# Patient Record
Sex: FEMALE | Race: BLACK OR AFRICAN AMERICAN | NOT HISPANIC OR LATINO | Employment: FULL TIME | URBAN - METROPOLITAN AREA
[De-identification: names, ages, dates, MRNs, and addresses within clinical notes are randomized per-mention and may not be internally consistent; named-entity substitution may affect disease eponyms.]

---

## 2017-01-13 RX ORDER — BETAMETHASONE DIPROPIONATE 0.5 MG/G
CREAM TOPICAL
Qty: 45 G | Refills: 3 | Status: SHIPPED | OUTPATIENT
Start: 2017-01-13 | End: 2018-07-27 | Stop reason: SDUPTHER

## 2017-02-13 ENCOUNTER — TELEPHONE (OUTPATIENT)
Dept: OBSTETRICS AND GYNECOLOGY | Facility: CLINIC | Age: 52
End: 2017-02-13

## 2017-02-13 RX ORDER — NORETHINDRONE 0.35 MG/1
1 TABLET ORAL DAILY
Qty: 84 TABLET | Refills: 3 | Status: SHIPPED | OUTPATIENT
Start: 2017-02-13 | End: 2017-02-17 | Stop reason: SDUPTHER

## 2017-02-14 NOTE — TELEPHONE ENCOUNTER
----- Message from Ursula Henao MA sent at 2/9/2017  3:17 PM CST -----  Contact:  [facility]      ----- Message -----     From: Nadia Navarro     Sent: 2/9/2017  12:25 PM       To: Gamaliel MAHER Staff     Pt is calling nurse staff regarding a refill RX robert . Pt call back to be advise [phone]    [facility] - [place] - [address] [address]  [address] [place] [address]  Phone: [phone] Fax: [phone]

## 2017-02-17 ENCOUNTER — TELEPHONE (OUTPATIENT)
Dept: OBSTETRICS AND GYNECOLOGY | Facility: CLINIC | Age: 52
End: 2017-02-17

## 2017-02-17 RX ORDER — NORETHINDRONE 0.35 MG/1
1 TABLET ORAL DAILY
Qty: 84 TABLET | Refills: 0 | Status: SHIPPED | OUTPATIENT
Start: 2017-02-17 | End: 2018-06-19

## 2017-02-17 NOTE — TELEPHONE ENCOUNTER
----- Message from Ursula Henao MA sent at 2/17/2017  9:17 AM CST -----      ----- Message -----     From: Laura Collado     Sent: 2/16/2017   4:38 PM       To: Gamaliel MAHER Staff    Would like a refill on jonatan. Pt uses.  [facility] - [place]  [id number]  [address]  [place] [address]  Phone: [phone] Fax: [phone]    Please call back at [phone]. Thanks//cdb

## 2017-02-27 ENCOUNTER — OFFICE VISIT (OUTPATIENT)
Dept: FAMILY MEDICINE | Facility: CLINIC | Age: 52
End: 2017-02-27
Payer: COMMERCIAL

## 2017-02-27 VITALS
SYSTOLIC BLOOD PRESSURE: 90 MMHG | OXYGEN SATURATION: 98 % | HEIGHT: 62 IN | RESPIRATION RATE: 16 BRPM | WEIGHT: 184.31 LBS | TEMPERATURE: 97 F | DIASTOLIC BLOOD PRESSURE: 70 MMHG | BODY MASS INDEX: 33.92 KG/M2

## 2017-02-27 DIAGNOSIS — L50.9 URTICARIA: Primary | ICD-10-CM

## 2017-02-27 PROCEDURE — 96372 THER/PROPH/DIAG INJ SC/IM: CPT | Mod: S$GLB,,, | Performed by: FAMILY MEDICINE

## 2017-02-27 PROCEDURE — 99214 OFFICE O/P EST MOD 30 MIN: CPT | Mod: 25,S$GLB,, | Performed by: FAMILY MEDICINE

## 2017-02-27 PROCEDURE — 1160F RVW MEDS BY RX/DR IN RCRD: CPT | Mod: S$GLB,,, | Performed by: FAMILY MEDICINE

## 2017-02-27 PROCEDURE — 3074F SYST BP LT 130 MM HG: CPT | Mod: S$GLB,,, | Performed by: FAMILY MEDICINE

## 2017-02-27 PROCEDURE — 99999 PR PBB SHADOW E&M-EST. PATIENT-LVL IV: CPT | Mod: PBBFAC,,, | Performed by: FAMILY MEDICINE

## 2017-02-27 PROCEDURE — 3078F DIAST BP <80 MM HG: CPT | Mod: S$GLB,,, | Performed by: FAMILY MEDICINE

## 2017-02-27 RX ORDER — BETAMETHASONE SODIUM PHOSPHATE AND BETAMETHASONE ACETATE 3; 3 MG/ML; MG/ML
6 INJECTION, SUSPENSION INTRA-ARTICULAR; INTRALESIONAL; INTRAMUSCULAR; SOFT TISSUE ONCE
Status: COMPLETED | OUTPATIENT
Start: 2017-02-27 | End: 2017-02-27

## 2017-02-27 RX ORDER — TRIAMCINOLONE ACETONIDE 40 MG/ML
40 INJECTION, SUSPENSION INTRA-ARTICULAR; INTRAMUSCULAR
Status: COMPLETED | OUTPATIENT
Start: 2017-02-27 | End: 2017-02-27

## 2017-02-27 RX ADMIN — TRIAMCINOLONE ACETONIDE 40 MG: 40 INJECTION, SUSPENSION INTRA-ARTICULAR; INTRAMUSCULAR at 08:02

## 2017-02-27 RX ADMIN — BETAMETHASONE SODIUM PHOSPHATE AND BETAMETHASONE ACETATE 6 MG: 3; 3 INJECTION, SUSPENSION INTRA-ARTICULAR; INTRALESIONAL; INTRAMUSCULAR; SOFT TISSUE at 08:02

## 2017-02-27 NOTE — PROGRESS NOTES
Subjective:       Patient ID: Stacey Wade is a 51 y.o. female.    Chief Complaint: Rash      HPI   Ms. Wade present to clinic today for complaints of urticaria flare up.   She states her last flare up was a few weeks ago.   She states she see allergy and dermatology for this.   She is suppose to be on XOLAIR , but last month got her prescription late.   Her allergist is Dr. Horton.   She states she is not sure what caused the flare up and it might be related to stress.   Her last flare up was a few weeks ago.   She states over the weekend, she had a lot of itching on her face.   She usually has to come get steroid shot to make it better.     Review of Systems   Constitutional: Negative for fever.   Respiratory: Negative for cough and shortness of breath.    Cardiovascular: Negative for chest pain.   Gastrointestinal: Negative for abdominal pain, diarrhea, nausea and vomiting.   Skin: Positive for rash.       Medication List with Changes/Refills   Current Medications    ASPIRIN 81 MG TAB    Take 1 tablet by mouth.    BETAMETHASONE DIPROPIONATE (DIPROLENE) 0.05 % CREAM    APPLY TOPICALLY 2 TIMES DAILY    CONNIE 0.35 MG TABLET    Take 1 tablet (0.35 mg total) by mouth once daily.    CLORAZEPATE (TRANXENE) 3.75 MG TAB    Take 1 tablet (3.75 mg total) by mouth daily as needed.    DOXEPIN (SINEQUAN) 10 MG CAPSULE    TAKE 2 CAPSULES BY MOUTH 3 TIMES A DAY    FEXOFENADINE (ALLEGRA) 180 MG TABLET    TAKE ONE TABLET BY MOUTH EVERY DAY    HYDROXYZINE HCL (ATARAX) 25 MG TABLET    TAKE ONE TABLET BY MOUTH FOUR TIMES A DAY AS NEEDED    MOMETASONE (NASONEX) 50 MCG/ACTUATION NASAL SPRAY    2 sprays by Nasal route once daily.    MONTELUKAST (SINGULAIR) 10 MG TABLET    Take 1 tablet (10 mg total) by mouth once daily.    OMALIZUMAB (XOLAIR) 150 MG INJECTION    Inject 150 mg into the skin.    POTASSIUM CHLORIDE (MICRO-K) 10 MEQ CPSR    Take 1 capsule (10 mEq total) by mouth once daily.    RANITIDINE (ZANTAC) 150 MG TABLET     Take 1 tablet (150 mg total) by mouth 2 (two) times daily.    TRIAMTERENE-HYDROCHLOROTHIAZIDE 37.5-25 MG (DYAZIDE) 37.5-25 MG PER CAPSULE    Take 1 capsule by mouth once daily.       Patient Active Problem List   Diagnosis    Essential hypertension    Allergic rhinitis    Anxiety    Urticaria         Objective:     Physical Exam   Constitutional: She is oriented to person, place, and time. She appears well-developed and well-nourished. No distress.   HENT:   Head: Normocephalic and atraumatic.   Right Ear: External ear normal.   Left Ear: External ear normal.   Eyes: EOM are normal. Right eye exhibits no discharge. Left eye exhibits no discharge.   Cardiovascular: Normal rate and regular rhythm.    Pulmonary/Chest: Effort normal and breath sounds normal. No respiratory distress. She has no wheezes.   Musculoskeletal: She exhibits no edema.   Neurological: She is alert and oriented to person, place, and time.   Skin: Skin is warm and dry. Rash noted. She is not diaphoretic. No erythema.   Small papule noted on face    Psychiatric: She has a normal mood and affect.   Vitals reviewed.    Vitals:    02/27/17 0819   BP: 90/70   Resp: 16   Temp: 97.4 °F (36.3 °C)       Assessment/  PLAN     Urticaria  -     triamcinolone acetonide injection 40 mg; Inject 1 mL (40 mg total) into the muscle one time.  -     betamethasone acetate-betamethasone sodium phosphate injection 6 mg; Inject 1 mL (6 mg total) into the muscle once.    plan as above   Get back in with dermatology / allergy   Reviewed previous notes and saw where pt had got steroid shot from PCP    Shelby Lawrence MD  [facility]

## 2017-02-27 NOTE — MR AVS SNAPSHOT
[facility]  [address]  [place] [address]  Phone: [phone]                  Stacey Wade   2017 8:00 AM   Office Visit    Description:  Female : 1965   Provider:  Shelby Lawrence MD   Department:  [facility]           Reason for Visit     Rash           Diagnoses this Visit        Comments    Urticaria    -  Primary            To Do List           Future Appointments        Provider Department Dept Phone    3/13/2017 10:00 AM Nadiya Alston MD [facility]/ -[phone]      Goals (5 Years of Data)     None      Follow-Up and Disposition     Return if symptoms worsen or fail to improve.      [facility]     [facility] On Call Nurse [facility] Line -  Assistance  Registered nurses in the [facility] On Call Center provide clinical advisement, health education, appointment booking, and other advisory services.  Call for this free service at [phone].             Medications           Message regarding Medications     Verify the changes and/or additions to your medication regime listed below are the same as discussed with your clinician today.  If any of these changes or additions are incorrect, please notify your healthcare provider.        These medications were administered today        Dose Freq    triamcinolone acetonide injection 40 mg 40 mg Clinic/HOD 1 time    Sig: Inject 1 mL (40 mg total) into the muscle one time.    Class: Normal    Route: Intramuscular    betamethasone acetate-betamethasone sodium phosphate injection 6 mg 6 mg Once    Sig: Inject 1 mL (6 mg total) into the muscle once.    Class: Normal    Route: Intramuscular           Verify that the below list of medications is an accurate representation of the medications you are currently taking.  If none reported, the list may be blank. If incorrect, please contact your healthcare provider. Carry this list with you in case of emergency.           Current  Medications     aspirin 81 mg Tab Take 1 tablet by mouth.    betamethasone dipropionate (DIPROLENE) 0.05 % cream APPLY TOPICALLY 2 TIMES DAILY    CONNIE 0.35 mg tablet Take 1 tablet (0.35 mg total) by mouth once daily.    clorazepate (TRANXENE) 3.75 MG Tab Take 1 tablet (3.75 mg total) by mouth daily as needed.    doxepin (SINEQUAN) 10 MG capsule TAKE 2 CAPSULES BY MOUTH 3 TIMES A DAY    fexofenadine (ALLEGRA) 180 MG tablet TAKE ONE TABLET BY MOUTH EVERY DAY    hydrOXYzine HCl (ATARAX) 25 MG tablet TAKE ONE TABLET BY MOUTH FOUR TIMES A DAY AS NEEDED    mometasone (NASONEX) 50 mcg/actuation nasal spray 2 sprays by Nasal route once daily.    montelukast (SINGULAIR) 10 mg tablet Take 1 tablet (10 mg total) by mouth once daily.    omalizumab (XOLAIR) 150 mg injection Inject 150 mg into the skin.    potassium chloride (MICRO-K) 10 MEQ CpSR Take 1 capsule (10 mEq total) by mouth once daily.    ranitidine (ZANTAC) 150 MG tablet Take 1 tablet (150 mg total) by mouth 2 (two) times daily.    triamterene-hydrochlorothiazide 37.5-25 mg (DYAZIDE) 37.5-25 mg per capsule Take 1 capsule by mouth once daily.           Clinical Reference Information           Your Vitals Were     BP                   90/70           Blood Pressure          Most Recent Value    BP  90/70      Allergies as of 2/27/2017     Benzalkonium Chloride    Black Pepper    Chocolate Flavor    Citrus And Derivatives    Furosemide    Grapefruit    Latex    Lemon Balm (Aline Officinalis)    Neomycin-bacitracin-polymyxin    Oats (Yehuda)    Wheat Containing Prod      Immunizations Administered on Date of Encounter - 2/27/2017     None      Administrations This Visit     betamethasone acetate-betamethasone sodium phosphate injection 6 mg     Admin Date Action Dose Route Administered By             02/27/2017 Given 6 mg Intramuscular Parvin Pierce LPN                    triamcinolone acetonide injection 40 mg     Admin Date Action Dose Route Administered By              02/27/2017 Given 40 mg Intramuscular Parvin Pierce LPN                      Language Assistance Services     ATTENTION: Language assistance services are available, free of charge. Please call [phone].      ATENCIÓN: Si nayely weathers, tiene a rodriguez disposición servicios gratuitos de asistencia lingüística. Llame al [phone].     CHÚ Ý: N?u b?n nói Ti?ng Vi?t, có các d?ch v? h? tr? ngôn ng? mi?n phí dành cho b?n. G?i s? [phone].         [facility] complies with applicable Federal civil rights laws and does not discriminate on the basis of race, color, national origin, age, disability, or sex.

## 2017-03-13 ENCOUNTER — OFFICE VISIT (OUTPATIENT)
Dept: OBSTETRICS AND GYNECOLOGY | Facility: CLINIC | Age: 52
End: 2017-03-13
Payer: COMMERCIAL

## 2017-03-13 VITALS
WEIGHT: 183 LBS | DIASTOLIC BLOOD PRESSURE: 70 MMHG | BODY MASS INDEX: 33.68 KG/M2 | HEIGHT: 62 IN | SYSTOLIC BLOOD PRESSURE: 122 MMHG

## 2017-03-13 DIAGNOSIS — N63.10 BREAST MASS, RIGHT: ICD-10-CM

## 2017-03-13 DIAGNOSIS — Z01.419 WELL WOMAN EXAM WITH ROUTINE GYNECOLOGICAL EXAM: Primary | ICD-10-CM

## 2017-03-13 PROCEDURE — 88175 CYTOPATH C/V AUTO FLUID REDO: CPT

## 2017-03-13 PROCEDURE — 3078F DIAST BP <80 MM HG: CPT | Mod: S$GLB,,, | Performed by: OBSTETRICS & GYNECOLOGY

## 2017-03-13 PROCEDURE — 99396 PREV VISIT EST AGE 40-64: CPT | Mod: S$GLB,,, | Performed by: OBSTETRICS & GYNECOLOGY

## 2017-03-13 PROCEDURE — 3074F SYST BP LT 130 MM HG: CPT | Mod: S$GLB,,, | Performed by: OBSTETRICS & GYNECOLOGY

## 2017-03-13 PROCEDURE — 99999 PR PBB SHADOW E&M-EST. PATIENT-LVL III: CPT | Mod: PBBFAC,,, | Performed by: OBSTETRICS & GYNECOLOGY

## 2017-03-13 NOTE — PROGRESS NOTES
CHIEF COMPLAINT:   Gynecologic Exam  Chief Complaint   Patient presents with    Gynecologic Exam       HISTORY OF PRESENT ILLNESS  Patient presents for annual exam. The patient has no complaints today.   She is sexually active.  Contraception:  robert ocp and wants to cont    Menses regular Qmonth <5days in length with moderate/normal flow.    Patient's last menstrual period was 02/20/2017.    GYN screening history: last Pap: was normal.      Health Maintenance   Topic Date Due    Pap Smear  03/12/2017    Lipid Panel  10/11/2017    Mammogram  12/06/2017    TETANUS VACCINE  10/10/2026    Influenza Vaccine  Completed       HISTORY  Patient Active Problem List   Diagnosis    Essential hypertension    Allergic rhinitis    Anxiety    Urticaria       Past Medical History:   Diagnosis Date    Allergic rhinitis     Anxiety     Hypertension     Urticaria        Past Surgical History:   Procedure Laterality Date    HERNIA REPAIR Left        Family History   Problem Relation Age of Onset    Lung cancer Paternal Grandfather     Ovarian cancer Maternal Grandmother     Hyperlipidemia Mother     Hypertension Mother     Breast cancer Mother     Arthritis Mother     Lung cancer Father     Breast cancer Maternal Aunt     Heart failure Other      Great aunt    Prostate cancer Brother        Social History     Social History    Marital status: Single     Spouse name: N/A    Number of children: N/A    Years of education: N/A     Social History Main Topics    Smoking status: Never Smoker    Smokeless tobacco: Never Used    Alcohol use No    Drug use: No    Sexual activity: Not Currently     Partners: Male     Birth control/ protection: OCP, None     Other Topics Concern    None     Social History Narrative    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       Current Outpatient Prescriptions   Medication Sig Dispense Refill     aspirin 81 mg Tab Take 1 tablet by mouth.      betamethasone dipropionate (DIPROLENE) 0.05 % cream APPLY TOPICALLY 2 TIMES DAILY 45 g 3    CONNIE 0.35 mg tablet Take 1 tablet (0.35 mg total) by mouth once daily. 84 tablet 0    clorazepate (TRANXENE) 3.75 MG Tab Take 1 tablet (3.75 mg total) by mouth daily as needed. 30 tablet 0    doxepin (SINEQUAN) 10 MG capsule TAKE 2 CAPSULES BY MOUTH 3 TIMES A  capsule 11    fexofenadine (ALLEGRA) 180 MG tablet TAKE ONE TABLET BY MOUTH EVERY DAY 30 tablet 0    hydrOXYzine HCl (ATARAX) 25 MG tablet TAKE ONE TABLET BY MOUTH FOUR TIMES A DAY AS NEEDED 120 tablet 5    mometasone (NASONEX) 50 mcg/actuation nasal spray 2 sprays by Nasal route once daily. 1 each 11    montelukast (SINGULAIR) 10 mg tablet Take 1 tablet (10 mg total) by mouth once daily. 30 tablet 11    omalizumab (XOLAIR) 150 mg injection Inject 150 mg into the skin.      potassium chloride (MICRO-K) 10 MEQ CpSR Take 1 capsule (10 mEq total) by mouth once daily. 30 capsule 11    ranitidine (ZANTAC) 150 MG tablet Take 1 tablet (150 mg total) by mouth 2 (two) times daily. 60 tablet 11    triamterene-hydrochlorothiazide 37.5-25 mg (DYAZIDE) 37.5-25 mg per capsule Take 1 capsule by mouth once daily. 30 capsule 11     No current facility-administered medications for this visit.        Review of patient's allergies indicates:   Allergen Reactions    Benzalkonium chloride     Black pepper      Other reaction(s): Hives    Chocolate flavor      Other reaction(s): Hives    Citrus and derivatives Hives     LEMON PRODUCTS    Furosemide     Grapefruit      Other reaction(s): Hives    Latex      Other reaction(s): Hives    Lemon balm (casper officinalis) Hives    Neomycin-bacitracin-polymyxin      Other reaction(s): Rash    Oats (richard) Hives     Oat foods (oatmeal, etc...)    Wheat containing prod            PHYSICAL EXAM     Vitals:    03/13/17 1036   BP: 122/70       PAIN SCALE: 0/10  None    ROS:  GENERAL: No fever, chills, fatigability or weight loss.  ABDOMEN: Appetite fine. No weight loss. Denies diarrhea, abdominal pain, hematemesis or blood in stool.  No change in bowel movement pattern.  URINARY: No flank pain, dysuria or hematuria.  REPRODUCTIVE: No abnormal vaginal bleeding.  BREASTS: Breasts symmetric, nontender and no lumps detected.    PE:   APPEARANCE: Well nourished, well developed, in no acute distress.  NECK: Neck symmetric without masses or thyromegaly.     NODES: No inguinal lymph node enlargement.  ABDOMEN: Soft. No tenderness or masses. No hepatosplenomegaly. No hernias.  BREASTS: Symmetrical, no skin changes or visible lesions. No  nipple discharge or adenopathy bilaterally. There ia a palpable masses in the right breast right next to the areola that is NT mobile and oval shape but with irreg edges, 10x5mm    PELVIC:   VULVA: No lesions. Normal female genitalia.  URETHRAL MEATUS: Normal size and location, no lesions, no prolapse.  URETHRA: No masses, tenderness, prolapse or scarring.  VAGINA: Moist and well rugated, no discharge, no significant cystocele or rectocele.  CERVIX: No lesions, normal diameter, no stenosis, no cervical motion tenderness.  UTERUS: 10-12week size, irregular shape, mobile, non-tender, normal position, good support.  ADNEXA: No masses, tenderness or CDS nodularity.  ANUS PERINEUM: No lesions, no relaxation, external hemorrhoids noted.         DIAGNOSIS:   1. gyn exam  2. Screening pap smear  3. Breast mass      PLAN:   Breast u/s then f/u [facility]    MEDICATIONS PRESCRIBED:   MVI    COUNSELING:  Patient was counseled today on A.C.S. Pap guidelines and recommendations for yearly pelvic exams, mammograms and monthly self breast exams; to see her PCP for other health maintenance.     FOLLOW-UP: With me in 1 year. Pap smear every 3 years.

## 2017-03-13 NOTE — MR AVS SNAPSHOT
[facility]  [address]  [place] [address]  Phone: [phone]  Fax: [phone]                  Stacey Wade   3/13/2017 10:00 AM   Office Visit    Description:  Female : 1965   Provider:  Nadiya Alston MD   Department:  [facility]           Reason for Visit     Gynecologic Exam           Diagnoses this Visit        Comments    Well woman exam with routine gynecological exam    -  Primary     Breast mass, right                To Do List           Future Appointments        Provider Department Dept Phone    3/16/2017 2:45 PM [facility] [phone]    3/27/2017 8:00 AM Yue Merchant NP [facility] General Surgery [phone]      Goals (5 Years of Data)     None      [facility] On Call     [facility] On Call Nurse [facility] Line -  Assistance  Registered nurses in the [facility] On Call Center provide clinical advisement, health education, appointment booking, and other advisory services.  Call for this free service at [phone].             Medications           Message regarding Medications     Verify the changes and/or additions to your medication regime listed below are the same as discussed with your clinician today.  If any of these changes or additions are incorrect, please notify your healthcare provider.             Verify that the below list of medications is an accurate representation of the medications you are currently taking.  If none reported, the list may be blank. If incorrect, please contact your healthcare provider. Carry this list with you in case of emergency.           Current Medications     aspirin 81 mg Tab Take 1 tablet by mouth.    betamethasone dipropionate (DIPROLENE) 0.05 % cream APPLY TOPICALLY 2 TIMES DAILY    CONNIE 0.35 mg tablet Take 1 tablet (0.35 mg total) by mouth once daily.    clorazepate (TRANXENE) 3.75 MG Tab Take 1 tablet (3.75 mg total) by mouth daily as needed.    doxepin (SINEQUAN) 10  "MG capsule TAKE 2 CAPSULES BY MOUTH 3 TIMES A DAY    fexofenadine (ALLEGRA) 180 MG tablet TAKE ONE TABLET BY MOUTH EVERY DAY    hydrOXYzine HCl (ATARAX) 25 MG tablet TAKE ONE TABLET BY MOUTH FOUR TIMES A DAY AS NEEDED    mometasone (NASONEX) 50 mcg/actuation nasal spray 2 sprays by Nasal route once daily.    montelukast (SINGULAIR) 10 mg tablet Take 1 tablet (10 mg total) by mouth once daily.    omalizumab (XOLAIR) 150 mg injection Inject 150 mg into the skin.    potassium chloride (MICRO-K) 10 MEQ CpSR Take 1 capsule (10 mEq total) by mouth once daily.    ranitidine (ZANTAC) 150 MG tablet Take 1 tablet (150 mg total) by mouth 2 (two) times daily.    triamterene-hydrochlorothiazide 37.5-25 mg (DYAZIDE) 37.5-25 mg per capsule Take 1 capsule by mouth once daily.           Clinical Reference Information           Your Vitals Were     BP Height Weight Last Period BMI    122/70 5' 2" (1.575 m) 83 kg (183 lb) 02/20/2017 33.47 kg/m2      Blood Pressure          Most Recent Value    BP  122/70      Allergies as of 3/13/2017     Benzalkonium Chloride    Black Pepper    Chocolate Flavor    Citrus And Derivatives    Furosemide    Grapefruit    Latex    Lemon Balm (Aline Officinalis)    Neomycin-bacitracin-polymyxin    Oats (Yehuda)    Wheat Containing Prod      Immunizations Administered on Date of Encounter - 3/13/2017     None      Orders Placed During Today's Visit      Normal Orders This Visit    Ambulatory referral to General Surgery     Liquid-based pap smear, screening     Future Labs/Procedures Expected by Expires    US Breast Right Complete  3/13/2017 5/13/2018      Language Assistance Services     ATTENTION: Language assistance services are available, free of charge. Please call [phone].      ATENCIÓN: Si delorisla jasiel, tiene a rodriguez disposición servicios gratuitos de asistencia lingüística. Llame al [phone].     CHÚ Ý: N?u b?n nói Ti?ng Vi?t, có các d?ch v? h? tr? ngôn ng? mi?n phí dành cho b?n. G?i s? " [phone].         [facility] complies with applicable Federal civil rights laws and does not discriminate on the basis of race, color, national origin, age, disability, or sex.

## 2017-03-15 ENCOUNTER — TELEPHONE (OUTPATIENT)
Dept: RADIOLOGY | Facility: HOSPITAL | Age: 52
End: 2017-03-15

## 2017-03-16 ENCOUNTER — HOSPITAL ENCOUNTER (OUTPATIENT)
Dept: RADIOLOGY | Facility: HOSPITAL | Age: 52
Discharge: HOME | End: 2017-03-16
Attending: OBSTETRICS & GYNECOLOGY
Payer: COMMERCIAL

## 2017-03-16 DIAGNOSIS — N63.10 BREAST MASS, RIGHT: ICD-10-CM

## 2017-03-16 PROCEDURE — 77061 BREAST TOMOSYNTHESIS UNI: CPT | Mod: TC

## 2017-03-16 PROCEDURE — 76642 ULTRASOUND BREAST LIMITED: CPT | Mod: 26,RT,, | Performed by: RADIOLOGY

## 2017-03-16 PROCEDURE — 77061 BREAST TOMOSYNTHESIS UNI: CPT | Mod: 26,,, | Performed by: RADIOLOGY

## 2017-03-16 PROCEDURE — 77065 DX MAMMO INCL CAD UNI: CPT | Mod: 26,,, | Performed by: RADIOLOGY

## 2017-03-16 PROCEDURE — 76642 ULTRASOUND BREAST LIMITED: CPT | Mod: TC,PO,RT

## 2017-03-17 ENCOUNTER — DOCUMENTATION ONLY (OUTPATIENT)
Dept: RADIOLOGY | Facility: HOSPITAL | Age: 52
End: 2017-03-17

## 2017-03-17 NOTE — PROGRESS NOTES
Breast Care Management Follow-Up:    Date of Mammogram:03/16/17    Mammogram Reason:Palpable abnormality in the right breast    Mammogram Results:and Right U/S - no abnormality seen.      Referrals/Recommendations:Annual mammo in December 2017.        Patient Status:03/17/17 Pt has an appt with Yue Merchant NP on 3/27/17 for evaluation of breast mass. Results letter and report mailed to pt.

## 2017-03-27 ENCOUNTER — OFFICE VISIT (OUTPATIENT)
Dept: SURGERY | Facility: CLINIC | Age: 52
End: 2017-03-27
Payer: COMMERCIAL

## 2017-03-27 VITALS
WEIGHT: 185.44 LBS | HEART RATE: 81 BPM | SYSTOLIC BLOOD PRESSURE: 116 MMHG | BODY MASS INDEX: 33.91 KG/M2 | DIASTOLIC BLOOD PRESSURE: 74 MMHG

## 2017-03-27 DIAGNOSIS — Z80.3 FAMILY HISTORY OF BREAST CANCER: ICD-10-CM

## 2017-03-27 DIAGNOSIS — Z12.39 BREAST CANCER SCREENING, HIGH RISK PATIENT: ICD-10-CM

## 2017-03-27 DIAGNOSIS — N60.11 FIBROCYSTIC BREAST CHANGES, RIGHT: Primary | ICD-10-CM

## 2017-03-27 PROCEDURE — 1160F RVW MEDS BY RX/DR IN RCRD: CPT | Mod: S$GLB,,, | Performed by: NURSE PRACTITIONER

## 2017-03-27 PROCEDURE — 3078F DIAST BP <80 MM HG: CPT | Mod: S$GLB,,, | Performed by: NURSE PRACTITIONER

## 2017-03-27 PROCEDURE — 3074F SYST BP LT 130 MM HG: CPT | Mod: S$GLB,,, | Performed by: NURSE PRACTITIONER

## 2017-03-27 PROCEDURE — 99204 OFFICE O/P NEW MOD 45 MIN: CPT | Mod: S$GLB,,, | Performed by: NURSE PRACTITIONER

## 2017-03-27 PROCEDURE — 99999 PR PBB SHADOW E&M-EST. PATIENT-LVL III: CPT | Mod: PBBFAC,,, | Performed by: NURSE PRACTITIONER

## 2017-03-27 NOTE — MR AVS SNAPSHOT
[facility]  [address] [address]  Phone: [phone]  Fax: [phone]                  Stacey Wade   3/27/2017 8:00 AM   Office Visit    Description:  Female : 1965   Provider:  Yue Merchant NP   [facility]           Reason for Visit     Breast Problem                To Do List           Future Appointments        Provider Department Dept Phone    5/15/2017 8:00 AM Yue Merchant NP [facility] [phone]      Goals (5 Years of Data)     None      [facility]     [facility] On Call Nurse Care Line -  Assistance  Registered nurses in the [facility] On Call Center provide clinical advisement, health education, appointment booking, and other advisory services.  Call for this free service at [phone].             Medications           Message regarding Medications     Verify the changes and/or additions to your medication regime listed below are the same as discussed with your clinician today.  If any of these changes or additions are incorrect, please notify your healthcare provider.             Verify that the below list of medications is an accurate representation of the medications you are currently taking.  If none reported, the list may be blank. If incorrect, please contact your healthcare provider. Carry this list with you in case of emergency.           Current Medications     aspirin 81 mg Tab Take 1 tablet by mouth.    betamethasone dipropionate (DIPROLENE) 0.05 % cream APPLY TOPICALLY 2 TIMES DAILY    CONNIE 0.35 mg tablet Take 1 tablet (0.35 mg total) by mouth once daily.    clorazepate (TRANXENE) 3.75 MG Tab Take 1 tablet (3.75 mg total) by mouth daily as needed.    doxepin (SINEQUAN) 10 MG capsule TAKE 2 CAPSULES BY MOUTH 3 TIMES A DAY    fexofenadine (ALLEGRA) 180 MG tablet TAKE ONE TABLET BY MOUTH EVERY DAY    hydrOXYzine HCl (ATARAX) 25 MG tablet TAKE ONE TABLET BY MOUTH  FOUR TIMES A DAY AS NEEDED    mometasone (NASONEX) 50 mcg/actuation nasal spray 2 sprays by Nasal route once daily.    montelukast (SINGULAIR) 10 mg tablet Take 1 tablet (10 mg total) by mouth once daily.    omalizumab (XOLAIR) 150 mg injection Inject 150 mg into the skin.    potassium chloride (MICRO-K) 10 MEQ CpSR Take 1 capsule (10 mEq total) by mouth once daily.    ranitidine (ZANTAC) 150 MG tablet Take 1 tablet (150 mg total) by mouth 2 (two) times daily.    triamterene-hydrochlorothiazide 37.5-25 mg (DYAZIDE) 37.5-25 mg per capsule Take 1 capsule by mouth once daily.           Clinical Reference Information           Your Vitals Were     BP Pulse Weight Last Period BMI    116/74 81 84.1 kg (185 lb 6.5 oz) 02/20/2017 33.91 kg/m2      Blood Pressure          Most Recent Value    BP  116/74      Allergies as of 3/27/2017     Benzalkonium Chloride    Black Pepper    Chocolate Flavor    Citrus And Derivatives    Furosemide    Grapefruit    Latex    Lemon Balm (Aline Officinalis)    Neomycin-bacitracin-polymyxin    Oats (Yehuda)    Wheat Containing Prod      Immunizations Administered on Date of Encounter - 3/27/2017     None      Language Assistance Services     ATTENTION: Language assistance services are available, free of charge. Please call [phone].      ATENCIÓN: Si nayely weathers, tiene a rodriguez disposición servicios gratuitos de asistencia lingüística. Llame al [phone].     CHÚ Ý: N?u b?n nói Ti?ng Vi?t, có các d?ch v? h? tr? ngôn ng? mi?n phí dành cho b?n. G?i s? [phone].         [facility] complies with applicable Federal civil rights laws and does not discriminate on the basis of race, color, national origin, age, disability, or sex.

## 2017-03-27 NOTE — PROGRESS NOTES
Patient ID: Stacey Wade is a 51 y.o. female.    Chief Complaint: Breast Problem (breast mass)    HPI: Patient presents for the evaluation of a breast mass noted during her routine gyn exam.    Review of Systems   Constitutional: Negative.    HENT: Negative.    Eyes: Negative.    Respiratory: Negative.    Cardiovascular: Negative.    Gastrointestinal: Negative.    Endocrine: Negative.    Genitourinary: Negative.    Musculoskeletal: Negative.    Skin: Negative.    Allergic/Immunologic: Negative.    Neurological: Negative.    Hematological: Negative.    Psychiatric/Behavioral: Negative.    Breast: pt denies any breast pain, nipple discharge or palpable mass that she has noted. Denies any previous breast abnormalities or trauma.    Current Outpatient Prescriptions   Medication Sig Dispense Refill    aspirin 81 mg Tab Take 1 tablet by mouth.      betamethasone dipropionate (DIPROLENE) 0.05 % cream APPLY TOPICALLY 2 TIMES DAILY 45 g 3    CONNIE 0.35 mg tablet Take 1 tablet (0.35 mg total) by mouth once daily. 84 tablet 0    clorazepate (TRANXENE) 3.75 MG Tab Take 1 tablet (3.75 mg total) by mouth daily as needed. 30 tablet 0    doxepin (SINEQUAN) 10 MG capsule TAKE 2 CAPSULES BY MOUTH 3 TIMES A  capsule 11    fexofenadine (ALLEGRA) 180 MG tablet TAKE ONE TABLET BY MOUTH EVERY DAY 30 tablet 0    hydrOXYzine HCl (ATARAX) 25 MG tablet TAKE ONE TABLET BY MOUTH FOUR TIMES A DAY AS NEEDED 120 tablet 5    mometasone (NASONEX) 50 mcg/actuation nasal spray 2 sprays by Nasal route once daily. 1 each 11    montelukast (SINGULAIR) 10 mg tablet Take 1 tablet (10 mg total) by mouth once daily. 30 tablet 11    omalizumab (XOLAIR) 150 mg injection Inject 150 mg into the skin.      potassium chloride (MICRO-K) 10 MEQ CpSR Take 1 capsule (10 mEq total) by mouth once daily. 30 capsule 11    ranitidine (ZANTAC) 150 MG tablet Take 1 tablet (150 mg total) by mouth 2 (two) times daily. 60 tablet 11     triamterene-hydrochlorothiazide 37.5-25 mg (DYAZIDE) 37.5-25 mg per capsule Take 1 capsule by mouth once daily. 30 capsule 11     No current facility-administered medications for this visit.        Review of patient's allergies indicates:   Allergen Reactions    Benzalkonium chloride     Black pepper      Other reaction(s): Hives    Chocolate flavor      Other reaction(s): Hives    Citrus and derivatives Hives     LEMON PRODUCTS    Furosemide     Grapefruit      Other reaction(s): Hives    Latex      Other reaction(s): Hives    Lemon balm (casper officinalis) Hives    Neomycin-bacitracin-polymyxin      Other reaction(s): Rash    Oats (richard) Hives     Oat foods (oatmeal, etc...)    Wheat containing prod        Past Medical History:   Diagnosis Date    Allergic rhinitis     Anxiety     Hypertension     Urticaria        Past Surgical History:   Procedure Laterality Date    HERNIA REPAIR Left        Family History   Problem Relation Age of Onset    Lung cancer Paternal Grandfather     Ovarian cancer Maternal Grandmother     Hyperlipidemia Mother     Hypertension Mother     Breast cancer Mother     Arthritis Mother     Lung cancer Father     Breast cancer Maternal Aunt     Heart failure Other      Great aunt    Prostate cancer Brother        Social History     Social History    Marital status: Single     Spouse name: N/A    Number of children: N/A    Years of education: N/A     Occupational History    Not on file.     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       Vitals:    03/27/17 0822   BP: 116/74   Pulse: 81        Physical Exam   Constitutional: She is oriented to person, place, and time. She appears well-developed and well-nourished.   HENT:   Head: Normocephalic and atraumatic.   Right Ear: External ear normal.   Left Ear: External ear normal.   Eyes: Conjunctivae and EOM are normal. Pupils are equal, round, and reactive to light. Right eye exhibits no discharge. Left eye exhibits no discharge. No scleral icterus.   Neck: Normal range of motion. Neck supple. No thyromegaly present.   Cardiovascular: Normal rate and regular rhythm.    Pulmonary/Chest: Effort normal and breath sounds normal. Right breast exhibits no inverted nipple, no mass, no nipple discharge, no skin change and no tenderness. Left breast exhibits no inverted nipple, no mass, no nipple discharge, no skin change and no tenderness.       Musculoskeletal: Normal range of motion. She exhibits no edema.   Lymphadenopathy:        Head (right side): No submental, no submandibular, no tonsillar, no preauricular, no posterior auricular and no occipital adenopathy present.        Head (left side): No submental, no submandibular, no tonsillar, no preauricular, no posterior auricular and no occipital adenopathy present.     She has no cervical adenopathy.        Right cervical: No superficial cervical and no posterior cervical adenopathy present.       Left cervical: No superficial cervical and no posterior cervical adenopathy present.     She has no axillary adenopathy.        Right: No supraclavicular adenopathy present.        Left: No supraclavicular adenopathy present.   Neurological: She is alert and oriented to person, place, and time.   Skin: Skin is warm and dry.   Psychiatric: She has a normal mood and affect. Her behavior is normal. Judgment and thought content normal.   Vitals reviewed.    IMAGIN16- bilateral mammo wnl  3/16/17:  MAMMO DIGITAL DIAGNOSTIC RIGHT WITH TOMOSYNTHESIS_CAD  Views: right craniocaudal spot compression; right  craniocaudal with  tomosynthesis; right mediolateral spot compression; right mediolateral with  tomosynthesis; right mediolateral oblique spot compression; right  mediolateral oblique with tomosynthesis    The breasts are heterogeneously dense.  This may lower the sensitivity of  mammography.    Finding 1:  No significant masses, significant calcifications, or other  abnormalities are seen.  Digital tomosynthesis was performed and used in the interpretation of the  images.  Images were evaluated with a Computer Aided Detection (CAD) system.    RIGHT LIMITED ULTRASOUND FINDINGS:  High-resolution real-time ultrasound scanning was performed.    Finding 2:  There is no evidence of any solid mass or abnormal cystic  elements.  Impression  Finding 1:  There is no mammographic evidence of malignancy.    Finding 2:  There is no sonographic evidence of malignancy.    Routine follow-up mammogram in 1 year is recommended.    Menarche at 18 y/o    LMP: 3/17/17  No history of hormone use other than birth control and no history of radiation to the neck or chest wall.     FH: Mother breast cancer at 59 y/o, Maternal aunt breast cancer at 49 y/o, Maternal GM ovarian cancer, Father lung and prostate cancer, Brother - prostate cancer    Assessment & Plan:  1. Area of prominent glandular tissue noted at the 3 oclock location of the right breast. Fibrous texture and blends more medially.  2. Negative imaging - no abnormality noted on mammo or ultrasound.  3. Explained clinical findings and imaging findings.   4. Risk assessment performed with NCI breast cancer tool- Patient risk is 1% higher than average woman her age over the next 5 years and 1% higher over the average for a lifetime.   5. Recommend pt rtc in 6 weeks for a recheck and measure of the area. If changes would recommend biopsy. If stable continue to watch. Pt agrees.   6. Patient verbalized understanding of the recommendations and agrees with the plan- proper bse  demonstrated for pt.

## 2017-03-27 NOTE — LETTER
March 27, 2017      Nadiya Alston MD  [address] [facility] [address] [facility] [address]           [facility]  [address] [place] [address]  Phone: [phone]  Fax: [phone]          Patient: Stacey Wade   MR Number: [id number]   YOB: 1965   Date of Visit: 3/27/2017       Dear Dr. Nadiya Alston:    Thank you for referring Stacey Wade to me for evaluation. Attached you will find relevant portions of my assessment and plan of care.    If you have questions, please do not hesitate to call me. I look forward to following Stacey Wade along with you.    Sincerely,    Yue Merchant NP    Enclosure  CC:  No Recipients    If you would like to receive this communication electronically, please contact [email or web address] or [phone] to request more information on [employer] Link access.    For providers and/or their staff who would like to refer a patient to [facility], please contact us through our one-stop-shop provider referral line, Rosie Rivers, at [phone].    If you feel you have received this communication in error or would no longer like to receive these types of communications, please e-mail [email or web address]

## 2017-04-28 ENCOUNTER — OFFICE VISIT (OUTPATIENT)
Dept: FAMILY MEDICINE | Facility: CLINIC | Age: 52
End: 2017-04-28
Payer: COMMERCIAL

## 2017-04-28 VITALS
TEMPERATURE: 98 F | RESPIRATION RATE: 18 BRPM | HEART RATE: 90 BPM | SYSTOLIC BLOOD PRESSURE: 110 MMHG | HEIGHT: 62 IN | BODY MASS INDEX: 33.71 KG/M2 | WEIGHT: 183.19 LBS | DIASTOLIC BLOOD PRESSURE: 80 MMHG

## 2017-04-28 DIAGNOSIS — L50.9 URTICARIA: Primary | ICD-10-CM

## 2017-04-28 PROCEDURE — 3074F SYST BP LT 130 MM HG: CPT | Mod: S$GLB,,, | Performed by: FAMILY MEDICINE

## 2017-04-28 PROCEDURE — 1160F RVW MEDS BY RX/DR IN RCRD: CPT | Mod: S$GLB,,, | Performed by: FAMILY MEDICINE

## 2017-04-28 PROCEDURE — 96372 THER/PROPH/DIAG INJ SC/IM: CPT | Mod: S$GLB,,, | Performed by: FAMILY MEDICINE

## 2017-04-28 PROCEDURE — 3079F DIAST BP 80-89 MM HG: CPT | Mod: S$GLB,,, | Performed by: FAMILY MEDICINE

## 2017-04-28 PROCEDURE — 99999 PR PBB SHADOW E&M-EST. PATIENT-LVL III: CPT | Mod: PBBFAC,,, | Performed by: FAMILY MEDICINE

## 2017-04-28 PROCEDURE — 99213 OFFICE O/P EST LOW 20 MIN: CPT | Mod: 25,S$GLB,, | Performed by: FAMILY MEDICINE

## 2017-04-28 RX ORDER — BETAMETHASONE SODIUM PHOSPHATE AND BETAMETHASONE ACETATE 3; 3 MG/ML; MG/ML
6 INJECTION, SUSPENSION INTRA-ARTICULAR; INTRALESIONAL; INTRAMUSCULAR; SOFT TISSUE
Status: COMPLETED | OUTPATIENT
Start: 2017-04-28 | End: 2017-04-28

## 2017-04-28 RX ORDER — TRIAMCINOLONE ACETONIDE 40 MG/ML
40 INJECTION, SUSPENSION INTRA-ARTICULAR; INTRAMUSCULAR
Status: COMPLETED | OUTPATIENT
Start: 2017-04-28 | End: 2017-04-28

## 2017-04-28 RX ADMIN — TRIAMCINOLONE ACETONIDE 40 MG: 40 INJECTION, SUSPENSION INTRA-ARTICULAR; INTRAMUSCULAR at 10:04

## 2017-04-28 RX ADMIN — BETAMETHASONE SODIUM PHOSPHATE AND BETAMETHASONE ACETATE 6 MG: 3; 3 INJECTION, SUSPENSION INTRA-ARTICULAR; INTRALESIONAL; INTRAMUSCULAR; SOFT TISSUE at 10:04

## 2017-04-28 NOTE — PROGRESS NOTES
CHIEF COMPLAINT: This is a 51-year-old female complaining of urticaria and itching.    SUBJECTIVE: Patient has chronic urticaria and receives monthly injections of Xolair, which has been very helpful in controlling her symptoms.  However, she's had flareup of urticaria recently with swelling and itching of right side of face.  She also complains of generalized itching despite taking Benadryl, ranitidine, Singulair, Atarax and Allegra.  Patient also applies Gold Bond powder.  She requests corticosteroid injection.  Last injection was 2 months ago.  Patient denies chest pain, shortness of breath or wheezing.    ROS:  GENERAL: Patient denies fever, chills, night sweats. Patient denies weight gain or loss. Patient denies anorexia, fatigue, weakness or swollen glands.  SKIN: Patient denies hair loss.  HEENT: Patient denies sore throat, ear pain, hearing loss, nasal congestion, or runny nose. Patient denies visual disturbance, eye irritation or discharge.  LUNGS: Patient denies cough, wheeze or hemoptysis.  CARDIOVASCULAR: Patient denies chest pain, shortness of breath, palpitations, syncope or lower extremity edema.  GI: Patient denies abdominal pain, nausea, vomiting, diarrhea, constipation, blood in stool or melena.     OBJECTIVE:    GENERAL: Well-developed well-nourished obese, black female alert and oriented x3 in no acute distress.  Memory, judgment and cognition without deficit.  SKIN: No urticaria seen. Slight erythema and swelling noted right face and neck.  No excoriations.  HEENT: Eyes: Clear conjunctivae.  No scleral icterus.  .  Pharynx: Without injection or exudates.  NECK: Supple, normal range of motion.  No lymphadenopathy.  LUNGS: Clear to auscultation.  Normal respiratory effort.  CARDIOVASCULAR: Regular rhythm, normal S1, S2 without murmur, gallop or rub.  EXTREMITIES: Without cyanosis, clubbing or edema.  Distal pulses 2+ and equal.  No joint effusion, erythema or warmth.    ASSESSMENT:  1. Urticaria       PLAN:   1.  Kenalog 40 mg and Celestone 6 mg IM.  2.  Continue maintenance medications.  3.  Cool baths.  4.  Follow-up with allergist.

## 2017-04-28 NOTE — MR AVS SNAPSHOT
[facility] Medicine  [address]  [place] [address]  Phone: [phone]                  Stacey Wade   2017 9:30 AM   Office Visit    Description:  Female : 1965   Provider:  Savannah Lindsey MD   Department:  [facility]           Reason for Visit     Allergic Reaction           Diagnoses this Visit        Comments    Urticaria    -  Primary            To Do List           Future Appointments        Provider Department Dept Phone    5/15/2017 8:00 AM Yue Merchant NP '[place] - General Surgery [phone]      Goals (5 Years of Data)     None      [facility]     [facility] On Call Nurse Care Line -  Assistance  Unless otherwise directed by your provider, please contact [facility] On[facility], our nurse care line that is available for  assistance.     Registered nurses in the [facility] On Call Center provide: appointment scheduling, clinical advisement, health education, and other advisory services.  Call: [phone] (toll free)               Medications           Message regarding Medications     Verify the changes and/or additions to your medication regime listed below are the same as discussed with your clinician today.  If any of these changes or additions are incorrect, please notify your healthcare provider.        These medications were administered today        Dose Freq    betamethasone acetate-betamethasone sodium phosphate injection 6 mg 6 mg Clinic/HOD 1 time    Sig: Inject 1 mL (6 mg total) into the muscle one time.    Class: Normal    Route: Intramuscular    triamcinolone acetonide injection 40 mg 40 mg Clinic/HOD 1 time    Sig: Inject 1 mL (40 mg total) into the muscle one time.    Class: Normal    Route: Intramuscular           Verify that the below list of medications is an accurate representation of the medications you are currently taking.  If none reported, the list may be blank. If incorrect, please contact  "your healthcare provider. Carry this list with you in case of emergency.           Current Medications     aspirin 81 mg Tab Take 1 tablet by mouth.    betamethasone dipropionate (DIPROLENE) 0.05 % cream APPLY TOPICALLY 2 TIMES DAILY    CONNIE 0.35 mg tablet Take 1 tablet (0.35 mg total) by mouth once daily.    clorazepate (TRANXENE) 3.75 MG Tab Take 1 tablet (3.75 mg total) by mouth daily as needed.    doxepin (SINEQUAN) 10 MG capsule TAKE 2 CAPSULES BY MOUTH 3 TIMES A DAY    fexofenadine (ALLEGRA) 180 MG tablet TAKE ONE TABLET BY MOUTH EVERY DAY    hydrOXYzine HCl (ATARAX) 25 MG tablet TAKE ONE TABLET BY MOUTH FOUR TIMES A DAY AS NEEDED    mometasone (NASONEX) 50 mcg/actuation nasal spray 2 sprays by Nasal route once daily.    montelukast (SINGULAIR) 10 mg tablet Take 1 tablet (10 mg total) by mouth once daily.    omalizumab (XOLAIR) 150 mg injection Inject 150 mg into the skin.    potassium chloride (MICRO-K) 10 MEQ CpSR Take 1 capsule (10 mEq total) by mouth once daily.    ranitidine (ZANTAC) 150 MG tablet Take 1 tablet (150 mg total) by mouth 2 (two) times daily.    triamterene-hydrochlorothiazide 37.5-25 mg (DYAZIDE) 37.5-25 mg per capsule Take 1 capsule by mouth once daily.           Clinical Reference Information           Your Vitals Were     BP Pulse Temp Resp Height Weight    110/80 90 97.5 °F (36.4 °C) (Tympanic) 18 5' 2" (1.575 m) 83.1 kg (183 lb 3.2 oz)    BMI                33.51 kg/m2          Blood Pressure          Most Recent Value    BP  110/80      Allergies as of 4/28/2017     Benzalkonium Chloride    Black Pepper    Chocolate Flavor    Citrus And Derivatives    Furosemide    Grapefruit    Latex    Lemon Balm (Aline Officinalis)    Neomycin-bacitracin-polymyxin    Oats (Yehuda)    Wheat Containing Prod      Immunizations Administered on Date of Encounter - 4/28/2017     None      Language Assistance Services     ATTENTION: Language assistance services are available, free of charge. Please call " [phone].      ATENCIÓN: Si habla español, tiene a rodriguez disposición servicios gratuitos de asistencia lingüística. Llame al [phone].     CHÚ Ý: N?u b?n nói Ti?ng Vi?t, có các d?ch v? h? tr? ngôn ng? mi?n phí dành cho b?n. G?i s? [phone].         [facility] complies with applicable Federal civil rights laws and does not discriminate on the basis of race, color, national origin, age, disability, or sex.

## 2017-05-15 ENCOUNTER — OFFICE VISIT (OUTPATIENT)
Dept: SURGERY | Facility: CLINIC | Age: 52
End: 2017-05-15
Payer: COMMERCIAL

## 2017-05-15 VITALS
WEIGHT: 184.06 LBS | SYSTOLIC BLOOD PRESSURE: 105 MMHG | BODY MASS INDEX: 33.67 KG/M2 | HEART RATE: 76 BPM | TEMPERATURE: 99 F | DIASTOLIC BLOOD PRESSURE: 80 MMHG

## 2017-05-15 DIAGNOSIS — N60.11 FIBROCYSTIC DISEASE OF BOTH BREASTS: Primary | ICD-10-CM

## 2017-05-15 DIAGNOSIS — N60.12 FIBROCYSTIC DISEASE OF BOTH BREASTS: Primary | ICD-10-CM

## 2017-05-15 DIAGNOSIS — Z80.3 FAMILY HISTORY OF BREAST CANCER: ICD-10-CM

## 2017-05-15 PROCEDURE — 99999 PR PBB SHADOW E&M-EST. PATIENT-LVL IV: CPT | Mod: PBBFAC,,, | Performed by: NURSE PRACTITIONER

## 2017-05-15 PROCEDURE — 3074F SYST BP LT 130 MM HG: CPT | Mod: S$GLB,,, | Performed by: NURSE PRACTITIONER

## 2017-05-15 PROCEDURE — 3079F DIAST BP 80-89 MM HG: CPT | Mod: S$GLB,,, | Performed by: NURSE PRACTITIONER

## 2017-05-15 PROCEDURE — 1160F RVW MEDS BY RX/DR IN RCRD: CPT | Mod: S$GLB,,, | Performed by: NURSE PRACTITIONER

## 2017-05-15 PROCEDURE — 99213 OFFICE O/P EST LOW 20 MIN: CPT | Mod: S$GLB,,, | Performed by: NURSE PRACTITIONER

## 2017-05-15 NOTE — MR AVS SNAPSHOT
[facility]  [address] [address]  Phone: [phone]  Fax: [phone]                  Stacey Wade   5/15/2017 8:00 AM   Office Visit    Description:  Female : 1965   Provider:  Yue Merchant NP   [facility]                To Do List           Future Appointments        Provider Department Dept Phone    2017 8:00 AM Yue Merchant NP [facility] [phone]      Goals (5 Years of Data)     None      [facility] On Call     [facility] On Call Nurse Care Line -  Assistance  Unless otherwise directed by your provider, please contact [facility] On-Call, our nurse care line that is available for  assistance.     Registered nurses in the [facility] On Call Center provide: appointment scheduling, clinical advisement, health education, and other advisory services.  Call: [phone] (toll free)               Medications           Message regarding Medications     Verify the changes and/or additions to your medication regime listed below are the same as discussed with your clinician today.  If any of these changes or additions are incorrect, please notify your healthcare provider.             Verify that the below list of medications is an accurate representation of the medications you are currently taking.  If none reported, the list may be blank. If incorrect, please contact your healthcare provider. Carry this list with you in case of emergency.           Current Medications     aspirin 81 mg Tab Take 1 tablet by mouth.    betamethasone dipropionate (DIPROLENE) 0.05 % cream APPLY TOPICALLY 2 TIMES DAILY    CONNIE 0.35 mg tablet Take 1 tablet (0.35 mg total) by mouth once daily.    clorazepate (TRANXENE) 3.75 MG Tab Take 1 tablet (3.75 mg total) by mouth daily as needed.    doxepin (SINEQUAN) 10 MG capsule TAKE 2 CAPSULES BY MOUTH 3 TIMES A DAY    fexofenadine (ALLEGRA) 180 MG tablet TAKE ONE TABLET  BY MOUTH EVERY DAY    hydrOXYzine HCl (ATARAX) 25 MG tablet TAKE ONE TABLET BY MOUTH FOUR TIMES A DAY AS NEEDED    mometasone (NASONEX) 50 mcg/actuation nasal spray 2 sprays by Nasal route once daily.    montelukast (SINGULAIR) 10 mg tablet Take 1 tablet (10 mg total) by mouth once daily.    omalizumab (XOLAIR) 150 mg injection Inject 150 mg into the skin.    potassium chloride (MICRO-K) 10 MEQ CpSR Take 1 capsule (10 mEq total) by mouth once daily.    ranitidine (ZANTAC) 150 MG tablet Take 1 tablet (150 mg total) by mouth 2 (two) times daily.    triamterene-hydrochlorothiazide 37.5-25 mg (DYAZIDE) 37.5-25 mg per capsule Take 1 capsule by mouth once daily.           Clinical Reference Information           Your Vitals Were     BP Pulse Temp Weight BMI    105/80 76 98.5 °F (36.9 °C) (Oral) 83.5 kg (184 lb 1.4 oz) 33.67 kg/m2      Blood Pressure          Most Recent Value    BP  105/80      Allergies as of 5/15/2017     Benzalkonium Chloride    Black Pepper    Chocolate Flavor    Citrus And Derivatives    Furosemide    Grapefruit    Latex    Lemon Balm (Aline Officinalis)    Neomycin-bacitracin-polymyxin    Oats (Yehuda)    Wheat Containing Prod      Immunizations Administered on Date of Encounter - 5/15/2017     None      Language Assistance Services     ATTENTION: Language assistance services are available, free of charge. Please call [phone].      ATENCIÓN: Si habla jasiel, tiene a rodriguez disposición servicios gratuitos de asistencia lingüística. Llame al [phone].     [facility] Ý: N?u b?n nói Ti?ng Vi?t, có các d?ch v? h? tr? ngôn ng? mi?n phí dành cho b?n. G?i s? [phone].         [facility] complies with applicable Federal civil rights laws and does not discriminate on the basis of race, color, national origin, age, disability, or sex.

## 2017-05-15 NOTE — PROGRESS NOTES
Patient ID: Stacey Wade is a 51 y.o. female.    Chief Complaint: Breast Problem (fibrocystic breast) and family history of breast cancer    HPI: Patient presents for 6 week recheck of a breast mass noted during her routine gyn exam. Negative right breast imaging with mammo and ultrasound at that time (March 2017).    Review of Systems   Constitutional: Negative.    HENT: Negative.    Eyes: Negative.    Respiratory: Negative.    Cardiovascular: Negative.    Gastrointestinal: Negative.    Endocrine: Negative.    Genitourinary: Negative.    Musculoskeletal: Negative.    Skin: Negative.    Allergic/Immunologic: Negative.    Neurological: Negative.    Hematological: Negative.    Psychiatric/Behavioral: Negative.    Breast: pt denies any breast pain, nipple discharge or palpable mass that she has noted. Denies any previous breast abnormalities or trauma.    Current Outpatient Prescriptions   Medication Sig Dispense Refill    aspirin 81 mg Tab Take 1 tablet by mouth.      betamethasone dipropionate (DIPROLENE) 0.05 % cream APPLY TOPICALLY 2 TIMES DAILY 45 g 3    CONNIE 0.35 mg tablet Take 1 tablet (0.35 mg total) by mouth once daily. 84 tablet 0    clorazepate (TRANXENE) 3.75 MG Tab Take 1 tablet (3.75 mg total) by mouth daily as needed. 30 tablet 0    doxepin (SINEQUAN) 10 MG capsule TAKE 2 CAPSULES BY MOUTH 3 TIMES A  capsule 11    fexofenadine (ALLEGRA) 180 MG tablet TAKE ONE TABLET BY MOUTH EVERY DAY 30 tablet 0    hydrOXYzine HCl (ATARAX) 25 MG tablet TAKE ONE TABLET BY MOUTH FOUR TIMES A DAY AS NEEDED 120 tablet 5    mometasone (NASONEX) 50 mcg/actuation nasal spray 2 sprays by Nasal route once daily. 1 each 11    montelukast (SINGULAIR) 10 mg tablet Take 1 tablet (10 mg total) by mouth once daily. 30 tablet 11    omalizumab (XOLAIR) 150 mg injection Inject 150 mg into the skin.      potassium chloride (MICRO-K) 10 MEQ CpSR Take 1 capsule (10 mEq total) by mouth once daily. 30 capsule 11     ranitidine (ZANTAC) 150 MG tablet Take 1 tablet (150 mg total) by mouth 2 (two) times daily. 60 tablet 11    triamterene-hydrochlorothiazide 37.5-25 mg (DYAZIDE) 37.5-25 mg per capsule Take 1 capsule by mouth once daily. 30 capsule 11     No current facility-administered medications for this visit.        Review of patient's allergies indicates:   Allergen Reactions    Benzalkonium chloride     Black pepper      Other reaction(s): Hives    Chocolate flavor      Other reaction(s): Hives    Citrus and derivatives Hives     LEMON PRODUCTS    Furosemide     Grapefruit      Other reaction(s): Hives    Latex      Other reaction(s): Hives    Lemon balm (casper officinalis) Hives    Neomycin-bacitracin-polymyxin      Other reaction(s): Rash    Oats (richard) Hives     Oat foods (oatmeal, etc...)    Wheat containing prod        Past Medical History:   Diagnosis Date    Allergic rhinitis     Anxiety     Hypertension     Urticaria        Past Surgical History:   Procedure Laterality Date    HERNIA REPAIR Left        Family History   Problem Relation Age of Onset    Lung cancer Paternal Grandfather     Ovarian cancer Maternal Grandmother     Hyperlipidemia Mother     Hypertension Mother     Breast cancer Mother     Arthritis Mother     Lung cancer Father     Breast cancer Maternal Aunt     Heart failure Other      Great aunt    Prostate cancer Brother        Social History     Social History    Marital status: Single     Spouse name: N/A    Number of children: N/A    Years of education: N/A     Occupational History    Not on file.     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  She cares for her mother, who lives with her.       Vitals:    05/15/17 0806   BP: 105/80   Pulse: 76   Temp: 98.5 °F (36.9 °C)       Physical Exam   Constitutional: She is oriented to person, place, and time. She appears well-developed and well-nourished.   HENT:   Head: Normocephalic and atraumatic.   Right Ear: External ear normal.   Left Ear: External ear normal.   Eyes: Conjunctivae and EOM are normal. Pupils are equal, round, and reactive to light. Right eye exhibits no discharge. Left eye exhibits no discharge. No scleral icterus.   Neck: Normal range of motion. Neck supple. No thyromegaly present.   Cardiovascular: Normal rate and regular rhythm.    Pulmonary/Chest: Effort normal and breath sounds normal. Right breast exhibits no inverted nipple, no mass, no nipple discharge, no skin change and no tenderness. Left breast exhibits no inverted nipple, no mass, no nipple discharge, no skin change and no tenderness.       Musculoskeletal: Normal range of motion. She exhibits no edema.   Lymphadenopathy:        Head (right side): No submental, no submandibular, no tonsillar, no preauricular, no posterior auricular and no occipital adenopathy present.        Head (left side): No submental, no submandibular, no tonsillar, no preauricular, no posterior auricular and no occipital adenopathy present.     She has no cervical adenopathy.        Right cervical: No superficial cervical and no posterior cervical adenopathy present.       Left cervical: No superficial cervical and no posterior cervical adenopathy present.     She has no axillary adenopathy.        Right: No supraclavicular adenopathy present.        Left: No supraclavicular adenopathy present.   Neurological: She is alert and oriented to person, place, and time.   Skin: Skin is warm and dry.   Psychiatric: She has a normal mood and affect. Her behavior is normal. Judgment and thought content normal.   Vitals reviewed.    IMAGIN16- bilateral mammo  wnl  3/16/17:  MAMMO DIGITAL DIAGNOSTIC RIGHT WITH TOMOSYNTHESIS_CAD  Views: right craniocaudal spot compression; right craniocaudal with  tomosynthesis; right mediolateral spot compression; right mediolateral with  tomosynthesis; right mediolateral oblique spot compression; right  mediolateral oblique with tomosynthesis    The breasts are heterogeneously dense.  This may lower the sensitivity of  mammography.    Finding 1:  No significant masses, significant calcifications, or other  abnormalities are seen.  Digital tomosynthesis was performed and used in the interpretation of the  images.  Images were evaluated with a Computer Aided Detection (CAD) system.    RIGHT LIMITED ULTRASOUND FINDINGS:  High-resolution real-time ultrasound scanning was performed.    Finding 2:  There is no evidence of any solid mass or abnormal cystic  elements.  Impression  Finding 1:  There is no mammographic evidence of malignancy.    Finding 2:  There is no sonographic evidence of malignancy.    Routine follow-up mammogram in 1 year is recommended.    Menarche at 20 y/o    LMP: 3/17/17  No history of hormone use other than birth control and no history of radiation to the neck or chest wall.     FH: Mother breast cancer at 59 y/o, Maternal aunt breast cancer at 51 y/o, Maternal GM ovarian cancer, Father lung and prostate cancer, Brother - prostate cancer    Assessment & Plan:  1. Area of prominent glandular tissue noted at the 3 oclock location of the right breast. Fibrous texture and blends more medially. Smaller on exam today  2. Negative imaging - no abnormality noted on mammo or ultrasound.  3. Explained clinical findings and imaging findings.   4. Risk assessment performed with NCI breast cancer tool- Patient risk is 1% higher than average woman her age over the next 5 years and 1% higher over the average for a lifetime.   5. Recommend pt rtc in 3 months for a recheck and measure of the area. If changes would recommend biopsy.  If stable continue to watch. Pt agrees.   6. Patient verbalized understanding of the recommendations and agrees with the plan- proper bse demonstrated for pt.

## 2017-06-19 RX ORDER — HYDROXYZINE HYDROCHLORIDE 25 MG/1
TABLET, FILM COATED ORAL
Qty: 120 TABLET | Refills: 5 | Status: SHIPPED | OUTPATIENT
Start: 2017-06-19 | End: 2019-01-26 | Stop reason: SDUPTHER

## 2017-07-27 RX ORDER — FLUCONAZOLE 150 MG/1
TABLET ORAL
Qty: 1 TABLET | Refills: 0 | Status: SHIPPED | OUTPATIENT
Start: 2017-07-27 | End: 2018-04-24 | Stop reason: ALTCHOICE

## 2017-07-28 RX ORDER — CLORAZEPATE DIPOTASSIUM 3.75 MG/1
3.75 TABLET ORAL DAILY PRN
Qty: 30 TABLET | Refills: 2 | Status: SHIPPED | OUTPATIENT
Start: 2017-07-28 | End: 2019-04-11 | Stop reason: SDUPTHER

## 2017-08-07 ENCOUNTER — OFFICE VISIT (OUTPATIENT)
Dept: SURGERY | Facility: CLINIC | Age: 52
End: 2017-08-07
Payer: COMMERCIAL

## 2017-08-07 VITALS
TEMPERATURE: 98 F | DIASTOLIC BLOOD PRESSURE: 72 MMHG | SYSTOLIC BLOOD PRESSURE: 105 MMHG | BODY MASS INDEX: 33.63 KG/M2 | WEIGHT: 183.88 LBS | HEART RATE: 74 BPM

## 2017-08-07 DIAGNOSIS — Z80.3 FAMILY HISTORY OF BREAST CANCER IN MOTHER: ICD-10-CM

## 2017-08-07 DIAGNOSIS — N60.12 FIBROCYSTIC BREAST CHANGES OF BOTH BREASTS: Primary | ICD-10-CM

## 2017-08-07 DIAGNOSIS — N60.11 FIBROCYSTIC BREAST CHANGES OF BOTH BREASTS: Primary | ICD-10-CM

## 2017-08-07 PROCEDURE — 99214 OFFICE O/P EST MOD 30 MIN: CPT | Mod: S$GLB,,, | Performed by: NURSE PRACTITIONER

## 2017-08-07 PROCEDURE — 3008F BODY MASS INDEX DOCD: CPT | Mod: S$GLB,,, | Performed by: NURSE PRACTITIONER

## 2017-08-07 PROCEDURE — 99999 PR PBB SHADOW E&M-EST. PATIENT-LVL III: CPT | Mod: PBBFAC,,, | Performed by: NURSE PRACTITIONER

## 2017-08-07 NOTE — PROGRESS NOTES
Patient ID: Stacey Wade is a 51 y.o. female.    Chief Complaint: No chief complaint on file.    HPI: Patient presents for 3 mon recheck of a breast mass noted during her routine gyn exam. Negative right breast imaging with mammo and ultrasound at that time (March 2017).    Review of Systems   Constitutional: Negative.    HENT: Negative.    Eyes: Negative.    Respiratory: Negative.    Cardiovascular: Negative.    Gastrointestinal: Negative.    Endocrine: Negative.    Genitourinary: Negative.    Musculoskeletal: Negative.    Skin: Negative.    Allergic/Immunologic: Negative.    Neurological: Negative.    Hematological: Negative.    Psychiatric/Behavioral: Negative.    Breast: pt denies any nipple discharge or palpable mass that she has noted. Has had bilateral nipple tenderness since her cycle a couple of weeks ago and has had an increase in her cafeine intake over the past month.     Current Outpatient Prescriptions   Medication Sig Dispense Refill    aspirin 81 mg Tab Take 1 tablet by mouth.      betamethasone dipropionate (DIPROLENE) 0.05 % cream APPLY TOPICALLY 2 TIMES DAILY 45 g 3    CONNIE 0.35 mg tablet Take 1 tablet (0.35 mg total) by mouth once daily. 84 tablet 0    clorazepate (TRANXENE) 3.75 MG Tab Take 1 tablet (3.75 mg total) by mouth daily as needed. 30 tablet 2    doxepin (SINEQUAN) 10 MG capsule TAKE 2 CAPSULES BY MOUTH 3 TIMES A  capsule 11    fexofenadine (ALLEGRA) 180 MG tablet TAKE ONE TABLET BY MOUTH EVERY DAY 30 tablet 0    fluconazole (DIFLUCAN) 150 MG Tab TAKE ONE TABLET BY MOUTH EVERY DAY 1 tablet 0    hydrOXYzine HCl (ATARAX) 25 MG tablet TAKE ONE TABLET BY MOUTH FOUR TIMES A DAY AS NEEDED 120 tablet 5    mometasone (NASONEX) 50 mcg/actuation nasal spray 2 sprays by Nasal route once daily. 1 each 11    montelukast (SINGULAIR) 10 mg tablet Take 1 tablet (10 mg total) by mouth once daily. 30 tablet 11    omalizumab (XOLAIR) 150 mg injection Inject 150 mg into the skin.       potassium chloride (MICRO-K) 10 MEQ CpSR Take 1 capsule (10 mEq total) by mouth once daily. 30 capsule 11    ranitidine (ZANTAC) 150 MG tablet Take 1 tablet (150 mg total) by mouth 2 (two) times daily. 60 tablet 11    triamterene-hydrochlorothiazide 37.5-25 mg (DYAZIDE) 37.5-25 mg per capsule Take 1 capsule by mouth once daily. 30 capsule 11     No current facility-administered medications for this visit.        Review of patient's allergies indicates:   Allergen Reactions    Benzalkonium chloride     Black pepper      Other reaction(s): Hives    Chocolate flavor      Other reaction(s): Hives    Citrus and derivatives Hives     LEMON PRODUCTS    Furosemide     Grapefruit      Other reaction(s): Hives    Latex      Other reaction(s): Hives    Lemon balm (casper officinalis) Hives    Neomycin-bacitracin-polymyxin      Other reaction(s): Rash    Oats (richard) Hives     Oat foods (oatmeal, etc...)    Wheat containing prod        Past Medical History:   Diagnosis Date    Allergic rhinitis     Anxiety     Hypertension     Urticaria        Past Surgical History:   Procedure Laterality Date    HERNIA REPAIR Left        Family History   Problem Relation Age of Onset    Lung cancer Paternal Grandfather     Ovarian cancer Maternal Grandmother     Hyperlipidemia Mother     Hypertension Mother     Breast cancer Mother     Arthritis Mother     Lung cancer Father     Breast cancer Maternal Aunt     Heart failure Other      Great aunt    Prostate cancer Brother        Social History     Social History    Marital status: Single     Spouse name: N/A    Number of children: N/A    Years of education: N/A     Occupational History    Not on file.     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       There were no vitals filed for this visit.    Physical Exam   Constitutional: She is oriented to person, place, and time. She appears well-developed and well-nourished.   HENT:   Head: Normocephalic and atraumatic.   Right Ear: External ear normal.   Left Ear: External ear normal.   Eyes: Conjunctivae and EOM are normal. Pupils are equal, round, and reactive to light. Right eye exhibits no discharge. Left eye exhibits no discharge. No scleral icterus.   Neck: Normal range of motion. Neck supple. No thyromegaly present.   Cardiovascular: Normal rate and regular rhythm.    Pulmonary/Chest: Effort normal and breath sounds normal. Right breast exhibits no inverted nipple, no mass, no nipple discharge, no skin change and no tenderness. Left breast exhibits no inverted nipple, no mass, no nipple discharge, no skin change and no tenderness.       Musculoskeletal: Normal range of motion. She exhibits no edema.   Lymphadenopathy:        Head (right side): No submental, no submandibular, no tonsillar, no preauricular, no posterior auricular and no occipital adenopathy present.        Head (left side): No submental, no submandibular, no tonsillar, no preauricular, no posterior auricular and no occipital adenopathy present.     She has no cervical adenopathy.        Right cervical: No superficial cervical and no posterior cervical adenopathy present.       Left cervical: No superficial cervical and no posterior cervical adenopathy present.     She has no axillary adenopathy.        Right: No supraclavicular adenopathy present.        Left: No supraclavicular adenopathy present.   Neurological: She is alert and oriented to person, place, and time.   Skin: Skin is warm and dry.   Psychiatric: She has a normal mood and affect. Her behavior is normal. Judgment and  thought content normal.   Vitals reviewed.    IMAGIN16- bilateral mammo wnl  3/16/17:  MAMMO DIGITAL DIAGNOSTIC RIGHT WITH TOMOSYNTHESIS_CAD  Views: right craniocaudal spot compression; right craniocaudal with  tomosynthesis; right mediolateral spot compression; right mediolateral with  tomosynthesis; right mediolateral oblique spot compression; right  mediolateral oblique with tomosynthesis    The breasts are heterogeneously dense.  This may lower the sensitivity of  mammography.    Finding 1:  No significant masses, significant calcifications, or other  abnormalities are seen.  Digital tomosynthesis was performed and used in the interpretation of the  images.  Images were evaluated with a Computer Aided Detection (CAD) system.    RIGHT LIMITED ULTRASOUND FINDINGS:  High-resolution real-time ultrasound scanning was performed.    Finding 2:  There is no evidence of any solid mass or abnormal cystic  elements.  Impression  Finding 1:  There is no mammographic evidence of malignancy.    Finding 2:  There is no sonographic evidence of malignancy.    Routine follow-up mammogram in 1 year is recommended.    Menarche at 18 y/o    LMP: 10 days ago  No history of hormone use other than birth control and no history of radiation to the neck or chest wall.     FH: Mother breast cancer at 59 y/o, Maternal aunt breast cancer at 49 y/o, Maternal GM ovarian cancer, Father lung and prostate cancer, Brother - prostate cancer    Assessment & Plan:  1. Area of prominent glandular tissue noted at the 3 oclock location of the right breast. Fibrous texture and blends more medially. Larger on exam today  2. Negative imaging - no abnormality noted on mammo or ultrasound.  3. Explained clinical findings and imaging findings.   4. Risk assessment performed with NCI breast cancer tool- Patient risk is 1% higher than average woman her age over the next 5 years and 1% higher over the average for a lifetime.   5. Explained findings  to pt and offered pt surgical consultation to see if the surgeon feels like needs to be biopsied due to increase in size vs decrease caffeine intake and recheck in one month - pt recommended this- pt would like to make dietary changes and recheck area- if the same or larger will agree to see surgeon. If smaller will continue our clinical f/u and plan to get a linda mammo in Dec. Pt in agreement.

## 2017-09-11 ENCOUNTER — OFFICE VISIT (OUTPATIENT)
Dept: SURGERY | Facility: CLINIC | Age: 52
End: 2017-09-11
Payer: COMMERCIAL

## 2017-09-11 VITALS
BODY MASS INDEX: 34.07 KG/M2 | DIASTOLIC BLOOD PRESSURE: 75 MMHG | SYSTOLIC BLOOD PRESSURE: 111 MMHG | WEIGHT: 186.31 LBS | HEART RATE: 74 BPM

## 2017-09-11 DIAGNOSIS — N60.11 FIBROCYSTIC BREAST CHANGES, RIGHT: Primary | ICD-10-CM

## 2017-09-11 DIAGNOSIS — Z80.3 FAMILY HISTORY OF BREAST CANCER: ICD-10-CM

## 2017-09-11 PROCEDURE — 3078F DIAST BP <80 MM HG: CPT | Mod: S$GLB,,, | Performed by: NURSE PRACTITIONER

## 2017-09-11 PROCEDURE — 99999 PR PBB SHADOW E&M-EST. PATIENT-LVL III: CPT | Mod: PBBFAC,,, | Performed by: NURSE PRACTITIONER

## 2017-09-11 PROCEDURE — 99213 OFFICE O/P EST LOW 20 MIN: CPT | Mod: S$GLB,,, | Performed by: NURSE PRACTITIONER

## 2017-09-11 PROCEDURE — 3008F BODY MASS INDEX DOCD: CPT | Mod: S$GLB,,, | Performed by: NURSE PRACTITIONER

## 2017-09-11 PROCEDURE — 3074F SYST BP LT 130 MM HG: CPT | Mod: S$GLB,,, | Performed by: NURSE PRACTITIONER

## 2017-09-11 NOTE — PROGRESS NOTES
Patient ID: Stacey Wade is a 51 y.o. female.    Chief Complaint: No chief complaint on file.    HPI: Patient presents for 1 mon recheck of a breast mass noted during her routine gyn exam back in March. Negative right breast imaging with mammo and ultrasound at that time (March 2017). We have been checking it clinically and one month ago it seemed to increase in size slightly. Pt had been using more caffeine than usual. Recommended seeing surgeon and pt wanted to decrease her caffeine to see if it would decrease the size of the area.     Review of Systems   Constitutional: Negative.    HENT: Negative.    Eyes: Negative.    Respiratory: Negative.    Cardiovascular: Negative.    Gastrointestinal: Negative.    Endocrine: Negative.    Genitourinary: Negative.    Musculoskeletal: Negative.    Skin: Negative.    Allergic/Immunologic: Negative.    Neurological: Negative.    Hematological: Negative.    Psychiatric/Behavioral: Negative.    Breast: pt denies any nipple discharge or palpable mass that she has noted. Has had bilateral nipple tenderness since her cycle a couple of weeks ago and has had a decrease in her cafeine intake over the past month.     Current Outpatient Prescriptions   Medication Sig Dispense Refill    aspirin 81 mg Tab Take 1 tablet by mouth.      betamethasone dipropionate (DIPROLENE) 0.05 % cream APPLY TOPICALLY 2 TIMES DAILY 45 g 3    CONNIE 0.35 mg tablet Take 1 tablet (0.35 mg total) by mouth once daily. 84 tablet 0    clorazepate (TRANXENE) 3.75 MG Tab Take 1 tablet (3.75 mg total) by mouth daily as needed. 30 tablet 2    doxepin (SINEQUAN) 10 MG capsule TAKE 2 CAPSULES BY MOUTH 3 TIMES A  capsule 11    fexofenadine (ALLEGRA) 180 MG tablet TAKE ONE TABLET BY MOUTH EVERY DAY 30 tablet 0    fluconazole (DIFLUCAN) 150 MG Tab TAKE ONE TABLET BY MOUTH EVERY DAY 1 tablet 0    hydrOXYzine HCl (ATARAX) 25 MG tablet TAKE ONE TABLET BY MOUTH FOUR TIMES A DAY AS NEEDED 120 tablet 5     mometasone (NASONEX) 50 mcg/actuation nasal spray 2 sprays by Nasal route once daily. 1 each 11    montelukast (SINGULAIR) 10 mg tablet Take 1 tablet (10 mg total) by mouth once daily. 30 tablet 11    omalizumab (XOLAIR) 150 mg injection Inject 150 mg into the skin.      potassium chloride (MICRO-K) 10 MEQ CpSR Take 1 capsule (10 mEq total) by mouth once daily. 30 capsule 11    ranitidine (ZANTAC) 150 MG tablet Take 1 tablet (150 mg total) by mouth 2 (two) times daily. 60 tablet 11    triamterene-hydrochlorothiazide 37.5-25 mg (DYAZIDE) 37.5-25 mg per capsule Take 1 capsule by mouth once daily. 30 capsule 11     No current facility-administered medications for this visit.        Review of patient's allergies indicates:   Allergen Reactions    Benzalkonium chloride     Black pepper      Other reaction(s): Hives    Chocolate flavor      Other reaction(s): Hives    Citrus and derivatives Hives     LEMON PRODUCTS    Furosemide     Grapefruit      Other reaction(s): Hives    Latex      Other reaction(s): Hives    Lemon balm (casper officinalis) Hives    Neomycin-bacitracin-polymyxin      Other reaction(s): Rash    Oats (richard) Hives     Oat foods (oatmeal, etc...)    Wheat containing prod        Past Medical History:   Diagnosis Date    Allergic rhinitis     Anxiety     Hypertension     Urticaria        Past Surgical History:   Procedure Laterality Date    HERNIA REPAIR Left        Family History   Problem Relation Age of Onset    Lung cancer Paternal Grandfather     Ovarian cancer Maternal Grandmother     Hyperlipidemia Mother     Hypertension Mother     Breast cancer Mother     Arthritis Mother     Lung cancer Father     Breast cancer Maternal Aunt     Heart failure Other      Great aunt    Prostate cancer Brother        Social History     Social History    Marital status: Single     Spouse name: N/A    Number of children: N/A    Years of education: N/A     Occupational History     Not on file.     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       There were no vitals filed for this visit.    Physical Exam   Constitutional: She is oriented to person, place, and time. She appears well-developed and well-nourished.   HENT:   Head: Normocephalic and atraumatic.   Right Ear: External ear normal.   Left Ear: External ear normal.   Eyes: Conjunctivae and EOM are normal. Pupils are equal, round, and reactive to light. Right eye exhibits no discharge. Left eye exhibits no discharge. No scleral icterus.   Neck: Normal range of motion. Neck supple. No thyromegaly present.   Cardiovascular: Normal rate and regular rhythm.    Pulmonary/Chest: Effort normal and breath sounds normal. Right breast exhibits no inverted nipple, no mass, no nipple discharge, no skin change and no tenderness. Left breast exhibits no inverted nipple, no mass, no nipple discharge, no skin change and no tenderness.       Musculoskeletal: Normal range of motion. She exhibits no edema.   Lymphadenopathy:        Head (right side): No submental, no submandibular, no tonsillar, no preauricular, no posterior auricular and no occipital adenopathy present.        Head (left side): No submental, no submandibular, no tonsillar, no preauricular, no posterior auricular and no occipital adenopathy present.     She has no cervical adenopathy.        Right cervical: No superficial cervical and no posterior cervical adenopathy present.       Left cervical: No superficial cervical and no posterior cervical adenopathy present.     She has no axillary adenopathy.        Right: No  supraclavicular adenopathy present.        Left: No supraclavicular adenopathy present.   Neurological: She is alert and oriented to person, place, and time.   Skin: Skin is warm and dry.   Psychiatric: She has a normal mood and affect. Her behavior is normal. Judgment and thought content normal.   Vitals reviewed.    IMAGIN16- bilateral mammo wnl  3/16/17:  MAMMO DIGITAL DIAGNOSTIC RIGHT WITH TOMOSYNTHESIS_CAD  Views: right craniocaudal spot compression; right craniocaudal with  tomosynthesis; right mediolateral spot compression; right mediolateral with  tomosynthesis; right mediolateral oblique spot compression; right  mediolateral oblique with tomosynthesis    The breasts are heterogeneously dense.  This may lower the sensitivity of  mammography.    Finding 1:  No significant masses, significant calcifications, or other  abnormalities are seen.  Digital tomosynthesis was performed and used in the interpretation of the  images.  Images were evaluated with a Computer Aided Detection (CAD) system.    RIGHT LIMITED ULTRASOUND FINDINGS:  High-resolution real-time ultrasound scanning was performed.    Finding 2:  There is no evidence of any solid mass or abnormal cystic  elements.  Impression  Finding 1:  There is no mammographic evidence of malignancy.    Finding 2:  There is no sonographic evidence of malignancy.    Routine follow-up mammogram in 1 year is recommended.    Menarche at 20 y/o    LMP: 19  No history of hormone use other than birth control and no history of radiation to the neck or chest wall.     FH: Mother breast cancer at 61 y/o, Maternal aunt breast cancer at 49 y/o, Maternal GM ovarian cancer, Father lung and prostate cancer, Brother - prostate cancer    Assessment & Plan:  1. Area of prominent glandular tissue noted at the 3 oclock location of the right breast. Fibrous texture and blends more medially and laterally- slightly smaller and softer today  2. Negative imaging - no  abnormality noted on mammo or ultrasound.  3. Explained clinical findings and imaging findings.   4. Risk assessment performed with NCI breast cancer tool- Patient risk is 1% higher than average woman her age over the next 5 years and 1% higher over the average for a lifetime.   5. RTC in 6 weeks for recheck of the right breast and will plan to schedule bilateral mammo in Dec and see her on the same day.

## 2017-10-12 RX ORDER — MONTELUKAST SODIUM 10 MG/1
TABLET ORAL
Qty: 30 TABLET | Refills: 0 | Status: SHIPPED | OUTPATIENT
Start: 2017-10-12 | End: 2017-12-11 | Stop reason: SDUPTHER

## 2017-10-12 RX ORDER — DOXEPIN HYDROCHLORIDE 10 MG/1
CAPSULE ORAL
Qty: 120 CAPSULE | Refills: 0 | Status: SHIPPED | OUTPATIENT
Start: 2017-10-12 | End: 2018-05-11 | Stop reason: SDUPTHER

## 2017-10-12 RX ORDER — POTASSIUM CHLORIDE 750 MG/1
CAPSULE, EXTENDED RELEASE ORAL
Qty: 30 CAPSULE | Refills: 0 | Status: SHIPPED | OUTPATIENT
Start: 2017-10-12 | End: 2017-12-08 | Stop reason: SDUPTHER

## 2017-10-12 RX ORDER — TRIAMTERENE AND HYDROCHLOROTHIAZIDE 37.5; 25 MG/1; MG/1
1 CAPSULE ORAL DAILY
Qty: 30 CAPSULE | Refills: 0 | Status: SHIPPED | OUTPATIENT
Start: 2017-10-12 | End: 2017-11-17 | Stop reason: SDUPTHER

## 2017-10-23 ENCOUNTER — OFFICE VISIT (OUTPATIENT)
Dept: SURGERY | Facility: CLINIC | Age: 52
End: 2017-10-23
Payer: COMMERCIAL

## 2017-10-23 VITALS
WEIGHT: 186.5 LBS | BODY MASS INDEX: 34.32 KG/M2 | HEIGHT: 62 IN | HEART RATE: 75 BPM | DIASTOLIC BLOOD PRESSURE: 73 MMHG | TEMPERATURE: 98 F | SYSTOLIC BLOOD PRESSURE: 104 MMHG | RESPIRATION RATE: 20 BRPM

## 2017-10-23 DIAGNOSIS — Z12.31 VISIT FOR SCREENING MAMMOGRAM: ICD-10-CM

## 2017-10-23 DIAGNOSIS — N60.11 FIBROCYSTIC BREAST CHANGES, RIGHT: Primary | ICD-10-CM

## 2017-10-23 DIAGNOSIS — Z91.89 AT HIGH RISK FOR BREAST CANCER: ICD-10-CM

## 2017-10-23 PROCEDURE — 99999 PR PBB SHADOW E&M-EST. PATIENT-LVL II: CPT | Mod: PBBFAC,,, | Performed by: NURSE PRACTITIONER

## 2017-10-23 PROCEDURE — 99213 OFFICE O/P EST LOW 20 MIN: CPT | Mod: S$GLB,,, | Performed by: NURSE PRACTITIONER

## 2017-10-23 NOTE — PROGRESS NOTES
Patient ID: Stacey Wade is a 51 y.o. female.    Chief Complaint: Breast Problem (recheck)    HPI: Patient presents for recheck of a breast mass noted during her routine gyn exam back in March. Negative right breast imaging with mammo and ultrasound at that time (March 2017). We have been checking it clinically and one month ago it seemed to increase in size slightly. Pt had been using more caffeine than usual. Recommended seeing surgeon and pt wanted to decrease her caffeine to see if it would decrease the size of the area. It did go back to her baseline measurement. Pt denies any change. No new areas of concern.     Review of Systems   Constitutional: Negative.    HENT: Negative.    Eyes: Negative.    Respiratory: Negative.    Cardiovascular: Negative.    Gastrointestinal: Negative.    Endocrine: Negative.    Genitourinary: Negative.    Musculoskeletal: Negative.    Skin: Negative.    Allergic/Immunologic: Negative.    Neurological: Negative.    Hematological: Negative.    Psychiatric/Behavioral: Negative.    Breast: pt denies any nipple discharge or palpable mass that she has noted. Has had bilateral nipple tenderness since her cycle which started yesterday. She has decreased her cafeine intake.     Current Outpatient Prescriptions   Medication Sig Dispense Refill    aspirin 81 mg Tab Take 1 tablet by mouth.      betamethasone dipropionate (DIPROLENE) 0.05 % cream APPLY TOPICALLY 2 TIMES DAILY 45 g 3    CONNIE 0.35 mg tablet Take 1 tablet (0.35 mg total) by mouth once daily. 84 tablet 0    clorazepate (TRANXENE) 3.75 MG Tab Take 1 tablet (3.75 mg total) by mouth daily as needed. 30 tablet 2    doxepin (SINEQUAN) 10 MG capsule TAKE 2 CAPSULES BY MOUTH 3 TIMES A  capsule 0    fexofenadine (ALLEGRA) 180 MG tablet TAKE ONE TABLET BY MOUTH EVERY DAY 30 tablet 0    fluconazole (DIFLUCAN) 150 MG Tab TAKE ONE TABLET BY MOUTH EVERY DAY 1 tablet 0    hydrOXYzine HCl (ATARAX) 25 MG tablet TAKE ONE  TABLET BY MOUTH FOUR TIMES A DAY AS NEEDED 120 tablet 5    mometasone (NASONEX) 50 mcg/actuation nasal spray 2 sprays by Nasal route once daily. 1 each 11    montelukast (SINGULAIR) 10 mg tablet TAKE 1 TABLET BY MOUTH ONCE DAILY 30 tablet 0    omalizumab (XOLAIR) 150 mg injection Inject 150 mg into the skin.      potassium chloride (MICRO-K) 10 MEQ CpSR TAKE 1 CAPSULE BY MOUTH ONCE DAILY 30 capsule 0    ranitidine (ZANTAC) 150 MG tablet TAKE 1 TABLET BY MOUTH 2 TIMES A DAY 60 tablet 0    triamterene-hydrochlorothiazide 37.5-25 mg (DYAZIDE) 37.5-25 mg per capsule TAKE 1 CAPSULE BY MOUTH ONCE DAILY 30 capsule 0     No current facility-administered medications for this visit.        Review of patient's allergies indicates:   Allergen Reactions    Benzalkonium chloride     Black pepper      Other reaction(s): Hives    Chocolate flavor      Other reaction(s): Hives    Citrus and derivatives Hives     LEMON PRODUCTS    Furosemide     Grapefruit      Other reaction(s): Hives    Latex      Other reaction(s): Hives    Lemon balm (casper officinalis) Hives    Neomycin-bacitracin-polymyxin      Other reaction(s): Rash    Oats (richard) Hives     Oat foods (oatmeal, etc...)    Wheat containing prod        Past Medical History:   Diagnosis Date    Allergic rhinitis     Anxiety     Hypertension     Urticaria        Past Surgical History:   Procedure Laterality Date    HERNIA REPAIR Left        Family History   Problem Relation Age of Onset    Lung cancer Paternal Grandfather     Ovarian cancer Maternal Grandmother     Hyperlipidemia Mother     Hypertension Mother     Breast cancer Mother     Arthritis Mother     Lung cancer Father     Breast cancer Maternal Aunt     Heart failure Other      Great aunt    Prostate cancer Brother        Social History     Social History    Marital status: Single     Spouse name: N/A    Number of children: N/A    Years of education: N/A     Occupational History     Not on file.     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       There were no vitals filed for this visit.    Physical Exam   Constitutional: She is oriented to person, place, and time. She appears well-developed and well-nourished.   HENT:   Head: Normocephalic and atraumatic.   Right Ear: External ear normal.   Left Ear: External ear normal.   Eyes: Conjunctivae and EOM are normal. Pupils are equal, round, and reactive to light. Right eye exhibits no discharge. Left eye exhibits no discharge. No scleral icterus.   Neck: Normal range of motion. Neck supple. No thyromegaly present.   Cardiovascular: Normal rate and regular rhythm.    Pulmonary/Chest: Effort normal and breath sounds normal. Right breast exhibits no inverted nipple, no mass, no nipple discharge, no skin change and no tenderness. Left breast exhibits no inverted nipple, no mass, no nipple discharge, no skin change and no tenderness.       Musculoskeletal: Normal range of motion. She exhibits no edema.   Lymphadenopathy:        Head (right side): No submental, no submandibular, no tonsillar, no preauricular, no posterior auricular and no occipital adenopathy present.        Head (left side): No submental, no submandibular, no tonsillar, no preauricular, no posterior auricular and no occipital adenopathy present.     She has no cervical adenopathy.        Right cervical: No superficial cervical and no posterior cervical adenopathy present.       Left cervical: No superficial cervical and no posterior cervical adenopathy present.     She has no axillary adenopathy.        Right: No  supraclavicular adenopathy present.        Left: No supraclavicular adenopathy present.   Neurological: She is alert and oriented to person, place, and time.   Skin: Skin is warm and dry.   Psychiatric: She has a normal mood and affect. Her behavior is normal. Judgment and thought content normal.   Vitals reviewed.    IMAGIN16- bilateral mammo wnl  3/16/17:  MAMMO DIGITAL DIAGNOSTIC RIGHT WITH TOMOSYNTHESIS_CAD  Views: right craniocaudal spot compression; right craniocaudal with  tomosynthesis; right mediolateral spot compression; right mediolateral with  tomosynthesis; right mediolateral oblique spot compression; right  mediolateral oblique with tomosynthesis    The breasts are heterogeneously dense.  This may lower the sensitivity of  mammography.    Finding 1:  No significant masses, significant calcifications, or other  abnormalities are seen.  Digital tomosynthesis was performed and used in the interpretation of the  images.  Images were evaluated with a Computer Aided Detection (CAD) system.    RIGHT LIMITED ULTRASOUND FINDINGS:  High-resolution real-time ultrasound scanning was performed.    Finding 2:  There is no evidence of any solid mass or abnormal cystic  elements.  Impression  Finding 1:  There is no mammographic evidence of malignancy.    Finding 2:  There is no sonographic evidence of malignancy.    Routine follow-up mammogram in 1 year is recommended.    Menarche at 20 y/o    LMP: 10/22/17  No history of hormone use other than birth control and no history of radiation to the neck or chest wall.     FH: Mother breast cancer at 59 y/o, Maternal aunt breast cancer at 51 y/o, Maternal GM ovarian cancer, Father lung and prostate cancer, Brother - prostate cancer    Assessment & Plan:  1. Area of prominent glandular tissue noted at the 3 oclock location of the right breast. Fibrous texture and blends more medially and laterally- stable measurements today  2. Negative imaging - no abnormality  noted on mammo or ultrasound in March 2017  3. Explained clinical findings and imaging findings.   4. RTC on 12/18/17 for linda mammo and see me same day for exam- discussed risk assessment with breast imaging and if scores over 20% would need to do MRI of the breasts once a year and mammograms once a year 6 mons apart. Pt has started exercising and trying to loose wt. Will discuss further risk reduction strategies in Dec at the time of her mammogram. Pt verbalized understanding.

## 2017-11-16 RX ORDER — TRIAMTERENE AND HYDROCHLOROTHIAZIDE 37.5; 25 MG/1; MG/1
1 CAPSULE ORAL DAILY
Qty: 30 CAPSULE | Refills: 0 | OUTPATIENT
Start: 2017-11-16

## 2017-11-16 RX ORDER — MONTELUKAST SODIUM 10 MG/1
TABLET ORAL
Qty: 30 TABLET | Refills: 0 | OUTPATIENT
Start: 2017-11-16

## 2017-11-16 RX ORDER — POTASSIUM CHLORIDE 750 MG/1
CAPSULE, EXTENDED RELEASE ORAL
Qty: 30 CAPSULE | Refills: 0 | OUTPATIENT
Start: 2017-11-16

## 2017-11-16 RX ORDER — DOXEPIN HYDROCHLORIDE 10 MG/1
CAPSULE ORAL
Qty: 120 CAPSULE | Refills: 0 | OUTPATIENT
Start: 2017-11-16

## 2017-11-17 ENCOUNTER — TELEPHONE (OUTPATIENT)
Dept: FAMILY MEDICINE | Facility: CLINIC | Age: 52
End: 2017-11-17

## 2017-11-17 RX ORDER — TRIAMTERENE AND HYDROCHLOROTHIAZIDE 37.5; 25 MG/1; MG/1
1 CAPSULE ORAL DAILY
Qty: 30 CAPSULE | Refills: 0 | Status: SHIPPED | OUTPATIENT
Start: 2017-11-17 | End: 2017-12-08 | Stop reason: SDUPTHER

## 2017-11-17 NOTE — TELEPHONE ENCOUNTER
----- Message from Darby Shen sent at 11/17/2017  8:09 AM CST -----  Contact: Pt  Pt needs to speak with nurse regarding an upcoming appt if needed. Please give pt a call at  [phone] (work)

## 2017-11-17 NOTE — TELEPHONE ENCOUNTER
----- Message from Araceli Hawk sent at 11/17/2017  1:26 PM CST -----  Contact: patient  Calling concerning her medication. States she made the appointment and really need BP medication. Please call patient ASAP @ [phone]. Thanks, laney

## 2017-12-08 ENCOUNTER — OFFICE VISIT (OUTPATIENT)
Dept: FAMILY MEDICINE | Facility: CLINIC | Age: 52
End: 2017-12-08
Payer: COMMERCIAL

## 2017-12-08 VITALS
HEIGHT: 62 IN | TEMPERATURE: 98 F | WEIGHT: 186.5 LBS | OXYGEN SATURATION: 99 % | DIASTOLIC BLOOD PRESSURE: 76 MMHG | HEART RATE: 91 BPM | SYSTOLIC BLOOD PRESSURE: 120 MMHG | RESPIRATION RATE: 18 BRPM | BODY MASS INDEX: 34.32 KG/M2

## 2017-12-08 DIAGNOSIS — Z12.11 COLON CANCER SCREENING: ICD-10-CM

## 2017-12-08 DIAGNOSIS — L50.9 URTICARIA: ICD-10-CM

## 2017-12-08 DIAGNOSIS — F41.9 ANXIETY: ICD-10-CM

## 2017-12-08 DIAGNOSIS — J30.9 ALLERGIC RHINITIS, UNSPECIFIED CHRONICITY, UNSPECIFIED SEASONALITY, UNSPECIFIED TRIGGER: ICD-10-CM

## 2017-12-08 DIAGNOSIS — I10 ESSENTIAL HYPERTENSION: ICD-10-CM

## 2017-12-08 DIAGNOSIS — Z23 NEEDS FLU SHOT: ICD-10-CM

## 2017-12-08 DIAGNOSIS — Z00.00 PREVENTATIVE HEALTH CARE: Primary | ICD-10-CM

## 2017-12-08 PROCEDURE — 90471 IMMUNIZATION ADMIN: CPT | Mod: S$GLB,,, | Performed by: FAMILY MEDICINE

## 2017-12-08 PROCEDURE — 99396 PREV VISIT EST AGE 40-64: CPT | Mod: 25,S$GLB,, | Performed by: FAMILY MEDICINE

## 2017-12-08 PROCEDURE — 90686 IIV4 VACC NO PRSV 0.5 ML IM: CPT | Mod: S$GLB,,, | Performed by: FAMILY MEDICINE

## 2017-12-08 PROCEDURE — 99999 PR PBB SHADOW E&M-EST. PATIENT-LVL V: CPT | Mod: PBBFAC,,, | Performed by: FAMILY MEDICINE

## 2017-12-08 RX ORDER — TRIAMTERENE AND HYDROCHLOROTHIAZIDE 37.5; 25 MG/1; MG/1
1 CAPSULE ORAL DAILY
Qty: 30 CAPSULE | Refills: 11 | Status: SHIPPED | OUTPATIENT
Start: 2017-12-08 | End: 2018-05-03

## 2017-12-08 RX ORDER — POTASSIUM CHLORIDE 750 MG/1
10 CAPSULE, EXTENDED RELEASE ORAL DAILY
Qty: 30 CAPSULE | Refills: 11 | Status: SHIPPED | OUTPATIENT
Start: 2017-12-08 | End: 2018-12-20 | Stop reason: SDUPTHER

## 2017-12-08 NOTE — PROGRESS NOTES
CHIEF COMPLAINT: This is a 52-year-old female here for preventive health exam.    SUBJECTIVE: Patient is doing well without complaints.  She's being followed by breast screening because of fibrocystic disease.  She had nodule in right breast which has improved since she discontinued caffeine.  She continues to take Xolair injections for chronic urticaria.  Her allergist is weaning her off of other meds which include Singulair, doxepin, and ranitidine.  Her hypertension is controlled on Dyazide.  Patient takes potassium supplementation for hypokalemia.  Patient takes OCP to regulate menses.  The patient exercises once a week.     Eye exam November 2017.  Mammogram March 2017. Pap smear March 2017 per GYN.  Tdap October 2016. Flu vaccine October 2016.     ROS:  GENERAL: Patient denies fever, chills, night sweats. Patient denies weight gain or loss. Patient denies anorexia, fatigue, weakness or swollen glands.  SKIN: Patient denies rash or hair loss.  HEENT: Patient denies sore throat, ear pain, hearing loss, nasal congestion, or runny nose. Patient denies visual disturbance, eye irritation or discharge.  LUNGS: Patient denies cough, wheeze or hemoptysis.  CARDIOVASCULAR: Patient denies shortness of breath, palpitations, syncope or lower extremity edema.  GI: Patient denies abdominal pain, nausea, vomiting, diarrhea, constipation, blood in stool or melena.  GENITOURINARY: Patient denies pelvic pain, vaginal discharge, itch or odor. Patient denies irregular vaginal bleeding. Patient denies dysuria, frequency, hematuria, nocturia, urgency or incontinence.  BREASTS: Patient denies breast pain, mass or nipple discharge.  MUSCULOSKELETAL: Patient denies joint swelling, redness or warmth. Positive for ankle and foot pain.  NEUROLOGIC: Patient denies headache, vertigo, paresthesias, weakness in limb, dysarthria, dysphagia or abnormality of gait.  PSYCHIATRIC: Patient denies anxiety, depression, or memory loss.     OBJECTIVE:    GENERAL: Well-developed well-nourished, obese, black female alert and oriented x3, in no acute distress. Memory, judgment and cognition without deficit.  Weight gain of 3 pounds in the last year.  SKIN: Clear without rash. Normal color and tone.  HEENT: Eyes: Clear conjunctivae. Pupils equal reactive to light and accommodation. Ears: Clear TMs. Clear canals. Nose: Without congestion. Pharynx: Without injection or exudates.  NECK: Supple, normal range of motion. No masses, lymphadenopathy or enlarged thyroid. No JVD. Carotids 2+ and equal. No bruits.  LUNGS: Clear to auscultation. Normal respiratory effort.  CARDIOVASCULAR: Regular rhythm, normal S1, S2 without murmur, gallop or rub.  BACK: No CVA or spinal tenderness.  BREASTS: No masses, tenderness or nipple discharge.  ABDOMEN: Normal appearance. Active bowel sounds. Soft, nontender without mass or organomegaly. No rebound or guarding.  EXTREMITIES: Without cyanosis, clubbing or edema. Distal pulses 2+ and equal. Normal range of motion in all extremities. No joint effusion, erythema or warmth. Ankle/foot splints in place.  NEUROLOGIC: Cranial nerves II through XII without deficit. Motor strength equal bilaterally. Sensation normal to touch. Deep tendon reflexes 2+ and equal. Gait without abnormality. No tremor. Negative cerebellar signs.  PELVIC: Deferred to GYN.     ASSESSMENT:  1. Preventative health care    2. Essential hypertension    3. Anxiety    4. Allergic rhinitis, unspecified chronicity, unspecified seasonality, unspecified trigger    5. Urticaria    6. Colon cancer screening      PLAN:   1.  Weight reduction.  Exercise regularly.  2.  Age-appropriate counseling.  3.  Fasting lab.  4.  Refill medications.  5.  Colonoscopy.  6.  Influenza vaccine.

## 2017-12-11 ENCOUNTER — LAB VISIT (OUTPATIENT)
Dept: LAB | Facility: HOSPITAL | Age: 52
End: 2017-12-11
Attending: FAMILY MEDICINE
Payer: COMMERCIAL

## 2017-12-11 DIAGNOSIS — Z00.00 PREVENTATIVE HEALTH CARE: ICD-10-CM

## 2017-12-11 LAB
ALBUMIN SERPL BCP-MCNC: 3.5 G/DL
ALP SERPL-CCNC: 120 U/L
ALT SERPL W/O P-5'-P-CCNC: 16 U/L
ANION GAP SERPL CALC-SCNC: 8 MMOL/L
AST SERPL-CCNC: 16 U/L
BASOPHILS # BLD AUTO: 0.05 K/UL
BASOPHILS NFR BLD: 0.7 %
BILIRUB SERPL-MCNC: 0.4 MG/DL
BUN SERPL-MCNC: 11 MG/DL
CALCIUM SERPL-MCNC: 9.6 MG/DL
CHLORIDE SERPL-SCNC: 103 MMOL/L
CHOLEST SERPL-MCNC: 159 MG/DL
CHOLEST/HDLC SERPL: 2.9 {RATIO}
CO2 SERPL-SCNC: 26 MMOL/L
CREAT SERPL-MCNC: 1 MG/DL
DIFFERENTIAL METHOD: ABNORMAL
EOSINOPHIL # BLD AUTO: 0.2 K/UL
EOSINOPHIL NFR BLD: 2.1 %
ERYTHROCYTE [DISTWIDTH] IN BLOOD BY AUTOMATED COUNT: 16.1 %
EST. GFR  (AFRICAN AMERICAN): >60 ML/MIN/1.73 M^2
EST. GFR  (NON AFRICAN AMERICAN): >60 ML/MIN/1.73 M^2
GLUCOSE SERPL-MCNC: 90 MG/DL
HCT VFR BLD AUTO: 44.1 %
HDLC SERPL-MCNC: 54 MG/DL
HDLC SERPL: 34 %
HGB BLD-MCNC: 14.4 G/DL
IMM GRANULOCYTES # BLD AUTO: 0.02 K/UL
IMM GRANULOCYTES NFR BLD AUTO: 0.3 %
LDLC SERPL CALC-MCNC: 95.8 MG/DL
LYMPHOCYTES # BLD AUTO: 1.9 K/UL
LYMPHOCYTES NFR BLD: 25.2 %
MCH RBC QN AUTO: 24 PG
MCHC RBC AUTO-ENTMCNC: 32.7 G/DL
MCV RBC AUTO: 73 FL
MONOCYTES # BLD AUTO: 0.6 K/UL
MONOCYTES NFR BLD: 7.6 %
NEUTROPHILS # BLD AUTO: 4.8 K/UL
NEUTROPHILS NFR BLD: 64.1 %
NONHDLC SERPL-MCNC: 105 MG/DL
NRBC BLD-RTO: 0 /100 WBC
PLATELET # BLD AUTO: 155 K/UL
PMV BLD AUTO: 12.9 FL
POTASSIUM SERPL-SCNC: 3.3 MMOL/L
PROT SERPL-MCNC: 7.3 G/DL
RBC # BLD AUTO: 6.01 M/UL
SODIUM SERPL-SCNC: 137 MMOL/L
TRIGL SERPL-MCNC: 46 MG/DL
TSH SERPL DL<=0.005 MIU/L-ACNC: 1.52 UIU/ML
WBC # BLD AUTO: 7.46 K/UL

## 2017-12-11 PROCEDURE — 85025 COMPLETE CBC W/AUTO DIFF WBC: CPT

## 2017-12-11 PROCEDURE — 36415 COLL VENOUS BLD VENIPUNCTURE: CPT | Mod: PO

## 2017-12-11 PROCEDURE — 80053 COMPREHEN METABOLIC PANEL: CPT

## 2017-12-11 PROCEDURE — 84443 ASSAY THYROID STIM HORMONE: CPT

## 2017-12-11 PROCEDURE — 80061 LIPID PANEL: CPT

## 2017-12-11 RX ORDER — MONTELUKAST SODIUM 10 MG/1
TABLET ORAL
Qty: 30 TABLET | Refills: 11 | Status: SHIPPED | OUTPATIENT
Start: 2017-12-11 | End: 2018-12-20 | Stop reason: SDUPTHER

## 2017-12-14 NOTE — PROGRESS NOTES
Patient ID: Stacey Wade is a 52 y.o. female.    Chief Complaint: Breast Cancer Screening    HPI: Patient presents for her annual mammogram and breast examination. Has a history of a breast mass noted during her routine gyn exam back in March 2017. Negative right breast imaging with mammo and ultrasound at that time. We have been checking it clinically and couple of months ago it seemed to increase in size slightly. Pt had been using more caffeine than usual. Recommended seeing surgeon and pt wanted to decrease her caffeine to see if it would decrease the size of the area. It did go back to her baseline measurement. Pt denies any change. No new areas of concern.   Review of Systems   Constitutional: Negative.    HENT: Negative.    Eyes: Negative.    Respiratory: Negative.    Cardiovascular: Negative.    Gastrointestinal: Negative.    Endocrine: Negative.    Genitourinary: Negative.    Musculoskeletal: Negative.    Skin: Negative.    Allergic/Immunologic: Negative.    Neurological: Negative.    Hematological: Negative.    Psychiatric/Behavioral: Negative.    Breast: pt denies any nipple discharge or palpable mass that she has noted. Has had bilateral nipple tenderness since her cycle which started yesterday. She has decreased her cafeine intake.     Current Outpatient Prescriptions   Medication Sig Dispense Refill    aspirin 81 mg Tab Take 1 tablet by mouth.      betamethasone dipropionate (DIPROLENE) 0.05 % cream APPLY TOPICALLY 2 TIMES DAILY 45 g 3    CONNIE 0.35 mg tablet Take 1 tablet (0.35 mg total) by mouth once daily. 84 tablet 0    clorazepate (TRANXENE) 3.75 MG Tab Take 1 tablet (3.75 mg total) by mouth daily as needed. 30 tablet 2    doxepin (SINEQUAN) 10 MG capsule TAKE 2 CAPSULES BY MOUTH 3 TIMES A  capsule 0    fexofenadine (ALLEGRA) 180 MG tablet TAKE ONE TABLET BY MOUTH EVERY DAY 30 tablet 0    fluconazole (DIFLUCAN) 150 MG Tab TAKE ONE TABLET BY MOUTH EVERY DAY 1 tablet 0     hydrOXYzine HCl (ATARAX) 25 MG tablet TAKE ONE TABLET BY MOUTH FOUR TIMES A DAY AS NEEDED 120 tablet 5    mometasone (NASONEX) 50 mcg/actuation nasal spray 2 sprays by Nasal route once daily. 1 each 11    montelukast (SINGULAIR) 10 mg tablet TAKE 1 TABLET BY MOUTH ONCE DAILY 30 tablet 11    omalizumab (XOLAIR) 150 mg injection Inject 150 mg into the skin.      potassium chloride (MICRO-K) 10 MEQ CpSR Take 1 capsule (10 mEq total) by mouth once daily. 30 capsule 11    ranitidine (ZANTAC) 150 MG tablet TAKE 1 TABLET BY MOUTH 2 TIMES A DAY 60 tablet 11    triamterene-hydrochlorothiazide 37.5-25 mg (DYAZIDE) 37.5-25 mg per capsule Take 1 capsule by mouth once daily. 30 capsule 11     No current facility-administered medications for this visit.        Review of patient's allergies indicates:   Allergen Reactions    Benzalkonium chloride     Black pepper      Other reaction(s): Hives    Chocolate flavor      Other reaction(s): Hives    Citrus and derivatives Hives     LEMON PRODUCTS    Furosemide     Grapefruit      Other reaction(s): Hives    Latex      Other reaction(s): Hives    Lemon balm (casper officinalis) Hives    Neomycin-bacitracin-polymyxin      Other reaction(s): Rash    Oats (richard) Hives     Oat foods (oatmeal, etc...)    Wheat containing prod        Past Medical History:   Diagnosis Date    Allergic rhinitis     Anxiety     Hypertension     Urticaria        Past Surgical History:   Procedure Laterality Date    HERNIA REPAIR Left        Family History   Problem Relation Age of Onset    Lung cancer Paternal Grandfather     Ovarian cancer Maternal Grandmother     Hyperlipidemia Mother     Hypertension Mother     Breast cancer Mother     Arthritis Mother     Lung cancer Father     Breast cancer Maternal Aunt     Heart failure Other      Great aunt    Prostate cancer Brother        Social History     Social History    Marital status: Single     Spouse name: N/A    Number of  children: N/A    Years of education: N/A     Occupational History    Not on file.     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       There were no vitals filed for this visit.    Physical Exam   Constitutional: She is oriented to person, place, and time. She appears well-developed and well-nourished.   HENT:   Head: Normocephalic and atraumatic.   Right Ear: External ear normal.   Left Ear: External ear normal.   Eyes: Conjunctivae and EOM are normal. Pupils are equal, round, and reactive to light. Right eye exhibits no discharge. Left eye exhibits no discharge. No scleral icterus.   Neck: Normal range of motion. Neck supple. No thyromegaly present.   Cardiovascular: Normal rate and regular rhythm.    Pulmonary/Chest: Effort normal and breath sounds normal. Right breast exhibits no inverted nipple, no mass, no nipple discharge, no skin change and no tenderness. Left breast exhibits no inverted nipple, no mass, no nipple discharge, no skin change and no tenderness.       Musculoskeletal: Normal range of motion. She exhibits no edema.   Lymphadenopathy:        Head (right side): No submental, no submandibular, no tonsillar, no preauricular, no posterior auricular and no occipital adenopathy present.        Head (left side): No submental, no submandibular, no tonsillar, no preauricular, no posterior auricular and no occipital adenopathy present.     She has no cervical adenopathy.        Right cervical: No superficial cervical and no posterior cervical adenopathy present.       Left cervical: No superficial cervical and no posterior cervical  "adenopathy present.     She has no axillary adenopathy.        Right: No supraclavicular adenopathy present.        Left: No supraclavicular adenopathy present.   Neurological: She is alert and oriented to person, place, and time.   Skin: Skin is warm and dry.   Psychiatric: She has a normal mood and affect. Her behavior is normal. Judgment and thought content normal.   Vitals reviewed.    IMAGING:   Risk Factors:    Menarche at 18 y/o    LMP: 19  No history of hormone use other than birth control and no history of radiation to the neck or chest wall.     Ht: 5'2"  Wt: 186  BMI:34.31    FH: Maternal aunt breast cancer at 49 y/o, Maternal GM ovarian cancer, Father lung and prostate cancer, Brother - prostate cancer    Assessment & Plan:  1. Area of prominent glandular tissue noted at the 3 oclock location of the right breast. Fibrous texture and blends more medially and laterally- stable measurements today  2. Mammogram results pending- Explained potential for elevated breast cancer risk score due to nulliparity and family history.       Explained potential for elevated risk assessment score due to nulliparity and family history of breast cancer. Explained recommendations for additional screening with MRI in 6 months alternating with Diagnostic mammograms    Discussed modifiable risk factors such as diet and exercise. Pt not exercising at all. Explained benefits of loosing a few pounds to decrease estrogen exposure from fat cells. Pt has agreed to loose 5 # by March visit.    Discussed risks vs benefits of genetic testing - no first degree relative with breast cancer and only one with breast and one with ovarian- will discuss further at the 3 month f/u visit. Explained process of drawing blood- filing with insurance- if denied pt will be notified and given the option of paying out of pocket.      Discussed if testing is negative would proceed with MRI and Mammogram screenings alternating every 6 months with " clinical exams.  If positive would have pt see surgeon and plastic surgeon to discuss prophylactic linda mastectomies with reconstruction with or without TAHBSO depending on test results.    Discussed use of tamoxifen for the reduction of breast cancer risk- Pt declines at this time due to the potential for hot flashes and blood clots.   Discussed implications for her care and the care of her family.      BSE technique was demonstrated and explained. Related what to look and feel for on exam monthly. Pt verbalized understanding and return demonstrated proper technique and knowledge of what to look for on self exam.   One hour- 60 minutes was spent with this patient and 75% was spent identifying risk factors, reviewing records and educating pt regarding risk reduction strategies and planning future screenings.    3. Explained clinical findings and imaging findings.   4. RTC on 12/18/17 for linda mammo and see me same day for exam- discussed risk assessment with breast imaging and if scores over 20% would need to do MRI of the breasts June 2018.

## 2017-12-18 ENCOUNTER — OFFICE VISIT (OUTPATIENT)
Dept: SURGERY | Facility: CLINIC | Age: 52
End: 2017-12-18
Payer: COMMERCIAL

## 2017-12-18 ENCOUNTER — HOSPITAL ENCOUNTER (OUTPATIENT)
Dept: RADIOLOGY | Facility: HOSPITAL | Age: 52
Discharge: HOME | End: 2017-12-18
Attending: NURSE PRACTITIONER
Payer: COMMERCIAL

## 2017-12-18 VITALS
HEIGHT: 62 IN | SYSTOLIC BLOOD PRESSURE: 110 MMHG | HEART RATE: 72 BPM | DIASTOLIC BLOOD PRESSURE: 72 MMHG | BODY MASS INDEX: 33.51 KG/M2 | WEIGHT: 182.13 LBS

## 2017-12-18 VITALS — BODY MASS INDEX: 34.23 KG/M2 | HEIGHT: 62 IN | WEIGHT: 186 LBS

## 2017-12-18 DIAGNOSIS — Z12.31 VISIT FOR SCREENING MAMMOGRAM: ICD-10-CM

## 2017-12-18 DIAGNOSIS — Z91.89 AT HIGH RISK FOR BREAST CANCER: ICD-10-CM

## 2017-12-18 DIAGNOSIS — Z80.3 FAMILY HISTORY OF BREAST CANCER: ICD-10-CM

## 2017-12-18 DIAGNOSIS — N60.11 FIBROCYSTIC BREAST CHANGES, RIGHT: ICD-10-CM

## 2017-12-18 DIAGNOSIS — Z55.9 EDUCATIONAL CIRCUMSTANCE: ICD-10-CM

## 2017-12-18 DIAGNOSIS — Z12.31 VISIT FOR SCREENING MAMMOGRAM: Primary | ICD-10-CM

## 2017-12-18 DIAGNOSIS — Z71.89 COUNSELING REGARDING GOALS OF CARE: ICD-10-CM

## 2017-12-18 PROCEDURE — 99214 OFFICE O/P EST MOD 30 MIN: CPT | Mod: S$GLB,,, | Performed by: NURSE PRACTITIONER

## 2017-12-18 PROCEDURE — 77067 SCR MAMMO BI INCL CAD: CPT | Mod: 26,,, | Performed by: RADIOLOGY

## 2017-12-18 PROCEDURE — 99999 PR PBB SHADOW E&M-EST. PATIENT-LVL IV: CPT | Mod: PBBFAC,,, | Performed by: NURSE PRACTITIONER

## 2017-12-18 PROCEDURE — 77067 SCR MAMMO BI INCL CAD: CPT | Mod: TC

## 2017-12-18 SDOH — SOCIAL DETERMINANTS OF HEALTH (SDOH): PROBLEMS RELATED TO EDUCATION AND LITERACY, UNSPECIFIED: Z55.9

## 2017-12-26 ENCOUNTER — TELEPHONE (OUTPATIENT)
Dept: FAMILY MEDICINE | Facility: CLINIC | Age: 52
End: 2017-12-26

## 2017-12-26 NOTE — TELEPHONE ENCOUNTER
----- Message from Tobin Martinez sent at 12/26/2017  1:30 PM CST -----  Contact: Pt   States she's calling rg letting the Dr know that she hasn't heard anything back from the gastro dept and can be reached at [phone]

## 2017-12-29 ENCOUNTER — TELEPHONE (OUTPATIENT)
Dept: FAMILY MEDICINE | Facility: CLINIC | Age: 52
End: 2017-12-29

## 2017-12-29 NOTE — TELEPHONE ENCOUNTER
----- Message from Kenisha Lawrence sent at 12/29/2017  1:49 PM CST -----  The name of her eye drRaghu Is Richard duckworth. She see him at [facility] on [address]. Pt can be reach at [phone]

## 2018-01-15 RX ORDER — SODIUM, POTASSIUM,MAG SULFATES 17.5-3.13G
SOLUTION, RECONSTITUTED, ORAL ORAL
Qty: 354 ML | Refills: 0 | Status: ON HOLD | OUTPATIENT
Start: 2018-01-15 | End: 2018-01-18 | Stop reason: ALTCHOICE

## 2018-01-18 ENCOUNTER — SURGERY (OUTPATIENT)
Age: 53
End: 2018-01-18

## 2018-01-18 ENCOUNTER — ANESTHESIA EVENT (OUTPATIENT)
Dept: ENDOSCOPY | Facility: HOSPITAL | Age: 53
End: 2018-01-18
Payer: COMMERCIAL

## 2018-01-18 ENCOUNTER — HOSPITAL ENCOUNTER (OUTPATIENT)
Facility: HOSPITAL | Age: 53
Discharge: HOME | End: 2018-01-18
Attending: FAMILY MEDICINE | Admitting: FAMILY MEDICINE
Payer: COMMERCIAL

## 2018-01-18 ENCOUNTER — ANESTHESIA (OUTPATIENT)
Dept: ENDOSCOPY | Facility: HOSPITAL | Age: 53
End: 2018-01-18
Payer: COMMERCIAL

## 2018-01-18 VITALS
HEART RATE: 78 BPM | OXYGEN SATURATION: 99 % | RESPIRATION RATE: 18 BRPM | TEMPERATURE: 98 F | DIASTOLIC BLOOD PRESSURE: 83 MMHG | SYSTOLIC BLOOD PRESSURE: 119 MMHG

## 2018-01-18 DIAGNOSIS — Z12.11 COLON CANCER SCREENING: Primary | ICD-10-CM

## 2018-01-18 DIAGNOSIS — Z12.11 SPECIAL SCREENING FOR MALIGNANT NEOPLASMS, COLON: ICD-10-CM

## 2018-01-18 LAB
B-HCG UR QL: NEGATIVE
CTP QC/QA: YES

## 2018-01-18 PROCEDURE — G0121 COLON CA SCRN NOT HI RSK IND: HCPCS | Performed by: FAMILY MEDICINE

## 2018-01-18 PROCEDURE — 25000003 PHARM REV CODE 250: Performed by: FAMILY MEDICINE

## 2018-01-18 PROCEDURE — 81025 URINE PREGNANCY TEST: CPT | Performed by: FAMILY MEDICINE

## 2018-01-18 PROCEDURE — 25000003 PHARM REV CODE 250: Performed by: NURSE ANESTHETIST, CERTIFIED REGISTERED

## 2018-01-18 PROCEDURE — 37000008 HC ANESTHESIA 1ST 15 MINUTES: Performed by: FAMILY MEDICINE

## 2018-01-18 PROCEDURE — 37000009 HC ANESTHESIA EA ADD 15 MINS: Performed by: FAMILY MEDICINE

## 2018-01-18 PROCEDURE — G0121 COLON CA SCRN NOT HI RSK IND: HCPCS | Mod: ,,, | Performed by: FAMILY MEDICINE

## 2018-01-18 PROCEDURE — 63600175 PHARM REV CODE 636 W HCPCS: Performed by: NURSE ANESTHETIST, CERTIFIED REGISTERED

## 2018-01-18 RX ORDER — SODIUM CHLORIDE, SODIUM LACTATE, POTASSIUM CHLORIDE, CALCIUM CHLORIDE 600; 310; 30; 20 MG/100ML; MG/100ML; MG/100ML; MG/100ML
INJECTION, SOLUTION INTRAVENOUS CONTINUOUS PRN
Status: DISCONTINUED | OUTPATIENT
Start: 2018-01-18 | End: 2018-01-18

## 2018-01-18 RX ORDER — PROPOFOL 10 MG/ML
INJECTION, EMULSION INTRAVENOUS
Status: DISCONTINUED | OUTPATIENT
Start: 2018-01-18 | End: 2018-01-18

## 2018-01-18 RX ORDER — LIDOCAINE HCL/PF 100 MG/5ML
SYRINGE (ML) INTRAVENOUS
Status: DISCONTINUED | OUTPATIENT
Start: 2018-01-18 | End: 2018-01-18

## 2018-01-18 RX ORDER — SODIUM CHLORIDE, SODIUM LACTATE, POTASSIUM CHLORIDE, CALCIUM CHLORIDE 600; 310; 30; 20 MG/100ML; MG/100ML; MG/100ML; MG/100ML
INJECTION, SOLUTION INTRAVENOUS CONTINUOUS
Status: DISCONTINUED | OUTPATIENT
Start: 2018-01-18 | End: 2018-01-18 | Stop reason: HOSPADM

## 2018-01-18 RX ADMIN — PROPOFOL 30 MG: 10 INJECTION, EMULSION INTRAVENOUS at 02:01

## 2018-01-18 RX ADMIN — PROPOFOL 30 MG: 10 INJECTION, EMULSION INTRAVENOUS at 03:01

## 2018-01-18 RX ADMIN — PROPOFOL 140 MG: 10 INJECTION, EMULSION INTRAVENOUS at 02:01

## 2018-01-18 RX ADMIN — SODIUM CHLORIDE, SODIUM LACTATE, POTASSIUM CHLORIDE, AND CALCIUM CHLORIDE: 600; 310; 30; 20 INJECTION, SOLUTION INTRAVENOUS at 02:01

## 2018-01-18 RX ADMIN — LIDOCAINE HYDROCHLORIDE 100 MG: 20 INJECTION, SOLUTION INTRAVENOUS at 02:01

## 2018-01-18 RX ADMIN — SODIUM CHLORIDE, SODIUM LACTATE, POTASSIUM CHLORIDE, AND CALCIUM CHLORIDE: .6; .31; .03; .02 INJECTION, SOLUTION INTRAVENOUS at 01:01

## 2018-01-18 NOTE — ANESTHESIA POSTPROCEDURE EVALUATION
Anesthesia Post Evaluation    Patient: Stacey Wade    Procedure(s) Performed: Procedure(s) (LRB):  COLONOSCOPY (N/A)    Final Anesthesia Type: MAC  Patient location during evaluation: PACU  Patient participation: Yes- Able to Participate  Level of consciousness: awake and alert and oriented  Post-procedure vital signs: reviewed and stable  Pain management: adequate  Airway patency: patent  PONV status at discharge: No PONV  Anesthetic complications: no      Cardiovascular status: blood pressure returned to baseline  Respiratory status: unassisted, room air and spontaneous ventilation  Hydration status: euvolemic  Follow-up not needed.        Visit Vitals  /71 (BP Location: Left arm, Patient Position: Lying)   Pulse 85   Temp 36.8 °C (98.2 °F) (Oral)   Resp 16   SpO2 100%   Breastfeeding? No       Pain/Brooke Score: Pain Assessment Performed: Yes (1/18/2018  1:17 PM)  Presence of Pain: denies (1/18/2018  1:17 PM)  Brooke Score: 9 (1/18/2018  3:09 PM)

## 2018-01-18 NOTE — ANESTHESIA PREPROCEDURE EVALUATION
01/18/2018  Stacey Wade is a 52 y.o., female.    Anesthesia Evaluation    I have reviewed the Patient Summary Reports.    I have reviewed the Nursing Notes.   I have reviewed the Medications.     Review of Systems  Social:  No Alcohol Use, Non-Smoker    Hematology/Oncology:  Hematology Normal   Oncology Normal     EENT/Dental:   chronic allergic rhinitis   Cardiovascular:   Exercise tolerance: good Hypertension ECG has been reviewed. Normal sinus rhythm  Nonspecific T wave abnormality  Abnormal ECG  No previous ECGs available  Confirmed by JINNY HAUSER MD ([id number]) on 3/2/2016 6:54:09 AM   Pulmonary:  Pulmonary Normal    Renal/:  Renal/ Normal     Hepatic/GI:   Bowel Prep. 1030 last drink of fluid.   Musculoskeletal:  Musculoskeletal Normal    Neurological:  Neurology Normal    Endocrine:  Endocrine Normal    Dermatological:  Skin Normal    Psych:  Psychiatric Normal           Physical Exam  General:  Well nourished, Obesity    Airway/Jaw/Neck:  Airway Findings: Mallampati: I                Anesthesia Plan  Type of Anesthesia, risks & benefits discussed:  Anesthesia Type:  MAC  Patient's Preference:   Intra-op Monitoring Plan:   Intra-op Monitoring Plan Comments:   Post Op Pain Control Plan:   Post Op Pain Control Plan Comments:   Induction:   IV  Beta Blocker:  Patient is not currently on a Beta-Blocker (No further documentation required).       Informed Consent: Patient understands risks and agrees with Anesthesia plan.  Questions answered. Anesthesia consent signed with patient.  ASA Score: 2     Day of Surgery Review of History & Physical: I have interviewed and examined the patient. I have reviewed the patient's H&P dated: 01/18/18. There are no significant changes.  H&P update referred to the provider.         Ready For Surgery From Anesthesia Perspective.

## 2018-01-18 NOTE — TRANSFER OF CARE
Anesthesia Transfer of Care Note    Patient: Stacey Wade    Procedure(s) Performed: Procedure(s) (LRB):  COLONOSCOPY (N/A)    Patient location: PACU    Anesthesia Type: MAC    Transport from OR: Transported from OR on room air with adequate spontaneous ventilation    Post pain: adequate analgesia    Post assessment: no apparent anesthetic complications    Post vital signs: stable    Level of consciousness: awake and alert    Nausea/Vomiting: no nausea/vomiting    Complications: none    Transfer of care protocol was followed      Last vitals:   Visit Vitals  /71 (BP Location: Left arm, Patient Position: Lying)   Pulse 85   Temp 36.8 °C (98.2 °F) (Oral)   Resp 16   SpO2 100%   Breastfeeding? No

## 2018-01-18 NOTE — ANESTHESIA RELEASE NOTE
Anesthesia Release from PACU Note    Patient: Stacey Wade    Procedure(s) Performed: Procedure(s) (LRB):  COLONOSCOPY (N/A)    Anesthesia type: MAC    Post pain: Adequate analgesia    Post assessment: no apparent anesthetic complications, tolerated procedure well and no evidence of recall    Last Vitals:   Visit Vitals  /71 (BP Location: Left arm, Patient Position: Lying)   Pulse 85   Temp 36.8 °C (98.2 °F) (Oral)   Resp 16   SpO2 100%   Breastfeeding? No       Post vital signs: stable    Level of consciousness: awake, alert  and oriented    Nausea/Vomiting: no nausea/no vomiting    Complications: none    Airway Patency: patent    Respiratory: unassisted, spontaneous ventilation, room air    Cardiovascular: stable    Hydration: euvolemic

## 2018-01-18 NOTE — DISCHARGE SUMMARY
Endoscopy Discharge Summary      Admit Date: 1/18/2018    Discharge Date and Time:  1/18/2018 3:07 PM    Attending Physician: Yong Smith MD     Discharge Physician: Yong Smith MD     Principal Admitting Diagnoses: <principal problem not specified>         Discharge Diagnosis: The primary encounter diagnosis was Colon cancer screening. A diagnosis of Special screening for malignant neoplasms, colon was also pertinent to this visit.     Discharged Condition: Good    Indication for Admission: <principal problem not specified>     Hospital Course: Patient was admitted for an inpatient procedure and tolerated the procedure well with no complications.    Significant Diagnostic Studies: Colonoscopy    Pathology (if any):  Specimen (12h ago through future)    None          Estimated Blood Loss: 0 ml.    Discussed with: patient and family.    Disposition: Home.    Follow Up/Patient Instructions:   Current Discharge Medication List      CONTINUE these medications which have NOT CHANGED    Details   aspirin 81 mg Tab Take 1 tablet by mouth.      betamethasone dipropionate (DIPROLENE) 0.05 % cream APPLY TOPICALLY 2 TIMES DAILY  Qty: 45 g, Refills: 3    Comments: **ATTN: FILL MEDS DUE**      CONNIE 0.35 mg tablet Take 1 tablet (0.35 mg total) by mouth once daily.  Qty: 84 tablet, Refills: 0      clorazepate (TRANXENE) 3.75 MG Tab Take 1 tablet (3.75 mg total) by mouth daily as needed.  Qty: 30 tablet, Refills: 2      doxepin (SINEQUAN) 10 MG capsule TAKE 2 CAPSULES BY MOUTH 3 TIMES A DAY  Qty: 120 capsule, Refills: 0      fexofenadine (ALLEGRA) 180 MG tablet TAKE ONE TABLET BY MOUTH EVERY DAY  Qty: 30 tablet, Refills: 0      hydrOXYzine HCl (ATARAX) 25 MG tablet TAKE ONE TABLET BY MOUTH FOUR TIMES A DAY AS NEEDED  Qty: 120 tablet, Refills: 5      mometasone (NASONEX) 50 mcg/actuation nasal spray 2 sprays by Nasal route once daily.  Qty: 1 each, Refills: 11      montelukast (SINGULAIR) 10 mg tablet TAKE 1 TABLET BY  MOUTH ONCE DAILY  Qty: 30 tablet, Refills: 11    Comments: ATTN TECH:CALL IN,FILL ALL      omalizumab (XOLAIR) 150 mg injection Inject 150 mg into the skin.      potassium chloride (MICRO-K) 10 MEQ CpSR Take 1 capsule (10 mEq total) by mouth once daily.  Qty: 30 capsule, Refills: 11      ranitidine (ZANTAC) 150 MG tablet TAKE 1 TABLET BY MOUTH 2 TIMES A DAY  Qty: 60 tablet, Refills: 11    Comments: ATTN TECH:CALL IN,FILL ALL      triamterene-hydrochlorothiazide 37.5-25 mg (DYAZIDE) 37.5-25 mg per capsule Take 1 capsule by mouth once daily.  Qty: 30 capsule, Refills: 11      fluconazole (DIFLUCAN) 150 MG Tab TAKE ONE TABLET BY MOUTH EVERY DAY  Qty: 1 tablet, Refills: 0               Discharge Procedure Orders  Diet general     Activity as tolerated     Call MD for:  temperature >100.4     Call MD for:  persistent nausea and vomiting     Call MD for:  severe uncontrolled pain     Call MD for:  difficulty breathing, headache or visual disturbances     Call MD for:  redness, tenderness, or signs of infection (pain, swelling, redness, odor or green/yellow discharge around incision site)     Call MD for:  hives     Call MD for:  persistent dizziness or light-headedness     No dressing needed         Follow-up Information     Go to Savannah Lindsey MD.    Specialty:  Family Medicine  Why:  As needed  Contact information:  [address] [place] [address]  [phone]                   [email or web address]

## 2018-01-18 NOTE — H&P
Short Stay Endoscopy History and Physical    PCP - Savannah Lindsey MD    Procedure - Colonoscopy  ASA - 2  Mallampati - per anesthesia  History of Anesthesia problems - no  Family history Anesthesia problems -  no     HPI:  This is a 52 y.o. female here for evaluation of :   Active Hospital Problems    Diagnosis  POA    Colon cancer screening [Z12.11]  Not Applicable    Special screening for malignant neoplasms, colon [Z12.11]  Not Applicable      Resolved Hospital Problems    Diagnosis Date Resolved POA   No resolved problems to display.         Health Maintenance       Date Due Completion Date    Colonoscopy 10/24/2017 ---    Lipid Panel 12/11/2018 12/11/2017    Mammogram 12/18/2018 12/18/2017    Override on 10/18/2012: Done    Pap Smear with HPV Cotest 03/13/2020 3/13/2017    TETANUS VACCINE 10/10/2026 10/10/2016          Screening - yes  History of polyps - no  Diarrhea - no  Anemia - no  Blood in stools - no  Abdominal pain - no  Other - no    ROS:  CONSTITUTIONAL: Denies weight change,  fatigue, fevers, chills, night sweats.  CARDIOVASCULAR: Denies chest pain, shortness of breath, orthopnea and edema.  RESPIRATORY: Denies cough, hemoptysis, dyspnea, and wheezing.  GI: See HPI.    Medical History:   Past Medical History:   Diagnosis Date    Allergic rhinitis     Anxiety     Hypertension     Urticaria        Surgical History:   Past Surgical History:   Procedure Laterality Date    HERNIA REPAIR Left        Family History:   Family History   Problem Relation Age of Onset    Lung cancer Paternal Grandfather     Ovarian cancer Maternal Grandmother     Hyperlipidemia Mother     Hypertension Mother     Breast cancer Mother     Arthritis Mother     Lung cancer Father     Breast cancer Maternal Aunt     Heart failure Other      Great aunt    Prostate cancer Brother        Social History:   Social History   Substance Use Topics    Smoking status: Never Smoker    Smokeless tobacco: Never Used     Alcohol use No       Allergies:   Review of patient's allergies indicates:   Allergen Reactions    Benzalkonium chloride     Black pepper      Other reaction(s): Hives    Chocolate flavor      Other reaction(s): Hives    Citrus and derivatives Hives     LEMON PRODUCTS    Furosemide     Grapefruit      Other reaction(s): Hives    Latex      Other reaction(s): Hives    Lemon balm (casper officinalis) Hives    Neomycin-bacitracin-polymyxin      Other reaction(s): Rash    Oats (richard) Hives     Oat foods (oatmeal, etc...)    Wheat containing prod        Medications:   No current facility-administered medications on file prior to encounter.      Current Outpatient Prescriptions on File Prior to Encounter   Medication Sig Dispense Refill    aspirin 81 mg Tab Take 1 tablet by mouth.      betamethasone dipropionate (DIPROLENE) 0.05 % cream APPLY TOPICALLY 2 TIMES DAILY 45 g 3    CONNIE 0.35 mg tablet Take 1 tablet (0.35 mg total) by mouth once daily. 84 tablet 0    clorazepate (TRANXENE) 3.75 MG Tab Take 1 tablet (3.75 mg total) by mouth daily as needed. 30 tablet 2    doxepin (SINEQUAN) 10 MG capsule TAKE 2 CAPSULES BY MOUTH 3 TIMES A  capsule 0    fexofenadine (ALLEGRA) 180 MG tablet TAKE ONE TABLET BY MOUTH EVERY DAY 30 tablet 0    hydrOXYzine HCl (ATARAX) 25 MG tablet TAKE ONE TABLET BY MOUTH FOUR TIMES A DAY AS NEEDED 120 tablet 5    mometasone (NASONEX) 50 mcg/actuation nasal spray 2 sprays by Nasal route once daily. 1 each 11    omalizumab (XOLAIR) 150 mg injection Inject 150 mg into the skin.      potassium chloride (MICRO-K) 10 MEQ CpSR Take 1 capsule (10 mEq total) by mouth once daily. 30 capsule 11    triamterene-hydrochlorothiazide 37.5-25 mg (DYAZIDE) 37.5-25 mg per capsule Take 1 capsule by mouth once daily. 30 capsule 11    fluconazole (DIFLUCAN) 150 MG Tab TAKE ONE TABLET BY MOUTH EVERY DAY 1 tablet 0       Physical Exam:  Vital Signs: see nurses notes.  General Appearance:  Well appearing in no acute distress  ENT: OP clear  Chest: CTA B  CV: RRR, no m/r/g  Abd: s/nt/nd/nabs  Ext: no edema    Labs:Reviewed    IMP:  Active Hospital Problems    Diagnosis  POA    Colon cancer screening [Z12.11]  Not Applicable    Special screening for malignant neoplasms, colon [Z12.11]  Not Applicable      Resolved Hospital Problems    Diagnosis Date Resolved POA   No resolved problems to display.         Plan:   I have explained the risks and benefits of colonoscopy to the patient including but not limited to bleeding, perforation, infection, and death. The patient wishes to proceed.

## 2018-01-18 NOTE — DISCHARGE INSTRUCTIONS
Colonoscopy     A camera attached to a flexible tube with a viewing lens is used to take video pictures.     Colonoscopy is a test to view the inside of your lower digestive tract (colon and rectum). Sometimes it can show the last part of the small intestine (ileum). During the test, small pieces of tissue may be removed for testing. This is called a biopsy. Small growths, such as polyps, may also be removed.   Why is colonoscopy done?  The test is done to help look for colon cancer. And it can help find the source of abdominal pain, bleeding, and changes in bowel habits. It may be needed once a year, depending on factors such as your:  · Age  · Health history  · Family health history  · Symptoms  · Results from any prior colonoscopy  Risks and possible complications  These include:  · Bleeding               · A puncture or tear in the colon   · Risks of anesthesia  · A cancer lesion not being seen  Getting ready   To prepare for the test:  · Talk with your healthcare provider about the risks of the test (see below). Also ask your healthcare provider about alternatives to the test.  · Tell your healthcare provider about any medicines you take. Also tell him or her about any health conditions you may have.  · Make sure your rectum and colon are empty for the test. Follow the diet and bowel prep instructions exactly. If you dont, the test may need to be rescheduled.  · Plan for a friend or family member to drive you home after the test.     Colonoscopy provides an inside view of the entire colon.     You may discuss the results with your doctor right away or at a future visit.  During the test   The test is usually done in the hospital on an outpatient basis. This means you go home the same day. The procedure takes about 30 minutes. During that time:  · You are given relaxing (sedating) medicine through an IV line. You may be drowsy, or fully asleep.  · The healthcare provider will first give you a physical exam to  check for anal and rectal problems.  · Then the anus is lubricated and the scope inserted.  · If you are awake, you may have a feeling similar to needing to have a bowel movement. You may also feel pressure as air is pumped into the colon. Its OK to pass gas during the procedure.  · Biopsy, polyp removal, or other treatments may be done during the test.  After the test   You may have gas right after the test. It can help to try to pass it to help prevent later bloating. Your healthcare provider may discuss the results with you right away. Or you may need to schedule a follow-up visit to talk about the results. After the test, you can go back to your normal eating and other activities. You may be tired from the sedation and need to rest for a few hours.  Date Last Reviewed: 11/1/2016 © 2000-2017 [employer]. [address] [place] [address], [place] [address]. All rights reserved. This information is not intended as a substitute for professional medical care. Always follow your healthcare professional's instructions.

## 2018-03-16 NOTE — PROGRESS NOTES
Patient ID: Stacey Wade is a 52 y.o. female.    Chief Complaint: Breast Cancer Screening    HPI: Patient presents for 3 month f/u breast exam and to discuss high risk status based on her breast cancer risk assessment score of 25.79% calculated at the time of her annual mammogram 12/18/17.    Has a history of a breast mass noted during her routine gyn exam back in March 2017. Negative right breast imaging with mammo and ultrasound at that time. We have been checking it clinically at 3- 6 mon intervals - had an episode of it increasing increase in size slightly last year. Pt had been using more caffeine than usual. Recommended seeing surgeon and pt wanted to decrease her caffeine to see if it would decrease the size of the area. It did go back to her baseline measurement. Pt denies any change. No new areas of concern.     Review of Systems   Constitutional: Negative.    HENT: Negative.    Eyes: Negative.    Respiratory: Negative.    Cardiovascular: Negative.    Gastrointestinal: Negative.    Endocrine: Negative.    Genitourinary: Negative.    Musculoskeletal: Negative.    Skin: Negative.    Allergic/Immunologic: Negative.    Neurological: Negative.    Hematological: Negative.    Psychiatric/Behavioral: Negative.    Breast: pt denies any nipple discharge or palpable mass that she has noted. Has had bilateral nipple tenderness intermittently. She has continued to decrease her cafeine intake.     Current Outpatient Prescriptions   Medication Sig Dispense Refill    aspirin (ECOTRIN) 81 MG EC tablet Take 81 mg by mouth once daily.      betamethasone dipropionate (DIPROLENE) 0.05 % cream APPLY TOPICALLY 2 TIMES DAILY 45 g 3    CONNIE 0.35 mg tablet Take 1 tablet (0.35 mg total) by mouth once daily. 84 tablet 0    clorazepate (TRANXENE) 3.75 MG Tab Take 1 tablet (3.75 mg total) by mouth daily as needed. 30 tablet 2    doxepin (SINEQUAN) 10 MG capsule TAKE 2 CAPSULES BY MOUTH 3 TIMES A  capsule 0     fexofenadine (ALLEGRA) 180 MG tablet TAKE ONE TABLET BY MOUTH EVERY DAY 30 tablet 0    fluconazole (DIFLUCAN) 150 MG Tab TAKE ONE TABLET BY MOUTH EVERY DAY 1 tablet 0    hydrOXYzine HCl (ATARAX) 25 MG tablet TAKE ONE TABLET BY MOUTH FOUR TIMES A DAY AS NEEDED 120 tablet 5    mometasone (NASONEX) 50 mcg/actuation nasal spray 2 sprays by Nasal route once daily. 1 each 11    montelukast (SINGULAIR) 10 mg tablet TAKE 1 TABLET BY MOUTH ONCE DAILY 30 tablet 11    omalizumab (XOLAIR) 150 mg injection Inject 150 mg into the skin.      potassium chloride (MICRO-K) 10 MEQ CpSR Take 1 capsule (10 mEq total) by mouth once daily. 30 capsule 11    ranitidine (ZANTAC) 150 MG tablet TAKE 1 TABLET BY MOUTH 2 TIMES A DAY 60 tablet 11    triamterene-hydrochlorothiazide 37.5-25 mg (DYAZIDE) 37.5-25 mg per capsule Take 1 capsule by mouth once daily. 30 capsule 11     No current facility-administered medications for this visit.        Review of patient's allergies indicates:   Allergen Reactions    Benzalkonium chloride     Black pepper      Other reaction(s): Hives    Chocolate flavor      Other reaction(s): Hives    Citrus and derivatives Hives     LEMON PRODUCTS    Furosemide     Grapefruit      Other reaction(s): Hives    Latex      Other reaction(s): Hives    Lemon balm (casper officinalis) Hives    Neomycin-bacitracin-polymyxin      Other reaction(s): Rash    Oats (richard) Hives     Oat foods (oatmeal, etc...)    Wheat containing prod        Past Medical History:   Diagnosis Date    Allergic rhinitis     Anxiety     Hypertension     Urticaria        Past Surgical History:   Procedure Laterality Date    COLONOSCOPY N/A 1/18/2018    Procedure: COLONOSCOPY;  Surgeon: Yong Smith MD;  Location: [facility];  Service: Endoscopy;  Laterality: N/A;    HERNIA REPAIR Left        Family History   Problem Relation Age of Onset    Lung cancer Paternal Grandfather     Ovarian cancer Maternal Grandmother      Hyperlipidemia Mother     Hypertension Mother     Breast cancer Mother     Arthritis Mother     Lung cancer Father     Breast cancer Maternal Aunt     Heart failure Other      Great aunt    Prostate cancer Brother        Social History     Social History    Marital status: Single     Spouse name: N/A    Number of children: N/A    Years of education: N/A     Occupational History    Not on file.     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       Vitals:    03/19/18 0917   BP: 107/75   Pulse: 77       Physical Exam   Constitutional: She is oriented to person, place, and time. She appears well-developed and well-nourished.   HENT:   Head: Normocephalic and atraumatic.   Right Ear: External ear normal.   Left Ear: External ear normal.   Eyes: Conjunctivae and EOM are normal. Pupils are equal, round, and reactive to light. Right eye exhibits no discharge. Left eye exhibits no discharge. No scleral icterus.   Neck: Normal range of motion. Neck supple. No thyromegaly present.   Cardiovascular: Normal rate and regular rhythm.    Pulmonary/Chest: Effort normal and breath sounds normal. Right breast exhibits no inverted nipple, no mass, no nipple discharge, no skin change and no tenderness. Left breast exhibits no inverted nipple, no mass, no nipple discharge, no skin change and no tenderness.       Musculoskeletal: Normal range of motion. She exhibits no edema.   Lymphadenopathy:        Head (right side): No submental, no submandibular, no tonsillar, no preauricular, no posterior auricular and no occipital adenopathy present.        Head (left  "side): No submental, no submandibular, no tonsillar, no preauricular, no posterior auricular and no occipital adenopathy present.     She has no cervical adenopathy.        Right cervical: No superficial cervical and no posterior cervical adenopathy present.       Left cervical: No superficial cervical and no posterior cervical adenopathy present.     She has no axillary adenopathy.        Right: No supraclavicular adenopathy present.        Left: No supraclavicular adenopathy present.   Neurological: She is alert and oriented to person, place, and time.   Skin: Skin is warm and dry.   Psychiatric: She has a normal mood and affect. Her behavior is normal. Judgment and thought content normal.   Vitals reviewed.    [id number]  Result:  Mammo Digital Screening Bilat with CAD     History:  Patient is 52 y.o. and is seen for a screening mammogram.     Films Compared:  Compared to: 2016 Mammo Digital Screening Bilat with CAD (No Change), and 2015 Mammo Digital Screening Bilat with CAD (No Change)     Findings:  Computer-aided detection was utilized in the interpretation of this examination.       The breasts are heterogeneously dense, which may obscure small masses. There is no evidence of suspicious masses, microcalcifications or architectural distortion.     Impression:  No mammographic evidence of malignancy.     BI-RADS Category 1: Negative     Recommendation:  Routine screening mammogram in 1 year is recommended.     The patient's estimated lifetime risk of breast cancer (to age 85) based on Tyrer-Cuzick - 7 risk assessment model is: Tyrer-Cuzick: 25.79 %. According to the American Cancer Society,  patients with a lifetime breast cancer risk of 20% or higher might benefit from supplemental screening tests.    Menarche at 18 y/o    LMP:   No history of hormone use other than birth control and no history of radiation to the neck or chest wall.     Ht: 5'2"  Wt: 179  BMI:34.31    FH: Maternal aunt " breast cancer at 49 y/o, Maternal GM ovarian cancer, Father lung and prostate cancer, Brother - prostate cancer    Assessment & Plan:  1. Area of prominent glandular tissue noted at the 3 oclock location of the right breast. Fibrous texture and blends more medially and laterally- stable measurements today  2. Mammogram 12/18/17 wnl - elevated risk assessment score.   3. Explained recommendations for additional screening with MRI in June and bilateral mammo-diagnostic Dec  4. Discussed modifiable risk factors such as diet and exercise. Pt walking for about 15 minutes 2 times a week. Encouraged 30 minutes most days of the week. Pt lost 3 # since her visit in Dec. Explained benefits of loosing a few pounds to decrease estrogen exposure from fat cells. Pt has agreed to loose 5 # by June visit.  5. Discussed risks vs benefits of genetic testing - no first degree relative with breast cancer and only one with breast and one with ovarian-  Explained process of drawing blood- filing with insurance- if denied pt will be notified and given the option of paying out of pocket.  Discussed if testing is negative would proceed with MRI and Mammogram screenings alternating every 6 months with clinical exams.If positive would have pt see surgeon and plastic surgeon to discuss prophylactic linda mastectomies with reconstruction with or without TAHBSO depending on test results. Pt has a twin sister that has been told she has an abnormal mammogram- will hold off on making genetic recommendations until we know what is happening with her.   6. Discussed use of tamoxifen for the reduction of breast cancer risk- Pt declines at this time due to the potential for hot flashes and blood clots.   Discussed implications for her care and the care of her family.   7. BSE technique was demonstrated and explained. Related what to look and feel for on exam monthly. Pt verbalized understanding and return demonstrated proper technique and knowledge of  what to look for on self exam.     One hour- 30 minutes was spent with this patient and 75% was spent identifying risk factors, reviewing records and educating pt regarding risk reduction strategies and planning future screenings.

## 2018-03-19 ENCOUNTER — OFFICE VISIT (OUTPATIENT)
Dept: SURGERY | Facility: CLINIC | Age: 53
End: 2018-03-19
Payer: COMMERCIAL

## 2018-03-19 VITALS
SYSTOLIC BLOOD PRESSURE: 107 MMHG | OXYGEN SATURATION: 98 % | HEART RATE: 77 BPM | HEIGHT: 62 IN | BODY MASS INDEX: 32.94 KG/M2 | WEIGHT: 179 LBS | DIASTOLIC BLOOD PRESSURE: 75 MMHG

## 2018-03-19 DIAGNOSIS — Z71.89 COUNSELING REGARDING GOALS OF CARE: ICD-10-CM

## 2018-03-19 DIAGNOSIS — Z91.89 AT HIGH RISK FOR BREAST CANCER: Primary | ICD-10-CM

## 2018-03-19 DIAGNOSIS — Z80.3 FAMILY HISTORY OF BREAST CANCER: ICD-10-CM

## 2018-03-19 DIAGNOSIS — Z55.9 EDUCATIONAL CIRCUMSTANCE: ICD-10-CM

## 2018-03-19 PROCEDURE — 99999 PR PBB SHADOW E&M-EST. PATIENT-LVL IV: CPT | Mod: PBBFAC,,, | Performed by: NURSE PRACTITIONER

## 2018-03-19 PROCEDURE — 3074F SYST BP LT 130 MM HG: CPT | Mod: CPTII,S$GLB,, | Performed by: NURSE PRACTITIONER

## 2018-03-19 PROCEDURE — 3078F DIAST BP <80 MM HG: CPT | Mod: CPTII,S$GLB,, | Performed by: NURSE PRACTITIONER

## 2018-03-19 PROCEDURE — 99214 OFFICE O/P EST MOD 30 MIN: CPT | Mod: S$GLB,,, | Performed by: NURSE PRACTITIONER

## 2018-03-19 RX ORDER — ASPIRIN 81 MG/1
81 TABLET ORAL DAILY
Status: ON HOLD | COMMUNITY
End: 2022-05-13 | Stop reason: HOSPADM

## 2018-03-19 RX ORDER — TRIAMTERENE AND HYDROCHLOROTHIAZIDE 37.5; 25 MG/1; MG/1
CAPSULE ORAL
COMMUNITY
End: 2018-03-19

## 2018-03-19 SDOH — SOCIAL DETERMINANTS OF HEALTH (SDOH): PROBLEMS RELATED TO EDUCATION AND LITERACY, UNSPECIFIED: Z55.9

## 2018-04-04 RX ORDER — MOMETASONE FUROATE 50 UG/1
SPRAY, METERED NASAL
Qty: 17 G | Refills: 9 | Status: SHIPPED | OUTPATIENT
Start: 2018-04-04 | End: 2019-02-14 | Stop reason: SDUPTHER

## 2018-04-24 ENCOUNTER — LAB VISIT (OUTPATIENT)
Dept: LAB | Facility: HOSPITAL | Age: 53
End: 2018-04-24
Payer: COMMERCIAL

## 2018-04-24 ENCOUNTER — OFFICE VISIT (OUTPATIENT)
Dept: FAMILY MEDICINE | Facility: CLINIC | Age: 53
End: 2018-04-24
Payer: COMMERCIAL

## 2018-04-24 VITALS
DIASTOLIC BLOOD PRESSURE: 70 MMHG | OXYGEN SATURATION: 97 % | HEIGHT: 62 IN | BODY MASS INDEX: 33.02 KG/M2 | SYSTOLIC BLOOD PRESSURE: 100 MMHG | RESPIRATION RATE: 18 BRPM | HEART RATE: 90 BPM | WEIGHT: 179.44 LBS | TEMPERATURE: 99 F

## 2018-04-24 DIAGNOSIS — R42 LIGHTHEADEDNESS: ICD-10-CM

## 2018-04-24 DIAGNOSIS — I95.9 HYPOTENSION, UNSPECIFIED HYPOTENSION TYPE: ICD-10-CM

## 2018-04-24 DIAGNOSIS — R42 LIGHTHEADEDNESS: Primary | ICD-10-CM

## 2018-04-24 LAB
ANION GAP SERPL CALC-SCNC: 10 MMOL/L
BASOPHILS # BLD AUTO: 0.02 K/UL
BASOPHILS NFR BLD: 0.3 %
BUN SERPL-MCNC: 9 MG/DL
CALCIUM SERPL-MCNC: 9.6 MG/DL
CHLORIDE SERPL-SCNC: 99 MMOL/L
CO2 SERPL-SCNC: 26 MMOL/L
CREAT SERPL-MCNC: 1 MG/DL
DIFFERENTIAL METHOD: ABNORMAL
EOSINOPHIL # BLD AUTO: 0 K/UL
EOSINOPHIL NFR BLD: 0.3 %
ERYTHROCYTE [DISTWIDTH] IN BLOOD BY AUTOMATED COUNT: 16.5 %
EST. GFR  (AFRICAN AMERICAN): >60 ML/MIN/1.73 M^2
EST. GFR  (NON AFRICAN AMERICAN): >60 ML/MIN/1.73 M^2
GLUCOSE SERPL-MCNC: 105 MG/DL
HCT VFR BLD AUTO: 46.1 %
HGB BLD-MCNC: 14.7 G/DL
IMM GRANULOCYTES # BLD AUTO: 0.02 K/UL
IMM GRANULOCYTES NFR BLD AUTO: 0.3 %
LYMPHOCYTES # BLD AUTO: 1.3 K/UL
LYMPHOCYTES NFR BLD: 18.4 %
MCH RBC QN AUTO: 23.4 PG
MCHC RBC AUTO-ENTMCNC: 31.9 G/DL
MCV RBC AUTO: 74 FL
MONOCYTES # BLD AUTO: 0.5 K/UL
MONOCYTES NFR BLD: 6.6 %
NEUTROPHILS # BLD AUTO: 5.4 K/UL
NEUTROPHILS NFR BLD: 74.1 %
NRBC BLD-RTO: 0 /100 WBC
PLATELET # BLD AUTO: 166 K/UL
PMV BLD AUTO: 12.5 FL
POTASSIUM SERPL-SCNC: 3.1 MMOL/L
RBC # BLD AUTO: 6.27 M/UL
SODIUM SERPL-SCNC: 135 MMOL/L
WBC # BLD AUTO: 7.28 K/UL

## 2018-04-24 PROCEDURE — 3074F SYST BP LT 130 MM HG: CPT | Mod: CPTII,S$GLB,, | Performed by: FAMILY MEDICINE

## 2018-04-24 PROCEDURE — 36415 COLL VENOUS BLD VENIPUNCTURE: CPT | Mod: PO

## 2018-04-24 PROCEDURE — 80048 BASIC METABOLIC PNL TOTAL CA: CPT

## 2018-04-24 PROCEDURE — 99214 OFFICE O/P EST MOD 30 MIN: CPT | Mod: S$GLB,,, | Performed by: FAMILY MEDICINE

## 2018-04-24 PROCEDURE — 3008F BODY MASS INDEX DOCD: CPT | Mod: CPTII,S$GLB,, | Performed by: FAMILY MEDICINE

## 2018-04-24 PROCEDURE — 85025 COMPLETE CBC W/AUTO DIFF WBC: CPT

## 2018-04-24 PROCEDURE — 99999 PR PBB SHADOW E&M-EST. PATIENT-LVL V: CPT | Mod: PBBFAC,,, | Performed by: FAMILY MEDICINE

## 2018-04-24 PROCEDURE — 3078F DIAST BP <80 MM HG: CPT | Mod: CPTII,S$GLB,, | Performed by: FAMILY MEDICINE

## 2018-04-24 NOTE — PATIENT INSTRUCTIONS
Stop taking blood pressure medicine for a few days, increase eating   Keep checking blood pressure   Follow up in 1 week with dr mcdonough

## 2018-04-24 NOTE — PROGRESS NOTES
Subjective:       Patient ID: Stacey Wade is a 52 y.o. female.    Chief Complaint: Chills and Nausea      HPI   Ms. Wade presents to clinic today for feeling ill.   She states she has been lightheaded.   She has had decrease in appetite, chills and nausea.   She denies any diarrhea or vomiting.  She not tried anything for this.  She continues to take all her medications.      Review of Systems   Constitutional: Positive for appetite change, chills, diaphoresis and fatigue. Negative for fever.   Respiratory: Negative for cough and shortness of breath.    Cardiovascular: Negative for chest pain.   Gastrointestinal: Positive for nausea. Negative for abdominal pain, diarrhea and vomiting.   Genitourinary: Negative for dysuria and hematuria.   Musculoskeletal: Positive for arthralgias and myalgias.   Neurological: Positive for light-headedness. Negative for dizziness and headaches.       Medication List with Changes/Refills   Current Medications    ASPIRIN (ECOTRIN) 81 MG EC TABLET    Take 81 mg by mouth once daily.    BETAMETHASONE DIPROPIONATE (DIPROLENE) 0.05 % CREAM    APPLY TOPICALLY 2 TIMES DAILY    CONNIE 0.35 MG TABLET    Take 1 tablet (0.35 mg total) by mouth once daily.    CLORAZEPATE (TRANXENE) 3.75 MG TAB    Take 1 tablet (3.75 mg total) by mouth daily as needed.    DOXEPIN (SINEQUAN) 10 MG CAPSULE    TAKE 2 CAPSULES BY MOUTH 3 TIMES A DAY    FEXOFENADINE (ALLEGRA) 180 MG TABLET    TAKE ONE TABLET BY MOUTH EVERY DAY    HYDROXYZINE HCL (ATARAX) 25 MG TABLET    TAKE ONE TABLET BY MOUTH FOUR TIMES A DAY AS NEEDED    MOMETASONE (NASONEX) 50 MCG/ACTUATION NASAL SPRAY    USE 2 SPRAYS NASALLY ONCE DAILY    MONTELUKAST (SINGULAIR) 10 MG TABLET    TAKE 1 TABLET BY MOUTH ONCE DAILY    OMALIZUMAB (XOLAIR) 150 MG INJECTION    Inject 150 mg into the skin.    POTASSIUM CHLORIDE (MICRO-K) 10 MEQ CPSR    Take 1 capsule (10 mEq total) by mouth once daily.    RANITIDINE (ZANTAC) 150 MG TABLET    TAKE 1 TABLET BY MOUTH  2 TIMES A DAY    TRIAMTERENE-HYDROCHLOROTHIAZIDE 37.5-25 MG (DYAZIDE) 37.5-25 MG PER CAPSULE    Take 1 capsule by mouth once daily.   Discontinued Medications    FLUCONAZOLE (DIFLUCAN) 150 MG TAB    TAKE ONE TABLET BY MOUTH EVERY DAY       Patient Active Problem List   Diagnosis    Essential hypertension    Allergic rhinitis    Anxiety    Urticaria    Colon cancer screening    Special screening for malignant neoplasms, colon         Objective:     Physical Exam   Constitutional: She is oriented to person, place, and time. She appears well-developed and well-nourished. No distress.   HENT:   Head: Normocephalic and atraumatic.   Right Ear: External ear normal.   Left Ear: External ear normal.   Mouth/Throat: No oropharyngeal exudate.   Eyes: EOM are normal. Right eye exhibits no discharge. Left eye exhibits no discharge.   Cardiovascular: Normal rate and regular rhythm.    Pulmonary/Chest: Effort normal and breath sounds normal. No respiratory distress. She has no wheezes.   Musculoskeletal: She exhibits no edema.   Neurological: She is alert and oriented to person, place, and time.   Skin: Skin is warm and dry. She is not diaphoretic. No erythema.   Psychiatric: She has a normal mood and affect.   Vitals reviewed.    Vitals:    04/24/18 1323   BP: 100/70   Pulse: 90   Resp: 18   Temp: 99.2 °F (37.3 °C)       Assessment/  PLAN     Lightheadedness  -     Basic metabolic panel; Future; Expected date: 04/24/2018  -     CBC auto differential; Future; Expected date: 04/24/2018    Hypotension, unspecified hypotension type  - increase food and water intake   - hold bp meds for a few days     Plan as above  Monitor bp   rtc 1 week to follow up with PCP      Shelby Lawrence MD  [facility]

## 2018-04-25 ENCOUNTER — TELEPHONE (OUTPATIENT)
Dept: FAMILY MEDICINE | Facility: CLINIC | Age: 53
End: 2018-04-25

## 2018-04-26 ENCOUNTER — OFFICE VISIT (OUTPATIENT)
Dept: OBSTETRICS AND GYNECOLOGY | Facility: CLINIC | Age: 53
End: 2018-04-26
Payer: COMMERCIAL

## 2018-04-26 ENCOUNTER — PATIENT MESSAGE (OUTPATIENT)
Dept: FAMILY MEDICINE | Facility: CLINIC | Age: 53
End: 2018-04-26

## 2018-04-26 ENCOUNTER — PATIENT MESSAGE (OUTPATIENT)
Dept: OBSTETRICS AND GYNECOLOGY | Facility: CLINIC | Age: 53
End: 2018-04-26

## 2018-04-26 VITALS
BODY MASS INDEX: 33.6 KG/M2 | SYSTOLIC BLOOD PRESSURE: 114 MMHG | WEIGHT: 182.56 LBS | HEIGHT: 62 IN | DIASTOLIC BLOOD PRESSURE: 70 MMHG

## 2018-04-26 DIAGNOSIS — Z01.419 WELL WOMAN EXAM WITH ROUTINE GYNECOLOGICAL EXAM: Primary | ICD-10-CM

## 2018-04-26 DIAGNOSIS — Z00.00 PREVENTATIVE HEALTH CARE: ICD-10-CM

## 2018-04-26 PROBLEM — Z12.11 COLON CANCER SCREENING: Status: RESOLVED | Noted: 2018-01-18 | Resolved: 2018-04-26

## 2018-04-26 PROBLEM — Z12.11 SPECIAL SCREENING FOR MALIGNANT NEOPLASMS, COLON: Status: RESOLVED | Noted: 2018-01-18 | Resolved: 2018-04-26

## 2018-04-26 PROCEDURE — 3074F SYST BP LT 130 MM HG: CPT | Mod: CPTII,S$GLB,, | Performed by: NURSE PRACTITIONER

## 2018-04-26 PROCEDURE — 88175 CYTOPATH C/V AUTO FLUID REDO: CPT

## 2018-04-26 PROCEDURE — 3078F DIAST BP <80 MM HG: CPT | Mod: CPTII,S$GLB,, | Performed by: NURSE PRACTITIONER

## 2018-04-26 PROCEDURE — 99999 PR PBB SHADOW E&M-EST. PATIENT-LVL IV: CPT | Mod: PBBFAC,,, | Performed by: NURSE PRACTITIONER

## 2018-04-26 PROCEDURE — 99396 PREV VISIT EST AGE 40-64: CPT | Mod: S$GLB,,, | Performed by: NURSE PRACTITIONER

## 2018-04-26 NOTE — PROGRESS NOTES
"CC: Well woman exam    Stacey Wade is a 52 y.o. female  presents for well woman exam.  LMP: Patient's last menstrual period was 2018 (exact date)..  No issues, problems, or complaints. No birth control, is not sexually active. Last pap exam was normal. Is being followed by GRANT Merchant NP for elevated risk of breast cancer.       Past Medical History:   Diagnosis Date    Allergic rhinitis     Anxiety     Hypertension     Urticaria      Past Surgical History:   Procedure Laterality Date    COLONOSCOPY N/A 2018    Procedure: COLONOSCOPY;  Surgeon: Yong Smith MD;  Location: [facility];  Service: Endoscopy;  Laterality: N/A;    HERNIA REPAIR Left      Social History     Social History    Marital status: Single     Spouse name: N/A    Number of children: N/A    Years of education: N/A     Occupational History    Not on file.     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     Family History   Problem Relation Age of Onset    Lung cancer Paternal Grandfather     Ovarian cancer Maternal Grandmother     Hyperlipidemia Mother     Hypertension Mother     Breast cancer Mother     Arthritis Mother     Lung cancer Father     Breast cancer Maternal Aunt     Heart failure Other      Great aunt    Prostate cancer Brother      OB History      Para Term  AB Living    0 0            SAB TAB Ectopic Multiple Live Births                       /70 (BP Location: Right arm, Patient Position: Sitting, BP Method: Medium (Manual))   Ht 5' 2" (1.575 m)   Wt 82.8 kg (182 lb 8.7 oz)   LMP 2018 (Exact Date)  "  BMI 33.39 kg/m²       ROS:  GENERAL: Denies weight gain or weight loss. Feeling well overall.   SKIN: Denies rash or lesions.   HEAD: Denies head injury or headache.   NODES: Denies enlarged lymph nodes.   CHEST: Denies chest pain or shortness of breath.   CARDIOVASCULAR: Denies palpitations or left sided chest pain.   ABDOMEN: No abdominal pain, constipation, diarrhea, nausea, vomiting or rectal bleeding.   URINARY: No frequency, dysuria, hematuria, or burning on urination.  REPRODUCTIVE: See HPI.   BREASTS: The patient performs breast self-examination and denies pain, lumps, or nipple discharge.   HEMATOLOGIC: No easy bruisability or excessive bleeding.   MUSCULOSKELETAL: Denies joint pain or swelling.   NEUROLOGIC: Denies syncope or weakness.   PSYCHIATRIC: Denies depression, anxiety or mood swings.    PHYSICAL EXAM:  APPEARANCE: Well nourished, well developed, in no acute distress.  AFFECT: WNL, alert and oriented x 3  SKIN: No acne or hirsutism  NECK: Neck symmetric without masses or thyromegaly  NODES: No inguinal, cervical, axillary, or femoral lymph node enlargement  CHEST: Good respiratory effect  ABDOMEN: Soft.  No tenderness or masses.  No hepatosplenomegaly.  No hernias.  BREASTS: Symmetrical, no skin changes or visible lesions.  No palpable masses, nipple discharge bilaterally.  PELVIC: Normal external genitalia without lesions.  Normal hair distribution.  Adequate perineal body, normal urethral meatus.  Vagina atrophy and well rugated without lesions or discharge.  Cervix pink, without lesions, discharge or tenderness.  No significant cystocele or rectocele.  Bimanual exam shows uterus to be normal size, regular, mobile and nontender.  Adnexa without masses or tenderness.    EXTREMITIES: No edema.    1. Well woman exam with routine gynecological exam  Liquid-based pap smear, screening   2. Preventative health care  Liquid-based pap smear, screening     PLAN:  Pap exam  Patient was aware that a pap  exam did not need to be obtained today; Patient wanted to proceed with pap exam  Patient was counseled today on A.C.S. Pap guidelines and recommendations for yearly pelvic exams, mammograms and monthly self breast exams; to see her PCP for other health maintenance.

## 2018-04-27 DIAGNOSIS — E87.6 HYPOKALEMIA: Primary | ICD-10-CM

## 2018-04-30 NOTE — TELEPHONE ENCOUNTER
Spoke w pt. Indeed, considering her age, sexual activity status (abstinent) and increase overall lifetime risk of developing breast cancer, I agree w Yue Merchant for pt to stop her Laihsa pills after she completes the current pack. She will f/u w me if her menses become too painful, heavy, or irregular and we will discuss other alternatives.

## 2018-05-01 ENCOUNTER — TELEPHONE (OUTPATIENT)
Dept: OBSTETRICS AND GYNECOLOGY | Facility: CLINIC | Age: 53
End: 2018-05-01

## 2018-05-01 NOTE — TELEPHONE ENCOUNTER
Pt returned call to clinic. Informed pt that her pap exam came back normal.  Pt verbalized understanding.

## 2018-05-03 ENCOUNTER — OFFICE VISIT (OUTPATIENT)
Dept: FAMILY MEDICINE | Facility: CLINIC | Age: 53
End: 2018-05-03
Payer: COMMERCIAL

## 2018-05-03 ENCOUNTER — LAB VISIT (OUTPATIENT)
Dept: LAB | Facility: HOSPITAL | Age: 53
End: 2018-05-03
Payer: COMMERCIAL

## 2018-05-03 VITALS
WEIGHT: 179.88 LBS | SYSTOLIC BLOOD PRESSURE: 100 MMHG | HEART RATE: 77 BPM | HEIGHT: 62 IN | OXYGEN SATURATION: 98 % | BODY MASS INDEX: 33.1 KG/M2 | TEMPERATURE: 98 F | RESPIRATION RATE: 18 BRPM | DIASTOLIC BLOOD PRESSURE: 60 MMHG

## 2018-05-03 DIAGNOSIS — E87.6 HYPOKALEMIA: ICD-10-CM

## 2018-05-03 DIAGNOSIS — I95.9 HYPOTENSION, UNSPECIFIED HYPOTENSION TYPE: Primary | ICD-10-CM

## 2018-05-03 DIAGNOSIS — I10 ESSENTIAL HYPERTENSION: ICD-10-CM

## 2018-05-03 LAB
ANION GAP SERPL CALC-SCNC: 9 MMOL/L
BUN SERPL-MCNC: 9 MG/DL
CALCIUM SERPL-MCNC: 9.7 MG/DL
CHLORIDE SERPL-SCNC: 106 MMOL/L
CO2 SERPL-SCNC: 25 MMOL/L
CREAT SERPL-MCNC: 0.9 MG/DL
EST. GFR  (AFRICAN AMERICAN): >60 ML/MIN/1.73 M^2
EST. GFR  (NON AFRICAN AMERICAN): >60 ML/MIN/1.73 M^2
GLUCOSE SERPL-MCNC: 74 MG/DL
POTASSIUM SERPL-SCNC: 3.7 MMOL/L
SODIUM SERPL-SCNC: 140 MMOL/L

## 2018-05-03 PROCEDURE — 3008F BODY MASS INDEX DOCD: CPT | Mod: CPTII,S$GLB,, | Performed by: FAMILY MEDICINE

## 2018-05-03 PROCEDURE — 36415 COLL VENOUS BLD VENIPUNCTURE: CPT | Mod: PO

## 2018-05-03 PROCEDURE — 3074F SYST BP LT 130 MM HG: CPT | Mod: CPTII,S$GLB,, | Performed by: FAMILY MEDICINE

## 2018-05-03 PROCEDURE — 3078F DIAST BP <80 MM HG: CPT | Mod: CPTII,S$GLB,, | Performed by: FAMILY MEDICINE

## 2018-05-03 PROCEDURE — 99999 PR PBB SHADOW E&M-EST. PATIENT-LVL IV: CPT | Mod: PBBFAC,,, | Performed by: FAMILY MEDICINE

## 2018-05-03 PROCEDURE — 99214 OFFICE O/P EST MOD 30 MIN: CPT | Mod: S$GLB,,, | Performed by: FAMILY MEDICINE

## 2018-05-03 PROCEDURE — 80048 BASIC METABOLIC PNL TOTAL CA: CPT

## 2018-05-03 NOTE — PROGRESS NOTES
CHIEF COMPLAINT: This 52-year-old female here for follow-up low blood pressure.    SUBJECTIVE: Patient had an episode of lightheadedness, nausea, headache, feeling hot and chilled.  She was seen here 10 days ago with low blood pressure and was told that her symptoms might be related to hypotension.  She was told to discontinue diuretic and consider lower dose of medication.  Blood work done at that time showed potassium of 3.1.  Patient takes potassium chloride 10 mEq daily.  In the last 10 days, she's taken Dyazide twice because of headache and concern about elevated blood pressure.  She did not confirm that her blood pressure was elevated by using sphygmomanometer.  Currently, she denies headache, vertigo, chest pain, shortness of breath or lower extremity edema.  Patient has lost 7 pounds in the last months.    ROS:  GENERAL: Patient denies fever, chills, night sweats. Patient denies weight gain or loss. Patient denies anorexia, fatigue, weakness or swollen glands.  SKIN: Patient denies rash or hair loss.  HEENT: Patient denies sore throat, ear pain, hearing loss, nasal congestion, or runny nose. Patient denies visual disturbance, eye irritation or discharge.  LUNGS: Patient denies cough, wheeze or hemoptysis.  CARDIOVASCULAR: Patient denies shortness of breath, palpitations, syncope or lower extremity edema.  GI: Patient denies abdominal pain, nausea, vomiting, diarrhea, constipation, blood in stool or melena.  GENITOURINARY: Patient denies dysuria, frequency, hematuria, nocturia, urgency or incontinence.  MUSCULOSKELETAL: Patient denies joint swelling, redness or warmth.  NEUROLOGIC: Patient denies headache, vertigo, paresthesias, weakness in limb, dysarthria, dysphagia or abnormality of gait.  PSYCHIATRIC: Patient denies anxiety, depression, or memory loss.     OBJECTIVE:   Repeat blood pressure = 100/60.  GENERAL: Well-developed well-nourished, obese, black female alert and oriented x3, in no acute distress.  Memory, judgment and cognition without deficit.    SKIN: Clear without rash. Normal color and tone.  HEENT: Eyes: Clear conjunctivae.  No scleral icterus.  NECK: Supple, normal range of motion. No masses, lymphadenopathy or enlarged thyroid. No JVD. Carotids 2+ and equal. No bruits.  LUNGS: Clear to auscultation. Normal respiratory effort.  CARDIOVASCULAR: Regular rhythm, normal S1, S2 without murmur, gallop or rub.  BACK: No CVA or spinal tenderness.  ABDOMEN: Soft, nontender without mass or organomegaly. No rebound or guarding.  EXTREMITIES: Without cyanosis, clubbing or edema. Distal pulses 2+ and equal. Normal range of motion in all extremities. No joint effusion, erythema or warmth.    NEUROLOGIC: Motor strength equal bilaterally. Sensation normal to touch..  Gait without abnormality. No tremor.     ASSESSMENT:  1. Hypotension, unspecified hypotension type    2. Essential hypertension    3. Hypokalemia      PLAN:   1.  Discontinue Dyazide.  2.  Monitor blood pressure.  Report readings greater than or equal to 140/90.  3.  Check BMP today.  4.  Continue potassium supplement for now.

## 2018-05-11 ENCOUNTER — NURSE TRIAGE (OUTPATIENT)
Dept: ADMINISTRATIVE | Facility: CLINIC | Age: 53
End: 2018-05-11

## 2018-05-11 RX ORDER — DOXEPIN HYDROCHLORIDE 10 MG/1
CAPSULE ORAL
Qty: 180 CAPSULE | Refills: 5 | Status: SHIPPED | OUTPATIENT
Start: 2018-05-11 | End: 2019-02-14 | Stop reason: SDUPTHER

## 2018-05-11 NOTE — TELEPHONE ENCOUNTER
Pharmacy calling requesting refill for pt richie   Advised pharmacy MD was in clinic and the request was sent

## 2018-05-11 NOTE — TELEPHONE ENCOUNTER
----- Message from Elenita Wade sent at 5/11/2018  2:52 PM CDT -----  [facility] ([facility]) is requesting a call from nurse to f/u on pt prescription.        Please call [facility] ([facility]) back at  [phone]

## 2018-05-11 NOTE — TELEPHONE ENCOUNTER
----- Message from Cristelclaire Silva sent at 5/11/2018  8:54 AM CDT -----  Contact: pt  States she need her doxepin refilled, she took her last two last night, she is out. Pt uses   [facility] [place] [place] - [place] - [address]  [address] [facility] [address]  [place] [address]  Phone: [phone] Fax: [phone]    Please call pt at [phone]. Thank you

## 2018-05-12 NOTE — TELEPHONE ENCOUNTER
"  Reason for Disposition   Caller has medication question only, adult not sick, and triager answers question    Answer Assessment - Initial Assessment Questions  1. SYMPTOMS: "Do you have any symptoms?"      n/a  2. SEVERITY: If symptoms are present, ask "Are they mild, moderate or severe?"      n/a    Protocols used: ST MEDICATION QUESTION CALL-A-    Patient calling for refill on Doxepin. Already sent and received by pharmacy. Patient advised and voices understanding.  "

## 2018-06-06 ENCOUNTER — TELEPHONE (OUTPATIENT)
Dept: FAMILY MEDICINE | Facility: CLINIC | Age: 53
End: 2018-06-06

## 2018-06-06 NOTE — TELEPHONE ENCOUNTER
----- Message from Michele Dennis sent at 6/6/2018  1:33 PM CDT -----  Contact: pt   Pt is requesting a call back from the nurse in regards to pt stopping her med that  told her to stop and pt left foot swelling.                                              [phone] ([place])

## 2018-06-08 ENCOUNTER — TELEPHONE (OUTPATIENT)
Dept: FAMILY MEDICINE | Facility: CLINIC | Age: 53
End: 2018-06-08

## 2018-06-08 NOTE — TELEPHONE ENCOUNTER
Advised pt that she needs to be evaluated for the issue she's having. Wasn't in regards to her BP, but has notice swelling in her hip. Appt scheduled.

## 2018-06-11 ENCOUNTER — TELEPHONE (OUTPATIENT)
Dept: RADIOLOGY | Facility: HOSPITAL | Age: 53
End: 2018-06-11

## 2018-06-12 ENCOUNTER — OFFICE VISIT (OUTPATIENT)
Dept: FAMILY MEDICINE | Facility: CLINIC | Age: 53
End: 2018-06-12
Payer: COMMERCIAL

## 2018-06-12 ENCOUNTER — HOSPITAL ENCOUNTER (OUTPATIENT)
Dept: RADIOLOGY | Facility: HOSPITAL | Age: 53
Discharge: HOME | End: 2018-06-12
Attending: NURSE PRACTITIONER
Payer: COMMERCIAL

## 2018-06-12 VITALS
HEIGHT: 62 IN | SYSTOLIC BLOOD PRESSURE: 104 MMHG | TEMPERATURE: 98 F | DIASTOLIC BLOOD PRESSURE: 76 MMHG | WEIGHT: 180.75 LBS | RESPIRATION RATE: 18 BRPM | BODY MASS INDEX: 33.26 KG/M2

## 2018-06-12 DIAGNOSIS — R60.0 PEDAL EDEMA: Primary | ICD-10-CM

## 2018-06-12 PROCEDURE — 77059 MRI BREAST BILATERAL W W/O CONTRAST: CPT | Mod: 26,,, | Performed by: RADIOLOGY

## 2018-06-12 PROCEDURE — 0159T MRI BREAST BILATERAL W W/O CONTRAST: CPT | Mod: TC,PO

## 2018-06-12 PROCEDURE — A9577 INJ MULTIHANCE: HCPCS | Mod: PO | Performed by: NURSE PRACTITIONER

## 2018-06-12 PROCEDURE — 3078F DIAST BP <80 MM HG: CPT | Mod: CPTII,S$GLB,, | Performed by: FAMILY MEDICINE

## 2018-06-12 PROCEDURE — 3008F BODY MASS INDEX DOCD: CPT | Mod: CPTII,S$GLB,, | Performed by: FAMILY MEDICINE

## 2018-06-12 PROCEDURE — 25500020 PHARM REV CODE 255: Mod: PO | Performed by: NURSE PRACTITIONER

## 2018-06-12 PROCEDURE — 99214 OFFICE O/P EST MOD 30 MIN: CPT | Mod: S$GLB,,, | Performed by: FAMILY MEDICINE

## 2018-06-12 PROCEDURE — 99999 PR PBB SHADOW E&M-EST. PATIENT-LVL IV: CPT | Mod: PBBFAC,,, | Performed by: FAMILY MEDICINE

## 2018-06-12 PROCEDURE — 0159T MRI BREAST BILATERAL W W/O CONTRAST: CPT | Mod: 26,,, | Performed by: RADIOLOGY

## 2018-06-12 PROCEDURE — 3074F SYST BP LT 130 MM HG: CPT | Mod: CPTII,S$GLB,, | Performed by: FAMILY MEDICINE

## 2018-06-12 RX ORDER — FUROSEMIDE 20 MG/1
20 TABLET ORAL DAILY PRN
Qty: 30 TABLET | Refills: 0 | Status: SHIPPED | OUTPATIENT
Start: 2018-06-12 | End: 2018-07-06 | Stop reason: SDUPTHER

## 2018-06-12 RX ADMIN — GADOBENATE DIMEGLUMINE 15 ML: 529 INJECTION, SOLUTION INTRAVENOUS at 11:06

## 2018-06-12 NOTE — PROGRESS NOTES
Subjective:       Patient ID: Stacey Wade is a 52 y.o. female.    Chief Complaint: Foot Swelling (left)      HPI  Ms. Wade presents to clinic today for foot swelling.  She has had swelling since last week.   She denies any injury.  She states she wore heels on Sunday.   She has been off her blood pressure medicine since her last visit here with her PCP- Dr Lindsey.     Review of Systems   Constitutional: Negative for fever.   Respiratory: Negative for cough and shortness of breath.    Cardiovascular: Negative for chest pain.   Gastrointestinal: Negative for abdominal pain, blood in stool, nausea and vomiting.   Genitourinary: Negative for difficulty urinating and hematuria.   Skin: Negative for rash.   Neurological: Negative for dizziness and headaches.       Medication List with Changes/Refills   Current Medications    ASPIRIN (ECOTRIN) 81 MG EC TABLET    Take 81 mg by mouth once daily.    BETAMETHASONE DIPROPIONATE (DIPROLENE) 0.05 % CREAM    APPLY TOPICALLY 2 TIMES DAILY    CONNIE 0.35 MG TABLET    Take 1 tablet (0.35 mg total) by mouth once daily.    CLORAZEPATE (TRANXENE) 3.75 MG TAB    Take 1 tablet (3.75 mg total) by mouth daily as needed.    DOXEPIN (SINEQUAN) 10 MG CAPSULE    TAKE 2 CAPSULES BY MOUTH 3 TIMES A DAY    FEXOFENADINE (ALLEGRA) 180 MG TABLET    TAKE ONE TABLET BY MOUTH EVERY DAY    HYDROXYZINE HCL (ATARAX) 25 MG TABLET    TAKE ONE TABLET BY MOUTH FOUR TIMES A DAY AS NEEDED    MOMETASONE (NASONEX) 50 MCG/ACTUATION NASAL SPRAY    USE 2 SPRAYS NASALLY ONCE DAILY    MONTELUKAST (SINGULAIR) 10 MG TABLET    TAKE 1 TABLET BY MOUTH ONCE DAILY    OMALIZUMAB (XOLAIR) 150 MG INJECTION    Inject 150 mg into the skin.    POTASSIUM CHLORIDE (MICRO-K) 10 MEQ CPSR    Take 1 capsule (10 mEq total) by mouth once daily.    RANITIDINE (ZANTAC) 150 MG TABLET    TAKE 1 TABLET BY MOUTH 2 TIMES A DAY       Patient Active Problem List   Diagnosis    Essential hypertension    Allergic rhinitis    Anxiety     Urticaria         Objective:     Physical Exam   Constitutional: She is oriented to person, place, and time. She appears well-developed and well-nourished. No distress.   HENT:   Head: Normocephalic and atraumatic.   Eyes: EOM are normal. Right eye exhibits no discharge. Left eye exhibits no discharge.   Cardiovascular: Normal rate and regular rhythm.    Pulmonary/Chest: Effort normal and breath sounds normal. No respiratory distress. She has no wheezes.   Musculoskeletal: She exhibits edema.   Mild pedal edema      Neurological: She is alert and oriented to person, place, and time.   Skin: Skin is warm and dry. She is not diaphoretic. No erythema.   Psychiatric: She has a normal mood and affect.   Vitals reviewed.    Vitals:    06/12/18 0844   BP: 104/76   Resp: 18   Temp: 98 °F (36.7 °C)   \    Assessment/  PLAN     Pedal edema  -     furosemide (LASIX) 20 MG tablet; Take 1 tablet (20 mg total) by mouth daily as needed.  Dispense: 30 tablet; Refill: 0  - avoid heels   - take lasix as needed   - elevate extremities   - monitor bp     Plan as above           Shelby Lawrence MD  [facility]

## 2018-06-12 NOTE — PATIENT INSTRUCTIONS
Leg Swelling in a Single Leg  Swelling of the arms, feet, ankles, and legs is called edema. It is caused by extra fluid collecting in the tissues. Because of gravity, extra fluid in the body settles to the lowest part. That is why the legs and feet are most affected. You have swelling in a single leg.  Some of the causes for swelling in only a single leg include:  · Infection in the foot or leg  · Long-term problem with a vein not working well (venous insufficiency)  · Swollen, twisted vein in the leg (varicose veins)  · Insect bite or sting on the foot or leg  · Injury or recent surgery on the foot or leg  · Blood clot in a deep vein of the leg (deep vein thrombosis or DVT)  · Inflammation of the joints of the lower leg  Medical treatment will depend on what is causing your swelling.  Home care  Follow these guidelines when caring for yourself at home:  · Dont wear tight clothing.  · Keep your legs up while lying or sitting.  · Take any medicines as directed.  · If infection, injury, or recent surgery is the cause of your swelling, stay off your legs as much as possible until your symptoms get better.  · If you have venous insufficiency or varicose veins, dont sit or  one place for long periods of time. Take breaks and walk around every few hours. Talk with your healthcare provider about wearing support stockings to help lessen swelling during the day.  · Wear compression stockings with your doctor's approval  Follow-up care  Follow up with your healthcare provider as advised.  Call 911  Call 911 if any of these occur:  · Shortness of breath or trouble breathing  · Chest pain  · Coughing up blood  · Fainting or loss of consciousness   When to seek medical advice  Call your healthcare provider right away if any of these occur:  · Increased pain, swelling, warmth, or redness of the leg, ankle, or foot  · Fever of 100.4°F (38ºC) or higher, or as directed by your healthcare provider  · Weakness or  dizziness  · Shaking chills  · Drenching sweats  Date Last Reviewed: 4/11/2016  © 5481-1451 [employer]. [address] [place] [address], [place] [address]. All rights reserved. This information is not intended as a substitute for professional medical care. Always follow your healthcare professional's instructions.

## 2018-06-18 NOTE — PROGRESS NOTES
Patient ID: Stacey Wade is a 52 y.o. female.    Chief Complaint: No chief complaint on file.    HPI: Patient presents for 6 month f/u breast exam and to review recent linda breast MRI results performed due to high risk status based on her breast cancer risk assessment score of 25.79% calculated at the time of her annual mammogram 12/18/17.    Has a history of a breast mass noted during her routine gyn exam back in March 2017. Negative right breast imaging with mammo and ultrasound at that time. We have been checking it clinically at 3- 6 mon intervals - had an episode of it increasing increase in size slightly last year. Pt had been using more caffeine than usual. Recommended seeing surgeon and pt wanted to decrease her caffeine to see if it would decrease the size of the area. It did go back to her baseline measurement. Pt denies any change. No new areas of concern.     MRI on 6/12/18 was wnl    Pt exercising intermittently by walking but no wt loss-    Pt is completely off her hormones now.     Review of Systems   Constitutional: Negative.  Negative for appetite change and unexpected weight change.   HENT: Negative.    Eyes: Negative.  Negative for visual disturbance.   Respiratory: Negative.  Negative for cough and shortness of breath.    Cardiovascular: Negative.  Negative for chest pain.   Gastrointestinal: Negative.  Negative for abdominal pain and diarrhea.   Endocrine: Negative.    Genitourinary: Negative.  Negative for frequency.   Musculoskeletal: Negative.  Negative for back pain.   Skin: Negative.  Negative for rash.   Allergic/Immunologic: Negative.    Neurological: Negative.  Negative for headaches.   Hematological: Negative.  Negative for adenopathy.   Psychiatric/Behavioral: Negative.  The patient is not nervous/anxious.    Breast: pt denies any nipple discharge or palpable mass that she has noted. Has had bilateral nipple tenderness intermittently. She has continued to decrease her cafeine  intake.     Current Outpatient Prescriptions   Medication Sig Dispense Refill    aspirin (ECOTRIN) 81 MG EC tablet Take 81 mg by mouth once daily.      betamethasone dipropionate (DIPROLENE) 0.05 % cream APPLY TOPICALLY 2 TIMES DAILY (Patient taking differently: APPLY TOPICALLY 2 TIMES DAILY prn) 45 g 3    CONNIE 0.35 mg tablet Take 1 tablet (0.35 mg total) by mouth once daily. 84 tablet 0    clorazepate (TRANXENE) 3.75 MG Tab Take 1 tablet (3.75 mg total) by mouth daily as needed. 30 tablet 2    doxepin (SINEQUAN) 10 MG capsule TAKE 2 CAPSULES BY MOUTH 3 TIMES A  capsule 5    fexofenadine (ALLEGRA) 180 MG tablet TAKE ONE TABLET BY MOUTH EVERY DAY 30 tablet 0    furosemide (LASIX) 20 MG tablet Take 1 tablet (20 mg total) by mouth daily as needed. 30 tablet 0    hydrOXYzine HCl (ATARAX) 25 MG tablet TAKE ONE TABLET BY MOUTH FOUR TIMES A DAY AS NEEDED 120 tablet 5    mometasone (NASONEX) 50 mcg/actuation nasal spray USE 2 SPRAYS NASALLY ONCE DAILY 17 g 9    montelukast (SINGULAIR) 10 mg tablet TAKE 1 TABLET BY MOUTH ONCE DAILY 30 tablet 11    omalizumab (XOLAIR) 150 mg injection Inject 150 mg into the skin.      potassium chloride (MICRO-K) 10 MEQ CpSR Take 1 capsule (10 mEq total) by mouth once daily. 30 capsule 11    ranitidine (ZANTAC) 150 MG tablet TAKE 1 TABLET BY MOUTH 2 TIMES A DAY 60 tablet 11     No current facility-administered medications for this visit.        Review of patient's allergies indicates:   Allergen Reactions    Benzalkonium chloride     Black pepper      Other reaction(s): Hives    Chocolate flavor      Other reaction(s): Hives    Citrus and derivatives Hives     LEMON PRODUCTS    Furosemide     Grapefruit      Other reaction(s): Hives    Latex      Other reaction(s): Hives    Lemon balm (casper officinalis) Hives    Neomycin-bacitracin-polymyxin      Other reaction(s): Rash    Oats (richard) Hives     Oat foods (oatmeal, etc...)    Wheat containing prod         Past Medical History:   Diagnosis Date    Allergic rhinitis     Anxiety     Hypertension     Urticaria        Past Surgical History:   Procedure Laterality Date    COLONOSCOPY N/A 1/18/2018    Procedure: COLONOSCOPY;  Surgeon: Yong Smith MD;  [facility];  Service: Endoscopy;  Laterality: N/A;    HERNIA REPAIR Left        Family History   Problem Relation Age of Onset    Lung cancer Paternal Grandfather     Ovarian cancer Maternal Grandmother     Hyperlipidemia Mother     Hypertension Mother     Breast cancer Mother     Arthritis Mother     Lung cancer Father     Breast cancer Maternal Aunt     Heart failure Other         Great aunt    Prostate cancer Brother        Social History     Social History    Marital status: Single     Spouse name: N/A    Number of children: N/A    Years of education: N/A     Occupational History    Not on file.     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       There were no vitals filed for this visit.    Physical Exam   Constitutional: She is oriented to person, place, and time. She appears well-developed and well-nourished.   HENT:   Head: Normocephalic and atraumatic.   Right Ear: External ear normal.   Left Ear: External ear normal.   Eyes: Conjunctivae and EOM are normal. Pupils are equal, round, and reactive to light. Right eye exhibits no discharge. Left eye exhibits no discharge. No scleral icterus.   Neck: Normal range of motion. Neck supple. No thyromegaly present.   Cardiovascular: Normal rate and regular rhythm.    Pulmonary/Chest: Effort normal and breath sounds normal.  Right breast exhibits no inverted nipple, no mass, no nipple discharge, no skin change and no tenderness. Left breast exhibits no inverted nipple, no mass, no nipple discharge, no skin change and no tenderness.       Musculoskeletal: Normal range of motion. She exhibits no edema.   Lymphadenopathy:        Head (right side): No submental, no submandibular, no tonsillar, no preauricular, no posterior auricular and no occipital adenopathy present.        Head (left side): No submental, no submandibular, no tonsillar, no preauricular, no posterior auricular and no occipital adenopathy present.     She has no cervical adenopathy.        Right cervical: No superficial cervical and no posterior cervical adenopathy present.       Left cervical: No superficial cervical and no posterior cervical adenopathy present.     She has no axillary adenopathy.        Right: No supraclavicular adenopathy present.        Left: No supraclavicular adenopathy present.   Neurological: She is alert and oriented to person, place, and time.   Skin: Skin is warm and dry.   Psychiatric: She has a normal mood and affect. Her behavior is normal. Judgment and thought content normal.   Vitals reviewed.    [id number]  Result:   MRI Breast Bilateral W WO Contrast     History:  Patient is 52 y.o. and is seen for at high risk for breast cancer.        Films Compared:  Prior images (if available) were compared.     Technique:  A routine breast MRI was performed with a dedicated breast coil. Pre-contrast STIR were acquired. Then, pre and post contrast T1 weighted fat saturated images were acquired and subtracted with MIP reconstruction. 15 ml of intravenous gadolinium contrast was administered. The study was reviewed with eWise software.     Findings:  The breasts have heterogeneous fibroglandular tissue. The background parenchymal enhancement is minimal and symmetric.     No significant masses, enhancements, or other abnormal findings are  "seen.     Impression:  No significant masses, enhancements, or other abnormal findings are seen.     BI-RADS Category:   Overall: 1 - Negative     Recommendation:  Screening Breast MRI in 1 year is recommended for both breasts.     The patient's estimated lifetime risk of breast cancer (to age 85) based on Tyrer-Cuzick - 7 risk assessment model is: Tyrer-Cuzick: 25.79 %. According to the American Cancer Society,  patients with a lifetime breast cancer risk of 20% or higher might benefit from supplemental screening tests.    Encounter     View Encounter              Menarche at 18 y/o    LMP: 3 weeks ago  No history of hormone use other than birth control and no history of radiation to the neck or chest wall.     Ht: 5'2"  Wt: 179  BMI:34.31    FH: Maternal aunt breast cancer at 51 y/o, Maternal GM ovarian cancer, Father lung and prostate cancer, Brother - prostate cancer    Assessment & Plan:  1. Area of prominent glandular tissue noted at the 3 oclock location of the right breast. Fibrous texture and blends more medially and laterally- stable measurements today  2. Mammogram 17 wnl - elevated risk assessment score. MRI 18- wnl  3. Recommend [facility] screening mammogram in Dec with clinical exam.   4. BSE recommended monthly- call for any changes.    5. Encouraged exercise and wt loss  6. [facility] mammogram and exam 2018    One hour- 30 minutes was spent with this patient and 75% was spent identifying risk factors, reviewing records and educating pt regarding risk reduction strategies and planning future screenings.      "

## 2018-06-19 ENCOUNTER — OFFICE VISIT (OUTPATIENT)
Dept: HEMATOLOGY/ONCOLOGY | Facility: CLINIC | Age: 53
End: 2018-06-19
Payer: COMMERCIAL

## 2018-06-19 VITALS
WEIGHT: 183.19 LBS | DIASTOLIC BLOOD PRESSURE: 68 MMHG | OXYGEN SATURATION: 99 % | SYSTOLIC BLOOD PRESSURE: 102 MMHG | HEIGHT: 62 IN | HEART RATE: 79 BPM | BODY MASS INDEX: 33.71 KG/M2

## 2018-06-19 DIAGNOSIS — Z91.89 AT HIGH RISK FOR BREAST CANCER: Primary | ICD-10-CM

## 2018-06-19 PROCEDURE — 99999 PR PBB SHADOW E&M-EST. PATIENT-LVL III: CPT | Mod: PBBFAC,,, | Performed by: NURSE PRACTITIONER

## 2018-06-19 PROCEDURE — 3008F BODY MASS INDEX DOCD: CPT | Mod: CPTII,S$GLB,, | Performed by: NURSE PRACTITIONER

## 2018-06-19 PROCEDURE — 99213 OFFICE O/P EST LOW 20 MIN: CPT | Mod: S$GLB,,, | Performed by: NURSE PRACTITIONER

## 2018-06-19 PROCEDURE — 3074F SYST BP LT 130 MM HG: CPT | Mod: CPTII,S$GLB,, | Performed by: NURSE PRACTITIONER

## 2018-06-19 PROCEDURE — 3078F DIAST BP <80 MM HG: CPT | Mod: CPTII,S$GLB,, | Performed by: NURSE PRACTITIONER

## 2018-07-06 DIAGNOSIS — R60.0 PEDAL EDEMA: ICD-10-CM

## 2018-07-06 RX ORDER — FUROSEMIDE 20 MG/1
TABLET ORAL
Qty: 30 TABLET | Refills: 0 | Status: SHIPPED | OUTPATIENT
Start: 2018-07-06 | End: 2019-02-14

## 2018-07-25 ENCOUNTER — OFFICE VISIT (OUTPATIENT)
Dept: FAMILY MEDICINE | Facility: CLINIC | Age: 53
End: 2018-07-25
Payer: COMMERCIAL

## 2018-07-25 ENCOUNTER — LAB VISIT (OUTPATIENT)
Dept: LAB | Facility: HOSPITAL | Age: 53
End: 2018-07-25
Payer: COMMERCIAL

## 2018-07-25 ENCOUNTER — TELEPHONE (OUTPATIENT)
Dept: FAMILY MEDICINE | Facility: CLINIC | Age: 53
End: 2018-07-25

## 2018-07-25 VITALS
RESPIRATION RATE: 18 BRPM | HEIGHT: 62 IN | HEART RATE: 90 BPM | OXYGEN SATURATION: 98 % | BODY MASS INDEX: 33.92 KG/M2 | TEMPERATURE: 99 F | SYSTOLIC BLOOD PRESSURE: 110 MMHG | WEIGHT: 184.31 LBS | DIASTOLIC BLOOD PRESSURE: 76 MMHG

## 2018-07-25 DIAGNOSIS — Z79.899 MEDICATION MANAGEMENT: ICD-10-CM

## 2018-07-25 DIAGNOSIS — L50.8 CHRONIC URTICARIA: Primary | ICD-10-CM

## 2018-07-25 LAB
ANION GAP SERPL CALC-SCNC: 8 MMOL/L
BUN SERPL-MCNC: 13 MG/DL
CALCIUM SERPL-MCNC: 9.7 MG/DL
CHLORIDE SERPL-SCNC: 106 MMOL/L
CO2 SERPL-SCNC: 26 MMOL/L
CREAT SERPL-MCNC: 1.1 MG/DL
EST. GFR  (AFRICAN AMERICAN): >60 ML/MIN/1.73 M^2
EST. GFR  (NON AFRICAN AMERICAN): 58.7 ML/MIN/1.73 M^2
GLUCOSE SERPL-MCNC: 91 MG/DL
POTASSIUM SERPL-SCNC: 3.7 MMOL/L
SODIUM SERPL-SCNC: 140 MMOL/L

## 2018-07-25 PROCEDURE — 36415 COLL VENOUS BLD VENIPUNCTURE: CPT | Mod: PO

## 2018-07-25 PROCEDURE — 99999 PR PBB SHADOW E&M-EST. PATIENT-LVL V: CPT | Mod: PBBFAC,,, | Performed by: FAMILY MEDICINE

## 2018-07-25 PROCEDURE — 80048 BASIC METABOLIC PNL TOTAL CA: CPT

## 2018-07-25 PROCEDURE — 96372 THER/PROPH/DIAG INJ SC/IM: CPT | Mod: S$GLB,,, | Performed by: FAMILY MEDICINE

## 2018-07-25 PROCEDURE — 3078F DIAST BP <80 MM HG: CPT | Mod: CPTII,S$GLB,, | Performed by: FAMILY MEDICINE

## 2018-07-25 PROCEDURE — 3074F SYST BP LT 130 MM HG: CPT | Mod: CPTII,S$GLB,, | Performed by: FAMILY MEDICINE

## 2018-07-25 PROCEDURE — 99214 OFFICE O/P EST MOD 30 MIN: CPT | Mod: 25,S$GLB,, | Performed by: FAMILY MEDICINE

## 2018-07-25 PROCEDURE — 3008F BODY MASS INDEX DOCD: CPT | Mod: CPTII,S$GLB,, | Performed by: FAMILY MEDICINE

## 2018-07-25 RX ORDER — BETAMETHASONE SODIUM PHOSPHATE AND BETAMETHASONE ACETATE 3; 3 MG/ML; MG/ML
12 INJECTION, SUSPENSION INTRA-ARTICULAR; INTRALESIONAL; INTRAMUSCULAR; SOFT TISSUE ONCE
Status: COMPLETED | OUTPATIENT
Start: 2018-07-25 | End: 2018-07-25

## 2018-07-25 RX ADMIN — BETAMETHASONE SODIUM PHOSPHATE AND BETAMETHASONE ACETATE 12 MG: 3; 3 INJECTION, SUSPENSION INTRA-ARTICULAR; INTRALESIONAL; INTRAMUSCULAR; SOFT TISSUE at 02:07

## 2018-07-25 NOTE — PATIENT INSTRUCTIONS
Hives (Adult)  Hives are pink or red bumps on the skin. These bumps are also known as wheals. The bumps can itch, burn, or sting. Hives can occur anywhere on the body. They vary in size and shape and can form in clusters. Individual hives can appear and go away quickly. New hives may develop as old ones fade. Hives are common and usually harmless. Occasionally hives are a sign of a serious allergy.  Hives are often caused by an allergic reaction. It may be an allergic reaction to foods such as fruit, shellfish, chocolate, nuts, or tomatoes. It may be a reaction to pollens, animal fur, or mold spores. Medicines, chemicals, and insect bites can also cause hives. And hives can be caused by hot sun or cold air. The cause of hives can be difficult to find.  You may be given medicines to relieve swelling and itching. Follow all instructions when using these medicines. The hives will usually fade in a few days, but can last up to 2 weeks.  Home care  Follow these tips:  · Try to find the cause of the hives and eliminate it. Discuss possible causes with your healthcare provider. Future reactions to the same allergen may be worse.  · Dont scratch the hives. Scratching will delay healing. To reduce itching, apply cool, wet compresses to the skin.  · Dress in soft, loose cotton clothing.  · Dont bathe in hot water. This can make the itching worse.  · Apply an ice pack or cool pack wrapped in a thin towel to your skin. This will help reduce redness and itching. But if your hives were caused by exposure to cold, then do not apply more cold to them.  · You may use over-the counter antihistamines to reduce itching. Some older antihistamines, such as diphenhydramine and chlorpheniramine, are inexpensive. But they need to be taken often and may make you sleepy. They are best used at bedtime. Dont use diphenhydramine if you have glaucoma or have trouble urinating because of an enlarged prostate. Newer antihistamines, such as  loratadine, cetirizine, and fexofenadine, are generally more expensive. But they tend to have fewer side effects, such as drowsiness. They can be taken less often.  · Another type of antihistamine is used to treat heartburn. This type includes ranitidine, nizatidine, famotidine, and cimetidine. These are sometimes used along with the above antihistamines if a single medicine is not working.  Follow-up care  Follow up with your healthcare provider if your symptoms don't get better in 2 days. Ask your provider about allergy testing if you have had a severe reaction, or have had several episodes of hives. He or she can use the allergy testing to find out what you are allergic to.  When to seek medical advice  Call your healthcare provider right away if any of these occur:  · Fever of 100.4°F (38.0°C) or higher, or as directed by your healthcare provider  · Redness, swelling, or pain  · Foul-smelling fluid coming from the rash  Call 911  Call 911 if any of the following occur:  · Swelling of the face, throat, or tongue  · Trouble breathing or swallowing  · Dizziness, weakness, or fainting  Date Last Reviewed: 9/1/2016  © 8347-9910 [employer]. [address] [place] [address], [place] [address]. All rights reserved. This information is not intended as a substitute for professional medical care. Always follow your healthcare professional's instructions.

## 2018-07-27 RX ORDER — BETAMETHASONE DIPROPIONATE 0.5 MG/G
CREAM TOPICAL
Qty: 45 G | Refills: 0 | Status: SHIPPED | OUTPATIENT
Start: 2018-07-27 | End: 2019-04-26 | Stop reason: SDUPTHER

## 2018-07-29 NOTE — PROGRESS NOTES
Subjective:       Patient ID: Stacey Wade is a 52 y.o. female.    Chief Complaint: Urticaria      HPI    Ms. Wade presents to clinic today for concern of rash.   She states a few days ago at work the heat was off and since then, she had a rash and itching.   She states she has taken all her regular allergy medicine, but feels she needs a shot.   She states her eyes were swollen and itchy.   She also had hives.     Review of Systems   Constitutional: Negative for fever.   Respiratory: Negative for cough and shortness of breath.    Cardiovascular: Negative for chest pain.   Gastrointestinal: Negative for abdominal pain and vomiting.   Skin:        Swelling over eyes        Medication List with Changes/Refills   Current Medications    ASPIRIN (ECOTRIN) 81 MG EC TABLET    Take 81 mg by mouth once daily.    CLORAZEPATE (TRANXENE) 3.75 MG TAB    Take 1 tablet (3.75 mg total) by mouth daily as needed.    DOXEPIN (SINEQUAN) 10 MG CAPSULE    TAKE 2 CAPSULES BY MOUTH 3 TIMES A DAY    FEXOFENADINE (ALLEGRA) 180 MG TABLET    TAKE ONE TABLET BY MOUTH EVERY DAY    FUROSEMIDE (LASIX) 20 MG TABLET    TAKE 1 TABLET BY MOUTH EVERY DAY AS NEEDED    HYDROXYZINE HCL (ATARAX) 25 MG TABLET    TAKE ONE TABLET BY MOUTH FOUR TIMES A DAY AS NEEDED    MOMETASONE (NASONEX) 50 MCG/ACTUATION NASAL SPRAY    USE 2 SPRAYS NASALLY ONCE DAILY    MONTELUKAST (SINGULAIR) 10 MG TABLET    TAKE 1 TABLET BY MOUTH ONCE DAILY    OMALIZUMAB (XOLAIR) 150 MG INJECTION    Inject 150 mg into the skin.    POTASSIUM CHLORIDE (MICRO-K) 10 MEQ CPSR    Take 1 capsule (10 mEq total) by mouth once daily.    RANITIDINE (ZANTAC) 150 MG TABLET    TAKE 1 TABLET BY MOUTH 2 TIMES A DAY   Changed and/or Refilled Medications    Modified Medication Previous Medication    BETAMETHASONE DIPROPIONATE (DIPROLENE) 0.05 % CREAM betamethasone dipropionate (DIPROLENE) 0.05 % cream       APPLY TOPICALLY 2 TIMES DAILY    APPLY TOPICALLY 2 TIMES DAILY       Patient Active Problem  List   Diagnosis    Essential hypertension    Allergic rhinitis    Anxiety    Urticaria         Objective:     Physical Exam   Constitutional: She is oriented to person, place, and time. She appears well-developed and well-nourished. No distress.   HENT:   Head: Normocephalic and atraumatic.   Eyes: EOM are normal. Right eye exhibits no discharge. Left eye exhibits no discharge.   Cardiovascular: Normal rate and regular rhythm.    Pulmonary/Chest: Effort normal and breath sounds normal. No respiratory distress. She has no wheezes.   Musculoskeletal: She exhibits no edema.   Neurological: She is alert and oriented to person, place, and time.   Skin: Skin is warm and dry. Rash noted. She is not diaphoretic. No erythema.   Psychiatric: She has a normal mood and affect.   Vitals reviewed.    Vitals:    07/25/18 1411   BP: 110/76   Pulse: 90   Resp: 18   Temp: 98.6 °F (37 °C)       Assessment/  PLAN     Chronic urticaria  -     betamethasone acetate-betamethasone sodium phosphate injection 12 mg; Inject 2 mLs (12 mg total) into the muscle once.    Medication management  -     Basic metabolic panel; Future; Expected date: 08/08/2018        Shelby Lawrence MD  [facility]

## 2018-08-30 ENCOUNTER — TELEPHONE (OUTPATIENT)
Dept: FAMILY MEDICINE | Facility: CLINIC | Age: 53
End: 2018-08-30

## 2018-08-30 ENCOUNTER — OFFICE VISIT (OUTPATIENT)
Dept: FAMILY MEDICINE | Facility: CLINIC | Age: 53
End: 2018-08-30
Payer: COMMERCIAL

## 2018-08-30 VITALS
HEART RATE: 88 BPM | OXYGEN SATURATION: 98 % | RESPIRATION RATE: 18 BRPM | SYSTOLIC BLOOD PRESSURE: 130 MMHG | DIASTOLIC BLOOD PRESSURE: 88 MMHG | BODY MASS INDEX: 33.84 KG/M2 | HEIGHT: 62 IN | WEIGHT: 183.88 LBS | TEMPERATURE: 99 F

## 2018-08-30 DIAGNOSIS — J01.90 ACUTE RHINOSINUSITIS: Primary | ICD-10-CM

## 2018-08-30 PROCEDURE — 99999 PR PBB SHADOW E&M-EST. PATIENT-LVL III: CPT | Mod: PBBFAC,,, | Performed by: FAMILY MEDICINE

## 2018-08-30 PROCEDURE — 3008F BODY MASS INDEX DOCD: CPT | Mod: CPTII,S$GLB,, | Performed by: FAMILY MEDICINE

## 2018-08-30 PROCEDURE — 3075F SYST BP GE 130 - 139MM HG: CPT | Mod: CPTII,S$GLB,, | Performed by: FAMILY MEDICINE

## 2018-08-30 PROCEDURE — 99213 OFFICE O/P EST LOW 20 MIN: CPT | Mod: S$GLB,,, | Performed by: FAMILY MEDICINE

## 2018-08-30 PROCEDURE — 3079F DIAST BP 80-89 MM HG: CPT | Mod: CPTII,S$GLB,, | Performed by: FAMILY MEDICINE

## 2018-08-30 RX ORDER — METHYLPREDNISOLONE 4 MG/1
TABLET ORAL
Qty: 1 PACKAGE | Refills: 0 | Status: SHIPPED | OUTPATIENT
Start: 2018-08-30 | End: 2018-12-18

## 2018-08-30 NOTE — TELEPHONE ENCOUNTER
Pt states that she has been sick since last week Tuesday, she has decided to come in for a visit at 2:45 today bc she is wanting antibiotics.

## 2018-08-30 NOTE — TELEPHONE ENCOUNTER
Is she requesting a cough medication?  I do not call in antibiotics.  how long has she been sick?

## 2018-08-30 NOTE — TELEPHONE ENCOUNTER
Pt wants to know if you can call her something in for a cold. She states that she is experiencing chills, cough, congestion, headache, and is seeing yellow mucus when she sneezes, she states that she took mussinex pm and robitussin and it did not work.    Lp: 12/08/17  [facility] [address]

## 2018-08-30 NOTE — PROGRESS NOTES
CHIEF COMPLAINT:  This is a 52-year-old female complaining of upper respiratory symptoms.    SUBJECTIVE:  The patient complains of a 10 day history of head and nasal congestion, sore throat and chills.  Cough has resolved with cough drops.  She is taking Mucinex without relief.  She denies chest congestion, fever, runny nose, ear pain or postnasal drip.    ROS:  GENERAL: Patient denies fever, night sweats.  Patient denies weight gain or loss. Patient denies anorexia, fatigue, weakness or swollen glands.  SKIN: Patient denies rash.  HEENT: Patient denies hearing loss, visual disturbance, eye irritation or discharge.  LUNGS: Patient denies wheeze or hemoptysis.  CARDIOVASCULAR: Patient denies chest pain, shortness of breath, palpitations, syncope or lower extremity edema.  GI: Patient denies abdominal pain, nausea, vomiting, diarrhea, constipation, blood in stool or melena.     OBJECTIVE:   GENERAL: Well-developed well-nourished, obese, black female alert and oriented x3 in no acute distress.  Memory, judgment and cognition without deficit.  No audible wheezing.  Hoarseness.  SKIN: Clear without rash.  Normal color and tone.  HEENT: Eyes: Clear conjunctivae.  No scleral icterus.  Ears: Clear canals.  Clear TMs.  Nose: Mild bilateral congestion.  Pharynx: 2+ injection.  No exudates.  NECK: Supple, normal range of motion.  No lymphadenopathy.  No masses or enlarged thyroid.  No JVD.  Carotids 2+ and equal.  No bruits.  LUNGS: Clear to auscultation.  Normal respiratory effort.  CARDIOVASCULAR: Regular rhythm, normal S1, S2 without murmur, gallop or rub.    ASSESSMENT:  1. Acute rhinosinusitis      PLAN:   1.  Medrol Dosepak.  2.  Symptomatic treatment.  3.  Follow up if no improvement or worsening symptoms.

## 2018-08-30 NOTE — TELEPHONE ENCOUNTER
----- Message from Purvi Alvarado sent at 8/30/2018  8:34 AM CDT -----  Contact: patient   Patient needs call back  medication [phone]

## 2018-09-28 ENCOUNTER — TELEPHONE (OUTPATIENT)
Dept: OBSTETRICS AND GYNECOLOGY | Facility: CLINIC | Age: 53
End: 2018-09-28

## 2018-09-28 RX ORDER — FLUCONAZOLE 150 MG/1
150 TABLET ORAL ONCE
Qty: 1 TABLET | Refills: 1 | Status: SHIPPED | OUTPATIENT
Start: 2018-09-28 | End: 2018-09-28

## 2018-09-28 NOTE — TELEPHONE ENCOUNTER
This is a Dr. Alston pt. Requesting a diflucan be sent to [facility]cay due to vaginal itching. Please advise.

## 2018-09-28 NOTE — TELEPHONE ENCOUNTER
----- Message from Charlotte Mccormack sent at 9/28/2018  4:28 PM CDT -----  Contact: self [phone]  Would like to follow-up with nurse regarding request for Diflucan. Please call back at [phone]. Md Brenda

## 2018-12-18 ENCOUNTER — OFFICE VISIT (OUTPATIENT)
Dept: FAMILY MEDICINE | Facility: CLINIC | Age: 53
End: 2018-12-18
Payer: COMMERCIAL

## 2018-12-18 VITALS
WEIGHT: 183.63 LBS | HEART RATE: 89 BPM | DIASTOLIC BLOOD PRESSURE: 80 MMHG | SYSTOLIC BLOOD PRESSURE: 126 MMHG | OXYGEN SATURATION: 98 % | HEIGHT: 62 IN | BODY MASS INDEX: 33.79 KG/M2 | RESPIRATION RATE: 18 BRPM | TEMPERATURE: 99 F

## 2018-12-18 DIAGNOSIS — Z23 NEEDS FLU SHOT: ICD-10-CM

## 2018-12-18 DIAGNOSIS — J06.9 ACUTE URI: Primary | ICD-10-CM

## 2018-12-18 PROCEDURE — 99213 OFFICE O/P EST LOW 20 MIN: CPT | Mod: 25,S$GLB,, | Performed by: FAMILY MEDICINE

## 2018-12-18 PROCEDURE — 90471 IMMUNIZATION ADMIN: CPT | Mod: S$GLB,,, | Performed by: FAMILY MEDICINE

## 2018-12-18 PROCEDURE — 3008F BODY MASS INDEX DOCD: CPT | Mod: CPTII,S$GLB,, | Performed by: FAMILY MEDICINE

## 2018-12-18 PROCEDURE — 99999 PR PBB SHADOW E&M-EST. PATIENT-LVL IV: CPT | Mod: PBBFAC,,, | Performed by: FAMILY MEDICINE

## 2018-12-18 PROCEDURE — 3074F SYST BP LT 130 MM HG: CPT | Mod: CPTII,S$GLB,, | Performed by: FAMILY MEDICINE

## 2018-12-18 PROCEDURE — 90686 IIV4 VACC NO PRSV 0.5 ML IM: CPT | Mod: S$GLB,,, | Performed by: FAMILY MEDICINE

## 2018-12-18 PROCEDURE — 3079F DIAST BP 80-89 MM HG: CPT | Mod: CPTII,S$GLB,, | Performed by: FAMILY MEDICINE

## 2018-12-18 RX ORDER — METHYLPREDNISOLONE 4 MG/1
TABLET ORAL
Qty: 1 PACKAGE | Refills: 0 | Status: SHIPPED | OUTPATIENT
Start: 2018-12-18 | End: 2019-02-14

## 2018-12-18 NOTE — PROGRESS NOTES
CHIEF COMPLAINT:  This is a 53-year-old female complaining of upper respiratory illness.    SUBJECTIVE:  Patient complains of a 4 day history of sore throat, sinus pressure, stuffy nose and slight cough which is nonproductive.  She denies ear pain, fever, chills, chest congestion, shortness of breath or wheezing.  She has been taking Mucinex Sinus and congestion and Sudafed.  Positive ill contacts.  Patient has not received the influenza vaccine.    ROS:  GENERAL: Patient denies fever, night sweats.  Patient denies weight gain or loss. Patient denies anorexia, fatigue, weakness or swollen glands.  SKIN: Patient denies rash.  HEENT: Patient denies hearing loss, visual disturbance, eye irritation or discharge.  LUNGS: Patient denies wheeze or hemoptysis.  CARDIOVASCULAR: Patient denies chest pain, shortness of breath, palpitations, syncope or lower extremity edema.  GI: Patient denies abdominal pain, nausea, vomiting, diarrhea, constipation, blood in stool or melena.     OBJECTIVE:   GENERAL: Well-developed well-nourished, obese, black female alert and oriented x3 in no acute distress.  Memory, judgment and cognition without deficit.  No audible wheezing..  SKIN: Clear without rash.  Normal color and tone.  HEENT: Eyes: Clear conjunctivae.  No scleral icterus.  Ears: Clear canals.  Clear TMs.  Nose:  Marked bilateral congestion.  Pharynx: 2+ injection.  No exudates.  NECK: Supple, normal range of motion.  No lymphadenopathy.  No masses or enlarged thyroid.  No JVD.  Carotids 2+ and equal.  No bruits.  LUNGS: Clear to auscultation.  Normal respiratory effort.  CARDIOVASCULAR: Regular rhythm, normal S1, S2 without murmur, gallop or rub.    ASSESSMENT:  1. Acute URI    2. Needs flu shot      PLAN:   1.  Symptomatic treatment.  2.  Vitamin-C 1000 mg daily for 5 days.  3.  Increase fluid intake.  4.  Influenza vaccine.  5.  Follow-up if no improvement or worsening symptoms.

## 2018-12-18 NOTE — LETTER
December 18, 2018                 [facility]  Family Medicine  [address] [address]  Phone: [phone]   December 18, 2018     Patient: Stacey Wade   YOB: 1965   Date of Visit: 12/18/2018       To Whom it May Concern:    Stacey Wade was seen in my clinic on 12/18/2018. She may return to work on 12/19/2018.    If you have any questions or concerns, please don't hesitate to call.    Sincerely,         Savannah Lindsey MD

## 2018-12-19 NOTE — PROGRESS NOTES
Patient ID: Stacey Wade is a 53 y.o. female.    Chief Complaint: High risk for breast cancer    HPI: Patient presents for 6 month f/u breast exam and to review recent linda breast mammogram.     Pt has been calculated to be high risk for breast cancer- Her breast cancer risk assessment score of 25.79% was calculated at the time of her annual mammogram 12/18/17. June 2018 had linda MRI and was wnl.    Has a history of a breast mass noted during her routine gyn exam back in March 2017. Negative right breast imaging with mammo and ultrasound at that time. We have been checking it clinically at 3- 6 mon intervals - had an episode of it increasing increase in size slightly last year. Pt had been using more caffeine than usual. Recommended seeing surgeon and pt wanted to decrease her caffeine to see if it would decrease the size of the area. It did go back to her baseline measurement. Pt denies any change. No new areas of concern.     MRI on 6/12/18 was wnl    Pt exercising once a week- walking for 1 hour on Saturdays - wt loss-    Pt is completely off her hormones now.     Review of Systems   Constitutional: Negative.  Negative for appetite change and unexpected weight change.   HENT: Negative.    Eyes: Negative.  Negative for visual disturbance.   Respiratory: Negative.  Negative for cough and shortness of breath.    Cardiovascular: Negative.  Negative for chest pain.   Gastrointestinal: Negative.  Negative for abdominal pain and diarrhea.   Endocrine: Negative.    Genitourinary: Negative.  Negative for frequency.   Musculoskeletal: Negative.  Negative for back pain.   Skin: Negative.  Negative for rash.   Allergic/Immunologic: Negative.    Neurological: Negative.  Negative for headaches.   Hematological: Negative.  Negative for adenopathy.   Psychiatric/Behavioral: Negative.  The patient is not nervous/anxious.    Breast: pt denies any nipple discharge or palpable mass that she has noted. Has had bilateral nipple  tenderness intermittently. She has continued to decrease her cafeine intake.     Current Outpatient Medications   Medication Sig Dispense Refill    aspirin (ECOTRIN) 81 MG EC tablet Take 81 mg by mouth once daily.      betamethasone dipropionate (DIPROLENE) 0.05 % cream APPLY TOPICALLY 2 TIMES DAILY 45 g 0    clorazepate (TRANXENE) 3.75 MG Tab Take 1 tablet (3.75 mg total) by mouth daily as needed. 30 tablet 2    doxepin (SINEQUAN) 10 MG capsule TAKE 2 CAPSULES BY MOUTH 3 TIMES A  capsule 5    fexofenadine (ALLEGRA) 180 MG tablet TAKE ONE TABLET BY MOUTH EVERY DAY 30 tablet 0    furosemide (LASIX) 20 MG tablet TAKE 1 TABLET BY MOUTH EVERY DAY AS NEEDED 30 tablet 0    hydrOXYzine HCl (ATARAX) 25 MG tablet TAKE ONE TABLET BY MOUTH FOUR TIMES A DAY AS NEEDED 120 tablet 5    methylPREDNISolone (MEDROL DOSEPACK) 4 mg tablet use as directed 1 Package 0    mometasone (NASONEX) 50 mcg/actuation nasal spray USE 2 SPRAYS NASALLY ONCE DAILY 17 g 9    montelukast (SINGULAIR) 10 mg tablet TAKE 1 TABLET BY MOUTH ONCE DAILY 30 tablet 0    omalizumab (XOLAIR) 150 mg injection Inject 150 mg into the skin.      potassium chloride (MICRO-K) 10 MEQ CpSR TAKE 1 CAPSULE BY MOUTH ONCE DAILY 30 capsule 0    ranitidine (ZANTAC) 150 MG tablet TAKE 1 TABLET BY MOUTH 2 TIMES A DAY 60 tablet 0     No current facility-administered medications for this visit.        Review of patient's allergies indicates:   Allergen Reactions    Benzalkonium chloride     Black pepper      Other reaction(s): Hives    Chocolate flavor      Other reaction(s): Hives    Citrus and derivatives Hives     LEMON PRODUCTS    Furosemide     Grapefruit      Other reaction(s): Hives    Latex      Other reaction(s): Hives    Lemon balm (casper officinalis) Hives    Neomycin-bacitracin-polymyxin      Other reaction(s): Rash    Oats (richard) Hives     Oat foods (oatmeal, etc...)    Wheat containing prod        Past Medical History:   Diagnosis  Date    Allergic rhinitis     Anxiety     Hypertension     Urticaria        Past Surgical History:   Procedure Laterality Date    COLONOSCOPY N/A 1/18/2018    Procedure: COLONOSCOPY;  Surgeon: Yong Smith MD;  Location: [facility];  Service: Endoscopy;  Laterality: N/A;    COLONOSCOPY N/A 1/18/2018    Performed by Yong Smith MD at [facility] ENDO    HERNIA REPAIR Left        Family History   Problem Relation Age of Onset    Lung cancer Paternal Grandfather     Ovarian cancer Maternal Grandmother     Hyperlipidemia Mother     Hypertension Mother     Breast cancer Mother     Arthritis Mother     Lung cancer Father     Breast cancer Maternal Aunt     Heart failure Other         Great aunt    Prostate cancer Brother        Social History     Socioeconomic History    Marital status: Single     Spouse name: Not on file    Number of children: Not on file    Years of education: Not on file    Highest education level: Not on file   Social Needs    Financial resource strain: Not on file    Food insecurity - worry: Not on file    Food insecurity - inability: Not on file    Transportation needs - medical: Not on file    Transportation needs - non-medical: Not on file   Occupational History    Not on file   Tobacco Use    Smoking status: Never Smoker    Smokeless tobacco: Never Used   Substance and Sexual Activity    Alcohol use: No    Drug use: No    Sexual activity: Not Currently     Partners: Male     Birth control/protection: OCP, None   Other Topics Concern    Not on file   Social History Narrative    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       Vitals:    12/20/18 1446   BP: 110/80   Pulse: 87   Resp: 16       Physical Exam   Constitutional: She is oriented to person, place, and time. She appears well-developed and well-nourished.   HENT:   Head: Normocephalic and atraumatic.   Right Ear: External ear normal.  "  Left Ear: External ear normal.   Eyes: Conjunctivae and EOM are normal. Pupils are equal, round, and reactive to light. Right eye exhibits no discharge. Left eye exhibits no discharge. No scleral icterus.   Neck: Normal range of motion. Neck supple. No thyromegaly present.   Cardiovascular: Normal rate and regular rhythm.   Pulmonary/Chest: Effort normal and breath sounds normal. Right breast exhibits no inverted nipple, no mass, no nipple discharge, no skin change and no tenderness. Left breast exhibits no inverted nipple, no mass, no nipple discharge, no skin change and no tenderness.   Musculoskeletal: Normal range of motion. She exhibits no edema.   Lymphadenopathy:        Head (right side): No submental, no submandibular, no tonsillar, no preauricular, no posterior auricular and no occipital adenopathy present.        Head (left side): No submental, no submandibular, no tonsillar, no preauricular, no posterior auricular and no occipital adenopathy present.     She has no cervical adenopathy.        Right cervical: No superficial cervical and no posterior cervical adenopathy present.       Left cervical: No superficial cervical and no posterior cervical adenopathy present.     She has no axillary adenopathy.        Right: No supraclavicular adenopathy present.        Left: No supraclavicular adenopathy present.   Neurological: She is alert and oriented to person, place, and time.   Skin: Skin is warm and dry.   Psychiatric: She has a normal mood and affect. Her behavior is normal. Judgment and thought content normal.   Vitals reviewed.    IMAGING: mammogram today- results pending    Menarche at 18 y/o      No history of hormone use other than birth control and no history of radiation to the neck or chest wall.     Ht: 5'2"  Wt: 183  BMI: 33.63    FH: Maternal aunt breast cancer at 49 y/o, Maternal GM ovarian cancer, Father lung and prostate cancer, Brother - prostate cancer    Assessment & Plan:  1. Area " of prominent glandular tissue noted at the 3 oclock location of the right breast. Fibrous texture and blends more medially and laterally- stable measurements today  2. Mammogram today- results pending - elevated risk assessment score. MRI recommended for 6/2019  3. Recommend linda screening mammogram in Dec 2019 if mammo today wnl- with clinical exam.   4. BSE recommended monthly- call for any changes.    5. Encouraged exercise and wt loss- pt agrees  6. Will plan to discuss genetic testing at next visit -

## 2018-12-20 ENCOUNTER — HOSPITAL ENCOUNTER (OUTPATIENT)
Dept: RADIOLOGY | Facility: HOSPITAL | Age: 53
Discharge: HOME | End: 2018-12-20
Attending: NURSE PRACTITIONER
Payer: COMMERCIAL

## 2018-12-20 ENCOUNTER — OFFICE VISIT (OUTPATIENT)
Dept: HEMATOLOGY/ONCOLOGY | Facility: CLINIC | Age: 53
End: 2018-12-20
Payer: COMMERCIAL

## 2018-12-20 VITALS
RESPIRATION RATE: 16 BRPM | SYSTOLIC BLOOD PRESSURE: 110 MMHG | OXYGEN SATURATION: 97 % | WEIGHT: 183.88 LBS | HEART RATE: 87 BPM | HEIGHT: 62 IN | DIASTOLIC BLOOD PRESSURE: 80 MMHG | BODY MASS INDEX: 33.84 KG/M2

## 2018-12-20 DIAGNOSIS — Z71.89 COUNSELING REGARDING GOALS OF CARE: ICD-10-CM

## 2018-12-20 DIAGNOSIS — Z55.9 EDUCATIONAL CIRCUMSTANCE: Primary | ICD-10-CM

## 2018-12-20 DIAGNOSIS — Z91.89 AT HIGH RISK FOR BREAST CANCER: ICD-10-CM

## 2018-12-20 PROCEDURE — 99999 PR PBB SHADOW E&M-EST. PATIENT-LVL IV: CPT | Mod: PBBFAC,,, | Performed by: NURSE PRACTITIONER

## 2018-12-20 PROCEDURE — 99213 OFFICE O/P EST LOW 20 MIN: CPT | Mod: S$GLB,,, | Performed by: NURSE PRACTITIONER

## 2018-12-20 PROCEDURE — 3008F BODY MASS INDEX DOCD: CPT | Mod: CPTII,S$GLB,, | Performed by: NURSE PRACTITIONER

## 2018-12-20 PROCEDURE — 77063 BREAST TOMOSYNTHESIS BI: CPT | Mod: TC

## 2018-12-20 PROCEDURE — 3079F DIAST BP 80-89 MM HG: CPT | Mod: CPTII,S$GLB,, | Performed by: NURSE PRACTITIONER

## 2018-12-20 PROCEDURE — 77063 BREAST TOMOSYNTHESIS BI: CPT | Mod: 26,,, | Performed by: RADIOLOGY

## 2018-12-20 PROCEDURE — 3074F SYST BP LT 130 MM HG: CPT | Mod: CPTII,S$GLB,, | Performed by: NURSE PRACTITIONER

## 2018-12-20 PROCEDURE — 77067 SCR MAMMO BI INCL CAD: CPT | Mod: TC

## 2018-12-20 PROCEDURE — 77067 SCR MAMMO BI INCL CAD: CPT | Mod: 26,,, | Performed by: RADIOLOGY

## 2018-12-20 RX ORDER — POTASSIUM CHLORIDE 750 MG/1
CAPSULE, EXTENDED RELEASE ORAL
Qty: 30 CAPSULE | Refills: 0 | Status: SHIPPED | OUTPATIENT
Start: 2018-12-20 | End: 2019-01-18 | Stop reason: SDUPTHER

## 2018-12-20 RX ORDER — MONTELUKAST SODIUM 10 MG/1
TABLET ORAL
Qty: 30 TABLET | Refills: 0 | Status: SHIPPED | OUTPATIENT
Start: 2018-12-20 | End: 2019-02-14 | Stop reason: SDUPTHER

## 2018-12-20 SDOH — SOCIAL DETERMINANTS OF HEALTH (SDOH): PROBLEMS RELATED TO EDUCATION AND LITERACY, UNSPECIFIED: Z55.9

## 2019-01-18 RX ORDER — POTASSIUM CHLORIDE 750 MG/1
CAPSULE, EXTENDED RELEASE ORAL
Qty: 30 CAPSULE | Refills: 0 | Status: SHIPPED | OUTPATIENT
Start: 2019-01-18 | End: 2019-05-15 | Stop reason: SDUPTHER

## 2019-01-25 ENCOUNTER — TELEPHONE (OUTPATIENT)
Dept: FAMILY MEDICINE | Facility: CLINIC | Age: 54
End: 2019-01-25

## 2019-01-25 NOTE — TELEPHONE ENCOUNTER
----- Message from Veronika Mullen sent at 1/25/2019  1:13 PM CST -----  Contact: [phone]-  Would like to consult with the nurse, patients needs to know if she needs to come in to be seen by the doctor to get refill, please call back at [phone], thanks

## 2019-01-27 RX ORDER — HYDROXYZINE HYDROCHLORIDE 25 MG/1
TABLET, FILM COATED ORAL
Qty: 120 TABLET | Refills: 0 | Status: SHIPPED | OUTPATIENT
Start: 2019-01-27 | End: 2019-02-14 | Stop reason: SDUPTHER

## 2019-01-28 RX ORDER — HYDROXYZINE HYDROCHLORIDE 25 MG/1
TABLET, FILM COATED ORAL
Qty: 120 TABLET | Refills: 0 | OUTPATIENT
Start: 2019-01-28

## 2019-01-28 NOTE — TELEPHONE ENCOUNTER
----- Message from Araceli Hawk sent at 1/28/2019  1:12 PM CST -----  Contact: patient  Please call patient concerning her medication renewals.   1. What is the name of the medication you are requesting? Atarax generic brand  2. What is the dose? 25 mg  3. How do you take the medication? Orally, topically, etc? oral  4. How often do you take this medication?1 pill 4 x daily  5. Do you need a 30 day or 90 day supply? 30  6. How many refills are you requesting?3  7. What is your preferred pharmacy and location of the pharmacy?   [facility] [place] [place]  [id number]  [facility] [place] [address]  Phone: [phone] Fax: [phone]    8. Who can we contact with further questions? Patient @ [phone]

## 2019-02-14 ENCOUNTER — OFFICE VISIT (OUTPATIENT)
Dept: FAMILY MEDICINE | Facility: CLINIC | Age: 54
End: 2019-02-14
Payer: COMMERCIAL

## 2019-02-14 VITALS
SYSTOLIC BLOOD PRESSURE: 114 MMHG | HEIGHT: 62 IN | DIASTOLIC BLOOD PRESSURE: 72 MMHG | OXYGEN SATURATION: 99 % | HEART RATE: 77 BPM | BODY MASS INDEX: 33.51 KG/M2 | WEIGHT: 182.13 LBS | TEMPERATURE: 98 F

## 2019-02-14 DIAGNOSIS — I10 ESSENTIAL HYPERTENSION: Chronic | ICD-10-CM

## 2019-02-14 DIAGNOSIS — Z00.00 PREVENTATIVE HEALTH CARE: Primary | ICD-10-CM

## 2019-02-14 DIAGNOSIS — F41.9 ANXIETY: ICD-10-CM

## 2019-02-14 DIAGNOSIS — J30.9 ALLERGIC RHINITIS, UNSPECIFIED SEASONALITY, UNSPECIFIED TRIGGER: ICD-10-CM

## 2019-02-14 DIAGNOSIS — L50.9 URTICARIA: ICD-10-CM

## 2019-02-14 PROCEDURE — 3074F PR MOST RECENT SYSTOLIC BLOOD PRESSURE < 130 MM HG: ICD-10-PCS | Mod: CPTII,S$GLB,, | Performed by: FAMILY MEDICINE

## 2019-02-14 PROCEDURE — 3078F PR MOST RECENT DIASTOLIC BLOOD PRESSURE < 80 MM HG: ICD-10-PCS | Mod: CPTII,S$GLB,, | Performed by: FAMILY MEDICINE

## 2019-02-14 PROCEDURE — 99396 PR PREVENTIVE VISIT,EST,40-64: ICD-10-PCS | Mod: S$GLB,,, | Performed by: FAMILY MEDICINE

## 2019-02-14 PROCEDURE — 99999 PR PBB SHADOW E&M-EST. PATIENT-LVL III: ICD-10-PCS | Mod: PBBFAC,,, | Performed by: FAMILY MEDICINE

## 2019-02-14 PROCEDURE — 3078F DIAST BP <80 MM HG: CPT | Mod: CPTII,S$GLB,, | Performed by: FAMILY MEDICINE

## 2019-02-14 PROCEDURE — 99999 PR PBB SHADOW E&M-EST. PATIENT-LVL III: CPT | Mod: PBBFAC,,, | Performed by: FAMILY MEDICINE

## 2019-02-14 PROCEDURE — 3074F SYST BP LT 130 MM HG: CPT | Mod: CPTII,S$GLB,, | Performed by: FAMILY MEDICINE

## 2019-02-14 PROCEDURE — 99396 PREV VISIT EST AGE 40-64: CPT | Mod: S$GLB,,, | Performed by: FAMILY MEDICINE

## 2019-02-14 RX ORDER — DOXEPIN HYDROCHLORIDE 10 MG/1
CAPSULE ORAL
Qty: 180 CAPSULE | Refills: 5 | Status: SHIPPED | OUTPATIENT
Start: 2019-02-14 | End: 2019-08-14 | Stop reason: SDUPTHER

## 2019-02-14 RX ORDER — MOMETASONE FUROATE 50 UG/1
2 SPRAY, METERED NASAL DAILY
Qty: 17 G | Refills: 11 | Status: SHIPPED | OUTPATIENT
Start: 2019-02-14 | End: 2020-05-07

## 2019-02-14 RX ORDER — HYDROXYZINE HYDROCHLORIDE 25 MG/1
25 TABLET, FILM COATED ORAL 4 TIMES DAILY PRN
Qty: 120 TABLET | Refills: 5 | Status: SHIPPED | OUTPATIENT
Start: 2019-02-14 | End: 2019-08-14 | Stop reason: SDUPTHER

## 2019-02-14 RX ORDER — MONTELUKAST SODIUM 10 MG/1
10 TABLET ORAL DAILY
Qty: 30 TABLET | Refills: 11 | Status: SHIPPED | OUTPATIENT
Start: 2019-02-14 | End: 2020-02-13

## 2019-02-14 NOTE — PROGRESS NOTES
CHIEF COMPLAINT:  This is a 53-year-old female here for preventive health exam.    SUBJECTIVE:  Patient is doing well without complaints.  She would like to discontinue potassium because  she no longer takes furosemide or Dyazide.  She has a history of hypertension.  Her blood pressure is 114/72 on no meds.  She's being followed by breast screening because of fibrocystic disease.  She had nodule in right breast which has improved since she discontinued caffeine.  She continues to take Xolair injections for chronic urticaria.  She also takes Singulair, doxepin, and ranitidine.  The patient does not exercise regularly.  Her weight is unchanged.  She is obese with a BMI of 33.31.     Eye exam 2018.  Mammogram June 2018.   Pap smear April 2018 per GYN.  Colonoscopy January 2018, due again in January 2028.  Tdap October 2016. Flu vaccine December 2018.     ROS:  GENERAL: Patient denies fever, chills, night sweats. Patient denies weight gain or loss. Patient denies anorexia, fatigue, weakness or swollen glands.  SKIN: Patient denies rash or hair loss.  HEENT: Patient denies sore throat, ear pain, hearing loss, nasal congestion, or runny nose. Patient denies visual disturbance, eye irritation or discharge.  LUNGS: Patient denies cough, wheeze or hemoptysis.  CARDIOVASCULAR: Patient denies shortness of breath, palpitations, syncope or lower extremity edema.  GI: Patient denies abdominal pain, nausea, vomiting, diarrhea, constipation, blood in stool or melena.  GENITOURINARY: Patient denies pelvic pain, vaginal discharge, itch or odor. Patient denies irregular vaginal bleeding. Patient denies dysuria, frequency, hematuria, nocturia, urgency or incontinence.  BREASTS: Patient denies breast pain, mass or nipple discharge.  MUSCULOSKELETAL: Patient denies joint swelling, redness or warmth. Positive for ankle and foot pain.  NEUROLOGIC: Patient denies headache, vertigo, paresthesias, weakness in limb, dysarthria, dysphagia or  abnormality of gait.  PSYCHIATRIC: Patient denies anxiety, depression, or memory loss.     OBJECTIVE:   GENERAL: Well-developed well-nourished, obese, black female alert and oriented x3, in no acute distress. Memory, judgment and cognition without deficit.  Weight gain of 3 pounds in the last year.  SKIN: Clear without rash. Normal color and tone.  HEENT: Eyes: Clear conjunctivae. Pupils equal reactive to light and accommodation. Ears: Clear TMs. Clear canals. Nose: Without congestion. Pharynx: Without injection or exudates.  NECK: Supple, normal range of motion. No masses, lymphadenopathy or enlarged thyroid. No JVD. Carotids 2+ and equal. No bruits.  LUNGS: Clear to auscultation. Normal respiratory effort.  CARDIOVASCULAR: Regular rhythm, normal S1, S2 without murmur, gallop or rub.  BACK: No CVA or spinal tenderness.  BREASTS: No masses, tenderness or nipple discharge.  ABDOMEN: Normal appearance. Active bowel sounds. Soft, nontender without mass or organomegaly. No rebound or guarding.  EXTREMITIES: Without cyanosis, clubbing or edema. Distal pulses 2+ and equal. Normal range of motion in all extremities. No joint effusion, erythema or warmth. Ankle/foot splints in place.  NEUROLOGIC: Cranial nerves II through XII without deficit. Motor strength equal bilaterally. Sensation normal to touch. Deep tendon reflexes 2+ and equal. Gait without abnormality. No tremor. Negative cerebellar signs.  PELVIC: Deferred to GYN.    ASSESSMENT:  1. Preventative health care    2. Essential hypertension    3. Allergic rhinitis, unspecified seasonality, unspecified trigger    4. Anxiety    5. Urticaria      PLAN:   1.  Weight reduction.  Exercise regularly.  2.  Age-appropriate counseling.  3.  Fasting lab.  4.  Refill medications.  5.  Follow-up annually.

## 2019-02-18 ENCOUNTER — LAB VISIT (OUTPATIENT)
Dept: LAB | Facility: HOSPITAL | Age: 54
End: 2019-02-18
Attending: FAMILY MEDICINE
Payer: COMMERCIAL

## 2019-02-18 DIAGNOSIS — Z00.00 PREVENTATIVE HEALTH CARE: ICD-10-CM

## 2019-02-18 LAB
ALBUMIN SERPL BCP-MCNC: 3.9 G/DL
ALP SERPL-CCNC: 158 U/L
ALT SERPL W/O P-5'-P-CCNC: 21 U/L
ANION GAP SERPL CALC-SCNC: 7 MMOL/L
AST SERPL-CCNC: 17 U/L
BASOPHILS # BLD AUTO: 0.05 K/UL
BASOPHILS NFR BLD: 0.6 %
BILIRUB SERPL-MCNC: 0.5 MG/DL
BUN SERPL-MCNC: 10 MG/DL
CALCIUM SERPL-MCNC: 9.5 MG/DL
CHLORIDE SERPL-SCNC: 104 MMOL/L
CHOLEST SERPL-MCNC: 180 MG/DL
CHOLEST/HDLC SERPL: 2.9 {RATIO}
CO2 SERPL-SCNC: 28 MMOL/L
CREAT SERPL-MCNC: 0.9 MG/DL
DIFFERENTIAL METHOD: ABNORMAL
EOSINOPHIL # BLD AUTO: 0.1 K/UL
EOSINOPHIL NFR BLD: 1.5 %
ERYTHROCYTE [DISTWIDTH] IN BLOOD BY AUTOMATED COUNT: 16.5 %
EST. GFR  (AFRICAN AMERICAN): >60 ML/MIN/1.73 M^2
EST. GFR  (NON AFRICAN AMERICAN): >60 ML/MIN/1.73 M^2
GLUCOSE SERPL-MCNC: 79 MG/DL
HCT VFR BLD AUTO: 44.9 %
HDLC SERPL-MCNC: 63 MG/DL
HDLC SERPL: 35 %
HGB BLD-MCNC: 14 G/DL
IMM GRANULOCYTES # BLD AUTO: 0.02 K/UL
IMM GRANULOCYTES NFR BLD AUTO: 0.3 %
LDLC SERPL CALC-MCNC: 102.8 MG/DL
LYMPHOCYTES # BLD AUTO: 2.3 K/UL
LYMPHOCYTES NFR BLD: 29.4 %
MCH RBC QN AUTO: 23.3 PG
MCHC RBC AUTO-ENTMCNC: 31.2 G/DL
MCV RBC AUTO: 75 FL
MONOCYTES # BLD AUTO: 0.5 K/UL
MONOCYTES NFR BLD: 6.2 %
NEUTROPHILS # BLD AUTO: 4.9 K/UL
NEUTROPHILS NFR BLD: 62 %
NONHDLC SERPL-MCNC: 117 MG/DL
NRBC BLD-RTO: 0 /100 WBC
PLATELET # BLD AUTO: 192 K/UL
PMV BLD AUTO: 11.9 FL
POTASSIUM SERPL-SCNC: 3.5 MMOL/L
PROT SERPL-MCNC: 7 G/DL
RBC # BLD AUTO: 6.01 M/UL
SODIUM SERPL-SCNC: 139 MMOL/L
TRIGL SERPL-MCNC: 71 MG/DL
TSH SERPL DL<=0.005 MIU/L-ACNC: 1.61 UIU/ML
WBC # BLD AUTO: 7.87 K/UL

## 2019-02-18 PROCEDURE — 80061 LIPID PANEL: CPT

## 2019-02-18 PROCEDURE — 80053 COMPREHEN METABOLIC PANEL: CPT

## 2019-02-18 PROCEDURE — 84443 ASSAY THYROID STIM HORMONE: CPT

## 2019-02-18 PROCEDURE — 36415 COLL VENOUS BLD VENIPUNCTURE: CPT | Mod: PO

## 2019-02-18 PROCEDURE — 85025 COMPLETE CBC W/AUTO DIFF WBC: CPT

## 2019-04-11 RX ORDER — CLORAZEPATE DIPOTASSIUM 3.75 MG/1
TABLET ORAL
Qty: 30 TABLET | Refills: 0 | Status: SHIPPED | OUTPATIENT
Start: 2019-04-11 | End: 2019-06-10 | Stop reason: SDUPTHER

## 2019-04-26 RX ORDER — BETAMETHASONE DIPROPIONATE 0.5 MG/G
CREAM TOPICAL
Qty: 45 G | Refills: 3 | Status: SHIPPED | OUTPATIENT
Start: 2019-04-26 | End: 2020-01-03

## 2019-05-15 RX ORDER — POTASSIUM CHLORIDE 750 MG/1
CAPSULE, EXTENDED RELEASE ORAL
Qty: 30 CAPSULE | Refills: 9 | Status: SHIPPED | OUTPATIENT
Start: 2019-05-15 | End: 2020-02-13

## 2019-06-05 ENCOUNTER — TELEPHONE (OUTPATIENT)
Dept: HEMATOLOGY/ONCOLOGY | Facility: CLINIC | Age: 54
End: 2019-06-05

## 2019-06-05 NOTE — TELEPHONE ENCOUNTER
----- Message from Carlos Vásquez sent at 6/5/2019  1:19 PM CDT -----  Contact: Self/[phone]  Type: Patient Call Back    Who called:Patient    What is the request in detail:The patient would like to speak to the staff regarding her MRI.    Would the patient rather a call back or a response via [facility]? Call back    Best call back number:[phone]      Thank you

## 2019-06-10 RX ORDER — CLORAZEPATE DIPOTASSIUM 3.75 MG/1
TABLET ORAL
Qty: 30 TABLET | Refills: 0 | Status: SHIPPED | OUTPATIENT
Start: 2019-06-10 | End: 2020-06-11

## 2019-06-13 ENCOUNTER — TELEPHONE (OUTPATIENT)
Dept: HEMATOLOGY/ONCOLOGY | Facility: CLINIC | Age: 54
End: 2019-06-13

## 2019-06-13 NOTE — TELEPHONE ENCOUNTER
----- Message from Charlotte Mccormack sent at 6/13/2019  1:39 PM CDT -----  Contact: self [phone]  Pt would like return call from nurse regarding prior authorization for MRI. Please call back at [phone].   Md Brenda

## 2019-06-18 ENCOUNTER — OFFICE VISIT (OUTPATIENT)
Dept: FAMILY MEDICINE | Facility: CLINIC | Age: 54
End: 2019-06-18
Payer: COMMERCIAL

## 2019-06-18 ENCOUNTER — HOSPITAL ENCOUNTER (OUTPATIENT)
Dept: RADIOLOGY | Facility: HOSPITAL | Age: 54
Discharge: HOME | End: 2019-06-18
Attending: FAMILY MEDICINE
Payer: COMMERCIAL

## 2019-06-18 VITALS
WEIGHT: 181.44 LBS | BODY MASS INDEX: 33.39 KG/M2 | TEMPERATURE: 99 F | HEART RATE: 84 BPM | SYSTOLIC BLOOD PRESSURE: 112 MMHG | HEIGHT: 62 IN | DIASTOLIC BLOOD PRESSURE: 72 MMHG | OXYGEN SATURATION: 98 % | RESPIRATION RATE: 18 BRPM

## 2019-06-18 DIAGNOSIS — R10.12 ACUTE LUQ PAIN: Primary | ICD-10-CM

## 2019-06-18 DIAGNOSIS — M54.9 UPPER BACK PAIN ON LEFT SIDE: ICD-10-CM

## 2019-06-18 DIAGNOSIS — K59.00 CONSTIPATION, UNSPECIFIED CONSTIPATION TYPE: ICD-10-CM

## 2019-06-18 DIAGNOSIS — R10.12 ACUTE LUQ PAIN: ICD-10-CM

## 2019-06-18 LAB
BILIRUB SERPL-MCNC: NEGATIVE MG/DL
BLOOD URINE, POC: NEGATIVE
COLOR, POC UA: YELLOW
GLUCOSE UR QL STRIP: NORMAL
KETONES UR QL STRIP: NEGATIVE
LEUKOCYTE ESTERASE URINE, POC: ABNORMAL
NITRITE, POC UA: NEGATIVE
PH, POC UA: 7
PROTEIN, POC: ABNORMAL
SPECIFIC GRAVITY, POC UA: 1.02
UROBILINOGEN, POC UA: NORMAL

## 2019-06-18 PROCEDURE — 3078F DIAST BP <80 MM HG: CPT | Mod: CPTII,S$GLB,, | Performed by: FAMILY MEDICINE

## 2019-06-18 PROCEDURE — 99999 PR PBB SHADOW E&M-EST. PATIENT-LVL IV: CPT | Mod: PBBFAC,,, | Performed by: FAMILY MEDICINE

## 2019-06-18 PROCEDURE — 99214 PR OFFICE/OUTPT VISIT, EST, LEVL IV, 30-39 MIN: ICD-10-PCS | Mod: 25,S$GLB,, | Performed by: FAMILY MEDICINE

## 2019-06-18 PROCEDURE — 3074F PR MOST RECENT SYSTOLIC BLOOD PRESSURE < 130 MM HG: ICD-10-PCS | Mod: CPTII,S$GLB,, | Performed by: FAMILY MEDICINE

## 2019-06-18 PROCEDURE — 3074F SYST BP LT 130 MM HG: CPT | Mod: CPTII,S$GLB,, | Performed by: FAMILY MEDICINE

## 2019-06-18 PROCEDURE — 74019 RADEX ABDOMEN 2 VIEWS: CPT | Mod: 26,,, | Performed by: RADIOLOGY

## 2019-06-18 PROCEDURE — 3008F BODY MASS INDEX DOCD: CPT | Mod: CPTII,S$GLB,, | Performed by: FAMILY MEDICINE

## 2019-06-18 PROCEDURE — 81002 URINALYSIS NONAUTO W/O SCOPE: CPT | Mod: S$GLB,,, | Performed by: FAMILY MEDICINE

## 2019-06-18 PROCEDURE — 3078F PR MOST RECENT DIASTOLIC BLOOD PRESSURE < 80 MM HG: ICD-10-PCS | Mod: CPTII,S$GLB,, | Performed by: FAMILY MEDICINE

## 2019-06-18 PROCEDURE — 74019 XR ABDOMEN FLAT AND ERECT: ICD-10-PCS | Mod: 26,,, | Performed by: RADIOLOGY

## 2019-06-18 PROCEDURE — 99999 PR PBB SHADOW E&M-EST. PATIENT-LVL IV: ICD-10-PCS | Mod: PBBFAC,,, | Performed by: FAMILY MEDICINE

## 2019-06-18 PROCEDURE — 74019 RADEX ABDOMEN 2 VIEWS: CPT | Mod: TC,FY,PO

## 2019-06-18 PROCEDURE — 81002 POCT URINE DIPSTICK WITHOUT MICROSCOPE: ICD-10-PCS | Mod: S$GLB,,, | Performed by: FAMILY MEDICINE

## 2019-06-18 PROCEDURE — 99214 OFFICE O/P EST MOD 30 MIN: CPT | Mod: 25,S$GLB,, | Performed by: FAMILY MEDICINE

## 2019-06-18 PROCEDURE — 3008F PR BODY MASS INDEX (BMI) DOCUMENTED: ICD-10-PCS | Mod: CPTII,S$GLB,, | Performed by: FAMILY MEDICINE

## 2019-06-18 NOTE — PROGRESS NOTES
CHIEF COMPLAINT:  This is a 53-year-old female complaining of left-sided back and upper abdominal pain.    SUBJECTIVE:  The patient complains of a 2-3 day history of pressure in the middle of her left upper back and left upper abdomen.  This morning she awakened and pain was worse when she took in a deep breath.  Pain has subsided at present.  Patient  has been taking Prilosec OTC and Gas-X with minimal relief.  She denies constipation but only has a bowel movement twice a week.  Stool is normal in quantity and quality.  Last bowel movement was yesterday and was somewhat heart and slightly difficult to pass.  Patient denies bright red blood or melena.  Patient reports that 1 month ago she began having urinary urgency.  She started taking Azo Go Less  with improvement in frequency.  She denies dysuria, hematuria or incontinence.  Patient has also been drinking cranberry juice.  She denies fever, chills, nausea, vomiting, diarrhea, pelvic pain or vaginal bleeding.    ROS:  GENERAL: Patient denies fever, chills, night sweats. Patient denies weight gain or loss. Patient denies anorexia, fatigue, weakness or swollen glands.  SKIN: Patient denies rash.  HEENT: Patient denies sore throat, ear pain, hearing loss, nasal congestion, or runny nose. Patient denies visual disturbance, eye irritation or discharge.  LUNGS: Patient denies cough, wheeze or hemoptysis.  CARDIOVASCULAR: Patient denies shortness of breath, palpitations, syncope or lower extremity edema.  GI:  As above.GENITOURINARY: Patient denies pelvic pain, vaginal discharge, itch or odor. Patient denies irregular vaginal bleeding. Patient denies dysuria, frequency, hematuria, nocturia, urgency or incontinence.  BREASTS: Patient denies breast pain, mass or nipple discharge.  MUSCULOSKELETAL: Patient denies joint swelling, redness or warmth. Positive for ankle and foot pain.  NEUROLOGIC: Patient denies headache, vertigo, paresthesias, weakness in limb, or abnormality  of gait.  PSYCHIATRIC: Patient denies anxiety, depression, or memory loss.     OBJECTIVE:   GENERAL: Well-developed well-nourished, obese, black female alert and oriented x3, in no acute distress. Memory, judgment and cognition without deficit.  Weight gain of 3 pounds in the last year.  SKIN: Clear without rash. Normal color and tone.  HEENT: Eyes: Clear conjunctivae.  No scleral icterus.  NECK: Supple, normal range of motion. No masses, lymphadenopathy or enlarged thyroid. No JVD. Carotids 2+ and equal. No bruits.  LUNGS: Clear to auscultation. Normal respiratory effort.  CARDIOVASCULAR: Regular rhythm, normal S1, S2 without murmur, gallop or rub.  BACK: No CVA or spinal tenderness. Normal range of motion in flexion and extension.  ABDOMEN: Normal appearance.  Decreased bowel sounds. Soft, with mild left upper quadrant tenderness, without mass or organomegaly. No rebound or guarding.  EXTREMITIES: Without cyanosis, clubbing or edema. Distal pulses 2+ and equal. Normal range of motion in all extremities. No joint effusion, erythema or warmth.  NEUROLOGIC: Gait without abnormality. No tremor.     UA dip:  Trace leukocytes.  Trace protein.  Abdominal x-ray:  No air-fluid levels.  Significant dilated bowel in left upper quadrant.  Retained stool.    ASSESSMENT:  1. Acute LUQ pain    2. Upper back pain on left side    3. Constipation, unspecified constipation type      PLAN:   1.  Colon cleanser with mild laxative.  2.  Increase daily water intake.  3.  Increase fiber in diet and/or fiber product daily.  4.  Follow up if no improvement or worsening symptoms.    This note is generated with speech recognition software and is subject to transcription error and sound alike phrases that may be missed by proofreading.

## 2019-06-21 ENCOUNTER — TELEPHONE (OUTPATIENT)
Dept: HEMATOLOGY/ONCOLOGY | Facility: CLINIC | Age: 54
End: 2019-06-21

## 2019-06-21 ENCOUNTER — PATIENT MESSAGE (OUTPATIENT)
Dept: HEMATOLOGY/ONCOLOGY | Facility: CLINIC | Age: 54
End: 2019-06-21

## 2019-06-21 NOTE — TELEPHONE ENCOUNTER
----- Message from Giovanni Truong sent at 6/21/2019  1:45 PM CDT -----  Contact: pt   Pt checking to make sure PA was received for MRI. pls return call.             ..[phone] (azna)

## 2019-06-24 ENCOUNTER — TELEPHONE (OUTPATIENT)
Dept: RADIOLOGY | Facility: HOSPITAL | Age: 54
End: 2019-06-24

## 2019-07-08 ENCOUNTER — LAB VISIT (OUTPATIENT)
Dept: LAB | Facility: HOSPITAL | Age: 54
End: 2019-07-08
Attending: NURSE PRACTITIONER
Payer: COMMERCIAL

## 2019-07-08 DIAGNOSIS — Z91.89 AT HIGH RISK FOR BREAST CANCER: ICD-10-CM

## 2019-07-08 LAB
ANION GAP SERPL CALC-SCNC: 10 MMOL/L (ref 8–16)
BUN SERPL-MCNC: 14 MG/DL (ref 6–20)
CALCIUM SERPL-MCNC: 9.9 MG/DL (ref 8.7–10.5)
CHLORIDE SERPL-SCNC: 104 MMOL/L (ref 95–110)
CO2 SERPL-SCNC: 24 MMOL/L (ref 23–29)
CREAT SERPL-MCNC: 1 MG/DL (ref 0.5–1.4)
EST. GFR  (AFRICAN AMERICAN): >60 ML/MIN/1.73 M^2
EST. GFR  (NON AFRICAN AMERICAN): >60 ML/MIN/1.73 M^2
GLUCOSE SERPL-MCNC: 87 MG/DL (ref 70–110)
POTASSIUM SERPL-SCNC: 3.9 MMOL/L (ref 3.5–5.1)
SODIUM SERPL-SCNC: 138 MMOL/L (ref 136–145)

## 2019-07-08 PROCEDURE — 36415 COLL VENOUS BLD VENIPUNCTURE: CPT | Mod: PO

## 2019-07-08 PROCEDURE — 80048 BASIC METABOLIC PNL TOTAL CA: CPT

## 2019-08-14 ENCOUNTER — TELEPHONE (OUTPATIENT)
Dept: HEMATOLOGY/ONCOLOGY | Facility: CLINIC | Age: 54
End: 2019-08-14

## 2019-08-14 PROBLEM — Z91.89 AT HIGH RISK FOR BREAST CANCER: Status: ACTIVE | Noted: 2019-08-14

## 2019-08-14 RX ORDER — DOXEPIN HYDROCHLORIDE 10 MG/1
CAPSULE ORAL
Qty: 180 CAPSULE | Refills: 5 | Status: SHIPPED | OUTPATIENT
Start: 2019-08-14 | End: 2020-02-13

## 2019-08-14 RX ORDER — HYDROXYZINE HYDROCHLORIDE 25 MG/1
TABLET, FILM COATED ORAL
Qty: 120 TABLET | Refills: 5 | Status: SHIPPED | OUTPATIENT
Start: 2019-08-14 | End: 2020-05-04 | Stop reason: SDUPTHER

## 2019-08-14 NOTE — TELEPHONE ENCOUNTER
----- Message from Charlotte Mccormack sent at 8/14/2019  1:14 PM CDT -----  Contact: self [phone]  .Type:  Patient Returning Call    Who Called:Stacey Wade  Who Left Message for Patient:Lisa  Does the patient know what this is regarding?:no  Would the patient rather a call back or a response via [facility]? Call back  Best Call Back Number:[phone]  Additional Information:

## 2019-08-14 NOTE — PROGRESS NOTES
Patient ID: Stacey Wade is a 53 y.o. female.    Chief Complaint: high risk for breast cancer    HPI: Patient presents for 6 month f/u breast exam.     Pt has been calculated to be high risk for breast cancer- Her breast cancer risk assessment score of 25.79% was calculated at the time of her annual mammogram 12/18/17. June 2018 had linda MRI and was wnl.    Pt was due for MRI in June this year but was unable to afford the co-pay associated with the test so it has been postponed at this time. Pt is here for exam.     Mammogram is due December 2019 unless pt decides to schedule MRI before then.     Has a history of a breast mass noted during her routine gyn exam back in March 2017. Negative right breast imaging with mammo and ultrasound at that time. We have been checking it clinically at 3- 6 mon intervals - had an episode of it increasing increase in size slightly last year. Pt had been using more caffeine than usual. Recommended seeing surgeon and pt wanted to decrease her caffeine to see if it would decrease the size of the area. It did go back to her baseline measurement. Pt denies any change. No new areas of concern.     MRI on 6/12/18 was wnl    Mammogram 12/20/18 wnl    Pt exercising twice a week- walking for 1 hour on Saturdays - and at [employer] on Friday nights. Has lost 4 # since Dec 2018- pt states feels much better    Pt is completely off her hormones now.     Review of Systems   Constitutional: Negative.  Negative for appetite change and unexpected weight change.   HENT: Negative.    Eyes: Negative.  Negative for visual disturbance.   Respiratory: Negative.  Negative for cough and shortness of breath.    Cardiovascular: Negative.  Negative for chest pain.   Gastrointestinal: Negative.  Negative for abdominal pain and diarrhea.        No reflux   Endocrine: Negative.    Genitourinary: Negative.  Negative for frequency.   Musculoskeletal: Negative.  Negative for back pain.   Skin: Negative.   Negative for rash.   Allergic/Immunologic: Negative.    Neurological: Negative.  Negative for headaches.   Hematological: Negative.  Negative for adenopathy.   Psychiatric/Behavioral: Negative.  The patient is not nervous/anxious.    Breast: pt denies any nipple discharge or palpable mass that she has noted. Has had bilateral nipple tenderness intermittently. She has continued to decrease her cafeine intake.     Current Outpatient Medications   Medication Sig Dispense Refill    aspirin (ECOTRIN) 81 MG EC tablet Take 81 mg by mouth once daily.      betamethasone dipropionate (DIPROLENE) 0.05 % cream APPLY TOPICALLY 2 TIMES DAILY 45 g 3    clorazepate (TRANXENE) 3.75 MG Tab TAKE 1 TABLET BY MOUTH EVERY DAY AS NEEDED 30 tablet 0    doxepin (SINEQUAN) 10 MG capsule TAKE 2 CAPSULES BY MOUTH 3 TIMES A  capsule 5    fexofenadine (ALLEGRA) 180 MG tablet TAKE ONE TABLET BY MOUTH EVERY DAY 30 tablet 0    hydrOXYzine HCl (ATARAX) 25 MG tablet TAKE 1 TABLET BY MOUTH FOUR TIMES A DAY AS NEEDED FOR ITCHING 120 tablet 5    mometasone (NASONEX) 50 mcg/actuation nasal spray 2 sprays by Nasal route once daily. 17 g 11    montelukast (SINGULAIR) 10 mg tablet Take 1 tablet (10 mg total) by mouth once daily. 30 tablet 11    omalizumab (XOLAIR) 150 mg injection Inject 150 mg into the skin.      potassium chloride (MICRO-K) 10 MEQ CpSR TAKE 1 CAPSULE BY MOUTH ONCE DAILY 30 capsule 9    ranitidine (ZANTAC) 150 MG tablet Take 1 tablet (150 mg total) by mouth 2 (two) times daily. 60 tablet 11     No current facility-administered medications for this visit.        Review of patient's allergies indicates:   Allergen Reactions    Benzalkonium chloride     Black pepper      Other reaction(s): Hives    Chocolate flavor      Other reaction(s): Hives    Citrus and derivatives Hives     LEMON PRODUCTS    Furosemide     Grapefruit      Other reaction(s): Hives    Latex      Other reaction(s): Hives    Lemon balm (casper  officinalis) Hives    Neomycin-bacitracin-polymyxin      Other reaction(s): Rash    Oats (richard) Hives     Oat foods (oatmeal, etc...)    Wheat containing prod        Past Medical History:   Diagnosis Date    Allergic rhinitis     Anxiety     Hypertension     Urticaria        Past Surgical History:   Procedure Laterality Date    COLONOSCOPY N/A 1/18/2018    [facility]    HERNIA REPAIR Left        Family History   Problem Relation Age of Onset    Lung cancer Paternal Grandfather     Ovarian cancer Maternal Grandmother     Hyperlipidemia Mother     Hypertension Mother     Breast cancer Mother     Arthritis Mother     Lung cancer Father     Breast cancer Maternal Aunt     Heart failure Other         Great aunt    Prostate cancer Brother        Social History     Socioeconomic History    Marital status: Single     Spouse name: Not on file    Number of children: Not on file    Years of education: Not on file    Highest education level: Not on file   Occupational History    Not on file   Social Needs    Financial resource strain: Not on file    Food insecurity:     Worry: Not on file     Inability: Not on file    Transportation needs:     Medical: Not on file     Non-medical: Not on file   Tobacco Use    Smoking status: Never Smoker    Smokeless tobacco: Never Used   Substance and Sexual Activity    Alcohol use: No    Drug use: No    Sexual activity: Not Currently     Partners: Male     Birth control/protection: OCP, None   Lifestyle    Physical activity:     Days per week: Not on file     Minutes per session: Not on file    Stress: Not on file   Relationships    Social connections:     Talks on phone: Not on file     Gets together: Not on file     Attends Restorationist service: Not on file     Active member of club or organization: Not on file     Attends meetings of clubs or organizations: Not on file     Relationship status: Not on file   Other Topics  Concern    Not on file   Social History Narrative    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       Vitals:    08/15/19 1032   BP: 116/76   Pulse: 69   Resp: 16   Temp: 98.2 °F (36.8 °C)       Physical Exam   Constitutional: She is oriented to person, place, and time. She appears well-developed and well-nourished.   HENT:   Head: Normocephalic and atraumatic.   Right Ear: External ear normal.   Left Ear: External ear normal.   Mouth/Throat: No oropharyngeal exudate.   Eyes: Pupils are equal, round, and reactive to light. Conjunctivae and EOM are normal. Right eye exhibits no discharge. Left eye exhibits no discharge. No scleral icterus.   Neck: Normal range of motion. Neck supple. No thyromegaly present.   Cardiovascular: Normal rate, regular rhythm and normal heart sounds.   Pulmonary/Chest: Effort normal and breath sounds normal. Right breast exhibits no inverted nipple, no mass, no nipple discharge, no skin change and no tenderness. Left breast exhibits no inverted nipple, no mass, no nipple discharge, no skin change and no tenderness.   Abdominal: Soft. Bowel sounds are normal.   Musculoskeletal: Normal range of motion. She exhibits no edema.        Right shoulder: She exhibits no crepitus and normal strength.   Lymphadenopathy:        Head (right side): No submental, no submandibular, no tonsillar, no preauricular, no posterior auricular and no occipital adenopathy present.        Head (left side): No submental, no submandibular, no tonsillar, no preauricular, no posterior auricular and no occipital adenopathy present.     She has no cervical adenopathy.        Right cervical: No superficial cervical and no posterior cervical adenopathy present.       Left cervical: No superficial cervical and no posterior cervical adenopathy present.     She has no axillary adenopathy.        Right: No supraclavicular adenopathy present.        Left:  "No supraclavicular adenopathy present.   Neurological: She is alert and oriented to person, place, and time. She has normal reflexes.   Skin: Skin is warm and dry. No rash noted. No erythema. No pallor.   Psychiatric: She has a normal mood and affect. Her behavior is normal. Judgment and thought content normal.   Vitals reviewed.    IMAGING: mammo due Dec 2019    Menarche at 20 y/o      No history of hormone use other than birth control and no history of radiation to the neck or chest wall.     Ht: 5'2"  Wt: 179 - down 4#  BMI: 33.63    FH: Maternal aunt breast cancer at 51 y/o, Maternal GM ovarian cancer, Father lung and prostate cancer, Brother - prostate cancer    Assessment & Plan:  1. Area of prominent glandular tissue noted at the 3 oclock location of the right breast. Fibrous texture and blends more medially and laterally- stable measurements today  2. Mammogram Dec 2018 wnl - elevated risk assessment score. MRI recommended for 2019- pt canceled due to financial responsibility  3. Recommend linda screening mammogram in Dec 2019 - with clinical exam-    4. BSE recommended monthly- call for any changes.    5. Encouraged continued exercise and wt loss- pt agrees  6. Discussed genetic testing - pt declines at this time  7. Pt declines tamoxifen use for chemoprevention due to potential side effects         "

## 2019-08-15 ENCOUNTER — OFFICE VISIT (OUTPATIENT)
Dept: HEMATOLOGY/ONCOLOGY | Facility: CLINIC | Age: 54
End: 2019-08-15
Payer: COMMERCIAL

## 2019-08-15 VITALS
TEMPERATURE: 98 F | SYSTOLIC BLOOD PRESSURE: 116 MMHG | HEART RATE: 69 BPM | RESPIRATION RATE: 16 BRPM | BODY MASS INDEX: 32.99 KG/M2 | OXYGEN SATURATION: 100 % | HEIGHT: 62 IN | DIASTOLIC BLOOD PRESSURE: 76 MMHG | WEIGHT: 179.25 LBS

## 2019-08-15 DIAGNOSIS — Z71.89 COUNSELING ON HEALTH PROMOTION AND DISEASE PREVENTION: ICD-10-CM

## 2019-08-15 DIAGNOSIS — Z71.89 COUNSELING AND COORDINATION OF CARE: Primary | ICD-10-CM

## 2019-08-15 DIAGNOSIS — Z91.89 AT HIGH RISK FOR BREAST CANCER: ICD-10-CM

## 2019-08-15 DIAGNOSIS — Z80.3 FAMILY HISTORY OF BREAST CANCER: ICD-10-CM

## 2019-08-15 PROCEDURE — 3074F SYST BP LT 130 MM HG: CPT | Mod: CPTII,S$GLB,, | Performed by: NURSE PRACTITIONER

## 2019-08-15 PROCEDURE — 99214 OFFICE O/P EST MOD 30 MIN: CPT | Mod: S$GLB,,, | Performed by: NURSE PRACTITIONER

## 2019-08-15 PROCEDURE — 3008F BODY MASS INDEX DOCD: CPT | Mod: CPTII,S$GLB,, | Performed by: NURSE PRACTITIONER

## 2019-08-15 PROCEDURE — 3074F PR MOST RECENT SYSTOLIC BLOOD PRESSURE < 130 MM HG: ICD-10-PCS | Mod: CPTII,S$GLB,, | Performed by: NURSE PRACTITIONER

## 2019-08-15 PROCEDURE — 3078F PR MOST RECENT DIASTOLIC BLOOD PRESSURE < 80 MM HG: ICD-10-PCS | Mod: CPTII,S$GLB,, | Performed by: NURSE PRACTITIONER

## 2019-08-15 PROCEDURE — 99999 PR PBB SHADOW E&M-EST. PATIENT-LVL IV: ICD-10-PCS | Mod: PBBFAC,,, | Performed by: NURSE PRACTITIONER

## 2019-08-15 PROCEDURE — 99214 PR OFFICE/OUTPT VISIT, EST, LEVL IV, 30-39 MIN: ICD-10-PCS | Mod: S$GLB,,, | Performed by: NURSE PRACTITIONER

## 2019-08-15 PROCEDURE — 99999 PR PBB SHADOW E&M-EST. PATIENT-LVL IV: CPT | Mod: PBBFAC,,, | Performed by: NURSE PRACTITIONER

## 2019-08-15 PROCEDURE — 3078F DIAST BP <80 MM HG: CPT | Mod: CPTII,S$GLB,, | Performed by: NURSE PRACTITIONER

## 2019-08-15 PROCEDURE — 3008F PR BODY MASS INDEX (BMI) DOCUMENTED: ICD-10-PCS | Mod: CPTII,S$GLB,, | Performed by: NURSE PRACTITIONER

## 2019-08-27 ENCOUNTER — OFFICE VISIT (OUTPATIENT)
Dept: FAMILY MEDICINE | Facility: CLINIC | Age: 54
End: 2019-08-27
Payer: COMMERCIAL

## 2019-08-27 VITALS
BODY MASS INDEX: 33.31 KG/M2 | TEMPERATURE: 99 F | WEIGHT: 181 LBS | HEIGHT: 62 IN | DIASTOLIC BLOOD PRESSURE: 82 MMHG | OXYGEN SATURATION: 98 % | HEART RATE: 91 BPM | SYSTOLIC BLOOD PRESSURE: 128 MMHG

## 2019-08-27 DIAGNOSIS — L50.9 URTICARIA: Primary | ICD-10-CM

## 2019-08-27 PROCEDURE — 3074F PR MOST RECENT SYSTOLIC BLOOD PRESSURE < 130 MM HG: ICD-10-PCS | Mod: CPTII,S$GLB,, | Performed by: FAMILY MEDICINE

## 2019-08-27 PROCEDURE — 3008F PR BODY MASS INDEX (BMI) DOCUMENTED: ICD-10-PCS | Mod: CPTII,S$GLB,, | Performed by: FAMILY MEDICINE

## 2019-08-27 PROCEDURE — 99999 PR PBB SHADOW E&M-EST. PATIENT-LVL III: CPT | Mod: PBBFAC,,, | Performed by: FAMILY MEDICINE

## 2019-08-27 PROCEDURE — 99213 PR OFFICE/OUTPT VISIT, EST, LEVL III, 20-29 MIN: ICD-10-PCS | Mod: 25,S$GLB,, | Performed by: FAMILY MEDICINE

## 2019-08-27 PROCEDURE — 96372 THER/PROPH/DIAG INJ SC/IM: CPT | Mod: S$GLB,,, | Performed by: FAMILY MEDICINE

## 2019-08-27 PROCEDURE — 3008F BODY MASS INDEX DOCD: CPT | Mod: CPTII,S$GLB,, | Performed by: FAMILY MEDICINE

## 2019-08-27 PROCEDURE — 3074F SYST BP LT 130 MM HG: CPT | Mod: CPTII,S$GLB,, | Performed by: FAMILY MEDICINE

## 2019-08-27 PROCEDURE — 3079F PR MOST RECENT DIASTOLIC BLOOD PRESSURE 80-89 MM HG: ICD-10-PCS | Mod: CPTII,S$GLB,, | Performed by: FAMILY MEDICINE

## 2019-08-27 PROCEDURE — 96372 PR INJECTION,THERAP/PROPH/DIAG2ST, IM OR SUBCUT: ICD-10-PCS | Mod: S$GLB,,, | Performed by: FAMILY MEDICINE

## 2019-08-27 PROCEDURE — 99999 PR PBB SHADOW E&M-EST. PATIENT-LVL III: ICD-10-PCS | Mod: PBBFAC,,, | Performed by: FAMILY MEDICINE

## 2019-08-27 PROCEDURE — 99213 OFFICE O/P EST LOW 20 MIN: CPT | Mod: 25,S$GLB,, | Performed by: FAMILY MEDICINE

## 2019-08-27 PROCEDURE — 3079F DIAST BP 80-89 MM HG: CPT | Mod: CPTII,S$GLB,, | Performed by: FAMILY MEDICINE

## 2019-08-27 RX ORDER — METHYLPREDNISOLONE ACETATE 80 MG/ML
80 INJECTION, SUSPENSION INTRA-ARTICULAR; INTRALESIONAL; INTRAMUSCULAR; SOFT TISSUE ONCE
Status: COMPLETED | OUTPATIENT
Start: 2019-08-27 | End: 2019-08-27

## 2019-08-27 RX ADMIN — METHYLPREDNISOLONE ACETATE 80 MG: 80 INJECTION, SUSPENSION INTRA-ARTICULAR; INTRALESIONAL; INTRAMUSCULAR; SOFT TISSUE at 09:08

## 2019-08-27 NOTE — PROGRESS NOTES
Subjective:      Patient ID: Stacey Wade is a 53 y.o. female.    Chief Complaint: Urticaria    HPI    Patient here today for hives   Patient would like steroid injection- has helped with hives in the past   Associated itching  Last injection was 1 year ago   Hives x 1 weekend   Past hx of hives/urticaria - chronic issue   Seeing allergist - gets xolair injection once a month - allergies to different foods, wheat/black pepper   No new medications  No new house hold items like detergents   No new foods, clothing or anything   Patient can't think of anything different   Diffusely spread   Having hives issue since 89'       Past Medical History:   Diagnosis Date    Allergic rhinitis     Anxiety     Hypertension     Urticaria        Past Surgical History:   Procedure Laterality Date    COLONOSCOPY N/A 1/18/2018    [facility]    HERNIA REPAIR Left        Family History   Problem Relation Age of Onset    Lung cancer Paternal Grandfather     Ovarian cancer Maternal Grandmother     Hyperlipidemia Mother     Hypertension Mother     Breast cancer Mother     Arthritis Mother     Lung cancer Father     Breast cancer Maternal Aunt     Heart failure Other         Great aunt    Prostate cancer Brother        Social History     Socioeconomic History    Marital status: Single     Spouse name: Not on file    Number of children: Not on file    Years of education: Not on file    Highest education level: Not on file   Occupational History    Not on file   Social Needs    Financial resource strain: Not on file    Food insecurity:     Worry: Not on file     Inability: Not on file    Transportation needs:     Medical: Not on file     Non-medical: Not on file   Tobacco Use    Smoking status: Never Smoker    Smokeless tobacco: Never Used   Substance and Sexual Activity    Alcohol use: No    Drug use: No    Sexual activity: Not Currently     Partners: Male     Birth  control/protection: OCP, None   Lifestyle    Physical activity:     Days per week: Not on file     Minutes per session: Not on file    Stress: Not on file   Relationships    Social connections:     Talks on phone: Not on file     Gets together: Not on file     Attends Restorationist service: Not on file     Active member of club or organization: Not on file     Attends meetings of clubs or organizations: Not on file     Relationship status: Not on file   Other Topics Concern    Not on file   Social History Narrative    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       Health Maintenance Topics with due status: Not Due       Topic Last Completion Date    TETANUS VACCINE 10/10/2016    Colonoscopy 01/18/2018    Pap Smear with HPV Cotest 04/26/2018    Influenza Vaccine 12/18/2018    Mammogram 12/20/2018    Lipid Panel 02/18/2019       Medication List with Changes/Refills   Current Medications    ASPIRIN (ECOTRIN) 81 MG EC TABLET    Take 81 mg by mouth once daily.    BETAMETHASONE DIPROPIONATE (DIPROLENE) 0.05 % CREAM    APPLY TOPICALLY 2 TIMES DAILY    CLORAZEPATE (TRANXENE) 3.75 MG TAB    TAKE 1 TABLET BY MOUTH EVERY DAY AS NEEDED    DOXEPIN (SINEQUAN) 10 MG CAPSULE    TAKE 2 CAPSULES BY MOUTH 3 TIMES A DAY    FEXOFENADINE (ALLEGRA) 180 MG TABLET    TAKE ONE TABLET BY MOUTH EVERY DAY    HYDROXYZINE HCL (ATARAX) 25 MG TABLET    TAKE 1 TABLET BY MOUTH FOUR TIMES A DAY AS NEEDED FOR ITCHING    MOMETASONE (NASONEX) 50 MCG/ACTUATION NASAL SPRAY    2 sprays by Nasal route once daily.    MONTELUKAST (SINGULAIR) 10 MG TABLET    Take 1 tablet (10 mg total) by mouth once daily.    OMALIZUMAB (XOLAIR) 150 MG INJECTION    Inject 150 mg into the skin.    POTASSIUM CHLORIDE (MICRO-K) 10 MEQ CPSR    TAKE 1 CAPSULE BY MOUTH ONCE DAILY    RANITIDINE (ZANTAC) 150 MG TABLET    Take 1 tablet (150 mg total) by mouth 2 (two) times daily.       Review of patient's  allergies indicates:   Allergen Reactions    Benzalkonium chloride     Black pepper      Other reaction(s): Hives    Chocolate flavor      Other reaction(s): Hives    Citrus and derivatives Hives     LEMON PRODUCTS    Grapefruit Hives     Other reaction(s): Hives    Ibuprofen Swelling    Latex      Other reaction(s): Hives    Lemon balm (casper officinalis) Hives     Anything with lemon in it    Naproxen Swelling    Neomycin-bacitracin-polymyxin      Other reaction(s): Rash    Oats (richard) Hives     Oat foods (oatmeal, etc...)    Vegetable acetoglycerides Hives     Vegetable gums    Wheat containing prod     Wheat flour Hives       Review of Systems   Constitutional: Negative for fever.   HENT: Negative for congestion.    Eyes: Negative for blurred vision.   Respiratory: Negative for shortness of breath.    Cardiovascular: Negative for chest pain and leg swelling.   Gastrointestinal: Negative for abdominal pain, constipation and diarrhea.   Genitourinary: Negative for dysuria.   Skin: Positive for itching and rash.   Neurological: Negative for headaches.       Objective:     Vitals:    08/27/19 0820   BP: 128/82   Pulse: 91   Temp: 99.3 °F (37.4 °C)     Body mass index is 33.1 kg/m².    Physical Exam   Constitutional: She is oriented to person, place, and time. She appears well-developed and well-nourished. No distress.   HENT:   Head: Normocephalic.   Cardiovascular: Normal rate, regular rhythm and normal heart sounds.   No murmur heard.  Pulmonary/Chest: Effort normal and breath sounds normal. No respiratory distress. She has no wheezes.   Abdominal: Soft. Bowel sounds are normal. There is no tenderness.   Musculoskeletal: She exhibits no edema.   Neurological: She is alert and oriented to person, place, and time.   Skin: Skin is warm and dry.   Multiple hives over arms, legs, abdomen, ankles, back, face     Psychiatric: She has a normal mood and affect.   Nursing note and vitals  reviewed.      Assessment and Plan:     Urticaria  -     methylPREDNISolone acetate injection 80 mg    patient would like steroid injection today as she needs jury duty and needs medication to work fast   Would like excuse for jury duty to go in later today     Follow up if symptoms worsen or fail to improve.    @@

## 2019-08-27 NOTE — LETTER
August 27, 2019                 [facility]  Family Medicine  [address] [place] [address]  Phone: [phone]  Fax: [phone]   August 27, 2019     Patient: Stacey Wade   YOB: 1965   Date of Visit: 8/27/2019       To Whom it May Concern:    Please excuse Ms. Stacey Wade for she was seen in my clinic on today 8/27/2019. In which caused her to be tardy for her jury duty post. She is able to complete all duties without any restrictions.   If you have any questions or concerns, please don't hesitate to call.    Sincerely,       ALEXANDER Grover MD

## 2019-09-23 ENCOUNTER — TELEPHONE (OUTPATIENT)
Dept: INTERNAL MEDICINE | Facility: CLINIC | Age: 54
End: 2019-09-23

## 2019-09-23 NOTE — TELEPHONE ENCOUNTER
MAJOR asking patient to give us a call back./marco antonio    ----- Message from Jasmyne Reed sent at 9/23/2019  8:49 AM CDT -----  Contact: Patient  Patient is having really bad sinus headaches and pressure. She would feel better after she vomit a few times, and would like to know if this is normal. Please call to advise at  [phone].

## 2019-09-24 ENCOUNTER — OFFICE VISIT (OUTPATIENT)
Dept: FAMILY MEDICINE | Facility: CLINIC | Age: 54
End: 2019-09-24
Payer: COMMERCIAL

## 2019-09-24 VITALS
OXYGEN SATURATION: 98 % | HEART RATE: 68 BPM | WEIGHT: 180.13 LBS | BODY MASS INDEX: 33.15 KG/M2 | TEMPERATURE: 99 F | DIASTOLIC BLOOD PRESSURE: 78 MMHG | SYSTOLIC BLOOD PRESSURE: 116 MMHG | HEIGHT: 62 IN

## 2019-09-24 DIAGNOSIS — G43.909 MIGRAINE WITHOUT STATUS MIGRAINOSUS, NOT INTRACTABLE, UNSPECIFIED MIGRAINE TYPE: Primary | ICD-10-CM

## 2019-09-24 DIAGNOSIS — L50.9 URTICARIA: ICD-10-CM

## 2019-09-24 DIAGNOSIS — R51.9 NEW ONSET OF HEADACHES: ICD-10-CM

## 2019-09-24 PROCEDURE — 3074F PR MOST RECENT SYSTOLIC BLOOD PRESSURE < 130 MM HG: ICD-10-PCS | Mod: CPTII,S$GLB,, | Performed by: FAMILY MEDICINE

## 2019-09-24 PROCEDURE — 3078F DIAST BP <80 MM HG: CPT | Mod: CPTII,S$GLB,, | Performed by: FAMILY MEDICINE

## 2019-09-24 PROCEDURE — 99999 PR PBB SHADOW E&M-EST. PATIENT-LVL III: ICD-10-PCS | Mod: PBBFAC,,, | Performed by: FAMILY MEDICINE

## 2019-09-24 PROCEDURE — 99214 PR OFFICE/OUTPT VISIT, EST, LEVL IV, 30-39 MIN: ICD-10-PCS | Mod: S$GLB,,, | Performed by: FAMILY MEDICINE

## 2019-09-24 PROCEDURE — 3078F PR MOST RECENT DIASTOLIC BLOOD PRESSURE < 80 MM HG: ICD-10-PCS | Mod: CPTII,S$GLB,, | Performed by: FAMILY MEDICINE

## 2019-09-24 PROCEDURE — 3008F PR BODY MASS INDEX (BMI) DOCUMENTED: ICD-10-PCS | Mod: CPTII,S$GLB,, | Performed by: FAMILY MEDICINE

## 2019-09-24 PROCEDURE — 3008F BODY MASS INDEX DOCD: CPT | Mod: CPTII,S$GLB,, | Performed by: FAMILY MEDICINE

## 2019-09-24 PROCEDURE — 99214 OFFICE O/P EST MOD 30 MIN: CPT | Mod: S$GLB,,, | Performed by: FAMILY MEDICINE

## 2019-09-24 PROCEDURE — 3074F SYST BP LT 130 MM HG: CPT | Mod: CPTII,S$GLB,, | Performed by: FAMILY MEDICINE

## 2019-09-24 PROCEDURE — 99999 PR PBB SHADOW E&M-EST. PATIENT-LVL III: CPT | Mod: PBBFAC,,, | Performed by: FAMILY MEDICINE

## 2019-09-24 RX ORDER — ONDANSETRON 4 MG/1
4 TABLET, ORALLY DISINTEGRATING ORAL EVERY 8 HOURS PRN
Qty: 15 TABLET | Refills: 0 | Status: SHIPPED | OUTPATIENT
Start: 2019-09-24 | End: 2021-02-25 | Stop reason: SDUPTHER

## 2019-09-24 RX ORDER — OMALIZUMAB 150 MG/ML
INJECTION, SOLUTION SUBCUTANEOUS
COMMUNITY
Start: 2019-09-23 | End: 2023-02-23

## 2019-09-24 RX ORDER — BUTALBITAL, ACETAMINOPHEN AND CAFFEINE 50; 325; 40 MG/1; MG/1; MG/1
1 TABLET ORAL EVERY 4 HOURS PRN
Qty: 15 TABLET | Refills: 0 | Status: SHIPPED | OUTPATIENT
Start: 2019-09-24 | End: 2019-10-24

## 2019-09-24 RX ORDER — FAMOTIDINE 20 MG/1
20 TABLET, FILM COATED ORAL 2 TIMES DAILY
Qty: 60 TABLET | Refills: 0 | Status: SHIPPED | OUTPATIENT
Start: 2019-09-24 | End: 2019-11-08 | Stop reason: SDUPTHER

## 2019-09-24 NOTE — PATIENT INSTRUCTIONS

## 2019-09-24 NOTE — PROGRESS NOTES
Subjective:      Patient ID: Stacey Wade is a 53 y.o. female.    Chief Complaint: Sinus Problem    HPI    Patient here today for sinus issues   Notes she has had chronic issue with sinus  Notes that she gets a pressure in her head - having to use nasal spray, pseudoephed and tylenol to resolve it   Associated nausea and vomiting x 3 - last time happened 4 days ago - symptoms have now resolved    Notes throbbing in right side of head   Associated sensitivity to light and sound during episode  No hx of migraines   These headaches started over the last year - new issue  Multiple times per month  Notes that mother has similar symptoms but not as bad     On xolair injections - couple of years ago   Using daily caffeine   Unsure what triggers headaches       Past Medical History:   Diagnosis Date    Allergic rhinitis     Anxiety     Hypertension     Urticaria        Past Surgical History:   Procedure Laterality Date    COLONOSCOPY N/A 1/18/2018    Procedure: COLONOSCOPY;  Surgeon: Yong Smith MD;  Location: [facility];  Service: Endoscopy;  Laterality: N/A;    HERNIA REPAIR Left        Family History   Problem Relation Age of Onset    Lung cancer Paternal Grandfather     Ovarian cancer Maternal Grandmother     Hyperlipidemia Mother     Hypertension Mother     Breast cancer Mother     Arthritis Mother     Lung cancer Father     Breast cancer Maternal Aunt     Heart failure Other         Great aunt    Prostate cancer Brother        Social History     Socioeconomic History    Marital status: Single     Spouse name: Not on file    Number of children: Not on file    Years of education: Not on file    Highest education level: Not on file   Occupational History    Not on file   Social Needs    Financial resource strain: Not on file    Food insecurity:     Worry: Not on file     Inability: Not on file    Transportation needs:     Medical: Not on file     Non-medical: Not on file   Tobacco Use     Smoking status: Never Smoker    Smokeless tobacco: Never Used   Substance and Sexual Activity    Alcohol use: No    Drug use: No    Sexual activity: Not Currently     Partners: Male     Birth control/protection: OCP, None   Lifestyle    Physical activity:     Days per week: Not on file     Minutes per session: Not on file    Stress: Not on file   Relationships    Social connections:     Talks on phone: Not on file     Gets together: Not on file     Attends Gnosticist service: Not on file     Active member of club or organization: Not on file     Attends meetings of clubs or organizations: Not on file     Relationship status: Not on file   Other Topics Concern    Not on file   Social History Narrative    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       Health Maintenance Topics with due status: Not Due       Topic Last Completion Date    TETANUS VACCINE 10/10/2016    Colonoscopy 01/18/2018    Pap Smear with HPV Cotest 04/26/2018    Mammogram 12/20/2018    Lipid Panel 02/18/2019       Medication List with Changes/Refills   New Medications    BUTALBITAL-ACETAMINOPHEN-CAFFEINE -40 MG (FIORICET, ESGIC) -40 MG PER TABLET    Take 1 tablet by mouth every 4 (four) hours as needed for Pain or Headaches.    FAMOTIDINE (PEPCID) 20 MG TABLET    Take 1 tablet (20 mg total) by mouth 2 (two) times daily.    ONDANSETRON (ZOFRAN-ODT) 4 MG TBDL    Take 1 tablet (4 mg total) by mouth every 8 (eight) hours as needed (nausea).   Current Medications    ASPIRIN (ECOTRIN) 81 MG EC TABLET    Take 81 mg by mouth once daily.    BETAMETHASONE DIPROPIONATE (DIPROLENE) 0.05 % CREAM    APPLY TOPICALLY 2 TIMES DAILY    CLORAZEPATE (TRANXENE) 3.75 MG TAB    TAKE 1 TABLET BY MOUTH EVERY DAY AS NEEDED    DOXEPIN (SINEQUAN) 10 MG CAPSULE    TAKE 2 CAPSULES BY MOUTH 3 TIMES A DAY    FEXOFENADINE (ALLEGRA) 180 MG TABLET    TAKE ONE TABLET BY MOUTH EVERY DAY     HYDROXYZINE HCL (ATARAX) 25 MG TABLET    TAKE 1 TABLET BY MOUTH FOUR TIMES A DAY AS NEEDED FOR ITCHING    MOMETASONE (NASONEX) 50 MCG/ACTUATION NASAL SPRAY    2 sprays by Nasal route once daily.    MONTELUKAST (SINGULAIR) 10 MG TABLET    Take 1 tablet (10 mg total) by mouth once daily.    POTASSIUM CHLORIDE (MICRO-K) 10 MEQ CPSR    TAKE 1 CAPSULE BY MOUTH ONCE DAILY    XOLAIR 150 MG/ML INJECTION       Discontinued Medications    RANITIDINE (ZANTAC) 150 MG TABLET    Take 1 tablet (150 mg total) by mouth 2 (two) times daily.       Review of patient's allergies indicates:   Allergen Reactions    Benzalkonium chloride     Black pepper      Other reaction(s): Hives    Chocolate flavor      Other reaction(s): Hives    Citrus and derivatives Hives     LEMON PRODUCTS    Grapefruit Hives     Other reaction(s): Hives    Ibuprofen Swelling    Latex      Other reaction(s): Hives    Lemon balm (casper officinalis) Hives     Anything with lemon in it    Naproxen Swelling    Neomycin-bacitracin-polymyxin      Other reaction(s): Rash    Oats (richard) Hives     Oat foods (oatmeal, etc...)    Vegetable acetoglycerides Hives     Vegetable gums    Wheat containing prod     Wheat flour Hives       Review of Systems   Constitutional: Negative for chills, fever and malaise/fatigue.   HENT: Negative for congestion and ear pain.    Eyes: Negative for blurred vision and pain.   Respiratory: Negative for shortness of breath.    Cardiovascular: Negative for chest pain and leg swelling.   Gastrointestinal: Positive for nausea and vomiting. Negative for abdominal pain, constipation and diarrhea.   Genitourinary: Negative for dysuria.   Skin: Negative for rash.   Neurological: Positive for headaches.       Objective:     Vitals:    09/24/19 0720   BP: 116/78   Pulse: 68   Temp: 98.5 °F (36.9 °C)     Body mass index is 32.94 kg/m².    Physical Exam   Constitutional: She is oriented to person, place, and time. She appears  well-developed and well-nourished. No distress.   HENT:   Head: Normocephalic and atraumatic.   Right Ear: External ear normal.   Left Ear: External ear normal.   Nose: Nose normal.   Mouth/Throat: Oropharynx is clear and moist.   Eyes: Pupils are equal, round, and reactive to light. Conjunctivae and EOM are normal.   Neck: No thyromegaly present.   Cardiovascular: Normal rate, regular rhythm, normal heart sounds and intact distal pulses.   No murmur heard.  Pulmonary/Chest: Effort normal and breath sounds normal. No respiratory distress. She has no wheezes. She has no rales. She exhibits no tenderness.   Abdominal: Soft. Bowel sounds are normal. She exhibits no distension. There is no tenderness.   Musculoskeletal: She exhibits no edema.   Lymphadenopathy:     She has no cervical adenopathy.   Neurological: She is alert and oriented to person, place, and time. She displays normal reflexes. No cranial nerve deficit or sensory deficit. She exhibits normal muscle tone. Coordination normal.   Skin: Skin is warm and dry.   Psychiatric: She has a normal mood and affect.   Nursing note and vitals reviewed.      Assessment and Plan:     Migraine without status migrainosus, not intractable, unspecified migraine type  -     MRI Brain Without Contrast; Future; Expected date: 09/24/2019    New onset of headaches  -     MRI Brain Without Contrast; Future; Expected date: 09/24/2019    Advised patient that I do not believe this is sinus congestion but migraines  Given educational material/hand outs   Will give as needed fioricet --advised take as soon as she feels headaches   zofran for nausea   Given recent new onset of headaches with no hx of migraines in the past - will get MRI of brain to rule out mass/pathology causing new onset headaches   Advised patient to cut back on caffeine and increase water intake   Consider migraine diary     Urticaria  Resolved    Other orders  -     butalbital-acetaminophen-caffeine -40 mg  (FIORICET, ESGIC) -40 mg per tablet; Take 1 tablet by mouth every 4 (four) hours as needed for Pain or Headaches.  Dispense: 15 tablet; Refill: 0  -     ondansetron (ZOFRAN-ODT) 4 MG TbDL; Take 1 tablet (4 mg total) by mouth every 8 (eight) hours as needed (nausea).  Dispense: 15 tablet; Refill: 0  -     famotidine (PEPCID) 20 MG tablet; Take 1 tablet (20 mg total) by mouth 2 (two) times daily.  Dispense: 60 tablet; Refill: 0        Follow up in about 4 weeks (around 10/22/2019).    @@

## 2019-10-01 ENCOUNTER — TELEPHONE (OUTPATIENT)
Dept: FAMILY MEDICINE | Facility: CLINIC | Age: 54
End: 2019-10-01

## 2019-10-01 NOTE — TELEPHONE ENCOUNTER
----- Message from Darby Shen sent at 10/1/2019  2:00 PM CDT -----  Contact: Pt  ..Type:  Patient Returning Call    Who Called: Pt   Who Left Message for Patient: Aimee  Does the patient know what this is regarding?: return call   Would the patient rather a call back or a response via [facility]? Call back   Best Call Back Number: [phone]  Additional Information:

## 2019-10-01 NOTE — TELEPHONE ENCOUNTER
Patient said the vm was old and she was returning the call, but she's seen Chiquis and has another follow-up appointment with Rosalia at the end of the month/vlw

## 2019-10-01 NOTE — TELEPHONE ENCOUNTER
Spoke to pt. I do not see where we called her and she is not sure what the call would have been for. I let her know Vernique would give her a call tomorrow regarding what the call was for.

## 2019-10-22 ENCOUNTER — OFFICE VISIT (OUTPATIENT)
Dept: FAMILY MEDICINE | Facility: CLINIC | Age: 54
End: 2019-10-22
Payer: COMMERCIAL

## 2019-10-22 VITALS
DIASTOLIC BLOOD PRESSURE: 82 MMHG | WEIGHT: 180.44 LBS | HEART RATE: 84 BPM | SYSTOLIC BLOOD PRESSURE: 122 MMHG | TEMPERATURE: 98 F | HEIGHT: 62 IN | OXYGEN SATURATION: 99 % | BODY MASS INDEX: 33.21 KG/M2

## 2019-10-22 DIAGNOSIS — Z23 NEED FOR VACCINATION: ICD-10-CM

## 2019-10-22 DIAGNOSIS — J30.9 ALLERGIC RHINITIS, UNSPECIFIED SEASONALITY, UNSPECIFIED TRIGGER: ICD-10-CM

## 2019-10-22 DIAGNOSIS — R51.9 ACUTE NONINTRACTABLE HEADACHE, UNSPECIFIED HEADACHE TYPE: Primary | ICD-10-CM

## 2019-10-22 DIAGNOSIS — L50.9 URTICARIA: ICD-10-CM

## 2019-10-22 PROCEDURE — 3008F PR BODY MASS INDEX (BMI) DOCUMENTED: ICD-10-PCS | Mod: CPTII,S$GLB,, | Performed by: FAMILY MEDICINE

## 2019-10-22 PROCEDURE — 3079F PR MOST RECENT DIASTOLIC BLOOD PRESSURE 80-89 MM HG: ICD-10-PCS | Mod: CPTII,S$GLB,, | Performed by: FAMILY MEDICINE

## 2019-10-22 PROCEDURE — 99214 OFFICE O/P EST MOD 30 MIN: CPT | Mod: 25,S$GLB,, | Performed by: FAMILY MEDICINE

## 2019-10-22 PROCEDURE — 3008F BODY MASS INDEX DOCD: CPT | Mod: CPTII,S$GLB,, | Performed by: FAMILY MEDICINE

## 2019-10-22 PROCEDURE — 99999 PR PBB SHADOW E&M-EST. PATIENT-LVL III: CPT | Mod: PBBFAC,,, | Performed by: FAMILY MEDICINE

## 2019-10-22 PROCEDURE — 99999 PR PBB SHADOW E&M-EST. PATIENT-LVL III: ICD-10-PCS | Mod: PBBFAC,,, | Performed by: FAMILY MEDICINE

## 2019-10-22 PROCEDURE — 3079F DIAST BP 80-89 MM HG: CPT | Mod: CPTII,S$GLB,, | Performed by: FAMILY MEDICINE

## 2019-10-22 PROCEDURE — 3074F SYST BP LT 130 MM HG: CPT | Mod: CPTII,S$GLB,, | Performed by: FAMILY MEDICINE

## 2019-10-22 PROCEDURE — 99214 PR OFFICE/OUTPT VISIT, EST, LEVL IV, 30-39 MIN: ICD-10-PCS | Mod: 25,S$GLB,, | Performed by: FAMILY MEDICINE

## 2019-10-22 PROCEDURE — 3074F PR MOST RECENT SYSTOLIC BLOOD PRESSURE < 130 MM HG: ICD-10-PCS | Mod: CPTII,S$GLB,, | Performed by: FAMILY MEDICINE

## 2019-10-22 NOTE — PROGRESS NOTES
CHIEF COMPLAINT:  This is a 53-year-old female here for follow-up headaches.    SUBJECTIVE:  The patient reports 2 episodes of severe headache associated with vomiting last month.  She was seen on September 24  for evaluation and given Fioricet and Zofran.  Patient initially felt that headache was related to her sinuses although she had no sinus symptoms at the time.  Pain is usually localized supraorbital ridge on either side.  Vomiting was a new component.  Patient has had no headaches associated with vomiting September 24.  She did not have MRI of the head done as ordered because of cost.  Patient has a history of allergic rhinitis and urticaria.  She takes multiple antihistamines, Singulair, doxepin and H2 blocker for her allergic symptoms.  She is followed by allergist.  Ranitidine was discontinued at last visit and patient is now taking Pepcid 20 mg twice daily.  Patient also reports that she did not have MRI of the breast as recommended due to high risk for breast cancer because of cost.  Previous MRI in June 2018 was negative.    ROS:  GENERAL: Patient denies fever, chills, night sweats. Patient denies weight gain or loss. Patient denies anorexia, fatigue, weakness or swollen glands.  SKIN: Patient denies rash.  HEENT: Patient denies sore throat, ear pain, hearing loss, nasal congestion, or runny nose. Patient denies visual disturbance, eye irritation or discharge.  LUNGS: Patient denies cough, wheeze or hemoptysis.  CARDIOVASCULAR: Patient denies shortness of breath, palpitations, syncope or lower extremity edema.  GI: Patient denies abdominal pain, nausea, vomiting, diarrhea, constipation, blood in stool or melena.  GENITOURINARY:  Patient denies dysuria, frequency, hematuria, nocturia, urgency or incontinence.  MUSCULOSKELETAL: Patient denies joint swelling, redness or warmth. Positive for ankle and foot pain.  NEUROLOGIC: Patient denies headache, vertigo, paresthesias, weakness in limb, dysarthria,  dysphagia or abnormality of gait.  PSYCHIATRIC: Patient denies anxiety, depression, or memory loss.     OBJECTIVE:   GENERAL: Well-developed well-nourished, obese, black female alert and oriented x3, in no acute distress. Memory, judgment and cognition without deficit.  SKIN: Clear without rash. Normal color and tone.  HEENT: Eyes: Clear conjunctivae. Pupils equal reactive to light and accommodation. Extraocular movements intact. No nystagmus.  Fundi not visualized.  Ears: Clear TMs. Clear canals. Nose: Without congestion. Pharynx: Without injection or exudates.  NECK: Supple, normal range of motion. No masses, lymphadenopathy or enlarged thyroid. No JVD. Carotids 2+ and equal. No bruits.  LUNGS: Clear to auscultation. Normal respiratory effort.  CARDIOVASCULAR: Regular rhythm, normal S1, S2 without murmur, gallop or rub.  EXTREMITIES: Without cyanosis, clubbing or edema. Distal pulses 2+ and equal. Normal range of motion in all extremities. No joint effusion, erythema or warmth.  NEUROLOGIC: Cranial nerves II through XII without deficit. Motor strength equal bilaterally. Sensation normal to touch. Deep tendon reflexes 2+ and equal. Gait without abnormality. No tremor. Negative cerebellar signs. Negative Romberg.    ASSESSMENT:  1. Acute nonintractable headache, unspecified headache type    2. Urticaria    3. Allergic rhinitis, unspecified seasonality, unspecified trigger    4. Need for vaccination      PLAN:   1.  Episodes of report further episodes of headaches associated with vomiting.  2.  Use Fioricet and Zofran as needed.  3.  Continue Pepcid 20 mg twice daily.  Refill as needed.  4.  Influenza vaccine.  5.  Screening mammogram December 2019.  6.  Return to clinic in 4 months for preventive health exam.    This note is generated with speech recognition software and is subject to transcription error and sound alike phrases that may be missed by proofreading.

## 2019-11-06 DIAGNOSIS — Z91.89 AT HIGH RISK FOR BREAST CANCER: ICD-10-CM

## 2019-11-06 DIAGNOSIS — Z80.3 FAMILY HISTORY OF BREAST CANCER: Primary | ICD-10-CM

## 2019-11-08 RX ORDER — FAMOTIDINE 20 MG/1
TABLET, FILM COATED ORAL
Qty: 60 TABLET | Refills: 0 | Status: SHIPPED | OUTPATIENT
Start: 2019-11-08 | End: 2019-11-26 | Stop reason: SDUPTHER

## 2019-11-26 RX ORDER — FAMOTIDINE 20 MG/1
TABLET, FILM COATED ORAL
Qty: 60 TABLET | Refills: 0 | Status: SHIPPED | OUTPATIENT
Start: 2019-11-26 | End: 2020-01-03

## 2019-12-18 NOTE — PROGRESS NOTES
Patient ID: Stacey Wade is a 54 y.o. female.    Chief Complaint: Breast Cancer Screening and high risk breast cancer    HPI: Patient presents for 6 month f/u breast exam.     Pt has been calculated to be high risk for breast cancer- Her breast cancer risk assessment score of 25.79% was calculated at the time of her annual mammogram 12/18/17. June 2018 had linda MRI and was wnl.    Pt was due for MRI in June this year but was unable to afford the co-pay associated with the test so it has been postponed at this time. Pt is here for exam.     Mammogram was performed today- results pending.     Has a history of a breast mass noted during her routine gyn exam back in March 2017. Negative right breast imaging with mammo and ultrasound at that time. We have been checking it clinically at 3- 6 mon intervals - had an episode of it increasing increase in size slightly last year. Pt had been using more caffeine than usual. Recommended seeing surgeon and pt wanted to decrease her caffeine to see if it would decrease the size of the area. It did go back to her baseline measurement. Pt denies any change. No new areas of concern.     MRI on 6/12/18 was wnl    Mammogram 12/20/18 wnl    Pt exercising most days of the week for 45-1 hour - and at [employer] on Friday nights. Has lost 8 # since Dec 2018- pt states feels much better    Pt is completely off her hormones now.     Review of Systems   Constitutional: Negative.  Negative for appetite change and unexpected weight change.   HENT: Negative.    Eyes: Negative.  Negative for visual disturbance.   Respiratory: Negative.  Negative for cough and shortness of breath.    Cardiovascular: Negative.  Negative for chest pain.   Gastrointestinal: Negative.  Negative for abdominal pain and diarrhea.        No reflux   Endocrine: Negative.    Genitourinary: Negative.  Negative for frequency.   Musculoskeletal: Negative.  Negative for back pain.   Skin: Negative.  Negative for  rash.   Allergic/Immunologic: Negative.    Neurological: Negative.  Negative for headaches.   Hematological: Negative.  Negative for adenopathy.   Psychiatric/Behavioral: Negative.  The patient is not nervous/anxious.    Breast: pt denies any nipple discharge or palpable mass that she has noted. Has had bilateral nipple tenderness intermittently. She has continued to decrease her cafeine intake.     Current Outpatient Medications   Medication Sig Dispense Refill    aspirin (ECOTRIN) 81 MG EC tablet Take 81 mg by mouth once daily.      betamethasone dipropionate (DIPROLENE) 0.05 % cream APPLY TOPICALLY 2 TIMES DAILY 45 g 3    clorazepate (TRANXENE) 3.75 MG Tab TAKE 1 TABLET BY MOUTH EVERY DAY AS NEEDED 30 tablet 0    doxepin (SINEQUAN) 10 MG capsule TAKE 2 CAPSULES BY MOUTH 3 TIMES A  capsule 5    famotidine (PEPCID) 20 MG tablet TAKE 1 TABLET BY MOUTH 2 TIMES A DAY 60 tablet 0    fexofenadine (ALLEGRA) 180 MG tablet TAKE ONE TABLET BY MOUTH EVERY DAY 30 tablet 0    hydrOXYzine HCl (ATARAX) 25 MG tablet TAKE 1 TABLET BY MOUTH FOUR TIMES A DAY AS NEEDED FOR ITCHING 120 tablet 5    mometasone (NASONEX) 50 mcg/actuation nasal spray 2 sprays by Nasal route once daily. 17 g 11    montelukast (SINGULAIR) 10 mg tablet Take 1 tablet (10 mg total) by mouth once daily. 30 tablet 11    ondansetron (ZOFRAN-ODT) 4 MG TbDL Take 1 tablet (4 mg total) by mouth every 8 (eight) hours as needed (nausea). 15 tablet 0    potassium chloride (MICRO-K) 10 MEQ CpSR TAKE 1 CAPSULE BY MOUTH ONCE DAILY 30 capsule 9    XOLAIR 150 mg/mL injection        No current facility-administered medications for this visit.        Review of patient's allergies indicates:   Allergen Reactions    Benzalkonium chloride     Black pepper      Other reaction(s): Hives    Chocolate flavor      Other reaction(s): Hives    Citrus and derivatives Hives     LEMON PRODUCTS    Furosemide     Grapefruit      Other reaction(s): Hives    Latex       Other reaction(s): Hives    Lemon balm (casper officinalis) Hives    Neomycin-bacitracin-polymyxin      Other reaction(s): Rash    Oats (richrad) Hives     Oat foods (oatmeal, etc...)    Wheat containing prod        Past Medical History:   Diagnosis Date    Allergic rhinitis     Anxiety     Hypertension     Urticaria        Past Surgical History:   Procedure Laterality Date    COLONOSCOPY N/A 1/18/2018    Procedure: COLONOSCOPY;  Surgeon: Yong Smith MD;  Location: [facility];  Service: Endoscopy;  Laterality: N/A;    HERNIA REPAIR Left        Family History   Problem Relation Age of Onset    Lung cancer Paternal Grandfather     Ovarian cancer Maternal Grandmother     Hyperlipidemia Mother     Hypertension Mother     Breast cancer Mother     Arthritis Mother     Lung cancer Father     Breast cancer Maternal Aunt     Heart failure Other         Great aunt    Prostate cancer Brother        Social History     Socioeconomic History    Marital status: Single     Spouse name: Not on file    Number of children: Not on file    Years of education: Not on file    Highest education level: Not on file   Occupational History    Not on file   Social Needs    Financial resource strain: Not on file    Food insecurity:     Worry: Not on file     Inability: Not on file    Transportation needs:     Medical: Not on file     Non-medical: Not on file   Tobacco Use    Smoking status: Never Smoker    Smokeless tobacco: Never Used   Substance and Sexual Activity    Alcohol use: No    Drug use: No    Sexual activity: Not Currently     Partners: Male     Birth control/protection: OCP, None   Lifestyle    Physical activity:     Days per week: Not on file     Minutes per session: Not on file    Stress: Not on file   Relationships    Social connections:     Talks on phone: Not on file     Gets together: Not on file     Attends Scientology service: Not on file     Active member of club or organization: Not on  file     Attends meetings of clubs or organizations: Not on file     Relationship status: Not on file   Other Topics Concern    Not on file   Social History Narrative    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       Vitals:    12/19/19 1432   BP: 121/80   Pulse: 77   Temp: 98.4 °F (36.9 °C)       Physical Exam   Constitutional: She is oriented to person, place, and time. She appears well-developed and well-nourished.   HENT:   Head: Normocephalic and atraumatic.   Right Ear: External ear normal.   Left Ear: External ear normal.   Mouth/Throat: No oropharyngeal exudate.   Eyes: Pupils are equal, round, and reactive to light. Conjunctivae and EOM are normal. Right eye exhibits no discharge. Left eye exhibits no discharge. No scleral icterus.   Neck: Normal range of motion. Neck supple. No thyromegaly present.   Cardiovascular: Normal rate, regular rhythm and normal heart sounds.   Pulmonary/Chest: Effort normal and breath sounds normal. Right breast exhibits no inverted nipple, no mass, no nipple discharge, no skin change and no tenderness. Left breast exhibits no inverted nipple, no mass, no nipple discharge, no skin change and no tenderness.   Abdominal: Soft. Bowel sounds are normal.   Musculoskeletal: Normal range of motion. She exhibits no edema.        Right shoulder: She exhibits no crepitus and normal strength.   Lymphadenopathy:        Head (right side): No submental, no submandibular, no tonsillar, no preauricular, no posterior auricular and no occipital adenopathy present.        Head (left side): No submental, no submandibular, no tonsillar, no preauricular, no posterior auricular and no occipital adenopathy present.     She has no cervical adenopathy.        Right cervical: No superficial cervical and no posterior cervical adenopathy present.       Left cervical: No superficial cervical and no posterior cervical adenopathy  "present.     She has no axillary adenopathy.        Right: No supraclavicular adenopathy present.        Left: No supraclavicular adenopathy present.   Neurological: She is alert and oriented to person, place, and time. She has normal reflexes.   Skin: Skin is warm and dry. No rash noted. No erythema. No pallor.   Psychiatric: She has a normal mood and affect. Her behavior is normal. Judgment and thought content normal.   Vitals reviewed.    IMAGING: today- results pending    Menarche at 20 y/o      No history of hormone use other than birth control and no history of radiation to the neck or chest wall.     Ht: 5'2"  Wt: 174 - down 8#    FH: Maternal aunt breast cancer at 51 y/o, Maternal GM ovarian cancer, Father lung and prostate cancer, Brother - prostate cancer    Assessment & Plan:  1. Area of prominent glandular tissue noted at the 3 oclock location of the right breast. Fibrous texture and blends more medially and laterally- stable measurements today  2. Mammogram Dec 2018 wnl - elevated risk assessment score. MRI recommended for 2019- pt canceled due to financial responsibility  3. linda screening mammogram today- results pending  4. BSE recommended monthly- call for any changes.    5. Encouraged continued exercise and wt loss- pt agrees  6. Discussed genetic testing - pt declines at this time  7. Pt declines tamoxifen use for chemoprevention due to potential side effects         "

## 2019-12-19 ENCOUNTER — HOSPITAL ENCOUNTER (OUTPATIENT)
Dept: RADIOLOGY | Facility: HOSPITAL | Age: 54
Discharge: HOME | End: 2019-12-19
Attending: NURSE PRACTITIONER
Payer: COMMERCIAL

## 2019-12-19 ENCOUNTER — OFFICE VISIT (OUTPATIENT)
Dept: HEMATOLOGY/ONCOLOGY | Facility: CLINIC | Age: 54
End: 2019-12-19
Payer: COMMERCIAL

## 2019-12-19 VITALS
TEMPERATURE: 98 F | BODY MASS INDEX: 31.94 KG/M2 | WEIGHT: 174.63 LBS | SYSTOLIC BLOOD PRESSURE: 121 MMHG | HEART RATE: 77 BPM | DIASTOLIC BLOOD PRESSURE: 80 MMHG

## 2019-12-19 VITALS — HEIGHT: 62 IN | BODY MASS INDEX: 33.18 KG/M2 | WEIGHT: 180.31 LBS

## 2019-12-19 DIAGNOSIS — Z71.89 COUNSELING AND COORDINATION OF CARE: ICD-10-CM

## 2019-12-19 DIAGNOSIS — Z91.89 AT HIGH RISK FOR BREAST CANCER: Primary | ICD-10-CM

## 2019-12-19 DIAGNOSIS — Z71.9 HEALTH EDUCATION/COUNSELING: ICD-10-CM

## 2019-12-19 DIAGNOSIS — Z80.3 FAMILY HISTORY OF BREAST CANCER: ICD-10-CM

## 2019-12-19 DIAGNOSIS — Z71.89 COUNSELING ON HEALTH PROMOTION AND DISEASE PREVENTION: ICD-10-CM

## 2019-12-19 DIAGNOSIS — Z12.39 BREAST CANCER SCREENING: ICD-10-CM

## 2019-12-19 DIAGNOSIS — Z91.89 AT HIGH RISK FOR BREAST CANCER: ICD-10-CM

## 2019-12-19 PROCEDURE — 3079F PR MOST RECENT DIASTOLIC BLOOD PRESSURE 80-89 MM HG: ICD-10-PCS | Mod: CPTII,S$GLB,, | Performed by: NURSE PRACTITIONER

## 2019-12-19 PROCEDURE — 77067 SCR MAMMO BI INCL CAD: CPT | Mod: 26,,, | Performed by: RADIOLOGY

## 2019-12-19 PROCEDURE — 3074F SYST BP LT 130 MM HG: CPT | Mod: CPTII,S$GLB,, | Performed by: NURSE PRACTITIONER

## 2019-12-19 PROCEDURE — 77063 BREAST TOMOSYNTHESIS BI: CPT | Mod: 26,,, | Performed by: RADIOLOGY

## 2019-12-19 PROCEDURE — 77067 SCR MAMMO BI INCL CAD: CPT | Mod: TC

## 2019-12-19 PROCEDURE — 99999 PR PBB SHADOW E&M-EST. PATIENT-LVL III: ICD-10-PCS | Mod: PBBFAC,,, | Performed by: NURSE PRACTITIONER

## 2019-12-19 PROCEDURE — 99999 PR PBB SHADOW E&M-EST. PATIENT-LVL III: CPT | Mod: PBBFAC,,, | Performed by: NURSE PRACTITIONER

## 2019-12-19 PROCEDURE — 77063 MAMMO DIGITAL SCREENING BILAT WITH TOMOSYNTHESIS_CAD: ICD-10-PCS | Mod: 26,,, | Performed by: RADIOLOGY

## 2019-12-19 PROCEDURE — 3074F PR MOST RECENT SYSTOLIC BLOOD PRESSURE < 130 MM HG: ICD-10-PCS | Mod: CPTII,S$GLB,, | Performed by: NURSE PRACTITIONER

## 2019-12-19 PROCEDURE — 3079F DIAST BP 80-89 MM HG: CPT | Mod: CPTII,S$GLB,, | Performed by: NURSE PRACTITIONER

## 2019-12-19 PROCEDURE — 3008F BODY MASS INDEX DOCD: CPT | Mod: CPTII,S$GLB,, | Performed by: NURSE PRACTITIONER

## 2019-12-19 PROCEDURE — 3008F PR BODY MASS INDEX (BMI) DOCUMENTED: ICD-10-PCS | Mod: CPTII,S$GLB,, | Performed by: NURSE PRACTITIONER

## 2019-12-19 PROCEDURE — 99214 PR OFFICE/OUTPT VISIT, EST, LEVL IV, 30-39 MIN: ICD-10-PCS | Mod: S$GLB,,, | Performed by: NURSE PRACTITIONER

## 2019-12-19 PROCEDURE — 77067 MAMMO DIGITAL SCREENING BILAT WITH TOMOSYNTHESIS_CAD: ICD-10-PCS | Mod: 26,,, | Performed by: RADIOLOGY

## 2019-12-19 PROCEDURE — 99214 OFFICE O/P EST MOD 30 MIN: CPT | Mod: S$GLB,,, | Performed by: NURSE PRACTITIONER

## 2020-01-03 RX ORDER — BETAMETHASONE DIPROPIONATE 0.5 MG/G
CREAM TOPICAL
Qty: 45 G | Refills: 1 | Status: SHIPPED | OUTPATIENT
Start: 2020-01-03 | End: 2020-03-17 | Stop reason: SDUPTHER

## 2020-01-03 RX ORDER — FAMOTIDINE 20 MG/1
TABLET, FILM COATED ORAL
Qty: 60 TABLET | Refills: 0 | Status: SHIPPED | OUTPATIENT
Start: 2020-01-03 | End: 2020-02-11

## 2020-02-11 RX ORDER — FAMOTIDINE 20 MG/1
TABLET, FILM COATED ORAL
Qty: 60 TABLET | Refills: 0 | Status: SHIPPED | OUTPATIENT
Start: 2020-02-11 | End: 2020-02-27

## 2020-02-13 RX ORDER — MONTELUKAST SODIUM 10 MG/1
TABLET ORAL
Qty: 30 TABLET | Refills: 0 | Status: SHIPPED | OUTPATIENT
Start: 2020-02-13 | End: 2020-03-04

## 2020-02-13 RX ORDER — POTASSIUM CHLORIDE 750 MG/1
CAPSULE, EXTENDED RELEASE ORAL
Qty: 30 CAPSULE | Refills: 0 | Status: SHIPPED | OUTPATIENT
Start: 2020-02-13 | End: 2020-03-04

## 2020-02-13 RX ORDER — DOXEPIN HYDROCHLORIDE 10 MG/1
CAPSULE ORAL
Qty: 180 CAPSULE | Refills: 0 | Status: SHIPPED | OUTPATIENT
Start: 2020-02-13 | End: 2020-03-04

## 2020-02-27 RX ORDER — FAMOTIDINE 20 MG/1
TABLET, FILM COATED ORAL
Qty: 60 TABLET | Refills: 0 | Status: SHIPPED | OUTPATIENT
Start: 2020-02-27 | End: 2020-03-17 | Stop reason: SDUPTHER

## 2020-03-04 RX ORDER — POTASSIUM CHLORIDE 750 MG/1
CAPSULE, EXTENDED RELEASE ORAL
Qty: 30 CAPSULE | Refills: 0 | Status: SHIPPED | OUTPATIENT
Start: 2020-03-04 | End: 2020-03-17 | Stop reason: SDUPTHER

## 2020-03-04 RX ORDER — DOXEPIN HYDROCHLORIDE 10 MG/1
CAPSULE ORAL
Qty: 180 CAPSULE | Refills: 0 | Status: SHIPPED | OUTPATIENT
Start: 2020-03-04 | End: 2020-05-04

## 2020-03-04 RX ORDER — MONTELUKAST SODIUM 10 MG/1
TABLET ORAL
Qty: 30 TABLET | Refills: 0 | Status: SHIPPED | OUTPATIENT
Start: 2020-03-04 | End: 2020-03-17 | Stop reason: SDUPTHER

## 2020-03-17 ENCOUNTER — OFFICE VISIT (OUTPATIENT)
Dept: FAMILY MEDICINE | Facility: CLINIC | Age: 55
End: 2020-03-17
Payer: COMMERCIAL

## 2020-03-17 ENCOUNTER — LAB VISIT (OUTPATIENT)
Dept: LAB | Facility: HOSPITAL | Age: 55
End: 2020-03-17
Payer: COMMERCIAL

## 2020-03-17 VITALS
OXYGEN SATURATION: 99 % | BODY MASS INDEX: 31.77 KG/M2 | TEMPERATURE: 99 F | HEIGHT: 62 IN | SYSTOLIC BLOOD PRESSURE: 120 MMHG | DIASTOLIC BLOOD PRESSURE: 80 MMHG | HEART RATE: 99 BPM | WEIGHT: 172.63 LBS | RESPIRATION RATE: 18 BRPM

## 2020-03-17 DIAGNOSIS — Z00.00 PREVENTATIVE HEALTH CARE: ICD-10-CM

## 2020-03-17 DIAGNOSIS — I10 ESSENTIAL HYPERTENSION: Chronic | ICD-10-CM

## 2020-03-17 DIAGNOSIS — Z23 NEED FOR VACCINATION: ICD-10-CM

## 2020-03-17 DIAGNOSIS — Z00.00 PREVENTATIVE HEALTH CARE: Primary | ICD-10-CM

## 2020-03-17 DIAGNOSIS — J30.9 ALLERGIC RHINITIS, UNSPECIFIED SEASONALITY, UNSPECIFIED TRIGGER: ICD-10-CM

## 2020-03-17 LAB
ALBUMIN SERPL BCP-MCNC: 4 G/DL (ref 3.5–5.2)
ALP SERPL-CCNC: 144 U/L (ref 55–135)
ALT SERPL W/O P-5'-P-CCNC: 15 U/L (ref 10–44)
ANION GAP SERPL CALC-SCNC: 11 MMOL/L (ref 8–16)
AST SERPL-CCNC: 18 U/L (ref 10–40)
BASOPHILS # BLD AUTO: 0.04 K/UL (ref 0–0.2)
BASOPHILS NFR BLD: 0.6 % (ref 0–1.9)
BILIRUB SERPL-MCNC: 0.5 MG/DL (ref 0.1–1)
BUN SERPL-MCNC: 7 MG/DL (ref 6–20)
CALCIUM SERPL-MCNC: 9.7 MG/DL (ref 8.7–10.5)
CHLORIDE SERPL-SCNC: 102 MMOL/L (ref 95–110)
CHOLEST SERPL-MCNC: 207 MG/DL (ref 120–199)
CHOLEST/HDLC SERPL: 2.8 {RATIO} (ref 2–5)
CO2 SERPL-SCNC: 26 MMOL/L (ref 23–29)
CREAT SERPL-MCNC: 1 MG/DL (ref 0.5–1.4)
DIFFERENTIAL METHOD: ABNORMAL
EOSINOPHIL # BLD AUTO: 0.1 K/UL (ref 0–0.5)
EOSINOPHIL NFR BLD: 1.4 % (ref 0–8)
ERYTHROCYTE [DISTWIDTH] IN BLOOD BY AUTOMATED COUNT: 17 % (ref 11.5–14.5)
EST. GFR  (AFRICAN AMERICAN): >60 ML/MIN/1.73 M^2
EST. GFR  (NON AFRICAN AMERICAN): >60 ML/MIN/1.73 M^2
GLUCOSE SERPL-MCNC: 82 MG/DL (ref 70–110)
HCT VFR BLD AUTO: 48 % (ref 37–48.5)
HDLC SERPL-MCNC: 75 MG/DL (ref 40–75)
HDLC SERPL: 36.2 % (ref 20–50)
HGB BLD-MCNC: 14.2 G/DL (ref 12–16)
IMM GRANULOCYTES # BLD AUTO: 0.02 K/UL (ref 0–0.04)
IMM GRANULOCYTES NFR BLD AUTO: 0.3 % (ref 0–0.5)
LDLC SERPL CALC-MCNC: 118 MG/DL (ref 63–159)
LYMPHOCYTES # BLD AUTO: 2 K/UL (ref 1–4.8)
LYMPHOCYTES NFR BLD: 28.7 % (ref 18–48)
MCH RBC QN AUTO: 22.8 PG (ref 27–31)
MCHC RBC AUTO-ENTMCNC: 29.6 G/DL (ref 32–36)
MCV RBC AUTO: 77 FL (ref 82–98)
MONOCYTES # BLD AUTO: 0.5 K/UL (ref 0.3–1)
MONOCYTES NFR BLD: 6.6 % (ref 4–15)
NEUTROPHILS # BLD AUTO: 4.4 K/UL (ref 1.8–7.7)
NEUTROPHILS NFR BLD: 62.4 % (ref 38–73)
NONHDLC SERPL-MCNC: 132 MG/DL
NRBC BLD-RTO: 0 /100 WBC
PLATELET # BLD AUTO: 177 K/UL (ref 150–350)
PMV BLD AUTO: 12.1 FL (ref 9.2–12.9)
POTASSIUM SERPL-SCNC: 3.6 MMOL/L (ref 3.5–5.1)
PROT SERPL-MCNC: 7.5 G/DL (ref 6–8.4)
RBC # BLD AUTO: 6.24 M/UL (ref 4–5.4)
SODIUM SERPL-SCNC: 139 MMOL/L (ref 136–145)
TRIGL SERPL-MCNC: 70 MG/DL (ref 30–150)
TSH SERPL DL<=0.005 MIU/L-ACNC: 1.12 UIU/ML (ref 0.4–4)
WBC # BLD AUTO: 7.11 K/UL (ref 3.9–12.7)

## 2020-03-17 PROCEDURE — 36415 COLL VENOUS BLD VENIPUNCTURE: CPT | Mod: PO

## 2020-03-17 PROCEDURE — 86703 HIV-1/HIV-2 1 RESULT ANTBDY: CPT

## 2020-03-17 PROCEDURE — 90471 ZOSTER RECOMBINANT VACCINE: ICD-10-PCS | Mod: S$GLB,,, | Performed by: FAMILY MEDICINE

## 2020-03-17 PROCEDURE — 90471 IMMUNIZATION ADMIN: CPT | Mod: S$GLB,,, | Performed by: FAMILY MEDICINE

## 2020-03-17 PROCEDURE — 3079F DIAST BP 80-89 MM HG: CPT | Mod: CPTII,S$GLB,, | Performed by: FAMILY MEDICINE

## 2020-03-17 PROCEDURE — 90750 HZV VACC RECOMBINANT IM: CPT | Mod: S$GLB,,, | Performed by: FAMILY MEDICINE

## 2020-03-17 PROCEDURE — 3079F PR MOST RECENT DIASTOLIC BLOOD PRESSURE 80-89 MM HG: ICD-10-PCS | Mod: CPTII,S$GLB,, | Performed by: FAMILY MEDICINE

## 2020-03-17 PROCEDURE — 3074F PR MOST RECENT SYSTOLIC BLOOD PRESSURE < 130 MM HG: ICD-10-PCS | Mod: CPTII,S$GLB,, | Performed by: FAMILY MEDICINE

## 2020-03-17 PROCEDURE — 85025 COMPLETE CBC W/AUTO DIFF WBC: CPT

## 2020-03-17 PROCEDURE — 80053 COMPREHEN METABOLIC PANEL: CPT

## 2020-03-17 PROCEDURE — 3074F SYST BP LT 130 MM HG: CPT | Mod: CPTII,S$GLB,, | Performed by: FAMILY MEDICINE

## 2020-03-17 PROCEDURE — 99999 PR PBB SHADOW E&M-EST. PATIENT-LVL III: ICD-10-PCS | Mod: PBBFAC,,, | Performed by: FAMILY MEDICINE

## 2020-03-17 PROCEDURE — 84443 ASSAY THYROID STIM HORMONE: CPT

## 2020-03-17 PROCEDURE — 90750 ZOSTER RECOMBINANT VACCINE: ICD-10-PCS | Mod: S$GLB,,, | Performed by: FAMILY MEDICINE

## 2020-03-17 PROCEDURE — 80061 LIPID PANEL: CPT

## 2020-03-17 PROCEDURE — 99396 PREV VISIT EST AGE 40-64: CPT | Mod: 25,S$GLB,, | Performed by: FAMILY MEDICINE

## 2020-03-17 PROCEDURE — 99396 PR PREVENTIVE VISIT,EST,40-64: ICD-10-PCS | Mod: 25,S$GLB,, | Performed by: FAMILY MEDICINE

## 2020-03-17 PROCEDURE — 99999 PR PBB SHADOW E&M-EST. PATIENT-LVL III: CPT | Mod: PBBFAC,,, | Performed by: FAMILY MEDICINE

## 2020-03-17 RX ORDER — POTASSIUM CHLORIDE 750 MG/1
10 CAPSULE, EXTENDED RELEASE ORAL DAILY
Qty: 30 CAPSULE | Refills: 11 | Status: SHIPPED | OUTPATIENT
Start: 2020-03-17 | End: 2021-04-08

## 2020-03-17 RX ORDER — MONTELUKAST SODIUM 10 MG/1
10 TABLET ORAL DAILY
Qty: 30 TABLET | Refills: 11 | Status: SHIPPED | OUTPATIENT
Start: 2020-03-17 | End: 2021-05-13 | Stop reason: SDUPTHER

## 2020-03-17 RX ORDER — FAMOTIDINE 20 MG/1
20 TABLET, FILM COATED ORAL 2 TIMES DAILY
Qty: 60 TABLET | Refills: 11 | Status: SHIPPED | OUTPATIENT
Start: 2020-03-17 | End: 2020-07-17

## 2020-03-17 RX ORDER — BETAMETHASONE DIPROPIONATE 0.5 MG/G
CREAM TOPICAL
Qty: 45 G | Refills: 3 | Status: SHIPPED | OUTPATIENT
Start: 2020-03-17 | End: 2020-11-30

## 2020-03-17 NOTE — PROGRESS NOTES
CHIEF COMPLAINT:  This is a 54-year-old female here for preventive health exam.     SUBJECTIVE:  Patient is doing well without complaints.  She has a history of essential hypertension.  Her blood pressure is 120/80 on no meds.  She's being followed by breast screening because of fibrocystic disease.  She had nodule in right breast which has improved since she discontinued caffeine.  She continues to take Xolair injections for chronic urticaria.  She also takes Singulair, doxepin, and famotidine.  The patient does not exercise regularly.  She has lost 10 lb in the last year.  She is obese with a BMI of 31.57.    Eye exam 2018.  Mammogram December 2019.   Pap smear April 2018 per GYN.  Colonoscopy January 2018, due again in January 2028.  Tdap October 2016. Flu vaccine October 2019.     ROS:  GENERAL: Patient denies fever, chills, night sweats. Patient denies weight gain. Patient denies anorexia, fatigue, weakness or swollen glands.  Positive for weight loss.  SKIN: Patient denies rash or hair loss.  HEENT: Patient denies sore throat, ear pain, hearing loss, nasal congestion, or runny nose. Patient denies visual disturbance, eye irritation or discharge.  LUNGS: Patient denies cough, wheeze or hemoptysis.  CARDIOVASCULAR: Patient denies shortness of breath, palpitations, syncope or lower extremity edema.  GI: Patient denies abdominal pain, nausea, vomiting, diarrhea, constipation, blood in stool or melena.  GENITOURINARY: Patient denies pelvic pain, vaginal discharge, itch or odor. Patient denies irregular vaginal bleeding. Patient denies dysuria, frequency, hematuria, nocturia, urgency or incontinence.  BREASTS: Patient denies breast pain, mass or nipple discharge.  MUSCULOSKELETAL: Patient denies joint swelling, redness or warmth. Positive for ankle and foot pain.  NEUROLOGIC: Patient denies headache, vertigo, paresthesias, weakness in limb, dysarthria, dysphagia or abnormality of gait.  PSYCHIATRIC: Patient denies  anxiety, depression, or memory loss.     OBJECTIVE:   GENERAL: Well-developed well-nourished, obese, black female alert and oriented x3, in no acute distress. Memory, judgment and cognition without deficit.  Weight loss of 10 pounds in the last year.  SKIN: Clear without rash. Normal color and tone.  HEENT: Eyes: Clear conjunctivae. Pupils equal reactive to light and accommodation. Ears: Clear TMs. Clear canals. Nose: Without congestion. Pharynx: Without injection or exudates.  NECK: Supple, normal range of motion. No masses, lymphadenopathy or enlarged thyroid. No JVD. Carotids 2+ and equal. No bruits.  LUNGS: Clear to auscultation. Normal respiratory effort.  CARDIOVASCULAR: Regular rhythm, normal S1, S2 without murmur, gallop or rub.  BACK: No CVA or spinal tenderness.  BREASTS: No masses, tenderness or nipple discharge.  ABDOMEN: Normal appearance. Active bowel sounds. Soft, nontender without mass or organomegaly. No rebound or guarding.  EXTREMITIES: Without cyanosis, clubbing or edema. Distal pulses 2+ and equal. Normal range of motion in all extremities. No joint effusion, erythema or warmth. Ankle/foot splints in place.  NEUROLOGIC: Cranial nerves II through XII without deficit. Motor strength equal bilaterally. Sensation normal to touch. Deep tendon reflexes 2+ and equal. Gait without abnormality. No tremor. Negative cerebellar signs.  PELVIC: Deferred to GYN.    ASSESSMENT:  1. Preventative health care    2. Essential hypertension    3. Allergic rhinitis, unspecified seasonality, unspecified trigger    4. Need for vaccination      PLAN:  1.  Weight reduction. Exercise regularly.  2.  Age-appropriate counseling.  3.  Fasting lab.  4.  Refill medications.  5.  Zoster recombinant vaccine.  Return to clinic in 2-6 months for booster.  6.  Follow-up annually.    This note is generated with speech recognition software and is subject to transcription error and sound alike phrases that may be missed by  proofreading.

## 2020-03-18 LAB — HIV 1+2 AB+HIV1 P24 AG SERPL QL IA: NEGATIVE

## 2020-05-01 ENCOUNTER — TELEPHONE (OUTPATIENT)
Dept: FAMILY MEDICINE | Facility: CLINIC | Age: 55
End: 2020-05-01

## 2020-05-01 NOTE — TELEPHONE ENCOUNTER
Pt called in and stating and that she's been having trouble with VV. Pt wanted to come in on Monday I made her appt. Pt understood.

## 2020-05-01 NOTE — TELEPHONE ENCOUNTER
----- Message from Yohana Llanes sent at 5/1/2020  4:29 PM CDT -----  Contact: pt  Pt called requesting a call back due to unable to get on virtual visit and would prefer to come in. Pt can be reached at [phone]      Thanks,  Yohana Llanes

## 2020-05-01 NOTE — TELEPHONE ENCOUNTER
----- Message from Carolyn Mares sent at 5/1/2020  1:53 PM CDT -----  Contact: pt  Type:  Patient Returning Call    Who Called:  pt  Does the patient know what this is regarding?:  Pt is having trouble accessing virtual appt. Please call back as soon as possible to assist.  Best Call Back Number:    Additional Information:  Thank you

## 2020-05-01 NOTE — TELEPHONE ENCOUNTER
----- Message from Lenore Montesinos sent at 5/1/2020  6:41 AM CDT -----  Contact: Patient  Patient needs to talk to nurse about the past 2 days she as had to leave work early due to a bad headache, patient would like to italia advised, please call her back at [phone]. Thank you

## 2020-05-01 NOTE — TELEPHONE ENCOUNTER
Pt called in wanting a later appt for her VV. she's having trouble getting on. I made her a new appt. And I walked her through on how to do it. Pt understood.

## 2020-05-04 ENCOUNTER — OFFICE VISIT (OUTPATIENT)
Dept: FAMILY MEDICINE | Facility: CLINIC | Age: 55
End: 2020-05-04
Payer: COMMERCIAL

## 2020-05-04 VITALS
DIASTOLIC BLOOD PRESSURE: 84 MMHG | HEIGHT: 62 IN | OXYGEN SATURATION: 96 % | RESPIRATION RATE: 18 BRPM | BODY MASS INDEX: 33.43 KG/M2 | TEMPERATURE: 99 F | SYSTOLIC BLOOD PRESSURE: 130 MMHG | HEART RATE: 91 BPM | WEIGHT: 181.69 LBS

## 2020-05-04 DIAGNOSIS — I10 ESSENTIAL HYPERTENSION: Chronic | ICD-10-CM

## 2020-05-04 DIAGNOSIS — L50.9 URTICARIA: ICD-10-CM

## 2020-05-04 DIAGNOSIS — J30.9 ALLERGIC RHINITIS, UNSPECIFIED SEASONALITY, UNSPECIFIED TRIGGER: ICD-10-CM

## 2020-05-04 DIAGNOSIS — R51.9 ACUTE NONINTRACTABLE HEADACHE, UNSPECIFIED HEADACHE TYPE: Primary | ICD-10-CM

## 2020-05-04 PROBLEM — Z91.89 AT HIGH RISK FOR BREAST CANCER: Status: RESOLVED | Noted: 2019-08-14 | Resolved: 2020-05-04

## 2020-05-04 PROCEDURE — 3079F PR MOST RECENT DIASTOLIC BLOOD PRESSURE 80-89 MM HG: ICD-10-PCS | Mod: CPTII,S$GLB,, | Performed by: FAMILY MEDICINE

## 2020-05-04 PROCEDURE — 99214 OFFICE O/P EST MOD 30 MIN: CPT | Mod: S$GLB,,, | Performed by: FAMILY MEDICINE

## 2020-05-04 PROCEDURE — 99999 PR PBB SHADOW E&M-EST. PATIENT-LVL III: ICD-10-PCS | Mod: PBBFAC,,, | Performed by: FAMILY MEDICINE

## 2020-05-04 PROCEDURE — 3008F BODY MASS INDEX DOCD: CPT | Mod: CPTII,S$GLB,, | Performed by: FAMILY MEDICINE

## 2020-05-04 PROCEDURE — 99214 PR OFFICE/OUTPT VISIT, EST, LEVL IV, 30-39 MIN: ICD-10-PCS | Mod: S$GLB,,, | Performed by: FAMILY MEDICINE

## 2020-05-04 PROCEDURE — 3008F PR BODY MASS INDEX (BMI) DOCUMENTED: ICD-10-PCS | Mod: CPTII,S$GLB,, | Performed by: FAMILY MEDICINE

## 2020-05-04 PROCEDURE — 99999 PR PBB SHADOW E&M-EST. PATIENT-LVL III: CPT | Mod: PBBFAC,,, | Performed by: FAMILY MEDICINE

## 2020-05-04 PROCEDURE — 3079F DIAST BP 80-89 MM HG: CPT | Mod: CPTII,S$GLB,, | Performed by: FAMILY MEDICINE

## 2020-05-04 PROCEDURE — 3075F SYST BP GE 130 - 139MM HG: CPT | Mod: CPTII,S$GLB,, | Performed by: FAMILY MEDICINE

## 2020-05-04 PROCEDURE — 3075F PR MOST RECENT SYSTOLIC BLOOD PRESS GE 130-139MM HG: ICD-10-PCS | Mod: CPTII,S$GLB,, | Performed by: FAMILY MEDICINE

## 2020-05-04 RX ORDER — HYDROXYZINE HYDROCHLORIDE 25 MG/1
TABLET, FILM COATED ORAL
Qty: 120 TABLET | Refills: 5 | Status: SHIPPED | OUTPATIENT
Start: 2020-05-04 | End: 2020-06-30

## 2020-05-04 RX ORDER — DOXEPIN HYDROCHLORIDE 10 MG/1
CAPSULE ORAL
Qty: 180 CAPSULE | Refills: 3 | Status: SHIPPED | OUTPATIENT
Start: 2020-05-04 | End: 2021-05-13 | Stop reason: SDUPTHER

## 2020-05-04 NOTE — LETTER
Stacey Wade  [address]  [facility] [address]             [facility]  Family Medicine  [address] [facility] [address]  Phone: [phone]  Fax: [phone]   May 4, 2020     Patient: Stacey Wade   YOB: 1965   Date of Visit: 5/4/2020       To Whom it May Concern:    Stacey Wade was seen in my clinic on 5/4/2020.  She may return to work today.    Please excuse her from any classes or work missed from 4/29/2020 to 4/1/2020.    If you have any questions or concerns, please don't hesitate to call.    Sincerely,         Savannah Lindsey MD

## 2020-05-07 RX ORDER — MOMETASONE FUROATE 50 UG/1
SPRAY, METERED NASAL
Qty: 17 G | Refills: 11 | Status: SHIPPED | OUTPATIENT
Start: 2020-05-07 | End: 2021-05-13 | Stop reason: SDUPTHER

## 2020-05-28 ENCOUNTER — OFFICE VISIT (OUTPATIENT)
Dept: FAMILY MEDICINE | Facility: CLINIC | Age: 55
End: 2020-05-28
Payer: COMMERCIAL

## 2020-05-28 VITALS
DIASTOLIC BLOOD PRESSURE: 76 MMHG | WEIGHT: 177.69 LBS | HEIGHT: 62 IN | HEART RATE: 84 BPM | BODY MASS INDEX: 32.7 KG/M2 | TEMPERATURE: 99 F | RESPIRATION RATE: 18 BRPM | SYSTOLIC BLOOD PRESSURE: 100 MMHG | OXYGEN SATURATION: 99 %

## 2020-05-28 DIAGNOSIS — Z23 NEED FOR VACCINATION: ICD-10-CM

## 2020-05-28 DIAGNOSIS — I10 ESSENTIAL HYPERTENSION: Chronic | ICD-10-CM

## 2020-05-28 DIAGNOSIS — R51.9 RECURRENT HEADACHE: Primary | ICD-10-CM

## 2020-05-28 DIAGNOSIS — J30.9 ALLERGIC RHINITIS, UNSPECIFIED SEASONALITY, UNSPECIFIED TRIGGER: ICD-10-CM

## 2020-05-28 DIAGNOSIS — L23.89 ALLERGIC CONTACT DERMATITIS DUE TO OTHER AGENTS: ICD-10-CM

## 2020-05-28 PROCEDURE — 3078F PR MOST RECENT DIASTOLIC BLOOD PRESSURE < 80 MM HG: ICD-10-PCS | Mod: CPTII,S$GLB,, | Performed by: FAMILY MEDICINE

## 2020-05-28 PROCEDURE — 90750 HZV VACC RECOMBINANT IM: CPT | Mod: S$GLB,,, | Performed by: FAMILY MEDICINE

## 2020-05-28 PROCEDURE — 90471 ZOSTER RECOMBINANT VACCINE: ICD-10-PCS | Mod: S$GLB,,, | Performed by: FAMILY MEDICINE

## 2020-05-28 PROCEDURE — 3074F SYST BP LT 130 MM HG: CPT | Mod: CPTII,S$GLB,, | Performed by: FAMILY MEDICINE

## 2020-05-28 PROCEDURE — 99214 PR OFFICE/OUTPT VISIT, EST, LEVL IV, 30-39 MIN: ICD-10-PCS | Mod: 25,S$GLB,, | Performed by: FAMILY MEDICINE

## 2020-05-28 PROCEDURE — 99999 PR PBB SHADOW E&M-EST. PATIENT-LVL IV: ICD-10-PCS | Mod: PBBFAC,,, | Performed by: FAMILY MEDICINE

## 2020-05-28 PROCEDURE — 3078F DIAST BP <80 MM HG: CPT | Mod: CPTII,S$GLB,, | Performed by: FAMILY MEDICINE

## 2020-05-28 PROCEDURE — 90471 IMMUNIZATION ADMIN: CPT | Mod: S$GLB,,, | Performed by: FAMILY MEDICINE

## 2020-05-28 PROCEDURE — 90750 ZOSTER RECOMBINANT VACCINE: ICD-10-PCS | Mod: S$GLB,,, | Performed by: FAMILY MEDICINE

## 2020-05-28 PROCEDURE — 3008F PR BODY MASS INDEX (BMI) DOCUMENTED: ICD-10-PCS | Mod: CPTII,S$GLB,, | Performed by: FAMILY MEDICINE

## 2020-05-28 PROCEDURE — 3008F BODY MASS INDEX DOCD: CPT | Mod: CPTII,S$GLB,, | Performed by: FAMILY MEDICINE

## 2020-05-28 PROCEDURE — 99214 OFFICE O/P EST MOD 30 MIN: CPT | Mod: 25,S$GLB,, | Performed by: FAMILY MEDICINE

## 2020-05-28 PROCEDURE — 3074F PR MOST RECENT SYSTOLIC BLOOD PRESSURE < 130 MM HG: ICD-10-PCS | Mod: CPTII,S$GLB,, | Performed by: FAMILY MEDICINE

## 2020-05-28 PROCEDURE — 99999 PR PBB SHADOW E&M-EST. PATIENT-LVL IV: CPT | Mod: PBBFAC,,, | Performed by: FAMILY MEDICINE

## 2020-05-28 RX ORDER — PREDNISONE 20 MG/1
TABLET ORAL
Qty: 10 TABLET | Refills: 0 | Status: SHIPPED | OUTPATIENT
Start: 2020-05-28 | End: 2020-11-09

## 2020-05-28 NOTE — LETTER
May 28, 2020    Stacey Wade  [address]  [place] [address]             [facility]  Family Medicine  [address] [facility] [address]  Phone: [phone]  Fax: [phone]   May 28, 2020     Patient: Stacey Wade   YOB: 1965   Date of Visit: 5/28/2020       To Whom it May Concern:    Stacey Wade was seen in my clinic on 5/28/2020. She may return to work on 5/29/20.    Please excuse her from any classes or work missed.    If you have any questions or concerns, please don't hesitate to call.    Sincerely,         MD Aimee Osman LPN

## 2020-05-28 NOTE — PROGRESS NOTES
CHIEF COMPLAINT:  This is a 54-year-old female complaining of recurrent headache.     SUBJECTIVE:  The patient complains of recurrent headache yesterday which continues today to a lesser extent.  Headache was accompanied by nausea yesterday and is frontal and periorbital in location.  The headache caused her to leave work to go home early yesterda.   She took Sudafed, Excedrin migraine, Zofran and Fioricet.  Patient reports using Zofran and Fioricet infrequently over the last 6 months.  She has a history of headaches and has always attributed the headaches to sinus issues.  She also reports that change in weather can trigger her headaches.  She has a history of allergic rhinitis and conjunctivitis as well as urticaria.  She is followed by allergist and continues to take multiple antihistamines, Singulair, doxepin and an H2 blocker (famotidine) for her allergic symptoms.  Patient is also undergoing immunotherapy.  Previous workup of her headache was to include MRI but patient did not follow through on imaging study.  Patient denies vertigo, numbness, tingling, weakness in limb, dysarthria, dysphagia or abnormality of gait.  She requests refill of hydroxyzine.  Patient complains of reaction to facial mask cause itching and hives.  Hydrocortisone cream is not effective.      Patient has a history of essential hypertension but is not currently on antihypertensive medication.  Her blood pressure today is 100/76.  Patient is being followed for fibrocystic breast disease by breast specialist.     ROS:  GENERAL: Patient denies fever, chills, night sweats. Patient denies weight gain or loss. Patient denies anorexia, fatigue, weakness or swollen glands.  SKIN: Positive for rash and itching.  HEENT: Patient denies sore throat, ear pain, hearing loss, nasal congestion, or runny nose. Patient denies visual disturbance, eye irritation or discharge.  LUNGS: Patient denies cough, wheeze or hemoptysis.  CARDIOVASCULAR: Patient  denies shortness of breath, palpitations, syncope or lower extremity edema.  GI: Patient denies abdominal pain, vomiting, diarrhea, constipation, blood in stool or melena.  Positive for nausea.  GENITOURINARY:  Patient denies dysuria, frequency, hematuria, nocturia, urgency or incontinence.  MUSCULOSKELETAL: Patient denies joint swelling, redness or warmth. Positive for ankle and foot pain.  NEUROLOGIC: Patient denies vertigo, paresthesias, weakness in limb, dysarthria, dysphagia or abnormality of gait.  Positive for headache.  PSYCHIATRIC: Patient denies anxiety, depression, or memory loss.     OBJECTIVE:   GENERAL: Well-developed well-nourished, obese, black female alert and oriented x3, in no acute distress. Memory, judgment and cognition without deficit.  SKIN: Clear without rash. Normal color and tone.  HEENT: Eyes: Clear conjunctivae. Pupils equal reactive to light and accommodation. Extraocular movements intact. No nystagmus.  Fundi not visualized.  Ears: Clear TMs. Clear canals. Nose: Without congestion. Pharynx: Without injection or exudates.  NECK: Supple, normal range of motion. No masses, lymphadenopathy or enlarged thyroid. No JVD. Carotids 2+ and equal. No bruits.  LUNGS: Clear to auscultation. Normal respiratory effort.  CARDIOVASCULAR: Regular rhythm, normal S1, S2 without murmur, gallop or rub.  EXTREMITIES: Without cyanosis, clubbing or edema. Distal pulses 2+ and equal. Normal range of motion in all extremities. No joint effusion, erythema or warmth.  NEUROLOGIC: Cranial nerves II through XII without deficit. Motor strength equal bilaterally. Sensation normal to touch. Deep tendon reflexes 2+ and equal. Gait without abnormality. No tremor. Negative cerebellar signs. Negative Romberg.    ASSESSMENT:  1. Recurrent headache    2. Allergic contact dermatitis due to other agents    3. Allergic rhinitis, unspecified seasonality, unspecified trigger    4. Essential hypertension      PLAN:  1.  MRI of the  brain.  2.  Short prednisone taper starting at 40 mg over one week.  3.  Work excuse given.  4.  Consider neurology consult.     This note is generated with speech recognition software and is subject to transcription error and sound alike phrases that may be missed by proofreading.

## 2020-05-29 ENCOUNTER — OFFICE VISIT (OUTPATIENT)
Dept: FAMILY MEDICINE | Facility: CLINIC | Age: 55
End: 2020-05-29
Payer: COMMERCIAL

## 2020-05-29 VITALS — TEMPERATURE: 100 F | HEIGHT: 62 IN | BODY MASS INDEX: 32.57 KG/M2 | WEIGHT: 177 LBS

## 2020-05-29 DIAGNOSIS — R50.9 FEBRILE ILLNESS: Primary | ICD-10-CM

## 2020-05-29 DIAGNOSIS — R51.9 ACUTE NONINTRACTABLE HEADACHE, UNSPECIFIED HEADACHE TYPE: ICD-10-CM

## 2020-05-29 PROCEDURE — 99213 PR OFFICE/OUTPT VISIT, EST, LEVL III, 20-29 MIN: ICD-10-PCS | Mod: 95,,, | Performed by: FAMILY MEDICINE

## 2020-05-29 PROCEDURE — 99213 OFFICE O/P EST LOW 20 MIN: CPT | Mod: 95,,, | Performed by: FAMILY MEDICINE

## 2020-05-29 PROCEDURE — 3008F PR BODY MASS INDEX (BMI) DOCUMENTED: ICD-10-PCS | Mod: CPTII,,, | Performed by: FAMILY MEDICINE

## 2020-05-29 PROCEDURE — 3008F BODY MASS INDEX DOCD: CPT | Mod: CPTII,,, | Performed by: FAMILY MEDICINE

## 2020-05-29 PROCEDURE — U0003 INFECTIOUS AGENT DETECTION BY NUCLEIC ACID (DNA OR RNA); SEVERE ACUTE RESPIRATORY SYNDROME CORONAVIRUS 2 (SARS-COV-2) (CORONAVIRUS DISEASE [COVID-19]), AMPLIFIED PROBE TECHNIQUE, MAKING USE OF HIGH THROUGHPUT TECHNOLOGIES AS DESCRIBED BY CMS-2020-01-R: HCPCS

## 2020-05-29 NOTE — PROGRESS NOTES
The patient location is:  In her car in the parking lot  The chief complaint leading to consultation is:  Headache and nausea  Visit type:  Audiovisual  Face to Face time with patient:  15 min  20 minutes of total time spent on the encounter, which includes face to face time and non-face to face time preparing to see the patient (eg, review of tests), Obtaining and/or reviewing separately obtained history, Documenting clinical information in the electronic or other health record, Independently interpreting results (not separately reported) and communicating results to the patient/family/caregiver, or Care coordination (not separately reported).     Each patient to whom he or she provides medical services by telemedicine is:  (1) informed of the relationship between the physician and patient and the respective role of any other health care provider with respect to management of the patient; and (2) notified that he or she may decline to receive medical services by telemedicine and may withdraw from such care at any time.      CHIEF COMPLAINT:  This is a 54-year-old female complaining of headache and nausea.      SUBJECTIVE:  The patient was seen yesterday with recurrent headache.  She wakened this morning with headache and subjective fever.  She reports that her temperature has ranged from 100-100.9 degrees.  She denies chills or achiness.  She complains of stuffy nose  and allergy like symptoms.  She took an Excedrin migraine 2 hr ago.  Patient denies sore throat, cough, chest pain, shortness of breath, loss of taste or smell.  Patient has associated nausea but no emesis.  She denies abdominal pain or diarrhea.  Patient denies ill contacts.    ROS:  GENERAL: Patient denies fever, chills, night sweats. Patient denies weight gain or loss. Patient denies anorexia, fatigue, weakness or swollen glands.  SKIN: Positive for rash and itching.  HEENT: Patient denies sore throat, ear pain, hearing loss, nasal congestion, or  runny nose. Patient denies visual disturbance, eye irritation or discharge.  LUNGS: Patient denies cough, wheeze or hemoptysis.  CARDIOVASCULAR: Patient denies shortness of breath, palpitations, syncope or lower extremity edema.  GI: Patient denies abdominal pain, vomiting, diarrhea, constipation, blood in stool or melena.  Positive for nausea.  GENITOURINARY:  Patient denies dysuria, frequency, hematuria, nocturia, urgency or incontinence.  MUSCULOSKELETAL: Patient denies joint swelling, redness or warmth. Positive for ankle and foot pain.  NEUROLOGIC: Patient denies vertigo, paresthesias, weakness in limb, dysarthria, dysphagia or abnormality of gait.  Positive for headache.  PSYCHIATRIC: Patient denies anxiety, depression, or memory loss.    OBJECTIVE:   VIAL SIGNS:  Temperature 100.3° orally.  GENERAL: Well-developed well-nourished, black female alert and oriented x3 in no acute distress. Memory, judgment and cognition without deficit. No audible wheezing.  SKIN: Clear without rash. Normal color and tone.  HEENT: Eyes: Clear conjunctivae. No scleral icterus.  Nose:  Not congested-sounding.    NECK: Supple, normal range of motion.  LUNGS:  Normal respiratory effort.  EXTREMITIES:  Normal range of motion in all extremities.  NEUROLOGIC:  Gait without abnormality.  No tremor.    ASSESSMENT:  1. Febrile illness    2. Acute nonintractable headache, unspecified headache type      PLAN:   1.  COVID-19 testing.  2.  Keep well hydrated.  3.  Discontinue Excedrin migraine.  4.  Take Tylenol extra-strength 2 tablets every 4-6 hours as needed for headache or fever.  5.  Follow-up if no improvement or worsening symptoms.    This note is generated with speech recognition software and is subject to transcription error and sound alike phrases that may be missed by proofreading.

## 2020-05-30 LAB — SARS-COV-2 RNA RESP QL NAA+PROBE: NOT DETECTED

## 2020-06-01 ENCOUNTER — TELEPHONE (OUTPATIENT)
Dept: FAMILY MEDICINE | Facility: CLINIC | Age: 55
End: 2020-06-01

## 2020-06-01 NOTE — TELEPHONE ENCOUNTER
If she continues to have headaches, I recommend she has the imaging study but she can put it off for now since her headaches for most likely related to her illness.  Work excuse approved.

## 2020-06-01 NOTE — LETTER
June 1, 2020      [facility]  [address] [place] [address]  Phone: [phone]  Fax: [phone]       Patient: Stacey Wade   YOB: 1965  Date of Visit: 05/28/2020    To Whom It May Concern:    Dafne Wade  was at [facility] on 05/28/2020Shesteal may return to work/school on 06/01/2020 with no restrictions. If you have any questions or concerns, or if I can be of further assistance, please do not hesitate to contact me.    Sincerely,    Savannah Lindsey MD

## 2020-06-01 NOTE — TELEPHONE ENCOUNTER
Patient said she has to pay between $1500-$1800 to get it done... Because she has to meet her deductible, but has to put down $500 today.   She did cancel the appointment.   She wants to know if it's something that she really needs. Please advise  She did go to work today, but needs a letter to excuse her from missing work on Friday.     ----- Message from Whitney Merritt sent at 6/1/2020  8:04 AM CDT -----  Pt is requesting a call back from nurse. Stated she left an message on Friday. Please call back at [phone]

## 2020-06-30 ENCOUNTER — OFFICE VISIT (OUTPATIENT)
Dept: HEMATOLOGY/ONCOLOGY | Facility: CLINIC | Age: 55
End: 2020-06-30
Payer: COMMERCIAL

## 2020-06-30 VITALS
SYSTOLIC BLOOD PRESSURE: 137 MMHG | WEIGHT: 178.13 LBS | DIASTOLIC BLOOD PRESSURE: 85 MMHG | HEART RATE: 74 BPM | BODY MASS INDEX: 32.58 KG/M2

## 2020-06-30 DIAGNOSIS — Z12.39 BREAST CANCER SCREENING: ICD-10-CM

## 2020-06-30 DIAGNOSIS — Z91.89 AT HIGH RISK FOR BREAST CANCER: Primary | ICD-10-CM

## 2020-06-30 PROCEDURE — 3079F DIAST BP 80-89 MM HG: CPT | Mod: CPTII,S$GLB,, | Performed by: NURSE PRACTITIONER

## 2020-06-30 PROCEDURE — 3075F PR MOST RECENT SYSTOLIC BLOOD PRESS GE 130-139MM HG: ICD-10-PCS | Mod: CPTII,S$GLB,, | Performed by: NURSE PRACTITIONER

## 2020-06-30 PROCEDURE — 99999 PR PBB SHADOW E&M-EST. PATIENT-LVL IV: CPT | Mod: PBBFAC,,, | Performed by: NURSE PRACTITIONER

## 2020-06-30 PROCEDURE — 99214 OFFICE O/P EST MOD 30 MIN: CPT | Mod: S$GLB,,, | Performed by: NURSE PRACTITIONER

## 2020-06-30 PROCEDURE — 3079F PR MOST RECENT DIASTOLIC BLOOD PRESSURE 80-89 MM HG: ICD-10-PCS | Mod: CPTII,S$GLB,, | Performed by: NURSE PRACTITIONER

## 2020-06-30 PROCEDURE — 99214 PR OFFICE/OUTPT VISIT, EST, LEVL IV, 30-39 MIN: ICD-10-PCS | Mod: S$GLB,,, | Performed by: NURSE PRACTITIONER

## 2020-06-30 PROCEDURE — 3008F BODY MASS INDEX DOCD: CPT | Mod: CPTII,S$GLB,, | Performed by: NURSE PRACTITIONER

## 2020-06-30 PROCEDURE — 3008F PR BODY MASS INDEX (BMI) DOCUMENTED: ICD-10-PCS | Mod: CPTII,S$GLB,, | Performed by: NURSE PRACTITIONER

## 2020-06-30 PROCEDURE — 99999 PR PBB SHADOW E&M-EST. PATIENT-LVL IV: ICD-10-PCS | Mod: PBBFAC,,, | Performed by: NURSE PRACTITIONER

## 2020-06-30 PROCEDURE — 3075F SYST BP GE 130 - 139MM HG: CPT | Mod: CPTII,S$GLB,, | Performed by: NURSE PRACTITIONER

## 2020-06-30 RX ORDER — MONTELUKAST SODIUM 10 MG/1
10 TABLET ORAL
COMMUNITY
Start: 2020-06-08 | End: 2021-03-30

## 2020-06-30 RX ORDER — FAMOTIDINE 40 MG/1
TABLET, FILM COATED ORAL
COMMUNITY
Start: 2020-05-08 | End: 2020-07-17

## 2020-06-30 RX ORDER — HYDROXYZINE HYDROCHLORIDE 25 MG/1
25 TABLET, FILM COATED ORAL 2 TIMES DAILY PRN
COMMUNITY
Start: 2020-06-08 | End: 2020-11-30

## 2020-06-30 RX ORDER — EPINEPHRINE 0.3 MG/.3ML
0.3 INJECTION SUBCUTANEOUS DAILY PRN
COMMUNITY
Start: 2020-06-08

## 2020-06-30 RX ORDER — DOXEPIN HYDROCHLORIDE 10 MG/1
10 CAPSULE ORAL
COMMUNITY
Start: 2020-06-08 | End: 2021-05-13 | Stop reason: SDUPTHER

## 2020-06-30 RX ORDER — FAMOTIDINE 40 MG/1
TABLET, FILM COATED ORAL
COMMUNITY
Start: 2020-06-09 | End: 2020-07-17

## 2020-07-17 RX ORDER — FAMOTIDINE 20 MG/1
TABLET, FILM COATED ORAL
Qty: 60 TABLET | Refills: 6 | Status: SHIPPED | OUTPATIENT
Start: 2020-07-17 | End: 2021-03-30

## 2020-10-27 ENCOUNTER — PATIENT OUTREACH (OUTPATIENT)
Dept: ADMINISTRATIVE | Facility: OTHER | Age: 55
End: 2020-10-27

## 2020-10-28 ENCOUNTER — OFFICE VISIT (OUTPATIENT)
Dept: OBSTETRICS AND GYNECOLOGY | Facility: CLINIC | Age: 55
End: 2020-10-28
Payer: COMMERCIAL

## 2020-10-28 VITALS
WEIGHT: 179.69 LBS | BODY MASS INDEX: 32.86 KG/M2 | SYSTOLIC BLOOD PRESSURE: 118 MMHG | DIASTOLIC BLOOD PRESSURE: 84 MMHG

## 2020-10-28 DIAGNOSIS — Z01.419 WELL WOMAN EXAM WITH ROUTINE GYNECOLOGICAL EXAM: Primary | ICD-10-CM

## 2020-10-28 PROCEDURE — 88175 CYTOPATH C/V AUTO FLUID REDO: CPT

## 2020-10-28 PROCEDURE — 3008F PR BODY MASS INDEX (BMI) DOCUMENTED: ICD-10-PCS | Mod: CPTII,S$GLB,, | Performed by: OBSTETRICS & GYNECOLOGY

## 2020-10-28 PROCEDURE — 3079F PR MOST RECENT DIASTOLIC BLOOD PRESSURE 80-89 MM HG: ICD-10-PCS | Mod: CPTII,S$GLB,, | Performed by: OBSTETRICS & GYNECOLOGY

## 2020-10-28 PROCEDURE — 3079F DIAST BP 80-89 MM HG: CPT | Mod: CPTII,S$GLB,, | Performed by: OBSTETRICS & GYNECOLOGY

## 2020-10-28 PROCEDURE — 99396 PR PREVENTIVE VISIT,EST,40-64: ICD-10-PCS | Mod: S$GLB,,, | Performed by: OBSTETRICS & GYNECOLOGY

## 2020-10-28 PROCEDURE — 87624 HPV HI-RISK TYP POOLED RSLT: CPT

## 2020-10-28 PROCEDURE — 3008F BODY MASS INDEX DOCD: CPT | Mod: CPTII,S$GLB,, | Performed by: OBSTETRICS & GYNECOLOGY

## 2020-10-28 PROCEDURE — 3074F PR MOST RECENT SYSTOLIC BLOOD PRESSURE < 130 MM HG: ICD-10-PCS | Mod: CPTII,S$GLB,, | Performed by: OBSTETRICS & GYNECOLOGY

## 2020-10-28 PROCEDURE — 99999 PR PBB SHADOW E&M-EST. PATIENT-LVL III: ICD-10-PCS | Mod: PBBFAC,,, | Performed by: OBSTETRICS & GYNECOLOGY

## 2020-10-28 PROCEDURE — 99396 PREV VISIT EST AGE 40-64: CPT | Mod: S$GLB,,, | Performed by: OBSTETRICS & GYNECOLOGY

## 2020-10-28 PROCEDURE — 99999 PR PBB SHADOW E&M-EST. PATIENT-LVL III: CPT | Mod: PBBFAC,,, | Performed by: OBSTETRICS & GYNECOLOGY

## 2020-10-28 PROCEDURE — 3074F SYST BP LT 130 MM HG: CPT | Mod: CPTII,S$GLB,, | Performed by: OBSTETRICS & GYNECOLOGY

## 2020-10-28 NOTE — PROGRESS NOTES
CHIEF COMPLAINT:   Gynecologic Exam  Chief Complaint   Patient presents with    Well Woman       HISTORY OF PRESENT ILLNESS  Stacey Wade  presents for annual exam. The patient has no complaints today.  Still menstruating regularly.  She is not sexually active.  Contraception:  None - does not want any as does not plan to be sexually active but will use condoms if so    Menses regular Qmonth <5days in length with moderate/normal flow.    Patient's last menstrual period was 10/13/2020 (approximate).    GYN screening history: last Pap: was normal. Never had any abnormal Pap smears in past..    Health Maintenance   Topic Date Due    Hepatitis C Screening  1965    Mammogram  2020    Lipid Panel  2021    TETANUS VACCINE  10/10/2026       HISTORY  Patient Active Problem List   Diagnosis    Essential hypertension    Allergic rhinitis    Anxiety    Urticaria       Past Medical History:   Diagnosis Date    Allergic rhinitis     Anxiety     Hypertension     Urticaria        Past Surgical History:   Procedure Laterality Date    COLONOSCOPY N/A 2018    Procedure: COLONOSCOPY;  Surgeon: Yong Simth MD;  Location: [facility];  Service: Endoscopy;  Laterality: N/A;    HERNIA REPAIR Left        Family History   Problem Relation Age of Onset    Lung cancer Paternal Grandfather     Ovarian cancer Maternal Grandmother     Hyperlipidemia Mother     Hypertension Mother     Breast cancer Mother     Arthritis Mother     Lung cancer Father     Breast cancer Maternal Aunt     Heart failure Other         Great aunt    Prostate cancer Brother        Social History     Socioeconomic History    Marital status: Single     Spouse name: Not on file    Number of children: Not on file    Years of education: Not on file    Highest education level: Not on file   Occupational History    Not on file   Social Needs    Financial resource strain: Not on file    Food insecurity      Worry: Not on file     Inability: Not on file    Transportation needs     Medical: Not on file     Non-medical: Not on file   Tobacco Use    Smoking status: Never Smoker    Smokeless tobacco: Never Used   Substance and Sexual Activity    Alcohol use: No    Drug use: No    Sexual activity: Not Currently     Partners: Male     Birth control/protection: OCP, None   Lifestyle    Physical activity     Days per week: Not on file     Minutes per session: Not on file    Stress: Not on file   Relationships    Social connections     Talks on phone: Not on file     Gets together: Not on file     Attends Shinto service: Not on file     Active member of club or organization: Not on file     Attends meetings of clubs or organizations: Not on file     Relationship status: Not on file   Other Topics Concern    Not on file   Social History Narrative    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       Current Outpatient Medications   Medication Sig Dispense Refill    aspirin (ECOTRIN) 81 MG EC tablet Take 81 mg by mouth once daily.      betamethasone dipropionate (DIPROLENE) 0.05 % cream APPLY TOPICALLY 2 TIMES DAILY 45 g 3    clorazepate (TRANXENE) 3.75 MG Tab TAKE 1 TABLET BY MOUTH EVERY DAY AS NEEDED 30 tablet 2    doxepin (SINEQUAN) 10 MG capsule TAKE 2 CAPSULES BY MOUTH 3 TIMES A  capsule 3    doxepin (SINEQUAN) 10 MG capsule Take 10 mg by mouth.      EPINEPHrine (EPIPEN 2-TERRY) 0.3 mg/0.3 mL AtIn Inject 0.3 mg into the muscle daily as needed.      famotidine (PEPCID) 20 MG tablet TAKE 1 TABLET BY MOUTH TWICE A DAY. 60 tablet 6    fexofenadine (ALLEGRA) 180 MG tablet TAKE ONE TABLET BY MOUTH EVERY DAY 30 tablet 0    hydrOXYzine HCL (ATARAX) 25 MG tablet Take 25 mg by mouth 2 (two) times daily as needed.      mometasone (NASONEX) 50 mcg/actuation nasal spray INHALE 2 SPRAYS BY NASAL ROUTE ONCE DAILY 17 g 11    montelukast  (SINGULAIR) 10 mg tablet Take 1 tablet (10 mg total) by mouth once daily. 30 tablet 11    montelukast (SINGULAIR) 10 mg tablet Take 10 mg by mouth.      ondansetron (ZOFRAN-ODT) 4 MG TbDL Take 1 tablet (4 mg total) by mouth every 8 (eight) hours as needed (nausea). 15 tablet 0    potassium chloride (MICRO-K) 10 MEQ CpSR Take 1 capsule (10 mEq total) by mouth once daily. 30 capsule 11    predniSONE (DELTASONE) 20 MG tablet Take 2 tablets daily for 3 days, then 1 tablet daily for 3 days, then 1/2 tablet daily for 2 days. 10 tablet 0    XOLAIR 150 mg/mL injection        No current facility-administered medications for this visit.        Review of patient's allergies indicates:   Allergen Reactions    Benzalkonium chloride     Black pepper      Other reaction(s): Hives    Chocolate flavor      Other reaction(s): Hives    Citrus and derivatives Hives     LEMON PRODUCTS    Grapefruit Hives     Other reaction(s): Hives    Ibuprofen Swelling    Latex      Other reaction(s): Hives    Lemon balm (casper officinalis) Hives     Anything with lemon in it    Naproxen Swelling    Neomycin-bacitracin-polymyxin      Other reaction(s): Rash    Oats (richard) Hives     Oat foods (oatmeal, etc...)    Vegetable acetoglycerides Hives     Vegetable gums    Wheat containing prod     Wheat flour Hives           PHYSICAL EXAM     Vitals:    10/28/20 0923   BP: 118/84   Weight: 81.5 kg (179 lb 10.8 oz)   PainSc: 0-No pain        PAIN SCALE: 0/10 None    ROS:  GENERAL: No fever, chills, fatigability or weight loss.  ABDOMEN: Appetite fine. No weight loss. Denies diarrhea, abdominal pain, hematemesis or blood in stool.  No change in bowel movement pattern.  URINARY: No flank pain, dysuria or hematuria.  REPRODUCTIVE: No abnormal vaginal bleeding.  BREASTS: Breasts symmetric, nontender and no lumps detected.    PE:   APPEARANCE: Well nourished, well developed, in no acute distress.  NECK: Neck symmetric without masses or  thyromegaly.   NODES: No inguinal lymph node enlargement.  ABDOMEN: Soft. No tenderness or masses.  No hernias.  BREASTS: Symmetrical, no skin changes or visible lesions. No palpable masses, nipple discharge or adenopathy bilaterally.    PELVIC:   VULVA: No lesions. Normal female genitalia.  URETHRAL MEATUS: Normal size and location, no lesions, no prolapse.  URETHRA: No masses, tenderness, prolapse or scarring.  VAGINA: Moist and well rugated, no discharge, no significant cystocele or rectocele.  CERVIX: No lesions, normal diameter, no stenosis, no cervical motion tenderness.  UTERUS: 10 week size, regular shape, mobile, non-tender, normal position, good support.  ADNEXA: No masses, tenderness or CDS nodularity.  ANUS PERINEUM: No lesions, no relaxation, no external hemorrhoids.      DIAGNOSIS:      1. Well woman exam with routine gynecological exam        PLAN:   Orders Placed This Encounter   Procedures    HPV High Risk Genotypes, PCR     Order Specific Question:   Source     Answer:   Cervix    Mammo Digital Screening Bilat w/ Jimy     Standing Status:   Future     Standing Expiration Date:   12/28/2021     Order Specific Question:   May the Radiologist modify the order per protocol to meet the clinical needs of the patient?     Answer:   Yes        MEDICATIONS PRESCRIBED:   PNV         COUNSELING:  Patient was counseled today on A.C.S. Pap guidelines and recommendations for yearly pelvic exams, mammograms and monthly self breast exams; to see her PCP for other health maintenance.     FOLLOW-UP: With me in 1 year. Pap smear every 3 years.

## 2020-11-09 ENCOUNTER — OFFICE VISIT (OUTPATIENT)
Dept: FAMILY MEDICINE | Facility: CLINIC | Age: 55
End: 2020-11-09
Payer: COMMERCIAL

## 2020-11-09 VITALS
DIASTOLIC BLOOD PRESSURE: 74 MMHG | WEIGHT: 183.63 LBS | HEIGHT: 62 IN | TEMPERATURE: 98 F | HEART RATE: 102 BPM | BODY MASS INDEX: 33.79 KG/M2 | SYSTOLIC BLOOD PRESSURE: 118 MMHG | OXYGEN SATURATION: 98 %

## 2020-11-09 DIAGNOSIS — L50.8 ACUTE URTICARIA: Primary | ICD-10-CM

## 2020-11-09 PROCEDURE — 99999 PR PBB SHADOW E&M-EST. PATIENT-LVL IV: ICD-10-PCS | Mod: PBBFAC,,, | Performed by: FAMILY MEDICINE

## 2020-11-09 PROCEDURE — 99213 OFFICE O/P EST LOW 20 MIN: CPT | Mod: 25,S$GLB,, | Performed by: FAMILY MEDICINE

## 2020-11-09 PROCEDURE — 3008F BODY MASS INDEX DOCD: CPT | Mod: CPTII,S$GLB,, | Performed by: FAMILY MEDICINE

## 2020-11-09 PROCEDURE — 3078F PR MOST RECENT DIASTOLIC BLOOD PRESSURE < 80 MM HG: ICD-10-PCS | Mod: CPTII,S$GLB,, | Performed by: FAMILY MEDICINE

## 2020-11-09 PROCEDURE — 96372 PR INJECTION,THERAP/PROPH/DIAG2ST, IM OR SUBCUT: ICD-10-PCS | Mod: S$GLB,,, | Performed by: FAMILY MEDICINE

## 2020-11-09 PROCEDURE — 3008F PR BODY MASS INDEX (BMI) DOCUMENTED: ICD-10-PCS | Mod: CPTII,S$GLB,, | Performed by: FAMILY MEDICINE

## 2020-11-09 PROCEDURE — 96372 THER/PROPH/DIAG INJ SC/IM: CPT | Mod: S$GLB,,, | Performed by: FAMILY MEDICINE

## 2020-11-09 PROCEDURE — 3074F PR MOST RECENT SYSTOLIC BLOOD PRESSURE < 130 MM HG: ICD-10-PCS | Mod: CPTII,S$GLB,, | Performed by: FAMILY MEDICINE

## 2020-11-09 PROCEDURE — 3074F SYST BP LT 130 MM HG: CPT | Mod: CPTII,S$GLB,, | Performed by: FAMILY MEDICINE

## 2020-11-09 PROCEDURE — 99999 PR PBB SHADOW E&M-EST. PATIENT-LVL IV: CPT | Mod: PBBFAC,,, | Performed by: FAMILY MEDICINE

## 2020-11-09 PROCEDURE — 99213 PR OFFICE/OUTPT VISIT, EST, LEVL III, 20-29 MIN: ICD-10-PCS | Mod: 25,S$GLB,, | Performed by: FAMILY MEDICINE

## 2020-11-09 PROCEDURE — 3078F DIAST BP <80 MM HG: CPT | Mod: CPTII,S$GLB,, | Performed by: FAMILY MEDICINE

## 2020-11-09 RX ORDER — METHYLPREDNISOLONE ACETATE 80 MG/ML
80 INJECTION, SUSPENSION INTRA-ARTICULAR; INTRALESIONAL; INTRAMUSCULAR; SOFT TISSUE
Status: COMPLETED | OUTPATIENT
Start: 2020-11-09 | End: 2020-11-09

## 2020-11-09 RX ADMIN — METHYLPREDNISOLONE ACETATE 80 MG: 80 INJECTION, SUSPENSION INTRA-ARTICULAR; INTRALESIONAL; INTRAMUSCULAR; SOFT TISSUE at 03:11

## 2020-11-09 NOTE — LETTER
November 9, 2020      [facility]  [address] [place] [address]  Phone: [phone]  Fax: [phone]       Patient: Stacey Wade   YOB: 1965  Date of Visit: 11/09/2020    To Whom It May Concern:    Dafne Wade  was at [facility] on 11/09/2020. She may return to work/school on 11/10/2020 with no restrictions. If you have any questions or concerns, or if I can be of further assistance, please do not hesitate to contact me.    Sincerely,    Shelly Sandoval LPN

## 2020-11-09 NOTE — PROGRESS NOTES
CHIEF COMPLAINT:  This is a 55-year-old female complaining of flare-up of hives.    SUBJECTIVE:  Patient has a history of chronic urticaria for which she takes Xolair injections.  Her last injection was a few days ago.  She complains of flare-up of wheal and flare reaction for the last 5 days located on extremities and trunk.  She complains of incessant itching.  Swelling has decreased with time.  She has been using calamine lotion, a tea tree product and prescription corticosteroid cream.  She has taken prednisone and Benadryl with minimal relief.  Patient requests corticosteroid injection.  She denies shortness of breath, wheezing, swelling of lips, tongue or throat.    ROS:  GENERAL: Patient denies fever, chills, night sweats. Patient denies weight gain or loss. Patient denies anorexia, fatigue, weakness or swollen glands.  SKIN: Patient denies hair loss.  Positive for rash.  HEENT: Patient denies sore throat, ear pain, hearing loss, nasal congestion, or runny nose. Patient denies visual disturbance, eye irritation or discharge.  LUNGS: Patient denies cough, wheeze or hemoptysis.  CARDIOVASCULAR: Patient denies chest pain, shortness of breath, palpitations, syncope or lower extremity edema.  GI: Patient denies abdominal pain, nausea, vomiting, diarrhea, constipation, blood in stool or melena.     OBJECTIVE:    GENERAL: Well-developed well-nourished obese, black female alert and oriented x3 in no acute distress.  Memory, judgment and cognition without deficit.  SKIN: No urticaria seen.  Slight erythematous maculopapular eruption around elbows   No excoriations or fluctuance.  HEENT: Eyes: Clear conjunctivae.  No scleral icterus.  .  Pharynx: Without injection or exudates.  No swelling of tongue or oral mucosa noted.  NECK: Supple, normal range of motion.  No lymphadenopathy.  LUNGS: Clear to auscultation.  Normal respiratory effort.  CARDIOVASCULAR: Regular rhythm, normal S1, S2 without murmur, gallop or  rub.  EXTREMITIES: Without cyanosis, clubbing or edema.  Distal pulses 2+ and equal.  No joint effusion, erythema or warmth.    ASSESSMENT:  1. Acute urticaria        PLAN:   1.   Depo-Medrol 80 mg IM.  2.  Continue maintenance medications.  3.  Cool baths.  4.  Follow-up with allergist.    This note is generated with speech recognition software and is subject to transcription error and sound alike phrases that may be missed by proofreading.

## 2020-11-10 LAB
HPV HR 12 DNA SPEC QL NAA+PROBE: NEGATIVE
HPV16 AG SPEC QL: NEGATIVE
HPV18 DNA SPEC QL NAA+PROBE: NEGATIVE

## 2020-11-30 RX ORDER — BETAMETHASONE DIPROPIONATE 0.5 MG/G
CREAM TOPICAL
Qty: 45 G | Refills: 3 | Status: SHIPPED | OUTPATIENT
Start: 2020-11-30 | End: 2021-10-07

## 2020-11-30 RX ORDER — HYDROXYZINE HYDROCHLORIDE 25 MG/1
TABLET, FILM COATED ORAL
Qty: 120 TABLET | Refills: 3 | Status: SHIPPED | OUTPATIENT
Start: 2020-11-30 | End: 2021-05-13 | Stop reason: SDUPTHER

## 2020-12-04 LAB
FINAL PATHOLOGIC DIAGNOSIS: NORMAL
Lab: NORMAL

## 2020-12-10 ENCOUNTER — TELEPHONE (OUTPATIENT)
Dept: HEMATOLOGY/ONCOLOGY | Facility: CLINIC | Age: 55
End: 2020-12-10

## 2020-12-10 NOTE — TELEPHONE ENCOUNTER
----- Message from Donna Ken LPN sent at 12/9/2020  4:52 PM CST -----  Contact: pt    ----- Message -----  From: Giulia Davidson  Sent: 12/9/2020   1:43 PM CST  To: Mayur RUTHERFORD Staff    Pt requesting a call back regarding her mammo and breast exam. She states they were supposed to scheduled back in June. Please call pt back at [phone]

## 2020-12-10 NOTE — TELEPHONE ENCOUNTER
Spoke w pt, and was able to arrange mammo and f/u appt w her. She verbalized understanding to her appt's. Also appointments placed in mail .

## 2020-12-28 ENCOUNTER — TELEPHONE (OUTPATIENT)
Dept: OBSTETRICS AND GYNECOLOGY | Facility: CLINIC | Age: 55
End: 2020-12-28

## 2020-12-28 NOTE — TELEPHONE ENCOUNTER
----- Message from Michele Dennis sent at 12/28/2020  1:53 PM CST -----  Contact: MARY BETH ACOSTA [[id number]]  .Type:  Test Results    Who Called: MARY BETH ACOSTA [[id number]]  Name of Test (Lab/Mammo/Etc):  well womans exam  Date of Test:   10/28/2020  Ordering Provider:   Dr medrano   Where the test was performed:   [facility]   Would the patient rather a call back or a response via [facility]?  My chart   Best Call Back Number:  [phone]  Additional Information:

## 2020-12-29 ENCOUNTER — IMMUNIZATION (OUTPATIENT)
Dept: FAMILY MEDICINE | Facility: CLINIC | Age: 55
End: 2020-12-29
Payer: COMMERCIAL

## 2020-12-29 PROCEDURE — 90686 FLU VACCINE (QUAD) GREATER THAN OR EQUAL TO 3YO PRESERVATIVE FREE IM: ICD-10-PCS | Mod: S$GLB,,, | Performed by: FAMILY MEDICINE

## 2020-12-29 PROCEDURE — 90686 IIV4 VACC NO PRSV 0.5 ML IM: CPT | Mod: S$GLB,,, | Performed by: FAMILY MEDICINE

## 2020-12-29 PROCEDURE — 90471 IMMUNIZATION ADMIN: CPT | Mod: S$GLB,,, | Performed by: FAMILY MEDICINE

## 2020-12-29 PROCEDURE — 90471 FLU VACCINE (QUAD) GREATER THAN OR EQUAL TO 3YO PRESERVATIVE FREE IM: ICD-10-PCS | Mod: S$GLB,,, | Performed by: FAMILY MEDICINE

## 2021-01-05 ENCOUNTER — HOSPITAL ENCOUNTER (OUTPATIENT)
Dept: RADIOLOGY | Facility: HOSPITAL | Age: 56
Discharge: HOME | End: 2021-01-05
Attending: NURSE PRACTITIONER
Payer: COMMERCIAL

## 2021-01-05 DIAGNOSIS — Z91.89 AT HIGH RISK FOR BREAST CANCER: ICD-10-CM

## 2021-01-05 DIAGNOSIS — Z12.39 BREAST CANCER SCREENING: ICD-10-CM

## 2021-01-05 PROCEDURE — 77063 BREAST TOMOSYNTHESIS BI: CPT | Mod: 26,,, | Performed by: RADIOLOGY

## 2021-01-05 PROCEDURE — 77067 MAMMO DIGITAL SCREENING BILAT WITH TOMO: ICD-10-PCS | Mod: 26,,, | Performed by: RADIOLOGY

## 2021-01-05 PROCEDURE — 77067 SCR MAMMO BI INCL CAD: CPT | Mod: 26,,, | Performed by: RADIOLOGY

## 2021-01-05 PROCEDURE — 77067 SCR MAMMO BI INCL CAD: CPT | Mod: TC

## 2021-01-05 PROCEDURE — 77063 MAMMO DIGITAL SCREENING BILAT WITH TOMO: ICD-10-PCS | Mod: 26,,, | Performed by: RADIOLOGY

## 2021-01-07 RX ORDER — CLORAZEPATE DIPOTASSIUM 3.75 MG/1
TABLET ORAL
Qty: 30 TABLET | Refills: 0 | Status: SHIPPED | OUTPATIENT
Start: 2021-01-07 | End: 2021-02-04

## 2021-02-04 RX ORDER — CLORAZEPATE DIPOTASSIUM 3.75 MG/1
TABLET ORAL
Qty: 30 TABLET | Refills: 0 | Status: SHIPPED | OUTPATIENT
Start: 2021-02-04 | End: 2021-10-18

## 2021-02-25 RX ORDER — ONDANSETRON 4 MG/1
4 TABLET, ORALLY DISINTEGRATING ORAL EVERY 8 HOURS PRN
Qty: 15 TABLET | Refills: 0 | Status: SHIPPED | OUTPATIENT
Start: 2021-02-25 | End: 2021-05-13 | Stop reason: SDUPTHER

## 2021-03-30 ENCOUNTER — OFFICE VISIT (OUTPATIENT)
Dept: FAMILY MEDICINE | Facility: CLINIC | Age: 56
End: 2021-03-30
Payer: COMMERCIAL

## 2021-03-30 VITALS
HEART RATE: 98 BPM | HEIGHT: 62 IN | BODY MASS INDEX: 33.55 KG/M2 | SYSTOLIC BLOOD PRESSURE: 120 MMHG | TEMPERATURE: 99 F | DIASTOLIC BLOOD PRESSURE: 80 MMHG | OXYGEN SATURATION: 97 % | WEIGHT: 182.31 LBS

## 2021-03-30 DIAGNOSIS — L50.8 ACUTE URTICARIA: Primary | ICD-10-CM

## 2021-03-30 PROCEDURE — 3074F PR MOST RECENT SYSTOLIC BLOOD PRESSURE < 130 MM HG: ICD-10-PCS | Mod: CPTII,S$GLB,, | Performed by: FAMILY MEDICINE

## 2021-03-30 PROCEDURE — 3079F DIAST BP 80-89 MM HG: CPT | Mod: CPTII,S$GLB,, | Performed by: FAMILY MEDICINE

## 2021-03-30 PROCEDURE — 1126F AMNT PAIN NOTED NONE PRSNT: CPT | Mod: S$GLB,,, | Performed by: FAMILY MEDICINE

## 2021-03-30 PROCEDURE — 99213 PR OFFICE/OUTPT VISIT, EST, LEVL III, 20-29 MIN: ICD-10-PCS | Mod: S$GLB,,, | Performed by: FAMILY MEDICINE

## 2021-03-30 PROCEDURE — 1126F PR PAIN SEVERITY QUANTIFIED, NO PAIN PRESENT: ICD-10-PCS | Mod: S$GLB,,, | Performed by: FAMILY MEDICINE

## 2021-03-30 PROCEDURE — 99999 PR PBB SHADOW E&M-EST. PATIENT-LVL IV: ICD-10-PCS | Mod: PBBFAC,,, | Performed by: FAMILY MEDICINE

## 2021-03-30 PROCEDURE — 3074F SYST BP LT 130 MM HG: CPT | Mod: CPTII,S$GLB,, | Performed by: FAMILY MEDICINE

## 2021-03-30 PROCEDURE — 3079F PR MOST RECENT DIASTOLIC BLOOD PRESSURE 80-89 MM HG: ICD-10-PCS | Mod: CPTII,S$GLB,, | Performed by: FAMILY MEDICINE

## 2021-03-30 PROCEDURE — 99213 OFFICE O/P EST LOW 20 MIN: CPT | Mod: S$GLB,,, | Performed by: FAMILY MEDICINE

## 2021-03-30 PROCEDURE — 99999 PR PBB SHADOW E&M-EST. PATIENT-LVL IV: CPT | Mod: PBBFAC,,, | Performed by: FAMILY MEDICINE

## 2021-03-30 PROCEDURE — 3008F PR BODY MASS INDEX (BMI) DOCUMENTED: ICD-10-PCS | Mod: CPTII,S$GLB,, | Performed by: FAMILY MEDICINE

## 2021-03-30 PROCEDURE — 3008F BODY MASS INDEX DOCD: CPT | Mod: CPTII,S$GLB,, | Performed by: FAMILY MEDICINE

## 2021-03-30 RX ORDER — FAMOTIDINE 40 MG/1
20 TABLET, FILM COATED ORAL
COMMUNITY
Start: 2021-02-02 | End: 2023-07-17

## 2021-03-30 RX ORDER — PREDNISONE 20 MG/1
TABLET ORAL
Qty: 10 TABLET | Refills: 0 | Status: SHIPPED | OUTPATIENT
Start: 2021-03-30 | End: 2021-05-13

## 2021-03-31 ENCOUNTER — OFFICE VISIT (OUTPATIENT)
Dept: HEMATOLOGY/ONCOLOGY | Facility: CLINIC | Age: 56
End: 2021-03-31
Payer: COMMERCIAL

## 2021-03-31 VITALS
BODY MASS INDEX: 33.47 KG/M2 | SYSTOLIC BLOOD PRESSURE: 123 MMHG | DIASTOLIC BLOOD PRESSURE: 81 MMHG | HEIGHT: 62 IN | WEIGHT: 181.88 LBS | HEART RATE: 89 BPM | RESPIRATION RATE: 14 BRPM | OXYGEN SATURATION: 99 % | TEMPERATURE: 98 F

## 2021-03-31 DIAGNOSIS — Z80.3 FAMILY HISTORY OF BREAST CANCER: ICD-10-CM

## 2021-03-31 DIAGNOSIS — Z91.89 AT HIGH RISK FOR BREAST CANCER: Primary | ICD-10-CM

## 2021-03-31 PROCEDURE — 3079F DIAST BP 80-89 MM HG: CPT | Mod: CPTII,S$GLB,, | Performed by: NURSE PRACTITIONER

## 2021-03-31 PROCEDURE — 3008F PR BODY MASS INDEX (BMI) DOCUMENTED: ICD-10-PCS | Mod: CPTII,S$GLB,, | Performed by: NURSE PRACTITIONER

## 2021-03-31 PROCEDURE — 99999 PR PBB SHADOW E&M-EST. PATIENT-LVL IV: ICD-10-PCS | Mod: PBBFAC,,, | Performed by: NURSE PRACTITIONER

## 2021-03-31 PROCEDURE — 3079F PR MOST RECENT DIASTOLIC BLOOD PRESSURE 80-89 MM HG: ICD-10-PCS | Mod: CPTII,S$GLB,, | Performed by: NURSE PRACTITIONER

## 2021-03-31 PROCEDURE — 99999 PR PBB SHADOW E&M-EST. PATIENT-LVL IV: CPT | Mod: PBBFAC,,, | Performed by: NURSE PRACTITIONER

## 2021-03-31 PROCEDURE — 99213 PR OFFICE/OUTPT VISIT, EST, LEVL III, 20-29 MIN: ICD-10-PCS | Mod: S$GLB,,, | Performed by: NURSE PRACTITIONER

## 2021-03-31 PROCEDURE — 99213 OFFICE O/P EST LOW 20 MIN: CPT | Mod: S$GLB,,, | Performed by: NURSE PRACTITIONER

## 2021-03-31 PROCEDURE — 1126F AMNT PAIN NOTED NONE PRSNT: CPT | Mod: S$GLB,,, | Performed by: NURSE PRACTITIONER

## 2021-03-31 PROCEDURE — 3008F BODY MASS INDEX DOCD: CPT | Mod: CPTII,S$GLB,, | Performed by: NURSE PRACTITIONER

## 2021-03-31 PROCEDURE — 1126F PR PAIN SEVERITY QUANTIFIED, NO PAIN PRESENT: ICD-10-PCS | Mod: S$GLB,,, | Performed by: NURSE PRACTITIONER

## 2021-03-31 PROCEDURE — 3074F SYST BP LT 130 MM HG: CPT | Mod: CPTII,S$GLB,, | Performed by: NURSE PRACTITIONER

## 2021-03-31 PROCEDURE — 3074F PR MOST RECENT SYSTOLIC BLOOD PRESSURE < 130 MM HG: ICD-10-PCS | Mod: CPTII,S$GLB,, | Performed by: NURSE PRACTITIONER

## 2021-04-12 ENCOUNTER — OFFICE VISIT (OUTPATIENT)
Dept: FAMILY MEDICINE | Facility: CLINIC | Age: 56
End: 2021-04-12
Payer: COMMERCIAL

## 2021-04-12 VITALS
TEMPERATURE: 98 F | BODY MASS INDEX: 32.57 KG/M2 | WEIGHT: 177 LBS | HEIGHT: 62 IN | HEART RATE: 105 BPM | SYSTOLIC BLOOD PRESSURE: 112 MMHG | OXYGEN SATURATION: 97 % | DIASTOLIC BLOOD PRESSURE: 70 MMHG

## 2021-04-12 DIAGNOSIS — R51.9 CHRONIC NONINTRACTABLE HEADACHE, UNSPECIFIED HEADACHE TYPE: Primary | ICD-10-CM

## 2021-04-12 DIAGNOSIS — G89.29 CHRONIC NONINTRACTABLE HEADACHE, UNSPECIFIED HEADACHE TYPE: Primary | ICD-10-CM

## 2021-04-12 DIAGNOSIS — G44.1 OTHER VASCULAR HEADACHE: ICD-10-CM

## 2021-04-12 PROCEDURE — 1126F PR PAIN SEVERITY QUANTIFIED, NO PAIN PRESENT: ICD-10-PCS | Mod: S$GLB,,, | Performed by: FAMILY MEDICINE

## 2021-04-12 PROCEDURE — 1126F AMNT PAIN NOTED NONE PRSNT: CPT | Mod: S$GLB,,, | Performed by: FAMILY MEDICINE

## 2021-04-12 PROCEDURE — 3008F BODY MASS INDEX DOCD: CPT | Mod: CPTII,S$GLB,, | Performed by: FAMILY MEDICINE

## 2021-04-12 PROCEDURE — 3008F PR BODY MASS INDEX (BMI) DOCUMENTED: ICD-10-PCS | Mod: CPTII,S$GLB,, | Performed by: FAMILY MEDICINE

## 2021-04-12 PROCEDURE — 99214 OFFICE O/P EST MOD 30 MIN: CPT | Mod: S$GLB,,, | Performed by: FAMILY MEDICINE

## 2021-04-12 PROCEDURE — 99214 PR OFFICE/OUTPT VISIT, EST, LEVL IV, 30-39 MIN: ICD-10-PCS | Mod: S$GLB,,, | Performed by: FAMILY MEDICINE

## 2021-04-12 PROCEDURE — 99999 PR PBB SHADOW E&M-EST. PATIENT-LVL V: ICD-10-PCS | Mod: PBBFAC,,, | Performed by: FAMILY MEDICINE

## 2021-04-12 PROCEDURE — 99999 PR PBB SHADOW E&M-EST. PATIENT-LVL V: CPT | Mod: PBBFAC,,, | Performed by: FAMILY MEDICINE

## 2021-04-12 RX ORDER — RIZATRIPTAN BENZOATE 10 MG/1
10 TABLET ORAL
Qty: 9 TABLET | Refills: 2 | Status: SHIPPED | OUTPATIENT
Start: 2021-04-12 | End: 2021-05-13 | Stop reason: SDUPTHER

## 2021-04-20 ENCOUNTER — HOSPITAL ENCOUNTER (OUTPATIENT)
Dept: RADIOLOGY | Facility: HOSPITAL | Age: 56
Discharge: HOME | End: 2021-04-20
Attending: FAMILY MEDICINE
Payer: COMMERCIAL

## 2021-04-20 DIAGNOSIS — G44.1 OTHER VASCULAR HEADACHE: ICD-10-CM

## 2021-04-20 PROCEDURE — 70450 CT HEAD/BRAIN W/O DYE: CPT | Mod: 26,,, | Performed by: RADIOLOGY

## 2021-04-20 PROCEDURE — 70450 CT HEAD/BRAIN W/O DYE: CPT | Mod: TC

## 2021-04-20 PROCEDURE — 70450 CT HEAD WITHOUT CONTRAST: ICD-10-PCS | Mod: 26,,, | Performed by: RADIOLOGY

## 2021-05-11 ENCOUNTER — TELEPHONE (OUTPATIENT)
Dept: FAMILY MEDICINE | Facility: CLINIC | Age: 56
End: 2021-05-11

## 2021-05-13 ENCOUNTER — OFFICE VISIT (OUTPATIENT)
Dept: FAMILY MEDICINE | Facility: CLINIC | Age: 56
End: 2021-05-13
Payer: COMMERCIAL

## 2021-05-13 VITALS
OXYGEN SATURATION: 98 % | HEART RATE: 85 BPM | DIASTOLIC BLOOD PRESSURE: 70 MMHG | SYSTOLIC BLOOD PRESSURE: 110 MMHG | WEIGHT: 186.5 LBS | BODY MASS INDEX: 34.32 KG/M2 | TEMPERATURE: 98 F | RESPIRATION RATE: 18 BRPM | HEIGHT: 62 IN

## 2021-05-13 DIAGNOSIS — E66.9 OBESITY (BMI 30.0-34.9): ICD-10-CM

## 2021-05-13 DIAGNOSIS — Z76.0 MEDICATION REFILL: ICD-10-CM

## 2021-05-13 DIAGNOSIS — Z00.00 PREVENTATIVE HEALTH CARE: Primary | ICD-10-CM

## 2021-05-13 DIAGNOSIS — R51.9 RECURRENT HEADACHE: ICD-10-CM

## 2021-05-13 DIAGNOSIS — F41.9 ANXIETY: ICD-10-CM

## 2021-05-13 DIAGNOSIS — J30.9 ALLERGIC RHINITIS, UNSPECIFIED SEASONALITY, UNSPECIFIED TRIGGER: ICD-10-CM

## 2021-05-13 DIAGNOSIS — I10 ESSENTIAL HYPERTENSION: Chronic | ICD-10-CM

## 2021-05-13 DIAGNOSIS — L50.9 URTICARIA: ICD-10-CM

## 2021-05-13 DIAGNOSIS — R39.9 URINARY SYMPTOM OR SIGN: ICD-10-CM

## 2021-05-13 LAB
BILIRUB UR QL STRIP: NEGATIVE
CLARITY UR REFRACT.AUTO: ABNORMAL
COLOR UR AUTO: YELLOW
GLUCOSE UR QL STRIP: NEGATIVE
HGB UR QL STRIP: NEGATIVE
KETONES UR QL STRIP: NEGATIVE
LEUKOCYTE ESTERASE UR QL STRIP: NEGATIVE
NITRITE UR QL STRIP: NEGATIVE
PH UR STRIP: 6 [PH] (ref 5–8)
PROT UR QL STRIP: NEGATIVE
SP GR UR STRIP: 1.02 (ref 1–1.03)
URN SPEC COLLECT METH UR: ABNORMAL

## 2021-05-13 PROCEDURE — 99999 PR PBB SHADOW E&M-EST. PATIENT-LVL IV: ICD-10-PCS | Mod: PBBFAC,,, | Performed by: REGISTERED NURSE

## 2021-05-13 PROCEDURE — 99999 PR PBB SHADOW E&M-EST. PATIENT-LVL IV: CPT | Mod: PBBFAC,,, | Performed by: REGISTERED NURSE

## 2021-05-13 PROCEDURE — 3008F BODY MASS INDEX DOCD: CPT | Mod: CPTII,S$GLB,, | Performed by: REGISTERED NURSE

## 2021-05-13 PROCEDURE — 99396 PREV VISIT EST AGE 40-64: CPT | Mod: S$GLB,,, | Performed by: REGISTERED NURSE

## 2021-05-13 PROCEDURE — 81003 URINALYSIS AUTO W/O SCOPE: CPT | Performed by: REGISTERED NURSE

## 2021-05-13 PROCEDURE — 99396 PR PREVENTIVE VISIT,EST,40-64: ICD-10-PCS | Mod: S$GLB,,, | Performed by: REGISTERED NURSE

## 2021-05-13 PROCEDURE — 1126F PR PAIN SEVERITY QUANTIFIED, NO PAIN PRESENT: ICD-10-PCS | Mod: S$GLB,,, | Performed by: REGISTERED NURSE

## 2021-05-13 PROCEDURE — 3008F PR BODY MASS INDEX (BMI) DOCUMENTED: ICD-10-PCS | Mod: CPTII,S$GLB,, | Performed by: REGISTERED NURSE

## 2021-05-13 PROCEDURE — 1126F AMNT PAIN NOTED NONE PRSNT: CPT | Mod: S$GLB,,, | Performed by: REGISTERED NURSE

## 2021-05-13 RX ORDER — ONDANSETRON 4 MG/1
4 TABLET, ORALLY DISINTEGRATING ORAL EVERY 8 HOURS PRN
Qty: 30 TABLET | Refills: 0 | Status: SHIPPED | OUTPATIENT
Start: 2021-05-13 | End: 2022-07-15

## 2021-05-13 RX ORDER — MONTELUKAST SODIUM 10 MG/1
10 TABLET ORAL DAILY
Qty: 90 TABLET | Refills: 3 | Status: SHIPPED | OUTPATIENT
Start: 2021-05-13 | End: 2022-10-30 | Stop reason: SDUPTHER

## 2021-05-13 RX ORDER — HYDROXYZINE HYDROCHLORIDE 25 MG/1
TABLET, FILM COATED ORAL
Qty: 120 TABLET | Refills: 3 | Status: SHIPPED | OUTPATIENT
Start: 2021-05-13 | End: 2022-01-26

## 2021-05-13 RX ORDER — DOXEPIN HYDROCHLORIDE 10 MG/1
20 CAPSULE ORAL 3 TIMES DAILY
Qty: 180 CAPSULE | Refills: 3 | Status: SHIPPED | OUTPATIENT
Start: 2021-05-13 | End: 2022-01-26

## 2021-05-13 RX ORDER — RIZATRIPTAN BENZOATE 10 MG/1
10 TABLET ORAL
Qty: 9 TABLET | Refills: 2 | Status: SHIPPED | OUTPATIENT
Start: 2021-05-13 | End: 2021-07-29

## 2021-05-13 RX ORDER — MOMETASONE FUROATE 50 UG/1
SPRAY, METERED NASAL
Qty: 17 G | Refills: 11 | Status: SHIPPED | OUTPATIENT
Start: 2021-05-13 | End: 2022-03-21 | Stop reason: SDUPTHER

## 2021-06-02 ENCOUNTER — TELEPHONE (OUTPATIENT)
Dept: PRIMARY CARE CLINIC | Facility: CLINIC | Age: 56
End: 2021-06-02

## 2021-06-02 ENCOUNTER — OFFICE VISIT (OUTPATIENT)
Dept: PRIMARY CARE CLINIC | Facility: CLINIC | Age: 56
End: 2021-06-02
Payer: COMMERCIAL

## 2021-06-02 VITALS
RESPIRATION RATE: 18 BRPM | TEMPERATURE: 98 F | HEART RATE: 110 BPM | SYSTOLIC BLOOD PRESSURE: 120 MMHG | BODY MASS INDEX: 34.14 KG/M2 | WEIGHT: 185.5 LBS | DIASTOLIC BLOOD PRESSURE: 90 MMHG | OXYGEN SATURATION: 97 % | HEIGHT: 62 IN

## 2021-06-02 DIAGNOSIS — J30.9 ALLERGIC SINUSITIS: Primary | ICD-10-CM

## 2021-06-02 PROCEDURE — 3008F BODY MASS INDEX DOCD: CPT | Mod: CPTII,S$GLB,, | Performed by: REGISTERED NURSE

## 2021-06-02 PROCEDURE — 99213 PR OFFICE/OUTPT VISIT, EST, LEVL III, 20-29 MIN: ICD-10-PCS | Mod: S$GLB,,, | Performed by: REGISTERED NURSE

## 2021-06-02 PROCEDURE — 1125F PR PAIN SEVERITY QUANTIFIED, PAIN PRESENT: ICD-10-PCS | Mod: S$GLB,,, | Performed by: REGISTERED NURSE

## 2021-06-02 PROCEDURE — 99999 PR PBB SHADOW E&M-EST. PATIENT-LVL IV: ICD-10-PCS | Mod: PBBFAC,,, | Performed by: REGISTERED NURSE

## 2021-06-02 PROCEDURE — 99999 PR PBB SHADOW E&M-EST. PATIENT-LVL IV: CPT | Mod: PBBFAC,,, | Performed by: REGISTERED NURSE

## 2021-06-02 PROCEDURE — 1125F AMNT PAIN NOTED PAIN PRSNT: CPT | Mod: S$GLB,,, | Performed by: REGISTERED NURSE

## 2021-06-02 PROCEDURE — 3008F PR BODY MASS INDEX (BMI) DOCUMENTED: ICD-10-PCS | Mod: CPTII,S$GLB,, | Performed by: REGISTERED NURSE

## 2021-06-02 PROCEDURE — 99213 OFFICE O/P EST LOW 20 MIN: CPT | Mod: S$GLB,,, | Performed by: REGISTERED NURSE

## 2021-06-02 RX ORDER — AMOXICILLIN AND CLAVULANATE POTASSIUM 875; 125 MG/1; MG/1
1 TABLET, FILM COATED ORAL 2 TIMES DAILY
Qty: 20 TABLET | Refills: 0 | Status: SHIPPED | OUTPATIENT
Start: 2021-06-02 | End: 2021-06-12

## 2021-06-02 RX ORDER — PREDNISONE 20 MG/1
TABLET ORAL
Qty: 10 TABLET | Refills: 0 | Status: SHIPPED | OUTPATIENT
Start: 2021-06-02 | End: 2021-08-04 | Stop reason: SDUPTHER

## 2021-06-03 ENCOUNTER — LAB VISIT (OUTPATIENT)
Dept: LAB | Facility: HOSPITAL | Age: 56
End: 2021-06-03
Attending: REGISTERED NURSE
Payer: COMMERCIAL

## 2021-06-03 DIAGNOSIS — Z00.00 PREVENTATIVE HEALTH CARE: ICD-10-CM

## 2021-06-03 DIAGNOSIS — I10 ESSENTIAL HYPERTENSION: Chronic | ICD-10-CM

## 2021-06-03 LAB
ALBUMIN SERPL BCP-MCNC: 4.1 G/DL (ref 3.5–5.2)
ALP SERPL-CCNC: 135 U/L (ref 55–135)
ALT SERPL W/O P-5'-P-CCNC: 20 U/L (ref 10–44)
ANION GAP SERPL CALC-SCNC: 12 MMOL/L (ref 8–16)
AST SERPL-CCNC: 19 U/L (ref 10–40)
BASOPHILS # BLD AUTO: 0.04 K/UL (ref 0–0.2)
BASOPHILS NFR BLD: 0.5 % (ref 0–1.9)
BILIRUB SERPL-MCNC: 0.7 MG/DL (ref 0.1–1)
BUN SERPL-MCNC: 13 MG/DL (ref 6–20)
CALCIUM SERPL-MCNC: 10.4 MG/DL (ref 8.7–10.5)
CHLORIDE SERPL-SCNC: 102 MMOL/L (ref 95–110)
CHOLEST SERPL-MCNC: 240 MG/DL (ref 120–199)
CHOLEST/HDLC SERPL: 3 {RATIO} (ref 2–5)
CO2 SERPL-SCNC: 23 MMOL/L (ref 23–29)
CREAT SERPL-MCNC: 1 MG/DL (ref 0.5–1.4)
DIFFERENTIAL METHOD: ABNORMAL
EOSINOPHIL # BLD AUTO: 0 K/UL (ref 0–0.5)
EOSINOPHIL NFR BLD: 0.3 % (ref 0–8)
ERYTHROCYTE [DISTWIDTH] IN BLOOD BY AUTOMATED COUNT: 17.2 % (ref 11.5–14.5)
EST. GFR  (AFRICAN AMERICAN): >60 ML/MIN/1.73 M^2
EST. GFR  (NON AFRICAN AMERICAN): >60 ML/MIN/1.73 M^2
GLUCOSE SERPL-MCNC: 111 MG/DL (ref 70–110)
HCT VFR BLD AUTO: 44.2 % (ref 37–48.5)
HDLC SERPL-MCNC: 80 MG/DL (ref 40–75)
HDLC SERPL: 33.3 % (ref 20–50)
HGB BLD-MCNC: 13.6 G/DL (ref 12–16)
IMM GRANULOCYTES # BLD AUTO: 0.01 K/UL (ref 0–0.04)
IMM GRANULOCYTES NFR BLD AUTO: 0.1 % (ref 0–0.5)
LDLC SERPL CALC-MCNC: 152 MG/DL (ref 63–159)
LYMPHOCYTES # BLD AUTO: 1.7 K/UL (ref 1–4.8)
LYMPHOCYTES NFR BLD: 21.7 % (ref 18–48)
MCH RBC QN AUTO: 22.3 PG (ref 27–31)
MCHC RBC AUTO-ENTMCNC: 30.8 G/DL (ref 32–36)
MCV RBC AUTO: 72 FL (ref 82–98)
MONOCYTES # BLD AUTO: 0.5 K/UL (ref 0.3–1)
MONOCYTES NFR BLD: 6.1 % (ref 4–15)
NEUTROPHILS # BLD AUTO: 5.5 K/UL (ref 1.8–7.7)
NEUTROPHILS NFR BLD: 71.3 % (ref 38–73)
NONHDLC SERPL-MCNC: 160 MG/DL
NRBC BLD-RTO: 0 /100 WBC
PLATELET # BLD AUTO: 209 K/UL (ref 150–450)
PMV BLD AUTO: 12.5 FL (ref 9.2–12.9)
POTASSIUM SERPL-SCNC: 3.8 MMOL/L (ref 3.5–5.1)
PROT SERPL-MCNC: 7.8 G/DL (ref 6–8.4)
RBC # BLD AUTO: 6.11 M/UL (ref 4–5.4)
SODIUM SERPL-SCNC: 137 MMOL/L (ref 136–145)
TRIGL SERPL-MCNC: 40 MG/DL (ref 30–150)
TSH SERPL DL<=0.005 MIU/L-ACNC: 0.56 UIU/ML (ref 0.4–4)
WBC # BLD AUTO: 7.75 K/UL (ref 3.9–12.7)

## 2021-06-03 PROCEDURE — 84443 ASSAY THYROID STIM HORMONE: CPT | Performed by: REGISTERED NURSE

## 2021-06-03 PROCEDURE — 86803 HEPATITIS C AB TEST: CPT | Performed by: REGISTERED NURSE

## 2021-06-03 PROCEDURE — 80061 LIPID PANEL: CPT | Performed by: REGISTERED NURSE

## 2021-06-03 PROCEDURE — 80053 COMPREHEN METABOLIC PANEL: CPT | Performed by: REGISTERED NURSE

## 2021-06-03 PROCEDURE — 85025 COMPLETE CBC W/AUTO DIFF WBC: CPT | Performed by: REGISTERED NURSE

## 2021-06-03 PROCEDURE — 36415 COLL VENOUS BLD VENIPUNCTURE: CPT | Performed by: REGISTERED NURSE

## 2021-06-04 LAB — HCV AB SERPL QL IA: NEGATIVE

## 2021-07-23 ENCOUNTER — TELEPHONE (OUTPATIENT)
Dept: FAMILY MEDICINE | Facility: CLINIC | Age: 56
End: 2021-07-23

## 2021-07-31 DIAGNOSIS — R51.9 RECURRENT HEADACHE: ICD-10-CM

## 2021-07-31 RX ORDER — RIZATRIPTAN BENZOATE 10 MG/1
10 TABLET ORAL
Qty: 9 TABLET | Refills: 0 | Status: SHIPPED | OUTPATIENT
Start: 2021-07-31 | End: 2021-08-02

## 2021-08-02 DIAGNOSIS — R51.9 RECURRENT HEADACHE: ICD-10-CM

## 2021-08-02 RX ORDER — RIZATRIPTAN BENZOATE 10 MG/1
10 TABLET ORAL
Qty: 9 TABLET | Refills: 9 | Status: SHIPPED | OUTPATIENT
Start: 2021-08-02 | End: 2021-08-31 | Stop reason: SDUPTHER

## 2021-08-04 DIAGNOSIS — J30.9 ALLERGIC SINUSITIS: ICD-10-CM

## 2021-08-05 RX ORDER — PREDNISONE 20 MG/1
TABLET ORAL
Qty: 10 TABLET | Refills: 0 | Status: SHIPPED | OUTPATIENT
Start: 2021-08-05 | End: 2021-09-07

## 2021-08-26 RX ORDER — POTASSIUM CHLORIDE 750 MG/1
10 CAPSULE, EXTENDED RELEASE ORAL DAILY
Qty: 30 CAPSULE | Refills: 5 | Status: SHIPPED | OUTPATIENT
Start: 2021-08-26 | End: 2022-03-17

## 2021-08-31 DIAGNOSIS — R51.9 RECURRENT HEADACHE: ICD-10-CM

## 2021-08-31 RX ORDER — RIZATRIPTAN BENZOATE 10 MG/1
10 TABLET ORAL
Qty: 9 TABLET | Refills: 5 | Status: SHIPPED | OUTPATIENT
Start: 2021-08-31 | End: 2021-09-01 | Stop reason: SDUPTHER

## 2021-09-01 DIAGNOSIS — R51.9 RECURRENT HEADACHE: ICD-10-CM

## 2021-09-01 RX ORDER — RIZATRIPTAN BENZOATE 10 MG/1
10 TABLET ORAL
Qty: 9 TABLET | Refills: 5 | Status: SHIPPED | OUTPATIENT
Start: 2021-09-01 | End: 2022-04-20 | Stop reason: SDUPTHER

## 2021-09-07 ENCOUNTER — OFFICE VISIT (OUTPATIENT)
Dept: FAMILY MEDICINE | Facility: CLINIC | Age: 56
End: 2021-09-07
Payer: COMMERCIAL

## 2021-09-07 VITALS
HEIGHT: 62 IN | RESPIRATION RATE: 18 BRPM | HEART RATE: 100 BPM | DIASTOLIC BLOOD PRESSURE: 80 MMHG | OXYGEN SATURATION: 98 % | WEIGHT: 185.19 LBS | TEMPERATURE: 98 F | BODY MASS INDEX: 34.08 KG/M2 | SYSTOLIC BLOOD PRESSURE: 128 MMHG

## 2021-09-07 DIAGNOSIS — L50.9 URTICARIA: ICD-10-CM

## 2021-09-07 DIAGNOSIS — N95.1 PERIMENOPAUSE: ICD-10-CM

## 2021-09-07 DIAGNOSIS — R35.0 URINARY FREQUENCY: Primary | ICD-10-CM

## 2021-09-07 DIAGNOSIS — E78.5 HYPERLIPIDEMIA, UNSPECIFIED HYPERLIPIDEMIA TYPE: ICD-10-CM

## 2021-09-07 LAB
BILIRUB SERPL-MCNC: NORMAL MG/DL
BLOOD URINE, POC: NORMAL
CLARITY, POC UA: CLEAR
COLOR, POC UA: COLORLESS
GLUCOSE UR QL STRIP: NORMAL
KETONES UR QL STRIP: NORMAL
LEUKOCYTE ESTERASE URINE, POC: NORMAL
NITRITE, POC UA: NORMAL
PH, POC UA: 5
PROTEIN, POC: NORMAL
SPECIFIC GRAVITY, POC UA: 1.02
UROBILINOGEN, POC UA: NORMAL

## 2021-09-07 PROCEDURE — 3074F SYST BP LT 130 MM HG: CPT | Mod: CPTII,S$GLB,, | Performed by: FAMILY MEDICINE

## 2021-09-07 PROCEDURE — 99214 OFFICE O/P EST MOD 30 MIN: CPT | Mod: S$GLB,,, | Performed by: FAMILY MEDICINE

## 2021-09-07 PROCEDURE — 3079F PR MOST RECENT DIASTOLIC BLOOD PRESSURE 80-89 MM HG: ICD-10-PCS | Mod: CPTII,S$GLB,, | Performed by: FAMILY MEDICINE

## 2021-09-07 PROCEDURE — 3074F PR MOST RECENT SYSTOLIC BLOOD PRESSURE < 130 MM HG: ICD-10-PCS | Mod: CPTII,S$GLB,, | Performed by: FAMILY MEDICINE

## 2021-09-07 PROCEDURE — 3008F BODY MASS INDEX DOCD: CPT | Mod: CPTII,S$GLB,, | Performed by: FAMILY MEDICINE

## 2021-09-07 PROCEDURE — 81002 URINALYSIS NONAUTO W/O SCOPE: CPT | Mod: S$GLB,,, | Performed by: FAMILY MEDICINE

## 2021-09-07 PROCEDURE — 1159F PR MEDICATION LIST DOCUMENTED IN MEDICAL RECORD: ICD-10-PCS | Mod: CPTII,S$GLB,, | Performed by: FAMILY MEDICINE

## 2021-09-07 PROCEDURE — 3079F DIAST BP 80-89 MM HG: CPT | Mod: CPTII,S$GLB,, | Performed by: FAMILY MEDICINE

## 2021-09-07 PROCEDURE — 99999 PR PBB SHADOW E&M-EST. PATIENT-LVL IV: ICD-10-PCS | Mod: PBBFAC,,, | Performed by: FAMILY MEDICINE

## 2021-09-07 PROCEDURE — 1160F RVW MEDS BY RX/DR IN RCRD: CPT | Mod: CPTII,S$GLB,, | Performed by: FAMILY MEDICINE

## 2021-09-07 PROCEDURE — 99999 PR PBB SHADOW E&M-EST. PATIENT-LVL IV: CPT | Mod: PBBFAC,,, | Performed by: FAMILY MEDICINE

## 2021-09-07 PROCEDURE — 81002 POCT URINE DIPSTICK WITHOUT MICROSCOPE: ICD-10-PCS | Mod: S$GLB,,, | Performed by: FAMILY MEDICINE

## 2021-09-07 PROCEDURE — 99214 PR OFFICE/OUTPT VISIT, EST, LEVL IV, 30-39 MIN: ICD-10-PCS | Mod: S$GLB,,, | Performed by: FAMILY MEDICINE

## 2021-09-07 PROCEDURE — 1160F PR REVIEW ALL MEDS BY PRESCRIBER/CLIN PHARMACIST DOCUMENTED: ICD-10-PCS | Mod: CPTII,S$GLB,, | Performed by: FAMILY MEDICINE

## 2021-09-07 PROCEDURE — 3008F PR BODY MASS INDEX (BMI) DOCUMENTED: ICD-10-PCS | Mod: CPTII,S$GLB,, | Performed by: FAMILY MEDICINE

## 2021-09-07 PROCEDURE — 1159F MED LIST DOCD IN RCRD: CPT | Mod: CPTII,S$GLB,, | Performed by: FAMILY MEDICINE

## 2021-09-16 ENCOUNTER — TELEPHONE (OUTPATIENT)
Dept: FAMILY MEDICINE | Facility: CLINIC | Age: 56
End: 2021-09-16

## 2021-09-16 RX ORDER — TOLTERODINE 4 MG/1
4 CAPSULE, EXTENDED RELEASE ORAL DAILY
Qty: 30 CAPSULE | Refills: 5 | Status: SHIPPED | OUTPATIENT
Start: 2021-09-16 | End: 2021-09-17

## 2021-09-17 RX ORDER — TOLTERODINE 2 MG/1
4 CAPSULE, EXTENDED RELEASE ORAL DAILY
Qty: 30 CAPSULE | Refills: 5 | Status: SHIPPED | OUTPATIENT
Start: 2021-09-17 | End: 2021-09-17 | Stop reason: SDUPTHER

## 2021-09-17 RX ORDER — TOLTERODINE 2 MG/1
2 CAPSULE, EXTENDED RELEASE ORAL DAILY
Qty: 30 CAPSULE | Refills: 5 | Status: SHIPPED | OUTPATIENT
Start: 2021-09-17 | End: 2022-03-19 | Stop reason: SDUPTHER

## 2021-09-18 ENCOUNTER — IMMUNIZATION (OUTPATIENT)
Dept: PRIMARY CARE CLINIC | Facility: CLINIC | Age: 56
End: 2021-09-18
Payer: COMMERCIAL

## 2021-09-18 DIAGNOSIS — Z23 NEED FOR VACCINATION: Primary | ICD-10-CM

## 2021-09-18 PROCEDURE — 91301 COVID-19, MRNA, LNP-S, PF, 100 MCG/0.5 ML DOSE VACCINE: CPT | Mod: S$GLB,,, | Performed by: FAMILY MEDICINE

## 2021-09-18 PROCEDURE — 0011A COVID-19, MRNA, LNP-S, PF, 100 MCG/0.5 ML DOSE VACCINE: ICD-10-PCS | Mod: CV19,S$GLB,, | Performed by: FAMILY MEDICINE

## 2021-09-18 PROCEDURE — 91301 COVID-19, MRNA, LNP-S, PF, 100 MCG/0.5 ML DOSE VACCINE: ICD-10-PCS | Mod: S$GLB,,, | Performed by: FAMILY MEDICINE

## 2021-09-18 PROCEDURE — 0011A COVID-19, MRNA, LNP-S, PF, 100 MCG/0.5 ML DOSE VACCINE: CPT | Mod: CV19,S$GLB,, | Performed by: FAMILY MEDICINE

## 2021-10-06 ENCOUNTER — PATIENT OUTREACH (OUTPATIENT)
Dept: ADMINISTRATIVE | Facility: OTHER | Age: 56
End: 2021-10-06

## 2021-10-07 ENCOUNTER — OFFICE VISIT (OUTPATIENT)
Dept: OBSTETRICS AND GYNECOLOGY | Facility: CLINIC | Age: 56
End: 2021-10-07
Payer: COMMERCIAL

## 2021-10-07 VITALS
SYSTOLIC BLOOD PRESSURE: 122 MMHG | DIASTOLIC BLOOD PRESSURE: 70 MMHG | WEIGHT: 189.13 LBS | BODY MASS INDEX: 34.8 KG/M2 | HEIGHT: 62 IN

## 2021-10-07 DIAGNOSIS — Z01.419 WELL WOMAN EXAM WITH ROUTINE GYNECOLOGICAL EXAM: Primary | ICD-10-CM

## 2021-10-07 PROCEDURE — 3008F BODY MASS INDEX DOCD: CPT | Mod: CPTII,S$GLB,, | Performed by: OBSTETRICS & GYNECOLOGY

## 2021-10-07 PROCEDURE — 99396 PREV VISIT EST AGE 40-64: CPT | Mod: S$GLB,,, | Performed by: OBSTETRICS & GYNECOLOGY

## 2021-10-07 PROCEDURE — 3078F PR MOST RECENT DIASTOLIC BLOOD PRESSURE < 80 MM HG: ICD-10-PCS | Mod: CPTII,S$GLB,, | Performed by: OBSTETRICS & GYNECOLOGY

## 2021-10-07 PROCEDURE — 3008F PR BODY MASS INDEX (BMI) DOCUMENTED: ICD-10-PCS | Mod: CPTII,S$GLB,, | Performed by: OBSTETRICS & GYNECOLOGY

## 2021-10-07 PROCEDURE — 3078F DIAST BP <80 MM HG: CPT | Mod: CPTII,S$GLB,, | Performed by: OBSTETRICS & GYNECOLOGY

## 2021-10-07 PROCEDURE — 3074F PR MOST RECENT SYSTOLIC BLOOD PRESSURE < 130 MM HG: ICD-10-PCS | Mod: CPTII,S$GLB,, | Performed by: OBSTETRICS & GYNECOLOGY

## 2021-10-07 PROCEDURE — 3074F SYST BP LT 130 MM HG: CPT | Mod: CPTII,S$GLB,, | Performed by: OBSTETRICS & GYNECOLOGY

## 2021-10-07 PROCEDURE — 99396 PR PREVENTIVE VISIT,EST,40-64: ICD-10-PCS | Mod: S$GLB,,, | Performed by: OBSTETRICS & GYNECOLOGY

## 2021-10-07 PROCEDURE — 87624 HPV HI-RISK TYP POOLED RSLT: CPT | Performed by: OBSTETRICS & GYNECOLOGY

## 2021-10-07 PROCEDURE — 1159F MED LIST DOCD IN RCRD: CPT | Mod: CPTII,S$GLB,, | Performed by: OBSTETRICS & GYNECOLOGY

## 2021-10-07 PROCEDURE — 88175 CYTOPATH C/V AUTO FLUID REDO: CPT | Performed by: OBSTETRICS & GYNECOLOGY

## 2021-10-07 PROCEDURE — 99999 PR PBB SHADOW E&M-EST. PATIENT-LVL III: CPT | Mod: PBBFAC,,, | Performed by: OBSTETRICS & GYNECOLOGY

## 2021-10-07 PROCEDURE — 1159F PR MEDICATION LIST DOCUMENTED IN MEDICAL RECORD: ICD-10-PCS | Mod: CPTII,S$GLB,, | Performed by: OBSTETRICS & GYNECOLOGY

## 2021-10-07 PROCEDURE — 99999 PR PBB SHADOW E&M-EST. PATIENT-LVL III: ICD-10-PCS | Mod: PBBFAC,,, | Performed by: OBSTETRICS & GYNECOLOGY

## 2021-10-07 RX ORDER — BETAMETHASONE DIPROPIONATE 0.5 MG/G
CREAM TOPICAL
Qty: 45 G | Refills: 2 | Status: SHIPPED | OUTPATIENT
Start: 2021-10-07 | End: 2022-11-29

## 2021-10-07 RX ORDER — FAMOTIDINE 40 MG/1
20 TABLET, FILM COATED ORAL
COMMUNITY
Start: 2021-08-25 | End: 2021-10-18 | Stop reason: SDUPTHER

## 2021-10-16 ENCOUNTER — IMMUNIZATION (OUTPATIENT)
Dept: PRIMARY CARE CLINIC | Facility: CLINIC | Age: 56
End: 2021-10-16
Payer: COMMERCIAL

## 2021-10-16 DIAGNOSIS — Z23 NEED FOR VACCINATION: Primary | ICD-10-CM

## 2021-10-16 PROCEDURE — 0012A COVID-19, MRNA, LNP-S, PF, 100 MCG/0.5 ML DOSE VACCINE: CPT | Mod: CV19,PBBFAC | Performed by: FAMILY MEDICINE

## 2021-10-18 ENCOUNTER — OFFICE VISIT (OUTPATIENT)
Dept: FAMILY MEDICINE | Facility: CLINIC | Age: 56
End: 2021-10-18
Payer: COMMERCIAL

## 2021-10-18 VITALS
HEIGHT: 62 IN | RESPIRATION RATE: 18 BRPM | DIASTOLIC BLOOD PRESSURE: 84 MMHG | HEART RATE: 84 BPM | BODY MASS INDEX: 34.48 KG/M2 | SYSTOLIC BLOOD PRESSURE: 124 MMHG | OXYGEN SATURATION: 99 % | WEIGHT: 187.38 LBS | TEMPERATURE: 97 F

## 2021-10-18 DIAGNOSIS — R11.0 NAUSEA: ICD-10-CM

## 2021-10-18 DIAGNOSIS — T50.Z95A ADVERSE EFFECT OF VACCINE, INITIAL ENCOUNTER: Primary | ICD-10-CM

## 2021-10-18 LAB
FINAL PATHOLOGIC DIAGNOSIS: NORMAL
Lab: NORMAL

## 2021-10-18 PROCEDURE — 99999 PR PBB SHADOW E&M-EST. PATIENT-LVL IV: CPT | Mod: PBBFAC,,, | Performed by: FAMILY MEDICINE

## 2021-10-18 PROCEDURE — 3074F PR MOST RECENT SYSTOLIC BLOOD PRESSURE < 130 MM HG: ICD-10-PCS | Mod: CPTII,S$GLB,, | Performed by: FAMILY MEDICINE

## 2021-10-18 PROCEDURE — 3079F DIAST BP 80-89 MM HG: CPT | Mod: CPTII,S$GLB,, | Performed by: FAMILY MEDICINE

## 2021-10-18 PROCEDURE — 1159F PR MEDICATION LIST DOCUMENTED IN MEDICAL RECORD: ICD-10-PCS | Mod: CPTII,S$GLB,, | Performed by: FAMILY MEDICINE

## 2021-10-18 PROCEDURE — 99213 PR OFFICE/OUTPT VISIT, EST, LEVL III, 20-29 MIN: ICD-10-PCS | Mod: S$GLB,,, | Performed by: FAMILY MEDICINE

## 2021-10-18 PROCEDURE — 3008F PR BODY MASS INDEX (BMI) DOCUMENTED: ICD-10-PCS | Mod: CPTII,S$GLB,, | Performed by: FAMILY MEDICINE

## 2021-10-18 PROCEDURE — 99213 OFFICE O/P EST LOW 20 MIN: CPT | Mod: S$GLB,,, | Performed by: FAMILY MEDICINE

## 2021-10-18 PROCEDURE — 99999 PR PBB SHADOW E&M-EST. PATIENT-LVL IV: ICD-10-PCS | Mod: PBBFAC,,, | Performed by: FAMILY MEDICINE

## 2021-10-18 PROCEDURE — 1159F MED LIST DOCD IN RCRD: CPT | Mod: CPTII,S$GLB,, | Performed by: FAMILY MEDICINE

## 2021-10-18 PROCEDURE — 3079F PR MOST RECENT DIASTOLIC BLOOD PRESSURE 80-89 MM HG: ICD-10-PCS | Mod: CPTII,S$GLB,, | Performed by: FAMILY MEDICINE

## 2021-10-18 PROCEDURE — 3074F SYST BP LT 130 MM HG: CPT | Mod: CPTII,S$GLB,, | Performed by: FAMILY MEDICINE

## 2021-10-18 PROCEDURE — 1160F RVW MEDS BY RX/DR IN RCRD: CPT | Mod: CPTII,S$GLB,, | Performed by: FAMILY MEDICINE

## 2021-10-18 PROCEDURE — 1160F PR REVIEW ALL MEDS BY PRESCRIBER/CLIN PHARMACIST DOCUMENTED: ICD-10-PCS | Mod: CPTII,S$GLB,, | Performed by: FAMILY MEDICINE

## 2021-10-18 PROCEDURE — 3008F BODY MASS INDEX DOCD: CPT | Mod: CPTII,S$GLB,, | Performed by: FAMILY MEDICINE

## 2021-10-18 RX ORDER — TOLTERODINE 4 MG/1
4 CAPSULE, EXTENDED RELEASE ORAL DAILY
COMMUNITY
Start: 2021-10-14 | End: 2022-03-21

## 2021-10-21 ENCOUNTER — OFFICE VISIT (OUTPATIENT)
Dept: HEMATOLOGY/ONCOLOGY | Facility: CLINIC | Age: 56
End: 2021-10-21
Payer: COMMERCIAL

## 2021-10-21 VITALS
DIASTOLIC BLOOD PRESSURE: 77 MMHG | BODY MASS INDEX: 35.09 KG/M2 | SYSTOLIC BLOOD PRESSURE: 115 MMHG | HEIGHT: 62 IN | OXYGEN SATURATION: 98 % | TEMPERATURE: 98 F | WEIGHT: 190.69 LBS | HEART RATE: 69 BPM

## 2021-10-21 DIAGNOSIS — Z91.89 AT HIGH RISK FOR BREAST CANCER: ICD-10-CM

## 2021-10-21 DIAGNOSIS — Z12.31 ENCOUNTER FOR SCREENING MAMMOGRAM FOR MALIGNANT NEOPLASM OF BREAST: Primary | ICD-10-CM

## 2021-10-21 PROCEDURE — 3074F PR MOST RECENT SYSTOLIC BLOOD PRESSURE < 130 MM HG: ICD-10-PCS | Mod: CPTII,S$GLB,, | Performed by: NURSE PRACTITIONER

## 2021-10-21 PROCEDURE — 1160F PR REVIEW ALL MEDS BY PRESCRIBER/CLIN PHARMACIST DOCUMENTED: ICD-10-PCS | Mod: CPTII,S$GLB,, | Performed by: NURSE PRACTITIONER

## 2021-10-21 PROCEDURE — 99214 OFFICE O/P EST MOD 30 MIN: CPT | Mod: S$GLB,,, | Performed by: NURSE PRACTITIONER

## 2021-10-21 PROCEDURE — 3078F DIAST BP <80 MM HG: CPT | Mod: CPTII,S$GLB,, | Performed by: NURSE PRACTITIONER

## 2021-10-21 PROCEDURE — 3008F PR BODY MASS INDEX (BMI) DOCUMENTED: ICD-10-PCS | Mod: CPTII,S$GLB,, | Performed by: NURSE PRACTITIONER

## 2021-10-21 PROCEDURE — 99999 PR PBB SHADOW E&M-EST. PATIENT-LVL IV: ICD-10-PCS | Mod: PBBFAC,,, | Performed by: NURSE PRACTITIONER

## 2021-10-21 PROCEDURE — 3008F BODY MASS INDEX DOCD: CPT | Mod: CPTII,S$GLB,, | Performed by: NURSE PRACTITIONER

## 2021-10-21 PROCEDURE — 3074F SYST BP LT 130 MM HG: CPT | Mod: CPTII,S$GLB,, | Performed by: NURSE PRACTITIONER

## 2021-10-21 PROCEDURE — 99999 PR PBB SHADOW E&M-EST. PATIENT-LVL IV: CPT | Mod: PBBFAC,,, | Performed by: NURSE PRACTITIONER

## 2021-10-21 PROCEDURE — 1160F RVW MEDS BY RX/DR IN RCRD: CPT | Mod: CPTII,S$GLB,, | Performed by: NURSE PRACTITIONER

## 2021-10-21 PROCEDURE — 1159F MED LIST DOCD IN RCRD: CPT | Mod: CPTII,S$GLB,, | Performed by: NURSE PRACTITIONER

## 2021-10-21 PROCEDURE — 3078F PR MOST RECENT DIASTOLIC BLOOD PRESSURE < 80 MM HG: ICD-10-PCS | Mod: CPTII,S$GLB,, | Performed by: NURSE PRACTITIONER

## 2021-10-21 PROCEDURE — 1159F PR MEDICATION LIST DOCUMENTED IN MEDICAL RECORD: ICD-10-PCS | Mod: CPTII,S$GLB,, | Performed by: NURSE PRACTITIONER

## 2021-10-21 PROCEDURE — 99214 PR OFFICE/OUTPT VISIT, EST, LEVL IV, 30-39 MIN: ICD-10-PCS | Mod: S$GLB,,, | Performed by: NURSE PRACTITIONER

## 2021-11-23 ENCOUNTER — OFFICE VISIT (OUTPATIENT)
Dept: FAMILY MEDICINE | Facility: CLINIC | Age: 56
End: 2021-11-23
Payer: COMMERCIAL

## 2021-11-23 VITALS
RESPIRATION RATE: 18 BRPM | TEMPERATURE: 97 F | DIASTOLIC BLOOD PRESSURE: 80 MMHG | BODY MASS INDEX: 36.1 KG/M2 | OXYGEN SATURATION: 98 % | SYSTOLIC BLOOD PRESSURE: 118 MMHG | HEIGHT: 62 IN | WEIGHT: 196.19 LBS | HEART RATE: 84 BPM

## 2021-11-23 DIAGNOSIS — J06.9 ACUTE URI: Primary | ICD-10-CM

## 2021-11-23 PROCEDURE — 99999 PR PBB SHADOW E&M-EST. PATIENT-LVL IV: ICD-10-PCS | Mod: PBBFAC,,, | Performed by: FAMILY MEDICINE

## 2021-11-23 PROCEDURE — 99213 OFFICE O/P EST LOW 20 MIN: CPT | Mod: S$GLB,,, | Performed by: FAMILY MEDICINE

## 2021-11-23 PROCEDURE — 99213 PR OFFICE/OUTPT VISIT, EST, LEVL III, 20-29 MIN: ICD-10-PCS | Mod: S$GLB,,, | Performed by: FAMILY MEDICINE

## 2021-11-23 PROCEDURE — 99999 PR PBB SHADOW E&M-EST. PATIENT-LVL IV: CPT | Mod: PBBFAC,,, | Performed by: FAMILY MEDICINE

## 2021-11-23 RX ORDER — METHYLPREDNISOLONE 4 MG/1
TABLET ORAL
Qty: 1 EACH | Refills: 0 | Status: SHIPPED | OUTPATIENT
Start: 2021-11-23 | End: 2022-01-31

## 2021-11-29 RX ORDER — BUTALBITAL, ACETAMINOPHEN AND CAFFEINE 50; 325; 40 MG/1; MG/1; MG/1
1 TABLET ORAL EVERY 6 HOURS PRN
Qty: 30 TABLET | Refills: 2 | Status: SHIPPED | OUTPATIENT
Start: 2021-11-29 | End: 2021-12-29

## 2022-01-31 ENCOUNTER — OFFICE VISIT (OUTPATIENT)
Dept: FAMILY MEDICINE | Facility: CLINIC | Age: 57
End: 2022-01-31
Payer: COMMERCIAL

## 2022-01-31 ENCOUNTER — TELEPHONE (OUTPATIENT)
Dept: FAMILY MEDICINE | Facility: CLINIC | Age: 57
End: 2022-01-31
Payer: COMMERCIAL

## 2022-01-31 VITALS
HEART RATE: 83 BPM | SYSTOLIC BLOOD PRESSURE: 133 MMHG | WEIGHT: 189.63 LBS | DIASTOLIC BLOOD PRESSURE: 87 MMHG | OXYGEN SATURATION: 98 % | BODY MASS INDEX: 34.89 KG/M2 | TEMPERATURE: 98 F | HEIGHT: 62 IN

## 2022-01-31 DIAGNOSIS — M25.562 ACUTE PAIN OF LEFT KNEE: ICD-10-CM

## 2022-01-31 DIAGNOSIS — R09.81 HEAD CONGESTION: ICD-10-CM

## 2022-01-31 DIAGNOSIS — U07.1 COVID-19 VIRUS INFECTION: Primary | ICD-10-CM

## 2022-01-31 LAB
CTP QC/QA: YES
SARS-COV-2 RDRP RESP QL NAA+PROBE: POSITIVE

## 2022-01-31 PROCEDURE — 99214 PR OFFICE/OUTPT VISIT, EST, LEVL IV, 30-39 MIN: ICD-10-PCS | Mod: S$GLB,,, | Performed by: FAMILY MEDICINE

## 2022-01-31 PROCEDURE — 3075F SYST BP GE 130 - 139MM HG: CPT | Mod: CPTII,S$GLB,, | Performed by: FAMILY MEDICINE

## 2022-01-31 PROCEDURE — 99999 PR PBB SHADOW E&M-EST. PATIENT-LVL IV: CPT | Mod: PBBFAC,,, | Performed by: FAMILY MEDICINE

## 2022-01-31 PROCEDURE — 3079F PR MOST RECENT DIASTOLIC BLOOD PRESSURE 80-89 MM HG: ICD-10-PCS | Mod: CPTII,S$GLB,, | Performed by: FAMILY MEDICINE

## 2022-01-31 PROCEDURE — 3008F PR BODY MASS INDEX (BMI) DOCUMENTED: ICD-10-PCS | Mod: CPTII,S$GLB,, | Performed by: FAMILY MEDICINE

## 2022-01-31 PROCEDURE — 1160F PR REVIEW ALL MEDS BY PRESCRIBER/CLIN PHARMACIST DOCUMENTED: ICD-10-PCS | Mod: CPTII,S$GLB,, | Performed by: FAMILY MEDICINE

## 2022-01-31 PROCEDURE — 99999 PR PBB SHADOW E&M-EST. PATIENT-LVL IV: ICD-10-PCS | Mod: PBBFAC,,, | Performed by: FAMILY MEDICINE

## 2022-01-31 PROCEDURE — 3075F PR MOST RECENT SYSTOLIC BLOOD PRESS GE 130-139MM HG: ICD-10-PCS | Mod: CPTII,S$GLB,, | Performed by: FAMILY MEDICINE

## 2022-01-31 PROCEDURE — 1159F MED LIST DOCD IN RCRD: CPT | Mod: CPTII,S$GLB,, | Performed by: FAMILY MEDICINE

## 2022-01-31 PROCEDURE — U0002: ICD-10-PCS | Mod: QW,S$GLB,, | Performed by: FAMILY MEDICINE

## 2022-01-31 PROCEDURE — 1159F PR MEDICATION LIST DOCUMENTED IN MEDICAL RECORD: ICD-10-PCS | Mod: CPTII,S$GLB,, | Performed by: FAMILY MEDICINE

## 2022-01-31 PROCEDURE — 1160F RVW MEDS BY RX/DR IN RCRD: CPT | Mod: CPTII,S$GLB,, | Performed by: FAMILY MEDICINE

## 2022-01-31 PROCEDURE — U0002 COVID-19 LAB TEST NON-CDC: HCPCS | Mod: QW,S$GLB,, | Performed by: FAMILY MEDICINE

## 2022-01-31 PROCEDURE — 99214 OFFICE O/P EST MOD 30 MIN: CPT | Mod: S$GLB,,, | Performed by: FAMILY MEDICINE

## 2022-01-31 PROCEDURE — 3079F DIAST BP 80-89 MM HG: CPT | Mod: CPTII,S$GLB,, | Performed by: FAMILY MEDICINE

## 2022-01-31 PROCEDURE — 3008F BODY MASS INDEX DOCD: CPT | Mod: CPTII,S$GLB,, | Performed by: FAMILY MEDICINE

## 2022-01-31 RX ORDER — BUTALBITAL, ACETAMINOPHEN AND CAFFEINE 50; 325; 40 MG/1; MG/1; MG/1
TABLET ORAL
COMMUNITY
Start: 2021-11-30 | End: 2022-11-09 | Stop reason: SDUPTHER

## 2022-01-31 RX ORDER — PREDNISONE 20 MG/1
TABLET ORAL
Qty: 10 TABLET | Refills: 0 | Status: SHIPPED | OUTPATIENT
Start: 2022-01-31 | End: 2022-07-15

## 2022-01-31 RX ORDER — TRIAMTERENE AND HYDROCHLOROTHIAZIDE 37.5; 25 MG/1; MG/1
1 CAPSULE ORAL DAILY PRN
COMMUNITY
End: 2022-08-29 | Stop reason: SDUPTHER

## 2022-01-31 NOTE — PROGRESS NOTES
CHIEF COMPLAINT:  This is a 56-year-old female complaining of upper respiratory infection.    SUBJECTIVE:  Patient complains of a 1 week history of head congestion, facial pressure and sore throat.  Cough has resolved.  Patient denies fever, chills, loss of sense of smell or taste.  She denies ill contacts.  She has immunized against COVID-19 x2 without booster.  Patient has been taking Sudafed, Mucinex, NyQuil and chewing zinc tablets.    Patient complains of one-week history of left knee pain.  Pain is not localized and worse with walking.  Patient rates the pain 5/10 on the pain scale.  Patient denies history of injury or increased activity patient denies joint swelling, redness or warmth.    Patient has a history of essential hypertension but is not currently on antihypertensive medication.  Her blood pressure today is 133/87.  Patient is being followed for fibrocystic breast disease by breast specialist.     ROS:  GENERAL: Patient denies fever, chills, night sweats. Patient denies weight gain or loss. Patient denies anorexia, fatigue, weakness or swollen glands.  SKIN:  Patient denies rash or hair loss.  Positive for chronic hives.  HEENT: Patient denies, ear pain, hearing loss, or runny nose. Patient denies visual disturbance, eye irritation or discharge.  Positive for sore throat and head congestion.  Positive for facial pressure.  LUNGS: Patient denies cough, wheeze or hemoptysis.  CARDIOVASCULAR: Patient denies shortness of breath, palpitations, syncope or lower extremity edema.  GI: Patient denies abdominal pain, nausea, vomiting, diarrhea, constipation, blood in stool or melena.    GENITOURINARY:  Patient denies dysuria, hematuria, nocturia, urgency or incontinence.  Positive for urinary frequency.  MUSCULOSKELETAL: Patient denies joint swelling, redness or warmth. Positive for knee pain.  NEUROLOGIC: Patient denies headache, vertigo, paresthesias, weakness in limb, or abnormality of gait.  PSYCHIATRIC:  Patient denies anxiety, depression, or memory loss.     OBJECTIVE:   GENERAL: Well-developed well-nourished, obese, black female alert and oriented x3, in no acute distress. Memory, judgment and cognition without deficit.  SKIN: Clear without rash. Normal color and tone.  HEENT: Eyes: Clear conjunctivae.  No scleral icterus.  Ears:  Clear canals.  Clear TMs.  Nose:  Moderate bilateral congestion.  Pharynx:  Without exudates or injection.  NECK: Supple, normal range of motion. No masses, lymphadenopathy or enlarged thyroid. No JVD. Carotids 2+ and equal. No bruits.  LUNGS: Clear to auscultation. Normal respiratory effort.  CARDIOVASCULAR: Regular rhythm, normal S1, S2 without murmur, gallop or rub.  EXTREMITIES: Without cyanosis, clubbing or edema. Distal pulses 2+ and equal. Normal range of motion in left hip, knee, ankle and foot. No joint effusion, erythema or warmth.  Negative Dianne's.  Negative drawers sign.  No joint line tenderness.  NEUROLOGIC:  Motor strength equal bilaterally.  Sensation normal to touch.  Gait without abnormality.  No tremor.    Rapid COVID test:  Positive.    ASSESSMENT:  1. COVID-19 virus infection    2. Head congestion    3. Acute pain of left knee      PLAN:   1. Instructions regarding quarantine given.  2. Keep well hydrated.  3. Symptomatic treatment.  4. Short prednisone taper starting at 40 mg over 1 week.  5. Apply moist heat and/or ice q.i.d. for 15-20 minutes to left knee.  6. Consider physical therapy.  7. Follow-up if no improvement or worsening symptoms.      30 minutes of total time spent on the encounter, which includes face to face time and non-face to face time preparing to see the patient (eg, review of tests), Obtaining and/or reviewing separately obtained history, Documenting clinical information in the electronic or other health record, Independently interpreting results (not separately reported) and communicating results to the patient/family/caregiver, or Care  coordination (not separately reported).     This note is generated with speech recognition software and is subject to transcription error and sound alike phrases that may be missed by proofreading.

## 2022-01-31 NOTE — TELEPHONE ENCOUNTER
Sent excuse from today and returning on 02.07.2022 to patients [employer]. /graciela/          ----- Message from Josiane Burnett sent at 1/31/2022  3:13 PM CST -----  Contact: Pt- [phone]  Patient would like to get medical advice.    Symptoms (please be specific):  work excuse    Would you like a call back, or a response through your [facility] portal?:  portal     Comments:   Pt is requesting a work excuse since she is covid positive.

## 2022-02-02 NOTE — PROGRESS NOTES
Patient ID: Stacey Wade is a 56 y.o. female.    Chief Complaint: elevated risk for breast cancer f/u    HPI: Patient presents for 6 month f/u breast exam.     Pt has been calculated to be high risk for breast cancer- Her breast cancer risk assessment score of 27.17% was calculated at the time of her annual mammogram 1/5/2021. June 2018 had linda MRI and was wnl.     Pt has chosen to not have MRI's of the breast due to the expensive copay associated with the test .     Mammogram was performed  2/15/2022- pending results.     Has a history of a breast mass noted during her routine gyn exam back in March 2017. Negative right breast imaging with mammo and ultrasound at that time. We have been checking it clinically at 3- 6 mon intervals - had an episode of it increasing increase in size slightly last year. Pt had been using more caffeine than usual. Recommended seeing surgeon and pt wanted to decrease her caffeine to see if it would decrease the size of the area. It did go back to her baseline measurement. Pt denies any change. No new areas of concern.     MRI on 6/12/18 was wnl    Pt not exercising - caring for her mother- having left knee pain since had covid- off work and scheduled for PT next week    Pt is completely off her hormones      Review of Systems   Constitutional: Negative.  Negative for appetite change and unexpected weight change.   HENT: Negative.    Eyes: Negative.  Negative for visual disturbance.   Respiratory: Negative.  Negative for cough and shortness of breath.    Cardiovascular: Negative.  Negative for chest pain.   Gastrointestinal: Negative.  Negative for abdominal pain and diarrhea.        No reflux   Endocrine: Negative.    Genitourinary: Negative.  Negative for frequency.   Musculoskeletal: Negative.  Negative for back pain.   Allergic/Immunologic: Negative.    Neurological: Negative.  Negative for headaches.   Hematological: Negative.  Negative for adenopathy.    Psychiatric/Behavioral: Negative.  The patient is not nervous/anxious.    Breast: pt denies any nipple discharge or palpable mass that she has noted. Has had bilateral nipple tenderness intermittently. She has continued to decrease her cafeine intake.     Current Outpatient Medications   Medication Sig Dispense Refill    aspirin (ECOTRIN) 81 MG EC tablet Take 81 mg by mouth once daily.      betamethasone dipropionate 0.05 % cream APPLY TOPICALLY 2 TIMES DAILY. 45 g 2    butalbital-acetaminophen-caffeine -40 mg (FIORICET, ESGIC) -40 mg per tablet TAKE ONE TABLET BY MOUTH EVERY 6 HOURS AS NEEDED FOR PAIN OR FOR HEADACHE      doxepin (SINEQUAN) 10 MG capsule TAKE 2 CAPSULES BY MOUTH 3 TIMES A  capsule 1    EPINEPHrine (EPIPEN) 0.3 mg/0.3 mL AtIn Inject 0.3 mg into the muscle daily as needed.      famotidine (PEPCID) 40 MG tablet Take 20 mg by mouth.      fexofenadine (ALLEGRA) 180 MG tablet TAKE ONE TABLET BY MOUTH EVERY DAY 30 tablet 0    hydrOXYzine HCL (ATARAX) 25 MG tablet TAKE 1 TABLET BY MOUTH FOUR TIMES A DAY AS NEEDED FOR ITCHING 120 tablet 1    mometasone (NASONEX) 50 mcg/actuation nasal spray INHALE 2 SPRAYS BY NASAL ROUTE ONCE DAILY 17 g 11    montelukast (SINGULAIR) 10 mg tablet Take 1 tablet (10 mg total) by mouth once daily. 90 tablet 3    ondansetron (ZOFRAN-ODT) 4 MG TbDL Take 1 tablet (4 mg total) by mouth every 8 (eight) hours as needed (nausea). 30 tablet 0    potassium chloride (MICRO-K) 10 MEQ CpSR Take 1 capsule (10 mEq total) by mouth once daily. 30 capsule 5    predniSONE (DELTASONE) 20 MG tablet Take 2 tablets daily for 3 days, then 1 tablet daily for 3 days, then 1/2 tablet daily for 2 days. 10 tablet 0    rizatriptan (MAXALT) 10 MG tablet Take 1 tablet (10 mg total) by mouth as needed for Migraine (May repeat in 2 hrs.  No more than 2 tablets in 24 hrs.). 9 tablet 5    tolterodine (DETROL LA) 2 MG Cp24 Take 1 capsule (2 mg total) by mouth once daily. 30  capsule 5    tolterodine (DETROL LA) 4 MG 24 hr capsule Take 4 mg by mouth once daily.      triamterene-hydrochlorothiazide 37.5-25 mg (DYAZIDE) 37.5-25 mg per capsule Take 1 capsule by mouth daily as needed.      XOLAIR 150 mg/mL injection        No current facility-administered medications for this visit.       Review of patient's allergies indicates:   Allergen Reactions    Benzalkonium chloride     Black pepper      Other reaction(s): Hives    Chocolate flavor      Other reaction(s): Hives    Citrus and derivatives Hives     LEMON PRODUCTS    Furosemide     Grapefruit      Other reaction(s): Hives    Latex      Other reaction(s): Hives    Lemon balm (casper officinalis) Hives    Neomycin-bacitracin-polymyxin      Other reaction(s): Rash    Oats (richard) Hives     Oat foods (oatmeal, etc...)    Wheat containing prod        Past Medical History:   Diagnosis Date    Allergic rhinitis     Anxiety     Hypertension     Urticaria        Past Surgical History:   Procedure Laterality Date    COLONOSCOPY N/A 1/18/2018    Procedure: COLONOSCOPY;  Surgeon: Yong Smith MD;  Location: [facility];  Service: Endoscopy;  Laterality: N/A;    HERNIA REPAIR Left        Family History   Problem Relation Age of Onset    Lung cancer Paternal Grandfather     Ovarian cancer Maternal Grandmother     Hyperlipidemia Mother     Hypertension Mother     Breast cancer Mother 60    Arthritis Mother     Lung cancer Father     Breast cancer Maternal Aunt 53    Heart failure Other         Great aunt    Prostate cancer Brother     Diabetes Neg Hx        Social History     Socioeconomic History    Marital status: Single   Tobacco Use    Smoking status: Never Smoker    Smokeless tobacco: Never Used   Substance and Sexual Activity    Alcohol use: No    Drug use: No    Sexual activity: Not Currently     Partners: Male     Birth control/protection: None   Social History Narrative    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 She cares for her mother, who lives with her.       There were no vitals filed for this visit.    Physical Exam  Vitals reviewed.   Constitutional:       Appearance: Normal appearance. She is well-developed and well-nourished.   HENT:      Head: Normocephalic and atraumatic.      Right Ear: External ear normal.      Left Ear: External ear normal.      Mouth/Throat:      Pharynx: No oropharyngeal exudate.   Eyes:      General: No scleral icterus.        Right eye: No discharge.         Left eye: No discharge.      Extraocular Movements: EOM normal.      Conjunctiva/sclera: Conjunctivae normal.      Pupils: Pupils are equal, round, and reactive to light.   Neck:      Thyroid: No thyromegaly.   Cardiovascular:      Rate and Rhythm: Normal rate and regular rhythm.      Pulses: Normal pulses.      Heart sounds: Normal heart sounds.   Pulmonary:      Effort: Pulmonary effort is normal.      Breath sounds: Normal breath sounds.   Chest:   Breasts:      Right: No inverted nipple, mass, nipple discharge, skin change, tenderness, axillary adenopathy or supraclavicular adenopathy.      Left: No inverted nipple, mass, nipple discharge, skin change, tenderness, axillary adenopathy or supraclavicular adenopathy.       Abdominal:      General: Bowel sounds are normal.      Palpations: Abdomen is soft.   Musculoskeletal:         General: No edema. Normal range of motion.      Right shoulder: No crepitus. Normal strength.      Cervical back: Normal range of motion and neck supple.   Lymphadenopathy:      Head:      Right side of head: No submental, submandibular, tonsillar, preauricular, posterior auricular or occipital adenopathy.      Left side of head: No submental, submandibular, tonsillar, preauricular, posterior auricular or occipital adenopathy.      Cervical: No cervical adenopathy.      Right cervical: No superficial or posterior cervical adenopathy.     Left cervical: No  "superficial or posterior cervical adenopathy.      Upper Body:   No axillary adenopathy present.     Right upper body: No supraclavicular or axillary adenopathy.      Left upper body: No supraclavicular or axillary adenopathy.   Skin:     General: Skin is warm and dry.      Coloration: Skin is not pale.      Findings: No erythema or rash (fine barely visible papules over chin. No other areas noted).   Neurological:      General: No focal deficit present.      Mental Status: She is alert and oriented to person, place, and time.      Deep Tendon Reflexes: Reflexes are normal and symmetric.   Psychiatric:         Mood and Affect: Mood and affect and mood normal.         Behavior: Behavior normal.         Thought Content: Thought content normal.         Judgment: Judgment normal.       IMAGIN/15/2022- pending    Menarche at 20 y/o      No history of hormone use other than birth control and no history of radiation to the neck or chest wall.     Ht: 5'2"  Wt: 190    FH: Maternal aunt breast cancer at 49 y/o, Maternal GM ovarian cancer, Father lung and prostate cancer, Brother - prostate cancer    Assessment & Plan:  1. Area of prominent glandular tissue noted at the 3 oclock location of the right breast. Fibrous texture and blends more medially and laterally- stable measurements today  2. Mammogram 2/15/2022 - risk score 24.4 % - decreasing  3. linda screening mammogram 2023 and exam  4. BSE recommended monthly- call for any changes.    5. Encouraged to get back into exercise and wt loss- pt agrees  6. Discussed genetic testing - pt declines at this time  7. Pt declines tamoxifen use for chemoprevention due to potential side effects   8. RTC 2022 for breast exam                        "

## 2022-02-07 ENCOUNTER — OFFICE VISIT (OUTPATIENT)
Dept: FAMILY MEDICINE | Facility: CLINIC | Age: 57
End: 2022-02-07
Payer: COMMERCIAL

## 2022-02-07 ENCOUNTER — HOSPITAL ENCOUNTER (OUTPATIENT)
Dept: RADIOLOGY | Facility: HOSPITAL | Age: 57
Discharge: HOME | End: 2022-02-07
Attending: FAMILY MEDICINE
Payer: COMMERCIAL

## 2022-02-07 VITALS
BODY MASS INDEX: 34.08 KG/M2 | SYSTOLIC BLOOD PRESSURE: 124 MMHG | WEIGHT: 185.19 LBS | HEART RATE: 106 BPM | TEMPERATURE: 98 F | DIASTOLIC BLOOD PRESSURE: 68 MMHG | HEIGHT: 62 IN | RESPIRATION RATE: 18 BRPM | OXYGEN SATURATION: 98 %

## 2022-02-07 DIAGNOSIS — M25.562 ACUTE PAIN OF LEFT KNEE: Primary | ICD-10-CM

## 2022-02-07 DIAGNOSIS — M25.562 ACUTE PAIN OF LEFT KNEE: ICD-10-CM

## 2022-02-07 PROCEDURE — 3078F PR MOST RECENT DIASTOLIC BLOOD PRESSURE < 80 MM HG: ICD-10-PCS | Mod: CPTII,S$GLB,, | Performed by: FAMILY MEDICINE

## 2022-02-07 PROCEDURE — 96372 THER/PROPH/DIAG INJ SC/IM: CPT | Mod: S$GLB,,, | Performed by: FAMILY MEDICINE

## 2022-02-07 PROCEDURE — 3008F BODY MASS INDEX DOCD: CPT | Mod: CPTII,S$GLB,, | Performed by: FAMILY MEDICINE

## 2022-02-07 PROCEDURE — 73562 X-RAY EXAM OF KNEE 3: CPT | Mod: TC,FY,PO,LT

## 2022-02-07 PROCEDURE — 99999 PR PBB SHADOW E&M-EST. PATIENT-LVL V: CPT | Mod: PBBFAC,,, | Performed by: FAMILY MEDICINE

## 2022-02-07 PROCEDURE — 3008F PR BODY MASS INDEX (BMI) DOCUMENTED: ICD-10-PCS | Mod: CPTII,S$GLB,, | Performed by: FAMILY MEDICINE

## 2022-02-07 PROCEDURE — 1159F MED LIST DOCD IN RCRD: CPT | Mod: CPTII,S$GLB,, | Performed by: FAMILY MEDICINE

## 2022-02-07 PROCEDURE — 99213 PR OFFICE/OUTPT VISIT, EST, LEVL III, 20-29 MIN: ICD-10-PCS | Mod: 25,S$GLB,, | Performed by: FAMILY MEDICINE

## 2022-02-07 PROCEDURE — 3078F DIAST BP <80 MM HG: CPT | Mod: CPTII,S$GLB,, | Performed by: FAMILY MEDICINE

## 2022-02-07 PROCEDURE — 3074F PR MOST RECENT SYSTOLIC BLOOD PRESSURE < 130 MM HG: ICD-10-PCS | Mod: CPTII,S$GLB,, | Performed by: FAMILY MEDICINE

## 2022-02-07 PROCEDURE — 73560 XR KNEE ORTHO LEFT: ICD-10-PCS | Mod: 26,RT,, | Performed by: RADIOLOGY

## 2022-02-07 PROCEDURE — 3074F SYST BP LT 130 MM HG: CPT | Mod: CPTII,S$GLB,, | Performed by: FAMILY MEDICINE

## 2022-02-07 PROCEDURE — 73562 XR KNEE ORTHO LEFT: ICD-10-PCS | Mod: 26,LT,, | Performed by: RADIOLOGY

## 2022-02-07 PROCEDURE — 99213 OFFICE O/P EST LOW 20 MIN: CPT | Mod: 25,S$GLB,, | Performed by: FAMILY MEDICINE

## 2022-02-07 PROCEDURE — 1160F RVW MEDS BY RX/DR IN RCRD: CPT | Mod: CPTII,S$GLB,, | Performed by: FAMILY MEDICINE

## 2022-02-07 PROCEDURE — 73560 X-RAY EXAM OF KNEE 1 OR 2: CPT | Mod: 26,RT,, | Performed by: RADIOLOGY

## 2022-02-07 PROCEDURE — 1159F PR MEDICATION LIST DOCUMENTED IN MEDICAL RECORD: ICD-10-PCS | Mod: CPTII,S$GLB,, | Performed by: FAMILY MEDICINE

## 2022-02-07 PROCEDURE — 99999 PR PBB SHADOW E&M-EST. PATIENT-LVL V: ICD-10-PCS | Mod: PBBFAC,,, | Performed by: FAMILY MEDICINE

## 2022-02-07 PROCEDURE — 73562 X-RAY EXAM OF KNEE 3: CPT | Mod: 26,LT,, | Performed by: RADIOLOGY

## 2022-02-07 PROCEDURE — 73560 X-RAY EXAM OF KNEE 1 OR 2: CPT | Mod: TC,FY,PO,RT

## 2022-02-07 PROCEDURE — 1160F PR REVIEW ALL MEDS BY PRESCRIBER/CLIN PHARMACIST DOCUMENTED: ICD-10-PCS | Mod: CPTII,S$GLB,, | Performed by: FAMILY MEDICINE

## 2022-02-07 PROCEDURE — 96372 PR INJECTION,THERAP/PROPH/DIAG2ST, IM OR SUBCUT: ICD-10-PCS | Mod: S$GLB,,, | Performed by: FAMILY MEDICINE

## 2022-02-07 RX ORDER — METHYLPREDNISOLONE ACETATE 40 MG/ML
80 INJECTION, SUSPENSION INTRA-ARTICULAR; INTRALESIONAL; INTRAMUSCULAR; SOFT TISSUE
Status: COMPLETED | OUTPATIENT
Start: 2022-02-07 | End: 2022-02-07

## 2022-02-07 RX ORDER — METHYLPREDNISOLONE ACETATE 80 MG/ML
80 INJECTION, SUSPENSION INTRA-ARTICULAR; INTRALESIONAL; INTRAMUSCULAR; SOFT TISSUE
Status: DISCONTINUED | OUTPATIENT
Start: 2022-02-07 | End: 2022-02-07

## 2022-02-07 RX ADMIN — METHYLPREDNISOLONE ACETATE 80 MG: 40 INJECTION, SUSPENSION INTRA-ARTICULAR; INTRALESIONAL; INTRAMUSCULAR; SOFT TISSUE at 12:02

## 2022-02-07 NOTE — PROGRESS NOTES
CHIEF COMPLAINT:  This is a 56-year-old female complaining of left knee pain.    SUBJECTIVE:  The patient complains of a 2 week history of pain left knee without inciting injury or increased activity.  Pain became increasingly worse yesterday and she had difficulty walking.  She using a walker today and has purchased that neoprene brace.  She rates the pain 8/10 on the pain scale.  Patient complains of associated swelling.  She has been using Icy Hot and taking Tylenol or Anacin without much relief.  Patient denies fever or chills.    ROS:  GENERAL: Patient denies fever, chills, night sweats.  Patient denies weight gain or loss. Patient denies anorexia, fatigue, weakness or swollen glands.  SKIN: Patient denies rash.  HEENT: Patient denies sore throat, ear pain, hearing loss, nasal congestion, or runny nose. Patient denies visual disturbance, eye irritation or discharge.  LUNGS: Patient denies cough, wheeze or hemoptysis.  CARDIOVASCULAR: Patient denies chest pain, shortness of breath, palpitations, syncope or lower extremity edema.  GI: Patient denies abdominal pain, nausea, vomiting, diarrhea, constipation, blood in stool or melena.  GENITOURINARY: Patient denies dysuria, frequency, hematuria, nocturia, urgency or incontinence.  MUSCULOSKELETAL: Patient denies joint redness or warmth.  Positive joint pain and swelling.    NEUROLOGIC: Patient denies headache, vertigo, paresthesias, weakness in limb, or abnormality of gait.    OBJECTIVE:   GENERAL: Well-developed well-nourished obese black female alert and oriented x3 in no mild distress.  Memory, judgment and cognition without deficit.  Accompanied by family member.  SKIN: Clear without rash.  Normal color and tone.  HEENT: Eyes: Clear conjunctivae.  No scleral icterus.    NECK: Supple, normal range of motion.   LUNGS:  Normal respiratory effort.  EXTREMITIES: Without cyanosis, clubbing or edema.  Distal pulses 2+ and equal.  Decreased range of motion left knee due  to pain.  Mild erythema and swelling.  Ecchymoses along popliteal fossa due to brace.  Negative patellar apprehension.  No joint line tenderness.  Negative drawers.  Negative Dianne's.  NEUROLOGIC: Motor strength equal bilaterally.  Sensation normal to touch.  Antalgic gait.  No tremor.    ASSESSMENT:  1. Acute pain of left knee      PLAN:   1. Check CBC and uric acid.  2. Depo-Medrol 80 mg IM.  3. Apply moist heat and/or ice q.i.d. for 15-20 minutes.  4. Avoid tight brace.   5. X-ray left knee.  6. Consider orthopedic consult.  7. Follow-up if no improvement or worsening symptoms.    8. Work excuse given.    20 minutes of total time spent on the encounter, which includes face to face time and non-face to face time preparing to see the patient (eg, review of tests), Obtaining and/or reviewing separately obtained history, Documenting clinical information in the electronic or other health record, Independently interpreting results (not separately reported) and communicating results to the patient/family/caregiver, or Care coordination (not separately reported).     This note is generated with speech recognition software and is subject to transcription error and sound alike phrases that may be missed by proofreading.

## 2022-02-08 ENCOUNTER — TELEPHONE (OUTPATIENT)
Dept: FAMILY MEDICINE | Facility: CLINIC | Age: 57
End: 2022-02-08
Payer: COMMERCIAL

## 2022-02-09 ENCOUNTER — PATIENT MESSAGE (OUTPATIENT)
Dept: FAMILY MEDICINE | Facility: CLINIC | Age: 57
End: 2022-02-09
Payer: COMMERCIAL

## 2022-02-14 ENCOUNTER — TELEPHONE (OUTPATIENT)
Dept: FAMILY MEDICINE | Facility: CLINIC | Age: 57
End: 2022-02-14
Payer: COMMERCIAL

## 2022-02-14 NOTE — TELEPHONE ENCOUNTER
Spoke to patient, patient was seen last week in clinic by PCP for Left Knee Pain. Patient was given an excuse to return to work today, patient is still having issues with her knee and is using her walker today. She is wanting to know if we can send another excuse stating she can return today but with light duty and use of walker this week and then return to full duty next week. Please advise.    Patient would like excuse faxed to her at [phone] ATTN: Stacey Wade & Deepa. /graciela./          ----- Message from Sussy Ponce sent at 2/14/2022 11:22 AM CST -----  Contact: self  Stacey Wade would like a call back at [phone] ext [phone], in regards to consulting with nurse.

## 2022-02-15 ENCOUNTER — HOSPITAL ENCOUNTER (OUTPATIENT)
Dept: RADIOLOGY | Facility: HOSPITAL | Age: 57
Discharge: HOME | End: 2022-02-15
Attending: NURSE PRACTITIONER
Payer: COMMERCIAL

## 2022-02-15 ENCOUNTER — OFFICE VISIT (OUTPATIENT)
Dept: FAMILY MEDICINE | Facility: CLINIC | Age: 57
End: 2022-02-15
Payer: COMMERCIAL

## 2022-02-15 VITALS
DIASTOLIC BLOOD PRESSURE: 80 MMHG | HEIGHT: 62 IN | BODY MASS INDEX: 34.08 KG/M2 | WEIGHT: 185.19 LBS | TEMPERATURE: 97 F | HEART RATE: 98 BPM | SYSTOLIC BLOOD PRESSURE: 139 MMHG | RESPIRATION RATE: 18 BRPM | OXYGEN SATURATION: 95 %

## 2022-02-15 DIAGNOSIS — Z12.31 ENCOUNTER FOR SCREENING MAMMOGRAM FOR MALIGNANT NEOPLASM OF BREAST: ICD-10-CM

## 2022-02-15 DIAGNOSIS — M25.562 ACUTE PAIN OF LEFT KNEE: Primary | ICD-10-CM

## 2022-02-15 DIAGNOSIS — Z91.89 AT HIGH RISK FOR BREAST CANCER: ICD-10-CM

## 2022-02-15 PROCEDURE — 1160F RVW MEDS BY RX/DR IN RCRD: CPT | Mod: CPTII,S$GLB,, | Performed by: FAMILY MEDICINE

## 2022-02-15 PROCEDURE — 3077F PR MOST RECENT SYSTOLIC BLOOD PRESSURE >= 140 MM HG: ICD-10-PCS | Mod: CPTII,S$GLB,, | Performed by: FAMILY MEDICINE

## 2022-02-15 PROCEDURE — 77067 SCR MAMMO BI INCL CAD: CPT | Mod: 26,,, | Performed by: RADIOLOGY

## 2022-02-15 PROCEDURE — 77067 MAMMO DIGITAL SCREENING BILAT WITH TOMO: ICD-10-PCS | Mod: 26,,, | Performed by: RADIOLOGY

## 2022-02-15 PROCEDURE — 77067 SCR MAMMO BI INCL CAD: CPT | Mod: TC

## 2022-02-15 PROCEDURE — 77063 BREAST TOMOSYNTHESIS BI: CPT | Mod: TC

## 2022-02-15 PROCEDURE — 3079F DIAST BP 80-89 MM HG: CPT | Mod: CPTII,S$GLB,, | Performed by: FAMILY MEDICINE

## 2022-02-15 PROCEDURE — 77063 MAMMO DIGITAL SCREENING BILAT WITH TOMO: ICD-10-PCS | Mod: 26,,, | Performed by: RADIOLOGY

## 2022-02-15 PROCEDURE — 1159F PR MEDICATION LIST DOCUMENTED IN MEDICAL RECORD: ICD-10-PCS | Mod: CPTII,S$GLB,, | Performed by: FAMILY MEDICINE

## 2022-02-15 PROCEDURE — 3079F PR MOST RECENT DIASTOLIC BLOOD PRESSURE 80-89 MM HG: ICD-10-PCS | Mod: CPTII,S$GLB,, | Performed by: FAMILY MEDICINE

## 2022-02-15 PROCEDURE — 99213 PR OFFICE/OUTPT VISIT, EST, LEVL III, 20-29 MIN: ICD-10-PCS | Mod: S$GLB,,, | Performed by: FAMILY MEDICINE

## 2022-02-15 PROCEDURE — 99999 PR PBB SHADOW E&M-EST. PATIENT-LVL V: CPT | Mod: PBBFAC,,, | Performed by: FAMILY MEDICINE

## 2022-02-15 PROCEDURE — 3008F PR BODY MASS INDEX (BMI) DOCUMENTED: ICD-10-PCS | Mod: CPTII,S$GLB,, | Performed by: FAMILY MEDICINE

## 2022-02-15 PROCEDURE — 1159F MED LIST DOCD IN RCRD: CPT | Mod: CPTII,S$GLB,, | Performed by: FAMILY MEDICINE

## 2022-02-15 PROCEDURE — 77063 BREAST TOMOSYNTHESIS BI: CPT | Mod: 26,,, | Performed by: RADIOLOGY

## 2022-02-15 PROCEDURE — 3008F BODY MASS INDEX DOCD: CPT | Mod: CPTII,S$GLB,, | Performed by: FAMILY MEDICINE

## 2022-02-15 PROCEDURE — 99213 OFFICE O/P EST LOW 20 MIN: CPT | Mod: S$GLB,,, | Performed by: FAMILY MEDICINE

## 2022-02-15 PROCEDURE — 1160F PR REVIEW ALL MEDS BY PRESCRIBER/CLIN PHARMACIST DOCUMENTED: ICD-10-PCS | Mod: CPTII,S$GLB,, | Performed by: FAMILY MEDICINE

## 2022-02-15 PROCEDURE — 3077F SYST BP >= 140 MM HG: CPT | Mod: CPTII,S$GLB,, | Performed by: FAMILY MEDICINE

## 2022-02-15 PROCEDURE — 99999 PR PBB SHADOW E&M-EST. PATIENT-LVL V: ICD-10-PCS | Mod: PBBFAC,,, | Performed by: FAMILY MEDICINE

## 2022-02-15 NOTE — PROGRESS NOTES
CHIEF COMPLAINT:  This is a 56-year-old female here for follow-up left knee pain.      SUBJECTIVE:  The patient had onset of left knee pain 3 weeks ago without history of trauma.  Patient had knee swelling and difficulty ambulating.  Patient was taking a short prednisone taper due to antecedent COVID-19 infection.  She was given a Depo-Medrol injection at the time of visit 1 week ago.  The pain in her knee has decreased to 5/10 instead of 8/10.  She had increased swelling in her left lower leg yesterday which has improved today.  She continues to use a walker as needed.  She needs an excuse to return to work on light duty.  However her work does want her back using a walker.  Uric acid was within normal range.  CBC was unremarkable except for leukocytosis related to oral steroids.    ROS:  GENERAL: Patient denies fever, chills, night sweats.  Patient denies weight gain or loss. Patient denies anorexia, fatigue, weakness or swollen glands.  SKIN: Patient denies rash.  HEENT: Patient denies sore throat, ear pain, hearing loss, nasal congestion, or runny nose. Patient denies visual disturbance, eye irritation or discharge.  LUNGS: Patient denies cough, wheeze or hemoptysis.  CARDIOVASCULAR: Patient denies chest pain, shortness of breath, palpitations, syncope or lower extremity edema.  GI: Patient denies abdominal pain, nausea, vomiting, diarrhea, constipation, blood in stool or melena.  GENITOURINARY: Patient denies dysuria, frequency, hematuria, nocturia, urgency or incontinence.  MUSCULOSKELETAL: Patient denies joint redness or warmth.  Positive joint pain and swelling.    NEUROLOGIC: Patient denies headache, vertigo, paresthesias, weakness in limb, or abnormality of gait.     OBJECTIVE:   GENERAL: Well-developed well-nourished obese black female alert and oriented x3 in no mild distress.  Memory, judgment and cognition without deficit.  Accompanied by family member.  SKIN: Clear without rash.  Normal color and  tone.  HEENT: Eyes: Clear conjunctivae.  No scleral icterus.    NECK: Supple, normal range of motion.   LUNGS:  Normal respiratory effort.  EXTREMITIES: Without cyanosis, clubbing or edema.  Distal pulses 2+ and equal.  Decreased range of motion left knee due to pain.  No joint effusion, erythema or warmth.   Ecchymoses along popliteal fossa due to brace.  Negative patellar apprehension.  No joint line tenderness.  Negative drawers.  Negative Dianne's.  NEUROLOGIC: Motor strength equal bilaterally.  Sensation normal to touch.  Antalgic gait.  No tremor.    ASSESSMENT:  1. Acute pain of left knee      PLAN:  1. Physical therapy.  2. Return to work February 21 without walker.  3. Follow-up if no improvement or worsening symptoms.    20 minutes of total time spent on the encounter, which includes face to face time and non-face to face time preparing to see the patient (eg, review of tests), Obtaining and/or reviewing separately obtained history, Documenting clinical information in the electronic or other health record, Independently interpreting results (not separately reported) and communicating results to the patient/family/caregiver, or Care coordination (not separately reported).     This note is generated with speech recognition software and is subject to transcription error and sound alike phrases that may be missed by proofreading.

## 2022-02-15 NOTE — LETTER
February 15, 2022    Stacey Wade  [address]  [place] [address]             [facility]  Family Medicine  [address] [facility] [address]  Phone: [phone]  Fax: [phone]   February 15, 2022     Patient: Stacey Wade   YOB: 1965   Date of Visit: 2/15/2022       To Whom it May Concern:    Stacey Wade was seen in my clinic on 2/15/2022. She may return to work on 2/21/2022 on light duty pending physical therapy evaluation.    Please excuse her from any classes or work missed.    If you have any questions or concerns, please don't hesitate to call.    Sincerely,          Savannah Lindsey MD/JUAN FRANCISCO Okeefe

## 2022-02-16 ENCOUNTER — OFFICE VISIT (OUTPATIENT)
Dept: HEMATOLOGY/ONCOLOGY | Facility: CLINIC | Age: 57
End: 2022-02-16
Payer: COMMERCIAL

## 2022-02-16 VITALS
DIASTOLIC BLOOD PRESSURE: 81 MMHG | SYSTOLIC BLOOD PRESSURE: 128 MMHG | HEART RATE: 79 BPM | RESPIRATION RATE: 18 BRPM | HEIGHT: 62 IN | OXYGEN SATURATION: 98 % | TEMPERATURE: 97 F | BODY MASS INDEX: 35.01 KG/M2 | WEIGHT: 190.25 LBS

## 2022-02-16 DIAGNOSIS — Z71.9 HEALTH EDUCATION/COUNSELING: ICD-10-CM

## 2022-02-16 DIAGNOSIS — Z71.89 COUNSELING AND COORDINATION OF CARE: ICD-10-CM

## 2022-02-16 DIAGNOSIS — Z80.3 FAMILY HISTORY OF BREAST CANCER: ICD-10-CM

## 2022-02-16 DIAGNOSIS — Z71.89 COUNSELING ON HEALTH PROMOTION AND DISEASE PREVENTION: ICD-10-CM

## 2022-02-16 DIAGNOSIS — Z91.89 AT HIGH RISK FOR BREAST CANCER: Primary | ICD-10-CM

## 2022-02-16 DIAGNOSIS — Z12.39 ENCOUNTER FOR BREAST CANCER SCREENING USING NON-MAMMOGRAM MODALITY: ICD-10-CM

## 2022-02-16 PROCEDURE — 1159F MED LIST DOCD IN RCRD: CPT | Mod: CPTII,S$GLB,, | Performed by: NURSE PRACTITIONER

## 2022-02-16 PROCEDURE — 3074F SYST BP LT 130 MM HG: CPT | Mod: CPTII,S$GLB,, | Performed by: NURSE PRACTITIONER

## 2022-02-16 PROCEDURE — 1160F RVW MEDS BY RX/DR IN RCRD: CPT | Mod: CPTII,S$GLB,, | Performed by: NURSE PRACTITIONER

## 2022-02-16 PROCEDURE — 3079F PR MOST RECENT DIASTOLIC BLOOD PRESSURE 80-89 MM HG: ICD-10-PCS | Mod: CPTII,S$GLB,, | Performed by: NURSE PRACTITIONER

## 2022-02-16 PROCEDURE — 1159F PR MEDICATION LIST DOCUMENTED IN MEDICAL RECORD: ICD-10-PCS | Mod: CPTII,S$GLB,, | Performed by: NURSE PRACTITIONER

## 2022-02-16 PROCEDURE — 3008F PR BODY MASS INDEX (BMI) DOCUMENTED: ICD-10-PCS | Mod: CPTII,S$GLB,, | Performed by: NURSE PRACTITIONER

## 2022-02-16 PROCEDURE — 99214 PR OFFICE/OUTPT VISIT, EST, LEVL IV, 30-39 MIN: ICD-10-PCS | Mod: S$GLB,,, | Performed by: NURSE PRACTITIONER

## 2022-02-16 PROCEDURE — 99214 OFFICE O/P EST MOD 30 MIN: CPT | Mod: S$GLB,,, | Performed by: NURSE PRACTITIONER

## 2022-02-16 PROCEDURE — 99999 PR PBB SHADOW E&M-EST. PATIENT-LVL V: CPT | Mod: PBBFAC,,, | Performed by: NURSE PRACTITIONER

## 2022-02-16 PROCEDURE — 3079F DIAST BP 80-89 MM HG: CPT | Mod: CPTII,S$GLB,, | Performed by: NURSE PRACTITIONER

## 2022-02-16 PROCEDURE — 99999 PR PBB SHADOW E&M-EST. PATIENT-LVL V: ICD-10-PCS | Mod: PBBFAC,,, | Performed by: NURSE PRACTITIONER

## 2022-02-16 PROCEDURE — 3008F BODY MASS INDEX DOCD: CPT | Mod: CPTII,S$GLB,, | Performed by: NURSE PRACTITIONER

## 2022-02-16 PROCEDURE — 3074F PR MOST RECENT SYSTOLIC BLOOD PRESSURE < 130 MM HG: ICD-10-PCS | Mod: CPTII,S$GLB,, | Performed by: NURSE PRACTITIONER

## 2022-02-16 PROCEDURE — 1160F PR REVIEW ALL MEDS BY PRESCRIBER/CLIN PHARMACIST DOCUMENTED: ICD-10-PCS | Mod: CPTII,S$GLB,, | Performed by: NURSE PRACTITIONER

## 2022-02-20 ENCOUNTER — PATIENT MESSAGE (OUTPATIENT)
Dept: FAMILY MEDICINE | Facility: CLINIC | Age: 57
End: 2022-02-20
Payer: COMMERCIAL

## 2022-02-21 ENCOUNTER — PATIENT MESSAGE (OUTPATIENT)
Dept: FAMILY MEDICINE | Facility: CLINIC | Age: 57
End: 2022-02-21
Payer: COMMERCIAL

## 2022-02-22 ENCOUNTER — PATIENT MESSAGE (OUTPATIENT)
Dept: FAMILY MEDICINE | Facility: CLINIC | Age: 57
End: 2022-02-22
Payer: COMMERCIAL

## 2022-02-22 ENCOUNTER — CLINICAL SUPPORT (OUTPATIENT)
Dept: REHABILITATION | Facility: HOSPITAL | Age: 57
End: 2022-02-22
Payer: COMMERCIAL

## 2022-02-22 DIAGNOSIS — M25.662 DECREASED RANGE OF MOTION OF LEFT KNEE: ICD-10-CM

## 2022-02-22 DIAGNOSIS — R26.2 DIFFICULTY WALKING: ICD-10-CM

## 2022-02-22 DIAGNOSIS — M25.562 ACUTE PAIN OF LEFT KNEE: ICD-10-CM

## 2022-02-22 PROCEDURE — 97163 PT EVAL HIGH COMPLEX 45 MIN: CPT | Performed by: PHYSICAL THERAPIST

## 2022-02-22 NOTE — PLAN OF CARE
[facility]  Physical Therapy Initial Evaluation    Name: Stacey Wade  Clinic Number: [id number]    Therapy Diagnosis:   Encounter Diagnoses   Name Primary?    Acute pain of left knee     Difficulty walking     Decreased range of motion of left knee      Physician: Savannah Lindsey MD    Physician Orders: PT Eval and Treat   Medical Diagnosis from Referral: M25.562 (ICD-10-CM) - Pain in left knee  Evaluation Date: 2/22/2022  Authorization Period Expiration: 12/31/2022  Plan of Care Expiration: 4/5/2022  Visit # / Visits authorized: 1/ 1  FOTO: 1/3      Time In: 745  Time Out: 830  Total Billable Time: 45 minutes     Precautions: Standard    Subjective     Date of onset: First week of February.     History of current condition - Stacey reports: previously having covid-19 infection during week of 1/31/2022. Insidious onset of left knee pain. Pt reports: helped mom get dressed one day and then had to go to pharmacy to get medication for her. Once she got to pharmacy, was unable to full lift left leg out of car but once she did had significant pain in left leg to where walking became difficult. Pt had to hold onto windshield to get back in car. Once back at her house, took over 20 minutes to get back in house due to left knee pain. Pt had to use wheelchair for multiple days until knee was tolerable enough to walk with her moms rolling walker. Has noticed some tingling and numbness into foot. Increased popping and clicking. Pt reports left ankle and foot has been swollen as well for unknown reason. 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   .     Medical History:   Past Medical History:   Diagnosis Date    Allergic rhinitis     Anxiety     Hypertension     Urticaria        Surgical History:   Stacey Wade  has a past surgical history that includes  Hernia repair (Left) and Colonoscopy (N/A, 1/18/2018).    Medications:   Stacey has a current medication list which includes the following prescription(s): aspirin, betamethasone dipropionate, butalbital-acetaminophen-caffeine -40 mg, doxepin, epinephrine, famotidine, fexofenadine, hydroxyzine hcl, mometasone, montelukast, ondansetron, potassium chloride, prednisone, rizatriptan, tolterodine, tolterodine, triamterene-hydrochlorothiazide 37.5-25 mg, and xolair.    Allergies:   Review of patient's allergies indicates:   Allergen Reactions    Benzalkonium chloride     Black pepper      Other reaction(s): Hives    Chocolate flavor      Other reaction(s): Hives    Citrus and derivatives Hives     LEMON PRODUCTS    Grapefruit Hives     Other reaction(s): Hives    Ibuprofen Swelling    Latex      Other reaction(s): Hives    Lemon balm (casper officinalis) Hives     Anything with lemon in it    Naproxen Swelling    Neomycin-bacitracin-polymyxin      Other reaction(s): Rash    Oats (richard) Hives     Oat foods (oatmeal, etc...)    Vegetable acetoglycerides Hives     Vegetable gums    Wheat containing prod     Wheat flour Hives        Imaging, X-rays: Lateral patellar tilt, right greater than left.  No fracture or dislocation.  Subcentimeter sclerotic nonaggressive appearing fibro-osseous lesion in the posterolateral proximal left tibial metaphysis.  Minimal equivocal medial compartment narrowing of the left knee.  No effusion.    Prior Therapy: No  Social History:  lives with their family  Occupation: Pharmacy Technician   Prior Level of Function: able to walk with no knee pain  Current Level of Function: unable to walk without     Pain:   Current 8/10, worst 10/10, best 8/10   Location: left knee   Description: Aching, Burning, Throbbing, Deep and Sharp  Aggravating Factors: Sitting, Standing, Laying and Walking  Easing Factors: nothing    Pts goals: decrease pain in left knee, improve walking  "ability    Objective     Posture: no deficits noted. Does lean forward while using RW  Palpation: tenderness noted along anterior and posterior knee. Increase pain with all palpation  Sensation: some numbness noted in left foot but not consistently    Range of Motion/Strength:     Hip Right Left Pain/Dysfunction with Movement   AROM/PROM NT NT Unable to test due to increase pain in supine position in both knees     Knee Right Left Pain/Dysfunction with Movement   AROM/PROM      flexion  120/120  110/110 Pain   extension  -7/-8  -5/-6 Pain     Ankle Right Left Pain/Dysfunction with Movement   AROM/PROM NT NT Not tested due to increase in knee pain     L/E MMT Right Left Pain/Dysfunction with Movement   Hip Flexion 4/5 4/5    Hip Extension 4/5 4/5    Hip Abduction 3+/5 3+/5    Hip Adduction 4-/5 4-/5    Knee Flexion 3+/5 3+/5 Pain   Knee Extension 3+/5 3-/5 Pain   Ankle DF 4/5 4/5        Flexibility: Unable to gain hamstring length measures due to positioning    Special Tests: Patellar mobility - normal, valgus and varus stress tests (-). Lachman's (-), unable to test meniscus due to pain and positioning intolerance.     Gait Analysis:With AD.  Device Used -  Rolling walker  Deviations: walks with walker to far forward with legs behind, decreased knee flexion during push off, slow miguel with rest breaks needed every 15 ft due to pain.     TU.18 seconds   TUG Cutoff Scores:        30 second sit-to-stand test (without U/E support): 0 (2 w/ UE support)    30" sit to stand Cutoff Scores:      Single Leg Stance: unable to test due to pain and apprehension    Balance: unable to test due to pain and apprehension    CMS Impairment/Limitation/Restriction for FOTO Knee Survey    Therapist reviewed FOTO scores for Stacey Wade on 2022.   FOTO documents entered into [employer] - see Media section.    Limitation Score: 71%         TREATMENT   Evaluation only due to time constraint, pain, and apprehension to activity " due to amount of pain      Home Exercises Provided and Patient Education Provided     Education provided:   - Home modalities as needed  - Keep active at home, even with AD, to decrease stiffness in knee  - May need further imaging if no progress made in first 3-4 weeks of therapy  - Home exercise program within next two visits, need further evaluation of activity limitations and tolerance.       Assessment   Stacey is a 56 y.o. female referred to outpatient Physical Therapy with a medical diagnosis of Left knee pain. Pt presents with severe left knee pain with use of rolling walker to decrease weight bearing load. It is unclear after initial evaluation if patient will benefit from therapy. Activity limitations due to pain will be monitored next visit and home program will be initiated.     Pt prognosis is Guarded.   Pt will benefit from skilled outpatient Physical Therapy to address the deficits stated above and in the chart below, provide pt/family education, and to maximize pt's level of independence.     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), Profession (standing, walking)    Medical Necessity is demonstrated by the following  History  Co-morbidities and personal factors that may impact the plan of care Co-morbidities:   anxiety and high BMI    Personal Factors:   coping style  lifestyle     high   Examination  Body Structures and Functions, activity limitations and participation restrictions that may impact the plan of care Body Regions:   lower extremities    Body Systems:    gross symmetry  ROM  strength  gross coordinated movement  balance  gait  transfers    Participation Restrictions:   walking    Activity limitations:   Learning and  applying knowledge  no deficits    General Tasks and Commands  undertaking a single task    Communication  no deficits    Mobility  lifting and carrying objects  walking  moving around using equipment (WC)  driving (bike, car, motorcycle)    Self care  washing oneself (bathing, drying, washing hands)  caring for body parts (brushing teeth, shaving, grooming)  toileting  dressing    Domestic Life  shopping  cooking  doing house work (cleaning house, washing dishes, laundry)    Interactions/Relationships  no deficits    Life Areas  employment    Community and Social Life  community life  recreation and leisure         high   Clinical Presentation unstable clinical presentation with unpredictable characteristics high   Decision Making/ Complexity Score: high       Goals:  Short Term Goals (4 Weeks):  1. Patient will be compliant with home exercise program to supplement therapy in restoring pain free function.  2. Patient will improve impaired lower extremity manual muscle tests to >/= 4/5 to improve dynamic hip/knee support for functional tasks.  3. Patient will demonstrate normal, reciprocal gait pattern with no AD to show functional improvement.    Long Term Goals (6 Weeks):  1. Patient will improve FOTO score to </= 60% limited to decrease perceived limitation with mobility.   2. Patient will improve impaired lower extremity manual muscle tests to >/= 4+/5 to improve dynamic hip/knee support for functional tasks.  3. Patient will demonstrate ability to walk for 6 minutes straight, with only needing one rest break, to show functional endurance and strength improvements  4. Patient will achieve less than 20 seconds on TUG test to show functional gait speed improvement.  5. Patient will achieve 4 sit to stands, with no hand use, in 30 seconds to show functional lower extremity strength improvement.      Plan   Plan of care Certification: 2/22/2022 to 4/5/2022.    Outpatient Physical Therapy 2 times weekly for 6 weeks  to include the following interventions: Aquatic Therapy, Cervical/Lumbar Traction, Electrical Stimulation Russian, Gait Training, Manual Therapy, Moist Heat/ Ice, Neuromuscular Re-ed, Patient Education, Self Care, Therapeutic Activities, Therapeutic Exercise and Ultrasound, ASTYM, Kinesiotaping PRN, Functional Dry Needling    Gabe Ortega, PT, DPT

## 2022-02-22 NOTE — TELEPHONE ENCOUNTER
X-ray did not show anything except possibly minimal arthritis. I don't understand what else she wants.

## 2022-02-23 ENCOUNTER — PATIENT MESSAGE (OUTPATIENT)
Dept: FAMILY MEDICINE | Facility: CLINIC | Age: 57
End: 2022-02-23
Payer: COMMERCIAL

## 2022-02-24 ENCOUNTER — PATIENT MESSAGE (OUTPATIENT)
Dept: REHABILITATION | Facility: HOSPITAL | Age: 57
End: 2022-02-24
Payer: COMMERCIAL

## 2022-02-24 ENCOUNTER — PATIENT MESSAGE (OUTPATIENT)
Dept: FAMILY MEDICINE | Facility: CLINIC | Age: 57
End: 2022-02-24
Payer: COMMERCIAL

## 2022-02-24 PROBLEM — M25.562 ACUTE PAIN OF LEFT KNEE: Status: ACTIVE | Noted: 2022-02-24

## 2022-02-24 PROBLEM — M25.662 DECREASED RANGE OF MOTION OF LEFT KNEE: Status: ACTIVE | Noted: 2022-02-24

## 2022-02-24 PROBLEM — R26.2 DIFFICULTY WALKING: Status: ACTIVE | Noted: 2022-02-24

## 2022-02-25 ENCOUNTER — PATIENT MESSAGE (OUTPATIENT)
Dept: RESEARCH | Facility: HOSPITAL | Age: 57
End: 2022-02-25
Payer: COMMERCIAL

## 2022-02-28 ENCOUNTER — CLINICAL SUPPORT (OUTPATIENT)
Dept: REHABILITATION | Facility: HOSPITAL | Age: 57
End: 2022-02-28
Payer: COMMERCIAL

## 2022-02-28 ENCOUNTER — DOCUMENTATION ONLY (OUTPATIENT)
Dept: REHABILITATION | Facility: HOSPITAL | Age: 57
End: 2022-02-28
Payer: COMMERCIAL

## 2022-02-28 DIAGNOSIS — M25.662 DECREASED RANGE OF MOTION OF LEFT KNEE: ICD-10-CM

## 2022-02-28 DIAGNOSIS — M25.562 ACUTE PAIN OF LEFT KNEE: Primary | ICD-10-CM

## 2022-02-28 DIAGNOSIS — R26.2 DIFFICULTY WALKING: ICD-10-CM

## 2022-02-28 PROCEDURE — 97110 THERAPEUTIC EXERCISES: CPT | Mod: CQ

## 2022-02-28 NOTE — PROGRESS NOTES
PT/PTA met face to face to discuss pt's treatment plan and progress towards established goals. Pt will be seen by a physical therapist minimally every 6th visit or every 30 days.        Tee Schaffer PTA

## 2022-02-28 NOTE — PROGRESS NOTES
[facility]   Physical Therapy Treatment Note     Name: Stacey Wade  Clinic Number: [id number]    Therapy Diagnosis:   Encounter Diagnoses   Name Primary?    Acute pain of left knee Yes    Difficulty walking     Decreased range of motion of left knee      Physician: Savannah Lindsey MD    Visit Date: 2/28/2022       Physician Orders: PT Eval and Treat   Medical Diagnosis from Referral: M25.562 (ICD-10-CM) - Pain in left knee  Evaluation Date: 2/22/2022  Authorization Period Expiration: 12/31/2022  Plan of Care Expiration: 4/5/2022  Visit # / Visits authorized: 1/20 (+ 1 evaluation)  FOTO: 1/3    Precautions: Standard    PTA Visit #: 1/5     Time In: 0745  Time Out: 0830  Total Billable Time: 40 minutes    SUBJECTIVE     Patient reports: tight sensation at medial and posteriorl left knee joint. Minimal decreased pain after medication.     She N/A compliant with home exercise program.    Response to previous treatment: sore after evaluation    Functional change: decreased home chores, unable to get in/out of bathtub, decreased walking, and difficult to perform sit<-->stand    Pain: 7/10     Location: left knee    OBJECTIVE     Objective Measures updated at progress report unless specified.       TREATMENT     Stacey received the treatments listed below:     THERAPEUTIC EXERCISES to develop strength, endurance, ROM, flexibility and posture for (40) minutes including:    Intervention Performed Today    Exercise bike x 5 minutes   Quad set x X 10 with towel roll   Heel slide  x 2 minutes with slide board and straps  2 minutes with active range   Straight leg raise  x 2 x 5   bridges x 3 x 10                     Plan for Next Visit:          PATIENT EDUCATION AND HOME EXERCISES     Home Exercises Provided and Patient Education Provided     Education provided: (during session) minutes   Patient educated on biomechanical justification for therapeutic exercise and importance of  compliance with HEP in order to improve overall impairments and QOL    Patient was educated on all the above exercise prior/during/after for proper posture, positioning, and execution for safe performance with home exercise program.    Ambulate in rolling walker for increased posture    Written Home Exercises Provided: yes.  Exercises were reviewed and tSacey was able to demonstrate them prior to the end of the session.  Stacey demonstrated good  understanding of the education provided. See EMR under Patient Instructions for exercises provided during therapy sessions. Paper home exercises given to patient and exercises put on my chart keren today.      ASSESSMENT     Adjusted rolling walker due to it being too tall. Decreased height 2 holes but it could still be lowered more. Educated on correct walking pattern with no shoulder elevation and staying in rolling walker during gait. Hesitant to move her left knee initially with all activity but once activity was performed a few reps her mobility in her knee improved. Ambulated with improved quality after this session. Patient demonstrated better left lower extremity movement with automatic tasks such as lifting her leg to allow PTA to assist her with putting her shoe back on.      Stacey is progressing well towards her goals.   Pt prognosis is Guarded.     Pt will continue to benefit from skilled outpatient physical therapy to address the deficits listed in the problem list box on initial evaluation, provide pt/family education and to maximize pt's level of independence in the home and community environment.     Pt's spiritual, cultural and educational needs considered and pt agreeable to plan of care and goals.        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 Patient will be compliant with home  exercise program to supplement therapy in restoring pain free function.  2. Patient will improve impaired lower extremity manual muscle tests to >/= 4/5 to improve dynamic hip/knee support for functional tasks.  3. Patient will demonstrate normal, reciprocal gait pattern with no AD to show functional improvement.     Long Term Goals (6 Weeks):  1. Patient will improve FOTO score to </= 60% limited to decrease perceived limitation with mobility.   2. Patient will improve impaired lower extremity manual muscle tests to >/= 4+/5 to improve dynamic hip/knee support for functional tasks.  3. Patient will demonstrate ability to walk for 6 minutes straight, with only needing one rest break, to show functional endurance and strength improvements  4. Patient will achieve less than 20 seconds on TUG test to show functional gait speed improvement.  5. Patient will achieve 4 sit to stands, with no hand use, in 30 seconds to show functional lower extremity strength improvement.     Goals Key:  PC= progressing/continue; PM= partially met;        DC= discontinue    PLAN     Continue Plan of Care (POC) and progress per patient tolerance. See treatment section for details on planned progressions next session.      Tee Schaffer, PTA

## 2022-03-02 ENCOUNTER — PATIENT MESSAGE (OUTPATIENT)
Dept: FAMILY MEDICINE | Facility: CLINIC | Age: 57
End: 2022-03-02
Payer: COMMERCIAL

## 2022-03-04 ENCOUNTER — CLINICAL SUPPORT (OUTPATIENT)
Dept: REHABILITATION | Facility: HOSPITAL | Age: 57
End: 2022-03-04
Payer: COMMERCIAL

## 2022-03-04 ENCOUNTER — PATIENT MESSAGE (OUTPATIENT)
Dept: FAMILY MEDICINE | Facility: CLINIC | Age: 57
End: 2022-03-04
Payer: COMMERCIAL

## 2022-03-04 DIAGNOSIS — M25.562 ACUTE PAIN OF LEFT KNEE: Primary | ICD-10-CM

## 2022-03-04 DIAGNOSIS — R26.2 DIFFICULTY WALKING: ICD-10-CM

## 2022-03-04 DIAGNOSIS — M25.662 DECREASED RANGE OF MOTION OF LEFT KNEE: ICD-10-CM

## 2022-03-04 PROCEDURE — 97110 THERAPEUTIC EXERCISES: CPT | Mod: CQ

## 2022-03-04 NOTE — PROGRESS NOTES
[facility]   Physical Therapy Treatment Note     Name: Stacey Wade  Clinic Number: [id number]    Therapy Diagnosis:   Encounter Diagnoses   Name Primary?    Acute pain of left knee Yes    Difficulty walking     Decreased range of motion of left knee      Physician: Savannah Lindsey MD    Visit Date: 3/4/2022       Physician Orders: PT Eval and Treat   Medical Diagnosis from Referral: M25.562 (ICD-10-CM) - Pain in left knee  Evaluation Date: 2/22/2022  Authorization Period Expiration: 12/31/2022  Plan of Care Expiration: 4/5/2022  Visit # / Visits authorized: 2/20 (+ 1 evaluation)  FOTO: 1/3    Precautions: Standard    PTA Visit #: 2/5     Time In: 0830  Time Out: 0915  Total Billable Time: 40 minutes    SUBJECTIVE     Patient reports: tight sensation at anterior left knee joint. Minimal decreased pain after medication.     She was compliant with home exercise program yesterday but too sore to do the exercises before then.    Response to previous treatment: sore the next day after her last treatment     Functional change: decreased home chores, unable to get in/out of bathtub, decreased walking but with improved posture since rolling walker height was adjusted, and difficult to perform sit<-->stand    Pain: 6/10     Location: left knee    OBJECTIVE     Objective Measures updated at progress report unless specified.       TREATMENT     Stacey received the treatments listed below:     THERAPEUTIC EXERCISES to develop strength, endurance, ROM, flexibility and posture for (40) minutes including:    Intervention Performed Today    Exercise bike x 5 minutes slow revolutions for range of motion   Quad set x X 3 minutes with 5 second hold10 with towel roll   Heel slide    x 2 minutes with slide board and straps  2 minutes x 2 active range   Straight leg raise  x 2 x 5   bridges x 3 x 10    Hip abduction with band     Hip adduction with ball      Side lying clams  x 2 x 10      Plan  for Next Visit:      All above exercises are performed on her left lower extremity.     Left knee range of motion supine:     PATIENT EDUCATION AND HOME EXERCISES     Home Exercises Provided and Patient Education Provided     Education provided: (during session) minutes   Patient educated on biomechanical justification for therapeutic exercise and importance of compliance with HEP in order to improve overall impairments and QOL    Patient was educated on all the above exercise prior/during/after for proper posture, positioning, and execution for safe performance with home exercise program.    Ambulate in rolling walker for increased posture    Written Home Exercises Provided: yes.  Exercises were reviewed and Stacey was able to demonstrate them prior to the end of the session.  Stacey demonstrated good  understanding of the education provided. See EMR under Patient Instructions for exercises provided during therapy sessions. Paper home exercises given to patient and exercises put on my chart keren today.      ASSESSMENT     Adjusted rolling walker due to it being too tall. Decreased height 1 hole. Educated on correct walking pattern with no shoulder elevation and walked with rolling walker with improved posture.  Ambulated with improved quality after this session.      Stacey is progressing well towards her goals.   Pt prognosis is Guarded.     Pt will continue to benefit from skilled outpatient physical therapy to address the deficits listed in the problem list box on initial evaluation, provide pt/family education and to maximize pt's level of independence in the home and community environment.     Pt's spiritual, cultural and educational needs considered and pt agreeable to plan of care and goals.        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 Patient will be compliant with home exercise program to supplement therapy in restoring pain free function.  2. Patient will improve impaired lower extremity manual muscle tests to >/= 4/5 to improve dynamic hip/knee support for functional tasks.  3. Patient will demonstrate normal, reciprocal gait pattern with no AD to show functional improvement.     Long Term Goals (6 Weeks):  1. Patient will improve FOTO score to </= 60% limited to decrease perceived limitation with mobility.   2. Patient will improve impaired lower extremity manual muscle tests to >/= 4+/5 to improve dynamic hip/knee support for functional tasks.  3. Patient will demonstrate ability to walk for 6 minutes straight, with only needing one rest break, to show functional endurance and strength improvements  4. Patient will achieve less than 20 seconds on TUG test to show functional gait speed improvement.  5. Patient will achieve 4 sit to stands, with no hand use, in 30 seconds to show functional lower extremity strength improvement.     Goals Key:  PC= progressing/continue; PM= partially met;        DC= discontinue    PLAN     Continue Plan of Care (POC) and progress per patient tolerance. See treatment section for details on planned progressions next session.      Tee Schaffer, PTA

## 2022-03-07 ENCOUNTER — PATIENT MESSAGE (OUTPATIENT)
Dept: FAMILY MEDICINE | Facility: CLINIC | Age: 57
End: 2022-03-07

## 2022-03-07 ENCOUNTER — CLINICAL SUPPORT (OUTPATIENT)
Dept: FAMILY MEDICINE | Facility: CLINIC | Age: 57
End: 2022-03-07
Payer: COMMERCIAL

## 2022-03-07 PROCEDURE — 90686 IIV4 VACC NO PRSV 0.5 ML IM: CPT | Mod: S$GLB,,, | Performed by: FAMILY MEDICINE

## 2022-03-07 PROCEDURE — 90686 FLU VACCINE (QUAD) GREATER THAN OR EQUAL TO 3YO PRESERVATIVE FREE IM: ICD-10-PCS | Mod: S$GLB,,, | Performed by: FAMILY MEDICINE

## 2022-03-07 PROCEDURE — 99999 PR PBB SHADOW E&M-EST. PATIENT-LVL III: ICD-10-PCS | Mod: PBBFAC,,,

## 2022-03-07 PROCEDURE — 90471 IMMUNIZATION ADMIN: CPT | Mod: S$GLB,,, | Performed by: FAMILY MEDICINE

## 2022-03-07 PROCEDURE — 99999 PR PBB SHADOW E&M-EST. PATIENT-LVL III: CPT | Mod: PBBFAC,,,

## 2022-03-07 PROCEDURE — 90471 FLU VACCINE (QUAD) GREATER THAN OR EQUAL TO 3YO PRESERVATIVE FREE IM: ICD-10-PCS | Mod: S$GLB,,, | Performed by: FAMILY MEDICINE

## 2022-03-08 ENCOUNTER — CLINICAL SUPPORT (OUTPATIENT)
Dept: REHABILITATION | Facility: HOSPITAL | Age: 57
End: 2022-03-08
Payer: COMMERCIAL

## 2022-03-08 DIAGNOSIS — M25.662 DECREASED RANGE OF MOTION OF LEFT KNEE: ICD-10-CM

## 2022-03-08 DIAGNOSIS — M25.562 ACUTE PAIN OF LEFT KNEE: Primary | ICD-10-CM

## 2022-03-08 DIAGNOSIS — R26.2 DIFFICULTY WALKING: ICD-10-CM

## 2022-03-08 PROCEDURE — 97110 THERAPEUTIC EXERCISES: CPT | Mod: CQ

## 2022-03-08 NOTE — PROGRESS NOTES
[facility]   Physical Therapy Treatment Note     Name: Stacey Wade  Clinic Number: [id number]    Therapy Diagnosis:   Encounter Diagnoses   Name Primary?    Acute pain of left knee Yes    Difficulty walking     Decreased range of motion of left knee      Physician: Savannah Lindsey MD    Visit Date: 3/8/2022       Physician Orders: PT Eval and Treat   Medical Diagnosis from Referral: M25.562 (ICD-10-CM) - Pain in left knee  Evaluation Date: 2/22/2022  Authorization Period Expiration: 12/31/2022  Plan of Care Expiration: 4/5/2022  Visit # / Visits authorized: 3/20 (+ 1 evaluation)  FOTO: 1/3    Precautions: Standard    PTA Visit #: 3/5     Time In: 0800  Time Out: 0945  Total Billable Time: 39 minutes    SUBJECTIVE     Patient reports: tight sensation at posterior left knee joint. No pain medication taken today.     She was partially compliant with home exercise program but not consistently everyday.     Response to previous treatment: felt okay after her last treatment     Functional change: decreased home chores, unable to get in/out of bathtub, decreased walking but with improved posture since rolling walker height was adjusted, and difficult to perform sit<-->stand    Pain: 8/10     Location: left knee    OBJECTIVE     Objective Measures updated at progress report unless specified.       TREATMENT     Stacey received the treatments listed below:     THERAPEUTIC EXERCISES to develop strength, endurance, ROM, flexibility and posture for (39) minutes including:    Intervention Performed Today    Exercise bike x 5 minutes slow revolutions for range of motion   Quad set x X 3 minutes with 5 second hold 10 with towel roll   Heel slide    x 2 minutes with slide board and straps  3 minutes active range    Straight leg raise   2 x 5   bridges x 3 x 10    Hip abduction with band x 3 minutes bialteral   Hip adduction with ball  x 3 minutes bialteral   Side lying clams  x 3 x 10     Long arch quad  x 3 x 10                    Plan for Next Visit:      All above exercises are performed with her left lower extremity unless otherwise noted.       PATIENT EDUCATION AND HOME EXERCISES     Home Exercises Provided and Patient Education Provided     Education provided: (during session) minutes   Patient educated on biomechanical justification for therapeutic exercise and importance of compliance with HEP in order to improve overall impairments and QOL    Patient was educated on all the above exercise prior/during/after for proper posture, positioning, and execution for safe performance with home exercise program.    Importance of home exercise program compliance with bilateral lower extremity when she is having pain to assist with pain management.     Written Home Exercises Provided: yes.  Exercises were reviewed and Stacey was able to demonstrate them prior to the end of the session.  Stacey demonstrated good  understanding of the education provided. See EMR under Patient Instructions for exercises provided during therapy sessions. Paper home exercises given to patient and exercises put on my chart keren today.      ASSESSMENT     Initially with less left knee flexion with exercise bike and as activity progressed she continued to peddle slow but with improvement in knee range of motion. She was able to tolerate additional time with heel slides with no complaints. Ms. Wade performed all exercises slowly.       Stacey is progressing well towards her goals.   Pt prognosis is Guarded.     Pt will continue to benefit from skilled outpatient physical therapy to address the deficits listed in the problem list box on initial evaluation, provide pt/family education and to maximize pt's level of independence in the home and community environment.     Pt's spiritual, cultural and educational needs considered and pt agreeable to plan of care and goals.      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 Patient will be compliant with home exercise program to supplement therapy in restoring pain free function.  2. Patient will improve impaired lower extremity manual muscle tests to >/= 4/5 to improve dynamic hip/knee support for functional tasks.  3. Patient will demonstrate normal, reciprocal gait pattern with no AD to show functional improvement.     Long Term Goals (6 Weeks):  1. Patient will improve FOTO score to </= 60% limited to decrease perceived limitation with mobility.   2. Patient will improve impaired lower extremity manual muscle tests to >/= 4+/5 to improve dynamic hip/knee support for functional tasks.  3. Patient will demonstrate ability to walk for 6 minutes straight, with only needing one rest break, to show functional endurance and strength improvements  4. Patient will achieve less than 20 seconds on TUG test to show functional gait speed improvement.  5. Patient will achieve 4 sit to stands, with no hand use, in 30 seconds to show functional lower extremity strength improvement.     Goals Key:  PC= progressing/continue; PM= partially met;        DC= discontinue    PLAN     Continue Plan of Care (POC) and progress per patient tolerance. See treatment section for details on planned progressions next session.      Tee Schaffer, PTA     No

## 2022-03-09 ENCOUNTER — TELEPHONE (OUTPATIENT)
Dept: FAMILY MEDICINE | Facility: CLINIC | Age: 57
End: 2022-03-09

## 2022-03-09 ENCOUNTER — PATIENT MESSAGE (OUTPATIENT)
Dept: FAMILY MEDICINE | Facility: CLINIC | Age: 57
End: 2022-03-09
Payer: COMMERCIAL

## 2022-03-09 NOTE — TELEPHONE ENCOUNTER
----- Message from Angelina Valencia sent at 3/9/2022 12:19 PM CST -----  Contact: pt  The pt request a return call concerning her  FMLA paperwork, this paperwork in due by tomorrow, no additional info given and can be reached at [phone]///[id number]

## 2022-03-10 ENCOUNTER — CLINICAL SUPPORT (OUTPATIENT)
Dept: REHABILITATION | Facility: HOSPITAL | Age: 57
End: 2022-03-10
Payer: COMMERCIAL

## 2022-03-10 DIAGNOSIS — R26.2 DIFFICULTY WALKING: ICD-10-CM

## 2022-03-10 DIAGNOSIS — M25.662 DECREASED RANGE OF MOTION OF LEFT KNEE: ICD-10-CM

## 2022-03-10 DIAGNOSIS — M25.562 ACUTE PAIN OF LEFT KNEE: Primary | ICD-10-CM

## 2022-03-10 PROCEDURE — 97140 MANUAL THERAPY 1/> REGIONS: CPT

## 2022-03-10 PROCEDURE — 97110 THERAPEUTIC EXERCISES: CPT

## 2022-03-10 NOTE — PROGRESS NOTES
[facility]   Physical Therapy Treatment Note     Name: Stacey Wade  Clinic Number: [id number]    Therapy Diagnosis:   Encounter Diagnoses   Name Primary?    Acute pain of left knee Yes    Difficulty walking     Decreased range of motion of left knee      Physician: Savannah Lindsey MD    Visit Date: 3/10/2022       Physician Orders: PT Eval and Treat   Medical Diagnosis from Referral: M25.562 (ICD-10-CM) - Pain in left knee  Evaluation Date: 2/22/2022  Authorization Period Expiration: 12/31/2022  Plan of Care Expiration: 4/5/2022  Visit # / Visits authorized: 4/20 (+ 1 evaluation)  FOTO: 1/3    Precautions: Standard    PTA Visit #: 3/5     Time In: 10:15 am  Time Out: 11:00 am  Total Billable Time: 40 minutes    SUBJECTIVE     Patient reports: she has 7/10 left knee pain today.  She states she felt better after last visit but the pain continues to come back.  She uses kinesiotape on top of her compression pants to help with some knee pain.  She is unable to go grocery shopping because her leg hurts so bad.  She also has increased difficulty performing activities around her home.     She was partially compliant with home exercise program but not consistently everyday.     Response to previous treatment: Patient felt better after last visit.      Functional change: Patient hasn't seen any progress since the last visit.     Pain: 7/10     Location: left knee    OBJECTIVE     Objective Measures updated at progress report unless specified.       TREATMENT     Stacey received the treatments listed below:     THERAPEUTIC EXERCISES to develop strength, endurance, ROM, flexibility and posture for (10) minutes including:    Intervention Performed Today    Nustep x 5 minutes, level 1   Quad set  X 3 minutes with 5 second hold 10 with towel roll   Heel slide     2 minutes with slide board and straps  3 minutes active range    Straight leg raise   2 x 5   bridges  3 x 10    Hip abduction  with band  3 minutes bialteral   Hip adduction with ball   3 minutes bialteral   Side lying clams   3 x 10    Long arch quad   3 x 10   Hamstring stretch with strap X Left lower extremity 30 seconds x 2 trials   Soleus stretch with strap X left lower extremity, 30 seconds x 2 trials   Ankle dorsiflexion/ plantarflexion  X 1 set of 10 repetition each direction   Ankle inversion/eversion X 1 set of 10 repetition each direction     Plan for Next Visit:      MANUAL THERAPY TECHNIQUES were applied for (30) minutes, including:    Manual Intervention Performed Today    Soft Tissue Mobilization X left hip flexors, TFL, quadriceps, hamstrings , hip adductors , ITB, gastrocnemius , soleus, peroneals , tibialis anterior  and tibialis posterior   Joint Mobilizations     Mobilization with movement     Kinesiotape X Lateral patellar stability with medial and lateral tracking stability   Functional Dry Needling        Plan for Next Visit:          PATIENT EDUCATION AND HOME EXERCISES     Home Exercises Provided and Patient Education Provided     Education provided: (during session) minutes   Patient educated on biomechanical justification for therapeutic exercise and importance of compliance with HEP in order to improve overall impairments and QOL    Patient was educated on all the above exercise prior/during/after for proper posture, positioning, and execution for safe performance with home exercise program.    Importance of home exercise program compliance with bilateral lower extremity when she is having pain to assist with pain management.    3/10/2022:  Patient educated on importance of performing stretches and strengthening exercises to help with pain management and strength.    Written Home Exercises Provided: yes.  Exercises were reviewed and Stacey was able to demonstrate them prior to the end of the session.  Stacey demonstrated good  understanding of the education provided. See EMR under Patient Instructions for  exercises provided during therapy sessions. Paper home exercises given to patient and exercises put on my chart keren today.      ASSESSMENT     Patient unable to tolerate stationary bike today.  Patient required maximum encouragement to participate in nustep today.  Patient able to tolerate 5 minutes with a few rest breaks.  Patient with increased tissue tension noted in left hip flexor, quadriceps, adductor, iliotibial band, hamstring, gastrocnemius, and soleus as well as posterior knee.  Patient with increased sensitivity to touch initially but improved with time.  Patient with improved gait pattern noted at end of therapy session.     Stacey is progressing well towards her goals.   Pt prognosis is Guarded.     Pt will continue to benefit from skilled outpatient physical therapy to address the deficits listed in the problem list box on initial evaluation, provide pt/family education and to maximize pt's level of independence in the home and community environment.     Pt's spiritual, cultural and educational needs considered and pt agreeable to plan of care and goals.      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 Patient will be compliant with home exercise program to supplement therapy in restoring pain free function.  2. Patient will improve impaired lower extremity manual muscle tests to >/= 4/5 to improve dynamic hip/knee support for functional tasks.  3. Patient will demonstrate normal, reciprocal gait pattern with no AD to show functional improvement.     Long Term Goals (6 Weeks):  1. Patient will improve FOTO score to </= 60% limited to decrease perceived limitation with mobility.   2. Patient will improve impaired lower extremity manual muscle tests to >/= 4+/5 to improve dynamic hip/knee support for functional tasks.  3. Patient will  demonstrate ability to walk for 6 minutes straight, with only needing one rest break, to show functional endurance and strength improvements  4. Patient will achieve less than 20 seconds on TUG test to show functional gait speed improvement.  5. Patient will achieve 4 sit to stands, with no hand use, in 30 seconds to show functional lower extremity strength improvement.     Goals Key:  PC= progressing/continue; PM= partially met;        DC= discontinue    PLAN     Continue Plan of Care (POC) and progress per patient tolerance. See treatment section for details on planned progressions next session.      Mckayla Melara, PT, DPT

## 2022-03-15 ENCOUNTER — PATIENT MESSAGE (OUTPATIENT)
Dept: FAMILY MEDICINE | Facility: CLINIC | Age: 57
End: 2022-03-15
Payer: COMMERCIAL

## 2022-03-15 ENCOUNTER — CLINICAL SUPPORT (OUTPATIENT)
Dept: REHABILITATION | Facility: HOSPITAL | Age: 57
End: 2022-03-15
Payer: COMMERCIAL

## 2022-03-15 ENCOUNTER — TELEPHONE (OUTPATIENT)
Dept: SPORTS MEDICINE | Facility: CLINIC | Age: 57
End: 2022-03-15
Payer: COMMERCIAL

## 2022-03-15 DIAGNOSIS — M25.562 ACUTE PAIN OF LEFT KNEE: Primary | ICD-10-CM

## 2022-03-15 DIAGNOSIS — M25.662 DECREASED RANGE OF MOTION OF LEFT KNEE: ICD-10-CM

## 2022-03-15 DIAGNOSIS — R26.2 DIFFICULTY WALKING: ICD-10-CM

## 2022-03-15 PROCEDURE — 97140 MANUAL THERAPY 1/> REGIONS: CPT

## 2022-03-15 PROCEDURE — 97110 THERAPEUTIC EXERCISES: CPT

## 2022-03-15 NOTE — TELEPHONE ENCOUNTER
LVM stating that I was attempting to schedule. Left call-back number     ----- Message from Rena Billingsley sent at 3/15/2022  3:39 PM CDT -----  Contact: PT      Who Called: Stacey    Does the patient know what this is regarding?: orth referral   Would the patient rather a call back or a response via [facility]?  Callback    Best Call Back Number: .[phone]       Additional Information:

## 2022-03-15 NOTE — PROGRESS NOTES
[facility]   Physical Therapy Treatment Note     Name: Stacey Wade  Clinic Number: [id number]    Therapy Diagnosis:   Encounter Diagnoses   Name Primary?    Acute pain of left knee Yes    Difficulty walking     Decreased range of motion of left knee      Physician: Savannah Lindsey MD    Visit Date: 3/15/2022       Physician Orders: PT Eval and Treat   Medical Diagnosis from Referral: M25.562 (ICD-10-CM) - Pain in left knee  Evaluation Date: 2/22/2022  Authorization Period Expiration: 12/31/2022  Plan of Care Expiration: 4/5/2022  Visit # / Visits authorized: 5/20 (+ 1 evaluation)  FOTO: 1/3    Precautions: Standard    PTA Visit #: 3/5     Time In: 9:30 am  Time Out: 10:15 am  Total Billable Time: 40 minutes    SUBJECTIVE     Patient reports: she has 8/10 pain in left knee with the kinesiotape on right now and it is a 10/10 pain in her left knee without tape.  Patient felt looser after last visit but the pain came back.   She has been trying to stretch at home as well.  She tries to walk without walker or cane occasionally at home but it really hurts her knee.       She was partially compliant with home exercise program but not consistently everyday.     Response to previous treatment: Patient felt looser.     Functional change: Patient is able to do more.    Pain: 8/10     Location: left knee with kinesiotape    OBJECTIVE     Objective Measures updated at progress report unless specified.       TREATMENT     Stacey received the treatments listed below:     THERAPEUTIC EXERCISES to develop strength, endurance, ROM, flexibility and posture for (25) minutes including:    Intervention Performed Today    Nustep x 5 minutes, level 1   Quad set X X 3 minutes with 5 second hold 10 with towel roll   Heel slide     2 minutes with slide board and straps  3 minutes active range    Straight leg raise  X 2 x 10, first set with assistance   bridges  3 x 10    Hip abduction with belt X 3  minutes   Hip adduction with ball  X 2 minutes    Side lying clams   3 x 10    Long arch quad   3 x 10   Hamstring stretch with strap  Left lower extremity 30 seconds x 2 trials   Soleus stretch with strap  left lower extremity, 30 seconds x 2 trials   Ankle dorsiflexion/ plantarflexion   1 set of 10 repetition each direction   Ankle inversion/eversion  1 set of 10 repetition each direction   Gluteal sets X 3 minutes   Abdominal bracing X 3 minutes   Short arc quads X 4 sets of 10 repetition left lower extremity      Plan for Next Visit:      MANUAL THERAPY TECHNIQUES were applied for (15) minutes, including:    Manual Intervention Performed Today    Soft Tissue Mobilization X left hip flexors, TFL, quadriceps, hamstrings , hip adductors , ITB, gastrocnemius , soleus, peroneals , tibialis anterior  and tibialis posterior, left long leg distraction with belt   Joint Mobilizations     Mobilization with movement     Kinesiotape  Lateral patellar stability with medial and lateral tracking stability   Functional Dry Needling        Plan for Next Visit:          PATIENT EDUCATION AND HOME EXERCISES     Home Exercises Provided and Patient Education Provided     Education provided: (during session) minutes   Patient educated on biomechanical justification for therapeutic exercise and importance of compliance with HEP in order to improve overall impairments and QOL    Patient was educated on all the above exercise prior/during/after for proper posture, positioning, and execution for safe performance with home exercise program.    Importance of home exercise program compliance with bilateral lower extremity when she is having pain to assist with pain management.    3/10/2022:  Patient educated on importance of performing stretches and strengthening exercises to help with pain management and strength.   3/5/2022: Patient educated on moving more frequently at home as well as using her leg within a pain free range of  motion.  Patient also educated on calling PCP to get a referral to orthopaedics.     Written Home Exercises Provided: yes.  Exercises were reviewed and Stacey was able to demonstrate them prior to the end of the session.  Stacey demonstrated good  understanding of the education provided. See EMR under Patient Instructions for exercises provided during therapy sessions. Paper home exercises given to patient and exercises put on my chart keren today.      ASSESSMENT     Patient required maximum encouragement and assistance to perform stationary bike today.  Patient was unable to make a full revolution with left knee.  Patient with improved tissue tension noted throughout left lower extremity but he did continue to have increased tissue tension in left adductor, hamstring, gastrocnemius and soleus.  Patient tolerated all exercises well but did require assistance with first set of straight leg raises.  Patient with improved left lower extremity mobility and gait pattern at end of therapy session.     Stacey is progressing well towards her goals.   Pt prognosis is Guarded.     Pt will continue to benefit from skilled outpatient physical therapy to address the deficits listed in the problem list box on initial evaluation, provide pt/family education and to maximize pt's level of independence in the home and community environment.     Pt's spiritual, cultural and educational needs considered and pt agreeable to plan of care and goals.      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 Patient will be compliant with home exercise program to supplement therapy in restoring pain free function.  2. Patient will improve impaired lower extremity manual muscle tests to >/= 4/5 to improve dynamic hip/knee support for functional tasks.  3. Patient will demonstrate  normal, reciprocal gait pattern with no AD to show functional improvement.     Long Term Goals (6 Weeks):  1. Patient will improve FOTO score to </= 60% limited to decrease perceived limitation with mobility.   2. Patient will improve impaired lower extremity manual muscle tests to >/= 4+/5 to improve dynamic hip/knee support for functional tasks.  3. Patient will demonstrate ability to walk for 6 minutes straight, with only needing one rest break, to show functional endurance and strength improvements  4. Patient will achieve less than 20 seconds on TUG test to show functional gait speed improvement.  5. Patient will achieve 4 sit to stands, with no hand use, in 30 seconds to show functional lower extremity strength improvement.     Goals Key:  PC= progressing/continue; PM= partially met;        DC= discontinue    PLAN     Continue Plan of Care (POC) and progress per patient tolerance. See treatment section for details on planned progressions next session.      Mckayla Melara, PT, DPT

## 2022-03-16 ENCOUNTER — TELEPHONE (OUTPATIENT)
Dept: SPORTS MEDICINE | Facility: CLINIC | Age: 57
End: 2022-03-16
Payer: COMMERCIAL

## 2022-03-16 NOTE — TELEPHONE ENCOUNTER
I was able to schedule an appt w/ the pt. Verified facility location w/ pt. Understanding verbalized.    ----- Message from Stacey Valencia LPN sent at 3/15/2022  5:02 PM CDT -----  Contact: PT    ----- Message -----  From: aJnes Ram  Sent: 3/15/2022   4:34 PM CDT  To: Kaushik Rothman Staff    Type:  Patient Returning Call    Who Called:PT  Who Left Message for Patient: Mary Ellen  Does the patient know what this is regarding?: scheduling appt.   Would the patient rather a call back or a response via [facility]? Call back  Best Call Back Number: [phone]  Additional Information: N/A

## 2022-03-17 ENCOUNTER — CLINICAL SUPPORT (OUTPATIENT)
Dept: REHABILITATION | Facility: HOSPITAL | Age: 57
End: 2022-03-17
Payer: COMMERCIAL

## 2022-03-17 DIAGNOSIS — R26.2 DIFFICULTY WALKING: ICD-10-CM

## 2022-03-17 DIAGNOSIS — M25.562 ACUTE PAIN OF LEFT KNEE: Primary | ICD-10-CM

## 2022-03-17 DIAGNOSIS — M25.662 DECREASED RANGE OF MOTION OF LEFT KNEE: ICD-10-CM

## 2022-03-17 PROCEDURE — 97110 THERAPEUTIC EXERCISES: CPT | Mod: CQ

## 2022-03-17 PROCEDURE — 97140 MANUAL THERAPY 1/> REGIONS: CPT | Mod: CQ

## 2022-03-17 NOTE — PROGRESS NOTES
[facility]   Physical Therapy Treatment Note     Name: Stacey Wade  Clinic Number: [id number]    Therapy Diagnosis:   Encounter Diagnoses   Name Primary?    Acute pain of left knee Yes    Difficulty walking     Decreased range of motion of left knee      Physician: Savannah Lindsey MD    Visit Date: 3/17/2022       Physician Orders: PT Eval and Treat   Medical Diagnosis from Referral: M25.562 (ICD-10-CM) - Pain in left knee  Evaluation Date: 2/22/2022  Authorization Period Expiration: 12/31/2022  Plan of Care Expiration: 4/5/2022  Visit # / Visits authorized: 4/20 (+ 1 evaluation)  FOTO: 1/3    Precautions: Standard    PTA Visit #: 1/5     Time In: 9:00 am  Time Out: 9:45 am  Total Billable Time: 38 minutes    SUBJECTIVE     Patient reports: She will have an appointment with an orthopedist tomorrow. Purchased kinesiotape and applied after last session to herself. She was wearing kinesiotape and copperfit brace today.         She was compliant with home exercise program but not consistently everyday.     Response to previous treatment: Patient felt less pain with kinesiotape applied.     Functional change: Patient is able to do more. sleeping better with body pillow    Pain: 5/10     Location: left knee with kinesiotape    OBJECTIVE     Objective Measures updated at progress report unless specified.       TREATMENT     Stacey received the treatments listed below:     THERAPEUTIC EXERCISES to develop strength, endurance, ROM, flexibility and posture for (30) minutes including:    Intervention Performed Today    Exercise bike x 5 minutes   Nustep  5 minutes, level 1   Quad set (home exercise program)  X 3 minutes with 5 second hold 10 with towel roll   Heel slide   (home exercise program)   x 2 minutes with slide board and straps  3 minutes active range    Straight leg raise (home exercise program)  2 x 10, first set with assistance   Bridges (home exercise program) x 2 x 10  (decreased)   Hip abduction with belt  3 minutes   Hip adduction with ball   2 minutes    Side lying clams  x 3 x 10    Long arch quad   3 x 10   Hamstring stretch with strap  Left lower extremity 30 seconds x 2 trials   Soleus stretch with strap  left lower extremity, 30 seconds x 2 trials   Ankle dorsiflexion/ plantarflexion   1 set of 10 repetition each direction   Ankle inversion/eversion  1 set of 10 repetition each direction   Gluteal sets  3 minutes   Abdominal bracing  3 minutes   Short arc quads  4 sets of 10 repetition left lower extremity      Plan for Next Visit:      MANUAL THERAPY TECHNIQUES were applied for (8) minutes, including:    Manual Intervention Performed Today    Soft Tissue Mobilization x left hip flexors, TFL, quadriceps, hamstrings , hip adductors  and ITB   Joint Mobilizations     Mobilization with movement     Kinesiotape x Lateral patellar stability with medial and lateral tracking stability   Functional Dry Needling        Plan for Next Visit:          PATIENT EDUCATION AND HOME EXERCISES     Home Exercises Provided and Patient Education Provided     Education provided: (during session) minutes   Patient educated on biomechanical justification for therapeutic exercise and importance of compliance with HEP in order to improve overall impairments and quality of life     Patient was educated on all the above exercise prior/during/after for proper posture, positioning, and execution for safe performance with home exercise program.    Importance of home exercise program compliance with bilateral lower extremity when she is having pain to assist with pain management.    3/10/2022:  Patient educated on importance of performing stretches and strengthening exercises to help with pain management and strength.   3/5/2022: Patient educated on moving more frequently at home as well as using her leg within a pain free range of motion. Patient also educated on calling Primary Care Physician to  get a referral to orthopaedics.     Written Home Exercises Provided: yes.  Exercises were reviewed and Stacey was able to demonstrate them prior to the end of the session.  Stacey demonstrated good  understanding of the education provided. See EMR under Patient Instructions for exercises provided during therapy sessions. Paper home exercises given to patient and exercises put on my chart keren today.      ASSESSMENT     Patient tolerated exercises with less pain. kinsiotape was applied before exercises were performed. Patient was able to make a full revolution on the exercise bike before kinsiotape was applied.     Stacey is progressing well towards her goals.   Pt prognosis is Guarded.     Pt will continue to benefit from skilled outpatient physical therapy to address the deficits listed in the problem list box on initial evaluation, provide pt/family education and to maximize pt's level of independence in the home and community environment.     Pt's spiritual, cultural and educational needs considered and pt agreeable to plan of care and goals.      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 Patient will be compliant with home exercise program to supplement therapy in restoring pain free function.  2. Patient will improve impaired lower extremity manual muscle tests to >/= 4/5 to improve dynamic hip/knee support for functional tasks.  3. Patient will demonstrate normal, reciprocal gait pattern with no assistive device to show functional improvement.     Long Term Goals (6 Weeks):  1. Patient will improve FOTO score to </= 60% limited to decrease perceived limitation with mobility.   2. Patient will improve impaired lower extremity manual muscle tests to >/= 4+/5 to improve dynamic hip/knee support for functional tasks.  3. Patient will demonstrate  ability to walk for 6 minutes straight, with only needing one rest break, to show functional endurance and strength improvements  4. Patient will achieve less than 20 seconds on timed up and go  test to show functional gait speed improvement.  5. Patient will achieve 4 sit to stands, with no hand use, in 30 seconds to show functional lower extremity strength improvement.     Goals Key:  PC= progressing/continue; PM= partially met;        DC= discontinue    PLAN     Continue Plan of Care and progress per patient tolerance. See treatment section for details on planned progressions next session.      Tee Schaffer, PTA

## 2022-03-18 ENCOUNTER — OFFICE VISIT (OUTPATIENT)
Dept: SPORTS MEDICINE | Facility: CLINIC | Age: 57
End: 2022-03-18
Payer: COMMERCIAL

## 2022-03-18 VITALS — BODY MASS INDEX: 35.01 KG/M2 | WEIGHT: 190.25 LBS | HEIGHT: 62 IN

## 2022-03-18 DIAGNOSIS — S83.232A COMPLEX TEAR OF MEDIAL MENISCUS OF LEFT KNEE AS CURRENT INJURY, INITIAL ENCOUNTER: Primary | ICD-10-CM

## 2022-03-18 DIAGNOSIS — M25.562 ACUTE PAIN OF LEFT KNEE: ICD-10-CM

## 2022-03-18 DIAGNOSIS — R26.9 GAIT ABNORMALITY: ICD-10-CM

## 2022-03-18 PROCEDURE — 99204 OFFICE O/P NEW MOD 45 MIN: CPT | Mod: S$GLB,,, | Performed by: PHYSICAL MEDICINE & REHABILITATION

## 2022-03-18 PROCEDURE — 3008F PR BODY MASS INDEX (BMI) DOCUMENTED: ICD-10-PCS | Mod: CPTII,S$GLB,, | Performed by: PHYSICAL MEDICINE & REHABILITATION

## 2022-03-18 PROCEDURE — 1159F PR MEDICATION LIST DOCUMENTED IN MEDICAL RECORD: ICD-10-PCS | Mod: CPTII,S$GLB,, | Performed by: PHYSICAL MEDICINE & REHABILITATION

## 2022-03-18 PROCEDURE — 1160F RVW MEDS BY RX/DR IN RCRD: CPT | Mod: CPTII,S$GLB,, | Performed by: PHYSICAL MEDICINE & REHABILITATION

## 2022-03-18 PROCEDURE — 3008F BODY MASS INDEX DOCD: CPT | Mod: CPTII,S$GLB,, | Performed by: PHYSICAL MEDICINE & REHABILITATION

## 2022-03-18 PROCEDURE — 1160F PR REVIEW ALL MEDS BY PRESCRIBER/CLIN PHARMACIST DOCUMENTED: ICD-10-PCS | Mod: CPTII,S$GLB,, | Performed by: PHYSICAL MEDICINE & REHABILITATION

## 2022-03-18 PROCEDURE — 1159F MED LIST DOCD IN RCRD: CPT | Mod: CPTII,S$GLB,, | Performed by: PHYSICAL MEDICINE & REHABILITATION

## 2022-03-18 PROCEDURE — 99204 PR OFFICE/OUTPT VISIT, NEW, LEVL IV, 45-59 MIN: ICD-10-PCS | Mod: S$GLB,,, | Performed by: PHYSICAL MEDICINE & REHABILITATION

## 2022-03-18 PROCEDURE — 99999 PR PBB SHADOW E&M-EST. PATIENT-LVL IV: CPT | Mod: PBBFAC,,, | Performed by: PHYSICAL MEDICINE & REHABILITATION

## 2022-03-18 PROCEDURE — 99999 PR PBB SHADOW E&M-EST. PATIENT-LVL IV: ICD-10-PCS | Mod: PBBFAC,,, | Performed by: PHYSICAL MEDICINE & REHABILITATION

## 2022-03-18 RX ORDER — DICLOFENAC SODIUM 10 MG/G
2 GEL TOPICAL 4 TIMES DAILY
Qty: 100 G | Refills: 2 | Status: SHIPPED | OUTPATIENT
Start: 2022-03-18 | End: 2022-03-18

## 2022-03-18 RX ORDER — DICLOFENAC SODIUM 10 MG/G
2 GEL TOPICAL 4 TIMES DAILY
Qty: 100 G | Refills: 2 | Status: SHIPPED | OUTPATIENT
Start: 2022-03-18

## 2022-03-18 NOTE — PATIENT INSTRUCTIONS
Assessment:  Stacey Wade is a 56 y.o. female   Chief Complaint   Patient presents with    Left Knee - Pain       Complex tear of medial meniscus of left knee as current injury, initial encounter  -     MRI Knee Without Contrast Left; Future; Expected date: 03/18/2022  -     diclofenac sodium (VOLTAREN) 1 % Gel; Apply 2 g topically 4 (four) times daily.  Dispense: 100 g; Refill: 2    Acute pain of left knee  -     Ambulatory referral/consult to Orthopedics  -     MRI Knee Without Contrast Left; Future; Expected date: 03/18/2022  -     diclofenac sodium (VOLTAREN) 1 % Gel; Apply 2 g topically 4 (four) times daily.  Dispense: 100 g; Refill: 2    Gait abnormality  -     MRI Knee Without Contrast Left; Future; Expected date: 03/18/2022  -     diclofenac sodium (VOLTAREN) 1 % Gel; Apply 2 g topically 4 (four) times daily.  Dispense: 100 g; Refill: 2        Plan:  We personally reviewed her imaging today in clinic and agree with the radiologist's report.  Very little improvement with meds and PT over a 5 week period. Still having to use a walker and can't work.  Time for MRI to eval for surgical lesion.  Trial of Voltaren gel.   Continue PT.  Recommend trying Voltaren Gel, or generic diclofenac gel, over the counter, and following the directions on the package (4 times daily, give at least a week to see if it helps. Use it for 3 weeks on / 1 week off).   Remember that you may need to use it consistently for several days before seeing results.            Follow-up: after MRI or sooner if there are any problems between now and then.    Thank you for choosing [facility] and Dr. Whit Petit for your orthopedic & sports medicine care. It is our goal to provide you with exceptional care that will help keep you healthy, active, and get you back in the game.    Please do not hesitate to reach out to us via email, phone, or MyChart with any questions, concerns, or feedback.    If you felt that you  received exemplary care today, please consider leaving us feedback on [employer] at:  [email or web address]     If you are experiencing pain/discomfort ,or have questions after 5pm and would like to be connected to the [facility] [place] on-call team, please call this number and specify which Sports Medicine provider is treating you: [phone]

## 2022-03-18 NOTE — PROGRESS NOTES
SPORTS MEDICINE / PM&R New Patient Visit :    Referring Physician: Savannah Lindsey MD    Chief Complaint   Patient presents with    Left Knee - Pain       HPI: This is a 56 y.o.  female being seen in clinic today for evaluation of Pain of the Left Knee      The problem began 7 weeks ago.  She feels throbbing, aching, constant, pain at rest and pain with movement pain in her left knee . The symptoms are improving. She has tried icy hot, ice, heat, tylenol, bracing and injection with improvement. She is currently in  therapy.     History obtained from patient.Patient comes in with complaint of lateral, medial, and posterior left knee pain that began 7 weeks ago after she was getting over COVID-19. She saw her primary care doctor for this and was given an IM steroid injection 2/7/2022, was given prednisone, and sent to physical therapy. 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 She describes the pain as constant throbbing, aching, and worse with movement. She struggles to get comfortable to sleep well and had to buy a body pillow to prevent herself from rolling onto her left side. She is in PT, uses Icy Hot, ice, heat, tylenol, and a knee sleeve to try to combat her pain with slight improvement. She now has to use a walker to get around and wants to be able to get around without needing it.    Past family, medical, social, surgical history, and vital signs reviewed in chart.    General    Nursing note and vitals reviewed.  Constitutional: She is oriented to person, place, and time. She appears well-developed and well-nourished.   HENT:   Head: Normocephalic and atraumatic.   Eyes: Conjunctivae and EOM are normal. Pupils are equal, round, and reactive to light.   Neck: Neck supple.   Cardiovascular: Intact distal pulses.    Pulmonary/Chest: Effort normal. No respiratory distress.   Abdominal: She  exhibits no distension.   Neurological: She is alert and oriented to person, place, and time. She has normal reflexes.   Psychiatric: She has a normal mood and affect.     General Musculoskeletal Exam   Gait: antalgic and abnormal       Right Knee Exam   Right knee exam is normal.    Inspection   Erythema: absent  Swelling: absent  Effusion: absent    Range of Motion   The patient has normal right knee ROM.    Tests   Meniscus   Dianne:  Medial - negative Lateral - negative  Ligament Examination Lachman: normal (-1 to 2mm) PCL-Posterior Drawer: normal (0 to 2mm)     MCL - Valgus: normal (0 to 2mm)  LCL - Varus: normal  Patella   Patellar apprehension: negative  Patellar Tracking: normal    Other   Sensation: normal    Left Knee Exam     Inspection   Erythema: absent  Swelling: present  Effusion: present    Tenderness   The patient tender to palpation of the medial joint line and lateral joint line (popliteal fossa).    Range of Motion   Extension:  5 abnormal   Flexion:  80 abnormal     Tests   Meniscus   Dianne:  Medial - positive Lateral - negative  Stability Lachman: normal (-1 to 2mm) PCL-Posterior Drawer: normal (0 to 2mm)  MCL - Valgus: normal (0 to 2mm)  LCL - Varus: normal (0 to 2mm)  Patella   Patellar apprehension: negative  Patellar Tracking: normal    Other   Meniscal Cyst: absent  Popliteal (Baker's) Cyst: present  Sensation: normal    Comments:  Left hip flexion 3 /5 and painful, but no pain with hip rotation.     Right Hip Exam   Right hip exam is normal.     Tests   Pain w/ forced internal rotation (MALISSA): absent  Left Hip Exam   Left hip exam is normal.    Tests   Pain w/ forced internal rotation (MALISSA): absent          Muscle Strength   Right Lower Extremity   Hip Abduction: 5/5   Hip Adduction: 5/5   Hip Flexion: 5/5   Quadriceps:  5/5   Hamstrin/5   Left Lower Extremity   Hip Abduction: 4/5   Hip Adduction: 5/5   Hip Flexion: 5/5   Quadriceps:  5/5   Hamstrin/5     Reflexes      Left Side  Achilles:  2+  Quadriceps:  2+    Right Side   Achilles:  2+  Quadriceps:  2+    Vascular Exam     Right Pulses  Dorsalis Pedis:      2+          Left Pulses  Dorsalis Pedis:      2+              Mammo Digital Screening Bilat w/ Jimy  Narrative: Result:  Mammo Digital Screening Bilat w/ Jimy    History:  Patient is 56 y.o. and is seen for a screening mammogram.    Films Compared:  Compared to: 01/05/2021 Mammo Digital Screening Bilat w/ Jimy, 12/19/2019   Mammo Digital Screening Bilat w/ Jimy, 12/20/2018 Mammo Digital Screening   Bilat w/ Jimy, 12/18/2017 Mammo Digital Screening Bilat with CAD,   03/16/2017 Mammo Digital Diagnostic Right with Tomosynthesis_CAD, and   12/06/2016 Mammo Digital Screening Bilat with CAD     Findings:   This procedure was performed using tomosynthesis.   Computer-aided detection was utilized in the interpretation of this   examination.    The breasts are heterogeneously dense, which may obscure small masses.   There is no evidence of suspicious masses, microcalcifications or   architectural distortion.  Impression:    No mammographic evidence of malignancy.    BI-RADS Category 1: Negative    Recommendation:  Routine screening mammogram in 1 year is recommended.    Your estimated lifetime risk of breast cancer (to age 85) based on   Tyrer-Cuzick risk assessment model is 24.4 %.  According to the American   Cancer Society, patients with a lifetime breast cancer risk of 20% or   higher might benefit from supplemental screening tests. ??          Patient Instructions     Assessment:  Stacey Wade is a 56 y.o. female   Chief Complaint   Patient presents with    Left Knee - Pain       Complex tear of medial meniscus of left knee as current injury, initial encounter  -     MRI Knee Without Contrast Left; Future; Expected date: 03/18/2022  -     diclofenac sodium (VOLTAREN) 1 % Gel; Apply 2 g topically 4 (four) times daily.  Dispense: 100 g; Refill: 2    Acute pain of left  knee  -     Ambulatory referral/consult to Orthopedics  -     MRI Knee Without Contrast Left; Future; Expected date: 03/18/2022  -     diclofenac sodium (VOLTAREN) 1 % Gel; Apply 2 g topically 4 (four) times daily.  Dispense: 100 g; Refill: 2    Gait abnormality  -     MRI Knee Without Contrast Left; Future; Expected date: 03/18/2022  -     diclofenac sodium (VOLTAREN) 1 % Gel; Apply 2 g topically 4 (four) times daily.  Dispense: 100 g; Refill: 2        Plan:   We personally reviewed her imaging today in clinic and agree with the radiologist's report.   Very little improvement with meds and PT over a 5 week period. Still having to use a walker and can't work.   Time for MRI to eval for surgical lesion.   Trial of Voltaren gel.    Continue PT.   Recommend trying Voltaren Gel, or generic diclofenac gel, over the counter, and following the directions on the package (4 times daily, give at least a week to see if it helps. Use it for 3 weeks on / 1 week off).   Remember that you may need to use it consistently for several days before seeing results.              Follow-up: after MRI or sooner if there are any problems between now and then.    Thank you for choosing [facility] and Dr. Whit Petit for your orthopedic & sports medicine care. It is our goal to provide you with exceptional care that will help keep you healthy, active, and get you back in the game.    Please do not hesitate to reach out to us via email, phone, or MyChart with any questions, concerns, or feedback.    If you felt that you received exemplary care today, please consider leaving us feedback on Healthgrades at:  [email or web address]     If you are experiencing pain/discomfort ,or have questions after 5pm and would like to be connected to the [facility] on-call team, please call this number and specify which Sports Medicine provider is treating you:  [phone]         Disclaimer: This note was prepared using a voice recognition system and is likely to have sound alike errors within the text.     Whit Petit M.D.  Sports Medicine

## 2022-03-19 ENCOUNTER — PATIENT MESSAGE (OUTPATIENT)
Dept: FAMILY MEDICINE | Facility: CLINIC | Age: 57
End: 2022-03-19
Payer: COMMERCIAL

## 2022-03-21 DIAGNOSIS — J30.9 ALLERGIC RHINITIS, UNSPECIFIED SEASONALITY, UNSPECIFIED TRIGGER: ICD-10-CM

## 2022-03-21 RX ORDER — MOMETASONE FUROATE 50 UG/1
SPRAY, METERED NASAL
Qty: 17 G | Refills: 0 | Status: SHIPPED | OUTPATIENT
Start: 2022-03-21 | End: 2022-03-25 | Stop reason: SDUPTHER

## 2022-03-21 NOTE — TELEPHONE ENCOUNTER
No new care gaps identified.  Powered by [employer]. Reference number: [id number].   3/21/2022 10:43:39 AM CDT

## 2022-03-22 ENCOUNTER — CLINICAL SUPPORT (OUTPATIENT)
Dept: REHABILITATION | Facility: HOSPITAL | Age: 57
End: 2022-03-22
Payer: COMMERCIAL

## 2022-03-22 DIAGNOSIS — M25.662 DECREASED RANGE OF MOTION OF LEFT KNEE: ICD-10-CM

## 2022-03-22 DIAGNOSIS — R26.2 DIFFICULTY WALKING: ICD-10-CM

## 2022-03-22 DIAGNOSIS — M25.562 ACUTE PAIN OF LEFT KNEE: Primary | ICD-10-CM

## 2022-03-22 PROCEDURE — 97140 MANUAL THERAPY 1/> REGIONS: CPT

## 2022-03-22 PROCEDURE — 97110 THERAPEUTIC EXERCISES: CPT

## 2022-03-22 NOTE — PROGRESS NOTES
[facility]   Physical Therapy Treatment Note/ Progress Note    Name: Stacey Wade  Clinic Number: [id number]    Therapy Diagnosis:   Encounter Diagnoses   Name Primary?    Acute pain of left knee Yes    Difficulty walking     Decreased range of motion of left knee      Physician: Savannah Lindsey MD    Visit Date: 3/22/2022       Physician Orders: PT Eval and Treat   Medical Diagnosis from Referral: M25.562 (ICD-10-CM) - Pain in left knee  Evaluation Date: 2/22/2022  Authorization Period Expiration: 12/31/2022  Plan of Care Expiration: 4/5/2022  Visit # / Visits authorized: 7/20 (+ 1 evaluation)  FOTO: 2/3    Precautions: Standard    PTA Visit #: 1/5     Time In: 8:00 am  Time Out: 8:45 am  Total Billable Time: 38 minutes    SUBJECTIVE     Patient reports: She has 5/10 pain in left knee today.  She saw the doctor on Friday and he prescribed Voltaren gel for her left knee and has ordered an MRI for next week.  It is swelling and the doctor is unsure why.  She had increased pain th  She was compliant with home exercise program but not consistently everyday.     Response to previous treatment: Patient states her muscles felt better.      Functional change: No changes noted as of today.     Pain: 5/10     Location: left knee with kinesiotape    OBJECTIVE     Objective Measures updated at progress report unless specified.     HIP ROM  Right   Left   Increased/Decreased Pain  Flexion:  within functional limits Limited    LImtied secondary to pain and weakness      KNEE ROM   Left  Increased/Decreased Pain  Extension:   0 degrees   Pain at end range of motion   Flexion:  75 degrees   Pain at end range of motion       ANKLE ROM  Right    Left  Increased/Decreased Pain  Dorsiflexion:  within functional limits  within functional limits  None      Lower Extremity Strength   LE MMT Right Left   HIP Flexion 4+/5 3/5   Knee Flexion 4+/5 3/5   Knee Extension 4+/5 3/5   Ankle Dorsiflexion  4+/5 4/5        FOTO:     TREATMENT     Stacey received the treatments listed below:     THERAPEUTIC EXERCISES to develop strength, endurance, ROM, flexibility and posture for (16) minutes including:    Intervention Performed Today    Exercise bike  5 minutes   Nustep X 5 minutes, level 4   Quad set (home exercise program) X X 3 minutes with 5 second hold with towel roll   Heel slide   (home exercise program) X   3 minutes with slide board and straps with hold at top  3 minutes active range    Straight leg raise (home exercise program) X 2 x 10, 1 second hold   Bridges (home exercise program)  2 x 10 (decreased)   Hip abduction with belt X 3 minutes   Hip adduction with ball   2 minutes    Side lying clams   3 x 10    Long arch quad   3 x 10   Hamstring stretch with strap  Left lower extremity 30 seconds x 2 trials   Soleus stretch with strap  left lower extremity, 30 seconds x 2 trials   Ankle dorsiflexion/ plantarflexion   1 set of 10 repetition each direction   Ankle inversion/eversion  1 set of 10 repetition each direction   Gluteal sets  3 minutes   Abdominal bracing  3 minutes   Short arc quads  4 sets of 10 repetition left lower extremity      Plan for Next Visit:      MANUAL THERAPY TECHNIQUES were applied for (24) minutes, including:    Manual Intervention Performed Today    Soft Tissue Mobilization x left hip flexors, TFL, quadriceps, hamstrings , hip adductors  and ITB   Joint Mobilizations     Mobilization with movement     Kinesiotape  Lateral patellar stability with medial and lateral tracking stability   Functional Dry Needling        Plan for Next Visit:          PATIENT EDUCATION AND HOME EXERCISES     Home Exercises Provided and Patient Education Provided     Education provided: (during session) minutes   Patient educated on biomechanical justification for therapeutic exercise and importance of compliance with HEP in order to improve overall impairments and quality of life     Patient was  educated on all the above exercise prior/during/after for proper posture, positioning, and execution for safe performance with home exercise program.    Importance of home exercise program compliance with bilateral lower extremity when she is having pain to assist with pain management.    3/10/2022:  Patient educated on importance of performing stretches and strengthening exercises to help with pain management and strength.   3/5/2022: Patient educated on moving more frequently at home as well as using her leg within a pain free range of motion. Patient also educated on calling Primary Care Physician to get a referral to orthopaedics.     Written Home Exercises Provided: yes.  Exercises were reviewed and Stacey was able to demonstrate them prior to the end of the session.  Stacey demonstrated good  understanding of the education provided. See EMR under Patient Instructions for exercises provided during therapy sessions. Paper home exercises given to patient and exercises put on my chart keren today.      ASSESSMENT     Patient tolerated increased resistance with nustep today.  Patient with more limited left knee flexion range of motion secondary to increased pain.  Patient requires maximum encouragement to participate and move left knee.  Patient does continue to have some tissue tension noted in left adductors, hip flexor, quadriceps, hamstring and iliotibial band.     Stacey is progressing well towards her goals.   Pt prognosis is Guarded.     Pt will continue to benefit from skilled outpatient physical therapy to address the deficits listed in the problem list box on initial evaluation, provide pt/family education and to maximize pt's level of independence in the home and community environment.     Pt's spiritual, cultural and educational needs considered and pt agreeable to plan of care and goals.      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 Patient will be compliant with home exercise program to supplement therapy in restoring pain free function. (PROGRESSING 3/22/2022)  2. Patient will improve impaired lower extremity manual muscle tests to >/= 4/5 to improve dynamic hip/knee support for functional tasks. (PROGRESSING 3/22/2022)  3. Patient will demonstrate normal, reciprocal gait pattern with no assistive device to show functional improvement. (PROGRSESSING 3/22/2022)     Long Term Goals (6 Weeks):  1. Patient will improve FOTO score to </= 60% limited to decrease perceived limitation with mobility.  (PROGRESSING 3/22/2022)  2. Patient will improve impaired lower extremity manual muscle tests to >/= 4+/5 to improve dynamic hip/knee support for functional tasks. (PROGRESSING 3/22/2022)  3. Patient will demonstrate ability to walk for 6 minutes straight, with only needing one rest break, to show functional endurance and strength improvements (PROGRESSING 3/22/2022)  4. Patient will achieve less than 20 seconds on timed up and go  test to show functional gait speed improvement. (PROGRESSING 3/22/2022)  5. Patient will achieve 4 sit to stands, with no hand use, in 30 seconds to show functional lower extremity strength improvement.   (PROGRESSING 3/22/2022)  Goals Key:  PC= progressing/continue; PM= partially met;        DC= discontinue    PLAN     Continue Plan of Care and progress per patient tolerance. See treatment section for details on planned progressions next session.      Mckayla Melara, PT, DPT

## 2022-03-23 ENCOUNTER — PATIENT MESSAGE (OUTPATIENT)
Dept: FAMILY MEDICINE | Facility: CLINIC | Age: 57
End: 2022-03-23
Payer: COMMERCIAL

## 2022-03-24 ENCOUNTER — CLINICAL SUPPORT (OUTPATIENT)
Dept: REHABILITATION | Facility: HOSPITAL | Age: 57
End: 2022-03-24
Payer: COMMERCIAL

## 2022-03-24 DIAGNOSIS — M25.662 DECREASED RANGE OF MOTION OF LEFT KNEE: ICD-10-CM

## 2022-03-24 DIAGNOSIS — M25.562 ACUTE PAIN OF LEFT KNEE: Primary | ICD-10-CM

## 2022-03-24 DIAGNOSIS — R26.2 DIFFICULTY WALKING: ICD-10-CM

## 2022-03-24 PROCEDURE — 97110 THERAPEUTIC EXERCISES: CPT

## 2022-03-24 PROCEDURE — 97140 MANUAL THERAPY 1/> REGIONS: CPT

## 2022-03-24 NOTE — PROGRESS NOTES
[facility]   Physical Therapy Treatment Note    Name: Stacey Wade  Clinic Number: [id number]    Therapy Diagnosis:   Encounter Diagnoses   Name Primary?    Acute pain of left knee Yes    Difficulty walking     Decreased range of motion of left knee      Physician: Savannah Lindsey MD    Visit Date: 3/24/2022       Physician Orders: PT Eval and Treat   Medical Diagnosis from Referral: M25.562 (ICD-10-CM) - Pain in left knee  Evaluation Date: 2/22/2022  Authorization Period Expiration: 12/31/2022  Plan of Care Expiration: 4/5/2022  Visit # / Visits authorized: 8/20 (+ 1 evaluation)  FOTO: 2/3    Precautions: Standard    PTA Visit #: 1/5     Time In: 8:45 am  Time Out: 9:30 am  Total Billable Time: 40 minutes    SUBJECTIVE     Patient reports: she has 6/10 pain in left knee today.  She states she felt better after last treatment.  Overall, she feels as if her knee pain is less than it was upon evaluation. She has been doing her stretches and trying to move more at home.    She was compliant with home exercise program but not consistently everyday.     Response to previous treatment: Patient she felt better after last treatment.     Functional change: Overall she has less left knee pain that on evaluation.     Pain: 6/10     Location: left knee with kinesiotape    OBJECTIVE     Objective Measures updated at progress report unless specified.       TREATMENT     Stacey received the treatments listed below:     THERAPEUTIC EXERCISES to develop strength, endurance, ROM, flexibility and posture for (23) minutes including:    Intervention Performed Today    Exercise bike  5 minutes   Nustep X 5 minutes, level 5   Quad set (home exercise program)  X 3 minutes with 5 second hold with towel roll   Heel slide- seated   X 3 minutes with slide board and straps with hold at top  3 set of 10 repetition active range, seated with towel and slide board    Straight leg raise  X 2 x 12, 1 second  hold   Bridges  X 2 x 8   Hip abduction with belt  3 minutes   Hip adduction with ball   2 minutes    Side lying clams  X 3 x 10 with each lower extremity, with core activation   Long arch quad  X 1 pound, 1 x 10   Hamstring stretch with strap  Left lower extremity 30 seconds x 2 trials   Soleus stretch with strap  left lower extremity, 30 seconds x 2 trials   Ankle dorsiflexion/ plantarflexion   1 set of 10 repetition each direction   Ankle inversion/eversion  1 set of 10 repetition each direction   Gluteal sets  3 minutes   Abdominal bracing  3 minutes   Short arc quads  1 pound, 3 sets of 10 repetition left lower extremity                Plan for Next Visit:      MANUAL THERAPY TECHNIQUES were applied for (17) minutes, including:    Manual Intervention Performed Today    Soft Tissue Mobilization x left hip flexors, TFL, quadriceps, hamstrings , hip adductors  and ITB, gastrocnemius, soleus, posterior knee, left long leg distraction, left knee joint distraction   Joint Mobilizations     Mobilization with movement     Kinesiotape  Lateral patellar stability with medial and lateral tracking stability   Functional Dry Needling        Plan for Next Visit:          PATIENT EDUCATION AND HOME EXERCISES     Home Exercises Provided and Patient Education Provided     Education provided: (during session) minutes   Patient educated on biomechanical justification for therapeutic exercise and importance of compliance with HEP in order to improve overall impairments and quality of life     Patient was educated on all the above exercise prior/during/after for proper posture, positioning, and execution for safe performance with home exercise program.    Importance of home exercise program compliance with bilateral lower extremity when she is having pain to assist with pain management.    3/10/2022:  Patient educated on importance of performing stretches and strengthening exercises to help with pain management and  strength.   3/5/2022: Patient educated on moving more frequently at home as well as using her leg within a pain free range of motion. Patient also educated on calling Primary Care Physician to get a referral to orthopaedics.    3/24/00319: Patient educated on not being hyper focused on pain and what her knee is doing and to focus more on something pleasant while stretching or doing strengthening exercises.     Written Home Exercises Provided: yes.  Exercises were reviewed and Stacey was able to demonstrate them prior to the end of the session.  Stacey demonstrated good  understanding of the education provided. See EMR under Patient Instructions for exercises provided during therapy sessions. Paper home exercises given to patient and exercises put on my chart keren today.      ASSESSMENT     Patient demonstrated improved speed on nustep when she was encouraged to focus on the beautiful weather outside instead of focusing on her left knee pain.  Patient tolerated more repetition with straight leg raises.  Patient also able to tolerate increased resistance with short arc quads and long arc quads.  Patient demonstrated improved left knee range of motion with knee flexion when seated on edge of mat performing heel slides.  Patient had less pain at end of therapy session.     Stacey is progressing well towards her goals.   Pt prognosis is Guarded.     Pt will continue to benefit from skilled outpatient physical therapy to address the deficits listed in the problem list box on initial evaluation, provide pt/family education and to maximize pt's level of independence in the home and community environment.     Pt's spiritual, cultural and educational needs considered and pt agreeable to plan of care and goals.      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  Patient will be compliant with home exercise program to supplement therapy in restoring pain free function. (PROGRESSING 3/22/2022)  2. Patient will improve impaired lower extremity manual muscle tests to >/= 4/5 to improve dynamic hip/knee support for functional tasks. (PROGRESSING 3/22/2022)  3. Patient will demonstrate normal, reciprocal gait pattern with no assistive device to show functional improvement. (PROGRSESSING 3/22/2022)     Long Term Goals (6 Weeks):  1. Patient will improve FOTO score to </= 60% limited to decrease perceived limitation with mobility.  (PROGRESSING 3/22/2022)  2. Patient will improve impaired lower extremity manual muscle tests to >/= 4+/5 to improve dynamic hip/knee support for functional tasks. (PROGRESSING 3/22/2022)  3. Patient will demonstrate ability to walk for 6 minutes straight, with only needing one rest break, to show functional endurance and strength improvements (PROGRESSING 3/22/2022)  4. Patient will achieve less than 20 seconds on timed up and go  test to show functional gait speed improvement. (PROGRESSING 3/22/2022)  5. Patient will achieve 4 sit to stands, with no hand use, in 30 seconds to show functional lower extremity strength improvement.   (PROGRESSING 3/22/2022)  Goals Key:  PC= progressing/continue; PM= partially met;        DC= discontinue    PLAN     Continue Plan of Care and progress per patient tolerance. See treatment section for details on planned progressions next session.      Mckayla Melara, PT, DPT

## 2022-03-25 ENCOUNTER — PATIENT MESSAGE (OUTPATIENT)
Dept: FAMILY MEDICINE | Facility: CLINIC | Age: 57
End: 2022-03-25
Payer: COMMERCIAL

## 2022-03-29 ENCOUNTER — CLINICAL SUPPORT (OUTPATIENT)
Dept: REHABILITATION | Facility: HOSPITAL | Age: 57
End: 2022-03-29
Payer: COMMERCIAL

## 2022-03-29 DIAGNOSIS — R26.2 DIFFICULTY WALKING: ICD-10-CM

## 2022-03-29 DIAGNOSIS — M25.662 DECREASED RANGE OF MOTION OF LEFT KNEE: ICD-10-CM

## 2022-03-29 DIAGNOSIS — M25.562 ACUTE PAIN OF LEFT KNEE: Primary | ICD-10-CM

## 2022-03-29 PROCEDURE — 97110 THERAPEUTIC EXERCISES: CPT

## 2022-03-29 NOTE — PROGRESS NOTES
[facility]   Physical Therapy Treatment Note    Name: Stacey Wade  Clinic Number: [id number]    Therapy Diagnosis:   Encounter Diagnoses   Name Primary?    Acute pain of left knee Yes    Difficulty walking     Decreased range of motion of left knee      Physician: Savannah Lindsey MD    Visit Date: 3/29/2022       Physician Orders: PT Eval and Treat   Medical Diagnosis from Referral: M25.562 (ICD-10-CM) - Pain in left knee  Evaluation Date: 2/22/2022  Authorization Period Expiration: 12/31/2022  Plan of Care Expiration: 4/5/2022  Visit # / Visits authorized: 9/20 (+ 1 evaluation)  FOTO: 2/3    Precautions: Standard    PTA Visit #: 1/5     Time In: 8:45 am  Time Out: 9:30 am  Total Billable Time: 40 minutes    SUBJECTIVE     Patient reports: she has 2/10 pain today in her left knee.  She admits that it was throbbing Sunday and yesterday but it felt great after last therapy visit and through Saturday.  Patient is walking better and feels stronger.     She was compliant with home exercise program but not consistently everyday.     Response to previous treatment: Patient felt great after last visit.      Functional change: Patient has had a significant decrease in pain since last visit    Pain: 2/10     Location: left knee with kinesiotape    OBJECTIVE     Objective Measures updated at progress report unless specified.       TREATMENT     Stacey received the treatments listed below:     THERAPEUTIC EXERCISES to develop strength, endurance, ROM, flexibility and posture for (40) minutes including:    Intervention Performed Today    Exercise bike  5 minutes   Nustep X 5 minutes, level 3   Quad set (home exercise program)  X 3 minutes with 5 second hold with towel roll   Heel slide- seated    3 minutes with slide board and straps with hold at top  3 set of 10 repetition active range, seated with towel and slide board    Straight leg raise   2 x 12, 1 second hold   Bridges   2 x 8    Hip abduction with belt X 3 minutes, seated edge of mat   Hip adduction with ball  X 3 minutes, seated edge of mat     Side lying clams   3 x 10 with each lower extremity, with core activation   Long arch quad  X 2 pound, 3 x 10   Hamstring stretch with strap  Left lower extremity 30 seconds x 2 trials   Soleus stretch with strap  left lower extremity, 30 seconds x 2 trials   Ankle dorsiflexion/ plantarflexion   1 set of 10 repetition each direction   Ankle inversion/eversion  1 set of 10 repetition each direction   Gluteal sets  3 minutes   Abdominal bracing  3 minutes   Short arc quads  1 pound, 3 sets of 10 repetition left lower extremity    Shuttle X  X Double leg press: 3 cords, 3 sets of 10 repetition   Single leg press: 2 cords, 3 sets of 10 repetition each lower extremity    Standing gastrocnemius stretch on incline X 30 seconds x 2 trials   Standing soleus stretch on incline X 30 seconds x 2 trials    Sit to stand X  5 trials with bilateral upper extremity      Plan for Next Visit:      MANUAL THERAPY TECHNIQUES were applied for (0) minutes, including:    Manual Intervention Performed Today    Soft Tissue Mobilization  left hip flexors, TFL, quadriceps, hamstrings , hip adductors  and ITB, gastrocnemius, soleus, posterior knee, left long leg distraction, left knee joint distraction   Joint Mobilizations     Mobilization with movement     Kinesiotape  Lateral patellar stability with medial and lateral tracking stability   Functional Dry Needling        Plan for Next Visit:          PATIENT EDUCATION AND HOME EXERCISES     Home Exercises Provided and Patient Education Provided     Education provided: (during session) minutes   Patient educated on biomechanical justification for therapeutic exercise and importance of compliance with HEP in order to improve overall impairments and quality of life     Patient was educated on all the above exercise prior/during/after for proper posture, positioning, and  execution for safe performance with home exercise program.    Importance of home exercise program compliance with bilateral lower extremity when she is having pain to assist with pain management.    3/10/2022:  Patient educated on importance of performing stretches and strengthening exercises to help with pain management and strength.   3/5/2022: Patient educated on moving more frequently at home as well as using her leg within a pain free range of motion. Patient also educated on calling Primary Care Physician to get a referral to orthopaedics.    3/24/85944: Patient educated on not being hyper focused on pain and what her knee is doing and to focus more on something pleasant while stretching or doing strengthening exercises.     Written Home Exercises Provided: yes.  Exercises were reviewed and Stacey was able to demonstrate them prior to the end of the session.  Stacey demonstrated good  understanding of the education provided. See EMR under Patient Instructions for exercises provided during therapy sessions. Paper home exercises given to patient and exercises put on my chart keren today.      ASSESSMENT     Patient required verbal cues to not focus on left lower extremity and pain but to focus on the activity she is doing. Patient was able to perform gastrocnemius and soleus stretches standing as well as double and single leg presses on shuttle today.  Patient requires increased time with exercises for improved motor control and core activation as well as exercise technique.  Patient able to perform a full session of therapy with only exercises and no manual therapy.     Stacey is progressing well towards her goals.   Pt prognosis is Guarded.     Pt will continue to benefit from skilled outpatient physical therapy to address the deficits listed in the problem list box on initial evaluation, provide pt/family education and to maximize pt's level of independence in the home and community environment.     Pt's  spiritual, cultural and educational needs considered and pt agreeable to plan of care and goals.      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 Patient will be compliant with home exercise program to supplement therapy in restoring pain free function. (PROGRESSING 3/22/2022)  2. Patient will improve impaired lower extremity manual muscle tests to >/= 4/5 to improve dynamic hip/knee support for functional tasks. (PROGRESSING 3/22/2022)  3. Patient will demonstrate normal, reciprocal gait pattern with no assistive device to show functional improvement. (PROGRSESSING 3/22/2022)     Long Term Goals (6 Weeks):  1. Patient will improve FOTO score to </= 60% limited to decrease perceived limitation with mobility.  (PROGRESSING 3/22/2022)  2. Patient will improve impaired lower extremity manual muscle tests to >/= 4+/5 to improve dynamic hip/knee support for functional tasks. (PROGRESSING 3/22/2022)  3. Patient will demonstrate ability to walk for 6 minutes straight, with only needing one rest break, to show functional endurance and strength improvements (PROGRESSING 3/22/2022)  4. Patient will achieve less than 20 seconds on timed up and go  test to show functional gait speed improvement. (PROGRESSING 3/22/2022)  5. Patient will achieve 4 sit to stands, with no hand use, in 30 seconds to show functional lower extremity strength improvement.   (PROGRESSING 3/22/2022)  Goals Key:  PC= progressing/continue; PM= partially met;        DC= discontinue    PLAN     Continue Plan of Care and progress per patient tolerance. See treatment section for details on planned progressions next session.      Mckayla Melara, PT, DPT

## 2022-03-30 ENCOUNTER — HOSPITAL ENCOUNTER (OUTPATIENT)
Dept: RADIOLOGY | Facility: HOSPITAL | Age: 57
Discharge: HOME | End: 2022-03-30
Attending: PHYSICAL MEDICINE & REHABILITATION
Payer: COMMERCIAL

## 2022-03-30 DIAGNOSIS — M25.562 ACUTE PAIN OF LEFT KNEE: ICD-10-CM

## 2022-03-30 DIAGNOSIS — R26.9 GAIT ABNORMALITY: ICD-10-CM

## 2022-03-30 DIAGNOSIS — S83.232A COMPLEX TEAR OF MEDIAL MENISCUS OF LEFT KNEE AS CURRENT INJURY, INITIAL ENCOUNTER: ICD-10-CM

## 2022-03-30 PROCEDURE — 73721 MRI JNT OF LWR EXTRE W/O DYE: CPT | Mod: 26,LT,, | Performed by: RADIOLOGY

## 2022-03-30 PROCEDURE — 73721 MRI KNEE WITHOUT CONTRAST LEFT: ICD-10-PCS | Mod: 26,LT,, | Performed by: RADIOLOGY

## 2022-03-30 PROCEDURE — 73721 MRI JNT OF LWR EXTRE W/O DYE: CPT | Mod: TC,LT

## 2022-03-31 ENCOUNTER — TELEPHONE (OUTPATIENT)
Dept: SPORTS MEDICINE | Facility: CLINIC | Age: 57
End: 2022-03-31
Payer: COMMERCIAL

## 2022-03-31 ENCOUNTER — TELEPHONE (OUTPATIENT)
Dept: FAMILY MEDICINE | Facility: CLINIC | Age: 57
End: 2022-03-31

## 2022-03-31 ENCOUNTER — CLINICAL SUPPORT (OUTPATIENT)
Dept: REHABILITATION | Facility: HOSPITAL | Age: 57
End: 2022-03-31
Payer: COMMERCIAL

## 2022-03-31 DIAGNOSIS — R26.2 DIFFICULTY WALKING: ICD-10-CM

## 2022-03-31 DIAGNOSIS — M25.662 DECREASED RANGE OF MOTION OF LEFT KNEE: ICD-10-CM

## 2022-03-31 DIAGNOSIS — M25.562 ACUTE PAIN OF LEFT KNEE: Primary | ICD-10-CM

## 2022-03-31 PROCEDURE — 97112 NEUROMUSCULAR REEDUCATION: CPT

## 2022-03-31 PROCEDURE — 97110 THERAPEUTIC EXERCISES: CPT

## 2022-03-31 PROCEDURE — 97116 GAIT TRAINING THERAPY: CPT

## 2022-03-31 NOTE — TELEPHONE ENCOUNTER
Called pt back, she just asked what the medial and later meniscal tear meant. Explained that medial and lateral and the locations on the meniscus and that when she gets seen by an orthopedic surgeon, they will be able to better explain the MRI results with her in the visit

## 2022-03-31 NOTE — TELEPHONE ENCOUNTER
Yes, she can get COVID booster. The MRI shows arthritis, torn cartilage and fluid in her joint. But she really needs to discuss this with her orthopedic specialist as he would know more about the findings than me.

## 2022-03-31 NOTE — TELEPHONE ENCOUNTER
----- Message from Alena Mcmahan sent at 3/31/2022  2:24 PM CDT -----  Contact: MARY BETH ACOSTA [[id number]]  .Type:  Needs Medical Advice    Who Called: MARY BETH ACOSTA [[id number]]  Would the patient rather a call back or a response via [facility]? Call   Best Call Back Number: .[phone]  Additional Information: Pt is req a call back in regards to her MRI results

## 2022-03-31 NOTE — PROGRESS NOTES
[facility]   Physical Therapy Treatment Note    Name: Stacey Wade  Clinic Number: [id number]    Therapy Diagnosis:   Encounter Diagnoses   Name Primary?    Acute pain of left knee Yes    Difficulty walking     Decreased range of motion of left knee      Physician: Savannah Lindsey MD    Visit Date: 3/31/2022       Physician Orders: PT Eval and Treat   Medical Diagnosis from Referral: M25.562 (ICD-10-CM) - Pain in left knee  Evaluation Date: 2/22/2022  Authorization Period Expiration: 12/31/2022  Plan of Care Expiration: 4/5/2022  Visit # / Visits authorized: 10/20 (+ 1 evaluation)  FOTO: 2/3    Precautions: Standard    PTA Visit #: 1/5     Time In: 8:45 am  Time Out: 9:30 am  Total Billable Time: 40 minutes    SUBJECTIVE     Patient reports: she had 5-6/10 pain when she woke up this morning.  Currently she has 2-3/10 pain in her left knee.  She states she left her walker in the car and she wanted to try her cane today.     She was compliant with home exercise program but not consistently everyday.     Response to previous treatment: Patient was sore after last visit.       Functional change: Patient is able to ambulate with cane today.     Pain: 2-3/10     Location: left knee    OBJECTIVE     Objective Measures updated at progress report unless specified.       TREATMENT     Stacey received the treatments listed below:     THERAPEUTIC EXERCISES to develop strength, endurance, ROM, flexibility and posture for (15) minutes including:    Intervention Performed Today    Exercise bike  5 minutes   Nustep X 5 minutes, level 4   Quad set (home exercise program)  X 3 minutes with 5 second hold with towel roll   Heel slide- seated    3 minutes with slide board and straps with hold at top  3 set of 10 repetition active range, seated with towel and slide board    Straight leg raise   2 x 12, 1 second hold   Bridges   2 x 8   Hip abduction with belt  3 minutes, seated edge of mat   Hip  adduction with ball   3 minutes, seated edge of mat     Side lying clams   3 x 10 with each lower extremity, with core activation   Long arch quad  X 2 pound, 3 x 10, each lower extremity    Hamstring stretch with strap  Left lower extremity 30 seconds x 2 trials   Soleus stretch with strap  left lower extremity, 30 seconds x 2 trials   Ankle dorsiflexion/ plantarflexion   1 set of 10 repetition each direction   Ankle inversion/eversion  1 set of 10 repetition each direction   Gluteal sets  3 minutes   Abdominal bracing  3 minutes   Short arc quads  1 pound, 3 sets of 10 repetition left lower extremity    Shuttle X  X Double leg press: 4 cords, 3 sets of 10 repetition   Single leg press: 2 cords, 3 sets of 10 repetition each lower extremity    Standing gastrocnemius stretch on incline  30 seconds x 2 trials   Standing soleus stretch on incline  30 seconds x 2 trials    Sit to stand   5 trials with bilateral upper extremity      Plan for Next Visit:        NEUROMUSCULAR RE-EDUCATION ACTIVITIES to improve Balance, Coordination, Kinesthetic, Sense, Proprioception and Posture for (15) minutes.  The following were included:    Intervention Performed Today    Heel raises X 1 set of 10 repetition, eccentric control   Lunge toe taps with weight shift X 1 set of 10 repetition each lower extremity    Reciprocal steps with increased stride length X 1 set of 10 repetition each lower extremity                               Plan for Next Visit:          MANUAL THERAPY TECHNIQUES were applied for (0) minutes, including:    Manual Intervention Performed Today    Soft Tissue Mobilization  left hip flexors, TFL, quadriceps, hamstrings , hip adductors  and ITB, gastrocnemius, soleus, posterior knee, left long leg distraction, left knee joint distraction   Joint Mobilizations     Mobilization with movement     Kinesiotape  Lateral patellar stability with medial and lateral tracking stability   Functional Dry Needling        Plan for  Next Visit:        GAIT TRAINING to improve functional mobility and safety for (10) minutes.    Intervention Performed Today    Ambulation with cane X Around clinic   Ambulation without an assistive device X Around clinic   Heel to toe gait X    Increased stride length X                          Plan for Next Visit:          PATIENT EDUCATION AND HOME EXERCISES     Home Exercises Provided and Patient Education Provided     Education provided: (during session) minutes   Patient educated on biomechanical justification for therapeutic exercise and importance of compliance with HEP in order to improve overall impairments and quality of life     Patient was educated on all the above exercise prior/during/after for proper posture, positioning, and execution for safe performance with home exercise program.    Importance of home exercise program compliance with bilateral lower extremity when she is having pain to assist with pain management.    3/10/2022:  Patient educated on importance of performing stretches and strengthening exercises to help with pain management and strength.   3/5/2022: Patient educated on moving more frequently at home as well as using her leg within a pain free range of motion. Patient also educated on calling Primary Care Physician to get a referral to orthopaedics.    3/24/12142: Patient educated on not being hyper focused on pain and what her knee is doing and to focus more on something pleasant while stretching or doing strengthening exercises.     Written Home Exercises Provided: yes.  Exercises were reviewed and Stacey was able to demonstrate them prior to the end of the session.  Stacey demonstrated good  understanding of the education provided. See EMR under Patient Instructions for exercises provided during therapy sessions. Paper home exercises given to patient and exercises put on my chart keren today.      ASSESSMENT     Patient ambulated into clinic with straight cane.  Cane was too  high and assistive device required adjusting.  Patient educated on proper height of cane and proper gait pattern.  Patient demonstrated some difficulty with heel to toe gait pattern with left lower extremity and cane at the same time. Patient also able to ambulate without an assistive device around therapy clinic with slow miguel and decreased stride length.  Patient with improved gait pattern noted after working on gait pattern, stride length and weight shifting while in parallel bars.     Stacey is progressing well towards her goals.   Pt prognosis is Guarded.     Pt will continue to benefit from skilled outpatient physical therapy to address the deficits listed in the problem list box on initial evaluation, provide pt/family education and to maximize pt's level of independence in the home and community environment.     Pt's spiritual, cultural and educational needs considered and pt agreeable to plan of care and goals.      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 Patient will be compliant with home exercise program to supplement therapy in restoring pain free function. (PROGRESSING 3/22/2022)  2. Patient will improve impaired lower extremity manual muscle tests to >/= 4/5 to improve dynamic hip/knee support for functional tasks. (PROGRESSING 3/22/2022)  3. Patient will demonstrate normal, reciprocal gait pattern with no assistive device to show functional improvement. (PROGRSESSING 3/22/2022)     Long Term Goals (6 Weeks):  1. Patient will improve FOTO score to </= 60% limited to decrease perceived limitation with mobility.  (PROGRESSING 3/22/2022)  2. Patient will improve impaired lower extremity manual muscle tests to >/= 4+/5 to improve dynamic hip/knee support for functional tasks. (PROGRESSING 3/22/2022)  3. Patient will demonstrate  ability to walk for 6 minutes straight, with only needing one rest break, to show functional endurance and strength improvements (PROGRESSING 3/22/2022)  4. Patient will achieve less than 20 seconds on timed up and go  test to show functional gait speed improvement. (PROGRESSING 3/22/2022)  5. Patient will achieve 4 sit to stands, with no hand use, in 30 seconds to show functional lower extremity strength improvement.   (PROGRESSING 3/22/2022)  Goals Key:  PC= progressing/continue; PM= partially met;        DC= discontinue    PLAN     Continue Plan of Care and progress per patient tolerance. See treatment section for details on planned progressions next session.      Mckayla Melara, PT, DPT

## 2022-03-31 NOTE — TELEPHONE ENCOUNTER
Called pt and got her scheduled for her annual. Pt would like to know is it ok to get the covid booster, and she would like for you to explain her MRI finding from Dr. Petit to her when you have time. Please advise

## 2022-04-05 ENCOUNTER — CLINICAL SUPPORT (OUTPATIENT)
Dept: REHABILITATION | Facility: HOSPITAL | Age: 57
End: 2022-04-05
Payer: COMMERCIAL

## 2022-04-05 ENCOUNTER — DOCUMENTATION ONLY (OUTPATIENT)
Dept: REHABILITATION | Facility: HOSPITAL | Age: 57
End: 2022-04-05

## 2022-04-05 ENCOUNTER — PATIENT MESSAGE (OUTPATIENT)
Dept: FAMILY MEDICINE | Facility: CLINIC | Age: 57
End: 2022-04-05
Payer: COMMERCIAL

## 2022-04-05 ENCOUNTER — DOCUMENTATION ONLY (OUTPATIENT)
Dept: REHABILITATION | Facility: HOSPITAL | Age: 57
End: 2022-04-05
Payer: COMMERCIAL

## 2022-04-05 DIAGNOSIS — M25.562 ACUTE PAIN OF LEFT KNEE: Primary | ICD-10-CM

## 2022-04-05 DIAGNOSIS — S83.232A COMPLEX TEAR OF MEDIAL MENISCUS OF LEFT KNEE AS CURRENT INJURY, INITIAL ENCOUNTER: ICD-10-CM

## 2022-04-05 DIAGNOSIS — R26.2 DIFFICULTY WALKING: ICD-10-CM

## 2022-04-05 DIAGNOSIS — M25.662 DECREASED RANGE OF MOTION OF LEFT KNEE: ICD-10-CM

## 2022-04-05 PROCEDURE — 97110 THERAPEUTIC EXERCISES: CPT | Mod: CQ

## 2022-04-05 PROCEDURE — 97116 GAIT TRAINING THERAPY: CPT | Mod: CQ

## 2022-04-05 NOTE — PROGRESS NOTES
[facility]   Physical Therapy Treatment Note    Name: Stacey Wade  Clinic Number: [id number]    Therapy Diagnosis:   Encounter Diagnoses   Name Primary?    Acute pain of left knee Yes    Difficulty walking     Decreased range of motion of left knee      Physician: Savannah Lindsey MD    Visit Date: 4/5/2022       Physician Orders: PT Eval and Treat   Medical Diagnosis from Referral: M25.562 (ICD-10-CM) - Pain in left knee  Evaluation Date: 2/22/2022  Authorization Period Expiration: 12/31/2022  Plan of Care Expiration: 4/5/2022  Visit # / Visits authorized: 11/20 (+ 1 evaluation)  FOTO: 2/3    Precautions: Standard    PTA Visit #: 1/5     Time In: 8:55 am  Time Out: 9:45 am  Total Billable Time: 40 minutes    SUBJECTIVE     Patient reports: she had MRI and will call orthopedist to in near future to discuss options.     She was compliant with home exercise program but not consistently everyday.     Response to previous treatment: Patient was sore after last visit.       Functional change: Movement decreases pain but stiff and pain upon awakening    Pain: 2-3/10     Location: left knee    OBJECTIVE     Objective Measures updated at progress report unless specified.     TREATMENT     Stacey received the treatments listed below:     THERAPEUTIC EXERCISES to develop strength, endurance, range of motion, flexibility and posture for (30) minutes including:    Intervention Performed Today    Exercise bike  5 minutes   Nustep X 5 minutes, level 4   Quad set (home exercise program)  X 3 minutes with 5 second hold with towel roll   Heel slide- seated    3 minutes with slide board and straps with hold at top  3 set of 10 repetition active range, seated with towel and slide board    Straight leg raise  (home exercise program) x 3 x 10, 1 second hold (increased)   Bridges (home exercise program) x 3 x 10 (increased)   Hip abduction with belt  3 minutes, seated edge of mat   Hip adduction  with ball   3 minutes, seated edge of mat     Side lying clams   3 x 10 with each lower extremity, with core activation   Long arch quad  X 2 pound, 3 x 10, each lower extremity    Hamstring stretch with strap  Left lower extremity 30 seconds x 2 trials   Soleus stretch with strap  left lower extremity, 30 seconds x 2 trials   Ankle dorsiflexion/ plantarflexion   1 set of 10 repetition each direction   Ankle inversion/eversion  1 set of 10 repetition each direction   Gluteal sets  3 minutes   Abdominal bracing  3 minutes   Short arc quads  1 pound, 3 sets of 10 repetition left lower extremity    Shuttle    Double leg press: 4 cords, 3 sets of 10 repetition   Single leg press: 2 cords, 3 sets of 10 repetition each lower extremity    Standing gastrocnemius stretch on incline  30 seconds x 2 trials   Standing soleus stretch on incline  30 seconds x 2 trials    Prone hip extension x 3 x 10 (added)   Sit to stand   5 trials with bilateral upper extremity      Plan for Next Visit:        NEUROMUSCULAR RE-EDUCATION ACTIVITIES to improve Balance, Coordination, Kinesthetic, Sense, Proprioception and Posture for (0) minutes.  The following were included:    Intervention Performed Today    Heel raises  1 set of 10 repetition, eccentric control   Lunge toe taps with weight shift  1 set of 10 repetition each lower extremity    Reciprocal steps with increased stride length  1 set of 10 repetition each lower extremity                               Plan for Next Visit:          MANUAL THERAPY TECHNIQUES were applied for (0) minutes, including:    Manual Intervention Performed Today    Soft Tissue Mobilization  left hip flexors, TFL, quadriceps, hamstrings , hip adductors  and ITB, gastrocnemius, soleus, posterior knee, left long leg distraction, left knee joint distraction   Joint Mobilizations     Mobilization with movement     Kinesiotape  Lateral patellar stability with medial and lateral tracking stability   Functional Dry  Needling        Plan for Next Visit:        GAIT TRAINING to improve functional mobility and safety for (10) minutes.    Intervention Performed Today    Ambulation with cane X Around clinic   Ambulation without an assistive device X Around clinic   Heel to toe gait X    Increased stride length X On right lower extremity                          Plan for Next Visit:          PATIENT EDUCATION AND HOME EXERCISES     Home Exercises Provided and Patient Education Provided     Education provided: (during session) minutes   Patient educated on biomechanical justification for therapeutic exercise and importance of compliance with home exercise program in order to improve overall impairments and quality of life     Patient was educated on all the above exercise prior/during/after for proper posture, positioning, and execution for safe performance with home exercise program.    Importance of home exercise program compliance with bilateral lower extremity when she is having pain to assist with pain management.    3/10/2022:  Patient educated on importance of performing stretches and strengthening exercises to help with pain management and strength.   3/5/2022: Patient educated on moving more frequently at home as well as using her leg within a pain free range of motion. Patient also educated on calling Primary Care Physician to get a referral to orthopaedics.    3/24/58361: Patient educated on not being hyper focused on pain and what her knee is doing and to focus more on something pleasant while stretching or doing strengthening exercises.    4/5/2022: to avoid using cane in the center of her stance but instead to use cane on her right side.      Written Home Exercises Provided: yes.  Exercises were reviewed and Stacey was able to demonstrate them prior to the end of the session.  Stacey demonstrated good  understanding of the education provided. See EMR under Patient Instructions for exercises provided during therapy  sessions. Paper home exercises given to patient and exercises put on my chart keren today.      ASSESSMENT     Patient ambulated into clinic with straight cane with good understanding of corrections regarding placement of cane after instrucitons. Patient demonstrated minimal difficulty with heel to toe gait pattern with left lower extremity and cane at the same time. Patient also able to ambulate without an assistive device around therapy clinic with slow miguel and improved right lower extremity stride length after cues.      Stacey is progressing well towards her goals.   Pt prognosis is Guarded.     Pt will continue to benefit from skilled outpatient physical therapy to address the deficits listed in the problem list box on initial evaluation, provide pt/family education and to maximize pt's level of independence in the home and community environment.     Pt's spiritual, cultural and educational needs considered and pt agreeable to plan of care and goals.      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 Patient will be compliant with home exercise program to supplement therapy in restoring pain free function. (PROGRESSING 3/22/2022)  2. Patient will improve impaired lower extremity manual muscle tests to >/= 4/5 to improve dynamic hip/knee support for functional tasks. (PROGRESSING 3/22/2022)  3. Patient will demonstrate normal, reciprocal gait pattern with no assistive device to show functional improvement. (PROGRSESSING 3/22/2022)     Long Term Goals (6 Weeks):  1. Patient will improve FOTO score to </= 60% limited to decrease perceived limitation with mobility.  (PROGRESSING 3/22/2022)  2. Patient will improve impaired lower extremity manual muscle tests to >/= 4+/5 to improve dynamic hip/knee support for functional tasks. (PROGRESSING  3/22/2022)  3. Patient will demonstrate ability to walk for 6 minutes straight, with only needing one rest break, to show functional endurance and strength improvements (PROGRESSING 3/22/2022)  4. Patient will achieve less than 20 seconds on timed up and go  test to show functional gait speed improvement. (PROGRESSING 3/22/2022)  5. Patient will achieve 4 sit to stands, with no hand use, in 30 seconds to show functional lower extremity strength improvement.   (PROGRESSING 3/22/2022)  Goals Key:  PC= progressing/continue; PM= partially met;        DC= discontinue    PLAN     Continue Plan of Care and progress per patient tolerance. See treatment section for details on planned progressions next session.      Tee Schaffer, PTA

## 2022-04-06 ENCOUNTER — PATIENT MESSAGE (OUTPATIENT)
Dept: FAMILY MEDICINE | Facility: CLINIC | Age: 57
End: 2022-04-06
Payer: COMMERCIAL

## 2022-04-06 NOTE — TELEPHONE ENCOUNTER
Ok so I just looked and we have never received any paper work from [facility]. The last paperwork you did for her was her [employer] disability & FMLA papers.

## 2022-04-07 ENCOUNTER — CLINICAL SUPPORT (OUTPATIENT)
Dept: REHABILITATION | Facility: HOSPITAL | Age: 57
End: 2022-04-07
Payer: COMMERCIAL

## 2022-04-07 DIAGNOSIS — R26.2 DIFFICULTY WALKING: Primary | ICD-10-CM

## 2022-04-07 PROCEDURE — 97110 THERAPEUTIC EXERCISES: CPT

## 2022-04-07 NOTE — PLAN OF CARE
Outpatient Therapy Updated Plan of Care     Visit Date: 4/7/2022  Name: Stacey Wade  Clinic Number: [id number]    Therapy Diagnosis:   Encounter Diagnosis   Name Primary?    Difficulty walking Yes     Physician: Savannah Lindsey MD    Physician Orders: PT Eval and Treat   Medical Diagnosis from Referral: M25.562 (ICD-10-CM) - Pain in left knee  Evaluation Date: 2/22/2022    Total Visits Received: 13  Cancelled Visits: 0  No Show Visits: 0    Current Certification Period:  2/22/2022 to 4/5/2022  Precautions:  Standard  Visits from Evaluation Date:  13  Functional Level Prior to Evaluation:  unable to walk without     Subjective     Update: Patient reports: she has 5/10 pain in her left knee today because of a poor sleeping position.  She has an appointment to see ortho next week to discuss further treatment options. Patient attempted to do exercises yesterday but feels like she overdid it because she began having pain.       She was compliant with home exercise program but not consistently everyday.      Response to previous treatment: Patient had no adverse effects.      Functional change: Patient feels stronger and is able to walk with better pattern.     Pain: 5/10       Objective     Update: HIP ROM  Right   Left   Increased/Decreased Pain  Flexion:  within functional limits within functional limits  None  Extension  Not tested  Not tested     Abduction  within functional limits within functional limits        KNEE ROM   Right    Left  Increased/Decreased Pain  Extension:   within functional limits  -3 degrees   Pain  Flexion:  within functional limits  100 degrees   Pain    ANKLE ROM  Right    Left  Increased/Decreased Pain  Dorsiflexion:  within functional limits  within functional limits  None    Lower Extremity Strength   LE MMT Right Left   HIP Flexion 4+/5 4/5   Hip Extension Not tested /5 Not tested /5   Hip Abduction 3+/5 3+/5   Knee Flexion 4+/5 3-/5   Knee Extension 4+/5 3-/5   Ankle  Dorsiflexion 4+/5 4/5       GAIT:  Patient ambulated into clinic with straight cane.  Patient demonstrates decreased miguel but improved heel to toe gait with improved step length.  Patient with limited left arm swing.  Patient requires circumduction and increased dorsiflexion of left lower extremity to clear lower extremity.    30 second sit to stand test: 5.5 sit to stands with 2 upper extremity assist     FOTO:          Assessment     Update: Patient able to perform straight leg raises with resistance without difficulty.  Patient also demonstrates improved range of motion and strength as well as increased number with sit to stands in 30 seconds.  Patient required encouragement to flex and extend left knee as much as she could in supine.  Patient did have some bilateral knee discomfort with standing hip abduction.  She required tactile and verbal cues for core activation and weight shift.     Previous Short Term Goals Status:   Short Term Goals (4 Weeks):  1. Patient will be compliant with home exercise program to supplement therapy in restoring pain free function. (GOAL MET 4/7/2022)  2. Patient will improve impaired lower extremity manual muscle tests to >/= 4/5 to improve dynamic hip/knee support for functional tasks. (PROGRESSING 4/7/2022)  3. Patient will demonstrate normal, reciprocal gait pattern with no assistive device to show functional improvement. (PROGRSESSING 4/7/2022)     Long Term Goals (6 Weeks):  1. Patient will improve FOTO score to </= 60% limited to decrease perceived limitation with mobility.  (PROGRESSING 4/7/2022)  2. Patient will improve impaired lower extremity manual muscle tests to >/= 4+/5 to improve dynamic hip/knee support for functional tasks. (PROGRESSING 4/7/2022)  3. Patient will demonstrate ability to walk for 6 minutes straight, with only needing one rest break, to show functional endurance and strength improvements (PROGRESSING 4/7/2022)  4. Patient will achieve less than 20  seconds on timed up and go  test to show functional gait speed improvement. (PROGRESSING 4/7/2022)  5. Patient will achieve 4 sit to stands, with no hand use, in 30 seconds to show functional lower extremity strength improvement.   (PROGRESSING 4/7/2022)    New Short Term Goals Status:   Short Term Goals (4 Weeks):  1. Patient will be compliant with home exercise program to supplement therapy in restoring pain free function. (GOAL MET 4/7/2022)  2. Patient will improve impaired lower extremity manual muscle tests to >/= 4/5 to improve dynamic hip/knee support for functional tasks. (PROGRESSING 4/7/2022)  3. Patient will demonstrate normal, reciprocal gait pattern with no assistive device to show functional improvement. (PROGRSESSING 4/7/2022)     Long Term Goals (6 Weeks):  1. Patient will improve FOTO score to </= 60% limited to decrease perceived limitation with mobility.  (PROGRESSING 4/7/2022)  2. Patient will improve impaired lower extremity manual muscle tests to >/= 4+/5 to improve dynamic hip/knee support for functional tasks. (PROGRESSING 4/7/2022)  3. Patient will demonstrate ability to walk for 6 minutes straight, with only needing one rest break, to show functional endurance and strength improvements (PROGRESSING 4/7/2022)  4. Patient will achieve less than 20 seconds on timed up and go  test to show functional gait speed improvement. (PROGRESSING 4/7/2022)  5. Patient will achieve 4 sit to stands, with no hand use, in 30 seconds to show functional lower extremity strength improvement.   (PROGRESSING 4/7/2022)    Long Term Goal Status:   continue per initial plan of care.  Reasons for Recertification of Therapy:   Patient would benefit from further lower extremity strengthening and core strengthening as well as improved gait pattern with less restrictive assistive device.     Plan     Updated Certification Period: 4/7/2022 to 6/30/2022  Recommended Treatment Plan: 2 times per week for 12 weeks:  Cervical/Lumbar Traction, Electrical Stimulation (attended/unattended), Gait Training, Manual Therapy, Moist Heat/ Ice, Neuromuscular Re-ed, Orthotic Management and Training, Patient Education, Self Care, Therapeutic Activities, Therapeutic Exercise and Functional Dry Needling  Other Recommendations: none    Mckayla Melara, PT, DPT  4/7/2022      I CERTIFY THE NEED FOR THESE SERVICES FURNISHED UNDER THIS PLAN OF TREATMENT AND WHILE UNDER MY CARE    Physician's comments:        Physician's Signature: ___________________________________________________

## 2022-04-07 NOTE — PROGRESS NOTES
[facility]   Physical Therapy Treatment Note/ Progress Note     Name: Stacey Wade  Clinic Number: [id number]    Therapy Diagnosis:   Encounter Diagnosis   Name Primary?    Difficulty walking Yes     Physician: Savannah Lindsey MD    Visit Date: 4/7/2022       Physician Orders: PT Eval and Treat   Medical Diagnosis from Referral: M25.562 (ICD-10-CM) - Pain in left knee  Evaluation Date: 2/22/2022  Authorization Period Expiration: 12/31/2022  Plan of Care Expiration: 4/5/2022 (Requesting updated plan of care to 6/30/2022)  Visit # / Visits authorized: 12/20 (+ 1 evaluation)  FOTO: 2/3    Progress Note: 5/5/2022    Precautions: Standard    PTA Visit #: 1/5     Time In: 8:45 am  Time Out: 9:30 am  Total Billable Time: 40 minutes    SUBJECTIVE     Patient reports: she has 5/10 pain in her left knee today because of a poor sleeping position.  She has an appointment to see ortho next week to discuss further treatment options. Patient attempted to do exercises yesterday but feels like she overdid it because she began having pain.      She was compliant with home exercise program but not consistently everyday.     Response to previous treatment: Patient had no adverse effects.      Functional change: Patient feels stronger and is able to walk with better pattern.    Pain: 5/10     Location: left knee    OBJECTIVE     Objective Measures updated at progress report unless specified.     HIP ROM  Right   Left   Increased/Decreased Pain  Flexion:  within functional limits within functional limits  None  Extension  Not tested  Not tested     Abduction  within functional limits within functional limits        KNEE ROM   Right    Left  Increased/Decreased Pain  Extension:   within functional limits  -3 degrees   Pain  Flexion:  within functional limits  100 degrees   Pain    ANKLE ROM  Right    Left  Increased/Decreased Pain  Dorsiflexion:  within functional limits  within functional limits   None    Lower Extremity Strength   LE MMT Right Left   HIP Flexion 4+/5 4/5   Hip Extension Not tested /5 Not tested /5   Hip Abduction 3+/5 3+/5   Knee Flexion 4+/5 3-/5   Knee Extension 4+/5 3-/5   Ankle Dorsiflexion 4+/5 4/5       GAIT:  Patient ambulated into clinic with straight cane.  Patient demonstrates decreased miguel but improved heel to toe gait with improved step length.  Patient with limited left arm swing.  Patient requires circumduction and increased dorsiflexion of left lower extremity to clear lower extremity.    30 second sit to stand test: 5.5 sit to stands with 2 upper extremity assist     FOTO:            TREATMENT     Stacey received the treatments listed below:     THERAPEUTIC EXERCISES to develop strength, endurance, range of motion, flexibility and posture for (40) minutes including:    Intervention Performed Today    Exercise bike  5 minutes   Nustep X 5 minutes, level 4   Quad set (home exercise program)  X 3 minutes with 5 second hold with towel roll   Heel slide- seated    3 minutes with slide board and straps with hold at top  3 set of 10 repetition active range, seated with towel and slide board    Straight leg raise  (home exercise program) x 3 x 10, 1 second hold, 1 pound   Bridges (home exercise program)  3 x 10 (increased)   Hip abduction with belt  3 minutes, seated edge of mat   Hip adduction with ball   3 minutes, seated edge of mat     Side lying clams   3 x 10 with each lower extremity, with core activation   Long arch quad  X 2 pound, 3 x 10, left extremity    Hamstring stretch with strap  Left lower extremity 30 seconds x 2 trials   Soleus stretch with strap  left lower extremity, 30 seconds x 2 trials   Ankle dorsiflexion/ plantarflexion   1 set of 10 repetition each direction   Ankle inversion/eversion  1 set of 10 repetition each direction   Gluteal sets  3 minutes   Abdominal bracing  3 minutes   Short arc quads  1 pound, 3 sets of 10 repetition left lower extremity     Shuttle    Double leg press: 4 cords, 3 sets of 10 repetition   Single leg press: 2 cords, 3 sets of 10 repetition each lower extremity    Standing gastrocnemius stretch on incline X 30 seconds x 2 trials   Standing soleus stretch on incline X 30 seconds x 2 trials    Prone hip extension  3 x 10 (added)   Sit to stand X 30 second sit to stand test. See above   Heel raises X 3 sets of 10 repetition, eccentric control    Standing hip abduction X 1 set of 10 repetition each lower extremity    Reassessment X See above     Plan for Next Visit:        NEUROMUSCULAR RE-EDUCATION ACTIVITIES to improve Balance, Coordination, Kinesthetic, Sense, Proprioception and Posture for (0) minutes.  The following were included:    Intervention Performed Today    Heel raises  3 set of 10 repetition, eccentric control   Lunge toe taps with weight shift  1 set of 10 repetition each lower extremity    Reciprocal steps with increased stride length  1 set of 10 repetition each lower extremity                               Plan for Next Visit:          MANUAL THERAPY TECHNIQUES were applied for (0) minutes, including:    Manual Intervention Performed Today    Soft Tissue Mobilization  left hip flexors, TFL, quadriceps, hamstrings , hip adductors  and ITB, gastrocnemius, soleus, posterior knee, left long leg distraction, left knee joint distraction   Joint Mobilizations     Mobilization with movement     Kinesiotape  Lateral patellar stability with medial and lateral tracking stability   Functional Dry Needling        Plan for Next Visit:        GAIT TRAINING to improve functional mobility and safety for (0) minutes.    Intervention Performed Today    Ambulation with cane  Around clinic   Ambulation without an assistive device  Around clinic   Heel to toe gait     Increased stride length  On right lower extremity                          Plan for Next Visit:          PATIENT EDUCATION AND HOME EXERCISES     Home Exercises Provided and  Patient Education Provided     Education provided: (during session) minutes   Patient educated on biomechanical justification for therapeutic exercise and importance of compliance with home exercise program in order to improve overall impairments and quality of life     Patient was educated on all the above exercise prior/during/after for proper posture, positioning, and execution for safe performance with home exercise program.    Importance of home exercise program compliance with bilateral lower extremity when she is having pain to assist with pain management.    3/10/2022:  Patient educated on importance of performing stretches and strengthening exercises to help with pain management and strength.   3/5/2022: Patient educated on moving more frequently at home as well as using her leg within a pain free range of motion. Patient also educated on calling Primary Care Physician to get a referral to orthopaedics.    3/24/54651: Patient educated on not being hyper focused on pain and what her knee is doing and to focus more on something pleasant while stretching or doing strengthening exercises.    4/5/2022: to avoid using cane in the center of her stance but instead to use cane on her right side.      Written Home Exercises Provided: yes.  Exercises were reviewed and Stacey was able to demonstrate them prior to the end of the session.  Stacey demonstrated good  understanding of the education provided. See EMR under Patient Instructions for exercises provided during therapy sessions. Paper home exercises given to patient and exercises put on my chart keren today.      ASSESSMENT     Patient able to perform straight leg raises with resistance without difficulty.  Patient also demonstrates improved range of motion and strength as well as increased number with sit to stands in 30 seconds.  Patient required encouragement to flex and extend left knee as much as she could in supine.  Patient did have some bilateral  knee discomfort with standing hip abduction.  She required tactile and verbal cues for core activation and weight shift.     Stacey is progressing well towards her goals.   Pt prognosis is Guarded.     Pt will continue to benefit from skilled outpatient physical therapy to address the deficits listed in the problem list box on initial evaluation, provide pt/family education and to maximize pt's level of independence in the home and community environment.     Pt's spiritual, cultural and educational needs considered and pt agreeable to plan of care and goals.      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 Patient will be compliant with home exercise program to supplement therapy in restoring pain free function. (GOAL MET 4/7/2022)  2. Patient will improve impaired lower extremity manual muscle tests to >/= 4/5 to improve dynamic hip/knee support for functional tasks. (PROGRESSING 4/7/2022)  3. Patient will demonstrate normal, reciprocal gait pattern with no assistive device to show functional improvement. (PROGRSESSING 4/7/2022)     Long Term Goals (6 Weeks):  1. Patient will improve FOTO score to </= 60% limited to decrease perceived limitation with mobility.  (PROGRESSING 4/7/2022)  2. Patient will improve impaired lower extremity manual muscle tests to >/= 4+/5 to improve dynamic hip/knee support for functional tasks. (PROGRESSING 4/7/2022)  3. Patient will demonstrate ability to walk for 6 minutes straight, with only needing one rest break, to show functional endurance and strength improvements (PROGRESSING 4/7/2022)  4. Patient will achieve less than 20 seconds on timed up and go  test to show functional gait speed improvement. (PROGRESSING 4/7/2022)  5. Patient will achieve 4 sit to stands, with no hand use, in 30 seconds to show functional  lower extremity strength improvement.   (PROGRESSING 4/7/2022)  Goals Key:  PC= progressing/continue; PM= partially met;        DC= discontinue    PLAN     Continue Plan of Care and progress per patient tolerance. See treatment section for details on planned progressions next session.    Requesting updated plan of care from 4/7/2022 to 6/30/2022.    Mckayla Melara, PT, DPT

## 2022-04-10 ENCOUNTER — PATIENT MESSAGE (OUTPATIENT)
Dept: FAMILY MEDICINE | Facility: CLINIC | Age: 57
End: 2022-04-10
Payer: COMMERCIAL

## 2022-04-12 ENCOUNTER — CLINICAL SUPPORT (OUTPATIENT)
Dept: REHABILITATION | Facility: HOSPITAL | Age: 57
End: 2022-04-12
Payer: COMMERCIAL

## 2022-04-12 DIAGNOSIS — R26.2 DIFFICULTY WALKING: Primary | ICD-10-CM

## 2022-04-12 PROCEDURE — 97112 NEUROMUSCULAR REEDUCATION: CPT

## 2022-04-12 PROCEDURE — 97110 THERAPEUTIC EXERCISES: CPT

## 2022-04-12 NOTE — PROGRESS NOTES
[facility]PAT[facility] THERAPY AND WELLNESS   Physical Therapy Treatment Note    Name: Stacey Wade  Clinic Number: [id number]    Therapy Diagnosis:   Encounter Diagnosis   Name Primary?    Difficulty walking Yes     Physician: Savannah Lindsey MD    Visit Date: 4/12/2022       Physician Orders: PT Eval and Treat   Medical Diagnosis from Referral: M25.562 (ICD-10-CM) - Pain in left knee  Evaluation Date: 2/22/2022  Authorization Period Expiration: 12/31/2022  Plan of Care Expiration: 4/5/2022 (Requesting updated plan of care to 6/30/2022)  Visit # / Visits authorized: 13/20 (+ 1 evaluation)  FOTO: 2/3    Progress Note: 5/5/2022    Precautions: Standard    PTA Visit #: 1/5     Time In: 8:45 am  Time Out: 9:30 am  Total Billable Time: 40 minutes    SUBJECTIVE     Patient reports: she had 8/10 pain in her left knee over the weekend for no apparent reason.  It disappeared after a few days.  She has 1/10 pain today.      She was compliant with home exercise program but not consistently everyday.     Response to previous treatment: Patient had no adverse effects.      Functional change: Patient has less discomfort in knee and it feels looser.    Pain: 1/10     Location: left knee    OBJECTIVE     Objective Measures updated at progress report unless specified.         TREATMENT     Stacey received the treatments listed below:     THERAPEUTIC EXERCISES to develop strength, endurance, range of motion, flexibility and posture for (30) minutes including:    Intervention Performed Today    Exercise bike  5 minutes   Nustep X 5 minutes, level 5   Quad set (home exercise program)  X 3 minutes with 5 second hold with towel roll   Heel slide- seated    3 minutes with slide board and straps with hold at top  3 set of 10 repetition active range, seated with towel and slide board    Straight leg raise  (home exercise program)  3 x 10, 1 second hold, 1 pound   Bridges (home exercise program) X 1 x 10    Hip abduction with belt   3 minutes, seated edge of mat   Hip adduction with ball   3 minutes, seated edge of mat     Side lying clams   3 x 10 with each lower extremity, with core activation   Long arch quad  X 2 pound, 3 x 10, left extremity    Hamstring stretch with strap  Left lower extremity 30 seconds x 2 trials   Soleus stretch with strap  left lower extremity, 30 seconds x 2 trials   Ankle dorsiflexion/ plantarflexion   1 set of 10 repetition each direction   Ankle inversion/eversion  1 set of 10 repetition each direction   Gluteal sets  3 minutes   Abdominal bracing  3 minutes   Short arc quads  1 pound, 3 sets of 10 repetition left lower extremity    Shuttle X  X  X   Double leg press: 4 cords, 3 minutes  Double heel raises: 3 cords, 3 sets of 10 repetition   Single leg press: 2 cords, 3 sets of 10 repetition each lower extremity    Standing gastrocnemius stretch on incline X 30 seconds x 2 trials   Standing soleus stretch on incline X 30 seconds x 2 trials    Prone hip extension  3 x 10 (added)   Sit to stand  30 second sit to stand test. See above   Heel raises  3 sets of 10 repetition, eccentric control    Standing hip abduction  1 set of 10 repetition each lower extremity    Reassessment  See above     Plan for Next Visit:        NEUROMUSCULAR RE-EDUCATION ACTIVITIES to improve Balance, Coordination, Kinesthetic, Sense, Proprioception and Posture for (10) minutes.  The following were included:    Intervention Performed Today    Heel raises  3 set of 10 repetition, eccentric control   Lunge toe taps with weight shift  1 set of 10 repetition each lower extremity    Reciprocal steps with increased stride length  1 set of 10 repetition each lower extremity    Standing hip abduction X 1 set of 10 repetition each lower extremity    Step ups, 4 inch step X Forward: 1 sets of 15 repetition each lower extremity   Lateral: 1 set of 15 repetition each lower extremity                     Plan for Next Visit:          MANUAL THERAPY  TECHNIQUES were applied for (0) minutes, including:    Manual Intervention Performed Today    Soft Tissue Mobilization  left hip flexors, TFL, quadriceps, hamstrings , hip adductors  and ITB, gastrocnemius, soleus, posterior knee, left long leg distraction, left knee joint distraction   Joint Mobilizations     Mobilization with movement     Kinesiotape  Lateral patellar stability with medial and lateral tracking stability   Functional Dry Needling        Plan for Next Visit:        GAIT TRAINING to improve functional mobility and safety for (0) minutes.    Intervention Performed Today    Ambulation with cane  Around clinic   Ambulation without an assistive device  Around clinic   Heel to toe gait     Increased stride length  On right lower extremity                          Plan for Next Visit:          PATIENT EDUCATION AND HOME EXERCISES     Home Exercises Provided and Patient Education Provided     Education provided: (during session) minutes   Patient educated on biomechanical justification for therapeutic exercise and importance of compliance with home exercise program in order to improve overall impairments and quality of life     Patient was educated on all the above exercise prior/during/after for proper posture, positioning, and execution for safe performance with home exercise program.    Importance of home exercise program compliance with bilateral lower extremity when she is having pain to assist with pain management.    3/10/2022:  Patient educated on importance of performing stretches and strengthening exercises to help with pain management and strength.   3/5/2022: Patient educated on moving more frequently at home as well as using her leg within a pain free range of motion. Patient also educated on calling Primary Care Physician to get a referral to orthopaedics.    3/24/06465: Patient educated on not being hyper focused on pain and what her knee is doing and to focus more on something pleasant  while stretching or doing strengthening exercises.    4/5/2022: to avoid using cane in the center of her stance but instead to use cane on her right side.      Written Home Exercises Provided: yes.  Exercises were reviewed and Stacey was able to demonstrate them prior to the end of the session.  Stacey demonstrated good  understanding of the education provided. See EMR under Patient Instructions for exercises provided during therapy sessions. Paper home exercises given to patient and exercises put on my chart keren today.      ASSESSMENT     Patient was able to perform increased resistance on nustep today but did require verbal cues to stay on task.  Patient also able to perform double heel raises on shuttle with no pain.  Patient required verbal cues for erect posture, core activation and attempting to try standing activity without having to look at feet.  Patient with some bilateral lower extremity fatigue after standing exercises today.     Stacey is progressing well towards her goals.   Pt prognosis is Guarded.     Pt will continue to benefit from skilled outpatient physical therapy to address the deficits listed in the problem list box on initial evaluation, provide pt/family education and to maximize pt's level of independence in the home and community environment.     Pt's spiritual, cultural and educational needs considered and pt agreeable to plan of care and goals.      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 Patient will be compliant with home exercise program to supplement therapy in restoring pain free function. (GOAL MET 4/7/2022)  2. Patient will improve impaired lower extremity manual muscle tests to >/= 4/5 to improve dynamic hip/knee support for functional tasks. (PROGRESSING 4/7/2022)  3. Patient will demonstrate normal,  reciprocal gait pattern with no assistive device to show functional improvement. (PROGRSESSING 4/7/2022)     Long Term Goals (6 Weeks):  1. Patient will improve FOTO score to </= 60% limited to decrease perceived limitation with mobility.  (PROGRESSING 4/7/2022)  2. Patient will improve impaired lower extremity manual muscle tests to >/= 4+/5 to improve dynamic hip/knee support for functional tasks. (PROGRESSING 4/7/2022)  3. Patient will demonstrate ability to walk for 6 minutes straight, with only needing one rest break, to show functional endurance and strength improvements (PROGRESSING 4/7/2022)  4. Patient will achieve less than 20 seconds on timed up and go  test to show functional gait speed improvement. (PROGRESSING 4/7/2022)  5. Patient will achieve 4 sit to stands, with no hand use, in 30 seconds to show functional lower extremity strength improvement.   (PROGRESSING 4/7/2022)  Goals Key:  PC= progressing/continue; PM= partially met;        DC= discontinue    PLAN     Continue Plan of Care and progress per patient tolerance. See treatment section for details on planned progressions next session.    Requesting updated plan of care from 4/7/2022 to 6/30/2022.    Mckayla Melaar, PT, DPT

## 2022-04-14 ENCOUNTER — CLINICAL SUPPORT (OUTPATIENT)
Dept: REHABILITATION | Facility: HOSPITAL | Age: 57
End: 2022-04-14
Payer: COMMERCIAL

## 2022-04-14 ENCOUNTER — OFFICE VISIT (OUTPATIENT)
Dept: ORTHOPEDICS | Facility: CLINIC | Age: 57
End: 2022-04-14
Payer: COMMERCIAL

## 2022-04-14 ENCOUNTER — LAB VISIT (OUTPATIENT)
Dept: LAB | Facility: HOSPITAL | Age: 57
End: 2022-04-14
Attending: ORTHOPAEDIC SURGERY
Payer: COMMERCIAL

## 2022-04-14 VITALS — WEIGHT: 190 LBS | HEIGHT: 62 IN | BODY MASS INDEX: 34.96 KG/M2

## 2022-04-14 DIAGNOSIS — R26.2 DIFFICULTY WALKING: Primary | ICD-10-CM

## 2022-04-14 DIAGNOSIS — M25.462 EFFUSION OF LEFT KNEE: Primary | ICD-10-CM

## 2022-04-14 DIAGNOSIS — M25.562 ACUTE PAIN OF LEFT KNEE: ICD-10-CM

## 2022-04-14 DIAGNOSIS — M25.462 EFFUSION OF LEFT KNEE: ICD-10-CM

## 2022-04-14 DIAGNOSIS — M25.562 ACUTE PAIN OF LEFT KNEE: Primary | ICD-10-CM

## 2022-04-14 DIAGNOSIS — S83.232A COMPLEX TEAR OF MEDIAL MENISCUS OF LEFT KNEE AS CURRENT INJURY, INITIAL ENCOUNTER: ICD-10-CM

## 2022-04-14 LAB
APPEARANCE FLD: NORMAL
BASOPHILS # BLD AUTO: 0.05 K/UL (ref 0–0.2)
BASOPHILS NFR BLD: 0.6 % (ref 0–1.9)
BASOPHILS NFR FLD MANUAL: 0 %
BODY FLD TYPE: NORMAL
BODY FLD TYPE: NORMAL
BODY FLUID COMMENTS: NORMAL
COLOR FLD: NORMAL
CRP SERPL-MCNC: 3.7 MG/L (ref 0–8.2)
CRYSTALS FLD MICRO: NEGATIVE
DIFFERENTIAL METHOD: ABNORMAL
EOSINOPHIL # BLD AUTO: 0.1 K/UL (ref 0–0.5)
EOSINOPHIL NFR BLD: 1.5 % (ref 0–8)
EOSINOPHIL NFR FLD MANUAL: 0 %
ERYTHROCYTE [DISTWIDTH] IN BLOOD BY AUTOMATED COUNT: 16.4 % (ref 11.5–14.5)
ERYTHROCYTE [SEDIMENTATION RATE] IN BLOOD BY WESTERGREN METHOD: 10 MM/HR (ref 0–36)
HCT VFR BLD AUTO: 43.9 % (ref 37–48.5)
HGB BLD-MCNC: 13.2 G/DL (ref 12–16)
IMM GRANULOCYTES # BLD AUTO: 0.02 K/UL (ref 0–0.04)
IMM GRANULOCYTES NFR BLD AUTO: 0.3 % (ref 0–0.5)
LYMPHOCYTES # BLD AUTO: 2.4 K/UL (ref 1–4.8)
LYMPHOCYTES NFR BLD: 30.4 % (ref 18–48)
LYMPHOCYTES NFR FLD MANUAL: 69 %
MCH RBC QN AUTO: 22.5 PG (ref 27–31)
MCHC RBC AUTO-ENTMCNC: 30.1 G/DL (ref 32–36)
MCV RBC AUTO: 75 FL (ref 82–98)
MESOTHL CELL NFR FLD MANUAL: 0 %
MONOCYTES # BLD AUTO: 0.5 K/UL (ref 0.3–1)
MONOCYTES NFR BLD: 6.2 % (ref 4–15)
MONOS+MACROS NFR FLD MANUAL: 8 %
NEUTROPHILS # BLD AUTO: 4.8 K/UL (ref 1.8–7.7)
NEUTROPHILS NFR BLD: 61 % (ref 38–73)
NEUTROPHILS NFR FLD MANUAL: 23 %
NRBC BLD-RTO: 0 /100 WBC
OTHER CELLS FLD MANUAL: 0 %
PLATELET # BLD AUTO: 214 K/UL (ref 150–450)
PMV BLD AUTO: 12.2 FL (ref 9.2–12.9)
RBC # BLD AUTO: 5.86 M/UL (ref 4–5.4)
WBC # BLD AUTO: 7.93 K/UL (ref 3.9–12.7)
WBC # FLD: 137 /CU MM

## 2022-04-14 PROCEDURE — 85025 COMPLETE CBC W/AUTO DIFF WBC: CPT | Performed by: ORTHOPAEDIC SURGERY

## 2022-04-14 PROCEDURE — 97112 NEUROMUSCULAR REEDUCATION: CPT

## 2022-04-14 PROCEDURE — 36415 COLL VENOUS BLD VENIPUNCTURE: CPT | Performed by: ORTHOPAEDIC SURGERY

## 2022-04-14 PROCEDURE — 85652 RBC SED RATE AUTOMATED: CPT | Performed by: ORTHOPAEDIC SURGERY

## 2022-04-14 PROCEDURE — 89051 BODY FLUID CELL COUNT: CPT | Performed by: ORTHOPAEDIC SURGERY

## 2022-04-14 PROCEDURE — 97110 THERAPEUTIC EXERCISES: CPT

## 2022-04-14 PROCEDURE — 87205 SMEAR GRAM STAIN: CPT | Performed by: ORTHOPAEDIC SURGERY

## 2022-04-14 PROCEDURE — 87075 CULTR BACTERIA EXCEPT BLOOD: CPT | Performed by: ORTHOPAEDIC SURGERY

## 2022-04-14 PROCEDURE — 1159F MED LIST DOCD IN RCRD: CPT | Mod: CPTII,S$GLB,, | Performed by: ORTHOPAEDIC SURGERY

## 2022-04-14 PROCEDURE — 20610 DRAIN/INJ JOINT/BURSA W/O US: CPT | Mod: LT,S$GLB,, | Performed by: PHYSICIAN ASSISTANT

## 2022-04-14 PROCEDURE — 86140 C-REACTIVE PROTEIN: CPT | Performed by: ORTHOPAEDIC SURGERY

## 2022-04-14 PROCEDURE — 99204 OFFICE O/P NEW MOD 45 MIN: CPT | Mod: S$GLB,,, | Performed by: ORTHOPAEDIC SURGERY

## 2022-04-14 PROCEDURE — 99999 PR PBB SHADOW E&M-EST. PATIENT-LVL IV: ICD-10-PCS | Mod: PBBFAC,,, | Performed by: ORTHOPAEDIC SURGERY

## 2022-04-14 PROCEDURE — 87070 CULTURE OTHR SPECIMN AEROBIC: CPT | Performed by: ORTHOPAEDIC SURGERY

## 2022-04-14 PROCEDURE — 20610 LARGE JOINT ASPIRATION/INJECTION: L KNEE: ICD-10-PCS | Mod: LT,S$GLB,, | Performed by: PHYSICIAN ASSISTANT

## 2022-04-14 PROCEDURE — 89060 EXAM SYNOVIAL FLUID CRYSTALS: CPT | Performed by: ORTHOPAEDIC SURGERY

## 2022-04-14 PROCEDURE — 3008F PR BODY MASS INDEX (BMI) DOCUMENTED: ICD-10-PCS | Mod: CPTII,S$GLB,, | Performed by: ORTHOPAEDIC SURGERY

## 2022-04-14 PROCEDURE — 99999 PR PBB SHADOW E&M-EST. PATIENT-LVL IV: CPT | Mod: PBBFAC,,, | Performed by: ORTHOPAEDIC SURGERY

## 2022-04-14 PROCEDURE — 1159F PR MEDICATION LIST DOCUMENTED IN MEDICAL RECORD: ICD-10-PCS | Mod: CPTII,S$GLB,, | Performed by: ORTHOPAEDIC SURGERY

## 2022-04-14 PROCEDURE — 99204 PR OFFICE/OUTPT VISIT, NEW, LEVL IV, 45-59 MIN: ICD-10-PCS | Mod: S$GLB,,, | Performed by: ORTHOPAEDIC SURGERY

## 2022-04-14 PROCEDURE — 3008F BODY MASS INDEX DOCD: CPT | Mod: CPTII,S$GLB,, | Performed by: ORTHOPAEDIC SURGERY

## 2022-04-14 NOTE — PROGRESS NOTES
[facility]   Physical Therapy Treatment Note    Name: Stacey Wade  Clinic Number: [id number]    Therapy Diagnosis:   Encounter Diagnosis   Name Primary?    Difficulty walking Yes     Physician: Savannah Lindsey MD    Visit Date: 4/14/2022       Physician Orders: PT Eval and Treat   Medical Diagnosis from Referral: M25.562 (ICD-10-CM) - Pain in left knee  Evaluation Date: 2/22/2022  Authorization Period Expiration: 12/31/2022  Plan of Care Expiration: 4/5/2022 (Requesting updated plan of care to 6/30/2022)  Visit # / Visits authorized: 14/20 (+ 1 evaluation)  FOTO: 2/3    Progress Note: 5/5/2022    Precautions: Standard    PTA Visit #: 1/5     Time In: 9:30 am  Time Out: 10:15 am  Total Billable Time: 40 minutes    SUBJECTIVE     Patient reports: she had 5/10 pain in her knees today.  She had increased achiness last night.  She had difficulty sleeping because of the constant throbbing and achiness in her knee.  She had to use her cane again today because of her knee pain.     She was compliant with home exercise program but not consistently everyday.     Response to previous treatment: Patient felt great after last visit.      Functional change: Patient was able to do more at home after last visit.     Pain: 5/10     Location: left knee    OBJECTIVE     Objective Measures updated at progress report unless specified.         TREATMENT     Stacey received the treatments listed below:     THERAPEUTIC EXERCISES to develop strength, endurance, range of motion, flexibility and posture for (30) minutes including:    Intervention Performed Today    Exercise bike  5 minutes   Nustep X 5 minutes, level 4   Quad set (home exercise program)  X 3 minutes with 5 second hold with towel roll   Heel slide- seated    3 minutes with slide board and straps with hold at top  3 set of 10 repetition active range, seated with towel and slide board    Straight leg raise  (home exercise program) X 3 x  10, 1 second hold, 1 pound   Melanie (home exercise program)  1 x 10    Hip abduction with belt  3 minutes, seated edge of mat   Hip adduction with ball   3 minutes, seated edge of mat     Side lying clams  X 3 x 10 with each lower extremity, with core activation   Long arch quad  X 1 pound, 3 x 10, left extremity    Hamstring stretch with strap  Left lower extremity 30 seconds x 2 trials   Soleus stretch with strap  left lower extremity, 30 seconds x 2 trials   Ankle dorsiflexion/ plantarflexion   1 set of 10 repetition each direction   Ankle inversion/eversion  1 set of 10 repetition each direction   Gluteal sets  3 minutes   Abdominal bracing  3 minutes   Short arc quads  1 pound, 3 sets of 10 repetition left lower extremity    Shuttle X  X  X   Double leg press: 4 cords, 3 minutes  Double heel raises: 4 cords, 3 sets of 10 repetition   Single leg press: 3 cords, 3 sets of 10 repetition each lower extremity    Standing gastrocnemius stretch on incline  30 seconds x 2 trials   Standing soleus stretch on incline  30 seconds x 2 trials    Prone hip extension  3 x 10 (added)   Sit to stand  30 second sit to stand test. See above   Heel raises  3 sets of 10 repetition, eccentric control    Standing hip abduction  1 set of 10 repetition each lower extremity    Reassessment  See above   Seated marches X 1 pounds, 3 sets of 10 repetition      Plan for Next Visit:        NEUROMUSCULAR RE-EDUCATION ACTIVITIES to improve Balance, Coordination, Kinesthetic, Sense, Proprioception and Posture for (10) minutes.  The following were included:    Intervention Performed Today    Heel raises  3 set of 10 repetition, eccentric control   Lunge toe taps with weight shift  1 set of 10 repetition each lower extremity    Reciprocal steps with increased stride length  1 set of 10 repetition each lower extremity    Standing hip abduction  1 set of 10 repetition each lower extremity    Step ups, 4 inch step  Forward: 1 sets of 15 repetition  each lower extremity   Lateral: 1 set of 15 repetition each lower extremity    Lateral walking X 4 laps both directions   Backward walking X  100 feet   Lateral walking with turns to switch directions X 100 feet   March with kick out/big step X 20 feet     Plan for Next Visit:          MANUAL THERAPY TECHNIQUES were applied for (0) minutes, including:    Manual Intervention Performed Today    Soft Tissue Mobilization  left hip flexors, TFL, quadriceps, hamstrings , hip adductors  and ITB, gastrocnemius, soleus, posterior knee, left long leg distraction, left knee joint distraction   Joint Mobilizations     Mobilization with movement     Kinesiotape  Lateral patellar stability with medial and lateral tracking stability   Functional Dry Needling        Plan for Next Visit:        GAIT TRAINING to improve functional mobility and safety for (0) minutes.    Intervention Performed Today    Ambulation with cane  Around clinic   Ambulation without an assistive device  Around clinic   Heel to toe gait     Increased stride length  On right lower extremity                          Plan for Next Visit:          PATIENT EDUCATION AND HOME EXERCISES     Home Exercises Provided and Patient Education Provided     Education provided: (during session) minutes   Patient educated on biomechanical justification for therapeutic exercise and importance of compliance with home exercise program in order to improve overall impairments and quality of life     Patient was educated on all the above exercise prior/during/after for proper posture, positioning, and execution for safe performance with home exercise program.    Importance of home exercise program compliance with bilateral lower extremity when she is having pain to assist with pain management.    3/10/2022:  Patient educated on importance of performing stretches and strengthening exercises to help with pain management and strength.   3/5/2022: Patient educated on moving more  frequently at home as well as using her leg within a pain free range of motion. Patient also educated on calling Primary Care Physician to get a referral to orthopaedics.    3/24/75094: Patient educated on not being hyper focused on pain and what her knee is doing and to focus more on something pleasant while stretching or doing strengthening exercises.    4/5/2022: to avoid using cane in the center of her stance but instead to use cane on her right side.      Written Home Exercises Provided: yes.  Exercises were reviewed and Stacey was able to demonstrate them prior to the end of the session.  Stacey demonstrated good  understanding of the education provided. See EMR under Patient Instructions for exercises provided during therapy sessions. Paper home exercises given to patient and exercises put on my chart keren today.      ASSESSMENT     Patient presented to clinic with straight cane today as well as decreased miguel and step length with gait.  Patient able to perform increased resistance with heel raises and single leg press on shuttle.  Patient educated on proper inhalation and exhalation with each exercise to help with decreasing shortness of breath after each set of exercises.   Patient required verbal cues for core activation and erect posture.  Patient with improved gait pattern, speed and stride at end of therapy session.     Stacey is progressing well towards her goals.   Pt prognosis is Guarded.     Pt will continue to benefit from skilled outpatient physical therapy to address the deficits listed in the problem list box on initial evaluation, provide pt/family education and to maximize pt's level of independence in the home and community environment.     Pt's spiritual, cultural and educational needs considered and pt agreeable to plan of care and goals.      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 Patient will be compliant with home exercise program to supplement therapy in restoring pain free function. (GOAL MET 4/7/2022)  2. Patient will improve impaired lower extremity manual muscle tests to >/= 4/5 to improve dynamic hip/knee support for functional tasks. (PROGRESSING 4/7/2022)  3. Patient will demonstrate normal, reciprocal gait pattern with no assistive device to show functional improvement. (PROGRSESSING 4/7/2022)     Long Term Goals (6 Weeks):  1. Patient will improve FOTO score to </= 60% limited to decrease perceived limitation with mobility.  (PROGRESSING 4/7/2022)  2. Patient will improve impaired lower extremity manual muscle tests to >/= 4+/5 to improve dynamic hip/knee support for functional tasks. (PROGRESSING 4/7/2022)  3. Patient will demonstrate ability to walk for 6 minutes straight, with only needing one rest break, to show functional endurance and strength improvements (PROGRESSING 4/7/2022)  4. Patient will achieve less than 20 seconds on timed up and go  test to show functional gait speed improvement. (PROGRESSING 4/7/2022)  5. Patient will achieve 4 sit to stands, with no hand use, in 30 seconds to show functional lower extremity strength improvement.   (PROGRESSING 4/7/2022)  Goals Key:  PC= progressing/continue; PM= partially met;        DC= discontinue    PLAN     Continue Plan of Care and progress per patient tolerance. See treatment section for details on planned progressions next session.    Requesting updated plan of care from 4/7/2022 to 6/30/2022.    Mckayla Melara, PT, DPT

## 2022-04-14 NOTE — PROGRESS NOTES
Patient ID: Stacey Wade  YOB: 1965  MRN: [id number]    Chief Complaint: Pain of the Left Knee      Referred By: Dr. Petit    History of Present Illness: Stacey Wade is a RHD 56 y.o. 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 She has seen Dr. Petit and she has an MRI of her LEFT knee. Pt states that her pain is 7/10 today. She states that the pain is most severe in the mornings and towards the end of the day. The injury occurred on 01/06/2022. She was getting out of her car and felt pain.      Knee Pain   The pain is present in the left knee. This is a new problem. The current episode started more than 1 month ago. The problem occurs constantly. The problem has been unchanged. The quality of the pain is described as aching, burning, sharp, shooting, tightness and throbbing. The pain is at a severity of 7/10. Associated symptoms include an inability to bear weight, joint locking, joint swelling, a limited range of motion and stiffness. The symptoms are aggravated by activity, bearing weight, bending, extension, flexion, lying down, standing, walking and sitting. She has tried NSAIDs, OTC ointments, cold and brace/corset for the symptoms. The treatment provided no relief. Physical therapy was ineffective ([facility] ).      Past Medical History:   Past Medical History:   Diagnosis Date    Allergic rhinitis     Anxiety     Hypertension     Urticaria      Past Surgical History:   Procedure Laterality Date    COLONOSCOPY N/A 1/18/2018    Procedure: COLONOSCOPY;  Surgeon: Yong Smith MD;  [facility];  Service: Endoscopy;  Laterality: N/A;    HERNIA REPAIR Left      Family History   Problem Relation Age of Onset    Lung cancer Paternal Grandfather     Ovarian cancer Maternal Grandmother     Hyperlipidemia Mother     Hypertension Mother     Breast cancer Mother 60     Arthritis Mother     Lung cancer Father     Breast cancer Maternal Aunt 53    Heart failure Other         Great aunt    Prostate cancer Brother     Diabetes Neg Hx      Social History     Socioeconomic History    Marital status: Single   Tobacco Use    Smoking status: Never Smoker    Smokeless tobacco: Never Used   Substance and Sexual Activity    Alcohol use: No    Drug use: No    Sexual activity: Not Currently     Partners: Male     Birth control/protection: None   Social History Narrative    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     Medication List with Changes/Refills   Current Medications    ASPIRIN (ECOTRIN) 81 MG EC TABLET    Take 81 mg by mouth once daily.    BETAMETHASONE DIPROPIONATE 0.05 % CREAM    APPLY TOPICALLY 2 TIMES DAILY.    BUTALBITAL-ACETAMINOPHEN-CAFFEINE -40 MG (FIORICET, ESGIC) -40 MG PER TABLET    TAKE ONE TABLET BY MOUTH EVERY 6 HOURS AS NEEDED FOR PAIN OR FOR HEADACHE    DICLOFENAC SODIUM (VOLTAREN) 1 % GEL    Apply 2 g topically 4 (four) times daily.    DOXEPIN (SINEQUAN) 10 MG CAPSULE    TAKE 2 CAPSULES BY MOUTH 3 TIMES A DAY    EPINEPHRINE (EPIPEN) 0.3 MG/0.3 ML ATIN    Inject 0.3 mg into the muscle daily as needed.    FAMOTIDINE (PEPCID) 40 MG TABLET    Take 20 mg by mouth.    FEXOFENADINE (ALLEGRA) 180 MG TABLET    TAKE ONE TABLET BY MOUTH EVERY DAY    HYDROXYZINE HCL (ATARAX) 25 MG TABLET    TAKE 1 TABLET BY MOUTH FOUR TIMES A DAY AS NEEDED FOR ITCHING    MOMETASONE (NASONEX) 50 MCG/ACTUATION NASAL SPRAY    INHALE 2 SPRAYS BY NASAL ROUTE ONCE DAILY    MONTELUKAST (SINGULAIR) 10 MG TABLET    Take 1 tablet (10 mg total) by mouth once daily.    ONDANSETRON (ZOFRAN-ODT) 4 MG TBDL    Take 1 tablet (4 mg total) by mouth every 8 (eight) hours as needed (nausea).    POTASSIUM CHLORIDE (MICRO-K) 10 MEQ CPSR    Take 1 capsule (10 mEq total) by mouth once daily.    PREDNISONE (DELTASONE) 20 MG  TABLET    Take 2 tablets daily for 3 days, then 1 tablet daily for 3 days, then 1/2 tablet daily for 2 days.    RIZATRIPTAN (MAXALT) 10 MG TABLET    Take 1 tablet (10 mg total) by mouth as needed for Migraine (May repeat in 2 hrs.  No more than 2 tablets in 24 hrs.).    TOLTERODINE (DETROL LA) 4 MG 24 HR CAPSULE    Take 1 capsule (4 mg total) by mouth once daily.    TRIAMTERENE-HYDROCHLOROTHIAZIDE 37.5-25 MG (DYAZIDE) 37.5-25 MG PER CAPSULE    Take 1 capsule by mouth daily as needed.    XOLAIR 150 MG/ML INJECTION         Review of patient's allergies indicates:   Allergen Reactions    Benzalkonium chloride     Black pepper      Other reaction(s): Hives    Chocolate flavor      Other reaction(s): Hives    Citrus and derivatives Hives     LEMON PRODUCTS    Grapefruit Hives     Other reaction(s): Hives    Ibuprofen Swelling    Latex      Other reaction(s): Hives    Lemon balm (casper officinalis) Hives     Anything with lemon in it    Naproxen Swelling    Neomycin-bacitracin-polymyxin      Other reaction(s): Rash    Oats (richard) Hives     Oat foods (oatmeal, etc...)    Vegetable acetoglycerides Hives     Vegetable gums    Wheat containing prod     Wheat flour Hives     Review of Systems   Musculoskeletal: Positive for stiffness.       Physical Exam:   Body mass index is 34.75 kg/m².  There were no vitals filed for this visit.   GENERAL: Well appearing, appropriate for stated age, no acute distress.  CARDIOVASCULAR: Pulses regular by peripheral palpation.  PULMONARY: Respirations are even and non-labored.  NEURO: Awake, alert, and oriented x 3.  PSYCH: Mood & affect are appropriate.  HEENT: Head is normocephalic and atraumatic.  Ortho/SPM Exam  Right knee: Range of motion within normal limits and painless. Intact motor and sensation. Good perfusion. No tenderness, gross deformity, gross instability, or skin lesions.  Left knee: large effusion. TTP diffusely. ROM 0-90  Stable ligamentously  Calf soft and  nontender  NVID    Imaging:    MRI Knee Without Contrast Left  Narrative: EXAMINATION:  MRI KNEE WITHOUT CONTRAST LEFT    CLINICAL HISTORY:  Meniscus tear suspected;possible ACL tear as well;Pain in left knee    TECHNIQUE:  Multiplanar, multisequence images were performed about the knee.    COMPARISON:  None    FINDINGS:  Medial compartment: Complex tear of the posterior horn extending into the body of the medial meniscus with partial extrusion into the medial gutter.  The medial collateral ligament is intact of bowed by the extruded meniscus.  Cartilage heterogeneity and thinning within the medial compartment with partial-thickness cartilage loss along the medial femoral condyle and medial tibial plateau.    Lateral compartment: Increased signal within the lateral meniscus without a discrete tear.  The lateral collateral ligamentous complex is intact.  Cartilage heterogeneity within the lateral compartment.    Intercondylar notch: The anterior and posterior cruciate ligaments are intact.  There is a 9 mm loose body within the posterior aspect of the joint.    Patellofemoral compartment: The extensor mechanism is intact.  No patellar tilt or subluxation.  Cartilage heterogeneity within the patellofemoral compartment.  Small joint effusion with leaking, complex Baker's cyst.  Impression: Arthritic changes of the knee with medial and lateral meniscal tears, chondromalacia, and joint effusion with leaking Baker's cyst.  Intra-articular loose body along the posterior aspect of the joint.    Electronically signed by: Rubin Hale  Date:    03/30/2022  Time:    12:35      Relevant imaging results reviewed and interpreted by me, discussed with the patient and / or family today.     Other Tests:         Patient Instructions     Assessment:  Stacey Wade is a 56 y.o. 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    Acute pain of left knee      Complex tear of medial meniscus of left knee as current injury, initial encounter       Plan:   Aspiration today left knee - send to lab for cultures, cell count, gram stain   ESR, CRP, CBC w diff today   Continue compression sleeve   Continue PT   Continue low impact   Follow-up with Megha Kwon in 2 weeks. If cultures and bloodwork normal, can consider corticosteroid injection at that time   May be a role for arthroscopic surgery, meniscectomy, loose body removal in the future depending on response for treatment   Continue to recommend not doing standing work until symptoms improve    Thank you for choosing [facility] and Dr. Wes Faulkner for your orthopedic & sports medicine care. It is our goal to provide you with exceptional care that will help keep you healthy, active, and get you back in the game.    If you felt that you received exemplary care today, please consider leaving us feedback on [employer] at [email or web address].     Please do not hesitate to reach out to us via email, phone, or MyChart with any questions, concerns, or feedback.    If you are experiencing pain/discomfort ,or have questions after 5pm and would like to be connected to the [facility] on-call team, please call this number and specify which Sports Medicine provider is treating you: [phone]           I discussed worrisome and red flag signs and symptoms with the patient. The patient expressed understanding and agreed to alert me immediately or to go to the emergency room if they experience any of these.    Treatment plan was developed with input from the patient/family, and they expressed understanding and agreement with the plan. All questions were answered today.    Disclaimer: This note was prepared using a voice recognition system and is likely to have sound alike errors within the text.

## 2022-04-14 NOTE — PATIENT INSTRUCTIONS
Assessment:  Stacey Wade is a 56 y.o. 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    Acute pain of left knee     Complex tear of medial meniscus of left knee as current injury, initial encounter       Plan:  Aspiration today left knee - send to lab for cultures, cell count, gram stain  ESR, CRP, CBC w diff today  Continue compression sleeve  Continue PT  Continue low impact  Follow-up with Megha Kwon in 2 weeks. If cultures and bloodwork normal, can consider corticosteroid injection at that time  May be a role for arthroscopic surgery, meniscectomy, loose body removal in the future depending on response for treatment  Continue to recommend not doing standing work until symptoms improve    Thank you for choosing [facility] and Dr. Wes Faulkner for your orthopedic & sports medicine care. It is our goal to provide you with exceptional care that will help keep you healthy, active, and get you back in the game.    If you felt that you received exemplary care today, please consider leaving us feedback on [employer] at [email or web address].     Please do not hesitate to reach out to us via email, phone, or MyChart with any questions, concerns, or feedback.    If you are experiencing pain/discomfort ,or have questions after 5pm and would like to be connected to the [facility] on-call team, please call this number and specify which Sports Medicine provider is treating you: [phone]

## 2022-04-14 NOTE — PROCEDURES
Large Joint Aspiration/Injection: L knee    Date/Time: 4/14/2022 10:30 AM  Performed by: Megha Stoddard PA-C  Authorized by: Megha Stoddard PA-C     Consent Done?:  Yes (Verbal)  Indications:  Joint swelling and pain  Site marked: the procedure site was marked    Timeout: prior to procedure the correct patient, procedure, and site was verified    Prep: patient was prepped and draped in usual sterile fashion      Local anesthesia used?: Yes    Anesthesia:  Local infiltration  Local anesthetic:  Lidocaine 1% without epinephrine    Details:  Needle Size:  25 G  Ultrasonic Guidance for needle placement?: No    Approach:  Anterolateral  Location:  Knee  Site:  L knee  Aspirate amount (mL):  35  Aspirate:  Serous and yellow  Lab: fluid sent for laboratory analysis    Patient tolerance:  Patient tolerated the procedure well with no immediate complications     1% Lidocaine was used for local anesthetic  Aspiration was performed with an 18g needle.

## 2022-04-15 LAB
GRAM STN SPEC: NORMAL
GRAM STN SPEC: NORMAL

## 2022-04-18 ENCOUNTER — IMMUNIZATION (OUTPATIENT)
Dept: PHARMACY | Facility: CLINIC | Age: 57
End: 2022-04-18
Payer: COMMERCIAL

## 2022-04-18 ENCOUNTER — PATIENT MESSAGE (OUTPATIENT)
Dept: FAMILY MEDICINE | Facility: CLINIC | Age: 57
End: 2022-04-18
Payer: COMMERCIAL

## 2022-04-18 ENCOUNTER — PATIENT MESSAGE (OUTPATIENT)
Dept: ORTHOPEDICS | Facility: CLINIC | Age: 57
End: 2022-04-18
Payer: COMMERCIAL

## 2022-04-18 DIAGNOSIS — Z23 NEED FOR VACCINATION: Primary | ICD-10-CM

## 2022-04-18 LAB
BACTERIA SPEC AEROBE CULT: NO GROWTH
PATH INTERP FLD-IMP: NORMAL

## 2022-04-19 ENCOUNTER — PATIENT MESSAGE (OUTPATIENT)
Dept: FAMILY MEDICINE | Facility: CLINIC | Age: 57
End: 2022-04-19
Payer: COMMERCIAL

## 2022-04-19 ENCOUNTER — CLINICAL SUPPORT (OUTPATIENT)
Dept: REHABILITATION | Facility: HOSPITAL | Age: 57
End: 2022-04-19
Payer: COMMERCIAL

## 2022-04-19 DIAGNOSIS — R26.2 DIFFICULTY WALKING: Primary | ICD-10-CM

## 2022-04-19 PROCEDURE — 97110 THERAPEUTIC EXERCISES: CPT

## 2022-04-19 NOTE — TELEPHONE ENCOUNTER
Pt called in to inform us that she's feeling weak  Pt stated she got her booster shot and thinks that's why she feeling so weak. Pt also stated that she is still experiencing frequent urination problems pt stated she is taking the medication for it but still have to use AZO to help out      Amanda Franklin MA

## 2022-04-19 NOTE — PROGRESS NOTES
[facility]   Physical Therapy Treatment Note    Name: Stacey Wade  Clinic Number: [id number]    Therapy Diagnosis:   Encounter Diagnosis   Name Primary?    Difficulty walking Yes     Physician: Savannah Lindsey MD    Visit Date: 4/19/2022       Physician Orders: PT Eval and Treat   Medical Diagnosis from Referral: M25.562 (ICD-10-CM) - Pain in left knee  Evaluation Date: 2/22/2022  Authorization Period Expiration: 12/31/2022  Plan of Care Expiration: 4/5/2022 (Requesting updated plan of care to 6/30/2022)  Visit # / Visits authorized: 15/20 (+ 1 evaluation)  FOTO: 2/3    Progress Note: 5/5/2022    Precautions: Standard    PTA Visit #: 1/5     Time In: 8:50 am  Time Out: 9:30 am  Total Billable Time: 38 minutes    SUBJECTIVE     Patient reports: she has 6-7/10 pain today in her left knee.  She felt good after last visit but began having left knee pain Sunday.  She feels like the pain may be due to sleeping with a pillow between her knees.  She has left a message for Dr. Faulkner concerning her knee but she states he wants her to continue with therapy.  Patient states she had her COVID-19 booster yesterday.  She also has been having more right knee pain.    She was compliant with home exercise program but not consistently everyday.     Response to previous treatment: Patient felt good after last visit for a few days.     Functional change: No significant changes noted in the last week.     Pain: 6-7/10     Location: left knee    OBJECTIVE     Objective Measures updated at progress report unless specified.         TREATMENT     Stacey received the treatments listed below:     THERAPEUTIC EXERCISES to develop strength, endurance, range of motion, flexibility and posture for (30) minutes including:    Intervention Performed Today    Exercise bike X 5 minutes, level 1   Nustep  5 minutes, level 4   Quad set (home exercise program) X X 3 minutes with 5 second hold with towel roll    Heel slide- seated    3 minutes with slide board and straps with hold at top  3 set of 10 repetition active range, seated with towel and slide board    Straight leg raise  (home exercise program) X 3 x 10, 1 second hold, 0 pound   Bridges (home exercise program)  1 x 10    Hip abduction with belt X 3 minutes, supine   Hip adduction with ball  X 3 minutes, supine    Side lying clams  X 3 x 10 with each lower extremity, with core activation   Long arch quad  X 0 pound, 3 x 10, left extremity    Hamstring stretch with strap  Left lower extremity 30 seconds x 2 trials   Soleus stretch with strap  left lower extremity, 30 seconds x 2 trials   Ankle dorsiflexion/ plantarflexion   1 set of 10 repetition each direction   Ankle inversion/eversion  1 set of 10 repetition each direction   Gluteal sets X 3 minutes, supine   Abdominal bracing X 3 minutes, supine   Short arc quads X 0 pound, 3 sets of 10 repetition left lower extremity    Shuttle    Double leg press: 4 cords, 3 minutes  Double heel raises: 4 cords, 3 sets of 10 repetition   Single leg press: 3 cords, 3 sets of 10 repetition each lower extremity    Standing gastrocnemius stretch on incline  30 seconds x 2 trials   Standing soleus stretch on incline  30 seconds x 2 trials    Prone hip extension  3 x 10 (added)   Sit to stand  30 second sit to stand test. See above   Heel raises  3 sets of 10 repetition, eccentric control    Standing hip abduction  1 set of 10 repetition each lower extremity    Reassessment  See above   Seated marches  1 pounds, 3 sets of 10 repetition    Toe raises X 3 sets of 10 repetition. Seated edge of mat     Plan for Next Visit:        NEUROMUSCULAR RE-EDUCATION ACTIVITIES to improve Balance, Coordination, Kinesthetic, Sense, Proprioception and Posture for (0) minutes.  The following were included:    Intervention Performed Today    Heel raises  3 set of 10 repetition, eccentric control   Lunge toe taps with weight shift  1 set of 10  repetition each lower extremity    Reciprocal steps with increased stride length  1 set of 10 repetition each lower extremity    Standing hip abduction  1 set of 10 repetition each lower extremity    Step ups, 4 inch step  Forward: 1 sets of 15 repetition each lower extremity   Lateral: 1 set of 15 repetition each lower extremity    Lateral walking  4 laps both directions   Backward walking  100 feet   Lateral walking with turns to switch directions  100 feet   March with kick out/big step  20 feet     Plan for Next Visit:          MANUAL THERAPY TECHNIQUES were applied for (0) minutes, including:    Manual Intervention Performed Today    Soft Tissue Mobilization  left hip flexors, TFL, quadriceps, hamstrings , hip adductors  and ITB, gastrocnemius, soleus, posterior knee, left long leg distraction, left knee joint distraction   Joint Mobilizations     Mobilization with movement     Kinesiotape  Lateral patellar stability with medial and lateral tracking stability   Functional Dry Needling        Plan for Next Visit:        GAIT TRAINING to improve functional mobility and safety for (0) minutes.    Intervention Performed Today    Ambulation with cane  Around clinic   Ambulation without an assistive device  Around clinic   Heel to toe gait     Increased stride length  On right lower extremity                          Plan for Next Visit:          PATIENT EDUCATION AND HOME EXERCISES     Home Exercises Provided and Patient Education Provided     Education provided: (during session) minutes   Patient educated on biomechanical justification for therapeutic exercise and importance of compliance with home exercise program in order to improve overall impairments and quality of life     Patient was educated on all the above exercise prior/during/after for proper posture, positioning, and execution for safe performance with home exercise program.    Importance of home exercise program compliance with bilateral lower  extremity when she is having pain to assist with pain management.    3/10/2022:  Patient educated on importance of performing stretches and strengthening exercises to help with pain management and strength.   3/5/2022: Patient educated on moving more frequently at home as well as using her leg within a pain free range of motion. Patient also educated on calling Primary Care Physician to get a referral to orthopaedics.    3/24/13380: Patient educated on not being hyper focused on pain and what her knee is doing and to focus more on something pleasant while stretching or doing strengthening exercises.    4/5/2022: to avoid using cane in the center of her stance but instead to use cane on her right side.      Written Home Exercises Provided: yes.  Exercises were reviewed and Stacey was able to demonstrate them prior to the end of the session.  Stacey demonstrated good  understanding of the education provided. See EMR under Patient Instructions for exercises provided during therapy sessions. Paper home exercises given to patient and exercises put on my chart keren today.      ASSESSMENT     Patient with decreased miguel noted today with gait pattern.  Patient with more discomfort in left knee with all activities.  Exercises decreased and made easier today for patient's comfort and tolerance.  Patient required more encouragement to participate in activities secondary to being focused on her pain.  Patient required verbal cues to not hold her breath and perform breathing throughout each exercise.     Stacey is progressing well towards her goals.   Pt prognosis is Guarded.     Pt will continue to benefit from skilled outpatient physical therapy to address the deficits listed in the problem list box on initial evaluation, provide pt/family education and to maximize pt's level of independence in the home and community environment.     Pt's spiritual, cultural and educational needs considered and pt agreeable to plan of  care and goals.      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 Patient will be compliant with home exercise program to supplement therapy in restoring pain free function. (GOAL MET 4/7/2022)  2. Patient will improve impaired lower extremity manual muscle tests to >/= 4/5 to improve dynamic hip/knee support for functional tasks. (PROGRESSING 4/7/2022)  3. Patient will demonstrate normal, reciprocal gait pattern with no assistive device to show functional improvement. (PROGRSESSING 4/7/2022)     Long Term Goals (6 Weeks):  1. Patient will improve FOTO score to </= 60% limited to decrease perceived limitation with mobility.  (PROGRESSING 4/7/2022)  2. Patient will improve impaired lower extremity manual muscle tests to >/= 4+/5 to improve dynamic hip/knee support for functional tasks. (PROGRESSING 4/7/2022)  3. Patient will demonstrate ability to walk for 6 minutes straight, with only needing one rest break, to show functional endurance and strength improvements (PROGRESSING 4/7/2022)  4. Patient will achieve less than 20 seconds on timed up and go  test to show functional gait speed improvement. (PROGRESSING 4/7/2022)  5. Patient will achieve 4 sit to stands, with no hand use, in 30 seconds to show functional lower extremity strength improvement.   (PROGRESSING 4/7/2022)  Goals Key:  PC= progressing/continue; PM= partially met;        DC= discontinue    PLAN     Continue Plan of Care and progress per patient tolerance. See treatment section for details on planned progressions next session.    Requesting updated plan of care from 4/7/2022 to 6/30/2022.    Mckayla Melara, PT, DPT

## 2022-04-19 NOTE — TELEPHONE ENCOUNTER
As I told her in the previously message, she will have to make an appointment. She can see Judson if necessary.

## 2022-04-20 ENCOUNTER — PATIENT MESSAGE (OUTPATIENT)
Dept: ORTHOPEDICS | Facility: CLINIC | Age: 57
End: 2022-04-20
Payer: COMMERCIAL

## 2022-04-20 DIAGNOSIS — R51.9 RECURRENT HEADACHE: ICD-10-CM

## 2022-04-20 RX ORDER — RIZATRIPTAN BENZOATE 10 MG/1
10 TABLET ORAL
Qty: 9 TABLET | Refills: 0 | Status: SHIPPED | OUTPATIENT
Start: 2022-04-20 | End: 2022-08-03

## 2022-04-20 NOTE — TELEPHONE ENCOUNTER
No new care gaps identified.  Powered by [employer]. Reference number: [id number].   4/20/2022 10:40:25 AM CDT

## 2022-04-21 ENCOUNTER — CLINICAL SUPPORT (OUTPATIENT)
Dept: REHABILITATION | Facility: HOSPITAL | Age: 57
End: 2022-04-21
Payer: COMMERCIAL

## 2022-04-21 ENCOUNTER — PATIENT MESSAGE (OUTPATIENT)
Dept: FAMILY MEDICINE | Facility: CLINIC | Age: 57
End: 2022-04-21
Payer: COMMERCIAL

## 2022-04-21 ENCOUNTER — PATIENT MESSAGE (OUTPATIENT)
Dept: ORTHOPEDICS | Facility: CLINIC | Age: 57
End: 2022-04-21
Payer: COMMERCIAL

## 2022-04-21 DIAGNOSIS — R26.2 DIFFICULTY WALKING: Primary | ICD-10-CM

## 2022-04-21 PROCEDURE — 97110 THERAPEUTIC EXERCISES: CPT | Mod: CQ

## 2022-04-21 PROCEDURE — 97116 GAIT TRAINING THERAPY: CPT | Mod: CQ

## 2022-04-21 PROCEDURE — 97112 NEUROMUSCULAR REEDUCATION: CPT | Mod: CQ

## 2022-04-21 NOTE — PROGRESS NOTES
[facility]   Physical Therapy Treatment Note    Name: Stacey Wade  Clinic Number: [id number]    Therapy Diagnosis:   Encounter Diagnosis   Name Primary?    Difficulty walking Yes     Physician: Savannah Lindsey MD    Visit Date: 4/21/2022       Physician Orders: PT Eval and Treat   Medical Diagnosis from Referral: M25.562 (ICD-10-CM) - Pain in left knee  Evaluation Date: 2/22/2022  Authorization Period Expiration: 12/31/2022  Plan of Care Expiration: 4/5/2022 (Requesting updated plan of care to 6/30/2022)  Visit # / Visits authorized: 16/20 (+ 1 evaluation)  FOTO: 2/3    Progress Note: 5/5/2022    Precautions: Standard    PTA Visit #: 125     Time In: 9:00 am  Time Out: 9:45 am  Total Billable Time: 41 minutes    SUBJECTIVE     Patient reports: her left knee feels stiff upon awakening but after she moves around a few minutes, her stiffness resolves. May return to work in near future but will be on light duty initially.    She was compliant with home exercise program but not consistently everyday.     Response to previous treatment: Patient felt good after last visit for a few days.     Functional change: No significant changes noted in the last week.     Pain: 3/10     Location: left knee    OBJECTIVE     Objective Measures updated at progress report unless specified.         TREATMENT     Stacey received the treatments listed below:     THERAPEUTIC EXERCISES to develop strength, endurance, range of motion, flexibility and posture for (20) minutes including:    Intervention Performed Today    Exercise bike X 5 minutes, level 1   Nustep  5 minutes, level 4   Quad set (home exercise program)  X 3 minutes with 5 second hold with towel roll   Heel slide- seated    3 minutes with slide board and straps with hold at top  3 set of 10 repetition active range, seated with towel and slide board    Straight leg raise  (home exercise program)  3 x 10, 1 second hold, 0 pound   Bridges  (home exercise program) x 3 x 10 (increased)   Hip abduction with belt  3 minutes, supine   Hip adduction with ball   3 minutes, supine    Side lying clams   3 x 10 with each lower extremity, with core activation   Long arch quad   0 pound, 3 x 10, left extremity    Hamstring stretch with strap x Left lower extremity 30 seconds x 3 (increased)   Soleus stretch with strap  left lower extremity, 30 seconds x 3 (increased)   Ankle dorsiflexion/ plantarflexion   1 set of 10 repetition each direction   Ankle inversion/eversion  1 set of 10 repetition each direction   Gluteal sets  3 minutes, supine   Abdominal bracing  3 minutes, supine   Short arc quads  0 pound, 3 sets of 10 repetition left lower extremity    Shuttle    Double leg press: 4 cords, 3 minutes  Double heel raises: 4 cords, 3 sets of 10 repetition   Single leg press: 3 cords, 3 sets of 10 repetition each lower extremity    Standing gastrocnemius stretch on incline  30 seconds x 2 trials   Standing soleus stretch on incline  30 seconds x 2 trials    Prone hip extension  3 x 10 (added)   Sit to stand x 3 x 10 from low mat no hands 4 x in 18 seconds = long term goal 5 met)    Heel raises  3 sets of 10 repetition, eccentric control    Standing hip abduction  1 set of 10 repetition each lower extremity    Reassessment  See above   Seated marches  1 pounds, 3 sets of 10 repetition    Toe raises  3 sets of 10 repetition. Seated edge of mat     Plan for Next Visit:        NEUROMUSCULAR RE-EDUCATION ACTIVITIES to improve Balance, Coordination, Kinesthetic, Sense, Proprioception and Posture for (15) minutes.  The following were included:    Intervention Performed Today    Heel raises x 3 set of 10 repetition, eccentric control   Lunge toe taps with weight shift x 1 set of 10, 1 set of 5 repetition each lower extremity    Reciprocal steps with increased stride length  1 set of 10 repetition each lower extremity    Standing hip abduction  1 set of 10 repetition each  lower extremity    Step ups, 4 inch step x Forward: 1 sets of 15 repetition each lower extremity 1 hand balance  Lateral: 1 set of 15 repetition each lower extremity    Lateral walking  4 laps both directions   Backward walking  100 feet   Lateral walking with turns to switch directions  100 feet   March with kick out/big step  20 feet     Plan for Next Visit:          MANUAL THERAPY TECHNIQUES were applied for (0) minutes, including:    Manual Intervention Performed Today    Soft Tissue Mobilization  left hip flexors, TFL, quadriceps, hamstrings , hip adductors  and ITB, gastrocnemius, soleus, posterior knee, left long leg distraction, left knee joint distraction   Joint Mobilizations     Mobilization with movement     Kinesiotape  Lateral patellar stability with medial and lateral tracking stability   Functional Dry Needling        Plan for Next Visit:        GAIT TRAINING to improve functional mobility and safety for (6) minutes.    Intervention Performed Today    Ambulation with cane x 6 minutes Around clinic (goal met)   Ambulation without an assistive device  Around clinic   Heel to toe gait     Increased stride length  On right lower extremity                          Plan for Next Visit:      Adjusted cane for more appropriate height.     PATIENT EDUCATION AND HOME EXERCISES     Home Exercises Provided and Patient Education Provided     Education provided: (during session) minutes   Patient educated on biomechanical justification for therapeutic exercise and importance of compliance with home exercise program in order to improve overall impairments and quality of life     Patient was educated on all the above exercise prior/during/after for proper posture, positioning, and execution for safe performance with home exercise program.    Importance of home exercise program compliance with bilateral lower extremity when she is having pain to assist with pain management.    3/10/2022:  Patient educated on  importance of performing stretches and strengthening exercises to help with pain management and strength.   3/5/2022: Patient educated on moving more frequently at home as well as using her leg within a pain free range of motion. Patient also educated on calling Primary Care Physician to get a referral to orthopaedics.    3/24/69682: Patient educated on not being hyper focused on pain and what her knee is doing and to focus more on something pleasant while stretching or doing strengthening exercises.    4/5/2022: to avoid using cane in the center of her stance but instead to use cane on her right side.      Written Home Exercises Provided: yes.  Exercises were reviewed and Stacey was able to demonstrate them prior to the end of the session.  Stacey demonstrated good  understanding of the education provided. See EMR under Patient Instructions for exercises provided during therapy sessions. Paper home exercises given to patient and exercises put on my chart keren today.      ASSESSMENT     Patient with slow miguel noted today with gait pattern.  Patient with less discomfort in left knee after cane height was lowered. less focused on her pain with self distraction activities. Patient is getting more funcitonal as evident by her meeting long term goals 4, 5.      Stacey is progressing well towards her goals.   Pt prognosis is Guarded.     Pt will continue to benefit from skilled outpatient physical therapy to address the deficits listed in the problem list box on initial evaluation, provide pt/family education and to maximize pt's level of independence in the home and community environment.     Pt's spiritual, cultural and educational needs considered and pt agreeable to plan of care and goals.      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 Patient  will be compliant with home exercise program to supplement therapy in restoring pain free function. (GOAL MET 4/7/2022)  2. Patient will improve impaired lower extremity manual muscle tests to >/= 4/5 to improve dynamic hip/knee support for functional tasks. (PROGRESSING 4/7/2022)  3. Patient will demonstrate normal, reciprocal gait pattern with no assistive device to show functional improvement. (PROGRSESSING 4/7/2022)     Long Term Goals (6 Weeks):  1. Patient will improve FOTO score to </= 60% limited to decrease perceived limitation with mobility.  (PROGRESSING 4/7/2022)  2. Patient will improve impaired lower extremity manual muscle tests to >/= 4+/5 to improve dynamic hip/knee support for functional tasks. (PROGRESSING 4/7/2022)  3. Patient will demonstrate ability to walk for 6 minutes straight, with only needing one rest break, to show functional endurance and strength improvements (MET 4/21/2022)  4. Patient will achieve less than 20 seconds on timed up and go  test to show functional gait speed improvement. (PROGRESSING 4/7/2022)  5. Patient will achieve 4 sit to stands, with no hand use, in 30 seconds to show functional lower extremity strength improvement.   (MET 4/21/2022)  Goals Key:  PC= progressing/continue; PM= partially met;        DC= discontinue    PLAN     Continue Plan of Care and progress per patient tolerance. See treatment section for details on planned progressions next session.    Requesting updated plan of care from 4/7/2022 to 6/30/2022.    Tee Schaffer, PTA

## 2022-04-22 ENCOUNTER — OFFICE VISIT (OUTPATIENT)
Dept: FAMILY MEDICINE | Facility: CLINIC | Age: 57
End: 2022-04-22
Payer: COMMERCIAL

## 2022-04-22 VITALS
TEMPERATURE: 97 F | OXYGEN SATURATION: 98 % | HEIGHT: 62 IN | BODY MASS INDEX: 35.99 KG/M2 | HEART RATE: 87 BPM | WEIGHT: 195.56 LBS | SYSTOLIC BLOOD PRESSURE: 121 MMHG | DIASTOLIC BLOOD PRESSURE: 81 MMHG

## 2022-04-22 DIAGNOSIS — N39.41 URGE INCONTINENCE OF URINE: ICD-10-CM

## 2022-04-22 DIAGNOSIS — M25.562 LEFT KNEE PAIN, UNSPECIFIED CHRONICITY: ICD-10-CM

## 2022-04-22 DIAGNOSIS — E66.01 SEVERE OBESITY (BMI 35.0-39.9) WITH COMORBIDITY: ICD-10-CM

## 2022-04-22 DIAGNOSIS — N32.81 OVERACTIVE BLADDER: Primary | ICD-10-CM

## 2022-04-22 LAB
BILIRUB SERPL-MCNC: NORMAL MG/DL
BLOOD URINE, POC: NORMAL
CLARITY, POC UA: CLEAR
COLOR, POC UA: YELLOW
GLUCOSE UR QL STRIP: NORMAL
KETONES UR QL STRIP: NORMAL
LEUKOCYTE ESTERASE URINE, POC: NORMAL
NITRITE, POC UA: NORMAL
PH, POC UA: 7
PROTEIN, POC: NORMAL
SPECIFIC GRAVITY, POC UA: 1
UROBILINOGEN, POC UA: NORMAL

## 2022-04-22 PROCEDURE — 3079F PR MOST RECENT DIASTOLIC BLOOD PRESSURE 80-89 MM HG: ICD-10-PCS | Mod: CPTII,S$GLB,, | Performed by: FAMILY MEDICINE

## 2022-04-22 PROCEDURE — 81002 POCT URINE DIPSTICK WITHOUT MICROSCOPE: ICD-10-PCS | Mod: S$GLB,,, | Performed by: FAMILY MEDICINE

## 2022-04-22 PROCEDURE — 99999 PR PBB SHADOW E&M-EST. PATIENT-LVL V: CPT | Mod: PBBFAC,,, | Performed by: FAMILY MEDICINE

## 2022-04-22 PROCEDURE — 99213 PR OFFICE/OUTPT VISIT, EST, LEVL III, 20-29 MIN: ICD-10-PCS | Mod: S$GLB,,, | Performed by: FAMILY MEDICINE

## 2022-04-22 PROCEDURE — 1160F PR REVIEW ALL MEDS BY PRESCRIBER/CLIN PHARMACIST DOCUMENTED: ICD-10-PCS | Mod: CPTII,S$GLB,, | Performed by: FAMILY MEDICINE

## 2022-04-22 PROCEDURE — 1159F PR MEDICATION LIST DOCUMENTED IN MEDICAL RECORD: ICD-10-PCS | Mod: CPTII,S$GLB,, | Performed by: FAMILY MEDICINE

## 2022-04-22 PROCEDURE — 3008F BODY MASS INDEX DOCD: CPT | Mod: CPTII,S$GLB,, | Performed by: FAMILY MEDICINE

## 2022-04-22 PROCEDURE — 3074F PR MOST RECENT SYSTOLIC BLOOD PRESSURE < 130 MM HG: ICD-10-PCS | Mod: CPTII,S$GLB,, | Performed by: FAMILY MEDICINE

## 2022-04-22 PROCEDURE — 99999 PR PBB SHADOW E&M-EST. PATIENT-LVL V: ICD-10-PCS | Mod: PBBFAC,,, | Performed by: FAMILY MEDICINE

## 2022-04-22 PROCEDURE — 1160F RVW MEDS BY RX/DR IN RCRD: CPT | Mod: CPTII,S$GLB,, | Performed by: FAMILY MEDICINE

## 2022-04-22 PROCEDURE — 3074F SYST BP LT 130 MM HG: CPT | Mod: CPTII,S$GLB,, | Performed by: FAMILY MEDICINE

## 2022-04-22 PROCEDURE — 99213 OFFICE O/P EST LOW 20 MIN: CPT | Mod: S$GLB,,, | Performed by: FAMILY MEDICINE

## 2022-04-22 PROCEDURE — 1159F MED LIST DOCD IN RCRD: CPT | Mod: CPTII,S$GLB,, | Performed by: FAMILY MEDICINE

## 2022-04-22 PROCEDURE — 3008F PR BODY MASS INDEX (BMI) DOCUMENTED: ICD-10-PCS | Mod: CPTII,S$GLB,, | Performed by: FAMILY MEDICINE

## 2022-04-22 PROCEDURE — 3079F DIAST BP 80-89 MM HG: CPT | Mod: CPTII,S$GLB,, | Performed by: FAMILY MEDICINE

## 2022-04-22 PROCEDURE — 81002 URINALYSIS NONAUTO W/O SCOPE: CPT | Mod: S$GLB,,, | Performed by: FAMILY MEDICINE

## 2022-04-22 NOTE — PROGRESS NOTES
CHIEF COMPLAINT:  This is a 56-year-old complaining of urinary frequency.     SUBJECTIVE:  The patient complains of urinary frequency for almost 1 year.  She has to urinate at least 6-8 times per day and feels urinary pressure prior to urination.  Patient uses an AZO product and has been given a prescription for Detrol LA which she reports helps somewhat but not enough.  She now complains of urge incontinence.   Patient denies fever, chills, abdominal pain, dysuria or hematuria.  The patient is continuing to have problems with her left knee.  MRI showed medial meniscal tear.     ROS:  GENERAL: Patient denies fever, chills, night sweats. Patient denies weight gain or loss. Patient denies anorexia, fatigue, weakness or swollen glands.  SKIN:  Patient denies rash or hair loss.    HEENT: Patient denies sore throat, ear pain, hearing loss, nasal congestion, or runny nose. Patient denies visual disturbance, eye irritation or discharge.  LUNGS: Patient denies cough, wheeze or hemoptysis.  CARDIOVASCULAR: Patient denies shortness of breath, palpitations, syncope or lower extremity edema.  GI: Patient denies abdominal pain, nausea, vomiting, diarrhea, constipation, blood in stool or melena.    GENITOURINARY:  Patient denies dysuria or hematuria.  Positive for urinary frequency, urgency and incontinence.  MUSCULOSKELETAL: Patient denies joint swelling, redness or warmth. Positive for knee pain.  NEUROLOGIC: Patient denies headache, vertigo, paresthesias, weakness in limb, dysarthria, dysphagia or abnormality of gait.    PSYCHIATRIC: Patient denies anxiety, depression, or memory loss.     OBJECTIVE:   GENERAL: Well-developed well-nourished, obese, black female alert and oriented x3, in no acute distress. Memory, judgment and cognition without deficit.  SKIN: Clear without rash. Normal color and tone.  HEENT: Eyes: Clear conjunctivae.  No scleral icterus.  NECK: Supple, normal range of motion. No masses, lymphadenopathy or  enlarged thyroid. No JVD.  LUNGS:  Normal respiratory effort.  CARDIOVASCULAR: Regular rhythm.  EXTREMITIES: Without cyanosis, clubbing or edema. Distal pulses 2+ and equal. Normal range of motion in all extremities. No joint effusion, erythema or warmth.  NEUROLOGIC:   uses cane for support with ambulation.   No tremor.     UA dip:  Clear.    ASSESSMENT:  1. Overactive bladder    2. Urge incontinence of urine    3. Chronic pain of left knee      PLAN:  1. Patient requests urology consult.    20 minutes of total time spent on the encounter, which includes face to face time and non-face to face time preparing to see the patient (eg, review of tests), Obtaining and/or reviewing separately obtained history, Documenting clinical information in the electronic or other health record, Independently interpreting results (not separately reported) and communicating results to the patient/family/caregiver, or Care coordination (not separately reported).     This note is generated with speech recognition software and is subject to transcription error and sound alike phrases that may be missed by proofreading.

## 2022-04-25 LAB — BACTERIA SPEC ANAEROBE CULT: NORMAL

## 2022-04-26 ENCOUNTER — PATIENT MESSAGE (OUTPATIENT)
Dept: ORTHOPEDICS | Facility: CLINIC | Age: 57
End: 2022-04-26
Payer: COMMERCIAL

## 2022-04-26 ENCOUNTER — PATIENT MESSAGE (OUTPATIENT)
Dept: FAMILY MEDICINE | Facility: CLINIC | Age: 57
End: 2022-04-26
Payer: COMMERCIAL

## 2022-04-26 RX ORDER — MIRABEGRON 25 MG/1
25 TABLET, FILM COATED, EXTENDED RELEASE ORAL DAILY
Qty: 30 TABLET | Refills: 5 | Status: SHIPPED | OUTPATIENT
Start: 2022-04-26 | End: 2022-10-11 | Stop reason: SDUPTHER

## 2022-04-28 ENCOUNTER — OFFICE VISIT (OUTPATIENT)
Dept: ORTHOPEDICS | Facility: CLINIC | Age: 57
End: 2022-04-28
Payer: COMMERCIAL

## 2022-04-28 ENCOUNTER — CLINICAL SUPPORT (OUTPATIENT)
Dept: REHABILITATION | Facility: HOSPITAL | Age: 57
End: 2022-04-28
Payer: COMMERCIAL

## 2022-04-28 ENCOUNTER — PATIENT MESSAGE (OUTPATIENT)
Dept: FAMILY MEDICINE | Facility: CLINIC | Age: 57
End: 2022-04-28
Payer: COMMERCIAL

## 2022-04-28 VITALS — BODY MASS INDEX: 35.88 KG/M2 | WEIGHT: 195 LBS | HEIGHT: 62 IN

## 2022-04-28 DIAGNOSIS — M23.42 LOOSE BODY IN KNEE, LEFT: ICD-10-CM

## 2022-04-28 DIAGNOSIS — R26.2 DIFFICULTY WALKING: Primary | ICD-10-CM

## 2022-04-28 DIAGNOSIS — S83.232A COMPLEX TEAR OF MEDIAL MENISCUS OF LEFT KNEE AS CURRENT INJURY, INITIAL ENCOUNTER: ICD-10-CM

## 2022-04-28 DIAGNOSIS — M17.12 PRIMARY OSTEOARTHRITIS OF LEFT KNEE: ICD-10-CM

## 2022-04-28 DIAGNOSIS — M25.462 EFFUSION OF LEFT KNEE: Primary | ICD-10-CM

## 2022-04-28 DIAGNOSIS — E66.01 SEVERE OBESITY (BMI 35.0-39.9) WITH COMORBIDITY: ICD-10-CM

## 2022-04-28 DIAGNOSIS — I10 ESSENTIAL HYPERTENSION: ICD-10-CM

## 2022-04-28 DIAGNOSIS — M25.562 ACUTE PAIN OF LEFT KNEE: ICD-10-CM

## 2022-04-28 PROCEDURE — 97140 MANUAL THERAPY 1/> REGIONS: CPT

## 2022-04-28 PROCEDURE — 1160F RVW MEDS BY RX/DR IN RCRD: CPT | Mod: CPTII,S$GLB,, | Performed by: PHYSICIAN ASSISTANT

## 2022-04-28 PROCEDURE — 99213 PR OFFICE/OUTPT VISIT, EST, LEVL III, 20-29 MIN: ICD-10-PCS | Mod: S$GLB,,, | Performed by: PHYSICIAN ASSISTANT

## 2022-04-28 PROCEDURE — 1159F MED LIST DOCD IN RCRD: CPT | Mod: CPTII,S$GLB,, | Performed by: PHYSICIAN ASSISTANT

## 2022-04-28 PROCEDURE — 3008F PR BODY MASS INDEX (BMI) DOCUMENTED: ICD-10-PCS | Mod: CPTII,S$GLB,, | Performed by: PHYSICIAN ASSISTANT

## 2022-04-28 PROCEDURE — 3008F BODY MASS INDEX DOCD: CPT | Mod: CPTII,S$GLB,, | Performed by: PHYSICIAN ASSISTANT

## 2022-04-28 PROCEDURE — 99999 PR PBB SHADOW E&M-EST. PATIENT-LVL IV: ICD-10-PCS | Mod: PBBFAC,,, | Performed by: PHYSICIAN ASSISTANT

## 2022-04-28 PROCEDURE — 99999 PR PBB SHADOW E&M-EST. PATIENT-LVL IV: CPT | Mod: PBBFAC,,, | Performed by: PHYSICIAN ASSISTANT

## 2022-04-28 PROCEDURE — 99213 OFFICE O/P EST LOW 20 MIN: CPT | Mod: S$GLB,,, | Performed by: PHYSICIAN ASSISTANT

## 2022-04-28 PROCEDURE — 1160F PR REVIEW ALL MEDS BY PRESCRIBER/CLIN PHARMACIST DOCUMENTED: ICD-10-PCS | Mod: CPTII,S$GLB,, | Performed by: PHYSICIAN ASSISTANT

## 2022-04-28 PROCEDURE — 1159F PR MEDICATION LIST DOCUMENTED IN MEDICAL RECORD: ICD-10-PCS | Mod: CPTII,S$GLB,, | Performed by: PHYSICIAN ASSISTANT

## 2022-04-28 PROCEDURE — 97110 THERAPEUTIC EXERCISES: CPT

## 2022-04-28 NOTE — PROGRESS NOTES
Patient ID: Stacey Wade  YOB: 1965  MRN: [id number]    Chief Complaint: Pain and Swelling of the Left Knee      Referred By: Dr. Whit Petit     History of Present Illness: Stacey Wade is a 56 y.o. 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 She is here to review her lab results from an aspiration on 4/14/2022. She states that her Knee Swelling has returned since the aspiration.  She goes to Physical Therapy at [facility] with Mckayla and Tee. She wear a compression sleeve and uses Voltaren with relief.     HPI    Past Medical History:   Past Medical History:   Diagnosis Date    Allergic rhinitis     Anxiety     Hypertension     Urticaria      Past Surgical History:   Procedure Laterality Date    COLONOSCOPY N/A 1/18/2018    Procedure: COLONOSCOPY;  Surgeon: Yong Smith MD;  Location: [facility];  Service: Endoscopy;  Laterality: N/A;    HERNIA REPAIR Left      Family History   Problem Relation Age of Onset    Lung cancer Paternal Grandfather     Ovarian cancer Maternal Grandmother     Hyperlipidemia Mother     Hypertension Mother     Breast cancer Mother 60    Arthritis Mother     Lung cancer Father     Breast cancer Maternal Aunt 53    Heart failure Other         Great aunt    Prostate cancer Brother     Diabetes Neg Hx      Social History     Socioeconomic History    Marital status: Single   Tobacco Use    Smoking status: Never Smoker    Smokeless tobacco: Never Used   Substance and Sexual Activity    Alcohol use: No    Drug use: No    Sexual activity: Not Currently     Partners: Male     Birth control/protection: None   Social History Narrative    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     Medication List with Changes/Refills   Current  Medications    ASPIRIN (ECOTRIN) 81 MG EC TABLET    Take 81 mg by mouth once daily.    BETAMETHASONE DIPROPIONATE 0.05 % CREAM    APPLY TOPICALLY 2 TIMES DAILY.    BUTALBITAL-ACETAMINOPHEN-CAFFEINE -40 MG (FIORICET, ESGIC) -40 MG PER TABLET    TAKE ONE TABLET BY MOUTH EVERY 6 HOURS AS NEEDED FOR PAIN OR FOR HEADACHE    DICLOFENAC SODIUM (VOLTAREN) 1 % GEL    Apply 2 g topically 4 (four) times daily.    DOXEPIN (SINEQUAN) 10 MG CAPSULE    TAKE 2 CAPSULES BY MOUTH 3 TIMES A DAY    EPINEPHRINE (EPIPEN) 0.3 MG/0.3 ML ATIN    Inject 0.3 mg into the muscle daily as needed.    FAMOTIDINE (PEPCID) 40 MG TABLET    Take 20 mg by mouth.    FEXOFENADINE (ALLEGRA) 180 MG TABLET    TAKE ONE TABLET BY MOUTH EVERY DAY    HYDROXYZINE HCL (ATARAX) 25 MG TABLET    TAKE 1 TABLET BY MOUTH FOUR TIMES A DAY AS NEEDED FOR ITCHING    MIRABEGRON (MYRBETRIQ) 25 MG TB24 ER TABLET    Take 1 tablet (25 mg total) by mouth once daily.    MOMETASONE (NASONEX) 50 MCG/ACTUATION NASAL SPRAY    INHALE 2 SPRAYS BY NASAL ROUTE ONCE DAILY    MONTELUKAST (SINGULAIR) 10 MG TABLET    Take 1 tablet (10 mg total) by mouth once daily.    ONDANSETRON (ZOFRAN-ODT) 4 MG TBDL    Take 1 tablet (4 mg total) by mouth every 8 (eight) hours as needed (nausea).    POTASSIUM CHLORIDE (MICRO-K) 10 MEQ CPSR    Take 1 capsule (10 mEq total) by mouth once daily.    PREDNISONE (DELTASONE) 20 MG TABLET    Take 2 tablets daily for 3 days, then 1 tablet daily for 3 days, then 1/2 tablet daily for 2 days.    RIZATRIPTAN (MAXALT) 10 MG TABLET    Take 1 tablet (10 mg total) by mouth as needed for Migraine (May repeat in 2 hrs.  No more than 2 tablets in 24 hrs.).    TOLTERODINE (DETROL LA) 4 MG 24 HR CAPSULE    Take 1 capsule (4 mg total) by mouth once daily.    TRIAMTERENE-HYDROCHLOROTHIAZIDE 37.5-25 MG (DYAZIDE) 37.5-25 MG PER CAPSULE    Take 1 capsule by mouth daily as needed.    XOLAIR 150 MG/ML INJECTION         Review of patient's allergies indicates:   Allergen  Reactions    Benzalkonium chloride     Black pepper      Other reaction(s): Hives    Chocolate flavor      Other reaction(s): Hives    Citrus and derivatives Hives     LEMON PRODUCTS    Grapefruit Hives     Other reaction(s): Hives    Ibuprofen Swelling    Latex      Other reaction(s): Hives    Lemon balm (casper officinalis) Hives     Anything with lemon in it    Naproxen Swelling    Neomycin-bacitracin-polymyxin      Other reaction(s): Rash    Oats (richard) Hives     Oat foods (oatmeal, etc...)    Vegetable acetoglycerides Hives     Vegetable gums    Wheat containing prod     Wheat flour Hives     Review of Systems   Constitutional: Negative for chills and fever.   HENT: Negative for sore throat.    Eyes: Negative for pain.   Cardiovascular: Negative for chest pain and leg swelling.   Respiratory: Negative for cough and shortness of breath.    Skin: Negative for itching and rash.   Musculoskeletal: Positive for joint pain and joint swelling.   Gastrointestinal: Negative for abdominal pain, nausea and vomiting.   Genitourinary: Negative for dysuria.   Neurological: Negative for dizziness, numbness and paresthesias.       Physical Exam:   Body mass index is 35.67 kg/m².  There were no vitals filed for this visit.   GENERAL: Well appearing, appropriate for stated age, no acute distress.  CARDIOVASCULAR: Pulses regular by peripheral palpation.  PULMONARY: Respirations are even and non-labored.  NEURO: Awake, alert, and oriented x 3.  PSYCH: Mood & affect are appropriate.  HEENT: Head is normocephalic and atraumatic.  General    Nursing note and vitals reviewed.          Right Knee Exam   Right knee exam is normal.    Inspection   Effusion: absent    Tenderness   The patient is experiencing no tenderness.     Range of Motion   Extension: 0   Flexion: 120     Tests   Ligament Examination Lachman: normal (-1 to 2mm) PCL-Posterior Drawer: normal (0 to 2mm)     MCL - Valgus: normal (0 to 2mm)  LCL - Varus:  normal    Other   Sensation: normal    Left Knee Exam   Left knee exam is normal.    Inspection   Effusion: present    Tenderness   The patient tender to palpation of the medial joint line.    Crepitus   The patient has crepitus of the patella.    Range of Motion   Extension: 0   Flexion: 110     Tests   Meniscus   Dianne:  Medial - positive   Stability Lachman: normal (-1 to 2mm) PCL-Posterior Drawer: normal (0 to 2mm)  MCL - Valgus: normal (0 to 2mm)  LCL - Varus: normal (0 to 2mm)    Other   Popliteal (Baker's) Cyst: present  Sensation: normal    Muscle Strength   Right Lower Extremity   Hip Abduction: 5/5   Quadriceps:  5/5   Hamstrin/5   Left Lower Extremity   Hip Abduction: 5/5   Quadriceps:  5/5   Hamstrin/5     Vascular Exam     Right Pulses  Dorsalis Pedis:      2+  Posterior Tibial:      2+        Left Pulses  Dorsalis Pedis:      2+  Posterior Tibial:      2+        All compartments are soft and compressible. Calf soft non-tender. Intact EHL, FHL, gastroc soleus, and tibialis anterior. Sensation intact to light touch in superficial peroneal, deep peroneal, tibial, sural, and saphenous nerve distributions. Foot warm and well perfused with capillary refill of less than 2 seconds and palpable pedal pulses.       Imaging:    MRI Knee Without Contrast Left  Narrative: EXAMINATION:  MRI KNEE WITHOUT CONTRAST LEFT    CLINICAL HISTORY:  Meniscus tear suspected;possible ACL tear as well;Pain in left knee    TECHNIQUE:  Multiplanar, multisequence images were performed about the knee.    COMPARISON:  None    FINDINGS:  Medial compartment: Complex tear of the posterior horn extending into the body of the medial meniscus with partial extrusion into the medial gutter.  The medial collateral ligament is intact of bowed by the extruded meniscus.  Cartilage heterogeneity and thinning within the medial compartment with partial-thickness cartilage loss along the medial femoral condyle and medial tibial  plateau.    Lateral compartment: Increased signal within the lateral meniscus without a discrete tear.  The lateral collateral ligamentous complex is intact.  Cartilage heterogeneity within the lateral compartment.    Intercondylar notch: The anterior and posterior cruciate ligaments are intact.  There is a 9 mm loose body within the posterior aspect of the joint.    Patellofemoral compartment: The extensor mechanism is intact.  No patellar tilt or subluxation.  Cartilage heterogeneity within the patellofemoral compartment.  Small joint effusion with leaking, complex Baker's cyst.  Impression: Arthritic changes of the knee with medial and lateral meniscal tears, chondromalacia, and joint effusion with leaking Baker's cyst.  Intra-articular loose body along the posterior aspect of the joint.    Electronically signed by: Rubin Hale  Date:    03/30/2022  Time:    12:35      Relevant imaging results reviewed and interpreted by me, discussed with the patient and / or family today.     Other Tests:         Patient Instructions     Assessment:  Stacey Wade is a 56 y.o. 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    Effusion of left knee Yes    Acute pain of left knee     Complex tear of medial meniscus of left knee as current injury, initial encounter     Severe obesity (BMI 35.0-39.9) with comorbidity     Essential hypertension     Primary osteoarthritis of left knee     Loose body in knee, left       Plan:   Follow up with Dr. Faulkner to discuss possible interventions for loose body.    Discussed possible CSI but patient deferred at this time   Note: Return to work on Monday   Paperwork has already been  complete    Follow-up: With Dr. Faulkner or sooner if there are any problems between now and then.    Thank you for choosing [facility] and Dr. Wes Faulkner for your orthopedic & sports medicine care. It is our goal to provide you with exceptional care that will help keep you healthy, active, and get you back in the game.    If you felt that you received exemplary care today, please consider leaving us feedback on Healthgrades at [email or web address].     Please do not hesitate to reach out to us via email, phone, or MyChart with any questions, concerns, or feedback.    If you are experiencing pain/discomfort ,or have questions after 5pm and would like to be connected to the [facility] on-call team, please call this number and specify which Sports Medicine provider is treating you: [phone]        Provider Note/Medical Decision Making:        I discussed worrisome and red flag signs and symptoms with the patient. The patient expressed understanding and agreed to alert me immediately or to go to the emergency room if they experience any of these.    Treatment plan was developed with input from the patient/family, and they expressed understanding and agreement with the plan. All questions were answered today.    Disclaimer: This note was prepared using a voice recognition system and is likely to have sound alike errors within the text.

## 2022-04-28 NOTE — PROGRESS NOTES
[facility]   Physical Therapy Treatment Note    Name: Stacey Wade  Clinic Number: [id number]    Therapy Diagnosis:   Encounter Diagnosis   Name Primary?    Difficulty walking Yes     Physician: Savannah Lindsey MD    Visit Date: 4/28/2022       Physician Orders: PT Eval and Treat   Medical Diagnosis from Referral: M25.562 (ICD-10-CM) - Pain in left knee  Evaluation Date: 2/22/2022  Authorization Period Expiration: 12/31/2022  Plan of Care Expiration: 4/5/2022 (Requesting updated plan of care to 6/30/2022)  Visit # / Visits authorized: 16/20 (+ 1 evaluation)  FOTO: 2/3    Progress Note: 5/5/2022    Precautions: Standard    PTA Visit #: [id number]     Time In: 9:30 am  Time Out: 10:15 am  Total Billable Time: 41 minutes    SUBJECTIVE     Patient reports: she has 3-4/10 pain in her left knee today.  She woke up with 7/10 pain this morning.  As she has been up and moving around her knee pain has increased today.  She feels like her     She was compliant with home exercise program but not consistently everyday.     Response to previous treatment: Patient felt good after last visit.  She had minimal pain afterward.     Functional change: No significant changes noted in the last week.     Pain: 4/10     Location: left knee    OBJECTIVE     Objective Measures updated at progress report unless specified.         TREATMENT     Stacey received the treatments listed below:     THERAPEUTIC EXERCISES to develop strength, endurance, range of motion, flexibility and posture for (23) minutes including:    Intervention Performed Today    Exercise bike  5 minutes, level 1   Nustep X 5 minutes, level 4   Quad set (home exercise program)  X 3 minutes with 5 second hold with towel roll   Heel slide- seated    3 minutes with slide board and straps with hold at top  3 set of 10 repetition active range, seated with towel and slide board    Straight leg raise  (home exercise program)  3 x 10, 1 second hold,  0 pound   Bridges (home exercise program) X 3 x 10  With belt   Hip abduction with belt X 3 minutes, supine   Hip adduction with ball  X 3 minutes, supine    Side lying clams   3 x 10 with each lower extremity, with core activation   Long arch quad   0 pound, 3 x 10, left extremity    Hamstring stretch with strap  Left lower extremity 30 seconds x 3 (increased)   Soleus stretch with strap  left lower extremity, 30 seconds x 3 (increased)   Ankle dorsiflexion/ plantarflexion   1 set of 10 repetition each direction   Ankle inversion/eversion  1 set of 10 repetition each direction   Gluteal sets  3 minutes, supine   Abdominal bracing  3 minutes, supine   Short arc quads X 0 pound, 3 sets of 10 repetition left lower extremity, over a ball   Shuttle X   Double leg press: 4 cords, 3 minutes  Double heel raises: 4 cords, 3 sets of 10 repetition   Single leg press: 3 cords, 3 sets of 10 repetition each lower extremity    Standing gastrocnemius stretch on incline  30 seconds x 2 trials   Standing soleus stretch on incline  30 seconds x 2 trials    Prone hip extension  3 x 10 (added)   Sit to stand  3 x 10 from low mat no hands 4 x in 18 seconds = long term goal 5 met)    Heel raises/ toe raises/ heel toe rocks X  1sets of 10 repetition each exercise   Standing hip abduction  1 set of 10 repetition each lower extremity    Reassessment  See above   Seated marches  1 pounds, 3 sets of 10 repetition    Toe raises  3 sets of 10 repetition. Seated edge of mat     Plan for Next Visit:        NEUROMUSCULAR RE-EDUCATION ACTIVITIES to improve Balance, Coordination, Kinesthetic, Sense, Proprioception and Posture for (0) minutes.  The following were included:    Intervention Performed Today    Heel raises x 3 set of 10 repetition, eccentric control   Lunge toe taps with weight shift x 1 set of 10, 1 set of 5 repetition each lower extremity    Reciprocal steps with increased stride length  1 set of 10 repetition each lower extremity     Standing hip abduction  1 set of 10 repetition each lower extremity    Step ups, 4 inch step x Forward: 1 sets of 15 repetition each lower extremity 1 hand balance  Lateral: 1 set of 15 repetition each lower extremity    Lateral walking  4 laps both directions   Backward walking  100 feet   Lateral walking with turns to switch directions  100 feet   March with kick out/big step  20 feet     Plan for Next Visit:          MANUAL THERAPY TECHNIQUES were applied for (17) minutes, including:    Manual Intervention Performed Today    Soft Tissue Mobilization X left hip flexors, TFL, quadriceps, hamstrings , hip adductors , ITB, gastrocnemius , soleus, tibialis anterior , tibialis posterior and posterior knee, , left knee joint distraction, manual lymph drainage of anterior and posterior leg   Joint Mobilizations     Mobilization with movement     Kinesiotape  Lateral patellar stability with medial and lateral tracking stability   Functional Dry Needling        Plan for Next Visit:        GAIT TRAINING to improve functional mobility and safety for (0) minutes.    Intervention Performed Today    Ambulation with cane  6 minutes Around clinic (goal met)   Ambulation without an assistive device  Around clinic   Heel to toe gait     Increased stride length  On right lower extremity                          Plan for Next Visit:        PATIENT EDUCATION AND HOME EXERCISES     Home Exercises Provided and Patient Education Provided     Education provided: (during session) minutes   Patient educated on biomechanical justification for therapeutic exercise and importance of compliance with home exercise program in order to improve overall impairments and quality of life     Patient was educated on all the above exercise prior/during/after for proper posture, positioning, and execution for safe performance with home exercise program.    Importance of home exercise program compliance with bilateral lower extremity when she is  having pain to assist with pain management.    3/10/2022:  Patient educated on importance of performing stretches and strengthening exercises to help with pain management and strength.   3/5/2022: Patient educated on moving more frequently at home as well as using her leg within a pain free range of motion. Patient also educated on calling Primary Care Physician to get a referral to orthopaedics.    3/24/01880: Patient educated on not being hyper focused on pain and what her knee is doing and to focus more on something pleasant while stretching or doing strengthening exercises.    4/5/2022: to avoid using cane in the center of her stance but instead to use cane on her right side.      Written Home Exercises Provided: yes.  Exercises were reviewed and Stacey was able to demonstrate them prior to the end of the session.  Stacey demonstrated good  understanding of the education provided. See EMR under Patient Instructions for exercises provided during therapy sessions. Paper home exercises given to patient and exercises put on my chart keren today.      ASSESSMENT     Patient with multiple losses of balance noted today throughout therapy session.  Patient did not tolerate shuttle double leg press as well today as she normally does.  Patient with decreased symptoms after manual therapy and easier exercises.  Patient requires assistance from straight cane more than on previous visit.    Stacey is progressing well towards her goals.   Pt prognosis is Guarded.     Pt will continue to benefit from skilled outpatient physical therapy to address the deficits listed in the problem list box on initial evaluation, provide pt/family education and to maximize pt's level of independence in the home and community environment.     Pt's spiritual, cultural and educational needs considered and pt agreeable to plan of care and goals.      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 Patient will be compliant with home exercise program to supplement therapy in restoring pain free function. (GOAL MET 4/7/2022)  2. Patient will improve impaired lower extremity manual muscle tests to >/= 4/5 to improve dynamic hip/knee support for functional tasks. (PROGRESSING 4/7/2022)  3. Patient will demonstrate normal, reciprocal gait pattern with no assistive device to show functional improvement. (PROGRSESSING 4/7/2022)     Long Term Goals (6 Weeks):  1. Patient will improve FOTO score to </= 60% limited to decrease perceived limitation with mobility.  (PROGRESSING 4/7/2022)  2. Patient will improve impaired lower extremity manual muscle tests to >/= 4+/5 to improve dynamic hip/knee support for functional tasks. (PROGRESSING 4/7/2022)  3. Patient will demonstrate ability to walk for 6 minutes straight, with only needing one rest break, to show functional endurance and strength improvements (MET 4/21/2022)  4. Patient will achieve less than 20 seconds on timed up and go  test to show functional gait speed improvement. (PROGRESSING 4/7/2022)  5. Patient will achieve 4 sit to stands, with no hand use, in 30 seconds to show functional lower extremity strength improvement.   (MET 4/21/2022)  Goals Key:  PC= progressing/continue; PM= partially met;        DC= discontinue    PLAN     Continue Plan of Care and progress per patient tolerance. See treatment section for details on planned progressions next session.    Requesting updated plan of care from 4/7/2022 to 6/30/2022.    Mckayla Melara, PT, DPT

## 2022-04-28 NOTE — PATIENT INSTRUCTIONS
Assessment:  Stacey Wade is a 56 y.o. 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    Effusion of left knee Yes    Acute pain of left knee     Complex tear of medial meniscus of left knee as current injury, initial encounter     Severe obesity (BMI 35.0-39.9) with comorbidity     Essential hypertension     Primary osteoarthritis of left knee     Loose body in knee, left       Plan:  Follow up with Dr. Faulkner to discuss possible interventions for loose body.   Discussed possible CSI but patient deferred at this time  Note: Return to work on Monday  Paperwork has already been complete    Follow-up: With Dr. Faulkner or sooner if there are any problems between now and then.    Thank you for choosing [facility] and Dr. Wes Faulkner for your orthopedic & sports medicine care. It is our goal to provide you with exceptional care that will help keep you healthy, active, and get you back in the game.    If you felt that you received exemplary care today, please consider leaving us feedback on Healthgrades at [email or web address].     Please do not hesitate to reach out to us via email, phone, or MyChart with any questions, concerns, or feedback.    If you are experiencing pain/discomfort ,or have questions after 5pm and would like to be connected to the [facility] on-call team, please call this number and specify which Sports Medicine provider is treating you: [phone]

## 2022-05-02 ENCOUNTER — OFFICE VISIT (OUTPATIENT)
Dept: ORTHOPEDICS | Facility: CLINIC | Age: 57
End: 2022-05-02
Payer: COMMERCIAL

## 2022-05-02 ENCOUNTER — HOSPITAL ENCOUNTER (OUTPATIENT)
Dept: RADIOLOGY | Facility: HOSPITAL | Age: 57
Discharge: HOME | End: 2022-05-02
Attending: ORTHOPAEDIC SURGERY
Payer: COMMERCIAL

## 2022-05-02 VITALS — HEIGHT: 62 IN | BODY MASS INDEX: 35.91 KG/M2 | WEIGHT: 195.13 LBS

## 2022-05-02 DIAGNOSIS — S83.232A COMPLEX TEAR OF MEDIAL MENISCUS OF LEFT KNEE AS CURRENT INJURY, INITIAL ENCOUNTER: Primary | ICD-10-CM

## 2022-05-02 DIAGNOSIS — S83.232A COMPLEX TEAR OF MEDIAL MENISCUS OF LEFT KNEE AS CURRENT INJURY, INITIAL ENCOUNTER: ICD-10-CM

## 2022-05-02 DIAGNOSIS — M23.42 LOOSE BODY IN KNEE, LEFT KNEE: ICD-10-CM

## 2022-05-02 PROCEDURE — 77073 XR HIP TO ANKLE: ICD-10-PCS | Mod: 26,,, | Performed by: RADIOLOGY

## 2022-05-02 PROCEDURE — 99214 OFFICE O/P EST MOD 30 MIN: CPT | Mod: S$GLB,,, | Performed by: ORTHOPAEDIC SURGERY

## 2022-05-02 PROCEDURE — 77073 BONE LENGTH STUDIES: CPT | Mod: 26,,, | Performed by: RADIOLOGY

## 2022-05-02 PROCEDURE — 99214 PR OFFICE/OUTPT VISIT, EST, LEVL IV, 30-39 MIN: ICD-10-PCS | Mod: S$GLB,,, | Performed by: ORTHOPAEDIC SURGERY

## 2022-05-02 PROCEDURE — 99999 PR PBB SHADOW E&M-EST. PATIENT-LVL V: CPT | Mod: PBBFAC,,, | Performed by: ORTHOPAEDIC SURGERY

## 2022-05-02 PROCEDURE — 99999 PR PBB SHADOW E&M-EST. PATIENT-LVL V: ICD-10-PCS | Mod: PBBFAC,,, | Performed by: ORTHOPAEDIC SURGERY

## 2022-05-02 PROCEDURE — 3008F PR BODY MASS INDEX (BMI) DOCUMENTED: ICD-10-PCS | Mod: CPTII,S$GLB,, | Performed by: ORTHOPAEDIC SURGERY

## 2022-05-02 PROCEDURE — 77073 BONE LENGTH STUDIES: CPT | Mod: TC

## 2022-05-02 PROCEDURE — 3008F BODY MASS INDEX DOCD: CPT | Mod: CPTII,S$GLB,, | Performed by: ORTHOPAEDIC SURGERY

## 2022-05-02 NOTE — H&P (VIEW-ONLY)
Patient ID: Stacey Wade  YOB: 1965  MRN: [id number]    Chief Complaint: Injury and Pain of the Left Knee    Referral: Whit Petit MD    History of Present Illness: Stacey Wade is a 56 y.o. 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 Her pain is a 6/10 today and it can get as bad as 10/10. She uses voltaren gel and Tylenol for her pain as needed and it gives her some brief relief. She is currently in PT at the [facility] with Mckayla Melara. She says her PT is helping, but was told to follow up with Dr. Faulkner to discuss possible surgical options today.    Previous Plan (4/28/22):  · Follow up with Dr. Faulkner to discuss possible interventions for loose body.   · Discussed possible CSI but patient deferred at this time  · Note: Return to work on Monday  · Paperwork has already been complete    HPI    Past Medical History:   Past Medical History:   Diagnosis Date    Allergic rhinitis     Anxiety     Hypertension     Urticaria      Past Surgical History:   Procedure Laterality Date    COLONOSCOPY N/A 1/18/2018    Procedure: COLONOSCOPY;  Surgeon: Yong Smith MD;  Location: [facility];  Service: Endoscopy;  Laterality: N/A;    HERNIA REPAIR Left      Family History   Problem Relation Age of Onset    Lung cancer Paternal Grandfather     Ovarian cancer Maternal Grandmother     Hyperlipidemia Mother     Hypertension Mother     Breast cancer Mother 60    Arthritis Mother     Lung cancer Father     Breast cancer Maternal Aunt 53    Heart failure Other         Great aunt    Prostate cancer Brother     Diabetes Neg Hx      Social History     Socioeconomic History    Marital status: Single   Tobacco Use    Smoking status: Never Smoker    Smokeless tobacco: Never Used   Substance and Sexual Activity    Alcohol use: No    Drug use: No     Sexual activity: Not Currently     Partners: Male     Birth control/protection: None   Social History Narrative    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     Medication List with Changes/Refills   Current Medications    ASPIRIN (ECOTRIN) 81 MG EC TABLET    Take 81 mg by mouth once daily.    BETAMETHASONE DIPROPIONATE 0.05 % CREAM    APPLY TOPICALLY 2 TIMES DAILY.    BUTALBITAL-ACETAMINOPHEN-CAFFEINE -40 MG (FIORICET, ESGIC) -40 MG PER TABLET    TAKE ONE TABLET BY MOUTH EVERY 6 HOURS AS NEEDED FOR PAIN OR FOR HEADACHE    DICLOFENAC SODIUM (VOLTAREN) 1 % GEL    Apply 2 g topically 4 (four) times daily.    DOXEPIN (SINEQUAN) 10 MG CAPSULE    TAKE 2 CAPSULES BY MOUTH 3 TIMES A DAY    EPINEPHRINE (EPIPEN) 0.3 MG/0.3 ML ATIN    Inject 0.3 mg into the muscle daily as needed.    FAMOTIDINE (PEPCID) 40 MG TABLET    Take 20 mg by mouth.    FEXOFENADINE (ALLEGRA) 180 MG TABLET    TAKE ONE TABLET BY MOUTH EVERY DAY    HYDROXYZINE HCL (ATARAX) 25 MG TABLET    TAKE 1 TABLET BY MOUTH FOUR TIMES A DAY AS NEEDED FOR ITCHING    MIRABEGRON (MYRBETRIQ) 25 MG TB24 ER TABLET    Take 1 tablet (25 mg total) by mouth once daily.    MOMETASONE (NASONEX) 50 MCG/ACTUATION NASAL SPRAY    INHALE 2 SPRAYS BY NASAL ROUTE ONCE DAILY    MONTELUKAST (SINGULAIR) 10 MG TABLET    Take 1 tablet (10 mg total) by mouth once daily.    ONDANSETRON (ZOFRAN-ODT) 4 MG TBDL    Take 1 tablet (4 mg total) by mouth every 8 (eight) hours as needed (nausea).    POTASSIUM CHLORIDE (MICRO-K) 10 MEQ CPSR    Take 1 capsule (10 mEq total) by mouth once daily.    PREDNISONE (DELTASONE) 20 MG TABLET    Take 2 tablets daily for 3 days, then 1 tablet daily for 3 days, then 1/2 tablet daily for 2 days.    RIZATRIPTAN (MAXALT) 10 MG TABLET    Take 1 tablet (10 mg total) by mouth as needed for Migraine (May repeat in 2 hrs.  No more than 2 tablets in 24 hrs.).    TOLTERODINE (DETROL LA) 4 MG  24 HR CAPSULE    Take 1 capsule (4 mg total) by mouth once daily.    TRIAMTERENE-HYDROCHLOROTHIAZIDE 37.5-25 MG (DYAZIDE) 37.5-25 MG PER CAPSULE    Take 1 capsule by mouth daily as needed.    XOLAIR 150 MG/ML INJECTION         Review of patient's allergies indicates:   Allergen Reactions    Benzalkonium chloride     Black pepper      Other reaction(s): Hives    Chocolate flavor      Other reaction(s): Hives    Citrus and derivatives Hives     LEMON PRODUCTS    Grapefruit Hives     Other reaction(s): Hives    Ibuprofen Swelling    Latex      Other reaction(s): Hives    Lemon balm (casper officinalis) Hives     Anything with lemon in it    Naproxen Swelling    Neomycin-bacitracin-polymyxin      Other reaction(s): Rash    Oats (richard) Hives     Oat foods (oatmeal, etc...)    Vegetable acetoglycerides Hives     Vegetable gums    Wheat containing prod     Wheat flour Hives     ROS    Physical Exam:   Body mass index is 35.69 kg/m².  There were no vitals filed for this visit.   GENERAL: Well appearing, appropriate for stated age, no acute distress.  CARDIOVASCULAR: Pulses regular by peripheral palpation.  PULMONARY: Respirations are even and non-labored.  NEURO: Awake, alert, and oriented x 3.  PSYCH: Mood & affect are appropriate.  HEENT: Head is normocephalic and atraumatic.    Left Knee:  Inspection: Large effusion  Palpation tenderness: Medial and lateral joint line  Range of motion: 0 deg extension - 120 deg flexion  Strength:  5/5 Extension    5/5 Flexion    5/5 Hip Abduction   Stability: Stable ACL/Lachman      Stable Posterior Drawer      Stable MCL/Valgus Stress      Stable LCL/Varus Stress   Patella Exam: Negative J-sign   Negative Patellar apprehension   Negative Patellar grind   N/V Exam:  Tibial:    Normal sensory (plantar foot)  Normal motor (FHL)    Sup Peroneal:   Normal sensory (dorsal foot)  Normal motor (Peroneals)            Deep Peroneal:   Normal sensory (1st web space)  Normal motor  (EHL)    Sural:   Normal sensory (lateral foot)   Saphenous:   Normal sensory (medial lower leg)   Normal pedal pulses, warm and well perfused with capillary refill < 2 sec      Imaging:    MRI Knee Without Contrast Left  Narrative: EXAMINATION:  MRI KNEE WITHOUT CONTRAST LEFT    CLINICAL HISTORY:  Meniscus tear suspected;possible ACL tear as well;Pain in left knee    TECHNIQUE:  Multiplanar, multisequence images were performed about the knee.    COMPARISON:  None    FINDINGS:  Medial compartment: Complex tear of the posterior horn extending into the body of the medial meniscus with partial extrusion into the medial gutter.  The medial collateral ligament is intact of bowed by the extruded meniscus.  Cartilage heterogeneity and thinning within the medial compartment with partial-thickness cartilage loss along the medial femoral condyle and medial tibial plateau.    Lateral compartment: Increased signal within the lateral meniscus without a discrete tear.  The lateral collateral ligamentous complex is intact.  Cartilage heterogeneity within the lateral compartment.    Intercondylar notch: The anterior and posterior cruciate ligaments are intact.  There is a 9 mm loose body within the posterior aspect of the joint.    Patellofemoral compartment: The extensor mechanism is intact.  No patellar tilt or subluxation.  Cartilage heterogeneity within the patellofemoral compartment.  Small joint effusion with leaking, complex Baker's cyst.  Impression: Arthritic changes of the knee with medial and lateral meniscal tears, chondromalacia, and joint effusion with leaking Baker's cyst.  Intra-articular loose body along the posterior aspect of the joint.    Electronically signed by: Rubin Hale  Date:    03/30/2022  Time:    12:35    Relevant imaging results reviewed and interpreted by me, discussed with the patient and / or family today. I agree with findings stated above.      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    Effusion of left knee Yes    Acute pain of left knee      Complex tear of medial meniscus of left knee as current injury, initial encounter      Severe obesity (BMI 35.0-39.9) with comorbidity      Essential hypertension      Primary osteoarthritis of left knee      Loose body in knee, left        Plan:   Discussed treatment options today and the patient would like to seek definitive treatment for her meniscus and loose body with knee arthroscopy.   Left knee arthroscopy, medial meniscus root repair, medial meniscus repair, possible meniscus centralization, possible loose body removal, any indicated procedures   Continue PT until surgery   Will require PCP clearance   Recommend 4 hour shifts per day at work until surgery   Alignment XR on way out today     Follow-up: surgery or sooner if there are any problems between now and then.     Thank you for choosing [facility] and Dr. Wes Faulkner for your orthopedic & sports medicine care. It is our goal to provide you with exceptional care that will help keep you healthy, active, and get you back in the game.     If you felt that you received exemplary care today, please consider leaving us feedback on [employer] at [email or web address].      Please do not hesitate to reach out to us via email, phone, or MyChart with any questions, concerns, or feedback.     If you are experiencing pain/discomfort ,or have questions after 5pm and would like to be connected to the [facility] on-call team, please call this number and specify which Sports Medicine provider is treating you: [phone]      Provider Note/Medical Decision Making:      I discussed worrisome and red flag signs and symptoms with the patient. The patient expressed understanding and agreed to alert me immediately or to go to the emergency room if they experience any of these.    I had a long discussion with the patient about treatment options, including operative and nonoperative treatments. We discussed pros and cons of each including risks pertinent to surgery including pain, infection, bleeding, damage to adjacent structures like nerves and blood vessels, failure to heal, need for future surgeries, stiffness, instability, loss of limb, anesthesia risks like stroke, blood clot, loss of life. We discussed the possibility of need for later hardware removal in the case that hardware was used. We discussed common and uncommon risks, and discussed patient specific factors that may increase the risks present with surgery. All questions were answered. The patient expressed understanding of the pros and cons of surgery and wanted to proceed with surgical treatment.   I had a long discussion with the patient and any present family regarding treatment options. I explained that although the meniscus will usually not heal on its own, not all meniscus tears need surgery. We discussed that many people will improve with therapy and nonoperative management. We discussed the blood supply of the meniscus and the fact that some patients and some tear patterns are more amenable to repair. We discussed that when performing operative treatment of meniscus tears, we attempt to repair tears that we think need to be repaired and have a good chance of healing. If we do perform a meniscectomy, we attempt to leave as much meniscus intact as possible. We discussed the implications of having a torn or deficient meniscus. We discussed the  rehab, weight bearing, and postoperative differences between repair and resection, and we discussed postoperative expectations.   Treatment plan was developed with input from the patient/family, and they expressed understanding and agreement with the plan. All questions were answered today.    Disclaimer: This note was prepared using a voice recognition system and is likely to have sound alike errors within the text.     CLIFF, Richard Morales, acted as a scribe for Wes Faulkner MD for the duration of this office visit.

## 2022-05-02 NOTE — PATIENT INSTRUCTIONS
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    Effusion of left knee Yes    Acute pain of left knee      Complex tear of medial meniscus of left knee as current injury, initial encounter      Severe obesity (BMI 35.0-39.9) with comorbidity      Essential hypertension      Primary osteoarthritis of left knee      Loose body in knee, left        Plan:  Discussed treatment options today and the patient would like to seek definitive treatment for her meniscus and loose body with knee arthroscopy.  Left knee arthroscopy, medial meniscus root repair, medial meniscus repair, possible medial meniscus centralization, possible loose body removal, any indicated procedures  Continue PT until surgery  Will require PCP clearance with Dr. Lindsey  Recommend 4 hour shifts per day at work until surgery  Alignment XR on way out today     Follow-up: surgery or sooner if there are any problems between now and then.     Thank you for choosing [facility] and Dr. Wes Faulkner for your orthopedic & sports medicine care. It is our goal to provide you with exceptional care that will help keep you healthy, active, and get you back in the game.     If you felt that you received exemplary care today, please consider leaving us feedback on [employer] at [email or web address].      Please do not hesitate to reach out to us via email, phone, or MyChart with any questions, concerns, or feedback.     If you are experiencing pain/discomfort ,or have questions after 5pm and would like to be connected to the [facility]  [place] on-call team, please call this number and specify which Sports Medicine provider is treating you: [phone]

## 2022-05-02 NOTE — PROGRESS NOTES
Patient ID: Stacey Wade  YOB: 1965  MRN: [id number]    Chief Complaint: Injury and Pain of the Left Knee        History of Present Illness: Stacey Wade is a *** 56 y.o. 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 Her pain is a 6/10 today and it can get as bad as 10/10. She uses voltaren gel and Tylenol for her pain as needed and it gives her some brief relief. She is currently in PT at the [facility] with Mckayla Melara. She says her PT is helping, but was told to follow up with Dr. Faulkner to discuss possible surgical options today.  ***  Previous Plan (4/28/22):  · Follow up with Dr. Faulkner to discuss possible interventions for loose body.   · Discussed possible CSI but patient deferred at this time  · Note: Return to work on Monday  · Paperwork has already been complete    HPI    Past Medical History:   Past Medical History:   Diagnosis Date    Allergic rhinitis     Anxiety     Hypertension     Urticaria      Past Surgical History:   Procedure Laterality Date    COLONOSCOPY N/A 1/18/2018    Procedure: COLONOSCOPY;  Surgeon: Yong Smith MD;  Location: [facility];  Service: Endoscopy;  Laterality: N/A;    HERNIA REPAIR Left      Family History   Problem Relation Age of Onset    Lung cancer Paternal Grandfather     Ovarian cancer Maternal Grandmother     Hyperlipidemia Mother     Hypertension Mother     Breast cancer Mother 60    Arthritis Mother     Lung cancer Father     Breast cancer Maternal Aunt 53    Heart failure Other         Great aunt    Prostate cancer Brother     Diabetes Neg Hx      Social History     Socioeconomic History    Marital status: Single   Tobacco Use    Smoking status: Never Smoker    Smokeless tobacco: Never Used   Substance and Sexual Activity    Alcohol use: No    Drug use: No    Sexual activity: Not  Currently     Partners: Male     Birth control/protection: None   Social History Narrative    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     Medication List with Changes/Refills   Current Medications    ASPIRIN (ECOTRIN) 81 MG EC TABLET    Take 81 mg by mouth once daily.    BETAMETHASONE DIPROPIONATE 0.05 % CREAM    APPLY TOPICALLY 2 TIMES DAILY.    BUTALBITAL-ACETAMINOPHEN-CAFFEINE -40 MG (FIORICET, ESGIC) -40 MG PER TABLET    TAKE ONE TABLET BY MOUTH EVERY 6 HOURS AS NEEDED FOR PAIN OR FOR HEADACHE    DICLOFENAC SODIUM (VOLTAREN) 1 % GEL    Apply 2 g topically 4 (four) times daily.    DOXEPIN (SINEQUAN) 10 MG CAPSULE    TAKE 2 CAPSULES BY MOUTH 3 TIMES A DAY    EPINEPHRINE (EPIPEN) 0.3 MG/0.3 ML ATIN    Inject 0.3 mg into the muscle daily as needed.    FAMOTIDINE (PEPCID) 40 MG TABLET    Take 20 mg by mouth.    FEXOFENADINE (ALLEGRA) 180 MG TABLET    TAKE ONE TABLET BY MOUTH EVERY DAY    HYDROXYZINE HCL (ATARAX) 25 MG TABLET    TAKE 1 TABLET BY MOUTH FOUR TIMES A DAY AS NEEDED FOR ITCHING    MIRABEGRON (MYRBETRIQ) 25 MG TB24 ER TABLET    Take 1 tablet (25 mg total) by mouth once daily.    MOMETASONE (NASONEX) 50 MCG/ACTUATION NASAL SPRAY    INHALE 2 SPRAYS BY NASAL ROUTE ONCE DAILY    MONTELUKAST (SINGULAIR) 10 MG TABLET    Take 1 tablet (10 mg total) by mouth once daily.    ONDANSETRON (ZOFRAN-ODT) 4 MG TBDL    Take 1 tablet (4 mg total) by mouth every 8 (eight) hours as needed (nausea).    POTASSIUM CHLORIDE (MICRO-K) 10 MEQ CPSR    Take 1 capsule (10 mEq total) by mouth once daily.    PREDNISONE (DELTASONE) 20 MG TABLET    Take 2 tablets daily for 3 days, then 1 tablet daily for 3 days, then 1/2 tablet daily for 2 days.    RIZATRIPTAN (MAXALT) 10 MG TABLET    Take 1 tablet (10 mg total) by mouth as needed for Migraine (May repeat in 2 hrs.  No more than 2 tablets in 24 hrs.).    TOLTERODINE (DETROL LA) 4 MG 24 HR CAPSULE     Take 1 capsule (4 mg total) by mouth once daily.    TRIAMTERENE-HYDROCHLOROTHIAZIDE 37.5-25 MG (DYAZIDE) 37.5-25 MG PER CAPSULE    Take 1 capsule by mouth daily as needed.    XOLAIR 150 MG/ML INJECTION         Review of patient's allergies indicates:   Allergen Reactions    Benzalkonium chloride     Black pepper      Other reaction(s): Hives    Chocolate flavor      Other reaction(s): Hives    Citrus and derivatives Hives     LEMON PRODUCTS    Grapefruit Hives     Other reaction(s): Hives    Ibuprofen Swelling    Latex      Other reaction(s): Hives    Lemon balm (casper officinalis) Hives     Anything with lemon in it    Naproxen Swelling    Neomycin-bacitracin-polymyxin      Other reaction(s): Rash    Oats (richard) Hives     Oat foods (oatmeal, etc...)    Vegetable acetoglycerides Hives     Vegetable gums    Wheat containing prod     Wheat flour Hives     ROS    Physical Exam:   Body mass index is 35.69 kg/m².  There were no vitals filed for this visit.   GENERAL: Well appearing, appropriate for stated age, no acute distress.  CARDIOVASCULAR: Pulses regular by peripheral palpation.  PULMONARY: Respirations are even and non-labored.  NEURO: Awake, alert, and oriented x 3.  PSYCH: Mood & affect are appropriate.  HEENT: Head is normocephalic and atraumatic.  Ortho/SPM Exam  ***    Imaging:    MRI Knee Without Contrast Left  Narrative: EXAMINATION:  MRI KNEE WITHOUT CONTRAST LEFT    CLINICAL HISTORY:  Meniscus tear suspected;possible ACL tear as well;Pain in left knee    TECHNIQUE:  Multiplanar, multisequence images were performed about the knee.    COMPARISON:  None    FINDINGS:  Medial compartment: Complex tear of the posterior horn extending into the body of the medial meniscus with partial extrusion into the medial gutter.  The medial collateral ligament is intact of bowed by the extruded meniscus.  Cartilage heterogeneity and thinning within the medial compartment with partial-thickness cartilage  loss along the medial femoral condyle and medial tibial plateau.    Lateral compartment: Increased signal within the lateral meniscus without a discrete tear.  The lateral collateral ligamentous complex is intact.  Cartilage heterogeneity within the lateral compartment.    Intercondylar notch: The anterior and posterior cruciate ligaments are intact.  There is a 9 mm loose body within the posterior aspect of the joint.    Patellofemoral compartment: The extensor mechanism is intact.  No patellar tilt or subluxation.  Cartilage heterogeneity within the patellofemoral compartment.  Small joint effusion with leaking, complex Baker's cyst.  Impression: Arthritic changes of the knee with medial and lateral meniscal tears, chondromalacia, and joint effusion with leaking Baker's cyst.  Intra-articular loose body along the posterior aspect of the joint.    Electronically signed by: Rubin Hale  Date:    03/30/2022  Time:    12:35    ***  Relevant imaging results reviewed and interpreted by me, discussed with the patient and / or family today. ***    Other Tests:     ***    There are no Patient Instructions on file for this visit.  Provider Note/Medical Decision Making: ***       I discussed worrisome and red flag signs and symptoms with the patient. The patient expressed understanding and agreed to alert me immediately or to go to the emergency room if they experience any of these.    Treatment plan was developed with input from the patient/family, and they expressed understanding and agreement with the plan. All questions were answered today.    Disclaimer: This note was prepared using a voice recognition system and is likely to have sound alike errors within the text.

## 2022-05-02 NOTE — PROGRESS NOTES
Patient ID: Stacey Wade  YOB: 1965  MRN: [id number]    Chief Complaint: Injury and Pain of the Left Knee    Referral: Whit Petit MD    History of Present Illness: Stacey Wade is a 56 y.o. 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 Her pain is a 6/10 today and it can get as bad as 10/10. She uses voltaren gel and Tylenol for her pain as needed and it gives her some brief relief. She is currently in PT at the [facility] with Mckayla Melara. She says her PT is helping, but was told to follow up with Dr. Faulkner to discuss possible surgical options today.    Previous Plan (4/28/22):  · Follow up with Dr. Faulkner to discuss possible interventions for loose body.   · Discussed possible CSI but patient deferred at this time  · Note: Return to work on Monday  · Paperwork has already been complete    HPI    Past Medical History:   Past Medical History:   Diagnosis Date    Allergic rhinitis     Anxiety     Hypertension     Urticaria      Past Surgical History:   Procedure Laterality Date    COLONOSCOPY N/A 1/18/2018    Procedure: COLONOSCOPY;  Surgeon: Yong Smith MD;  Location[facility];  Service: Endoscopy;  Laterality: N/A;    HERNIA REPAIR Left      Family History   Problem Relation Age of Onset    Lung cancer Paternal Grandfather     Ovarian cancer Maternal Grandmother     Hyperlipidemia Mother     Hypertension Mother     Breast cancer Mother 60    Arthritis Mother     Lung cancer Father     Breast cancer Maternal Aunt 53    Heart failure Other         Great aunt    Prostate cancer Brother     Diabetes Neg Hx      Social History     Socioeconomic History    Marital status: Single   Tobacco Use    Smoking status: Never Smoker    Smokeless tobacco: Never Used   Substance and Sexual Activity    Alcohol use: No    Drug use: No     Sexual activity: Not Currently     Partners: Male     Birth control/protection: None   Social History Narrative    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     Medication List with Changes/Refills   Current Medications    ASPIRIN (ECOTRIN) 81 MG EC TABLET    Take 81 mg by mouth once daily.    BETAMETHASONE DIPROPIONATE 0.05 % CREAM    APPLY TOPICALLY 2 TIMES DAILY.    BUTALBITAL-ACETAMINOPHEN-CAFFEINE -40 MG (FIORICET, ESGIC) -40 MG PER TABLET    TAKE ONE TABLET BY MOUTH EVERY 6 HOURS AS NEEDED FOR PAIN OR FOR HEADACHE    DICLOFENAC SODIUM (VOLTAREN) 1 % GEL    Apply 2 g topically 4 (four) times daily.    DOXEPIN (SINEQUAN) 10 MG CAPSULE    TAKE 2 CAPSULES BY MOUTH 3 TIMES A DAY    EPINEPHRINE (EPIPEN) 0.3 MG/0.3 ML ATIN    Inject 0.3 mg into the muscle daily as needed.    FAMOTIDINE (PEPCID) 40 MG TABLET    Take 20 mg by mouth.    FEXOFENADINE (ALLEGRA) 180 MG TABLET    TAKE ONE TABLET BY MOUTH EVERY DAY    HYDROXYZINE HCL (ATARAX) 25 MG TABLET    TAKE 1 TABLET BY MOUTH FOUR TIMES A DAY AS NEEDED FOR ITCHING    MIRABEGRON (MYRBETRIQ) 25 MG TB24 ER TABLET    Take 1 tablet (25 mg total) by mouth once daily.    MOMETASONE (NASONEX) 50 MCG/ACTUATION NASAL SPRAY    INHALE 2 SPRAYS BY NASAL ROUTE ONCE DAILY    MONTELUKAST (SINGULAIR) 10 MG TABLET    Take 1 tablet (10 mg total) by mouth once daily.    ONDANSETRON (ZOFRAN-ODT) 4 MG TBDL    Take 1 tablet (4 mg total) by mouth every 8 (eight) hours as needed (nausea).    POTASSIUM CHLORIDE (MICRO-K) 10 MEQ CPSR    Take 1 capsule (10 mEq total) by mouth once daily.    PREDNISONE (DELTASONE) 20 MG TABLET    Take 2 tablets daily for 3 days, then 1 tablet daily for 3 days, then 1/2 tablet daily for 2 days.    RIZATRIPTAN (MAXALT) 10 MG TABLET    Take 1 tablet (10 mg total) by mouth as needed for Migraine (May repeat in 2 hrs.  No more than 2 tablets in 24 hrs.).    TOLTERODINE (DETROL LA) 4 MG  24 HR CAPSULE    Take 1 capsule (4 mg total) by mouth once daily.    TRIAMTERENE-HYDROCHLOROTHIAZIDE 37.5-25 MG (DYAZIDE) 37.5-25 MG PER CAPSULE    Take 1 capsule by mouth daily as needed.    XOLAIR 150 MG/ML INJECTION         Review of patient's allergies indicates:   Allergen Reactions    Benzalkonium chloride     Black pepper      Other reaction(s): Hives    Chocolate flavor      Other reaction(s): Hives    Citrus and derivatives Hives     LEMON PRODUCTS    Grapefruit Hives     Other reaction(s): Hives    Ibuprofen Swelling    Latex      Other reaction(s): Hives    Lemon balm (casper officinalis) Hives     Anything with lemon in it    Naproxen Swelling    Neomycin-bacitracin-polymyxin      Other reaction(s): Rash    Oats (richard) Hives     Oat foods (oatmeal, etc...)    Vegetable acetoglycerides Hives     Vegetable gums    Wheat containing prod     Wheat flour Hives     ROS    Physical Exam:   Body mass index is 35.69 kg/m².  There were no vitals filed for this visit.   GENERAL: Well appearing, appropriate for stated age, no acute distress.  CARDIOVASCULAR: Pulses regular by peripheral palpation.  PULMONARY: Respirations are even and non-labored.  NEURO: Awake, alert, and oriented x 3.  PSYCH: Mood & affect are appropriate.  HEENT: Head is normocephalic and atraumatic.    Left Knee:  Inspection: Large effusion  Palpation tenderness: Medial and lateral joint line  Range of motion: 0 deg extension - 120 deg flexion  Strength:  5/5 Extension    5/5 Flexion    5/5 Hip Abduction   Stability: Stable ACL/Lachman      Stable Posterior Drawer      Stable MCL/Valgus Stress      Stable LCL/Varus Stress   Patella Exam: Negative J-sign   Negative Patellar apprehension   Negative Patellar grind   N/V Exam:  Tibial:    Normal sensory (plantar foot)  Normal motor (FHL)    Sup Peroneal:   Normal sensory (dorsal foot)  Normal motor (Peroneals)            Deep Peroneal:   Normal sensory (1st web space)  Normal motor  (EHL)    Sural:   Normal sensory (lateral foot)   Saphenous:   Normal sensory (medial lower leg)   Normal pedal pulses, warm and well perfused with capillary refill < 2 sec      Imaging:    MRI Knee Without Contrast Left  Narrative: EXAMINATION:  MRI KNEE WITHOUT CONTRAST LEFT    CLINICAL HISTORY:  Meniscus tear suspected;possible ACL tear as well;Pain in left knee    TECHNIQUE:  Multiplanar, multisequence images were performed about the knee.    COMPARISON:  None    FINDINGS:  Medial compartment: Complex tear of the posterior horn extending into the body of the medial meniscus with partial extrusion into the medial gutter.  The medial collateral ligament is intact of bowed by the extruded meniscus.  Cartilage heterogeneity and thinning within the medial compartment with partial-thickness cartilage loss along the medial femoral condyle and medial tibial plateau.    Lateral compartment: Increased signal within the lateral meniscus without a discrete tear.  The lateral collateral ligamentous complex is intact.  Cartilage heterogeneity within the lateral compartment.    Intercondylar notch: The anterior and posterior cruciate ligaments are intact.  There is a 9 mm loose body within the posterior aspect of the joint.    Patellofemoral compartment: The extensor mechanism is intact.  No patellar tilt or subluxation.  Cartilage heterogeneity within the patellofemoral compartment.  Small joint effusion with leaking, complex Baker's cyst.  Impression: Arthritic changes of the knee with medial and lateral meniscal tears, chondromalacia, and joint effusion with leaking Baker's cyst.  Intra-articular loose body along the posterior aspect of the joint.    Electronically signed by: Rubin Hale  Date:    03/30/2022  Time:    12:35    Relevant imaging results reviewed and interpreted by me, discussed with the patient and / or family today. I agree with findings stated above.      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    Effusion of left knee Yes    Acute pain of left knee      Complex tear of medial meniscus of left knee as current injury, initial encounter      Severe obesity (BMI 35.0-39.9) with comorbidity      Essential hypertension      Primary osteoarthritis of left knee      Loose body in knee, left        Plan:   Discussed treatment options today and the patient would like to seek definitive treatment for her meniscus and loose body with knee arthroscopy.   Left knee arthroscopy, medial meniscus root repair, medial meniscus repair, possible meniscus centralization, possible loose body removal, any indicated procedures   Continue PT until surgery   Will require PCP clearance   Recommend 4 hour shifts per day at work until surgery   Alignment XR on way out today     Follow-up: surgery or sooner if there are any problems between now and then.     Thank you for choosing [facility] and Dr. Wes Faulkner for your orthopedic & sports medicine care. It is our goal to provide you with exceptional care that will help keep you healthy, active, and get you back in the game.     If you felt that you received exemplary care today, please consider leaving us feedback on [employer] at [email or web address].      Please do not hesitate to reach out to us via email, phone, or MyChart with any questions, concerns, or feedback.     If you are experiencing pain/discomfort ,or have questions after 5pm and would like to be connected to the [facility] on-call team, please call this number and specify which Sports Medicine provider is treating you: [phone]      Provider Note/Medical Decision Making:      I discussed worrisome and red flag signs and symptoms with the patient. The patient expressed understanding and agreed to alert me immediately or to go to the emergency room if they experience any of these.    I had a long discussion with the patient about treatment options, including operative and nonoperative treatments. We discussed pros and cons of each including risks pertinent to surgery including pain, infection, bleeding, damage to adjacent structures like nerves and blood vessels, failure to heal, need for future surgeries, stiffness, instability, loss of limb, anesthesia risks like stroke, blood clot, loss of life. We discussed the possibility of need for later hardware removal in the case that hardware was used. We discussed common and uncommon risks, and discussed patient specific factors that may increase the risks present with surgery. All questions were answered. The patient expressed understanding of the pros and cons of surgery and wanted to proceed with surgical treatment.   I had a long discussion with the patient and any present family regarding treatment options. I explained that although the meniscus will usually not heal on its own, not all meniscus tears need surgery. We discussed that many people will improve with therapy and nonoperative management. We discussed the blood supply of the meniscus and the fact that some patients and some tear patterns are more amenable to repair. We discussed that when performing operative treatment of meniscus tears, we attempt to repair tears that we think need to be repaired and have a good chance of healing. If we do perform a meniscectomy, we attempt to leave as much meniscus intact as possible. We discussed the implications of having a torn or deficient meniscus. We discussed the  rehab, weight bearing, and postoperative differences between repair and resection, and we discussed postoperative expectations.   Treatment plan was developed with input from the patient/family, and they expressed understanding and agreement with the plan. All questions were answered today.    Disclaimer: This note was prepared using a voice recognition system and is likely to have sound alike errors within the text.     CLIFF, Richard Morales, acted as a scribe for Wes Faulkner MD for the duration of this office visit.

## 2022-05-03 ENCOUNTER — PATIENT MESSAGE (OUTPATIENT)
Dept: SURGERY | Facility: HOSPITAL | Age: 57
End: 2022-05-03
Payer: COMMERCIAL

## 2022-05-03 ENCOUNTER — CLINICAL SUPPORT (OUTPATIENT)
Dept: REHABILITATION | Facility: HOSPITAL | Age: 57
End: 2022-05-03
Payer: COMMERCIAL

## 2022-05-03 DIAGNOSIS — R26.2 DIFFICULTY WALKING: Primary | ICD-10-CM

## 2022-05-03 PROCEDURE — 97112 NEUROMUSCULAR REEDUCATION: CPT | Mod: CQ

## 2022-05-03 PROCEDURE — 97110 THERAPEUTIC EXERCISES: CPT | Mod: CQ

## 2022-05-03 NOTE — PROGRESS NOTES
[facility]   Physical Therapy Treatment Note    Name: Stacey Wade  Clinic Number: [id number]    Therapy Diagnosis:   Encounter Diagnosis   Name Primary?    Difficulty walking Yes     Physician: Savannah Lindsey MD    Visit Date: 5/3/2022       Physician Orders: PT Eval and Treat   Medical Diagnosis from Referral: M25.562 (ICD-10-CM) - Pain in left knee  Evaluation Date: 2/22/2022  Authorization Period Expiration: 12/31/2022  Plan of Care Expiration: 4/5/2022 (Requesting updated plan of care to 6/30/2022)  Visit # / Visits authorized: 17/20 (+ 1 evaluation)  FOTO: 2/3    Progress Note: 5/5/2022    Precautions: Standard     PTA Visit #: 1/5     Time In: 7:35 am  Time Out: 8:15 am  Total Billable Time: 35 minutes    SUBJECTIVE     Patient reports: that she will surgery on 5/10/2022 to get her meniscus repaired. She may have to temporarily stop therapy for about 2 weeks due to having surgery.     She was not compliant with home exercise program within the last 1/2 week.      Response to previous treatment: Patient felt good after last visit.  She had minimal pain afterward.     Functional change: No significant changes noted in the last week.     Pain: 4/10     Location: left knee    OBJECTIVE     Objective Measures updated at progress report unless specified.     TREATMENT     Stacey received the treatments listed below:     THERAPEUTIC EXERCISES to develop strength, endurance, range of motion, flexibility and posture for (26) minutes including:    Intervention Performed Today    Exercise bike  5 minutes, level 1   Nustep X 5 minutes, level 4   Quad set (home exercise program)  X 3 minutes with 5 second hold with towel roll   Heel slide- seated    3 minutes with slide board and straps with hold at top  3 set of 10 repetition active range, seated with towel and slide board    Straight leg raise  (home exercise program)  3 x 10, 1 second hold, 0 pound   Bridges (home exercise  program)  3 x 10  With belt   Hip abduction with belt  3 minutes, supine   Hip adduction with ball   3 minutes, supine    Side lying clams  x 3 x 10 with each lower extremity, with core activation (progressed)   Long arch quad   0 pound, 3 x 10, left extremity    Hamstring stretch with strap  Left lower extremity 30 seconds x 3 (increased)   Soleus stretch with strap  left lower extremity, 30 seconds x 3 (increased)   Ankle dorsiflexion/ plantarflexion   1 set of 10 repetition each direction   Ankle inversion/eversion  1 set of 10 repetition each direction   Gluteal sets  3 minutes, supine   Abdominal bracing  3 minutes, supine   Short arc quads  0 pound, 3 sets of 10 repetition left lower extremity, over a ball   Shuttle    Double leg press: 4 cords, 3 minutes  Double heel raises: 4 cords, 3 sets of 10 repetition   Single leg press: 3 cords, 3 sets of 10 repetition each lower extremity    Standing gastrocnemius stretch on incline x 30 seconds x 3 trials   Standing soleus stretch on incline  30 seconds x 2 trials    Prone hip extension x 3 x 10 (added)   Sit to stand  3 x 10 from low mat no hands 4 x in 18 seconds = long term goal 5 met)    Heel raises/ toe raises/ heel toe rocks   1sets of 10 repetition each exercise   Standing hip abduction  1 set of 10 repetition each lower extremity    Reassessment  See above   Seated marches  1 pounds, 3 sets of 10 repetition    Toe raises  3 sets of 10 repetition. Seated edge of mat     Plan for Next Visit:        NEUROMUSCULAR RE-EDUCATION ACTIVITIES to improve Balance, Coordination, Kinesthetic, Sense, Proprioception and Posture for (9) minutes.  The following were included:    Intervention Performed Today    Heel raises x 3 set of 10 repetition, eccentric control   Lunge toe taps with weight shift  1 set of 10, 1 set of 5 repetition each lower extremity    Reciprocal steps with increased stride length  1 set of 10 repetition each lower extremity    Standing hip abduction  1  set of 10 repetition each lower extremity    Step ups, 4 inch step x Forward: 1 sets of 15 repetition each lower extremity 1 hand balance  Lateral: 1 set of 15 repetition each lower extremity    Lateral walking x 4 laps both directions   Backward walking  100 feet   Lateral walking with turns to switch directions  100 feet   March with kick out/big step  20 feet     Plan for Next Visit:          MANUAL THERAPY TECHNIQUES were applied for (0) minutes, including:    Manual Intervention Performed Today    Soft Tissue Mobilization  left hip flexors, TFL, quadriceps, hamstrings , hip adductors , ITB, gastrocnemius , soleus, tibialis anterior , tibialis posterior and posterior knee, , left knee joint distraction, manual lymph drainage of anterior and posterior leg   Joint Mobilizations     Mobilization with movement     Kinesiotape  Lateral patellar stability with medial and lateral tracking stability   Functional Dry Needling        Plan for Next Visit:        GAIT TRAINING to improve functional mobility and safety for (0) minutes.    Intervention Performed Today    Ambulation with cane  6 minutes Around clinic (goal met)   Ambulation without an assistive device  Around clinic   Heel to toe gait     Increased stride length  On right lower extremity                          Plan for Next Visit:          PATIENT EDUCATION AND HOME EXERCISES     Home Exercises Provided and Patient Education Provided     Education provided: (during session) minutes   Patient educated on biomechanical justification for therapeutic exercise and importance of compliance with home exercise program in order to improve overall impairments and quality of life     Patient was educated on all the above exercise prior/during/after for proper posture, positioning, and execution for safe performance with home exercise program.    Importance of home exercise program compliance with bilateral lower extremity when she is having pain to assist with pain  management.    3/10/2022:  Patient educated on importance of performing stretches and strengthening exercises to help with pain management and strength.   3/5/2022: Patient educated on moving more frequently at home as well as using her leg within a pain free range of motion. Patient also educated on calling Primary Care Physician to get a referral to orthopaedics.    3/24/04160: Patient educated on not being hyper focused on pain and what her knee is doing and to focus more on something pleasant while stretching or doing strengthening exercises.    4/5/2022: to avoid using cane in the center of her stance but instead to use cane on her right side.      Written Home Exercises Provided: yes.  Exercises were reviewed and Stacey was able to demonstrate them prior to the end of the session.  Stacey demonstrated good  understanding of the education provided. See EMR under Patient Instructions for exercises provided during therapy sessions. Paper home exercises given to patient and exercises put on my chart keren today.      ASSESSMENT     Patient ambulated with no losses of balance noted today. Less exercises were performed to day due to her using the bathroom before session started. Patient was noted to have increase difficulty with following instructions.      Stacey is progressing well towards her goals.   Pt prognosis is Guarded.     Pt will continue to benefit from skilled outpatient physical therapy to address the deficits listed in the problem list box on initial evaluation, provide pt/family education and to maximize pt's level of independence in the home and community environment.     Pt's spiritual, cultural and educational needs considered and pt agreeable to plan of care and goals.      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 Patient  will be compliant with home exercise program to supplement therapy in restoring pain free function. (GOAL MET 4/7/2022)  2. Patient will improve impaired lower extremity manual muscle tests to >/= 4/5 to improve dynamic hip/knee support for functional tasks. (PROGRESSING 4/7/2022)  3. Patient will demonstrate normal, reciprocal gait pattern with no assistive device to show functional improvement. (PROGRSESSING 4/7/2022)     Long Term Goals (6 Weeks):  1. Patient will improve FOTO score to </= 60% limited to decrease perceived limitation with mobility.  (PROGRESSING 4/7/2022)  2. Patient will improve impaired lower extremity manual muscle tests to >/= 4+/5 to improve dynamic hip/knee support for functional tasks. (PROGRESSING 4/7/2022)  3. Patient will demonstrate ability to walk for 6 minutes straight, with only needing one rest break, to show functional endurance and strength improvements (MET 4/21/2022)  4. Patient will achieve less than 20 seconds on timed up and go  test to show functional gait speed improvement. (PROGRESSING 4/7/2022)  5. Patient will achieve 4 sit to stands, with no hand use, in 30 seconds to show functional lower extremity strength improvement.   (MET 4/21/2022)  Goals Key:  PC= progressing/continue; PM= partially met;        DC= discontinue    PLAN     Continue Plan of Care and progress per patient tolerance. See treatment section for details on planned progressions next session.    Requesting updated plan of care from 4/7/2022 to 6/30/2022.    Tee Schaffer, PTA, DPT

## 2022-05-04 ENCOUNTER — TELEPHONE (OUTPATIENT)
Dept: FAMILY MEDICINE | Facility: CLINIC | Age: 57
End: 2022-05-04
Payer: COMMERCIAL

## 2022-05-04 ENCOUNTER — PATIENT MESSAGE (OUTPATIENT)
Dept: ORTHOPEDICS | Facility: CLINIC | Age: 57
End: 2022-05-04
Payer: COMMERCIAL

## 2022-05-04 NOTE — TELEPHONE ENCOUNTER
----- Message from Toni Serrano MA sent at 5/4/2022  4:19 PM CDT -----  Regarding: FW: Medical Clearance    ----- Message -----  From: Richard Morales  Sent: 5/4/2022   3:06 PM CDT  To: [facility] Staff  Subject: Medical Clearance                                This patient is scheduled for surgery on Tuesday 5/10/22, is it possible to get her in for a medical clearance this week?    I could work on clearance with our surgery preadmit team as well if your schedule is limited.    Thanks!

## 2022-05-05 ENCOUNTER — HOSPITAL ENCOUNTER (OUTPATIENT)
Dept: PREADMISSION TESTING | Facility: HOSPITAL | Age: 57
Discharge: HOME | End: 2022-05-05
Attending: FAMILY MEDICINE
Payer: COMMERCIAL

## 2022-05-05 ENCOUNTER — DOCUMENTATION ONLY (OUTPATIENT)
Dept: REHABILITATION | Facility: HOSPITAL | Age: 57
End: 2022-05-05
Payer: COMMERCIAL

## 2022-05-05 VITALS
SYSTOLIC BLOOD PRESSURE: 136 MMHG | OXYGEN SATURATION: 99 % | RESPIRATION RATE: 16 BRPM | HEART RATE: 86 BPM | DIASTOLIC BLOOD PRESSURE: 75 MMHG | TEMPERATURE: 98 F

## 2022-05-05 DIAGNOSIS — N32.81 OVERACTIVE BLADDER: ICD-10-CM

## 2022-05-05 DIAGNOSIS — L50.9 URTICARIA: ICD-10-CM

## 2022-05-05 DIAGNOSIS — I10 ESSENTIAL HYPERTENSION: Chronic | ICD-10-CM

## 2022-05-05 DIAGNOSIS — Z01.818 PRE-OP EVALUATION: Primary | ICD-10-CM

## 2022-05-05 DIAGNOSIS — E66.01 SEVERE OBESITY (BMI 35.0-39.9) WITH COMORBIDITY: ICD-10-CM

## 2022-05-05 DIAGNOSIS — F41.9 ANXIETY: ICD-10-CM

## 2022-05-05 PROBLEM — G43.909 MIGRAINES: Status: ACTIVE | Noted: 2022-05-05

## 2022-05-05 PROBLEM — S83.232A COMPLEX TEAR OF MEDIAL MENISCUS OF LEFT KNEE: Status: ACTIVE | Noted: 2022-05-05

## 2022-05-05 PROCEDURE — 93005 ELECTROCARDIOGRAM TRACING: CPT

## 2022-05-05 PROCEDURE — 93010 ELECTROCARDIOGRAM REPORT: CPT | Mod: ,,, | Performed by: INTERNAL MEDICINE

## 2022-05-05 PROCEDURE — 93010 EKG 12-LEAD: ICD-10-PCS | Mod: ,,, | Performed by: INTERNAL MEDICINE

## 2022-05-05 RX ORDER — CLORAZEPATE DIPOTASSIUM 3.75 MG/1
3.75 TABLET ORAL
COMMUNITY
End: 2022-12-01

## 2022-05-05 NOTE — H&P
Preoperative History and Physical  [facility]                                                                   Chief Complaint: Preoperative evaluation     History of Present Illness:      Stacey Wade is a 56 y.o. female with PMH of HTN ( only on dyazide PRN, controlled), chronic urticaria, overactive bladder , anxiety, and Left medial meniscus tear who presents to the office today for a preoperative consultation at the request of Dr. Faulkner who plans on performing L knee scope with medial meniscus repair on May 10.     Functional Status:      The patient is able to climb a flight of stairs. The patient is able to ambulate with walker for stability with knee pain . The patient's functional status is affected by the surgical problem. The patient's functional status is not affected by shortness of breath, chest pain, dyspnea on exertion and fatigue. Activity limited by pain, joint pain started in 1/2022 post COVID. 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    MET score greater than 4    Past Medical History:      Past Medical History:   Diagnosis Date    Allergic rhinitis     Anxiety     Arthritis     Hypertension     Urticaria         Past Surgical History:      Past Surgical History:   Procedure Laterality Date    COLONOSCOPY N/A 1/18/2018    Procedure: COLONOSCOPY;  Surgeon: Yong Smith MD;  Location: [facility];  Service: Endoscopy;  Laterality: N/A;    HERNIA REPAIR Left         Social History:      Social History     Socioeconomic History    Marital status: Single   Tobacco Use    Smoking status: Never Smoker    Smokeless tobacco: Never Used   Substance and Sexual Activity    Alcohol use: No    Drug use: No    Sexual activity: Not Currently     Partners: Male     Birth control/protection: None   Social History Narrative    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 She cares for her mother,  who lives with her.        Family History:      Family History   Problem Relation Age of Onset    Lung cancer Paternal Grandfather     Ovarian cancer Maternal Grandmother     Hyperlipidemia Mother     Hypertension Mother     Breast cancer Mother 60    Arthritis Mother     Lung cancer Father     Breast cancer Maternal Aunt 53    Heart failure Other         Great aunt    Prostate cancer Brother     Brain cancer Sister     Diabetes Neg Hx     Pseudochol deficiency Neg Hx     Malignant hyperthermia Neg Hx        Allergies:      Review of patient's allergies indicates:   Allergen Reactions    Benzalkonium chloride     Black pepper      Other reaction(s): Hives    Chocolate flavor      Other reaction(s): Hives    Citrus and derivatives Hives     LEMON PRODUCTS    Grapefruit Hives     Other reaction(s): Hives    Ibuprofen Swelling    Latex      Other reaction(s): Hives    Lemon balm (casper officinalis) Hives     Anything with lemon in it    Naproxen Swelling    Neomycin-bacitracin-polymyxin      Other reaction(s): Rash    Oats (richard) Hives     Oat foods (oatmeal, etc...)    Vegetable acetoglycerides Hives     Vegetable gums    Wheat containing prod     Wheat flour Hives       Medications:      Current Outpatient Medications   Medication Sig    aspirin (ECOTRIN) 81 MG EC tablet Take 81 mg by mouth once daily.    betamethasone dipropionate 0.05 % cream APPLY TOPICALLY 2 TIMES DAILY.    butalbital-acetaminophen-caffeine -40 mg (FIORICET, ESGIC) -40 mg per tablet TAKE ONE TABLET BY MOUTH EVERY 6 HOURS AS NEEDED FOR PAIN OR FOR HEADACHE    diclofenac sodium (VOLTAREN) 1 % Gel Apply 2 g topically 4 (four) times daily.    doxepin (SINEQUAN) 10 MG capsule TAKE 2 CAPSULES BY MOUTH 3 TIMES A DAY    EPINEPHrine (EPIPEN) 0.3 mg/0.3 mL AtIn Inject 0.3 mg into the muscle daily as needed.    famotidine (PEPCID) 40 MG tablet Take 20 mg by mouth.    fexofenadine (ALLEGRA) 180 MG tablet  TAKE ONE TABLET BY MOUTH EVERY DAY    hydrOXYzine HCL (ATARAX) 25 MG tablet TAKE 1 TABLET BY MOUTH FOUR TIMES A DAY AS NEEDED FOR ITCHING    mirabegron (MYRBETRIQ) 25 mg Tb24 ER tablet Take 1 tablet (25 mg total) by mouth once daily.    mometasone (NASONEX) 50 mcg/actuation nasal spray INHALE 2 SPRAYS BY NASAL ROUTE ONCE DAILY    montelukast (SINGULAIR) 10 mg tablet Take 1 tablet (10 mg total) by mouth once daily.    ondansetron (ZOFRAN-ODT) 4 MG TbDL Take 1 tablet (4 mg total) by mouth every 8 (eight) hours as needed (nausea).    potassium chloride (MICRO-K) 10 MEQ CpSR Take 1 capsule (10 mEq total) by mouth once daily. (Patient taking differently: Take 10 mEq by mouth as needed.)    rizatriptan (MAXALT) 10 MG tablet Take 1 tablet (10 mg total) by mouth as needed for Migraine (May repeat in 2 hrs.  No more than 2 tablets in 24 hrs.).    triamterene-hydrochlorothiazide 37.5-25 mg (DYAZIDE) 37.5-25 mg per capsule Take 1 capsule by mouth daily as needed.    XOLAIR 150 mg/mL injection     clorazepate (TRANXENE) 3.75 MG Tab Take 3.75 mg by mouth as needed.    predniSONE (DELTASONE) 20 MG tablet Take 2 tablets daily for 3 days, then 1 tablet daily for 3 days, then 1/2 tablet daily for 2 days. (Patient not taking: Reported on 5/5/2022)    tolterodine (DETROL LA) 4 MG 24 hr capsule Take 1 capsule (4 mg total) by mouth once daily. (Patient not taking: No sig reported)     No current facility-administered medications for this encounter.       Vitals:      Vitals:    05/05/22 1317   BP: 136/75   Pulse: 86   Resp: 16   Temp: 98 °F (36.7 °C)       Review of Systems:        Constitutional: Negative for fever, chills, weight loss, malaise/fatigue and diaphoresis.   HENT: Negative for hearing loss, ear pain, nosebleeds, congestion, sore throat, neck pain, tinnitus and ear discharge.    Eyes: Negative for blurred vision, double vision, photophobia, pain, discharge and redness.   Respiratory: Negative for cough,  hemoptysis, sputum production, shortness of breath, wheezing and stridor.    Cardiovascular: Negative for chest pain, palpitations, orthopnea, claudication, leg swelling and PND.   Gastrointestinal: Negative for heartburn, nausea, vomiting, abdominal pain, diarrhea, constipation, blood in stool and .   Genitourinary: Negative for dysuria, urgency, , hematuria and flank pain. + frequency - improved on meds   Musculoskeletal: Negative for myalgias, back pain, and falls.   Skin: Negative for rash.   Neurological: Negative for dizziness, tingling, tremors, sensory change, speech change, focal weakness, seizures, loss of consciousness, weakness and headaches.   Endo/Heme/Allergies: Negative for environmental allergies and polydipsia. Does not bruise/bleed easily.   Psychiatric/Behavioral: Negative for depression, , hallucinations, memory loss and substance abuse. The patient is not nervous/anxious and does not have insomnia.    All 14 systems reviewed and negative except as noted above.    Physical Exam:      Constitutional: Appears well-developed, well-nourished and in no acute distress.  Patient is oriented to person, place, and time.   Head: Normocephalic and atraumatic. Mucous membranes moist.  Neck: Neck supple no mass.   Cardiovascular: Normal rate and regular rhythm.  S1 S2 appreciated by ascultation.  Pulmonary/Chest: Effort normal and clear to auscultation bilaterally. No respiratory distress.   Abdomen: Soft. Non-tender and non-distended. Bowel sounds are normal.   Neurological: Patient is alert and oriented to person, place and time. Moves all extremities.  Skin: Warm and dry. No lesions.  Extremities: No clubbing, cyanosis or edema.    Laboratory data:      Reviewed and noted in plan where applicable. Please see chart for full laboratory data.        Predictors of intubation difficulty:       Morbid obesity? yes - BMI 35   Anatomically abnormal facies? no   Prominent incisors? no   Receding mandible? no    Short, thick neck? no   Neck range of motion: normal   Dentition: No chipped, loose, or missing teeth.  Based on the Modified Mallampati, patient is a mallampati score: II (hard and soft palate, upper portion of tonsils anduvula visible)    Cardiographics:      EC22 NSR , LVH criteria, nonspecific T wave abn   Echocardiogram: none available for review     Imaging:      Chest x-ray: none     Assessment and Plan:      Complex tear of medial meniscus of left knee  - pt presents at the request Dr. Faulkner who plans on performing a L knee scope on May 10   - Known risk factors for perioperative complications: None  none    Difficulty with intubation is not anticipated.    Cardiac Risk Estimation:  Per Revised Cardiac Risk Index patient is a Class I risk with a 3.9% risk of a major cardiac event.      1.) Preoperative workup as follows: ECG, hemoglobin, hematocrit, electrolytes, creatinine, glucose, liver function studies.  2.) Change in medication regimen before surgery: awaiting ASA instructions  from surgeon.   3.) Prophylaxis for cardiac events with perioperative beta-blockers: not indicated.  4.) Invasive hemodynamic monitoring perioperatively: not indicated.  5.) Deep vein thrombosis prophylaxis postoperatively: intermittent pneumatic compression boots and regimen to be chosen by surgical team.  6.) Surveillance for postoperative MI with ECG immediately postoperatively and on postoperati ve days 1 and 2 AND troponin levels 24 hours postoperatively and on day 4 or hospital discharge (whichever comes first): not indicated.  7.) Current medications which may produce withdrawal symptoms if withheld perioperatively: none  8.) Other measures:none    Urticaria  On Xolair injections   - pt feels increase break outs with stress  - pt on allegra, famotidine, atarax,doxepin  Singulair nightly  - may take atarax and doxepin AM of surgery   - also has Epi pen- has not has to use   - followed by Dr. Horton ; continue  f/u with allergy     Essential hypertension  - on dyazide PRN   - follows with PCP   - BP in PAT controlled  - Continue to follow up with PCP       Anxiety  - controlled currently   - Continue to follow up with PCP       Migraines  - Maxalt PRN last 5/5/22  - occur 1x - 3 x weekly   - Continue to follow up with PCP       Overactive bladder  - recently started on Mybetriq   - Continue to follow up with PCP       Severe obesity (BMI 35.0-39.9) with comorbidity  - self guided weight loss and exercise

## 2022-05-05 NOTE — DISCHARGE INSTRUCTIONS
To confirm, Your doctor has instructed you that surgery is scheduled for 5/10/22.       Please report to [facility].      Pre admit office will call afternoon prior to surgery with final arrival time.    INSTRUCTIONS IMPORTANT!!!     Do not eat, drink, or smoke after 12 midnight-including water. OK to brush teeth, no gum, candy or mints!    ¨ Take only these medicines with a small swallow of water-morning of surgery.    Doxepin, Atarax    Pre operative instructions:  Please review the Pre-Operative Instruction booklet that you were given.        Bathing Instructions--See page 6 in the Pre-operative booklet.      Prevention of surgical site infections:     -Keep incisions clean and dry.   -Do not soak/submerge incisions in water until completely healed.   -Do not apply lotions, powders, creams, or deodorants to site.   -Always make sure hands are cleaned with antibacterial soap/ alcohol-based  prior to touching the surgical site.  (This includes doctors, nurses, staff, and yourself.)    Signs and symptoms:   -Redness and pain around the area where you had surgery   -Drainage of cloudy fluid from your surgical wound   -Fever over 100.4       I have read or had read and explained to me, and understand the above information.  Additional comments or instructions:  Received a copy of Pre-operative instructions booklet, FAQ surgical site infection sheet, and packets of hibiclens (if indicated).

## 2022-05-05 NOTE — ASSESSMENT & PLAN NOTE
- pt presents at the request Dr. Faulkner who plans on performing a L knee scope on May 10   - Known risk factors for perioperative complications: None  none    Difficulty with intubation is not anticipated.    Cardiac Risk Estimation:  Per Revised Cardiac Risk Index patient is a Class I risk with a 3.9% risk of a major cardiac event.      1.) Preoperative workup as follows: ECG, hemoglobin, hematocrit, electrolytes, creatinine, glucose, liver function studies.  2.) Change in medication regimen before surgery: awaiting ASA instructions  from surgeon.   3.) Prophylaxis for cardiac events with perioperative beta-blockers: not indicated.  4.) Invasive hemodynamic monitoring perioperatively: not indicated.  5.) Deep vein thrombosis prophylaxis postoperatively: intermittent pneumatic compression boots and regimen to be chosen by surgical team.  6.) Surveillance for postoperative MI with ECG immediately postoperatively and on postoperati ve days 1 and 2 AND troponin levels 24 hours postoperatively and on day 4 or hospital discharge (whichever comes first): not indicated.  7.) Current medications which may produce withdrawal symptoms if withheld perioperatively: none  8.) Other measures: PCP clearance pending per surgeon

## 2022-05-05 NOTE — ASSESSMENT & PLAN NOTE
On Xolair injections   - pt feels increase break outs with stress  - pt on allegra, famotidine, atarax,doxepin  Singulair nightly  - may take atarax and doxepin AM of surgery   - also has Epi pen- has not has to use   - followed by Dr. Horton ; continue f/u with allergy

## 2022-05-05 NOTE — ASSESSMENT & PLAN NOTE
- on dyazide PRN   - follows with PCP   - BP in PAT controlled  - Continue to follow up with PCP

## 2022-05-06 ENCOUNTER — CLINICAL SUPPORT (OUTPATIENT)
Dept: REHABILITATION | Facility: HOSPITAL | Age: 57
End: 2022-05-06
Payer: COMMERCIAL

## 2022-05-06 DIAGNOSIS — R26.2 DIFFICULTY WALKING: Primary | ICD-10-CM

## 2022-05-06 PROCEDURE — 97110 THERAPEUTIC EXERCISES: CPT | Mod: CQ

## 2022-05-06 NOTE — PROGRESS NOTES
[facility]   Physical Therapy Progress Report    Name: Stacey Wade  Clinic Number: [id number]    Therapy Diagnosis:   Encounter Diagnosis   Name Primary?    Difficulty walking Yes     Physician: Savannah Lindsey MD    Visit Date: 5/6/2022       Physician Orders: PT Eval and Treat   Medical Diagnosis from Referral: M25.562 (ICD-10-CM) - Pain in left knee  Evaluation Date: 2/22/2022  Authorization Period Expiration: 12/31/2022  Plan of Care Expiration: 4/5/2022 (Requesting updated plan of care to 6/30/2022)  Visit # / Visits authorized: 18/20 (+ 1 evaluation)  FOTO: 2/3    Progress Note: 5/5/2022    Precautions: Standard     PTA Visit #: 2/5     Time In: 7:45 am  Time Out: 8:30 am  Total Billable Time: 41 minutes    SUBJECTIVE     Patient reports: that she will surgery on 5/10/2022 to get her meniscus repaired. She is having pain today due to making a wrong step.     She was not compliant with home exercise program within the last 1/2 week.      Response to previous treatment: Patient felt good after last visit.  She had minimal pain afterward.     Functional change: unable to stand long enough (limited to 15 minutes) preventing her from cooking an entire meal, decreased waking tolerance     Pain: 6/10     Location: left knee    OBJECTIVE     Objective Measures updated at progress report unless specified.       Update: HIP ROM                  Right                            Left                              Increased/Decreased Pain  Flexion:                       within functional limits within functional limits            None  Extension                    Not tested                    Not tested                                  Abduction                    within functional limits within functional limits                                         KNEE ROM                 Right                                        Left                  Increased/Decreased Pain  Extension:                    within functional limits             -3 degrees                   Pain  Flexion:                       within functional limits             100 degrees                Pain     ANKLE ROM               Right                                        Left                  Increased/Decreased Pain  Dorsiflexion:                within functional limits             within functional limits             None     Lower Extremity Strength   LE MMT Right Left Right 5/6/2022 Left 5/6/2022   HIP Flexion 4+/5 4/5 4+/5 4+/5   Hip Extension Not tested /5 Not tested /5 Not tested Not tested   Hip Abduction 3+/5 3+/5 NT NT   Knee Flexion 4+/5 3-/5 5/5 4/5 with pain   Knee Extension 4+/5 3-/5 5/5 4+/5 with pain   Ankle Dorsiflexion 4+/5 4/5 5/5 5/5       GAIT:  Patient ambulated into clinic with straight cane.  Patient demonstrates decreased miguel but improved heel to toe gait with improved step length.  Patient with limited left arm swing.  Patient with no circumduction and normal amount of dorsiflexion of left ankle to clear lower extremity.     30 second sit to stand test: 6 sit to stands with 2 upper extremity assist        TREATMENT     Stacey received the treatments listed below:     THERAPEUTIC EXERCISES to develop strength, endurance, range of motion, flexibility and posture for (41) minutes including: obtaining objective measurements    Intervention Performed Today    Exercise bike x 6 minutes, level 1   Nustep X 5 minutes, level 4   Quad set (home exercise program)  X 3 minutes with 5 second hold with towel roll   Heel slide- seated    3 minutes with slide board and straps with hold at top  3 set of 10 repetition active range, seated with towel and slide board    Straight leg raise  (home exercise program)  3 x 10, 1 second hold, 0 pound   Bridges (home exercise program)  3 x 10  With belt   Side lying hip abduction x 3 x 10 left only             Hip abduction with belt  3 minutes, supine   Hip adduction with  ball   3 minutes, supine    Side lying clams  x 3 x 10 with left lower extremity core activation (progressed)   Long arch quad  x 0 pound, 3 x 10, left extremity    Hamstring stretch with strap  Left lower extremity 30 seconds x 3 (increased)   Soleus stretch with strap  left lower extremity, 30 seconds x 3 (increased)   Ankle dorsiflexion/ plantarflexion   1 set of 10 repetition each direction   Ankle inversion/eversion  1 set of 10 repetition each direction   Gluteal sets  3 minutes, supine   Abdominal bracing  3 minutes, supine   Short arc quads  0 pound, 3 sets of 10 repetition left lower extremity, over a ball   Shuttle    Double leg press: 4 cords, 3 minutes  Double heel raises: 4 cords, 3 sets of 10 repetition   Single leg press: 3 cords, 3 sets of 10 repetition each lower extremity    Standing gastrocnemius stretch on incline  30 seconds x 3 trials   Standing soleus stretch on incline  30 seconds x 2 trials    Prone hip extension x 3 x 10 left lower extremity    Sit to stand  3 x 10 from low mat no hands 4 x in 18 seconds = long term goal 5 met)    Heel raises/ toe raises/ heel toe rocks   1sets of 10 repetition each exercise   Standing hip abduction  1 set of 10 repetition each lower extremity    Reassessment x See above   Seated marches  1 pounds, 3 sets of 10 repetition    Toe raises  3 sets of 10 repetition. Seated edge of mat     Plan for Next Visit:        NEUROMUSCULAR RE-EDUCATION ACTIVITIES to improve Balance, Coordination, Kinesthetic, Sense, Proprioception and Posture for (0) minutes.  The following were included:    Intervention Performed Today    Heel raises  3 set of 10 repetition, eccentric control   Lunge toe taps with weight shift  1 set of 10, 1 set of 5 repetition each lower extremity    Reciprocal steps with increased stride length  1 set of 10 repetition each lower extremity    Standing hip abduction  1 set of 10 repetition each lower extremity    Step ups, 4 inch step  Forward: 1 sets  of 15 repetition each lower extremity 1 hand balance  Lateral: 1 set of 15 repetition each lower extremity    Lateral walking  4 laps both directions   Backward walking  100 feet   Lateral walking with turns to switch directions  100 feet   March with kick out/big step  20 feet     Plan for Next Visit:          MANUAL THERAPY TECHNIQUES were applied for (0) minutes, including:    Manual Intervention Performed Today    Soft Tissue Mobilization  left hip flexors, TFL, quadriceps, hamstrings , hip adductors , ITB, gastrocnemius , soleus, tibialis anterior , tibialis posterior and posterior knee , , left knee joint distraction, manual lymph drainage of anterior and posterior leg   Joint Mobilizations     Mobilization with movement     Kinesiotape  Lateral patellar stability with medial and lateral tracking stability   Functional Dry Needling        Plan for Next Visit:        GAIT TRAINING to improve functional mobility and safety for (0) minutes.    Intervention Performed Today    Ambulation with cane  6 minutes Around clinic (goal met)   Ambulation without an assistive device  Around clinic   Heel to toe gait     Increased stride length  On right lower extremity                          Plan for Next Visit:          PATIENT EDUCATION AND HOME EXERCISES     Home Exercises Provided and Patient Education Provided     Education provided: (during session) minutes  Patient educated on biomechanical justification for therapeutic exercise and importance of compliance with home exercise program in order to improve overall impairments and quality of life    Patient was educated on all the above exercise prior/during/after for proper posture, positioning, and execution for safe performance with home exercise program.   Importance of home exercise program compliance with bilateral lower extremity when she is having pain to assist with pain management.   3/10/2022:  Patient educated on importance of performing stretches and  strengthening exercises to help with pain management and strength.  3/5/2022: Patient educated on moving more frequently at home as well as using her leg within a pain free range of motion. Patient also educated on calling Primary Care Physician to get a referral to orthopaedics.   3/24/71193: Patient educated on not being hyper focused on pain and what her knee is doing and to focus more on something pleasant while stretching or doing strengthening exercises.   4/5/2022: to avoid using cane in the center of her stance but instead to use cane on her right side.  5/6/2022: updated home exercise program and issued red band.     Written Home Exercises Provided: yes.  Exercises were reviewed and Stacey was able to demonstrate them prior to the end of the session.  Stacey demonstrated good  understanding of the education provided. See electronic medical record under Patient Instructions for exercises provided during therapy sessions. Paper home exercises given to patient and exercises put on my chart keren today.      ASSESSMENT     Ms. Ibarar reports she has made a 70% level of improvement since starting physical therapy but is still unable to stand long enough to cook an entire meal and has limited walking tolerance due to pain and fatigue. She has made strength gains in her bilateral knee flexion,  left knee extension, and bilateral ankle dorsiflexion. She ambulated with no losses of balance and with a better gait pattern requiring no cues. Good understanding of home exercise program issued today and educated on following doctor's instructions after surgery.      Stacey is progressing well towards her goals.   Pt prognosis is Guarded.     Patient will continue to benefit from skilled outpatient physical therapy to address the deficits listed in the problem list box on initial evaluation, provide patient/family education and to maximize patient's level of independence in the home and community environment.     Patient's  spiritual, cultural and educational needs considered and patient agreeable to plan of care and goals.      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 Patient will be compliant with home exercise program to supplement therapy in restoring pain free function. (GOAL MET 4/7/2022)  2. Patient will improve impaired lower extremity manual muscle tests to >/= 4/5 to improve dynamic hip/knee support for functional tasks. (MET 5/6/2022)  3. Patient will demonstrate normal, reciprocal gait pattern with no assistive device to show functional improvement. (PROGRSESSING 4/7/2022)     Long Term Goals (6 Weeks):  1. Patient will improve FOTO score to </= 60% limited to decrease perceived limitation with mobility.  (MET 5/6/2022)  2. Patient will improve impaired lower extremity manual muscle tests to >/= 4+/5 to improve dynamic hip/knee support for functional tasks. (MET 5/6/2022)  3. Patient will demonstrate ability to walk for 6 minutes straight, with only needing one rest break, to show functional endurance and strength improvements (MET 4/21/2022)  4. Patient will achieve less than 20 seconds on timed up and go test to show functional gait speed improvement. (PROGRESSING 4/7/2022)  5. Patient will achieve 4 sit to stands, with no hand use, in 30 seconds to show functional lower extremity strength improvement.   (MET 4/21/2022)  Goals Key:  PC= progressing/continue; PM= partially met;        DC= discontinue    PLAN     Patient to be discharged today secondary to having knee surgery next week.    Mckayla Melara PT, DPT      Tee Schaffer, PTA

## 2022-05-09 ENCOUNTER — DOCUMENTATION ONLY (OUTPATIENT)
Dept: REHABILITATION | Facility: HOSPITAL | Age: 57
End: 2022-05-09
Payer: COMMERCIAL

## 2022-05-09 ENCOUNTER — ANESTHESIA EVENT (OUTPATIENT)
Dept: SURGERY | Facility: HOSPITAL | Age: 57
End: 2022-05-09
Payer: COMMERCIAL

## 2022-05-09 ENCOUNTER — TELEPHONE (OUTPATIENT)
Dept: PREADMISSION TESTING | Facility: HOSPITAL | Age: 57
End: 2022-05-09
Payer: COMMERCIAL

## 2022-05-09 DIAGNOSIS — R26.2 DIFFICULTY WALKING: Primary | ICD-10-CM

## 2022-05-09 NOTE — PROGRESS NOTES
[facility]  Physical Therapy Discharge Note    Name: Stacey Wade  Clinic Number: [id number]    Therapy Diagnosis:   Encounter Diagnosis   Name Primary?    Difficulty walking Yes     Physician: No ref. provider found    Physician Orders: PT Eval and Treat   Medical Diagnosis from Referral: M25.562 (ICD-10-CM) - Pain in left knee  Evaluation Date: 2/22/2022      Date of Last visit: 5/6/2022  Total Visits Received: 20    ASSESSMENT        Ms. Ibarra reports she has made a 70% level of improvement since starting physical therapy but is still unable to stand long enough to cook an entire meal and has limited walking tolerance due to pain and fatigue. She has made strength gains in her bilateral knee flexion,  left knee extension, and bilateral ankle dorsiflexion. She ambulated with no losses of balance and with a better gait pattern requiring no cues. Good understanding of home exercise program issued today and educated on following doctor's instructions after surgery.     Discharge reason: Other:  Patient is having surgery on Tuesday 5/10/2022.    Discharge FOTO Score: 56%        Goals: Short Term Goals (4 Weeks):  1. Patient will be compliant with home exercise program to supplement therapy in restoring pain free function. (GOAL MET 4/7/2022)  2. Patient will improve impaired lower extremity manual muscle tests to >/= 4/5 to improve dynamic hip/knee support for functional tasks. (MET 5/6/2022)  3. Patient will demonstrate normal, reciprocal gait pattern with no assistive device to show functional improvement. (PROGRSESSING 4/7/2022)     Long Term Goals (6 Weeks):  1. Patient will improve FOTO score to </= 60% limited to decrease perceived limitation with mobility.  (MET 5/6/2022)  2. Patient will improve impaired lower extremity manual muscle tests to >/= 4+/5 to improve dynamic hip/knee support for functional tasks. (MET 5/6/2022)  3. Patient will demonstrate ability to walk for 6  minutes straight, with only needing one rest break, to show functional endurance and strength improvements (MET 4/21/2022)  4. Patient will achieve less than 20 seconds on timed up and go test to show functional gait speed improvement. (PROGRESSING 4/7/2022)  5. Patient will achieve 4 sit to stands, with no hand use, in 30 seconds to show functional lower extremity strength improvement.   (MET 4/21/2022)  Goals Key:  PC= progressing/continue;        PM= partially met;             DC= discontinue    PLAN   This patient is discharged from Physical Therapy.      Mckayla Melara, PT , DPT

## 2022-05-09 NOTE — TELEPHONE ENCOUNTER
Called and spoke with the patient about the following:    Your Surgery arrival time is at 6:15 AM on 5/10/2022 at [facility] location.    The address is [address] [place]  [address].     Only one adult (over 18) is to accompany you to surgery, unless it is a Pediatric patient, then 2 adults are encouraged to accompany them to the surgery center.    Your ride MUST STAY the entire time until you are discharged.      Please come in the main lobby (located on the 1st floor).     Be prepared to show your photo ID and insurance card.     Masks should be worn by ALL persons entering the building.     Nothing to eat or drink after after midnight, unless you were instructed to take specific medications discussed with the Pre-admit Nurse.     Please call [phone] with any questions or concerns.     Thanks.

## 2022-05-09 NOTE — ANESTHESIA PREPROCEDURE EVALUATION
05/09/2022   Past Medical History:   Diagnosis Date    Allergic rhinitis     Anxiety     Arthritis     Hypertension     Urticaria        Stacey Wade is a 56 y.o., female.  Past Surgical History:   Procedure Laterality Date    COLONOSCOPY N/A 1/18/2018    Procedure: COLONOSCOPY;  Surgeon: Yong Smith MD;  Location: [facility];  Service: Endoscopy;  Laterality: N/A;    HERNIA REPAIR Left            Pre-op Assessment    I have reviewed the Patient Summary Reports.     I have reviewed the Nursing Notes. I have reviewed the NPO Status.   I have reviewed the Medications.     Review of Systems  Anesthesia Hx:  No problems with previous Anesthesia  Denies Family Hx of Anesthesia complications.   Denies Personal Hx of Anesthesia complications.   Social:  Non-Smoker, No Alcohol Use    Hematology/Oncology:  Hematology Normal        EENT/Dental:   chronic allergic rhinitis   Cardiovascular:  Cardiovascular Normal Hypertension     Pulmonary:  Pulmonary Normal    Renal/:  Renal/ Normal     Hepatic/GI:  Hepatic/GI Normal    Musculoskeletal:   Arthritis  Meniscus tear   Neurological:   Headaches    Endocrine:  Endocrine Normal  Obesity / BMI > 30  Psych:  Psychiatric Normal           Physical Exam  General: Well nourished, Cooperative, Oriented and Alert    Chest/Lungs:  Clear to auscultation, Normal Respiratory Rate    Heart:  Rate: Normal  Rhythm: Regular Rhythm      Wt Readings from Last 3 Encounters:   05/02/22 88.5 kg (195 lb 1.7 oz)   04/28/22 88.5 kg (195 lb)   04/22/22 88.7 kg (195 lb 8.8 oz)     Temp Readings from Last 3 Encounters:   05/05/22 36.7 °C (98 °F) (Temporal)   04/22/22 36 °C (96.8 °F) (Temporal)   02/16/22 36.2 °C (97.1 °F) (Temporal)     BP Readings from Last 3 Encounters:   05/05/22 136/75   04/22/22 121/81   02/16/22 128/81     Pulse Readings from Last 3 Encounters:   05/05/22  86   04/22/22 87   02/16/22 79     Lab Results   Component Value Date    WBC 7.69 05/02/2022    HGB 13.5 05/02/2022    HCT 44.1 05/02/2022    MCV 75 (L) 05/02/2022     05/02/2022       CMP  Sodium   Date Value Ref Range Status   05/02/2022 142 136 - 145 mmol/L Final     Potassium   Date Value Ref Range Status   05/02/2022 3.6 3.5 - 5.1 mmol/L Final     Chloride   Date Value Ref Range Status   05/02/2022 106 95 - 110 mmol/L Final     CO2   Date Value Ref Range Status   05/02/2022 26 23 - 29 mmol/L Final     Glucose   Date Value Ref Range Status   05/02/2022 90 70 - 110 mg/dL Final     BUN   Date Value Ref Range Status   05/02/2022 10 6 - 20 mg/dL Final     Creatinine   Date Value Ref Range Status   05/02/2022 1.0 0.5 - 1.4 mg/dL Final     Calcium   Date Value Ref Range Status   05/02/2022 10.4 8.7 - 10.5 mg/dL Final     Total Protein   Date Value Ref Range Status   06/03/2021 7.8 6.0 - 8.4 g/dL Final     Albumin   Date Value Ref Range Status   06/03/2021 4.1 3.5 - 5.2 g/dL Final     Total Bilirubin   Date Value Ref Range Status   06/03/2021 0.7 0.1 - 1.0 mg/dL Final     Comment:     For infants and newborns, interpretation of results should be based  on gestational age, weight and in agreement with clinical  observations.    Premature Infant recommended reference ranges:  Up to 24 hours.............<8.0 mg/dL  Up to 48 hours............<12.0 mg/dL  3-5 days..................<15.0 mg/dL  6-29 days.................<15.0 mg/dL       Alkaline Phosphatase   Date Value Ref Range Status   06/03/2021 135 55 - 135 U/L Final     AST   Date Value Ref Range Status   06/03/2021 19 10 - 40 U/L Final     ALT   Date Value Ref Range Status   06/03/2021 20 10 - 44 U/L Final     Anion Gap   Date Value Ref Range Status   05/02/2022 10 8 - 16 mmol/L Final     eGFR if    Date Value Ref Range Status   05/02/2022 >60.0 >60 mL/min/1.73 m^2 Final     eGFR if non    Date Value Ref Range Status    05/02/2022 >60.0 >60 mL/min/1.73 m^2 Final     Comment:     Calculation used to obtain the estimated glomerular filtration  rate (eGFR) is the CKD-EPI equation.            Anesthesia Plan  Type of Anesthesia, risks & benefits discussed:    Anesthesia Type: Gen Supraglottic Airway, Gen ETT  Intra-op Monitoring Plan: Standard ASA Monitors  Post Op Pain Control Plan: multimodal analgesia and IV/PO Opioids PRN  Induction:  IV  Informed Consent: Informed consent signed with the Patient and all parties understand the risks and agree with anesthesia plan.  All questions answered.   ASA Score: 2  Day of Surgery Review of History & Physical: H&P Update referred to the surgeon/provider.    Ready For Surgery From Anesthesia Perspective.     .

## 2022-05-10 ENCOUNTER — ANESTHESIA (OUTPATIENT)
Dept: SURGERY | Facility: HOSPITAL | Age: 57
End: 2022-05-10
Payer: COMMERCIAL

## 2022-05-10 ENCOUNTER — PATIENT MESSAGE (OUTPATIENT)
Dept: ORTHOPEDICS | Facility: CLINIC | Age: 57
End: 2022-05-10
Payer: COMMERCIAL

## 2022-05-11 ENCOUNTER — PATIENT MESSAGE (OUTPATIENT)
Dept: FAMILY MEDICINE | Facility: CLINIC | Age: 57
End: 2022-05-11
Payer: COMMERCIAL

## 2022-05-11 ENCOUNTER — TELEPHONE (OUTPATIENT)
Dept: FAMILY MEDICINE | Facility: CLINIC | Age: 57
End: 2022-05-11

## 2022-05-12 ENCOUNTER — TELEPHONE (OUTPATIENT)
Dept: ORTHOPEDICS | Facility: CLINIC | Age: 57
End: 2022-05-12
Payer: COMMERCIAL

## 2022-05-12 ENCOUNTER — TELEPHONE (OUTPATIENT)
Dept: PREADMISSION TESTING | Facility: HOSPITAL | Age: 57
End: 2022-05-12
Payer: COMMERCIAL

## 2022-05-12 NOTE — TELEPHONE ENCOUNTER
Called and spoke with the patient about the following:    Your Surgery arrival time is at 6:15 AM on 5/13/2022 at [facility] location.    The address is [address] [place]  [address].     Only one adult (over 18) is to accompany you to surgery, unless it is a Pediatric patient, then 2 adults are encouraged to accompany them to the surgery center.    Your ride MUST STAY the entire time until you are discharged.      Please come in the main lobby (located on the 1st floor).     Be prepared to show your photo ID and insurance card.     Masks should be worn by ALL persons entering the building.     Nothing to eat or drink after after midnight, unless you were instructed to take specific medications discussed with the Pre-admit Nurse.     Please call [phone] with any questions or concerns.     Thanks.

## 2022-05-12 NOTE — TELEPHONE ENCOUNTER
Called patient to help facilitate PT scheduling.  She reports that since she used the Hibiclens wipes on Monday in preparation for Tuesday surgery (which was rescheduled), she now has a body wide rash/reaction that seems to have peaked last night.  She says she feels better this AM and is using Benadryl.  Will relay info to surgery and preadmit team.

## 2022-05-13 ENCOUNTER — HOSPITAL ENCOUNTER (OUTPATIENT)
Facility: HOSPITAL | Age: 57
Discharge: HOME | End: 2022-05-13
Attending: ORTHOPAEDIC SURGERY | Admitting: ORTHOPAEDIC SURGERY
Payer: COMMERCIAL

## 2022-05-13 ENCOUNTER — HOSPITAL ENCOUNTER (OUTPATIENT)
Dept: RADIOLOGY | Facility: HOSPITAL | Age: 57
Discharge: HOME | End: 2022-05-13
Attending: ORTHOPAEDIC SURGERY | Admitting: ORTHOPAEDIC SURGERY
Payer: COMMERCIAL

## 2022-05-13 ENCOUNTER — TELEPHONE (OUTPATIENT)
Dept: ORTHOPEDICS | Facility: CLINIC | Age: 57
End: 2022-05-13
Payer: COMMERCIAL

## 2022-05-13 VITALS
DIASTOLIC BLOOD PRESSURE: 76 MMHG | OXYGEN SATURATION: 97 % | TEMPERATURE: 98 F | SYSTOLIC BLOOD PRESSURE: 134 MMHG | HEART RATE: 94 BPM | HEIGHT: 62 IN | RESPIRATION RATE: 14 BRPM | WEIGHT: 194.88 LBS | BODY MASS INDEX: 35.86 KG/M2

## 2022-05-13 DIAGNOSIS — M23.204 OLD COMPLEX TEAR OF MEDIAL MENISCUS OF LEFT KNEE: Primary | ICD-10-CM

## 2022-05-13 DIAGNOSIS — S83.242A ACUTE MEDIAL MENISCUS TEAR OF LEFT KNEE: ICD-10-CM

## 2022-05-13 LAB
B-HCG UR QL: NEGATIVE
CTP QC/QA: YES

## 2022-05-13 PROCEDURE — 71000015 HC POSTOP RECOV 1ST HR: Performed by: ORTHOPAEDIC SURGERY

## 2022-05-13 PROCEDURE — 36000710: Performed by: ORTHOPAEDIC SURGERY

## 2022-05-13 PROCEDURE — 37000009 HC ANESTHESIA EA ADD 15 MINS: Performed by: ORTHOPAEDIC SURGERY

## 2022-05-13 PROCEDURE — 63600175 PHARM REV CODE 636 W HCPCS: Performed by: ANESTHESIOLOGY

## 2022-05-13 PROCEDURE — C1713 ANCHOR/SCREW BN/BN,TIS/BN: HCPCS | Performed by: ORTHOPAEDIC SURGERY

## 2022-05-13 PROCEDURE — 29882 ARTHRS KNE SRG MNISC RPR M/L: CPT | Mod: LT,,, | Performed by: ORTHOPAEDIC SURGERY

## 2022-05-13 PROCEDURE — 81025 URINE PREGNANCY TEST: CPT | Performed by: ORTHOPAEDIC SURGERY

## 2022-05-13 PROCEDURE — 71000033 HC RECOVERY, INTIAL HOUR: Performed by: ORTHOPAEDIC SURGERY

## 2022-05-13 PROCEDURE — 25000003 PHARM REV CODE 250: Performed by: ANESTHESIOLOGY

## 2022-05-13 PROCEDURE — D9220A PRA ANESTHESIA: Mod: ANES,,, | Performed by: ANESTHESIOLOGY

## 2022-05-13 PROCEDURE — 25000003 PHARM REV CODE 250: Performed by: NURSE ANESTHETIST, CERTIFIED REGISTERED

## 2022-05-13 PROCEDURE — 71000039 HC RECOVERY, EACH ADD'L HOUR: Performed by: ORTHOPAEDIC SURGERY

## 2022-05-13 PROCEDURE — 29882 PR KNEE SCOPE,MED OR LAT MENIS REPAIR: ICD-10-PCS | Mod: LT,,, | Performed by: ORTHOPAEDIC SURGERY

## 2022-05-13 PROCEDURE — D9220A PRA ANESTHESIA: ICD-10-PCS | Mod: ANES,,, | Performed by: ANESTHESIOLOGY

## 2022-05-13 PROCEDURE — 73560 X-RAY EXAM OF KNEE 1 OR 2: CPT | Mod: TC,LT

## 2022-05-13 PROCEDURE — 36000711: Performed by: ORTHOPAEDIC SURGERY

## 2022-05-13 PROCEDURE — 73560 XR KNEE 1 OR 2 VIEW LEFT: ICD-10-PCS | Mod: 26,LT,, | Performed by: RADIOLOGY

## 2022-05-13 PROCEDURE — 73560 X-RAY EXAM OF KNEE 1 OR 2: CPT | Mod: 26,LT,, | Performed by: RADIOLOGY

## 2022-05-13 PROCEDURE — 27201423 OPTIME MED/SURG SUP & DEVICES STERILE SUPPLY: Performed by: ORTHOPAEDIC SURGERY

## 2022-05-13 PROCEDURE — D9220A PRA ANESTHESIA: ICD-10-PCS | Mod: CRNA,,, | Performed by: NURSE ANESTHETIST, CERTIFIED REGISTERED

## 2022-05-13 PROCEDURE — 25000003 PHARM REV CODE 250: Performed by: ORTHOPAEDIC SURGERY

## 2022-05-13 PROCEDURE — 71000016 HC POSTOP RECOV ADDL HR: Performed by: ORTHOPAEDIC SURGERY

## 2022-05-13 PROCEDURE — 37000008 HC ANESTHESIA 1ST 15 MINUTES: Performed by: ORTHOPAEDIC SURGERY

## 2022-05-13 PROCEDURE — 63600175 PHARM REV CODE 636 W HCPCS: Performed by: ORTHOPAEDIC SURGERY

## 2022-05-13 PROCEDURE — 63600175 PHARM REV CODE 636 W HCPCS: Performed by: PHYSICIAN ASSISTANT

## 2022-05-13 PROCEDURE — D9220A PRA ANESTHESIA: Mod: CRNA,,, | Performed by: NURSE ANESTHETIST, CERTIFIED REGISTERED

## 2022-05-13 PROCEDURE — 63600175 PHARM REV CODE 636 W HCPCS: Performed by: NURSE ANESTHETIST, CERTIFIED REGISTERED

## 2022-05-13 DEVICE — BUTTON CLAVICLE: Type: IMPLANTABLE DEVICE | Site: KNEE | Status: FUNCTIONAL

## 2022-05-13 RX ORDER — LIDOCAINE HYDROCHLORIDE 10 MG/ML
INJECTION INFILTRATION; PERINEURAL
Status: DISCONTINUED | OUTPATIENT
Start: 2022-05-13 | End: 2022-05-13 | Stop reason: HOSPADM

## 2022-05-13 RX ORDER — DEXAMETHASONE SODIUM PHOSPHATE 4 MG/ML
INJECTION, SOLUTION INTRA-ARTICULAR; INTRALESIONAL; INTRAMUSCULAR; INTRAVENOUS; SOFT TISSUE
Status: DISCONTINUED | OUTPATIENT
Start: 2022-05-13 | End: 2022-05-13

## 2022-05-13 RX ORDER — PROPOFOL 10 MG/ML
VIAL (ML) INTRAVENOUS
Status: DISCONTINUED | OUTPATIENT
Start: 2022-05-13 | End: 2022-05-13

## 2022-05-13 RX ORDER — DIPHENHYDRAMINE HYDROCHLORIDE 50 MG/ML
25 INJECTION INTRAMUSCULAR; INTRAVENOUS EVERY 6 HOURS PRN
Status: DISCONTINUED | OUTPATIENT
Start: 2022-05-13 | End: 2022-05-13 | Stop reason: HOSPADM

## 2022-05-13 RX ORDER — MEPERIDINE HYDROCHLORIDE 25 MG/ML
12.5 INJECTION INTRAMUSCULAR; INTRAVENOUS; SUBCUTANEOUS ONCE
Status: DISCONTINUED | OUTPATIENT
Start: 2022-05-13 | End: 2022-05-13 | Stop reason: HOSPADM

## 2022-05-13 RX ORDER — FENTANYL CITRATE 50 UG/ML
INJECTION, SOLUTION INTRAMUSCULAR; INTRAVENOUS
Status: DISCONTINUED | OUTPATIENT
Start: 2022-05-13 | End: 2022-05-13

## 2022-05-13 RX ORDER — CHLORHEXIDINE GLUCONATE ORAL RINSE 1.2 MG/ML
10 SOLUTION DENTAL
Status: DISCONTINUED | OUTPATIENT
Start: 2022-05-13 | End: 2023-02-23

## 2022-05-13 RX ORDER — SODIUM CHLORIDE 9 MG/ML
INJECTION, SOLUTION INTRAVENOUS CONTINUOUS
Status: DISCONTINUED | OUTPATIENT
Start: 2022-05-13 | End: 2023-02-23

## 2022-05-13 RX ORDER — LIDOCAINE HYDROCHLORIDE 20 MG/ML
INJECTION, SOLUTION EPIDURAL; INFILTRATION; INTRACAUDAL; PERINEURAL
Status: DISCONTINUED | OUTPATIENT
Start: 2022-05-13 | End: 2022-05-13

## 2022-05-13 RX ORDER — OXYCODONE AND ACETAMINOPHEN 5; 325 MG/1; MG/1
1 TABLET ORAL
Qty: 50 TABLET | Refills: 0 | Status: SHIPPED | OUTPATIENT
Start: 2022-05-13 | End: 2022-05-28

## 2022-05-13 RX ORDER — ALBUTEROL SULFATE 0.83 MG/ML
2.5 SOLUTION RESPIRATORY (INHALATION) EVERY 4 HOURS PRN
Status: DISCONTINUED | OUTPATIENT
Start: 2022-05-13 | End: 2022-05-13 | Stop reason: HOSPADM

## 2022-05-13 RX ORDER — OXYCODONE HYDROCHLORIDE 5 MG/1
5 TABLET ORAL EVERY 4 HOURS PRN
Qty: 10 TABLET | Refills: 0 | Status: SHIPPED | OUTPATIENT
Start: 2022-05-13 | End: 2022-07-15

## 2022-05-13 RX ORDER — CEFAZOLIN SODIUM 2 G/50ML
2 SOLUTION INTRAVENOUS
Status: COMPLETED | OUTPATIENT
Start: 2022-05-13 | End: 2022-05-13

## 2022-05-13 RX ORDER — BUPIVACAINE HYDROCHLORIDE 5 MG/ML
INJECTION, SOLUTION EPIDURAL; INTRACAUDAL
Status: DISCONTINUED | OUTPATIENT
Start: 2022-05-13 | End: 2022-05-13 | Stop reason: HOSPADM

## 2022-05-13 RX ORDER — HYDROCODONE BITARTRATE AND ACETAMINOPHEN 5; 325 MG/1; MG/1
1 TABLET ORAL
Status: DISCONTINUED | OUTPATIENT
Start: 2022-05-13 | End: 2022-05-13 | Stop reason: HOSPADM

## 2022-05-13 RX ORDER — FENTANYL CITRATE 50 UG/ML
25 INJECTION, SOLUTION INTRAMUSCULAR; INTRAVENOUS EVERY 5 MIN PRN
Status: DISCONTINUED | OUTPATIENT
Start: 2022-05-13 | End: 2022-05-13 | Stop reason: HOSPADM

## 2022-05-13 RX ORDER — HYDROMORPHONE HYDROCHLORIDE 2 MG/ML
INJECTION, SOLUTION INTRAMUSCULAR; INTRAVENOUS; SUBCUTANEOUS
Status: DISCONTINUED | OUTPATIENT
Start: 2022-05-13 | End: 2022-05-13

## 2022-05-13 RX ORDER — MIDAZOLAM HYDROCHLORIDE 1 MG/ML
INJECTION, SOLUTION INTRAMUSCULAR; INTRAVENOUS
Status: DISCONTINUED | OUTPATIENT
Start: 2022-05-13 | End: 2022-05-13

## 2022-05-13 RX ORDER — CHLORHEXIDINE GLUCONATE ORAL RINSE 1.2 MG/ML
10 SOLUTION DENTAL 2 TIMES DAILY
Status: DISCONTINUED | OUTPATIENT
Start: 2022-05-13 | End: 2022-05-13 | Stop reason: HOSPADM

## 2022-05-13 RX ORDER — CEPHALEXIN 500 MG/1
500 CAPSULE ORAL EVERY 12 HOURS
Qty: 10 CAPSULE | Refills: 0 | Status: SHIPPED | OUTPATIENT
Start: 2022-05-13 | End: 2022-05-18

## 2022-05-13 RX ORDER — ASPIRIN 325 MG
325 TABLET, DELAYED RELEASE (ENTERIC COATED) ORAL DAILY
Qty: 20 TABLET | Refills: 0 | Status: SHIPPED | OUTPATIENT
Start: 2022-05-14 | End: 2022-07-15

## 2022-05-13 RX ORDER — ONDANSETRON 2 MG/ML
4 INJECTION INTRAMUSCULAR; INTRAVENOUS ONCE AS NEEDED
Status: COMPLETED | OUTPATIENT
Start: 2022-05-13 | End: 2022-05-13

## 2022-05-13 RX ORDER — EPINEPHRINE 1 MG/ML
INJECTION, SOLUTION INTRACARDIAC; INTRAMUSCULAR; INTRAVENOUS; SUBCUTANEOUS
Status: DISCONTINUED | OUTPATIENT
Start: 2022-05-13 | End: 2022-05-13 | Stop reason: HOSPADM

## 2022-05-13 RX ORDER — ONDANSETRON 4 MG/1
4 TABLET, FILM COATED ORAL EVERY 8 HOURS PRN
Qty: 30 TABLET | Refills: 0 | Status: SHIPPED | OUTPATIENT
Start: 2022-05-13 | End: 2022-07-15

## 2022-05-13 RX ORDER — BUPIVACAINE HYDROCHLORIDE 5 MG/ML
INJECTION, SOLUTION EPIDURAL; INTRACAUDAL
Status: DISCONTINUED
Start: 2022-05-13 | End: 2022-05-13 | Stop reason: HOSPADM

## 2022-05-13 RX ORDER — ONDANSETRON 2 MG/ML
INJECTION INTRAMUSCULAR; INTRAVENOUS
Status: DISCONTINUED | OUTPATIENT
Start: 2022-05-13 | End: 2022-05-13

## 2022-05-13 RX ORDER — LIDOCAINE HYDROCHLORIDE 10 MG/ML
INJECTION, SOLUTION EPIDURAL; INFILTRATION; INTRACAUDAL; PERINEURAL
Status: DISCONTINUED
Start: 2022-05-13 | End: 2022-05-13 | Stop reason: HOSPADM

## 2022-05-13 RX ORDER — EPINEPHRINE 1 MG/ML
INJECTION, SOLUTION INTRACARDIAC; INTRAMUSCULAR; INTRAVENOUS; SUBCUTANEOUS
Status: DISCONTINUED
Start: 2022-05-13 | End: 2022-05-13 | Stop reason: HOSPADM

## 2022-05-13 RX ORDER — KETAMINE HYDROCHLORIDE 50 MG/ML
INJECTION, SOLUTION INTRAMUSCULAR; INTRAVENOUS
Status: DISCONTINUED | OUTPATIENT
Start: 2022-05-13 | End: 2022-05-13

## 2022-05-13 RX ORDER — ACETAMINOPHEN 10 MG/ML
INJECTION, SOLUTION INTRAVENOUS
Status: DISCONTINUED | OUTPATIENT
Start: 2022-05-13 | End: 2022-05-13

## 2022-05-13 RX ORDER — DOCUSATE SODIUM 100 MG/1
100 CAPSULE, LIQUID FILLED ORAL 2 TIMES DAILY
Qty: 30 CAPSULE | Refills: 0 | Status: SHIPPED | OUTPATIENT
Start: 2022-05-13 | End: 2022-07-15

## 2022-05-13 RX ADMIN — KETAMINE HYDROCHLORIDE 10 MG: 50 INJECTION, SOLUTION INTRAMUSCULAR; INTRAVENOUS at 10:05

## 2022-05-13 RX ADMIN — ONDANSETRON 4 MG: 2 INJECTION, SOLUTION INTRAMUSCULAR; INTRAVENOUS at 09:05

## 2022-05-13 RX ADMIN — LIDOCAINE HYDROCHLORIDE 100 MG: 20 INJECTION, SOLUTION EPIDURAL; INFILTRATION; INTRACAUDAL; PERINEURAL at 09:05

## 2022-05-13 RX ADMIN — FENTANYL CITRATE 50 MCG: 50 INJECTION, SOLUTION INTRAMUSCULAR; INTRAVENOUS at 10:05

## 2022-05-13 RX ADMIN — ACETAMINOPHEN 1000 MG: 10 INJECTION, SOLUTION INTRAVENOUS at 09:05

## 2022-05-13 RX ADMIN — CEFAZOLIN SODIUM 2 G: 2 SOLUTION INTRAVENOUS at 09:05

## 2022-05-13 RX ADMIN — ONDANSETRON HYDROCHLORIDE 4 MG: 2 SOLUTION INTRAMUSCULAR; INTRAVENOUS at 04:05

## 2022-05-13 RX ADMIN — HYDROMORPHONE HYDROCHLORIDE 0.2 MG: 2 INJECTION INTRAMUSCULAR; INTRAVENOUS; SUBCUTANEOUS at 11:05

## 2022-05-13 RX ADMIN — DEXAMETHASONE SODIUM PHOSPHATE 8 MG: 4 INJECTION, SOLUTION INTRA-ARTICULAR; INTRALESIONAL; INTRAMUSCULAR; INTRAVENOUS; SOFT TISSUE at 09:05

## 2022-05-13 RX ADMIN — GLYCOPYRROLATE 0.2 MG: 0.2 INJECTION, SOLUTION INTRAMUSCULAR; INTRAVITREAL at 09:05

## 2022-05-13 RX ADMIN — HYDROMORPHONE HYDROCHLORIDE 0.5 MG: 2 INJECTION INTRAMUSCULAR; INTRAVENOUS; SUBCUTANEOUS at 10:05

## 2022-05-13 RX ADMIN — PROPOFOL 40 MG: 10 INJECTION, EMULSION INTRAVENOUS at 10:05

## 2022-05-13 RX ADMIN — PROPOFOL 200 MG: 10 INJECTION, EMULSION INTRAVENOUS at 09:05

## 2022-05-13 RX ADMIN — HYDROMORPHONE HYDROCHLORIDE 0.4 MG: 2 INJECTION INTRAMUSCULAR; INTRAVENOUS; SUBCUTANEOUS at 10:05

## 2022-05-13 RX ADMIN — HYDROCODONE BITARTRATE AND ACETAMINOPHEN 1 TABLET: 5; 325 TABLET ORAL at 02:05

## 2022-05-13 RX ADMIN — MIDAZOLAM 2 MG: 1 INJECTION INTRAMUSCULAR; INTRAVENOUS at 09:05

## 2022-05-13 RX ADMIN — KETAMINE HYDROCHLORIDE 25 MG: 50 INJECTION, SOLUTION INTRAMUSCULAR; INTRAVENOUS at 10:05

## 2022-05-13 NOTE — PLAN OF CARE
Pt lying in bed in NAD,VSS,RR equal and unlabored. Bed is low, locked, and call light in reach. Pt prepped for surgery

## 2022-05-13 NOTE — BRIEF OP NOTE
"[facility] - Per[facility] Services  Brief Operative Note    Surgery Date: 5/13/2022     Surgeon(s) and Role:     * Wes Faulkner MD - Primary    Assisting Surgeon: Megha Kwon PA-C    Pre-op Diagnosis:  Complex tear of medial meniscus of left knee as current injury, initial encounter [S83.232A]  Loose body in knee, left knee [M23.42]    Post-op Diagnosis:  Post-Op Diagnosis Codes:     * Complex tear of medial meniscus of left knee as current injury, initial encounter [S83.232A]     * Loose body in knee, left knee [M23.42]    Procedure(s) (LRB):  ARTHROSCOPY, KNEE, WITH MENISCUS REPAIR (Left)  CHONDROPLASTY, KNEE (Left)    Anesthesia: General    Operative Findings:   Left knee chondroplasty and left knee medial meniscus repair.     Estimated Blood Loss: * No values recorded between 5/13/2022 10:08 AM and 5/13/2022 12:30 PM *         Specimens:   Specimen (24h ago, onward)            None            Discharge Note    OUTCOME: Patient tolerated treatment/procedure well without complication and is now ready for discharge.    DISPOSITION: Home or Self Care    FINAL DIAGNOSIS:  Left knee medial meniscus tear    FOLLOWUP: In clinic    DISCHARGE INSTRUCTIONS:    Discharge Procedure Orders   CRUTCHES FOR HOME USE     Order Specific Question Answer Comments   Type: Axillary    Height: 5' 2" (1.575 m)    Weight: 88.4 kg (194 lb 14.2 oz)    Length of need (1-99 months): 2 weeks     Diet general     Call MD for:  temperature >100.4     Call MD for:  persistent nausea and vomiting     Call MD for:  severe uncontrolled pain     Call MD for:  difficulty breathing, headache or visual disturbances     Call MD for:  redness, tenderness, or signs of infection (pain, swelling, redness, odor or green/yellow discharge around incision site)     Call MD for:  hives     Call MD for:  persistent dizziness or light-headedness     Call MD for:  extreme fatigue     Wound care routine (specify)   Order Comments: Wound care routine: Please change " dressing at first post-op physical therapy appointment     Activity as tolerated     Non weight bearing

## 2022-05-13 NOTE — PATIENT INSTRUCTIONS
Knee Surgery Post-Operative Instructions     Wes Faulkner MD   [address] [place] [address]  Ph: [phone] Fax: [phone]    After you get home, apply ice to your knee but keep the bandages dry. You may apply ice?for 15-20 minutes every 1-2 hours for first week. Ice helps to reduce pain and?swelling. Never apply ice directly to the skin. If you are using a CryoCuff/PolarIce, it should be ice cold for no more than 15-20 minutes every 1-2 hours.     Elevate your leg on 2-3 pillows or rolled up towels placed under the heel so that the heel?is elevated higher than your knee. This will help reduce swelling and achieve full?extension of the knee.     It is important to get up and move around after your surgery. It's good for your lungs after anesthesia, and also good for your circulation to help prevent blood clots from developing.  However, too much walking will cause the knee to swell and hurt.     After 72 hours, you can remove the ACE wrap and bandages. You should then place new gauze/bandages and ACE wrap each day for 2 weeks.     You may shower, but the incisions, ACE bandages, and Brace must not get wet until 72 hours after surgery and only if there is no drainage at all from the incisions. Do not soak the knee under water for 2 weeks.     Weight-Bearing Status: You are to be (weight bearing x 6 weeks) on your operative leg.? Range of motion:Range of motion 0-90 x 4 weeks    Take the pain medicine as needed. You may take up to 2 tablets every 4-6 hours if?needed. As the pain subsides try to increase the time between doses.      Your first post-operative check-up with Dr. Faulkner 10-14 days from the?day of surgery.        It is normal to have some discomfort and swelling, as well as a small amount of blood-tinged drainage, following surgery. If this becomes severe, or if you develop a fever greater than or equal to?101 degrees, calf pain, or shortness of breath or chest pain, please  call immediately. If?you have questions or problems, call the office at [phone].     NORMAL SENSATIONS AND FINDINGS AFTER SURGERY   Shin pain   Knee swelling and warmth up to 2 weeks   Small amounts of bloody drainage   Numbness around the incision area   Soreness and swelling in the back of the knee   Bruising to the lower leg   Lower leg swelling, including the ankle - if this occurs elevate the leg above the heart?and apply ice to the swollen areas.   Numbness to the foot if you had a nerve block - will resolve within a few days   Low grade temperature less than 101.5 - if this occurs drink plenty of fluids and cough?and deep breathe (take 10 breaths, on the last hold for a second then forcefully cough a?few times). A low grade temp is normal for a week after surgery   Small amount of redness to the area where the sutures insert in the skin  Low back discomfort due to the epidural / spinal anesthesia apply a heating pad as?needed      NOTIFY OUR OFFICE IMMEDIATELY AT [phone] IF ANY OF THE FOLLOWING SIGNS OR SYMPTOMS OCCUR:   Chest pain or shortness of breath   Change is noted to your incision (i.e. increased redness or drainage)   Numbness of your foot if you didn't have a nerve block   Sharp pains in the back of your hip, thigh, or calf   Temperature greater than 101.5 degrees   Fever, chills, nausea, vomiting or diarrhea   Stitches loosen or fall out and incisions open up   Thick, foul-smelling drainage (yellow or greenish)   Increased pain which is not relieved by medications or other measures mentioned above       Knee & Quad Exercise Instructions     Wes Faulkner MD   [address] [place] [address]  Ph: [phone] Fax: [phone]       Exercises listed are to be performed by the patient following surgery. Perform sets of 10 repetitions, 4 times per day.        Heel Slides        Lie flat or sit with your leg straight. Slide your heel toward your hip. Try to get your  knee bent to a 90° angle. Slide your heel back so your leg is straight then relax.       Knee Extension (Lying Down)      While lying down, rest your ankle on a towel roll so that your knee and calf are not touching the floor. Allow gravity to straighten your knee. Maintain this position for up to 10 minutes.       Knee Extension (Sitting in a Chair)      While sitting in a chair, prop your heel on another chair so that there is nothing behind your calf or knee. Allow gravity to straighten your knee. Maintain this position for up to 10 minute       Patellar Mobilization      This exercise is done by simply pushing the patella up and down and side to side and holding that position. Movement of the patella is essential when restoring range of motion. If the patella cannot move within the femoral groove, then the knee cannot bend and extend.?         Quadriceps Isometrics (Quad Sets)        Lie flat or sit with your surgical leg straight. Tighten the muscle in the front of your thigh as much as you can, pushing the back or your knee flat against the floor. Hold this tight for 5 seconds then relax.        Straight Leg Raises (SLR)      Lie flat or sit with your leg straight and your knee brace on (if you have one). You may have your non-operative knee bent slightly for comfort. Perform a Quad set (as above) and flex your toes straight up. Lift your heel off of the floor and hold for at least 5 seconds. Keep your thigh muscle as tight as you can and lower your heel back down then relax.       Seated Knee Flexion        Sit with your legs dangling over the bed. Relax your leg allowing gravity to bend your knee. You may use your non-operative leg to gently push your operative leg into more of a bend. Maintain this position for up to 10 minutes.        Calf Pumps         Point and flex your toes to tighten your calf muscles.

## 2022-05-13 NOTE — LETTER
May 13, 2022    Stacey Wade  [address]  [facility] [address] [facility] [address]  Phone: [phone]  Fax: [phone]   May 13, 2022     Patient: Stacey Wade   YOB: 1965   Date of Visit: 5/2/2022       To Whom it May Concern:    Stacey Wade had surgery at [facility] on 5/13/2022. She will be off for the next 4 to 6 weeks for recovery. Please excuse her from any work missed.    If you have any questions or concerns, please don't hesitate to call.    Sincerely,         Rachel Richards RN

## 2022-05-13 NOTE — INTERVAL H&P NOTE
The patient has been examined and the H&P has been reviewed:    I concur with the findings and no changes have occurred since H&P was written.    Anesthesia/Surgery risks, benefits and alternative options discussed and understood by patient/family.          There are no hospital problems to display for this patient.    Plan:  Left knee arthroscopy, medial meniscus root repair, medial meniscus repair, possible meniscus centralization, possible loose body removal, any indicated procedures    I had a long discussion with the patient about treatment options, including operative and nonoperative treatments. We discussed pros and cons of each including risks pertinent to surgery including pain, infection, bleeding, damage to adjacent structures like nerves and blood vessels, failure to heal, need for future surgeries, stiffness, instability, loss of limb, anesthesia risks like stroke, blood clot, loss of life. We discussed the possibility of need for later hardware removal in the case that hardware was used. We discussed common and uncommon risks, and discussed patient specific factors that may increase the risks present with surgery. All questions were answered. The patient expressed understanding of the pros and cons of surgery and wanted to proceed with surgical treatment.    We discussed COVID19 with the patient, they are aware of our current policies and procedures, were given the option of delaying surgery, and they elect to proceed. All questions were answered.

## 2022-05-13 NOTE — PROGRESS NOTES
Post-Operative Progress Note    Patient reassessed in PACU  Nurse at bedside  Patient awake & vitals stable    Assessment:  Left lower extremity   Dressing clean, dry, intact   Brace: In place   All compartments are soft and compressible.    Calf soft non-tender.    Intact EHL, FHL, gastroc soleus, and tibialis anterior.    Sensation intact to light touch in superficial peroneal, deep peroneal, tibial, sural, and saphenous nerve distributions.    Foot warm and well perfused with capillary refill of less than 2 seconds and palpable pedal pulses.     Plan:   Follow up with Megha in 2 weeks in clinic   Start physical therapy: Monday 5/16   Post-op x-rays: At first post-op visit   Pain: Percocet disp 60, Oxycodone disp 10    Anti-biotics: Kelflex   DVT PPX: Aspirin 325 po daily  o Continue DVT ppx as prescribe to prevent any blood clots

## 2022-05-13 NOTE — DISCHARGE SUMMARY
"[facility] Services  Discharge Note  Short Stay    Procedure(s) (LRB):  ARTHROSCOPY, KNEE, WITH MENISCUS REPAIR (Left)  CHONDROPLASTY, KNEE (Left)    OUTCOME: Patient tolerated treatment/procedure well without complication and is now ready for discharge.    DISPOSITION: Home or Self Care    FINAL DIAGNOSIS: Left knee medial meniscus tear    FOLLOWUP: In clinic    DISCHARGE INSTRUCTIONS:    Discharge Procedure Orders   CRUTCHES FOR HOME USE     Order Specific Question Answer Comments   Type: Axillary    Height: 5' 2" (1.575 m)    Weight: 88.4 kg (194 lb 14.2 oz)    Length of need (1-99 months): 2 weeks     Diet general     Call MD for:  temperature >100.4     Call MD for:  persistent nausea and vomiting     Call MD for:  severe uncontrolled pain     Call MD for:  difficulty breathing, headache or visual disturbances     Call MD for:  redness, tenderness, or signs of infection (pain, swelling, redness, odor or green/yellow discharge around incision site)     Call MD for:  hives     Call MD for:  persistent dizziness or light-headedness     Call MD for:  extreme fatigue     Wound care routine (specify)   Order Comments: Wound care routine: Please change dressing at first post-op physical therapy appointment     Activity as tolerated     Non weight bearing        TIME SPENT ON DISCHARGE: 30 minutes  "

## 2022-05-13 NOTE — PLAN OF CARE
POC reviewed with patient, she denies any current questions, complaints, or concerns at this time

## 2022-05-13 NOTE — ANESTHESIA PROCEDURE NOTES
Intubation    Date/Time: 5/13/2022 9:47 AM  Performed by: Brie Lewis CRNA  Authorized by: Madhavi Mancia MD     Intubation:     Induction:  Intravenous    Intubated:  Postinduction    Mask Ventilation:  Easy mask    Attempts:  1    Attempted By:  CRNA    Difficult Airway Encountered?: No      Complications:  None    Airway Device:  Supraglottic airway/LMA    Airway Device Size:  4.0    Style/Cuff Inflation:  Cuffed    Secured at:  The lips    Placement Verified By:  Capnometry    Complicating Factors:  None    Findings Post-Intubation:  Atraumatic/condition of teeth unchanged

## 2022-05-13 NOTE — TRANSFER OF CARE
"Anesthesia Transfer of Care Note    Patient: Stacey Wade    Procedure(s) Performed: Procedure(s) (LRB):  ARTHROSCOPY, KNEE, WITH MENISCUS REPAIR (Left)  CHONDROPLASTY, KNEE (Left)    Patient location: PACU    Anesthesia Type: general    Transport from OR: Transported from OR on room air with adequate spontaneous ventilation    Post pain: adequate analgesia    Post assessment: no apparent anesthetic complications and tolerated procedure well    Post vital signs: stable    Level of consciousness: awake, responds to stimulation and sedated    Nausea/Vomiting: no nausea/vomiting    Complications: none    Transfer of care protocol was followed      Last vitals:   Visit Vitals  /80 (BP Location: Left arm, Patient Position: Lying)   Pulse 93   Temp 36.4 °C (97.6 °F) (Temporal)   Resp 12   Ht 5' 2" (1.575 m)   Wt 88.4 kg (194 lb 14.2 oz)   SpO2 98%   Breastfeeding No   BMI 35.65 kg/m²     "

## 2022-05-13 NOTE — ANESTHESIA POSTPROCEDURE EVALUATION
Anesthesia Post Evaluation    Patient: Stacey Wade    Procedure(s) Performed: Procedure(s) (LRB):  ARTHROSCOPY, KNEE, WITH MENISCUS REPAIR (Left)  CHONDROPLASTY, KNEE (Left)    Final Anesthesia Type: general      Patient location during evaluation: PACU  Patient participation: Yes- Able to Participate  Level of consciousness: awake and alert and oriented  Post-procedure vital signs: reviewed and stable  Pain management: adequate  Airway patency: patent    PONV status at discharge: No PONV  Anesthetic complications: no      Cardiovascular status: blood pressure returned to baseline, stable and hemodynamically stable  Respiratory status: unassisted  Hydration status: euvolemic  Follow-up not needed.          Vitals Value Taken Time   /76 05/13/22 1423   Temp 36.4 °C (97.6 °F) 05/13/22 1230   Pulse 91 05/13/22 1445   Resp 16 05/13/22 1445   SpO2 98 % 05/13/22 1445   Vitals shown include unvalidated device data.      No case tracking events are documented in the log.      Pain/Brooke Score: Pain Rating Prior to Med Admin: 6 (5/13/2022  2:02 PM)  Brooke Score: 9 (5/13/2022  2:00 PM)

## 2022-05-13 NOTE — NURSING TRANSFER
Assumed care of patient from PABLO Ramos. Patient vitals stable and resting comfortably in bed with no complaints. Plan of care reviewed with patient and family. will continue to monitor for discharge criteria

## 2022-05-13 NOTE — PLAN OF CARE
Patient met criteria for discharge. Discharged instructions given, and medications delievered by pharmacy. Patient dressed and wheeled to car by RN with no complaints.   Patient goals met.

## 2022-05-13 NOTE — PLAN OF CARE
Pt lying in bed in NAD,VSS,RR equal and unlabored. Bed is low, locked, and call light in reach. Pt prepped for surgery.

## 2022-05-13 NOTE — DISCHARGE SUMMARY
"[facility] Services  Discharge Note  Short Stay    Procedure(s) (LRB):  ARTHROSCOPY, KNEE, WITH MENISCUS REPAIR (Left)  CHONDROPLASTY, KNEE (Left)    OUTCOME: Patient tolerated treatment/procedure well without complication and is now ready for discharge.    DISPOSITION: Home or Self Care    FINAL DIAGNOSIS:  Left knee medial meniscus tear    FOLLOWUP: In clinic    DISCHARGE INSTRUCTIONS:    Discharge Procedure Orders   CRUTCHES FOR HOME USE     Order Specific Question Answer Comments   Type: Axillary    Height: 5' 2" (1.575 m)    Weight: 88.4 kg (194 lb 14.2 oz)    Length of need (1-99 months): 2 weeks     Diet general     Call MD for:  temperature >100.4     Call MD for:  persistent nausea and vomiting     Call MD for:  severe uncontrolled pain     Call MD for:  difficulty breathing, headache or visual disturbances     Call MD for:  redness, tenderness, or signs of infection (pain, swelling, redness, odor or green/yellow discharge around incision site)     Call MD for:  hives     Call MD for:  persistent dizziness or light-headedness     Call MD for:  extreme fatigue     Wound care routine (specify)   Order Comments: Wound care routine: Please change dressing at first post-op physical therapy appointment     Activity as tolerated     Non weight bearing        TIME SPENT ON DISCHARGE: 30 minutes  "

## 2022-05-16 ENCOUNTER — PATIENT MESSAGE (OUTPATIENT)
Dept: FAMILY MEDICINE | Facility: CLINIC | Age: 57
End: 2022-05-16
Payer: COMMERCIAL

## 2022-05-16 NOTE — OP NOTE
[facility] -  Orthopaedic Surgery & Sports Medicine  [facility]    OPERATIVE NOTE    Procedure Date: 5/13/2022     Preoperative Diagnosis:  · Complex tear of medial meniscus of left knee as current injury, initial encounter [S83.232A]  · Loose body in knee, left knee [M23.42]    Postoperative Diagnosis:  · Post-Op Diagnosis Codes:  ·    * Complex tear of medial meniscus of left knee as current injury, initial encounter [S83.232A]  ·    * Loose body in knee, left knee [M23.42]  · Medial meniscus extrusion  · Medial meniscus root tear    Surgeon: Wes Faulkner MD    Procedure Performed:  · Left knee medial meniscus posterior root repair  · Left knee medial meniscus centralization with anchors and complex meniscal repair  · Left knee chondroplasty    Anesthesia: General     Block: None    Fluids: Per Anesthesia Record    UOP: Per Anesthesia Record    Blood Loss: * No values recorded between 5/13/2022 10:08 AM and 5/13/2022 12:30 PM *    Implants:   Implant Name Type Inv. Item Serial No.  Lot No. LRB No. Used Action   ANCHOR SUT FIBERTAK 1.8 KNTLS - [id number]  ANCHOR SUT FIBERTAK 1.8 KNTLS  [employer] [id number] Left 1 Wasted   ANCHOR SUT FIBERTAK 1.8 KNTLS - [id number]  ANCHOR SUT FIBERTAK 1.8 KNTLS  [employer] [id number] Left 2 Implanted   BUTTON CLAVICLE - [id number]  BUTTON CLAVICLE  [employer] [id number] Left 1 Implanted   BUTTON CLAVICLE - [id number]  BUTTON CLAVICLE  [employer] [id number] Left 1 Implanted       Specimens:   Specimen (24h ago, onward)            None           Drains: None    Tourniquet Time:  None    Complications: No    Fluoro/C-arm utilized during the case: None    Preoperative Exam Under Anesthesia Findings:   ROM:  0-130  Lachman: 1A   Pivot Shift: Negative   Anterior Drawer: Negative   Posterior Drawer: Negative  Varus @ 0: Stable  Varus @ 30: Stable   Dial Test @ 0: Negative  Dial Test @ 30: Negative  Valgus @ 0: Stable  Valgus @ 30: Stable    Intraoperative Findings:    ACL: Intact  PCL: Intact  Medial Meniscus:  The patient had a nearly full-thickness tear through the posterior root of the medial meniscus.  She had extrusion and instability of the body of the medial meniscus consistent with preoperative imaging.  She had complex tearing throughout the posterior horn body junction.  She had partial-thickness chondral wear in the medial compartment  Lateral Meniscus:  Normal  MFC:  Partial-thickness chondral wear  MTP:  Partial-thickness chondral wear  LFC:  Normal  LTP:  Normal  Patella:  Normal  Trochlea:  Normal  Gutters:  Normal.  No loose bodies were identified.    Indications for Procedure and Brief History:  This is a 56-year-old female who has had medial-sided knee pain that has failed conservative management.  Imaging demonstrated a tear of the posterior root also significant extrusion of her medial meniscus.  We discussed with her treatment options.  Specifically we talked about potentially repairing the root tear and centralizing the meniscus.  We discussed with her that she had degenerative changes within the knee in meniscus and that the meniscus may not be repairable and that we cannot reverse any of the degenerative changes that had already occurred nor could we prevent the natural history of this disease although we hoped to provide some preservation of the joint if able to repair the root and centralize the meniscus. I had a long discussion with the patient about treatment options. We discussed operative and non-operative options. We discussed the risks of surgery at length, including but not limited to pain, infection, bleeding, damage to adjacent structures such as nerves and blood vessels, failure to heal, stiffness, laxity, need for more surgery, stroke, blood clot, loss of life, loss of limb, need for removal of any implants used, anesthesia risks, breathing problems, and heart problems. We talked about common and uncommon risks. We discussed risks that were  higher specific to the patient. They expressed understanding of the risks and opted to proceed with surgical management.    Description of Procedure: I met the the patient in the preoperative holding area. I identified, confirmed, and marked the operative extremity. All questions were answered. The patient was then taken back to the operating room and transferred to the operative table. The patient was placed in the supine position. All bony prominences were padded. A foot pad was placed to assist positioning at 90 degrees and at hyperflexion. Anesthesia was induced without complication. A tourniquet with adequate padding was placed at the proximal thigh. The operative extremity was then prepped and draped in the standard sterile fashion with a chlorhexidine and alcohol solution. A timeout was performed to ensure we had the proper patient, proper operative site, and were performing the proper procedure. All members of the operative team were in agreement with this. Intravenous antibiotics were administered within 60 minutes prior to the incision.     I began the procedure by performing a diagnostic arthroscopy.  I made my initial portal with a 11 knife anterolaterally, just adjacent to the patellar tendon next to the inferior pole of the patella. I then made an anteromedial portal utilizing a spinal needle for localization under arthroscopic visualization. I made the first portal adjacent to the medial border of the patellar tendon and just above the meniscus. I then gently resected excess fat pad with an arthroscopic shaver only as needed for visualization. I then performed a standard diagnostic arthroscopy, examining all compartments and intra-articular spaces of the knee. The key findings are listed above. I switched between all of the portals for viewing and instrumentation throughout the case as needed, and switched between and 30 degree and 70 degree scope as needed.    Meniscus Surgery:  I then moved on to  performing posterior root repair.  I prepared the root insertion site and I debrided back fibrosis on the meniscal torn areas.  I freshened up the edges.  I used a rasp and a curved arthroscopic shaver to decorticate the bone at the root insertion site and prepare it for repair the meniscus back down to it.  I then passed 2 loop sutures through the root just medial to the tear.  These 1st 2 sutures did ultimately pull out when tensioned so I did place 2 more slightly more medial in to better tissue that could hold the suture better.  I then used the Smith and Nephew root guide to advance a 2.4 pin from the anteromedial tibia into the anatomic insertion of the posterior root of the medial meniscus that I pulled the loop sutures down through there.      There was significant extrusion of the meniscus and there was complex tearing of the posterior horn body junction.  I chose to repair this area centralize the meniscus with 2 FiberTak sutures.  I used a curved guide in the high anteromedial portal to place 1 in the back corner of the posterior horn body junction into the tibial plateau just underneath the meniscus and then I placed 1 midline right underneath the body.  The posterior anchor I used to repair the meniscus with a hay bale circumferential type suture due the fact that there was complex tearing and poor meniscal quality and I did not feel that a mattress suture was hold.  We will to tension this appropriately and medialized this portion of the meniscus as well as perforating compression across the tear site.  I then moved on to the more anterior anchor and I used this anchor in a vertical mattress fashion to centralize the meniscus a place compression across the more vertically oriented tear pattern at this location and then tensioned these both appropriately with the root held in tension as well.  I then tensioned and fixed the root over a cortical button on the anteromedial tibia in view that we had good  recreation of the anatomic footprint and positioning of the medial meniscus.    Additional Surgical Procedures:  I then used arthroscopic shaver to gently debride back unstable cartilage flaps in the area of the medial compartment while leaving any intact cartilage in place.  There was partial-thickness wear throughout the medial compartment but no full-thickness chondral defects.    We then repeated our diagnostic arthroscopy to make sure there was no surgical debris, and to reexamine the knee. We then suctioned out excess arthroscopic fluid, and we performed a layered closure using 0-vicryl in the deep tissue and fascia, 2-0 vicryl in the subcutaneous tissue, and Monocryl in the skin. We placed sterile dressings over the wound. All sponge, instrument, and needle counts were correct x 2 at the end of the case. I was present and performed all key portions of the procedure.  The patient was awoken from anesthesia transported to the PACU in stable condition. The patient was examined postoperatively and the limb was warm and well perfused with appropriate neurovascular status relative to preoperative status and block status.     Condition: Good    Disposition: PACU - hemodynamically stable.    Attestation: I was present and scrubbed for the entire procedure.    Postoperative Plan:  · Meniscus repair protocol  · Weight bearing:  Nonweightbearing for 6 weeks  · Range of motion:  0-90 for 4 weeks  · DVT Ppx:  Aspirin daily  · PT/OT: Start POD#2 or #3  · Follow-up: 10-14 days        Wes Faulkner MD  Orthopaedic Surgery & Sports Medicine

## 2022-05-18 ENCOUNTER — CLINICAL SUPPORT (OUTPATIENT)
Dept: REHABILITATION | Facility: HOSPITAL | Age: 57
End: 2022-05-18
Attending: ORTHOPAEDIC SURGERY
Payer: COMMERCIAL

## 2022-05-18 DIAGNOSIS — M25.662 DECREASED RANGE OF MOTION OF LEFT KNEE: ICD-10-CM

## 2022-05-18 DIAGNOSIS — S83.232A COMPLEX TEAR OF MEDIAL MENISCUS OF LEFT KNEE AS CURRENT INJURY, INITIAL ENCOUNTER: ICD-10-CM

## 2022-05-18 DIAGNOSIS — S83.232A COMPLEX TEAR OF MEDIAL MENISCUS OF LEFT KNEE AS CURRENT INJURY, INITIAL ENCOUNTER: Primary | ICD-10-CM

## 2022-05-18 DIAGNOSIS — M25.562 ACUTE PAIN OF LEFT KNEE: Primary | ICD-10-CM

## 2022-05-18 DIAGNOSIS — Z74.09 DECREASED FUNCTIONAL MOBILITY AND ENDURANCE: ICD-10-CM

## 2022-05-18 DIAGNOSIS — R26.2 DIFFICULTY WALKING: ICD-10-CM

## 2022-05-18 PROCEDURE — 97163 PT EVAL HIGH COMPLEX 45 MIN: CPT

## 2022-05-18 PROCEDURE — 97110 THERAPEUTIC EXERCISES: CPT

## 2022-05-18 NOTE — PLAN OF CARE
[facility]   Physical Therapy Initial Evaluation   Date: 5/18/2022   Name: Stacey Wade  Clinic Number: [id number]    Therapy Diagnosis:    Encounter Diagnoses   Name Primary?    Complex tear of medial meniscus of left knee as current injury, initial encounter     Acute pain of left knee Yes    Decreased range of motion of left knee     Difficulty walking     Decreased functional mobility and endurance       Physician: Wes Faulkner MD     Physician Orders: PT Eval and Treat  Medical Diagnosis from Referral: Complex tear of medial meniscus of left knee as current injury, initial encounter  Evaluation Date: 5/18/2022  Authorization Period Expiration: 5/4/2022  Plan of Care Expiration: 8/18/2022  Progress Note Due: 6/18/2022  Visit # / Visits authorized: 1/1   FOTO: 1/3     Precautions: Standard, Fall and Weightbearing    Time In: 1038 (late arrival)  Time Out: 1130  Total Billable Time (timed & untimed codes): 48 minutes    SUBJECTIVE   Date of onset: 5/13/2022    History of current condition - Stacey reports to physical therapy following meniscus repair on 5/13/2022. States that she has not been able to move around a lot because she does not know how to use her crutches. Has not been doing any exercises since surgery     Imaging: x-ray knee performed on 5/13/2022    Pain:  Current 8/10, worst 10/10, best 8/10   Location: L knee   Description: sore, aching, swelling   Aggravating Factors: bending, lifting   Easing Factors: pain medication    Prior Therapy: Yes  Social History: Pt lives with their family  Occupation: Pt is a pharmacy technician  Prior Level of Function: ambulating with walker due to pain,   Current Level of Function: ambulating non weight bearing on left with rolling walker. Unable to perform ADLs, IADLs, daily household and work-related tasks or community mobility     Dominant Extremity: right    Pts goals: Pt reported goals are to decrease overall pain  levels in order to return to prior functional level.       Medical History:   Past Medical History:   Diagnosis Date    Allergic rhinitis     Anxiety     Arthritis     Hypertension     Urticaria        Surgical History:   Stacey Wade  has a past surgical history that includes Hernia repair (Left); Colonoscopy (N/A, 1/18/2018); and Chondroplasty of knee (Left, 5/13/2022).    Medications:   Stacey has a current medication list which includes the following prescription(s): aspirin, betamethasone dipropionate, butalbital-acetaminophen-caffeine -40 mg, clorazepate, diclofenac sodium, docusate sodium, doxepin, epinephrine, famotidine, fexofenadine, hydroxyzine hcl, myrbetriq, mometasone, montelukast, ondansetron, ondansetron, oxycodone, oxycodone-acetaminophen, potassium chloride, prednisone, rizatriptan, tolterodine, triamterene-hydrochlorothiazide 37.5-25 mg, and xolair, and the following Facility-Administered Medications: sodium chloride 0.9% and chlorhexidine.    Allergies:   Review of patient's allergies indicates:   Allergen Reactions    Benzalkonium chloride     Black pepper      Other reaction(s): Hives    Chocolate flavor      Other reaction(s): Hives    Citrus and derivatives Hives     LEMON PRODUCTS    Grapefruit Hives     Other reaction(s): Hives    Ibuprofen Swelling    Latex      Other reaction(s): Hives    Lemon balm (casper officinalis) Hives     Anything with lemon in it    Naproxen Swelling    Neomycin-bacitracin-polymyxin      Other reaction(s): Rash    Oats (richard) Hives     Oat foods (oatmeal, etc...)    Vegetable acetoglycerides Hives     Vegetable gums    Wheat containing prod     Wheat flour Hives        OBJECTIVE     GIRTH/EDEMA:     KNEE   3 in Superior (cm) Mid Patella  (cm) 6 in Inferior  (cm) GOALS  (cm)   RIGHT NT NT NT No goal   LEFT NT NT NT uninvolved side     RANGE OF MOTION:    Knee AROM/PROM Right   Left   Goal   Hyper - Zero - Flexion (PRE)   0-0-125  0-0-5 0-0-115   Hyper - Zero - Flexion (POST) --- 0-0-48 N/A     Ankle/Foot AROM/PROM Right   Left   Pain/Dysfunction with Movement Goal   Dorsiflexion (20) NT NT  15   Plantarflexion (50) NT NT  40     STRENGTH:     Right   Left   Pain/Dysfunction with Movement Goal   QUAD LAG (deg) --- NT With straight leg raise in long seated position 0*     L/E MMT Right Goal   Hip Flexion  4-/5 4+/5 B    Hip Extension  NT 4+/5 B    Hip Abduction  4-/5 4+/5 B    Hip IR 4-/5 4+/5 B    Hip ER 4-/5 4+/5 B    Knee Flexion 4-/5 4+/5 B    Knee Extension 4+/5 5/5 B   Ankle DF 4+/5 5/5 B   Ankle PF 4+/5 5/5 B   Ankle Inversion 4+/5 5/5 B   Ankle Eversion 4+/5 5/5 B     L/E MMT Left Goal   Hip Flexion  2+/5 4+/5 B    Hip Extension  NT 4+/5 B    Hip Abduction  2/5 4+/5 B    Hip IR NT 4+/5 B    Hip ER NT 4+/5 B    Knee Flexion NT 4+/5 B    Knee Extension 2/5 5/5 B   Ankle DF 3-/5 5/5 B   Ankle PF 3-/5 5/5 B   Ankle Inversion 3-/5 5/5 B   Ankle Eversion 3-/5 5/5 B       MUSCLE LENGTH:     Muscle Tested  Right Left  Goal   Hip Flexors  decreased decreased Normal B   Quadriceps decreased decreased Normal B   Hamstrings  decreased decreased Normal B   Piriformis  decreased decreased Normal B   Gastrocnemius  decreased decreased Normal B   Soleus  decreased decreased Normal B     JOINT MOBILITY:     Joint Motion Tested Right   Left    Goal   Patellar Glides: Superior Normal Hypomobile Normal B   Patellar Glides: Inferior Normal Hypomobile Normal B   Patellar Glides: Medical Normal Hypomobile Normal B   Patellar Tilts Normal Hypomobile Normal B       SPECIAL TESTS: not tested due to post-operative state.    Sensation:  Sensation is intact to light touch    Palpation: Increased tone and tenderness noted with palpation to thigh, knee and lower leg grossly     Posture:  Pt presents with postural abnormalities which include: forward head and rounded shoulders    Gait Analysis: The patient ambulated with the following assistive device: rolling  walker; the pt presents with the following gait abnormalities: non weight bearing on L, bradykinetic, decreased step length on right and poor balance and movement control due to non weight bearing status on L      FUNCTION:     CMS Impairment/Limitation/Restriction for FOTO Knee Survey    Therapist reviewed FOTO scores for Stacey on 5/18/2022.   FOTO documents entered into [employer] - see Media section.    Limitation Score: 99%         TREATMENT     Stacey received the treatments listed below:       THERAPEUTIC EXERCISES to develop strength, endurance, ROM, flexibility, posture, and core stabilization for (20) minutes including:    Intervention Performed Today    PROM knee flexion  x 20x by PT    Heel slides  x 20x    Heel prop  x 3 minutes    Quadriceps sets  x 20x with significant cueing from PT    Ankle pumps x 30x   Use rolling walker  x 5 minutes, standing, sitting, transfers, walking                     Plan for Next Visit: continue knee flexion ROM interventions, quas sets with NMES       PATIENT EDUCATION AND HOME EXERCISES       Education provided: (10) minutes   PURPOSE: Patient educated on the impairments noted above and the effects of physical therapy intervention to improve overall condition and QOL.    EXERCISE: Patient was educated on all the above exercise prior/during/after for proper posture, positioning, and execution for safe performance with home exercise program.    GAIT & BALANCE: Patient educated on the importance of strong core and lower extremity musculature in order to improve both static and dynamic balance, improve gait mechanics, reduce fall risk and improve household and community mobility.    ASSISTIVE DEVICE: Patient educated on proper utilization of assistive device for safe and efficient ambulation and to reduce risk of falls   TRANSFERS & TRANSITIONS: Patient educated on proper technique for bed mobility, transitions and transfers to improve body mechanics and decrease risk of  injury.    POST-OP PRECAUTIONS: patient educated on post-operative precautions in order to protect surgical repair, decrease risk of injury and promote healing.    BRACE: patient educated on proper fit, positioning, and technique for donning and doffing brace in order to maintain optimal alignment of the extremity and promote healing     Written Home Exercises Provided: yes.   Exercises were reviewed and Stacey was able to demonstrate them prior to the end of the session.  Stacey demonstrated good understanding of the education provided. See EMR under Patient Instructions for exercises provided during therapy sessions.    ASSESSMENT   Stacey is a 56 y.o. female referred to outpatient Physical Therapy with a medical diagnosis of  Complex tear of medial meniscus of left knee. Pt presents with impairments including: decreased ROM, decreased strength, decreased joint mobility, decreased muscle length, impaired balance, gait abnormalities, decreased overall function and scar tissue. These impairments will be addressed through manual therapy techniques, exercises, functional training, and modalities as necessary. Patient was treated and educated on exercises for home program, progression of therapy, and benefits of therapy to achieve full functional mobility.     Pt prognosis is Good.   Pt will benefit from skilled outpatient Physical Therapy to address the deficits stated above and in the chart below, provide pt/family education, and to maximize pt's level of independence.     Plan of care discussed with patient: Yes  Pt's spiritual, cultural and educational needs considered and patient is agreeable to the plan of care and goals as stated below:     Anticipated Barriers for therapy: co-morbidities, sedentary lifestyle, adherence to treatment plan and coping style    Medical Necessity is demonstrated by the following  History  Co-morbidities and personal factors that may impact the plan of care Co-morbidities:    Anxiety, high BMI, HTN     Personal Factors:   coping style  lifestyle       high   Examination  Body Structures and Functions, activity limitations and participation restrictions that may impact the plan of care Body Regions:   lower extremities     Body Systems:    gross symmetry  ROM  strength  gross coordinated movement  balance  gait  transfers     Participation Restrictions:   walking     Activity limitations:   Learning and applying knowledge  no deficits     General Tasks and Commands  undertaking a single task     Communication  no deficits     Mobility  lifting and carrying objects  walking  moving around using equipment (WC)  driving (bike, car, motorcycle)     Self care  washing oneself (bathing, drying, washing hands)  caring for body parts (brushing teeth, shaving, grooming)  toileting  dressing     Domestic Life  shopping  cooking  doing house work (cleaning house, washing dishes, laundry)     Interactions/Relationships  no deficits     Life Areas  employment     Community and Social Life  community life  recreation and leisure             high   Clinical Presentation unstable clinical presentation with unpredictable characteristics high   Decision Making/ Complexity Score: high         GOALS:    Short Term Goals: 6 weeks  1. SIGNS & SYMPTOMS: Recent signs and systems trend is improving in order to progress towards LTG's.   2. MOBILITY: Patient will improve AROM to 50% of stated goals, listed in objective measures above, in order to progress towards independence with functional activities.    3. STRENGTH: Patient will improve strength to 50% of stated goals, listed in objective measures above, in order to progress towards independence with functional activities. (make sure strength goals are set)  4. GAIT IMPROVING: Patient will demonstrate improved gait mechanics including symmetrical stance time and step length B in order to improve functional mobility, improve quality of life, and decrease risk  of further injury or fall.   5. HEP: Patient will demonstrate independence with HEP in order to progress toward functional independence.         Long Term Goals: 12 weeks   1. PAIN: Pt will report worst pain of 3/10 in order to progress toward max functional ability and improve quality of life  2. FUNCTION: Patient will demonstrate improved function as indicated by a functional limitation score of less than or equal to 60 out of 100 on FOTO.  3. MOBILITY: Patient will improve AROM to stated goals, listed in objective measures above, in order to return to maximal functional potential and improve quality of life.   4. STRENGTH: Patient will improve strength to stated goals, listed in objective measures above, in order to improve functional independence and quality of life.   5. GAIT NORMAL: Patient will demonstrate normalized gait mechanics with minimal compensation in order to return to PLOF.       PLAN   Plan of care Certification: 5/18/2022 to 8/18/2022. (Is this a 12 week POC? If not, change weeks for goals and below}    Outpatient Physical Therapy 2 times weekly for 12 weeks to include any combination of the following interventions: virtual visits, dry needling, modalities, electrical stimulation (IFC, Pre-Mod, Attended with Functional Dry Needling), Cervical/Lumbar Traction, Gait Training, Manual Therapy, Neuromuscular Re-ed, Patient Education, Self Care, Therapeutic Activites, and Therapeutic Exercise     Bell Mullen, PT, DPT    I CERTIFY THE NEED FOR THESE SERVICES FURNISHED UNDER THIS PLAN OF TREATMENT AND WHILE UNDER MY CARE   Physician's comments:     Physician's Signature: ___________________________________________________

## 2022-05-20 ENCOUNTER — OFFICE VISIT (OUTPATIENT)
Dept: ORTHOPEDICS | Facility: CLINIC | Age: 57
End: 2022-05-20
Payer: COMMERCIAL

## 2022-05-20 ENCOUNTER — CLINICAL SUPPORT (OUTPATIENT)
Dept: REHABILITATION | Facility: HOSPITAL | Age: 57
End: 2022-05-20
Payer: COMMERCIAL

## 2022-05-20 VITALS — BODY MASS INDEX: 35.7 KG/M2 | HEIGHT: 62 IN | WEIGHT: 194 LBS

## 2022-05-20 DIAGNOSIS — S83.232A COMPLEX TEAR OF MEDIAL MENISCUS OF LEFT KNEE AS CURRENT INJURY, INITIAL ENCOUNTER: Primary | ICD-10-CM

## 2022-05-20 DIAGNOSIS — Z74.09 DECREASED FUNCTIONAL MOBILITY AND ENDURANCE: Primary | ICD-10-CM

## 2022-05-20 PROCEDURE — 97014 ELECTRIC STIMULATION THERAPY: CPT

## 2022-05-20 PROCEDURE — 97140 MANUAL THERAPY 1/> REGIONS: CPT

## 2022-05-20 PROCEDURE — 3008F BODY MASS INDEX DOCD: CPT | Mod: CPTII,S$GLB,, | Performed by: ORTHOPAEDIC SURGERY

## 2022-05-20 PROCEDURE — 1159F PR MEDICATION LIST DOCUMENTED IN MEDICAL RECORD: ICD-10-PCS | Mod: CPTII,S$GLB,, | Performed by: ORTHOPAEDIC SURGERY

## 2022-05-20 PROCEDURE — 99024 PR POST-OP FOLLOW-UP VISIT: ICD-10-PCS | Mod: S$GLB,,, | Performed by: ORTHOPAEDIC SURGERY

## 2022-05-20 PROCEDURE — 99999 PR PBB SHADOW E&M-EST. PATIENT-LVL III: ICD-10-PCS | Mod: PBBFAC,,, | Performed by: ORTHOPAEDIC SURGERY

## 2022-05-20 PROCEDURE — 1159F MED LIST DOCD IN RCRD: CPT | Mod: CPTII,S$GLB,, | Performed by: ORTHOPAEDIC SURGERY

## 2022-05-20 PROCEDURE — 97110 THERAPEUTIC EXERCISES: CPT

## 2022-05-20 PROCEDURE — 99999 PR PBB SHADOW E&M-EST. PATIENT-LVL III: CPT | Mod: PBBFAC,,, | Performed by: ORTHOPAEDIC SURGERY

## 2022-05-20 PROCEDURE — 3008F PR BODY MASS INDEX (BMI) DOCUMENTED: ICD-10-PCS | Mod: CPTII,S$GLB,, | Performed by: ORTHOPAEDIC SURGERY

## 2022-05-20 PROCEDURE — 99024 POSTOP FOLLOW-UP VISIT: CPT | Mod: S$GLB,,, | Performed by: ORTHOPAEDIC SURGERY

## 2022-05-20 NOTE — PROGRESS NOTES
[facility]   Physical Therapy Treatment Note     Name: Stacey Wade  Clinic Number: [id number]    Therapy Diagnosis:   Encounter Diagnosis   Name Primary?    Decreased functional mobility and endurance Yes     Physician: Savannah Lindsey MD    Visit Date: 5/20/2022     Physician Orders: PT Eval and Treat  Medical Diagnosis from Referral: Complex tear of medial meniscus of left knee as current injury, initial encounter  Evaluation Date: 5/18/2022  Authorization Period Expiration: 12/31/2022  Plan of Care Expiration: 8/18/2022  Progress Note Due: 6/18/2022  Visit # / Visits authorized: 1/20 (+1 for evaluation)  FOTO: 1/3      Precautions: Standard, Fall and Weightbearing  PTA Visit #: 0/5     Time In: 9:40 am  Time Out: 10:15 am  Total Billable Time: 40 minutes    SUBJECTIVE     Pt reports: she has 6-7/10 pain today in her knee.  It is very hard for her to get around without putting weight on her left lower extremity while using a walker.  She felt good after last visit and has less tightness.    She was compliant with home exercise program.  Response to previous treatment: Patient had no adverse effects after last visit.  Functional change: Patient had less tightness.    Pain: 7/10  Location: left knee , lower legs and upper legs     OBJECTIVE     Objective Measures updated at progress report unless specified.     GIRTH/EDEMA:     KNEE Mid Patella  (cm) 3 in Supperior (cm) 6 in Inferior (cm)   RIGHT 46.355 39.37 35.56   LEFT 46.356 40.005 40.64         Treatment     Stacey received the treatments listed below:      THERAPEUTIC EXERCISES to develop strength, endurance, ROM, flexibility, posture and core stabilization for (30) minutes including:    Intervention Performed Today    passive range of motion seated left knee flexion to 90 degrees X 5 minutes   Heel prop with towel X 3 minutes, gravity assisted   Quad set X 3 minutes, towel under knee, lifting heel off mat   Straight  leg raise X 3 sets of 10 repetition, left lower extremity, with therapist assist   Supine hip adduction with ball X 3 minutes, legs extended   Supine hip abduction with belt X 3 minutes, legs extended               Plan for Next Visit:        MANUAL THERAPY TECHNIQUES were applied for (10) minutes, including:    Manual Intervention Performed Today    Soft Tissue Mobilization  l   Joint Mobilizations     Mobilization with movement     Patellar mobilizations X Medial and latera, superior and inferior glides   Manual lymph drainage X Lower legs, posterior/anterior knee, upper leg   Functional Dry Needling        Plan for Next Visit:        Stacey received electrical stimulation for neuromuscular re-education for quad strengthening for 15 minutes to left quad with quad set.       Patient Education and Home Exercises     Home Exercises Provided and Patient Education Provided     Education provided:   - Patient educated on home exercise program.     Written Home Exercises Provided: Patient instructed to cont prior HEP. Exercises were reviewed and Stacey was able to demonstrate them prior to the end of the session.  Stacey demonstrated good  understanding of the education provided. See EMR under Patient Instructions for exercises provided during therapy sessions    ASSESSMENT     Patient presents to therapy 10 minutes late.  Patient with increased fatigue and shortness of breath ambulating with rolling walker.  Patient tolerated exercises but did require verbal cues to attend to present task and keep muscle contractions going.  Patient required assistance with straight leg raises secondary to decreased quad strength.  Patient tolerated all other exercises well.     Stacey Is progressing well towards her goals.   Pt prognosis is Good.     Pt will continue to benefit from skilled outpatient physical therapy to address the deficits listed in the problem list box on initial evaluation, provide pt/family education and to  maximize pt's level of independence in the home and community environment.     Pt's spiritual, cultural and educational needs considered and pt agreeable to plan of care and goals.     Anticipated barriers to physical therapy: co-morbidities, sedentary lifestyle, adherence to treatment plan and coping style    Goals: Reviewed 05/20/2022  Short Term Goals: 6 weeks  1. SIGNS & SYMPTOMS: Recent signs and systems trend is improving in order to progress towards LTG's.   2. MOBILITY: Patient will improve AROM to 50% of stated goals, listed in objective measures above, in order to progress towards independence with functional activities.    3. STRENGTH: Patient will improve strength to 50% of stated goals, listed in objective measures above, in order to progress towards independence with functional activities. (make sure strength goals are set)  4. GAIT IMPROVING: Patient will demonstrate improved gait mechanics including symmetrical stance time and step length B in order to improve functional mobility, improve quality of life, and decrease risk of further injury or fall.  (PROGRESSNG 5/20/2022)  5. HEP: Patient will demonstrate independence with HEP in order to progress toward functional independence.            Long Term Goals: 12 weeks   1. PAIN: Pt will report worst pain of 3/10 in order to progress toward max functional ability and improve quality of life  2. FUNCTION: Patient will demonstrate improved function as indicated by a functional limitation score of less than or equal to 60 out of 100 on FOTO.  3. MOBILITY: Patient will improve AROM to stated goals, listed in objective measures above, in order to return to maximal functional potential and improve quality of life.   4. STRENGTH: Patient will improve strength to stated goals, listed in objective measures above, in order to improve functional independence and quality of life.   5. GAIT NORMAL: Patient will demonstrate normalized gait mechanics with minimal  compensation in order to return to PLOF.       PLAN     Continue with current plan of care from 5/18/2022 to 8/18/2022.  Progress patient's exercises and activities per protocol.     Mckayla Melara, PT, DPT

## 2022-05-20 NOTE — PROGRESS NOTES
Patient ID: tSacey Wade  YOB: 1965  MRN: [id number]    Chief Complaint: Post-op Evaluation of the Left Knee      Referred By: Whit Petit MD    History of Present Illness: Stacey Wade is a 56 y.o. 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 She notes nausea and vomiting for 2 days post-op.     HPI    Past Medical History:   Past Medical History:   Diagnosis Date    Allergic rhinitis     Anxiety     Arthritis     Hypertension     Urticaria      Past Surgical History:   Procedure Laterality Date    CHONDROPLASTY OF KNEE Left 5/13/2022    Procedure: CHONDROPLASTY, KNEE;  Surgeon: Wes Faulkner MD;  Location: [facility];  Service: Orthopedics;  Laterality: Left;    COLONOSCOPY N/A 1/18/2018    Procedure: COLONOSCOPY;  Surgeon: Yong Smith MD;  Location: [facility];  Service: Endoscopy;  Laterality: N/A;    HERNIA REPAIR Left      Family History   Problem Relation Age of Onset    Lung cancer Paternal Grandfather     Ovarian cancer Maternal Grandmother     Hyperlipidemia Mother     Hypertension Mother     Breast cancer Mother 60    Arthritis Mother     Lung cancer Father     Breast cancer Maternal Aunt 53    Heart failure Other         Great aunt    Prostate cancer Brother     Brain cancer Sister     Diabetes Neg Hx     Pseudochol deficiency Neg Hx     Malignant hyperthermia Neg Hx      Social History     Socioeconomic History    Marital status: Single   Tobacco Use    Smoking status: Never Smoker    Smokeless tobacco: Never Used   Substance and Sexual Activity    Alcohol use: No    Drug use: No    Sexual activity: Not Currently     Partners: Male     Birth control/protection: None   Social History Narrative    Patient is single has no children  and works as a pharmacy specialist. She cares for her mother, who lives with her.     Medication List with Changes/Refills   Current Medications    ASPIRIN (ECOTRIN) 325 MG EC TABLET    Take 1 tablet (325 mg total) by mouth once daily for 20 days. Complete full 3 weeks.    BETAMETHASONE DIPROPIONATE 0.05 % CREAM    APPLY TOPICALLY 2 TIMES DAILY.    BUTALBITAL-ACETAMINOPHEN-CAFFEINE -40 MG (FIORICET, ESGIC) -40 MG PER TABLET    TAKE ONE TABLET BY MOUTH EVERY 6 HOURS AS NEEDED FOR PAIN OR FOR HEADACHE    CLORAZEPATE (TRANXENE) 3.75 MG TAB    Take 3.75 mg by mouth as needed.    DICLOFENAC SODIUM (VOLTAREN) 1 % GEL    Apply 2 g topically 4 (four) times daily.    DOCUSATE SODIUM (COLACE) 100 MG CAPSULE    Take 1 capsule (100 mg total) by mouth 2 (two) times daily.    DOXEPIN (SINEQUAN) 10 MG CAPSULE    TAKE 2 CAPSULES BY MOUTH 3 TIMES A DAY    EPINEPHRINE (EPIPEN) 0.3 MG/0.3 ML ATIN    Inject 0.3 mg into the muscle daily as needed.    FAMOTIDINE (PEPCID) 40 MG TABLET    Take 20 mg by mouth.    FEXOFENADINE (ALLEGRA) 180 MG TABLET    TAKE ONE TABLET BY MOUTH EVERY DAY    HYDROXYZINE HCL (ATARAX) 25 MG TABLET    TAKE 1 TABLET BY MOUTH FOUR TIMES A DAY AS NEEDED FOR ITCHING    MIRABEGRON (MYRBETRIQ) 25 MG TB24 ER TABLET    Take 1 tablet (25 mg total) by mouth once daily.    MOMETASONE (NASONEX) 50 MCG/ACTUATION NASAL SPRAY    INHALE 2 SPRAYS BY NASAL ROUTE ONCE DAILY    MONTELUKAST (SINGULAIR) 10 MG TABLET    Take 1 tablet (10 mg total) by mouth once daily.    ONDANSETRON (ZOFRAN) 4 MG TABLET    Take 1 tablet (4 mg total) by mouth every 8 (eight) hours as needed for Nausea.    ONDANSETRON (ZOFRAN-ODT) 4 MG TBDL    Take 1 tablet (4 mg total) by mouth every 8 (eight) hours as needed (nausea).    OXYCODONE (ROXICODONE) 5 MG IMMEDIATE RELEASE TABLET    Take 1 tablet (5 mg total) by mouth every 4 (four) hours as needed for Pain (For break through pain).    OXYCODONE-ACETAMINOPHEN (PERCOCET) 5-325 MG PER TABLET     Take 1 tablet by mouth every 4 to 6 hours as needed for pain. May take 1 to 2 tablets every 4 to 6 hours as needed for pain.    POTASSIUM CHLORIDE (MICRO-K) 10 MEQ CPSR    Take 1 capsule (10 mEq total) by mouth once daily.    PREDNISONE (DELTASONE) 20 MG TABLET    Take 2 tablets daily for 3 days, then 1 tablet daily for 3 days, then 1/2 tablet daily for 2 days.    RIZATRIPTAN (MAXALT) 10 MG TABLET    Take 1 tablet (10 mg total) by mouth as needed for Migraine (May repeat in 2 hrs.  No more than 2 tablets in 24 hrs.).    TOLTERODINE (DETROL LA) 4 MG 24 HR CAPSULE    Take 1 capsule (4 mg total) by mouth once daily.    TRIAMTERENE-HYDROCHLOROTHIAZIDE 37.5-25 MG (DYAZIDE) 37.5-25 MG PER CAPSULE    Take 1 capsule by mouth daily as needed.    XOLAIR 150 MG/ML INJECTION         Review of patient's allergies indicates:   Allergen Reactions    Benzalkonium chloride     Black pepper      Other reaction(s): Hives    Chocolate flavor      Other reaction(s): Hives    Citrus and derivatives Hives     LEMON PRODUCTS    Grapefruit Hives     Other reaction(s): Hives    Ibuprofen Swelling    Latex      Other reaction(s): Hives    Lemon balm (casper officinalis) Hives     Anything with lemon in it    Naproxen Swelling    Neomycin-bacitracin-polymyxin      Other reaction(s): Rash    Oats (richard) Hives     Oat foods (oatmeal, etc...)    Vegetable acetoglycerides Hives     Vegetable gums    Wheat containing prod     Wheat flour Hives     ROS    Physical Exam:   Body mass index is 35.48 kg/m².  There were no vitals filed for this visit.   GENERAL: Well appearing, appropriate for stated age, no acute distress.  CARDIOVASCULAR: Pulses regular by peripheral palpation.  PULMONARY: Respirations are even and non-labored.  NEURO: Awake, alert, and oriented x 3.  PSYCH: Mood & affect are appropriate.  HEENT: Head is normocephalic and atraumatic.  Ortho/SPM Exam  Left knee: incisions c/d/i  + effusion  0-30, pain in further  flexion  Calf soft NT  Compartments soft and compressible  Intact EHL, FHL, gastrocsoleus, and tibialis anterior. Sensation intact to light touch in superficial peroneal, deep peroneal, tibial, sural, and saphenous nerve distributions. Foot warm and well perfused with capillary refill of less than 2 seconds and palpable pedal pulses.      Imaging:    X-Ray Knee 1 or 2 View Left  Narrative: EXAMINATION:  XR KNEE 1 OR 2 VIEW LEFT    CLINICAL HISTORY:  intra op;    TECHNIQUE:  Single fluoroscopic view of the left knee is submitted during ongoing surgical procedure.    COMPARISON:  None    FINDINGS:  Single view of the left knee is submitted during ongoing surgical procedure.  No acute abnormality seen on this single image.  Impression: Fluoroscopic view of the left knee for ongoing procedure.    Electronically signed by: Stephen Gonzalez  Date:    05/13/2022  Time:    12:35  SURG [place] Surgery Fluoro Usage  See OP Notes for results.     IMPRESSION: See OP Notes for results.     This procedure was auto-finalized by: Virtual Radiologist      Relevant imaging results reviewed and interpreted by me, discussed with the patient and / or family today.     Other Tests:     Reinforced nonweightbearing restriction  Continue physical therapy.  Okay for range of motion 0-90  Follow-up with patient 4 weeks or sooner if there are problems between now and then    There are no Patient Instructions on file for this visit.  Provider Note/Medical Decision Making:        I discussed worrisome and red flag signs and symptoms with the patient. The patient expressed understanding and agreed to alert me immediately or to go to the emergency room if they experience any of these.    Treatment plan was developed with input from the patient/family, and they expressed understanding and agreement with the plan. All questions were answered today.    Disclaimer: This note was prepared using a voice recognition system and is likely to have sound  alike errors within the text.

## 2022-05-23 ENCOUNTER — OFFICE VISIT (OUTPATIENT)
Dept: ORTHOPEDICS | Facility: CLINIC | Age: 57
End: 2022-05-23
Payer: COMMERCIAL

## 2022-05-23 ENCOUNTER — CLINICAL SUPPORT (OUTPATIENT)
Dept: REHABILITATION | Facility: HOSPITAL | Age: 57
End: 2022-05-23
Payer: COMMERCIAL

## 2022-05-23 VITALS — BODY MASS INDEX: 35.7 KG/M2 | HEIGHT: 62 IN | WEIGHT: 194 LBS

## 2022-05-23 DIAGNOSIS — M17.12 PRIMARY OSTEOARTHRITIS OF LEFT KNEE: ICD-10-CM

## 2022-05-23 DIAGNOSIS — S83.232A COMPLEX TEAR OF MEDIAL MENISCUS OF LEFT KNEE AS CURRENT INJURY, INITIAL ENCOUNTER: ICD-10-CM

## 2022-05-23 DIAGNOSIS — Z98.890 POST-OPERATIVE STATE: Primary | ICD-10-CM

## 2022-05-23 DIAGNOSIS — Z74.09 DECREASED FUNCTIONAL MOBILITY AND ENDURANCE: Primary | ICD-10-CM

## 2022-05-23 PROCEDURE — 1159F PR MEDICATION LIST DOCUMENTED IN MEDICAL RECORD: ICD-10-PCS | Mod: CPTII,S$GLB,, | Performed by: PHYSICIAN ASSISTANT

## 2022-05-23 PROCEDURE — 1160F PR REVIEW ALL MEDS BY PRESCRIBER/CLIN PHARMACIST DOCUMENTED: ICD-10-PCS | Mod: CPTII,S$GLB,, | Performed by: PHYSICIAN ASSISTANT

## 2022-05-23 PROCEDURE — 97110 THERAPEUTIC EXERCISES: CPT

## 2022-05-23 PROCEDURE — 99999 PR PBB SHADOW E&M-EST. PATIENT-LVL IV: ICD-10-PCS | Mod: PBBFAC,,, | Performed by: PHYSICIAN ASSISTANT

## 2022-05-23 PROCEDURE — 99024 POSTOP FOLLOW-UP VISIT: CPT | Mod: S$GLB,,, | Performed by: PHYSICIAN ASSISTANT

## 2022-05-23 PROCEDURE — 97140 MANUAL THERAPY 1/> REGIONS: CPT

## 2022-05-23 PROCEDURE — 97014 ELECTRIC STIMULATION THERAPY: CPT

## 2022-05-23 PROCEDURE — 1159F MED LIST DOCD IN RCRD: CPT | Mod: CPTII,S$GLB,, | Performed by: PHYSICIAN ASSISTANT

## 2022-05-23 PROCEDURE — 99024 PR POST-OP FOLLOW-UP VISIT: ICD-10-PCS | Mod: S$GLB,,, | Performed by: PHYSICIAN ASSISTANT

## 2022-05-23 PROCEDURE — 1160F RVW MEDS BY RX/DR IN RCRD: CPT | Mod: CPTII,S$GLB,, | Performed by: PHYSICIAN ASSISTANT

## 2022-05-23 PROCEDURE — 3008F BODY MASS INDEX DOCD: CPT | Mod: CPTII,S$GLB,, | Performed by: PHYSICIAN ASSISTANT

## 2022-05-23 PROCEDURE — 99999 PR PBB SHADOW E&M-EST. PATIENT-LVL IV: CPT | Mod: PBBFAC,,, | Performed by: PHYSICIAN ASSISTANT

## 2022-05-23 PROCEDURE — 3008F PR BODY MASS INDEX (BMI) DOCUMENTED: ICD-10-PCS | Mod: CPTII,S$GLB,, | Performed by: PHYSICIAN ASSISTANT

## 2022-05-23 NOTE — PROGRESS NOTES
" [facility]   Physical Therapy Treatment Note     Name: Stacey Wade  Clinic Number: [id number]    Therapy Diagnosis:   Encounter Diagnosis   Name Primary?    Decreased functional mobility and endurance Yes     Physician: Savannah Lindsey MD    Visit Date: 5/23/2022     Physician Orders: PT Eval and Treat  Medical Diagnosis from Referral: Complex tear of medial meniscus of left knee as current injury, initial encounter  Evaluation Date: 5/18/2022  Authorization Period Expiration: 12/31/2022  Plan of Care Expiration: 8/18/2022  Progress Note Due: 6/18/2022  Visit # / Visits authorized: 1/20 (+1 for evaluation)  FOTO: 1/3      Precautions: Standard, Fall and Weightbearing  PTA Visit #: 0/5     Time In: 8:53 am  Time Out: 10:05 am  Total Billable Time: 38 minutes    SUBJECTIVE     Pt reports: she sees the doctor again in 4 weeks.  She states the brace was placed "wrong" during her visit last week. Patient was unaware that she could adjust her knee brace.  Patient has 7-8/10 knee pain.      She was compliant with home exercise program.  Response to previous treatment: Patient had no adverse effects after last visit.  Functional change: No changes noted as of yet.     Pain: 7-8/10  Location: left knee , lower legs and upper legs     OBJECTIVE     Objective Measures updated at progress report unless specified.       Treatment     Stacey received the treatments listed below:      THERAPEUTIC EXERCISES to develop strength, endurance, ROM, flexibility, posture and core stabilization for (30) minutes including:    Intervention Performed Today    passive range of motion seated left knee flexion to 90 degrees X 5 minutes   Heel prop with towel X 3 minutes, gravity assisted   Quad set X 3 minutes, towel under knee, lifting heel off mat   Straight leg raise X 3 sets of 10 repetition, left lower extremity, with therapist assist   Supine hip adduction with ball X 3 minutes, legs extended "   Supine hip abduction with belt X 3 minutes, legs extended               Plan for Next Visit:        MANUAL THERAPY TECHNIQUES were applied for (10) minutes, including:    Manual Intervention Performed Today    Soft Tissue Mobilization  l   Joint Mobilizations     Mobilization with movement     Patellar mobilizations X Medial and latera, superior and inferior glides   Manual lymph drainage X Lower legs, posterior/anterior knee, upper leg   Functional Dry Needling        Plan for Next Visit:        Stacey received electrical stimulation for neuromuscular re-education for quad strengthening for 20 minutes to left quad with quad set.       Patient Education and Home Exercises     Home Exercises Provided and Patient Education Provided     Education provided:   - Patient educated on how to don and doff locked knee brace as well as stockingette for sweling.     Written Home Exercises Provided: Patient instructed to cont prior HEP. Exercises were reviewed and Stacey was able to demonstrate them prior to the end of the session.  Stacey demonstrated good  understanding of the education provided. See EMR under Patient Instructions for exercises provided during therapy sessions    ASSESSMENT     Patient presents to therapy 8 minutes late secondary to transportation.  Patient with increased swelling noted in lower part of leg.  Patient educated on how to don and doff locked knee brace to make sure that it is fitting appropriately.  Patient also educated on importance of elevating left lower extremity and wearing garments for compression.  Patient demonstrate good understanding.  Patient demonstrated increased stiffness with knee flexion most likely due to increased swelling.  Patient with improved motor control with straight leg raises.  Patient required verbal cues for safety with using upper extremity on surface she is sitting on instead of pulling up on rolling walker.     Stacey Is progressing well towards her goals.   Pt  prognosis is Good.     Pt will continue to benefit from skilled outpatient physical therapy to address the deficits listed in the problem list box on initial evaluation, provide pt/family education and to maximize pt's level of independence in the home and community environment.     Pt's spiritual, cultural and educational needs considered and pt agreeable to plan of care and goals.     Anticipated barriers to physical therapy: co-morbidities, sedentary lifestyle, adherence to treatment plan and coping style    Goals: Reviewed 05/23/2022  Short Term Goals: 6 weeks  1. SIGNS & SYMPTOMS: Recent signs and systems trend is improving in order to progress towards LTG's.   2. MOBILITY: Patient will improve AROM to 50% of stated goals, listed in objective measures above, in order to progress towards independence with functional activities.    3. STRENGTH: Patient will improve strength to 50% of stated goals, listed in objective measures above, in order to progress towards independence with functional activities. (make sure strength goals are set)  4. GAIT IMPROVING: Patient will demonstrate improved gait mechanics including symmetrical stance time and step length B in order to improve functional mobility, improve quality of life, and decrease risk of further injury or fall.  (PROGRESSNG 5/20/2022)  5. HEP: Patient will demonstrate independence with HEP in order to progress toward functional independence.            Long Term Goals: 12 weeks   1. PAIN: Pt will report worst pain of 3/10 in order to progress toward max functional ability and improve quality of life  2. FUNCTION: Patient will demonstrate improved function as indicated by a functional limitation score of less than or equal to 60 out of 100 on FOTO.  3. MOBILITY: Patient will improve AROM to stated goals, listed in objective measures above, in order to return to maximal functional potential and improve quality of life.   4. STRENGTH: Patient will improve strength  to stated goals, listed in objective measures above, in order to improve functional independence and quality of life.   5. GAIT NORMAL: Patient will demonstrate normalized gait mechanics with minimal compensation in order to return to PLOF.       PLAN     Continue with current plan of care from 5/18/2022 to 8/18/2022.  Progress patient's exercises and activities per protocol.     Mckayla Melara, PT, DPT

## 2022-05-23 NOTE — PROGRESS NOTES
Patient ID: Stacey Wade  YOB: 1965  MRN: [id number]    Chief Complaint: Post-op Evaluation of the Left Knee      Referred By: Dr. Whit Petit     History of Present Illness: Stacey Wade is a 56 y.o. 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 She states that there have been no changes since her last visit on 5/20/2022.     HPI    Past Medical History:   Past Medical History:   Diagnosis Date    Allergic rhinitis     Anxiety     Arthritis     Hypertension     Urticaria      Past Surgical History:   Procedure Laterality Date    CHONDROPLASTY OF KNEE Left 5/13/2022    Procedure: CHONDROPLASTY, KNEE;  Surgeon: Wes Faulkner MD;  Location: [facility];  Service: Orthopedics;  Laterality: Left;    COLONOSCOPY N/A 1/18/2018    Procedure: COLONOSCOPY;  Surgeon: Yong Smith MD;  Location: [facility];  Service: Endoscopy;  Laterality: N/A;    HERNIA REPAIR Left      Family History   Problem Relation Age of Onset    Lung cancer Paternal Grandfather     Ovarian cancer Maternal Grandmother     Hyperlipidemia Mother     Hypertension Mother     Breast cancer Mother 60    Arthritis Mother     Lung cancer Father     Breast cancer Maternal Aunt 53    Heart failure Other         Great aunt    Prostate cancer Brother     Brain cancer Sister     Diabetes Neg Hx     Pseudochol deficiency Neg Hx     Malignant hyperthermia Neg Hx      Social History     Socioeconomic History    Marital status: Single   Tobacco Use    Smoking status: Never Smoker    Smokeless tobacco: Never Used   Substance and Sexual Activity    Alcohol use: No    Drug use: No    Sexual activity: Not Currently     Partners: Male     Birth control/protection: None   Social History Narrative     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     Medication List with Changes/Refills   Current Medications    ASPIRIN (ECOTRIN) 325 MG EC TABLET    Take 1 tablet (325 mg total) by mouth once daily for 20 days. Complete full 3 weeks.    BETAMETHASONE DIPROPIONATE 0.05 % CREAM    APPLY TOPICALLY 2 TIMES DAILY.    BUTALBITAL-ACETAMINOPHEN-CAFFEINE -40 MG (FIORICET, ESGIC) -40 MG PER TABLET    TAKE ONE TABLET BY MOUTH EVERY 6 HOURS AS NEEDED FOR PAIN OR FOR HEADACHE    CLORAZEPATE (TRANXENE) 3.75 MG TAB    Take 3.75 mg by mouth as needed.    DICLOFENAC SODIUM (VOLTAREN) 1 % GEL    Apply 2 g topically 4 (four) times daily.    DOCUSATE SODIUM (COLACE) 100 MG CAPSULE    Take 1 capsule (100 mg total) by mouth 2 (two) times daily.    DOXEPIN (SINEQUAN) 10 MG CAPSULE    TAKE 2 CAPSULES BY MOUTH 3 TIMES A DAY    EPINEPHRINE (EPIPEN) 0.3 MG/0.3 ML ATIN    Inject 0.3 mg into the muscle daily as needed.    FAMOTIDINE (PEPCID) 40 MG TABLET    Take 20 mg by mouth.    FEXOFENADINE (ALLEGRA) 180 MG TABLET    TAKE ONE TABLET BY MOUTH EVERY DAY    HYDROXYZINE HCL (ATARAX) 25 MG TABLET    TAKE 1 TABLET BY MOUTH FOUR TIMES A DAY AS NEEDED FOR ITCHING    MIRABEGRON (MYRBETRIQ) 25 MG TB24 ER TABLET    Take 1 tablet (25 mg total) by mouth once daily.    MOMETASONE (NASONEX) 50 MCG/ACTUATION NASAL SPRAY    INHALE 2 SPRAYS BY NASAL ROUTE ONCE DAILY    MONTELUKAST (SINGULAIR) 10 MG TABLET    Take 1 tablet (10 mg total) by mouth once daily.    ONDANSETRON (ZOFRAN) 4 MG TABLET    Take 1 tablet (4 mg total) by mouth every 8 (eight) hours as needed for Nausea.    ONDANSETRON (ZOFRAN-ODT) 4 MG TBDL    Take 1 tablet (4 mg total) by mouth every 8 (eight) hours as needed (nausea).    OXYCODONE (ROXICODONE) 5 MG IMMEDIATE RELEASE TABLET    Take 1 tablet (5 mg total) by mouth every 4 (four) hours as needed for Pain (For break through pain).    OXYCODONE-ACETAMINOPHEN  (PERCOCET) 5-325 MG PER TABLET    Take 1 tablet by mouth every 4 to 6 hours as needed for pain. May take 1 to 2 tablets every 4 to 6 hours as needed for pain.    POTASSIUM CHLORIDE (MICRO-K) 10 MEQ CPSR    Take 1 capsule (10 mEq total) by mouth once daily.    PREDNISONE (DELTASONE) 20 MG TABLET    Take 2 tablets daily for 3 days, then 1 tablet daily for 3 days, then 1/2 tablet daily for 2 days.    RIZATRIPTAN (MAXALT) 10 MG TABLET    Take 1 tablet (10 mg total) by mouth as needed for Migraine (May repeat in 2 hrs.  No more than 2 tablets in 24 hrs.).    TOLTERODINE (DETROL LA) 4 MG 24 HR CAPSULE    Take 1 capsule (4 mg total) by mouth once daily.    TRIAMTERENE-HYDROCHLOROTHIAZIDE 37.5-25 MG (DYAZIDE) 37.5-25 MG PER CAPSULE    Take 1 capsule by mouth daily as needed.    XOLAIR 150 MG/ML INJECTION         Review of patient's allergies indicates:   Allergen Reactions    Benzalkonium chloride     Black pepper      Other reaction(s): Hives    Chocolate flavor      Other reaction(s): Hives    Citrus and derivatives Hives     LEMON PRODUCTS    Grapefruit Hives     Other reaction(s): Hives    Ibuprofen Swelling    Latex      Other reaction(s): Hives    Lemon balm (casper officinalis) Hives     Anything with lemon in it    Naproxen Swelling    Neomycin-bacitracin-polymyxin      Other reaction(s): Rash    Oats (richard) Hives     Oat foods (oatmeal, etc...)    Vegetable acetoglycerides Hives     Vegetable gums    Wheat containing prod     Wheat flour Hives     Review of Systems   Constitutional: Negative for chills and fever.   HENT: Negative for sore throat.    Eyes: Negative for pain.   Cardiovascular: Negative for chest pain and leg swelling.   Respiratory: Negative for cough and shortness of breath.    Skin: Negative for itching and rash.   Musculoskeletal: Positive for joint pain and joint swelling.   Gastrointestinal: Negative for abdominal pain, nausea and vomiting.   Genitourinary: Negative for dysuria.    Neurological: Negative for dizziness, numbness and paresthesias.       Physical Exam:   Body mass index is 35.48 kg/m².  There were no vitals filed for this visit.   GENERAL: Well appearing, appropriate for stated age, no acute distress.  CARDIOVASCULAR: Pulses regular by peripheral palpation.  PULMONARY: Respirations are even and non-labored.  NEURO: Awake, alert, and oriented x 3.  PSYCH: Mood & affect are appropriate.  HEENT: Head is normocephalic and atraumatic.  General    Nursing note and vitals reviewed.            Left Knee Exam   Left knee exam is normal.    Inspection   Scars: present  Effusion: present    Range of Motion   Extension: 0   Flexion: 90     Other   Sensation: normal    Comments:  Sutures removed   No s/s of infection  Healing no   No active drainage   No active bleeding    Muscle Strength   Left Lower Extremity   Hip Abduction: 5/5   Quadriceps:  4/5   Hamstrin/5     Vascular Exam       Left Pulses  Dorsalis Pedis:      2+  Posterior Tibial:      2+            Imaging:    X-Ray Knee 1 or 2 View Left  Narrative: EXAMINATION:  XR KNEE 1 OR 2 VIEW LEFT    CLINICAL HISTORY:  intra op;    TECHNIQUE:  Single fluoroscopic view of the left knee is submitted during ongoing surgical procedure.    COMPARISON:  None    FINDINGS:  Single view of the left knee is submitted during ongoing surgical procedure.  No acute abnormality seen on this single image.  Impression: Fluoroscopic view of the left knee for ongoing procedure.    Electronically signed by: Stephen Gonzalez  Date:    2022  Time:    12:35  SURG FL Surgery Fluoro Usage  See OP Notes for results.     IMPRESSION: See OP Notes for results.     This procedure was auto-finalized by: Virtual Radiologist      Relevant imaging results reviewed and interpreted by me, discussed with the patient and / or family today.     Other Tests:         Patient Instructions   Assessment:  Stacey Wade is a 56 y.o. 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    Post-op    No diagnosis found.     Plan:   Sutures removed   · Meniscus repair protocol  · Weight bearing:  Nonweightbearing for 6 weeks  · Range of motion:  0-90 for 4 weeks  · Continue 1 more week of aspirin  · Pictures reviewed at last visit.   · Follow up in 4 weeks with Dr. Wes Faulkner     Follow-up: 4 weeks or sooner if there are any problems between now and then.    Thank you for choosing [facility] and Dr. Wes Faulkner for your orthopedic & sports medicine care. It is our goal to provide you with exceptional care that will help keep you healthy, active, and get you back in the game.    If you felt that you received exemplary care today, please consider leaving us feedback on [employer] at [email or web address].     Please do not hesitate to reach out to us via email, phone, or MyChart with any questions, concerns, or feedback.    If you are experiencing pain/discomfort ,or have questions after 5pm and would like to be connected to the [facility] on-call team, please call this number and specify which Sports Medicine provider is treating you: [phone]        Provider Note/Medical Decision Making:        I discussed worrisome and red flag signs and symptoms with the patient. The patient expressed understanding and agreed to alert me immediately or to go to the emergency room if they experience any of these.    Treatment plan was developed with input from the patient/family, and they expressed understanding and agreement with the plan. All questions were answered today.    Disclaimer: This note was prepared using a voice recognition system and is likely to have sound alike errors within the text.

## 2022-05-23 NOTE — PATIENT INSTRUCTIONS
Assessment:  Stacey Wade is a 56 y.o. 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   Post-op    Encounter Diagnoses   Name Primary?    Post-operative state Yes    Complex tear of medial meniscus of left knee as current injury, initial encounter     Primary osteoarthritis of left knee         Plan:  Sutures removed   Meniscus repair protocol  Weight bearing:  Nonweightbearing for 6 weeks  Range of motion:  0-90 for 4 weeks  Continue 1 more week of aspirin  Pictures reviewed at last visit.   Follow up in 4 weeks with Dr. Wes Faulkner     Follow-up: 4 weeks or sooner if there are any problems between now and then.    Thank you for choosing [facility] and Dr. Wes Faulkner for your orthopedic & sports medicine care. It is our goal to provide you with exceptional care that will help keep you healthy, active, and get you back in the game.    If you felt that you received exemplary care today, please consider leaving us feedback on [employer] at [email or web address].     Please do not hesitate to reach out to us via email, phone, or MyChart with any questions, concerns, or feedback.    If you are experiencing pain/discomfort ,or have questions after 5pm and would like to be connected to the [facility] on-call team, please call this number and specify which Sports Medicine provider is treating you: [phone]

## 2022-05-27 ENCOUNTER — PATIENT MESSAGE (OUTPATIENT)
Dept: REHABILITATION | Facility: HOSPITAL | Age: 57
End: 2022-05-27
Payer: COMMERCIAL

## 2022-05-27 ENCOUNTER — PATIENT MESSAGE (OUTPATIENT)
Dept: ORTHOPEDICS | Facility: CLINIC | Age: 57
End: 2022-05-27
Payer: COMMERCIAL

## 2022-05-28 ENCOUNTER — PATIENT MESSAGE (OUTPATIENT)
Dept: ORTHOPEDICS | Facility: CLINIC | Age: 57
End: 2022-05-28
Payer: COMMERCIAL

## 2022-05-31 ENCOUNTER — CLINICAL SUPPORT (OUTPATIENT)
Dept: REHABILITATION | Facility: HOSPITAL | Age: 57
End: 2022-05-31
Payer: COMMERCIAL

## 2022-05-31 DIAGNOSIS — Z74.09 DECREASED FUNCTIONAL MOBILITY AND ENDURANCE: Primary | ICD-10-CM

## 2022-05-31 PROCEDURE — 97140 MANUAL THERAPY 1/> REGIONS: CPT

## 2022-05-31 PROCEDURE — 97110 THERAPEUTIC EXERCISES: CPT

## 2022-05-31 NOTE — PROGRESS NOTES
[facility]   Physical Therapy Treatment Note     Name: Stacey Wade  Clinic Number: [id number]    Therapy Diagnosis:   Encounter Diagnosis   Name Primary?    Decreased functional mobility and endurance Yes     Physician: Savannah Lindsey MD    Visit Date: 5/31/2022     Physician Orders: PT Eval and Treat  Medical Diagnosis from Referral: Complex tear of medial meniscus of left knee as current injury, initial encounter  Evaluation Date: 5/18/2022  Authorization Period Expiration: 12/31/2022  Plan of Care Expiration: 8/18/2022  Progress Note Due: 6/18/2022  Visit # / Visits authorized: 3/20 (+1 for evaluation)  FOTO: 1/3      Precautions: Standard, Fall and Weightbearing  PTA Visit #: 0/5     Time In: 11:45 am  Time Out: 12:30 pm  Total Billable Time: 38 minutes    SUBJECTIVE     Pt reports: she has 7/10 pain in her knee.  She had to stop taking her prescription pain medicine because it was making her constipated.  She has started taking tylenol arthritis.  She has been trying to do her exercises at home.  She states she has been doing the exercises at home.  She admits to not getting up too frequently because it is so hard on her shoulders and they are beginning to hurt.      She was compliant with home exercise program.  Response to previous treatment: Patient had a little soreness.  Functional change: Patient feels she is getting stronger.     Pain: 7/10  Location: left knee , lower legs and upper legs     OBJECTIVE     Objective Measures updated at progress report unless specified.       Treatment     Stacey received the treatments listed below:      THERAPEUTIC EXERCISES to develop strength, endurance, ROM, flexibility, posture and core stabilization for (30) minutes including:    Intervention Performed Today    passive range of motion seated left knee flexion to 90 degrees X 5 minutes   Heel prop with towel X 3 minutes, gravity assisted   Quad set X 3 minutes, towel under  knee, lifting heel off mat   Straight leg raise X 3 sets of 10 repetition, left lower extremity, with therapist assist   Supine hip adduction with ball  3 minutes, legs extended   Supine hip abduction with belt  3 minutes, legs extended   Side lying hip abduction X 3 sets of 10 repetition with therapist assist   Prone hip extension X 3 sets of 10 repetition with therapist assist   Side lying hip adduction X 3 sets of 10 repetition with therapist assist    Sit to stands X 1 set of 3 repetition with upper extremity assist   Fitting of Rolling walker X Patient's rolling walker too high.      Plan for Next Visit:        MANUAL THERAPY TECHNIQUES were applied for (10) minutes, including:    Manual Intervention Performed Today    Soft Tissue Mobilization  l   Joint Mobilizations     Mobilization with movement     Patellar mobilizations X Medial and latera, superior and inferior glides   Manual lymph drainage X Lower legs, posterior/anterior knee, upper leg   Functional Dry Needling        Plan for Next Visit:        Stacey received electrical stimulation for neuromuscular re-education for quad strengthening for 0 minutes to left quad with quad set.       Patient Education and Home Exercises     Home Exercises Provided and Patient Education Provided     Education provided:   - Patient educated on how to don and doff locked knee brace as well as stockingette for sweling.     Written Home Exercises Provided: Patient instructed to cont prior HEP. Exercises were reviewed and Stacey was able to demonstrate them prior to the end of the session.  Stacey demonstrated good  understanding of the education provided. See EMR under Patient Instructions for exercises provided during therapy sessions    ASSESSMENT     Patient presents to therapy again with knee brace locked in extension but not in proper positioning.  Patient educated again on proper positioning of brace, compression garments as well as performing ankle pumps and  swelling prevention.  Patient also educated on safety with proper sit to stands and hand placement. Patient with improved gait pattern noted at end of therapy session secondary to lower walker and improved muscle activation.     Stacey Is progressing well towards her goals.   Pt prognosis is Good.     Pt will continue to benefit from skilled outpatient physical therapy to address the deficits listed in the problem list box on initial evaluation, provide pt/family education and to maximize pt's level of independence in the home and community environment.     Pt's spiritual, cultural and educational needs considered and pt agreeable to plan of care and goals.     Anticipated barriers to physical therapy: co-morbidities, sedentary lifestyle, adherence to treatment plan and coping style    Goals: Reviewed 05/31/2022  Short Term Goals: 6 weeks  1. SIGNS & SYMPTOMS: Recent signs and systems trend is improving in order to progress towards LTG's.   2. MOBILITY: Patient will improve AROM to 50% of stated goals, listed in objective measures above, in order to progress towards independence with functional activities.    3. STRENGTH: Patient will improve strength to 50% of stated goals, listed in objective measures above, in order to progress towards independence with functional activities. (make sure strength goals are set)  4. GAIT IMPROVING: Patient will demonstrate improved gait mechanics including symmetrical stance time and step length B in order to improve functional mobility, improve quality of life, and decrease risk of further injury or fall.  (PROGRESSNG 5/20/2022)  5. HEP: Patient will demonstrate independence with HEP in order to progress toward functional independence.            Long Term Goals: 12 weeks   1. PAIN: Pt will report worst pain of 3/10 in order to progress toward max functional ability and improve quality of life  2. FUNCTION: Patient will demonstrate improved function as indicated by a functional  limitation score of less than or equal to 60 out of 100 on FOTO.  3. MOBILITY: Patient will improve AROM to stated goals, listed in objective measures above, in order to return to maximal functional potential and improve quality of life.   4. STRENGTH: Patient will improve strength to stated goals, listed in objective measures above, in order to improve functional independence and quality of life.   5. GAIT NORMAL: Patient will demonstrate normalized gait mechanics with minimal compensation in order to return to PLOF.       PLAN     Continue with current plan of care from 5/18/2022 to 8/18/2022.  Progress patient's exercises and activities per protocol.     Mckayla Melara, PT, DPT

## 2022-06-02 ENCOUNTER — CLINICAL SUPPORT (OUTPATIENT)
Dept: REHABILITATION | Facility: HOSPITAL | Age: 57
End: 2022-06-02
Payer: COMMERCIAL

## 2022-06-02 DIAGNOSIS — Z74.09 DECREASED FUNCTIONAL MOBILITY AND ENDURANCE: Primary | ICD-10-CM

## 2022-06-02 PROCEDURE — 97110 THERAPEUTIC EXERCISES: CPT

## 2022-06-02 PROCEDURE — 97140 MANUAL THERAPY 1/> REGIONS: CPT

## 2022-06-02 NOTE — PROGRESS NOTES
[facility]   Physical Therapy Treatment Note     Name: Stacey Wade  Clinic Number: [id number]    Therapy Diagnosis:   Encounter Diagnosis   Name Primary?    Decreased functional mobility and endurance Yes     Physician: Savannah Lindsey MD    Visit Date: 6/2/2022     Physician Orders: PT Eval and Treat  Medical Diagnosis from Referral: Complex tear of medial meniscus of left knee as current injury, initial encounter  Evaluation Date: 5/18/2022  Authorization Period Expiration: 12/31/2022  Plan of Care Expiration: 8/18/2022  Progress Note Due: 6/18/2022  Visit # / Visits authorized: 4/20 (+1 for evaluation)  FOTO: 1/3      Precautions: Standard, Fall and Weightbearing  PTA Visit #: 0/5     Time In: 1:00 pm  Time Out: 1:45 pm  Total Billable Time: 38 minutes    SUBJECTIVE     Pt reports: she has 5/10 pain in left knee.  She didn't sleep too well last night.  Her left knee was throbbing the last two nights.  She states she had decreased pain after last visit.      She was compliant with home exercise program.  Response to previous treatment: Patient had less pain after last visit.   Functional change: Patient is walking better secondary to having a properly fitting walker.     Pain: 5/10  Location: left knee , lower legs and upper legs     OBJECTIVE     Objective Measures updated at progress report unless specified.       Treatment     Stacey received the treatments listed below:      THERAPEUTIC EXERCISES to develop strength, endurance, ROM, flexibility, posture and core stabilization for (38) minutes including:    Intervention Performed Today    passive range of motion seated left knee flexion to 90 degrees X 5 minutes   Heel prop with towel  3 minutes, gravity assisted   Quad set X 3 minutes, towel under knee, lifting heel off mat   Straight leg raise X 3 sets of 10 repetition, left lower extremity, with therapist assist   Supine hip adduction with ball X 3 minutes, legs  extended   Supine hip abduction with belt X 3 minutes, legs extended   Side lying hip abduction X 3 sets of 10 repetition with therapist assist   Prone hip extension X 3 sets of 10 repetition with therapist assist   Side lying hip adduction X 3 sets of 10 repetition with therapist assist    Sit to stands  1 set of 3 repetition with upper extremity assist   Fitting of Rolling walker  Patient's rolling walker too high.    Heel slides X 3 minutes, 2 straps, slide board with towel     Plan for Next Visit:        MANUAL THERAPY TECHNIQUES were applied for (0) minutes, including:    Manual Intervention Performed Today    Soft Tissue Mobilization  l   Joint Mobilizations     Mobilization with movement     Patellar mobilizations  Medial and latera, superior and inferior glides   Manual lymph drainage  Lower legs, posterior/anterior knee, upper leg   Functional Dry Needling        Plan for Next Visit:        Stacey received electrical stimulation for neuromuscular re-education for quad strengthening for 0 minutes to left quad with quad set.       Patient Education and Home Exercises     Home Exercises Provided and Patient Education Provided     Education provided:   - Patient educated once again on proper wearing of locked hinged knee brace.     Written Home Exercises Provided: Patient instructed to cont prior HEP. Exercises were reviewed and Stacey was able to demonstrate them prior to the end of the session.  Stacey demonstrated good  understanding of the education provided. See EMR under Patient Instructions for exercises provided during therapy sessions    ASSESSMENT     Patient re-educated on importance of properly donning locked knee hinged brace and not keeping ice sleeve underneath brace.  Patient tolerated all exercises today but did have some difficulty and discomfort with heel slides.  Overall patient's strength is progressing and patient requires less assistance with leg raises in all directions.        Stacey Is  progressing well towards her goals.   Pt prognosis is Good.     Pt will continue to benefit from skilled outpatient physical therapy to address the deficits listed in the problem list box on initial evaluation, provide pt/family education and to maximize pt's level of independence in the home and community environment.     Pt's spiritual, cultural and educational needs considered and pt agreeable to plan of care and goals.     Anticipated barriers to physical therapy: co-morbidities, sedentary lifestyle, adherence to treatment plan and coping style    Goals: Reviewed 06/02/2022  Short Term Goals: 6 weeks  1. SIGNS & SYMPTOMS: Recent signs and systems trend is improving in order to progress towards LTG's.   2. MOBILITY: Patient will improve AROM to 50% of stated goals, listed in objective measures above, in order to progress towards independence with functional activities.    3. STRENGTH: Patient will improve strength to 50% of stated goals, listed in objective measures above, in order to progress towards independence with functional activities. (make sure strength goals are set)  4. GAIT IMPROVING: Patient will demonstrate improved gait mechanics including symmetrical stance time and step length B in order to improve functional mobility, improve quality of life, and decrease risk of further injury or fall.  (PROGRESSNG 5/20/2022)  5. HEP: Patient will demonstrate independence with HEP in order to progress toward functional independence.            Long Term Goals: 12 weeks   1. PAIN: Pt will report worst pain of 3/10 in order to progress toward max functional ability and improve quality of life  2. FUNCTION: Patient will demonstrate improved function as indicated by a functional limitation score of less than or equal to 60 out of 100 on FOTO.  3. MOBILITY: Patient will improve AROM to stated goals, listed in objective measures above, in order to return to maximal functional potential and improve quality of life.    4. STRENGTH: Patient will improve strength to stated goals, listed in objective measures above, in order to improve functional independence and quality of life.   5. GAIT NORMAL: Patient will demonstrate normalized gait mechanics with minimal compensation in order to return to PLOF.       PLAN     Continue with current plan of care from 5/18/2022 to 8/18/2022.  Progress patient's exercises and activities per protocol.     Mckayla Melara, PT, DPT

## 2022-06-07 ENCOUNTER — CLINICAL SUPPORT (OUTPATIENT)
Dept: REHABILITATION | Facility: HOSPITAL | Age: 57
End: 2022-06-07
Payer: COMMERCIAL

## 2022-06-07 DIAGNOSIS — Z74.09 DECREASED FUNCTIONAL MOBILITY AND ENDURANCE: Primary | ICD-10-CM

## 2022-06-07 PROCEDURE — 97110 THERAPEUTIC EXERCISES: CPT

## 2022-06-07 PROCEDURE — 97140 MANUAL THERAPY 1/> REGIONS: CPT

## 2022-06-07 NOTE — PROGRESS NOTES
[facility]   Physical Therapy Treatment Note     Name: Stacey Wade  Clinic Number: [id number]    Therapy Diagnosis:   Encounter Diagnosis   Name Primary?    Decreased functional mobility and endurance Yes     Physician: Savannah Lindsey MD    Visit Date: 6/7/2022     Physician Orders: PT Eval and Treat  Medical Diagnosis from Referral: Complex tear of medial meniscus of left knee as current injury, initial encounter  Evaluation Date: 5/18/2022  Authorization Period Expiration: 12/31/2022  Plan of Care Expiration: 8/18/2022  Progress Note Due: 6/18/2022  Visit # / Visits authorized: 5/20 (+1 for evaluation)  FOTO: 1/3      Precautions: Standard, Fall and Weightbearing  PTA Visit #: 0/5     Time In: 0805  Time Out: 0900  Total Billable Time: 46 minutes    SUBJECTIVE     Pt reports: continues to note fluctuations in knee pain . Is concerned about compression sleeve rolling down and therefore wrapped the strap of her brace through the sleeve to hold it up     She was compliant with home exercise program.  Response to previous treatment: soreness and fatigue following visit   Functional change: improved quadriceps strength with absence of quadriceps lag noted during straight leg raise     Pain: 5/10  Location: left knee , lower legs and upper legs     OBJECTIVE     Objective Measures updated at progress report unless specified.       Treatment     Stacey received the treatments listed below:      THERAPEUTIC EXERCISES to develop strength, endurance, ROM, flexibility, posture and core stabilization for (26) minutes including:    Intervention Performed Today    passive range of motion seated left knee flexion to 90 degrees X 10 minutes   Heel prop with towel  3 minutes, gravity assisted   Quad set + NMES X 3 minutes   SAQ + NMES x 3 minutes    LAQ + NMES  x 3 minutes    Straight leg raise + NMES  X 3 minutes    Supine hip adduction with ball  3 minutes, legs extended   Supine hip  abduction with belt  3 minutes, legs extended   Side lying hip abduction  3 sets of 10 repetition with therapist assist   Prone hip extension  3 sets of 10 repetition with therapist assist   Side lying hip adduction  3 sets of 10 repetition with therapist assist    Sit to stands  1 set of 3 repetition with upper extremity assist   Fitting of Rolling walker  Patient's rolling walker too high.    Heel slides X 4 minutes with 5s holds, 2 straps, slide board with towel     Plan for Next Visit:        MANUAL THERAPY TECHNIQUES were applied for (15) minutes, including:    Manual Intervention Performed Today    Soft Tissue Mobilization x L anterior knee scar mobilizations    Joint Mobilizations x Patellar mobilizations in all directions    Mobilization with movement     Patellar mobilizations  Medial and latera, superior and inferior glides   Manual lymph drainage  Lower legs, posterior/anterior knee, upper leg   Functional Dry Needling        Plan for Next Visit:        Stacey received electrical stimulation for neuromuscular re-education for quad strengthening for 0 minutes to left quad with quad set.       Patient Education and Home Exercises     Home Exercises Provided and Patient Education Provided     Education provided:   - Patient educated once again on proper wearing of locked hinged knee brace.     Written Home Exercises Provided: Patient instructed to cont prior HEP. Exercises were reviewed and Stacey was able to demonstrate them prior to the end of the session.  Stacey demonstrated good  understanding of the education provided. See EMR under Patient Instructions for exercises provided during therapy sessions    ASSESSMENT     Patient tolerated session well today. She demonstrates good knee extension ROM but continues to lack knee flexion ROM and is unable to obtain 90 degrees as indicated by post-operative protocol. Improved quadriceps strength noted by absence of quadriceps lag with straight leg raise flexion.  Steri-strips were removed today and scar mobilizations were initiated with instructions for patient to continue intervention independently as part of HEP       Stacey Is progressing well towards her goals.   Pt prognosis is Good.     Pt will continue to benefit from skilled outpatient physical therapy to address the deficits listed in the problem list box on initial evaluation, provide pt/family education and to maximize pt's level of independence in the home and community environment.     Pt's spiritual, cultural and educational needs considered and pt agreeable to plan of care and goals.     Anticipated barriers to physical therapy: co-morbidities, sedentary lifestyle, adherence to treatment plan and coping style    Goals: Reviewed 06/07/2022  Short Term Goals: 6 weeks  1. SIGNS & SYMPTOMS: Recent signs and systems trend is improving in order to progress towards LTG's.   2. MOBILITY: Patient will improve AROM to 50% of stated goals, listed in objective measures above, in order to progress towards independence with functional activities.    3. STRENGTH: Patient will improve strength to 50% of stated goals, listed in objective measures above, in order to progress towards independence with functional activities. (make sure strength goals are set)  4. GAIT IMPROVING: Patient will demonstrate improved gait mechanics including symmetrical stance time and step length B in order to improve functional mobility, improve quality of life, and decrease risk of further injury or fall.  (PROGRESSNG 5/20/2022)  5. HEP: Patient will demonstrate independence with HEP in order to progress toward functional independence.            Long Term Goals: 12 weeks   1. PAIN: Pt will report worst pain of 3/10 in order to progress toward max functional ability and improve quality of life  2. FUNCTION: Patient will demonstrate improved function as indicated by a functional limitation score of less than or equal to 60 out of 100 on  FOTO.  3. MOBILITY: Patient will improve AROM to stated goals, listed in objective measures above, in order to return to maximal functional potential and improve quality of life.   4. STRENGTH: Patient will improve strength to stated goals, listed in objective measures above, in order to improve functional independence and quality of life.   5. GAIT NORMAL: Patient will demonstrate normalized gait mechanics with minimal compensation in order to return to PLOF.       PLAN     Continue with current plan of care from 5/18/2022 to 8/18/2022.  Progress patient's exercises and activities per protocol.     Bell Mullen, PT, DPT

## 2022-06-09 ENCOUNTER — CLINICAL SUPPORT (OUTPATIENT)
Dept: REHABILITATION | Facility: HOSPITAL | Age: 57
End: 2022-06-09
Payer: COMMERCIAL

## 2022-06-09 DIAGNOSIS — Z74.09 DECREASED FUNCTIONAL MOBILITY AND ENDURANCE: Primary | ICD-10-CM

## 2022-06-09 PROCEDURE — 97110 THERAPEUTIC EXERCISES: CPT

## 2022-06-09 PROCEDURE — 97140 MANUAL THERAPY 1/> REGIONS: CPT

## 2022-06-09 NOTE — PROGRESS NOTES
[facility]   Physical Therapy Treatment Note     Name: Stacey Wade  Clinic Number: [id number]    Therapy Diagnosis:   Encounter Diagnosis   Name Primary?    Decreased functional mobility and endurance Yes     Physician: Savannah Lindsey MD    Visit Date: 6/9/2022     Physician Orders: PT Eval and Treat  Medical Diagnosis from Referral: Complex tear of medial meniscus of left knee as current injury, initial encounter  Evaluation Date: 5/18/2022  Authorization Period Expiration: 12/31/2022  Plan of Care Expiration: 8/18/2022  Progress Note Due: 6/18/2022  Visit # / Visits authorized: 6/20 (+1 for evaluation)  FOTO: 1/3      Precautions: Standard, Fall and Weightbearing  PTA Visit #: 0/5     Time In: 11:45 am  Time Out: 12:30 pm  Total Billable Time: 40 minutes    SUBJECTIVE     Pt reports: 3-4/10 pain in left knee today. Patient felt fine after last visit.  Patient was sore the day after therapy.      She was compliant with home exercise program.  Response to previous treatment: Patient had some soreness the day after therapy.   Functional change: Patient is walking better with walker despite not being able to put weight through left lower extremity>     Pain: 5/10  Location: left knee , lower legs and upper legs     OBJECTIVE     Objective Measures updated at progress report unless specified.       Treatment     Stacey received the treatments listed below:      THERAPEUTIC EXERCISES to develop strength, endurance, ROM, flexibility, posture and core stabilization for (30) minutes including:    Intervention Performed Today    passive range of motion seated left knee flexion to 90 degrees X 10 minutes   Heel prop with towel  3 minutes, gravity assisted   Quad set X 3 minutes   SAQ + NMES  3 minutes    LAQ + NMES   3 minutes    Straight leg raise  X 3 sets of 10 repetition therapist assit   Supine hip adduction with ball X 3 minutes, legs extended   Supine hip abduction with belt X  3 minutes, legs extended   Side lying hip abduction X 3 sets of 10 repetition with therapist assist   Prone hip extension X 3 sets of 10 repetition with therapist assist   Side lying hip adduction X 3 sets of 10 repetition    Sit to stands X 2 set of 5 repetition with upper extremity assist   Fitting of Rolling walker  Patient's rolling walker too high.    Heel slides X 4 minutes with 5s holds, 2 straps, slide board with towel   Gluteal sets X 3 second hold, 3 sets of 10 repetition    Single leg stance X  X 1 upper extremity, 30 seconds x 3 trials, eyes open  1 upper extremity, 30 seconds x 2 trials, eyes closed               Plan for Next Visit:        MANUAL THERAPY TECHNIQUES were applied for (10) minutes, including:    Manual Intervention Performed Today    Soft Tissue Mobilization x L anterior knee scar mobilizations    Joint Mobilizations x Patellar mobilizations in all directions    Mobilization with movement     Patellar mobilizations  Medial and latera, superior and inferior glides   Manual lymph drainage  Lower legs, posterior/anterior knee, upper leg   Functional Dry Needling        Plan for Next Visit:        Stacey received electrical stimulation for neuromuscular re-education for quad strengthening for 15 minutes to left quad with quad set.       Patient Education and Home Exercises     Home Exercises Provided and Patient Education Provided     Education provided:   - Patient educated once again on proper wearing of locked hinged knee brace.     Written Home Exercises Provided: Patient instructed to cont prior HEP. Exercises were reviewed and Stacey was able to demonstrate them prior to the end of the session.  Stacey demonstrated good  understanding of the education provided. See EMR under Patient Instructions for exercises provided during therapy sessions    ASSESSMENT     Patient tolerated all exercises well today but did require assist with eccentric lower of straight leg raises.  Patient had some  fatigue with leg extended supine hip abduction and adduction.  Patient progressing well with left knee passive range of motion but continues to have difficulty with heel slides secondary to pain.  Patient continues to require education on proper hinged knee brace wearing.  Patient also educated on importance of maintaining non-weight bearing status and not putting weight through left lower extremity at any time.     Stacey Is progressing well towards her goals.   Pt prognosis is Good.     Pt will continue to benefit from skilled outpatient physical therapy to address the deficits listed in the problem list box on initial evaluation, provide pt/family education and to maximize pt's level of independence in the home and community environment.     Pt's spiritual, cultural and educational needs considered and pt agreeable to plan of care and goals.     Anticipated barriers to physical therapy: co-morbidities, sedentary lifestyle, adherence to treatment plan and coping style    Goals: Reviewed 06/09/2022  Short Term Goals: 6 weeks  1. SIGNS & SYMPTOMS: Recent signs and systems trend is improving in order to progress towards LTG's.   2. MOBILITY: Patient will improve AROM to 50% of stated goals, listed in objective measures above, in order to progress towards independence with functional activities.    3. STRENGTH: Patient will improve strength to 50% of stated goals, listed in objective measures above, in order to progress towards independence with functional activities. (make sure strength goals are set)  4. GAIT IMPROVING: Patient will demonstrate improved gait mechanics including symmetrical stance time and step length B in order to improve functional mobility, improve quality of life, and decrease risk of further injury or fall.  (PROGRESSNG 5/20/2022)  5. HEP: Patient will demonstrate independence with HEP in order to progress toward functional independence.            Long Term Goals: 12 weeks   1. PAIN: Pt will  report worst pain of 3/10 in order to progress toward max functional ability and improve quality of life (GOAL MET 6/9/2022)  2. FUNCTION: Patient will demonstrate improved function as indicated by a functional limitation score of less than or equal to 60 out of 100 on FOTO.  3. MOBILITY: Patient will improve AROM to stated goals, listed in objective measures above, in order to return to maximal functional potential and improve quality of life.   4. STRENGTH: Patient will improve strength to stated goals, listed in objective measures above, in order to improve functional independence and quality of life.   5. GAIT NORMAL: Patient will demonstrate normalized gait mechanics with minimal compensation in order to return to PLOF.       PLAN     Continue with current plan of care from 5/18/2022 to 8/18/2022.  Progress patient's exercises and activities per protocol.     Mckayla Melara, PT, DPT

## 2022-06-13 ENCOUNTER — CLINICAL SUPPORT (OUTPATIENT)
Dept: REHABILITATION | Facility: HOSPITAL | Age: 57
End: 2022-06-13
Payer: COMMERCIAL

## 2022-06-13 ENCOUNTER — PATIENT MESSAGE (OUTPATIENT)
Dept: ORTHOPEDICS | Facility: CLINIC | Age: 57
End: 2022-06-13
Payer: COMMERCIAL

## 2022-06-13 DIAGNOSIS — Z74.09 DECREASED FUNCTIONAL MOBILITY AND ENDURANCE: Primary | ICD-10-CM

## 2022-06-13 PROCEDURE — 97110 THERAPEUTIC EXERCISES: CPT

## 2022-06-13 NOTE — PROGRESS NOTES
[facility]   Physical Therapy Treatment Note     Name: Stacey Wade  Clinic Number: [id number]    Therapy Diagnosis:   Encounter Diagnosis   Name Primary?    Decreased functional mobility and endurance Yes     Physician: Savannah Lindsey MD    Visit Date: 6/13/2022     Physician Orders: PT Eval and Treat  Medical Diagnosis from Referral: Complex tear of medial meniscus of left knee as current injury, initial encounter  Evaluation Date: 5/18/2022  Authorization Period Expiration: 12/31/2022  Plan of Care Expiration: 8/18/2022  Progress Note Due: 6/18/2022  Visit # / Visits authorized: 6/20 (+1 for evaluation)  FOTO: 1/3      Precautions: Standard, Fall and Weightbearing  PTA Visit #: 0/5     Time In: 8:45 am  Time Out: 9:30 am  Total Billable Time: 40 minutes    SUBJECTIVE     Pt reports: 3-4/10 pain in left knee today.  She states she woke up with her knee swollen. She is afraid to put weight through her leg today.  She was sore after last visit.  She tried some exercises over the weekend.     She was compliant with home exercise program.  Response to previous treatment: Patient had some soreness the day after therapy.   Functional change: Patient is able to bend it more.     Pain: 4/10  Location: left knee , lower legs and upper legs     OBJECTIVE     Objective Measures updated at progress report unless specified.       Treatment     Stacey received the treatments listed below:      THERAPEUTIC EXERCISES to develop strength, endurance, ROM, flexibility, posture and core stabilization for (40) minutes including:    Intervention Performed Today    passive range of motion seated left knee flexion to 90 degrees  10 minutes   Heel prop with towel  3 minutes, gravity assisted   Quad set X 3 minutes   SAQ + NMES  3 minutes    LAQ + NMES   3 minutes    Straight leg raise  X 3 sets of 10 repetition, 2 second hold   Supine hip adduction with ball  3 minutes, legs extended   Supine hip  abduction with belt  3 minutes, legs extended   Side lying hip abduction X 3 sets of 10 repetition, no assist, no brace   Prone hip extension X 3 sets of 10 repetition, no assist, no brace   Side lying hip adduction X 3 sets of 10 repetition, no assist, no brace   Sit to stands  2 set of 5 repetition with upper extremity assist   Fitting of Rolling walker  Patient's rolling walker too high.    Heel slides X 4 minutes with 5s holds, 2 straps, slide board with towel   Gluteal sets  3 second hold, 3 sets of 10 repetition    Single leg stance  1 upper extremity, 30 seconds x 3 trials, eyes open  1 upper extremity, 30 seconds x 2 trials, eyes closed   Nustep X Level, 5 minutes for weight bearing and range of motion    Prone knee hangs X 3 minutes   Mini squats 0-45 degrees  X 2 sets of 5 repetition, bilateral upper extremity on rolling walker   MOBO board X 2 positions, 2 minutes each   Standing toe raises X 1 set of 10 repetition      Plan for Next Visit:        MANUAL THERAPY TECHNIQUES were applied for (0) minutes, including:    Manual Intervention Performed Today    Soft Tissue Mobilization  L anterior knee scar mobilizations    Joint Mobilizations  Patellar mobilizations in all directions    Mobilization with movement     Patellar mobilizations  Medial and latera, superior and inferior glides   Manual lymph drainage  Lower legs, posterior/anterior knee, upper leg   Functional Dry Needling        Plan for Next Visit:        Stacey received electrical stimulation for neuromuscular re-education for quad strengthening for 15 minutes to left quad with quad set.       Patient Education and Home Exercises     Home Exercises Provided and Patient Education Provided     Education provided:   - Patient educated once again on proper wearing of locked hinged knee brace.     Written Home Exercises Provided: Patient instructed to cont prior HEP. Exercises were reviewed and Stacey was able to demonstrate them prior to the end of  the session.  Stacey demonstrated good  understanding of the education provided. See EMR under Patient Instructions for exercises provided during therapy sessions    ASSESSMENT     Patient with increased anxiety trying to bear weight through left lower extremity with gait and rolling walker.  Patient tolerated nustep with little discomfort.  Patient with improved range of motion with heel slides noted today.  Patient required some encouragement to try each exercise without therapist assist and good quad control and exercise technique.  Overall, patient progressing well with therapy.  Patient does require constant supervision to ensure that patient is continuing to perform exercise with correct technique.    Stacey Is progressing well towards her goals.   Pt prognosis is Good.     Pt will continue to benefit from skilled outpatient physical therapy to address the deficits listed in the problem list box on initial evaluation, provide pt/family education and to maximize pt's level of independence in the home and community environment.     Pt's spiritual, cultural and educational needs considered and pt agreeable to plan of care and goals.     Anticipated barriers to physical therapy: co-morbidities, sedentary lifestyle, adherence to treatment plan and coping style    Goals: Reviewed 06/13/2022  Short Term Goals: 6 weeks  1. SIGNS & SYMPTOMS: Recent signs and systems trend is improving in order to progress towards LTG's.   2. MOBILITY: Patient will improve AROM to 50% of stated goals, listed in objective measures above, in order to progress towards independence with functional activities.    3. STRENGTH: Patient will improve strength to 50% of stated goals, listed in objective measures above, in order to progress towards independence with functional activities. (make sure strength goals are set)  4. GAIT IMPROVING: Patient will demonstrate improved gait mechanics including symmetrical stance time and step length B in  order to improve functional mobility, improve quality of life, and decrease risk of further injury or fall.  (PROGRESSNG 5/20/2022)  5. HEP: Patient will demonstrate independence with HEP in order to progress toward functional independence.            Long Term Goals: 12 weeks   1. PAIN: Pt will report worst pain of 3/10 in order to progress toward max functional ability and improve quality of life (GOAL MET 6/9/2022)  2. FUNCTION: Patient will demonstrate improved function as indicated by a functional limitation score of less than or equal to 60 out of 100 on FOTO.  3. MOBILITY: Patient will improve AROM to stated goals, listed in objective measures above, in order to return to maximal functional potential and improve quality of life.   4. STRENGTH: Patient will improve strength to stated goals, listed in objective measures above, in order to improve functional independence and quality of life.   5. GAIT NORMAL: Patient will demonstrate normalized gait mechanics with minimal compensation in order to return to PLOF.       PLAN     Continue with current plan of care from 5/18/2022 to 8/18/2022.  Progress patient's exercises and activities per protocol.     Mckayla Melara, PT, DPT

## 2022-06-16 ENCOUNTER — CLINICAL SUPPORT (OUTPATIENT)
Dept: REHABILITATION | Facility: HOSPITAL | Age: 57
End: 2022-06-16
Payer: COMMERCIAL

## 2022-06-16 DIAGNOSIS — Z74.09 DECREASED FUNCTIONAL MOBILITY AND ENDURANCE: Primary | ICD-10-CM

## 2022-06-16 PROCEDURE — 97110 THERAPEUTIC EXERCISES: CPT

## 2022-06-16 NOTE — PROGRESS NOTES
[facility]   Physical Therapy Treatment Note/ Progress Note    Name: Stacey Wade  Clinic Number: [id number]    Therapy Diagnosis:   Encounter Diagnosis   Name Primary?    Decreased functional mobility and endurance Yes     Physician: Savannah Lindsey MD    Visit Date: 6/16/2022     Physician Orders: PT Eval and Treat  Medical Diagnosis from Referral: Complex tear of medial meniscus of left knee as current injury, initial encounter  Evaluation Date: 5/18/2022  Authorization Period Expiration: 12/31/2022  Plan of Care Expiration: 8/18/2022  Progress Note Due: 7/16/2022  Visit # / Visits authorized: 8/20 (+1 for evaluation)  FOTO: 1/3      Precautions: Standard, Fall and Weightbearing  PTA Visit #: 0/5     Time In: 8:00 am  Time Out: 8:45 am  Total Billable Time: 40 minutes    SUBJECTIVE     Pt reports: she has 1-2/10 pain in left knee today.  She has noticed less swelling in knee as well.  She did have to ice it the rest of the day after last visit but she is now walking better and able to put more weight through left lower extremity.    She was compliant with home exercise program.  Response to previous treatment: Patient had to ice left knee after last visit but did well.   Functional change: Patient is able to walk better.     Pain: 2/10  Location: left knee , lower legs and upper legs     OBJECTIVE     Objective Measures updated at progress report unless specified.     HIP ROM  Right   Left   Increased/Decreased Pain  Flexion:  within functional limits Limited secondary to weakness None    KNEE ROM   Right    Left  Increased/Decreased Pain  Extension:   within functional limits  0 degrees   None  Flexion:  within functional limits  80 degrees   Painful  (passive range of motion: 82 degrees)     ANKLE ROM  Right    Left  Increased/Decreased Pain  Dorsiflexion:  within functional limits  within functional limits  None      Lower Extremity Strength   LE MMT Right Left   HIP  Flexion 4+/5 3-/5   Knee Flexion 4+/5 Not tested /5   Knee Extension 4+/5 Not tested /5   Ankle Dorsiflexion 5/5 4/5        GAIT:   Patient ambulated into clinic with rolling walker and hinged knee brace locked in extension on left lower extremity.  Patient able to weight bear through left lower extremity with heel toe gait but continues to demonstrate decreased weight shift left as well as decreased miguel and step length.  Patient requires use of bilateral upper extremity to help with weight shifting and bilateral lower extremity advancement.     FOTO:       Treatment     Stacey received the treatments listed below:      THERAPEUTIC EXERCISES to develop strength, endurance, ROM, flexibility, posture and core stabilization for (40) minutes including:    Intervention Performed Today    passive range of motion seated left knee flexion to 90 degrees X 10 minutes   Heel prop with towel  3 minutes, gravity assisted   Quad set  3 minutes   SAQ + NMES  3 minutes    LAQ + NMES   3 minutes    Straight leg raise  X 3 sets of 12 repetition, 2 second hold   Supine hip adduction with ball  3 minutes, legs extended   Supine hip abduction with belt  3 minutes, legs extended   Side lying hip abduction X 3 sets of 10 repetition, no assist, no brace   Prone hip extension X 4 sets of 10 repetition, no assist, no brace   Side lying hip adduction X 4 sets of 10 repetition, no assist, no brace   Sit to stands  2 set of 5 repetition with upper extremity assist   Fitting of Rolling walker  Patient's rolling walker too high.    Heel slides X 4 minutes with 5s holds, 2 straps, slide board with towel   Gluteal sets  3 second hold, 3 sets of 10 repetition    Single leg stance  1 upper extremity, 30 seconds x 3 trials, eyes open  1 upper extremity, 30 seconds x 2 trials, eyes closed   Nustep X Level 1, 5 minutes for weight bearing and range of motion    Prone knee hangs  3 minutes   Mini squats 0-45 degrees  X 2 sets of 5 repetition,  bilateral upper extremity on rolling walker   MOBO board  2 positions, 2 minutes each   Standing toe raises X 1 set of 10 repetition    Reassessment X See above     Plan for Next Visit:        MANUAL THERAPY TECHNIQUES were applied for (0) minutes, including:    Manual Intervention Performed Today    Soft Tissue Mobilization  L anterior knee scar mobilizations    Joint Mobilizations  Patellar mobilizations in all directions    Mobilization with movement     Patellar mobilizations  Medial and latera, superior and inferior glides   Manual lymph drainage  Lower legs, posterior/anterior knee, upper leg   Functional Dry Needling        Plan for Next Visit:        Stacey received electrical stimulation for neuromuscular re-education for quad strengthening for 15 minutes to left quad with quad set.       Patient Education and Home Exercises     Home Exercises Provided and Patient Education Provided     Education provided:   - Patient educated once again on proper wearing of locked hinged knee brace.     Written Home Exercises Provided: Patient instructed to cont prior HEP. Exercises were reviewed and Stacey was able to demonstrate them prior to the end of the session.  Stacey demonstrated good  understanding of the education provided. See EMR under Patient Instructions for exercises provided during therapy sessions    ASSESSMENT     Patient with improved tolerance to weight bearing on left lower extremity today.  Patient demonstrated improved FOTO score as well as active assisted range of motion in left knee flexion.  Patient educated on importance of performing exercises at home to continue to progress with therapy.  Patient continues to require verbal cues for exercise technique with mini squats.  Patient with less fatigue noted at end of therapy session.     Stacey Is progressing well towards her goals.   Pt prognosis is Good.     Pt will continue to benefit from skilled outpatient physical therapy to address the  deficits listed in the problem list box on initial evaluation, provide pt/family education and to maximize pt's level of independence in the home and community environment.     Pt's spiritual, cultural and educational needs considered and pt agreeable to plan of care and goals.     Anticipated barriers to physical therapy: co-morbidities, sedentary lifestyle, adherence to treatment plan and coping style    Goals: Reviewed 06/16/2022  Short Term Goals: 6 weeks  1. SIGNS & SYMPTOMS: Recent signs and systems trend is improving in order to progress towards LTG's. (GOAL MET 6/16/2022)  2. MOBILITY: Patient will improve AROM to 50% of stated goals, listed in objective measures above, in order to progress towards independence with functional activities.  (PROGRESSING 6/16/2022)  3. STRENGTH: Patient will improve strength to 50% of stated goals, listed in objective measures above, in order to progress towards independence with functional activities. (make sure strength goals are set) (PROGRESSING 6/16/2022)  4. GAIT IMPROVING: Patient will demonstrate improved gait mechanics including symmetrical stance time and step length B in order to improve functional mobility, improve quality of life, and decrease risk of further injury or fall.  (PROGRESSNG 6/16/2022)  5. HEP: Patient will demonstrate independence with HEP in order to progress toward functional independence.            Long Term Goals: 12 weeks   1. PAIN: Pt will report worst pain of 3/10 in order to progress toward max functional ability and improve quality of life (GOAL MET 6/9/2022)  2. FUNCTION: Patient will demonstrate improved function as indicated by a functional limitation score of less than or equal to 60 out of 100 on FOTO. (PROGRESSING 6/16/2022)  3. MOBILITY: Patient will improve AROM to stated goals, listed in objective measures above, in order to return to maximal functional potential and improve quality of life. (PROGRESSING 6/16/2022)  4. STRENGTH:  Patient will improve strength to stated goals, listed in objective measures above, in order to improve functional independence and quality of life. (PROGRESSING 6/16/2022)  5. GAIT NORMAL: Patient will demonstrate normalized gait mechanics with minimal compensation in order to return to PLOF. (PROGRESSING 6/16/2022)      PLAN     Continue with current plan of care from 5/18/2022 to 8/18/2022.  Progress patient's exercises and activities per protocol.     Mckayla Melara, PT, DPT

## 2022-06-17 NOTE — TELEPHONE ENCOUNTER
----- Message from Michele Dennis sent at 6/8/2018  1:29 PM CDT -----  Contact: pt  Pt is requesting a call back from the nurse in regards to pt blood presser / fluid pill.   [phone]   [facility] Medicine  Franniecoby Kessler DO   Available via [employer] Teams      Patient is a 61y old  Female who presents with a chief complaint of sob (17 Jun 2022 09:54)      SUBJECTIVE / OVERNIGHT EVENTS:  Feels improved from yesterday.  Afebrile  No CP, abd pain, diarrhea     MEDICATIONS  (STANDING):  albuterol/ipratropium for Nebulization 3 milliLiter(s) Nebulizer every 6 hours  enoxaparin Injectable 40 milliGRAM(s) SubCutaneous every 24 hours  guaiFENesin  milliGRAM(s) Oral every 12 hours  influenza   Vaccine 0.5 milliLiter(s) IntraMuscular once  levoFLOXacin IVPB      levoFLOXacin IVPB 750 milliGRAM(s) IV Intermittent every 24 hours  methylPREDNISolone sodium succinate Injectable 40 milliGRAM(s) IV Push every 12 hours  sodium chloride 0.9%. 1000 milliLiter(s) (60 mL/Hr) IV Continuous <Continuous>    MEDICATIONS  (PRN):  acetaminophen     Tablet .. 650 milliGRAM(s) Oral every 6 hours PRN Temp greater or equal to 38C (100.4F), Mild Pain (1 - 3)  benzonatate 100 milliGRAM(s) Oral three times a day PRN Cough  melatonin 3 milliGRAM(s) Oral at bedtime PRN Insomnia  ondansetron Injectable 4 milliGRAM(s) IV Push every 8 hours PRN Nausea and/or Vomiting      CAPILLARY BLOOD GLUCOSE        I&O's Summary      PHYSICAL EXAM:  Vital Signs Last 24 Hrs  T(C): 36.8 (17 Jun 2022 12:09), Max: 37.1 (16 Jun 2022 22:03)  T(F): 98.3 (17 Jun 2022 12:09), Max: 98.8 (16 Jun 2022 22:03)  HR: 110 (17 Jun 2022 12:09) (106 - 120)  BP: 115/62 (17 Jun 2022 12:09) (115/62 - 152/90)  BP(mean): --  RR: 25 (17 Jun 2022 12:09) (24 - 28)  SpO2: 90% (17 Jun 2022 12:09) (90% - 94%)    CONSTITUTIONAL: NAD, well-developed, well-groomed  EYES: PERRL; conjunctiva and sclera clear  ENMT: Moist oral mucosa, no pharyngeal injection or exudates; normal dentition  RESPIRATORY: Normal respiratory effort; worse rhonchi on the L   CARDIOVASCULAR: Regular rate and rhythm, normal S1 and S2, no murmur; No lower extremity edema  ABDOMEN: Nontender to palpation, normoactive bowel sounds, no rebound/guarding  MUSCULOSKELETAL: no clubbing or cyanosis of digits; no joint swelling or tenderness to palpation  PSYCH: A+O to person, place, and time; affect appropriate  NEUROLOGY: non focal   SKIN: No rashes; no palpable lesions    LABS:                        15.0   9.95  )-----------( 231      ( 17 Jun 2022 06:38 )             46.6     06-17    133<L>  |  93<L>  |  10  ----------------------------<  126<H>  4.7   |  33<H>  |  0.34<L>    Ca    10.2      17 Jun 2022 06:38  Phos  2.8     06-17    TPro  6.3  /  Alb  3.6  /  TBili  0.2  /  DBili  x   /  AST  17  /  ALT  32  /  AlkPhos  139<H>  06-17              Culture - Sputum (collected 16 Jun 2022 16:06)  Source: .Sputum Sputum  Gram Stain (16 Jun 2022 22:28):    Few polymorphonuclear leukocytes per low power field    No Squamous epithelial cells per low power field    Few Gram positive cocci in pairs per oil power field    Few Gram Variable Rods per oil power field    Rare Yeast like cells per oil power field        RADIOLOGY & ADDITIONAL TESTS:  Results Reviewed:   Imaging Personally Reviewed:  Electrocardiogram Personally Reviewed:    COORDINATION OF CARE:  Care Discussed with Consultants/Other Providers [Y/N]:  Prior or Outpatient Records Reviewed [Y/N]:

## 2022-06-20 ENCOUNTER — OFFICE VISIT (OUTPATIENT)
Dept: ORTHOPEDICS | Facility: CLINIC | Age: 57
End: 2022-06-20
Payer: COMMERCIAL

## 2022-06-20 VITALS — BODY MASS INDEX: 35.7 KG/M2 | WEIGHT: 194 LBS | HEIGHT: 62 IN

## 2022-06-20 DIAGNOSIS — M23.42 LOOSE BODY IN KNEE, LEFT: ICD-10-CM

## 2022-06-20 DIAGNOSIS — S83.232D COMPLEX TEAR OF MEDIAL MENISCUS OF LEFT KNEE AS CURRENT INJURY, SUBSEQUENT ENCOUNTER: ICD-10-CM

## 2022-06-20 DIAGNOSIS — I10 ESSENTIAL HYPERTENSION: ICD-10-CM

## 2022-06-20 DIAGNOSIS — Z98.890 POST-OPERATIVE STATE: Primary | ICD-10-CM

## 2022-06-20 DIAGNOSIS — E66.01 SEVERE OBESITY (BMI 35.0-39.9) WITH COMORBIDITY: ICD-10-CM

## 2022-06-20 DIAGNOSIS — M17.12 PRIMARY OSTEOARTHRITIS OF LEFT KNEE: ICD-10-CM

## 2022-06-20 DIAGNOSIS — M25.462 EFFUSION OF LEFT KNEE: ICD-10-CM

## 2022-06-20 PROCEDURE — 99999 PR PBB SHADOW E&M-EST. PATIENT-LVL IV: CPT | Mod: PBBFAC,,, | Performed by: ORTHOPAEDIC SURGERY

## 2022-06-20 PROCEDURE — 1159F MED LIST DOCD IN RCRD: CPT | Mod: CPTII,S$GLB,, | Performed by: ORTHOPAEDIC SURGERY

## 2022-06-20 PROCEDURE — 1160F PR REVIEW ALL MEDS BY PRESCRIBER/CLIN PHARMACIST DOCUMENTED: ICD-10-PCS | Mod: CPTII,S$GLB,, | Performed by: ORTHOPAEDIC SURGERY

## 2022-06-20 PROCEDURE — 1160F RVW MEDS BY RX/DR IN RCRD: CPT | Mod: CPTII,S$GLB,, | Performed by: ORTHOPAEDIC SURGERY

## 2022-06-20 PROCEDURE — 99999 PR PBB SHADOW E&M-EST. PATIENT-LVL IV: ICD-10-PCS | Mod: PBBFAC,,, | Performed by: ORTHOPAEDIC SURGERY

## 2022-06-20 PROCEDURE — 1159F PR MEDICATION LIST DOCUMENTED IN MEDICAL RECORD: ICD-10-PCS | Mod: CPTII,S$GLB,, | Performed by: ORTHOPAEDIC SURGERY

## 2022-06-20 PROCEDURE — 99024 PR POST-OP FOLLOW-UP VISIT: ICD-10-PCS | Mod: S$GLB,,, | Performed by: ORTHOPAEDIC SURGERY

## 2022-06-20 PROCEDURE — 99024 POSTOP FOLLOW-UP VISIT: CPT | Mod: S$GLB,,, | Performed by: ORTHOPAEDIC SURGERY

## 2022-06-20 PROCEDURE — 3008F BODY MASS INDEX DOCD: CPT | Mod: CPTII,S$GLB,, | Performed by: ORTHOPAEDIC SURGERY

## 2022-06-20 PROCEDURE — 3008F PR BODY MASS INDEX (BMI) DOCUMENTED: ICD-10-PCS | Mod: CPTII,S$GLB,, | Performed by: ORTHOPAEDIC SURGERY

## 2022-06-20 NOTE — PROGRESS NOTES
Patient ID: Stacey Wade  YOB: 1965  MRN: [id number]    Chief Complaint: Post-op Evaluation of the Left Knee    Referred By: Dr. Whit Petit     History of Present Illness: Stacey Wade is a 56 y.o. 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 Overall, she is feeling much better since her last visit on 5/23/2022.     HPI    Past Medical History:   Past Medical History:   Diagnosis Date    Allergic rhinitis     Anxiety     Arthritis     Hypertension     Urticaria      Past Surgical History:   Procedure Laterality Date    CHONDROPLASTY OF KNEE Left 5/13/2022    Procedure: CHONDROPLASTY, KNEE;  Surgeon: Wes Faulkner MD;  Location: [facility];  Service: Orthopedics;  Laterality: Left;    COLONOSCOPY N/A 1/18/2018    Procedure: COLONOSCOPY;  Surgeon: Yong Smith MD;  Location: [facility];  Service: Endoscopy;  Laterality: N/A;    HERNIA REPAIR Left      Family History   Problem Relation Age of Onset    Lung cancer Paternal Grandfather     Ovarian cancer Maternal Grandmother     Hyperlipidemia Mother     Hypertension Mother     Breast cancer Mother 60    Arthritis Mother     Lung cancer Father     Breast cancer Maternal Aunt 53    Heart failure Other         Great aunt    Prostate cancer Brother     Brain cancer Sister     Diabetes Neg Hx     Pseudochol deficiency Neg Hx     Malignant hyperthermia Neg Hx      Social History     Socioeconomic History    Marital status: Single   Tobacco Use    Smoking status: Never Smoker    Smokeless tobacco: Never Used   Substance and Sexual Activity    Alcohol use: No    Drug use: No    Sexual activity: Not Currently     Partners: Male     Birth control/protection: None   Social History Narrative    Patient is  single has no children and works as a pharmacy specialist. She cares for her mother, who lives with her.     Medication List with Changes/Refills   Current Medications    ASPIRIN (ECOTRIN) 325 MG EC TABLET    Take 1 tablet (325 mg total) by mouth once daily for 20 days. Complete full 3 weeks.    BETAMETHASONE DIPROPIONATE 0.05 % CREAM    APPLY TOPICALLY 2 TIMES DAILY.    BUTALBITAL-ACETAMINOPHEN-CAFFEINE -40 MG (FIORICET, ESGIC) -40 MG PER TABLET    TAKE ONE TABLET BY MOUTH EVERY 6 HOURS AS NEEDED FOR PAIN OR FOR HEADACHE    CLORAZEPATE (TRANXENE) 3.75 MG TAB    Take 3.75 mg by mouth as needed.    DICLOFENAC SODIUM (VOLTAREN) 1 % GEL    Apply 2 g topically 4 (four) times daily.    DOCUSATE SODIUM (COLACE) 100 MG CAPSULE    Take 1 capsule (100 mg total) by mouth 2 (two) times daily.    DOXEPIN (SINEQUAN) 10 MG CAPSULE    TAKE 2 CAPSULES BY MOUTH 3 TIMES A DAY    EPINEPHRINE (EPIPEN) 0.3 MG/0.3 ML ATIN    Inject 0.3 mg into the muscle daily as needed.    FAMOTIDINE (PEPCID) 40 MG TABLET    Take 20 mg by mouth.    FEXOFENADINE (ALLEGRA) 180 MG TABLET    TAKE ONE TABLET BY MOUTH EVERY DAY    HYDROXYZINE HCL (ATARAX) 25 MG TABLET    TAKE 1 TABLET BY MOUTH FOUR TIMES A DAY AS NEEDED FOR ITCHING    MIRABEGRON (MYRBETRIQ) 25 MG TB24 ER TABLET    Take 1 tablet (25 mg total) by mouth once daily.    MOMETASONE (NASONEX) 50 MCG/ACTUATION NASAL SPRAY    INHALE 2 SPRAYS BY NASAL ROUTE ONCE DAILY    MONTELUKAST (SINGULAIR) 10 MG TABLET    Take 1 tablet (10 mg total) by mouth once daily.    ONDANSETRON (ZOFRAN) 4 MG TABLET    Take 1 tablet (4 mg total) by mouth every 8 (eight) hours as needed for Nausea.    ONDANSETRON (ZOFRAN-ODT) 4 MG TBDL    Take 1 tablet (4 mg total) by mouth every 8 (eight) hours as needed (nausea).    OXYCODONE (ROXICODONE) 5 MG IMMEDIATE RELEASE TABLET    Take 1 tablet (5 mg total) by mouth every 4 (four) hours as needed for Pain (For break through pain).    POTASSIUM CHLORIDE (MICRO-K) 10 MEQ  CPSR    Take 1 capsule (10 mEq total) by mouth once daily.    PREDNISONE (DELTASONE) 20 MG TABLET    Take 2 tablets daily for 3 days, then 1 tablet daily for 3 days, then 1/2 tablet daily for 2 days.    RIZATRIPTAN (MAXALT) 10 MG TABLET    Take 1 tablet (10 mg total) by mouth as needed for Migraine (May repeat in 2 hrs.  No more than 2 tablets in 24 hrs.).    TOLTERODINE (DETROL LA) 4 MG 24 HR CAPSULE    Take 1 capsule (4 mg total) by mouth once daily.    TRIAMTERENE-HYDROCHLOROTHIAZIDE 37.5-25 MG (DYAZIDE) 37.5-25 MG PER CAPSULE    Take 1 capsule by mouth daily as needed.    XOLAIR 150 MG/ML INJECTION         Review of patient's allergies indicates:   Allergen Reactions    Benzalkonium chloride     Black pepper      Other reaction(s): Hives    Chocolate flavor      Other reaction(s): Hives    Citrus and derivatives Hives     LEMON PRODUCTS    Grapefruit Hives     Other reaction(s): Hives    Ibuprofen Swelling    Latex      Other reaction(s): Hives    Lemon balm (casper officinalis) Hives     Anything with lemon in it    Naproxen Swelling    Neomycin-bacitracin-polymyxin      Other reaction(s): Rash    Oats (richard) Hives     Oat foods (oatmeal, etc...)    Vegetable acetoglycerides Hives     Vegetable gums    Wheat containing prod     Wheat flour Hives     ROS    Physical Exam:   Body mass index is 35.48 kg/m².  There were no vitals filed for this visit.   GENERAL: Well appearing, appropriate for stated age, no acute distress.  CARDIOVASCULAR: Pulses regular by peripheral palpation.  PULMONARY: Respirations are even and non-labored.  NEURO: Awake, alert, and oriented x 3.  PSYCH: Mood & affect are appropriate.  HEENT: Head is normocephalic and atraumatic.  General    Nursing note and vitals reviewed.            Left Knee Exam     Inspection   Effusion: present (mild)    Range of Motion   Extension: 0   Flexion: 80     Other   Sensation: normal    Comments:  Incisions healed       Muscle Strength   Left  Lower Extremity   Hip Abduction: 5/5   Quadriceps:  4/5   Hamstrin/5     Vascular Exam       Left Pulses  Dorsalis Pedis:      2+  Posterior Tibial:      2+          All compartments are soft and compressible. Calf soft non-tender. Intact EHL, FHL, gastroc soleus, and tibialis anterior. Sensation intact to light touch in superficial peroneal, deep peroneal, tibial, sural, and saphenous nerve distributions. Foot warm and well perfused with capillary refill of less than 2 seconds and palpable pedal pulses.       Imaging:    X-Ray Knee 1 or 2 View Left  Narrative: EXAMINATION:  XR KNEE 1 OR 2 VIEW LEFT    CLINICAL HISTORY:  intra op;    TECHNIQUE:  Single fluoroscopic view of the left knee is submitted during ongoing surgical procedure.    COMPARISON:  None    FINDINGS:  Single view of the left knee is submitted during ongoing surgical procedure.  No acute abnormality seen on this single image.  Impression: Fluoroscopic view of the left knee for ongoing procedure.    Electronically signed by: Stephen Gonzalez  Date:    2022  Time:    12:35  SURG [place] Surgery Fluoro Usage  See OP Notes for results.     IMPRESSION: See OP Notes for results.     This procedure was auto-finalized by: Virtual Radiologist      Relevant imaging results reviewed and interpreted by me, discussed with the patient and / or family today.     Other Tests:         Patient Instructions     Assessment:  Stacey Wade is a 56 y.o. 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    Post-operative state Yes    Complex tear of medial meniscus of left knee as current injury, subsequent encounter     Primary osteoarthritis of left knee     Effusion of left knee     Severe obesity (BMI 35.0-39.9) with comorbidity     Essential hypertension      Loose body in knee, left         Plan:   Continue physical therapy increasing visits to 3x weekly to focus on range of motion   Physical therapy using meniscus repair protocol   Can stop using brace   Can gradually move to full weight bearing    Ultrasound with Dr. Page to check meniscus extrusion.    Follow up in 2 weeks    Follow-up: 2 weeks or sooner if there are any problems between now and then.    Leave Review:    Google: Leave Google Review   Healthgrades: Leave Healthgrades Review    After Hours Number: [phone]        Provider Note/Medical Decision Making:      I discussed worrisome and red flag signs and symptoms with the patient. The patient expressed understanding and agreed to alert me immediately or to go to the emergency room if they experience any of these.    Treatment plan was developed with input from the patient/family, and they expressed understanding and agreement with the plan. All questions were answered today.    Disclaimer: This note was prepared using a voice recognition system and is likely to have sound alike errors within the text.

## 2022-06-20 NOTE — PATIENT INSTRUCTIONS
Assessment:  Stacey Wade is a 56 y.o. 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    Post-operative state Yes    Complex tear of medial meniscus of left knee as current injury, subsequent encounter     Primary osteoarthritis of left knee     Effusion of left knee     Severe obesity (BMI 35.0-39.9) with comorbidity     Essential hypertension     Loose body in knee, left         Plan:  Continue physical therapy increasing visits to 3x weekly to focus on range of motion  Physical therapy using meniscus repair protocol  Can stop using brace  Can gradually move to full weight bearing   Ultrasound with Dr. Page to check meniscus extrusion.   Follow up in 2 weeks    Follow-up: 2 weeks or sooner if there are any problems between now and then.    Leave Review:   Google: Leave Google Review  Healthgrades: Leave Healthgrades Review    After Hours Number: [phone]

## 2022-06-21 ENCOUNTER — CLINICAL SUPPORT (OUTPATIENT)
Dept: REHABILITATION | Facility: HOSPITAL | Age: 57
End: 2022-06-21
Payer: COMMERCIAL

## 2022-06-21 DIAGNOSIS — Z74.09 DECREASED FUNCTIONAL MOBILITY AND ENDURANCE: Primary | ICD-10-CM

## 2022-06-21 PROCEDURE — 97110 THERAPEUTIC EXERCISES: CPT

## 2022-06-21 PROCEDURE — 97112 NEUROMUSCULAR REEDUCATION: CPT

## 2022-06-21 NOTE — PROGRESS NOTES
[facility]   Physical Therapy Treatment Note    Name: Stacey Wade  Clinic Number: [id number]    Therapy Diagnosis:   Encounter Diagnosis   Name Primary?    Decreased functional mobility and endurance Yes     Physician: Savannah Lindsey MD    Visit Date: 6/21/2022     Physician Orders: PT Eval and Treat  Medical Diagnosis from Referral: Complex tear of medial meniscus of left knee as current injury, initial encounter  Evaluation Date: 5/18/2022  Authorization Period Expiration: 12/31/2022  Plan of Care Expiration: 8/18/2022  Progress Note Due: 7/16/2022  Visit # / Visits authorized: 9/20 (+1 for evaluation)  FOTO: 1/3      Precautions: Standard, Fall and Weightbearing  PTA Visit #: 0/5     Time In: 10:15 am  Time Out: 11:18 am  Total Billable Time: 53 minutes    SUBJECTIVE     Pt reports: she has 1/10 pain when she was walking into therapy today.  When she bends her knee, she admits to 7-8/10 pain.  She has been doing her exercises but only one time a day.  Patient was able to drive today to therapy.  Patient was able to make her own breakfast today.     She was compliant with home exercise program.  Response to previous treatment: Patient felt good after last visit.   Functional change: Patient is able to walk a little more.    Pain: 1/10  Location: left knee , lower legs and upper legs       OBJECTIVE     Objective Measures updated at progress report unless specified.     Knee flexion: right 95 degrees         Treatment     Stacey received the treatments listed below:      THERAPEUTIC EXERCISES to develop strength, endurance, ROM, flexibility, posture and core stabilization for (43) minutes including:    Intervention Performed Today    passive range of motion seated left knee flexion to 90 degrees X 8 minutes   Heel prop with towel  3 minutes, gravity assisted   Quad set  3 minutes   SAQ + NMES  3 minutes    LAQ + NMES   3 minutes    Straight leg raise  X 1 pound, 3 sets of  10 repetition, 2 second hold   Supine hip adduction with ball  3 minutes, legs extended   Supine hip abduction with belt  3 minutes, legs extended   Side lying hip abduction X 1 pound, 5 sets of 10 repetition, no assist, no brace   Prone hip extension X 1 pound, 3 sets of 10 repetition, no assist, no brace   Side lying hip adduction X 1 pound, 3 sets of 10 repetition, no assist, no brace   Sit to stands  2 set of 5 repetition with upper extremity assist   Fitting of Rolling walker  Patient's rolling walker too high.    Heel slides X 4 minutes with 5s holds, 1 straps, slide board with towel   Gluteal sets  3 second hold, 3 sets of 10 repetition    Single leg stance  1 upper extremity, 30 seconds x 3 trials, eyes open  1 upper extremity, 30 seconds x 2 trials, eyes closed   Nustep  Level 1, 5 minutes for weight bearing and range of motion    Prone knee hangs  3 minutes   Mini squats 0-45 degrees  X 1 sets of 10 repetition, one upper extremity in parallel bars   MOBO board  2 positions, 2 minutes each   Standing toe raises X 3 set of 10 repetition    Reassessment     Hamstring curls X Standing, 3 sets of 10 repetition    Stationary bike X No level, 5 minutes   Shuttle X  X Double leg press, 3 cords, 3 sets of 10 repetition   Single leg press: 2 cords, 3 sets of 10 repetition    Ankle 4 way X red theraband, 1 set of 10 repetition each direction     Plan for Next Visit: try upright bike next visit       .NEUROMUSCULAR RE-EDUCATION ACTIVITIES to improve Balance, Coordination, Kinesthetic, Sense, Proprioception and Posture for (10) minutes.  The following were included:    Intervention Performed Today    Left lower extremity step with weight shift, unlocking of knee X 1 sets of 10 repetition    Left lower extremity stepping over orange beam with weight shifting and unlocking of knee X 2 sets of 10 repetition    bilateral lower extremity step with even step length and weight shift X 1 set of 10 reps   Gait training with  straight cane with appropriate step length, weight shifting and lifting of toes X 6 minutes                         Plan for Next Visit:          MANUAL THERAPY TECHNIQUES were applied for (0) minutes, including:    Manual Intervention Performed Today    Soft Tissue Mobilization  L anterior knee scar mobilizations    Joint Mobilizations  Patellar mobilizations in all directions    Mobilization with movement     Patellar mobilizations  Medial and latera, superior and inferior glides   Manual lymph drainage  Lower legs, posterior/anterior knee, upper leg   Functional Dry Needling        Plan for Next Visit:        Stacey received electrical stimulation for neuromuscular re-education for quad strengthening for 0 minutes to left quad with quad set.       Patient Education and Home Exercises     Home Exercises Provided and Patient Education Provided     Education provided:   - Patient educated once again on proper wearing of locked hinged knee brace.     Written Home Exercises Provided: Patient instructed to cont prior HEP. Exercises were reviewed and Stacey was able to demonstrate them prior to the end of the session.  Stacey demonstrated good  understanding of the education provided. See EMR under Patient Instructions for exercises provided during therapy sessions    ASSESSMENT     Educated patient on importance of doing her exercises multiple times a day including heel slides three times a day.  Patient educated on importance of needing to get more knee flexion so that we can begin strengthening and improve gait pattern.  Patient tolerated new exercises but did require verbal cues to have normalized breathing. Patient educated on trying to walk with improved quad control, dorsiflexion and hip flexion weight less exaggerated lateral weight shift.  Patient required maximum verbal cues for erect posture during therapy session with most exercises.     Stacey Is progressing well towards her goals.   Pt prognosis is Good.      Pt will continue to benefit from skilled outpatient physical therapy to address the deficits listed in the problem list box on initial evaluation, provide pt/family education and to maximize pt's level of independence in the home and community environment.     Pt's spiritual, cultural and educational needs considered and pt agreeable to plan of care and goals.     Anticipated barriers to physical therapy: co-morbidities, sedentary lifestyle, adherence to treatment plan and coping style    Goals: Reviewed 06/21/2022  Short Term Goals: 6 weeks  1. SIGNS & SYMPTOMS: Recent signs and systems trend is improving in order to progress towards LTG's. (GOAL MET 6/16/2022)  2. MOBILITY: Patient will improve AROM to 50% of stated goals, listed in objective measures above, in order to progress towards independence with functional activities.  (PROGRESSING 6/16/2022)  3. STRENGTH: Patient will improve strength to 50% of stated goals, listed in objective measures above, in order to progress towards independence with functional activities. (make sure strength goals are set) (PROGRESSING 6/16/2022)  4. GAIT IMPROVING: Patient will demonstrate improved gait mechanics including symmetrical stance time and step length B in order to improve functional mobility, improve quality of life, and decrease risk of further injury or fall.  (PROGRESSNG 6/16/2022)  5. HEP: Patient will demonstrate independence with HEP in order to progress toward functional independence.            Long Term Goals: 12 weeks   1. PAIN: Pt will report worst pain of 3/10 in order to progress toward max functional ability and improve quality of life (GOAL MET 6/9/2022)  2. FUNCTION: Patient will demonstrate improved function as indicated by a functional limitation score of less than or equal to 60 out of 100 on FOTO. (PROGRESSING 6/16/2022)  3. MOBILITY: Patient will improve AROM to stated goals, listed in objective measures above, in order to return to maximal  functional potential and improve quality of life. (PROGRESSING 6/16/2022)  4. STRENGTH: Patient will improve strength to stated goals, listed in objective measures above, in order to improve functional independence and quality of life. (PROGRESSING 6/16/2022)  5. GAIT NORMAL: Patient will demonstrate normalized gait mechanics with minimal compensation in order to return to PLOF. (PROGRESSING 6/16/2022)      PLAN     Continue with current plan of care from 5/18/2022 to 8/18/2022.  Progress patient's exercises and activities per protocol.     Mckayla Melara, PT, DPT

## 2022-06-22 ENCOUNTER — TELEPHONE (OUTPATIENT)
Dept: FAMILY MEDICINE | Facility: CLINIC | Age: 57
End: 2022-06-22

## 2022-06-22 NOTE — TELEPHONE ENCOUNTER
Faxed the pt's work letter to her work's HR department at [phone]. Email confirmation received.

## 2022-06-22 NOTE — TELEPHONE ENCOUNTER
----- Message from Nohemy Bridges sent at 6/21/2022  2:11 PM CDT -----  Contact: [phone] @ patient  Good Afternoon  Patient need to talk to nurse    Please call and advise

## 2022-06-23 ENCOUNTER — CLINICAL SUPPORT (OUTPATIENT)
Dept: REHABILITATION | Facility: HOSPITAL | Age: 57
End: 2022-06-23
Payer: COMMERCIAL

## 2022-06-23 DIAGNOSIS — Z74.09 DECREASED FUNCTIONAL MOBILITY AND ENDURANCE: Primary | ICD-10-CM

## 2022-06-23 PROCEDURE — 97110 THERAPEUTIC EXERCISES: CPT | Mod: CQ

## 2022-06-23 NOTE — PROGRESS NOTES
[facility]   Physical Therapy Treatment Note    Name: Stacey Wade  Clinic Number: [id number]    Therapy Diagnosis:   Encounter Diagnosis   Name Primary?    Decreased functional mobility and endurance Yes     Physician: Savannah Lindsey MD    Visit Date: 6/23/2022     Physician Orders: PT Eval and Treat  Medical Diagnosis from Referral: Complex tear of medial meniscus of left knee as current injury, initial encounter  Evaluation Date: 5/18/2022  Authorization Period Expiration: 12/31/2022  Plan of Care Expiration: 8/18/2022  Progress Note Due: 7/16/2022  Visit # / Visits authorized: 10/20 (+1 for evaluation)  FOTO: 1/3      Precautions: Standard, Fall and Weightbearing  PTA Visit #: 1/5     Time In: 0922 am  Time Out: 1005 am  Total Billable Time: 40 minutes    SUBJECTIVE     Pt reports: she has been working on bending it at home 3x a day. She is at about a 3/10 with walking, but she has no pain at rest.     She was compliant with home exercise program.  Response to previous treatment: Patient felt good after last visit.   Functional change: Patient is able to walk a little more.    Pain: 3/10  Location: left knee , lower legs and upper legs       OBJECTIVE     Objective Measures updated at progress report unless specified.     Knee flexion: right 95 degrees         Treatment     Stacey received the treatments listed below:      THERAPEUTIC EXERCISES to develop strength, endurance, ROM, flexibility, posture and core stabilization for (40) minutes including:    Intervention Performed Today    Recumbent bike for range of motion x No level 5 minutes   passive range of motion seated left knee flexion to 90 degrees X 8 minutes   Heel prop with towel  3 minutes, gravity assisted   Quad set  3 minutes   SAQ + NMES  3 minutes    LAQ + NMES   3 minutes    Straight leg raise  X 1 pound, 4 sets of 10 repetition, 2 second hold   Supine hip adduction with ball  3 minutes, legs extended    Supine hip abduction with belt  3 minutes, legs extended   Side lying hip abduction X 1 pound, 3 sets of 10 repetition, no assist, no brace   Prone hip extension X 1 pound, 3 sets of 10 repetition, no assist, no brace   Side lying hip adduction X 1 pound, 3 sets of 10 repetition, no assist, no brace   Sit to stands  2 set of 5 repetition with upper extremity assist   Fitting of Rolling walker  Patient's rolling walker too high.    Heel slides X 4 minutes with 5s holds, 1 straps, slide board with towel   Gluteal sets  3 second hold, 3 sets of 10 repetition    Single leg stance  1 upper extremity, 30 seconds x 3 trials, eyes open  1 upper extremity, 30 seconds x 2 trials, eyes closed   Nustep  Level 1, 5 minutes for weight bearing and range of motion    Prone knee hangs  3 minutes   Mini squats 0-45 degrees   1 sets of 10 repetition, one upper extremity in parallel bars   MOBO board  2 positions, 2 minutes each   Standing toe raises  3 set of 10 repetition    Reassessment     Hamstring curls X Standing, 4 sets of 10 repetition    Stationary bike  No level, 5 minutes   Shuttle X  X Double leg press, 3 cords, 3 sets of 10 repetition   Single leg press: 2 cords, 3 sets of 10 repetition    Ankle 4 way  red theraband, 1 set of 10 repetition each direction     Plan for Next Visit: try upright bike next visit       .NEUROMUSCULAR RE-EDUCATION ACTIVITIES to improve Balance, Coordination, Kinesthetic, Sense, Proprioception and Posture for (0) minutes.  The following were included:    Intervention Performed Today    Left lower extremity step with weight shift, unlocking of knee  1 sets of 10 repetition    Left lower extremity stepping over orange beam with weight shifting and unlocking of knee  2 sets of 10 repetition    bilateral lower extremity step with even step length and weight shift  1 set of 10 reps   Gait training with straight cane with appropriate step length, weight shifting and lifting of toes  6 minutes                          Plan for Next Visit:          MANUAL THERAPY TECHNIQUES were applied for (0) minutes, including:    Manual Intervention Performed Today    Soft Tissue Mobilization  L anterior knee scar mobilizations    Joint Mobilizations  Patellar mobilizations in all directions    Mobilization with movement     Patellar mobilizations  Medial and latera, superior and inferior glides   Manual lymph drainage  Lower legs, posterior/anterior knee, upper leg   Functional Dry Needling        Plan for Next Visit:        Stacey received electrical stimulation for neuromuscular re-education for quad strengthening for 0 minutes to left quad with quad set.       Patient Education and Home Exercises     Home Exercises Provided and Patient Education Provided     Education provided:   - Patient educated once again on proper wearing of locked hinged knee brace.     Written Home Exercises Provided: Patient instructed to cont prior HEP. Exercises were reviewed and Stacey was able to demonstrate them prior to the end of the session.  Stacey demonstrated good  understanding of the education provided. See EMR under Patient Instructions for exercises provided during therapy sessions    ASSESSMENT     Patient with moderate complaints of discomfort with knee flexion activities. Accomplished 100 degrees of knee flexion today, pain accompanied. Good performance of hip strengthening activities today, patient with good fatigue noted. Patient left session with improved gait and no reports of overall increased pain.     Stacey Is progressing well towards her goals.   Pt prognosis is Good.     Pt will continue to benefit from skilled outpatient physical therapy to address the deficits listed in the problem list box on initial evaluation, provide pt/family education and to maximize pt's level of independence in the home and community environment.     Pt's spiritual, cultural and educational needs considered and pt agreeable to plan of care and  goals.     Anticipated barriers to physical therapy: co-morbidities, sedentary lifestyle, adherence to treatment plan and coping style    Goals: Reviewed 06/23/2022  Short Term Goals: 6 weeks  1. SIGNS & SYMPTOMS: Recent signs and systems trend is improving in order to progress towards LTG's. (GOAL MET 6/16/2022)  2. MOBILITY: Patient will improve AROM to 50% of stated goals, listed in objective measures above, in order to progress towards independence with functional activities.  (PROGRESSING 6/16/2022)  3. STRENGTH: Patient will improve strength to 50% of stated goals, listed in objective measures above, in order to progress towards independence with functional activities. (make sure strength goals are set) (PROGRESSING 6/16/2022)  4. GAIT IMPROVING: Patient will demonstrate improved gait mechanics including symmetrical stance time and step length B in order to improve functional mobility, improve quality of life, and decrease risk of further injury or fall.  (PROGRESSNG 6/16/2022)  5. HEP: Patient will demonstrate independence with HEP in order to progress toward functional independence.            Long Term Goals: 12 weeks   1. PAIN: Pt will report worst pain of 3/10 in order to progress toward max functional ability and improve quality of life (GOAL MET 6/9/2022)  2. FUNCTION: Patient will demonstrate improved function as indicated by a functional limitation score of less than or equal to 60 out of 100 on FOTO. (PROGRESSING 6/16/2022)  3. MOBILITY: Patient will improve AROM to stated goals, listed in objective measures above, in order to return to maximal functional potential and improve quality of life. (PROGRESSING 6/16/2022)  4. STRENGTH: Patient will improve strength to stated goals, listed in objective measures above, in order to improve functional independence and quality of life. (PROGRESSING 6/16/2022)  5. GAIT NORMAL: Patient will demonstrate normalized gait mechanics with minimal compensation in order  to return to PLOF. (PROGRESSING 6/16/2022)      PLAN     Continue with current plan of care from 5/18/2022 to 8/18/2022.  Progress patient's exercises and activities per protocol.     Lanie Kumari, PTA, DPT

## 2022-06-24 ENCOUNTER — CLINICAL SUPPORT (OUTPATIENT)
Dept: REHABILITATION | Facility: HOSPITAL | Age: 57
End: 2022-06-24
Payer: COMMERCIAL

## 2022-06-24 DIAGNOSIS — Z74.09 DECREASED FUNCTIONAL MOBILITY AND ENDURANCE: Primary | ICD-10-CM

## 2022-06-24 PROCEDURE — 97110 THERAPEUTIC EXERCISES: CPT | Mod: CQ

## 2022-06-24 NOTE — PROGRESS NOTES
[facility]   Physical Therapy Treatment Note    Name: Stacey Wade  Clinic Number: [id number]    Therapy Diagnosis:   Encounter Diagnosis   Name Primary?    Decreased functional mobility and endurance Yes     Physician: Savannah Lindsey MD    Visit Date: 6/24/2022     Physician Orders: PT Eval and Treat  Medical Diagnosis from Referral: Complex tear of medial meniscus of left knee as current injury, initial encounter  Evaluation Date: 5/18/2022  Authorization Period Expiration: 12/31/2022  Plan of Care Expiration: 8/18/2022  Progress Note Due: 7/16/2022  Visit # / Visits authorized: 11/20 (+1 for evaluation)  FOTO: 1/3      Precautions: Standard, Fall and Weightbearing  PTA Visit #: 2/5     Time In: 1115 am  Time Out: 1200 pm  Total Billable Time: 40 minutes    SUBJECTIVE     Pt reports: 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     She was compliant with home exercise program.  Response to previous treatment: Patient felt good after last visit.   Functional change: Patient is able to walk a little more.    Pain: 4/10  Location: left knee , lower legs and upper legs       OBJECTIVE     Objective Measures updated at progress report unless specified.     Knee flexion: right 95 degrees         Treatment     Stacey received the treatments listed below:      THERAPEUTIC EXERCISES to develop strength, endurance, ROM, flexibility, posture and core stabilization for (40) minutes including:    Intervention Performed Today    Recumbent bike for range of motion x No level 5 minutes   passive range of motion seated left knee flexion > 90 degrees in prone X 10 minutes   Heel prop with towel  3 minutes, gravity assisted   Quad set  3 minutes   SAQ + NMES  3 minutes    LAQ + NMES   3 minutes    Straight leg raise  X 1 pound, 4 sets of 10 repetition, 2  second hold   Supine hip adduction with ball  3 minutes, legs extended   Supine hip abduction with belt  3 minutes, legs extended   Side lying hip abduction X 1 pound, 3 sets of 10 repetition, no assist, no brace   Prone hip extension  1 pound, 3 sets of 10 repetition, no assist, no brace   Side lying hip adduction  1 pound, 3 sets of 10 repetition, no assist, no brace   Sit to stands  2 set of 5 repetition with upper extremity assist   Fitting of Rolling walker  Patient's rolling walker too high.    Heel slides  4 minutes with 5s holds, 1 straps, slide board with towel   Gluteal sets  3 second hold, 3 sets of 10 repetition    Single leg stance  1 upper extremity, 30 seconds x 3 trials, eyes open  1 upper extremity, 30 seconds x 2 trials, eyes closed   Nustep  Level 1, 5 minutes for weight bearing and range of motion    Prone knee hangs  3 minutes   Mini squats 0-45 degrees   1 sets of 10 repetition, one upper extremity in parallel bars   MOBO board  2 positions, 2 minutes each   Standing toe raises  3 set of 10 repetition    Reassessment     Hamstring curls  Standing, 3 sets of 10 repetition    Stationary bike  No level, 5 minutes   Leg Press X Double leg press, 3 plates 3 sets of 10 repetition   Single leg press: 2 cords, 3 sets of 10 repetition    Ankle 4 way  red theraband, 1 set of 10 repetition each direction   Standing hamstring curls x 3x10   Standing weight shifts x 30x5s hold     Plan for Next Visit: try upright bike next visit       .NEUROMUSCULAR RE-EDUCATION ACTIVITIES to improve Balance, Coordination, Kinesthetic, Sense, Proprioception and Posture for (0) minutes.  The following were included:    Intervention Performed Today    Left lower extremity step with weight shift, unlocking of knee  1 sets of 10 repetition    Left lower extremity stepping over orange beam with weight shifting and unlocking of knee  2 sets of 10 repetition    bilateral lower extremity step with even step length and weight shift   1 set of 10 reps   Gait training with straight cane with appropriate step length, weight shifting and lifting of toes  6 minutes               Plan for Next Visit:          MANUAL THERAPY TECHNIQUES were applied for (0) minutes, including:    Manual Intervention Performed Today    Soft Tissue Mobilization  L anterior knee scar mobilizations    Joint Mobilizations  Patellar mobilizations in all directions    Mobilization with movement     Patellar mobilizations  Medial and latera, superior and inferior glides   Manual lymph drainage  Lower legs, posterior/anterior knee, upper leg   Functional Dry Needling        Plan for Next Visit:        Stacey received electrical stimulation for neuromuscular re-education for quad strengthening for 0 minutes to left quad with quad set.       Patient Education and Home Exercises     Home Exercises Provided and Patient Education Provided     Education provided:   - Patient educated once again on proper wearing of locked hinged knee brace.     Written Home Exercises Provided: Patient instructed to cont prior HEP. Exercises were reviewed and Stacey was able to demonstrate them prior to the end of the session.  Stacey demonstrated good  understanding of the education provided. See EMR under Patient Instructions for exercises provided during therapy sessions    ASSESSMENT     Patient with less complaints of discomfort compared to last session. Low tolerance to weight bearing on left side today. Improved performance of quad activation activities today, less verbal cues necessary for proper performance. Prone knee flexion performed today, patient with some discomfort. Achieved >100 degrees of flexion in prone, pain accompanied. Patient left session with soreness.     Stacey Is progressing well towards her goals.   Pt prognosis is Good.     Pt will continue to benefit from skilled outpatient physical therapy to address the deficits listed in the problem list box on initial evaluation,  provide pt/family education and to maximize pt's level of independence in the home and community environment.     Pt's spiritual, cultural and educational needs considered and pt agreeable to plan of care and goals.     Anticipated barriers to physical therapy: co-morbidities, sedentary lifestyle, adherence to treatment plan and coping style    Goals: Reviewed 06/24/2022  Short Term Goals: 6 weeks  1. SIGNS & SYMPTOMS: Recent signs and systems trend is improving in order to progress towards LTG's. (GOAL MET 6/16/2022)  2. MOBILITY: Patient will improve AROM to 50% of stated goals, listed in objective measures above, in order to progress towards independence with functional activities.  (PROGRESSING 6/16/2022)  3. STRENGTH: Patient will improve strength to 50% of stated goals, listed in objective measures above, in order to progress towards independence with functional activities. (make sure strength goals are set) (PROGRESSING 6/16/2022)  4. GAIT IMPROVING: Patient will demonstrate improved gait mechanics including symmetrical stance time and step length B in order to improve functional mobility, improve quality of life, and decrease risk of further injury or fall.  (PROGRESSNG 6/16/2022)  5. HEP: Patient will demonstrate independence with HEP in order to progress toward functional independence.            Long Term Goals: 12 weeks   1. PAIN: Pt will report worst pain of 3/10 in order to progress toward max functional ability and improve quality of life (GOAL MET 6/9/2022)  2. FUNCTION: Patient will demonstrate improved function as indicated by a functional limitation score of less than or equal to 60 out of 100 on FOTO. (PROGRESSING 6/16/2022)  3. MOBILITY: Patient will improve AROM to stated goals, listed in objective measures above, in order to return to maximal functional potential and improve quality of life. (PROGRESSING 6/16/2022)  4. STRENGTH: Patient will improve strength to stated goals, listed in  objective measures above, in order to improve functional independence and quality of life. (PROGRESSING 6/16/2022)  5. GAIT NORMAL: Patient will demonstrate normalized gait mechanics with minimal compensation in order to return to PLOF. (PROGRESSING 6/16/2022)      PLAN     Continue with current plan of care from 5/18/2022 to 8/18/2022.  Progress patient's exercises and activities per protocol.     Lanie Kumari, PTA

## 2022-06-28 ENCOUNTER — CLINICAL SUPPORT (OUTPATIENT)
Dept: REHABILITATION | Facility: HOSPITAL | Age: 57
End: 2022-06-28
Payer: COMMERCIAL

## 2022-06-28 DIAGNOSIS — Z74.09 DECREASED FUNCTIONAL MOBILITY AND ENDURANCE: Primary | ICD-10-CM

## 2022-06-28 PROCEDURE — 97110 THERAPEUTIC EXERCISES: CPT

## 2022-06-28 NOTE — PROGRESS NOTES
[facility]   Physical Therapy Treatment Note    Name: Stacey Wade  Clinic Number: [id number]    Therapy Diagnosis:   Encounter Diagnosis   Name Primary?    Decreased functional mobility and endurance Yes     Physician: Savannah Lindsey MD    Visit Date: 6/28/2022     Physician Orders: PT Eval and Treat  Medical Diagnosis from Referral: Complex tear of medial meniscus of left knee as current injury, initial encounter  Evaluation Date: 5/18/2022  Authorization Period Expiration: 12/31/2022  Plan of Care Expiration: 8/18/2022  Progress Note Due: 7/16/2022  Visit # / Visits authorized: 12/20 (+1 for evaluation)  FOTO: 1/3      Precautions: Standard, Fall and Weightbearing  PTA Visit #: 2/5     Time In: 10:15 am  Time Out: 11:00 am  Total Billable Time: 40 minutes    SUBJECTIVE     Pt reports: she admits to 7-8/10 pain in her left knee today.  She had a rough day at work yesterday.  Her knee is swollen and painful.  She has been back at work since last Thursday.  She feels like work is trying to kill her.     She was compliant with home exercise program.  Response to previous treatment: Patient felt good after last visit.   Functional change: Patient is able to move her knee more.    Pain: 8/10  Location: left knee , lower legs and upper legs       OBJECTIVE     Objective Measures updated at progress report unless specified.     Left Knee flexion: 104 degrees       Treatment     Stacey received the treatments listed below:      THERAPEUTIC EXERCISES to develop strength, endurance, ROM, flexibility, posture and core stabilization for (40) minutes including:    Intervention Performed Today    Recumbent bike for range of motion x No level 5 minutes   passive range of motion seated left knee flexion > 90 degrees in prone X 5 minutes   Heel prop with towel  3 minutes, gravity assisted   Quad set  3 minutes   SAQ + NMES  3 minutes    LAQ + NMES   3 minutes    Straight leg raise  X 2  pound, 3 sets of 10 repetition, 2 second hold   Supine hip adduction with ball  3 minutes, legs extended   Supine hip abduction with belt  3 minutes, legs extended   Side lying hip abduction X 2 pound, 3 sets of 10 repetition, no assist, no brace   Prone hip extension X 2 pound, 3 sets of 10 repetition, no assist, no brace   Side lying hip adduction X 2 pound, 3 sets of 10 repetition, no assist, no brace   Sit to stands  2 set of 5 repetition with upper extremity assist   Fitting of Rolling walker  Patient's rolling walker too high.    Heel slides X 4 minutes with 5s holds, 2 straps, slide board with towel   Gluteal sets  3 second hold, 3 sets of 10 repetition    Single leg stance  1 upper extremity, 30 seconds x 3 trials, eyes open  1 upper extremity, 30 seconds x 2 trials, eyes closed   Nustep  Level 1, 5 minutes for weight bearing and range of motion    Prone knee hangs  3 minutes   Mini squats 0-45 degrees   1 sets of 10 repetition, one upper extremity in parallel bars   MOBO board  2 positions, 2 minutes each   Standing toe raises  3 set of 10 repetition    Reassessment     Hamstring curls  Standing, 3 sets of 10 repetition    Stationary bike  No level, 5 minutes   Leg Press/ Shuttle X  X  X Double leg press, 4 cords, 3 minutes  Double heel raises: 3 cords, 3 sets of 10 repetition   Single leg press: 3 cords, 3 minutes   Ankle 4 way  red theraband, 1 set of 10 repetition each direction   Standing hamstring curls x 3x10   Standing weight shifts  30x5s hold   Standing marches X 3 sets of 10 repetition    Standing heel raises X 3 sets of 10 repetition      Plan for Next Visit: try upright bike next visit       .NEUROMUSCULAR RE-EDUCATION ACTIVITIES to improve Balance, Coordination, Kinesthetic, Sense, Proprioception and Posture for (0) minutes.  The following were included:    Intervention Performed Today    Left lower extremity step with weight shift, unlocking of knee  1 sets of 10 repetition    Left lower  extremity stepping over orange beam with weight shifting and unlocking of knee  2 sets of 10 repetition    bilateral lower extremity step with even step length and weight shift  1 set of 10 reps   Gait training with straight cane with appropriate step length, weight shifting and lifting of toes  6 minutes               Plan for Next Visit:          MANUAL THERAPY TECHNIQUES were applied for (0) minutes, including:    Manual Intervention Performed Today    Soft Tissue Mobilization  L anterior knee scar mobilizations    Joint Mobilizations  Patellar mobilizations in all directions    Mobilization with movement     Patellar mobilizations  Medial and latera, superior and inferior glides   Manual lymph drainage  Lower legs, posterior/anterior knee, upper leg   Functional Dry Needling        Plan for Next Visit:        Stacey received electrical stimulation for neuromuscular re-education for quad strengthening for 0 minutes to left quad with quad set.       Patient Education and Home Exercises     Home Exercises Provided and Patient Education Provided     Education provided:   - Patient educated once again on proper wearing of locked hinged knee brace.     Written Home Exercises Provided: Patient instructed to cont prior HEP. Exercises were reviewed and Stacey was able to demonstrate them prior to the end of the session.  Stacey demonstrated good  understanding of the education provided. See EMR under Patient Instructions for exercises provided during therapy sessions    ASSESSMENT     Patient able to tolerate more resistance on shuttle today with double and single leg press as well as straight leg raise 4 way.  Patient with limited carry over from visit to visit.  Patient continues to require verbal cues for decreasing pace of each exercise.  Patient educated on proper heel toe gait instead of severe lateral weight shift to circumduct leg.      Stacey Is progressing well towards her goals.   Pt prognosis is Good.     Pt  will continue to benefit from skilled outpatient physical therapy to address the deficits listed in the problem list box on initial evaluation, provide pt/family education and to maximize pt's level of independence in the home and community environment.     Pt's spiritual, cultural and educational needs considered and pt agreeable to plan of care and goals.     Anticipated barriers to physical therapy: co-morbidities, sedentary lifestyle, adherence to treatment plan and coping style    Goals: Reviewed 06/28/2022  Short Term Goals: 6 weeks  1. SIGNS & SYMPTOMS: Recent signs and systems trend is improving in order to progress towards LTG's. (GOAL MET 6/16/2022)  2. MOBILITY: Patient will improve AROM to 50% of stated goals, listed in objective measures above, in order to progress towards independence with functional activities.  (PROGRESSING 6/16/2022)  3. STRENGTH: Patient will improve strength to 50% of stated goals, listed in objective measures above, in order to progress towards independence with functional activities. (make sure strength goals are set) (PROGRESSING 6/16/2022)  4. GAIT IMPROVING: Patient will demonstrate improved gait mechanics including symmetrical stance time and step length B in order to improve functional mobility, improve quality of life, and decrease risk of further injury or fall.  (PROGRESSNG 6/16/2022)  5. HEP: Patient will demonstrate independence with HEP in order to progress toward functional independence.            Long Term Goals: 12 weeks   1. PAIN: Pt will report worst pain of 3/10 in order to progress toward max functional ability and improve quality of life (GOAL MET 6/9/2022)  2. FUNCTION: Patient will demonstrate improved function as indicated by a functional limitation score of less than or equal to 60 out of 100 on FOTO. (PROGRESSING 6/16/2022)  3. MOBILITY: Patient will improve AROM to stated goals, listed in objective measures above, in order to return to maximal  functional potential and improve quality of life. (PROGRESSING 6/16/2022)  4. STRENGTH: Patient will improve strength to stated goals, listed in objective measures above, in order to improve functional independence and quality of life. (PROGRESSING 6/16/2022)  5. GAIT NORMAL: Patient will demonstrate normalized gait mechanics with minimal compensation in order to return to PLOF. (PROGRESSING 6/16/2022)      PLAN     Continue with current plan of care from 5/18/2022 to 8/18/2022.  Progress patient's exercises and activities per protocol.     Mckayla Melara, PT, DPT

## 2022-06-29 ENCOUNTER — PATIENT MESSAGE (OUTPATIENT)
Dept: FAMILY MEDICINE | Facility: CLINIC | Age: 57
End: 2022-06-29
Payer: COMMERCIAL

## 2022-06-29 RX ORDER — CLORAZEPATE DIPOTASSIUM 3.75 MG/1
3.75 TABLET ORAL
OUTPATIENT
Start: 2022-06-29

## 2022-06-29 NOTE — TELEPHONE ENCOUNTER
No new care gaps identified.  [facility] Embedded Care Gaps. Reference number: [id number]. 6/29/2022   12:17:25 PM CDT

## 2022-06-29 NOTE — TELEPHONE ENCOUNTER
No new care gaps identified.  [facility] Embedded Care Gaps. Reference number: [id number]. 6/29/2022   1:33:44 PM CDT

## 2022-06-30 ENCOUNTER — CLINICAL SUPPORT (OUTPATIENT)
Dept: REHABILITATION | Facility: HOSPITAL | Age: 57
End: 2022-06-30
Payer: COMMERCIAL

## 2022-06-30 ENCOUNTER — PATIENT MESSAGE (OUTPATIENT)
Dept: ORTHOPEDICS | Facility: CLINIC | Age: 57
End: 2022-06-30
Payer: COMMERCIAL

## 2022-06-30 DIAGNOSIS — Z74.09 DECREASED FUNCTIONAL MOBILITY AND ENDURANCE: Primary | ICD-10-CM

## 2022-06-30 PROCEDURE — 97140 MANUAL THERAPY 1/> REGIONS: CPT | Mod: CQ

## 2022-06-30 PROCEDURE — 97110 THERAPEUTIC EXERCISES: CPT | Mod: CQ

## 2022-06-30 NOTE — PROGRESS NOTES
[facility]   Physical Therapy Treatment Note    Name: Stacey Wade  Clinic Number: [id number]    Therapy Diagnosis:   Encounter Diagnosis   Name Primary?    Decreased functional mobility and endurance Yes     Physician: Savannah Lindsey MD    Visit Date: 6/30/2022     Physician Orders: PT Eval and Treat  Medical Diagnosis from Referral: Complex tear of medial meniscus of left knee as current injury, initial encounter  Evaluation Date: 5/18/2022  Authorization Period Expiration: 12/31/2022  Plan of Care Expiration: 8/18/2022  Progress Note Due: 7/16/2022  Visit # / Visits authorized: 13/20 (+1 for evaluation)  FOTO: 1/3      Precautions: Standard, Fall and Weightbearing  PTA Visit #: 1/5     Time In: 9:15 am  Time Out: 10:00 am  Total Billable Time: 40 minutes    SUBJECTIVE     Pt reports: her knee was throbbing all last night. She tried to use the ice machine from surgery, but she states it would not work. She will inquire with her MD about this.     She was compliant with home exercise program.  Response to previous treatment: Patient felt good after last visit.   Functional change: Patient is able to move her knee more.    Pain: 7/10  Location: left knee , lower legs and upper legs       OBJECTIVE     Objective Measures updated at progress report unless specified.     Left Knee flexion: 110 degrees post treatment      Treatment     Stacey received the treatments listed below:      THERAPEUTIC EXERCISES to develop strength, endurance, ROM, flexibility, posture and core stabilization for (30) minutes including:    Intervention Performed Today    Recumbent bike for range of motion x No level 7 minutes   passive range of motion seated left knee flexion > 90 degrees in prone X 5 minutes   Knee over toe stretch x 10s 10x each bilateral   Heel prop with towel  3 minutes, gravity assisted   Quad set  3 minutes   SAQ + NMES  3 minutes    LAQ + NMES   3 minutes    Straight leg raise    2 pound, 3 sets of 10 repetition, 2 second hold   Supine hip adduction with ball  3 minutes, legs extended   Supine hip abduction with belt  3 minutes, legs extended   Side lying hip abduction  2 pound, 3 sets of 10 repetition, no assist, no brace   Prone hip extension  2 pound, 3 sets of 10 repetition, no assist, no brace   Side lying hip adduction  2 pound, 3 sets of 10 repetition, no assist, no brace   Sit to stands  2 set of 5 repetition with upper extremity assist   Fitting of Rolling walker  Patient's rolling walker too high.    Heel slides  4 minutes with 5s holds, 2 straps, slide board with towel   Gluteal sets  3 second hold, 3 sets of 10 repetition    Single leg stance  1 upper extremity, 30 seconds x 3 trials, eyes open  1 upper extremity, 30 seconds x 2 trials, eyes closed   Nustep  Level 1, 5 minutes for weight bearing and range of motion    Prone knee hangs  3 minutes   Mini squats 0-45 degrees   1 sets of 10 repetition, one upper extremity in parallel bars   MOBO board  2 positions, 2 minutes each   Standing toe raises  3 set of 10 repetition    Reassessment     Hamstring curls  Standing, 3 sets of 10 repetition    Stationary bike  No level, 5 minutes   Leg Press/ Shuttle X  X  X Double leg press, 4 cords, 3 minutes  Double heel raises: 3 cords, 3 sets of 10 repetition   Single leg press: 3 cords, 3 minutes   Ankle 4 way  red theraband, 1 set of 10 repetition each direction   Standing hamstring curls x 3x10   Standing weight shifts  30x5s hold   Standing marches  3 sets of 10 repetition    Standing heel raises X 3 sets of 10 repetition    Step ups x 3x10 left     Plan for Next Visit: try upright bike next visit       .NEUROMUSCULAR RE-EDUCATION ACTIVITIES to improve Balance, Coordination, Kinesthetic, Sense, Proprioception and Posture for (0) minutes.  The following were included:    Intervention Performed Today    Left lower extremity step with weight shift, unlocking of knee  1 sets of 10 repetition     Left lower extremity stepping over orange beam with weight shifting and unlocking of knee  2 sets of 10 repetition    bilateral lower extremity step with even step length and weight shift  1 set of 10 reps   Gait training with straight cane with appropriate step length, weight shifting and lifting of toes  6 minutes               Plan for Next Visit:          MANUAL THERAPY TECHNIQUES were applied for (10) minutes, including:    Manual Intervention Performed Today    Soft Tissue Mobilization x L anterior knee scar mobilizations    Joint Mobilizations x Patellar mobilizations in all directions    Mobilization with movement     Patellar mobilizations x Medial and latera, superior and inferior glides   Manual lymph drainage  Lower legs, posterior/anterior knee, upper leg   Functional Dry Needling        Plan for Next Visit:        Stacey received electrical stimulation for neuromuscular re-education for quad strengthening for 0 minutes to left quad with quad set.       Patient Education and Home Exercises     Home Exercises Provided and Patient Education Provided     Education provided:   - Patient educated once again on proper wearing of locked hinged knee brace.     Written Home Exercises Provided: Patient instructed to cont prior HEP. Exercises were reviewed and Stacey was able to demonstrate them prior to the end of the session.  Stacey demonstrated good  understanding of the education provided. See EMR under Patient Instructions for exercises provided during therapy sessions    ASSESSMENT     Patient did well with session today with some complaints of discomfort secondary to limited range in left knee flexion. Added step ups and knee over toe stretch, patient with good fatigue noted. Improved tolerance to passive knee flexion stretch today, patient achieving 110 degrees of passive knee flexion in supine post treatment. Patient was able to perform full revolution on recumbent bike towards end of activity.  Patient left session with soreness.     Stacey Is progressing well towards her goals.   Pt prognosis is Good.     Pt will continue to benefit from skilled outpatient physical therapy to address the deficits listed in the problem list box on initial evaluation, provide pt/family education and to maximize pt's level of independence in the home and community environment.     Pt's spiritual, cultural and educational needs considered and pt agreeable to plan of care and goals.     Anticipated barriers to physical therapy: co-morbidities, sedentary lifestyle, adherence to treatment plan and coping style    Goals: Reviewed 06/30/2022  Short Term Goals: 6 weeks  1. SIGNS & SYMPTOMS: Recent signs and systems trend is improving in order to progress towards LTG's. (GOAL MET 6/16/2022)  2. MOBILITY: Patient will improve AROM to 50% of stated goals, listed in objective measures above, in order to progress towards independence with functional activities.  (PROGRESSING 6/16/2022)  3. STRENGTH: Patient will improve strength to 50% of stated goals, listed in objective measures above, in order to progress towards independence with functional activities. (make sure strength goals are set) (PROGRESSING 6/16/2022)  4. GAIT IMPROVING: Patient will demonstrate improved gait mechanics including symmetrical stance time and step length B in order to improve functional mobility, improve quality of life, and decrease risk of further injury or fall.  (PROGRESSNG 6/16/2022)  5. HEP: Patient will demonstrate independence with HEP in order to progress toward functional independence.            Long Term Goals: 12 weeks   1. PAIN: Pt will report worst pain of 3/10 in order to progress toward max functional ability and improve quality of life (GOAL MET 6/9/2022)  2. FUNCTION: Patient will demonstrate improved function as indicated by a functional limitation score of less than or equal to 60 out of 100 on FOTO. (PROGRESSING 6/16/2022)  3. MOBILITY:  Patient will improve AROM to stated goals, listed in objective measures above, in order to return to maximal functional potential and improve quality of life. (PROGRESSING 6/16/2022)  4. STRENGTH: Patient will improve strength to stated goals, listed in objective measures above, in order to improve functional independence and quality of life. (PROGRESSING 6/16/2022)  5. GAIT NORMAL: Patient will demonstrate normalized gait mechanics with minimal compensation in order to return to PLOF. (PROGRESSING 6/16/2022)      PLAN     Continue with current plan of care from 5/18/2022 to 8/18/2022.  Progress patient's exercises and activities per protocol.     Lanie Kumari, PTA

## 2022-07-01 ENCOUNTER — CLINICAL SUPPORT (OUTPATIENT)
Dept: REHABILITATION | Facility: HOSPITAL | Age: 57
End: 2022-07-01
Payer: COMMERCIAL

## 2022-07-01 DIAGNOSIS — Z74.09 DECREASED FUNCTIONAL MOBILITY AND ENDURANCE: Primary | ICD-10-CM

## 2022-07-01 PROCEDURE — 97110 THERAPEUTIC EXERCISES: CPT | Mod: CQ

## 2022-07-01 PROCEDURE — 97140 MANUAL THERAPY 1/> REGIONS: CPT | Mod: CQ

## 2022-07-01 RX ORDER — PREDNISONE 20 MG/1
TABLET ORAL
Qty: 10 TABLET | Refills: 0 | OUTPATIENT
Start: 2022-07-01

## 2022-07-01 RX ORDER — CLORAZEPATE DIPOTASSIUM 3.75 MG/1
TABLET ORAL
Qty: 30 TABLET | Refills: 0 | OUTPATIENT
Start: 2022-07-01

## 2022-07-01 NOTE — TELEPHONE ENCOUNTER
No new care gaps identified.  [facility] Embedded Care Gaps. Reference number: [id number]. 7/01/2022   12:27:49 PM CDT

## 2022-07-01 NOTE — TELEPHONE ENCOUNTER
----- Message from Tobin Martinez sent at 7/1/2022 11:35 AM CDT -----  Contact: pt  Is calling to get an rx for prednisone and medro dose pack/ recently had surgery / she's behind and flared up( hives)and the Dr knows what's going on and wants to have something to calm her and can be reached at [phone] and prtal message       [facility] - [place] - [id number]  [place] [address]  Phone: [phone] Fax: [phone]

## 2022-07-01 NOTE — PROGRESS NOTES
[facility]   Physical Therapy Treatment Note    Name: Stacey Wade  Clinic Number: [id number]    Therapy Diagnosis:   Encounter Diagnosis   Name Primary?    Decreased functional mobility and endurance Yes     Physician: Savannah Lindsey MD    Visit Date: 7/1/2022     Physician Orders: PT Eval and Treat  Medical Diagnosis from Referral: Complex tear of medial meniscus of left knee as current injury, initial encounter  Evaluation Date: 5/18/2022  Authorization Period Expiration: 12/31/2022  Plan of Care Expiration: 8/18/2022  Progress Note Due: 7/16/2022  Visit # / Visits authorized: 14/20 (+1 for evaluation)  FOTO: 1/3      Precautions: Standard, Fall and Weightbearing  PTA Visit #: 2/5     Time In: 3:50 pm  Time Out: 4:30 pm  Total Billable Time: 40 minutes    SUBJECTIVE     Pt reports: she was very sore after last visit. She worked today, so her pain is around a 6.    She was compliant with home exercise program.  Response to previous treatment: Patient felt good after last visit.   Functional change: Patient is able to move her knee more.    Pain: 6/10  Location: left knee , lower legs and upper legs       OBJECTIVE     Objective Measures updated at progress report unless specified.       Left Knee flexion: 113 degrees post treatment (105 pre treatment)      Treatment     Stacey received the treatments listed below:      THERAPEUTIC EXERCISES to develop strength, endurance, ROM, flexibility, posture and core stabilization for (30) minutes including:    Intervention Performed Today    Recumbent bike for range of motion x No level 7 minutes   passive range of motion seated left knee flexion > 90 degrees in prone X 5 minutes   Knee over toe stretch x 10s 10x each bilateral   Heel prop with towel  3 minutes, gravity assisted   Quad set  3 minutes   SAQ + NMES  3 minutes    LAQ + NMES   3 minutes    Straight leg raise   2 pound, 3 sets of 10 repetition, 2 second hold   Supine hip  adduction with ball  3 minutes, legs extended   Supine hip abduction with belt  3 minutes, legs extended   Side lying hip abduction  2 pound, 3 sets of 10 repetition, no assist, no brace   Prone hip extension  2 pound, 3 sets of 10 repetition, no assist, no brace   Side lying hip adduction  2 pound, 3 sets of 10 repetition, no assist, no brace   Sit to stands  2 set of 5 repetition with upper extremity assist   Fitting of Rolling walker  Patient's rolling walker too high.    Heel slides  4 minutes with 5s holds, 2 straps, slide board with towel   Gluteal sets  3 second hold, 3 sets of 10 repetition    Single leg stance  1 upper extremity, 30 seconds x 3 trials, eyes open  1 upper extremity, 30 seconds x 2 trials, eyes closed   Nustep  Level 1, 5 minutes for weight bearing and range of motion    Prone knee hangs  3 minutes   Mini squats 0-45 degrees   1 sets of 10 repetition, one upper extremity in parallel bars   MOBO board  2 positions, 2 minutes each   Standing toe raises  3 set of 10 repetition    Reassessment     Hamstring curls  Standing, 3 sets of 10 repetition    Stationary bike  No level, 5 minutes   Leg Press/ Shuttle X    X Double leg press, 4 cords, 3 minutes  Double heel raises: 3 cords, 3 sets of 10 repetition   Single leg press: 3 cords, 3 minutes   Ankle 4 way  red theraband, 1 set of 10 repetition each direction   Standing hamstring curls x 3x10   Standing weight shifts  30x5s hold   Standing marches  3 sets of 10 repetition    Standing heel raises X 3 sets of 10 repetition    Step ups x 3x10 left   Bridges x 3x10     Plan for Next Visit: try upright bike next visit       .NEUROMUSCULAR RE-EDUCATION ACTIVITIES to improve Balance, Coordination, Kinesthetic, Sense, Proprioception and Posture for (0) minutes.  The following were included:    Intervention Performed Today    Left lower extremity step with weight shift, unlocking of knee  1 sets of 10 repetition    Left lower extremity stepping over orange  beam with weight shifting and unlocking of knee  2 sets of 10 repetition    bilateral lower extremity step with even step length and weight shift  1 set of 10 reps   Gait training with straight cane with appropriate step length, weight shifting and lifting of toes  6 minutes               Plan for Next Visit:          MANUAL THERAPY TECHNIQUES were applied for (10) minutes, including:    Manual Intervention Performed Today    Soft Tissue Mobilization x L anterior knee scar mobilizations    Joint Mobilizations x Patellar mobilizations in all directions    Mobilization with movement     Patellar mobilizations x Medial and latera, superior and inferior glides   Manual lymph drainage  Lower legs, posterior/anterior knee, upper leg   Functional Dry Needling        Plan for Next Visit:        Stacey received electrical stimulation for neuromuscular re-education for quad strengthening for 0 minutes to left quad with quad set.       Patient Education and Home Exercises     Home Exercises Provided and Patient Education Provided     Education provided:   - Patient educated once again on proper wearing of locked hinged knee brace.     Written Home Exercises Provided: Patient instructed to cont prior HEP. Exercises were reviewed and Stacey was able to demonstrate them prior to the end of the session.  Stacey demonstrated good  understanding of the education provided. See EMR under Patient Instructions for exercises provided during therapy sessions    ASSESSMENT     Patient did well with session today with minimal complaints of discomfort. Patient able to perform full revolutions on recumbent bike today at normal seat distance, improvement noted. Increased pain with passive stretching into knee flexion in supine, achieving 113 degrees post treatment. Patient left session with soreness but decreased pain with gait.     Stacey Is progressing well towards her goals.   Pt prognosis is Good.     Pt will continue to benefit from  skilled outpatient physical therapy to address the deficits listed in the problem list box on initial evaluation, provide pt/family education and to maximize pt's level of independence in the home and community environment.     Pt's spiritual, cultural and educational needs considered and pt agreeable to plan of care and goals.     Anticipated barriers to physical therapy: co-morbidities, sedentary lifestyle, adherence to treatment plan and coping style    Goals: Reviewed 07/01/2022  Short Term Goals: 6 weeks  1. SIGNS & SYMPTOMS: Recent signs and systems trend is improving in order to progress towards LTG's. (GOAL MET 6/16/2022)  2. MOBILITY: Patient will improve AROM to 50% of stated goals, listed in objective measures above, in order to progress towards independence with functional activities.  (PROGRESSING 6/16/2022)  3. STRENGTH: Patient will improve strength to 50% of stated goals, listed in objective measures above, in order to progress towards independence with functional activities. (make sure strength goals are set) (PROGRESSING 6/16/2022)  4. GAIT IMPROVING: Patient will demonstrate improved gait mechanics including symmetrical stance time and step length B in order to improve functional mobility, improve quality of life, and decrease risk of further injury or fall.  (PROGRESSNG 6/16/2022)  5. HEP: Patient will demonstrate independence with HEP in order to progress toward functional independence.            Long Term Goals: 12 weeks   1. PAIN: Pt will report worst pain of 3/10 in order to progress toward max functional ability and improve quality of life (GOAL MET 6/9/2022)  2. FUNCTION: Patient will demonstrate improved function as indicated by a functional limitation score of less than or equal to 60 out of 100 on FOTO. (PROGRESSING 6/16/2022)  3. MOBILITY: Patient will improve AROM to stated goals, listed in objective measures above, in order to return to maximal functional potential and improve  quality of life. (PROGRESSING 6/16/2022)  4. STRENGTH: Patient will improve strength to stated goals, listed in objective measures above, in order to improve functional independence and quality of life. (PROGRESSING 6/16/2022)  5. GAIT NORMAL: Patient will demonstrate normalized gait mechanics with minimal compensation in order to return to PLOF. (PROGRESSING 6/16/2022)      PLAN     Continue with current plan of care from 5/18/2022 to 8/18/2022.  Progress patient's exercises and activities per protocol.     Lanie Kumari, PTA

## 2022-07-01 NOTE — TELEPHONE ENCOUNTER
Requested Prescriptions     Pending Prescriptions Disp Refills    clorazepate (TRANXENE) 3.75 MG Tab [Pharmacy Med Name: clorazepate dipotassium 3.75 mg tablet] 30 tablet 0     Sig: TAKE 1 TABLET BY MOUTH EVERY DAY AS NEEDED     Pharmacy sent request for med refill.     CHRISTINE 4/22/22   CLEM FAJARDO NA

## 2022-07-05 ENCOUNTER — CLINICAL SUPPORT (OUTPATIENT)
Dept: REHABILITATION | Facility: HOSPITAL | Age: 57
End: 2022-07-05
Payer: COMMERCIAL

## 2022-07-05 DIAGNOSIS — Z74.09 DECREASED FUNCTIONAL MOBILITY AND ENDURANCE: Primary | ICD-10-CM

## 2022-07-05 PROCEDURE — 97110 THERAPEUTIC EXERCISES: CPT

## 2022-07-05 NOTE — PROGRESS NOTES
[facility]   Physical Therapy Treatment Note    Name: Stacey Wade  Clinic Number: [id number]    Therapy Diagnosis:   Encounter Diagnosis   Name Primary?    Decreased functional mobility and endurance Yes     Physician: Savannah Lindsey MD    Visit Date: 7/5/2022     Physician Orders: PT Eval and Treat  Medical Diagnosis from Referral: Complex tear of medial meniscus of left knee as current injury, initial encounter  Evaluation Date: 5/18/2022  Authorization Period Expiration: 12/31/2022  Plan of Care Expiration: 8/18/2022  Progress Note Due: 7/16/2022  Visit # / Visits authorized: 15/20 (+1 for evaluation)  FOTO: 1/3      Precautions: Standard, Fall and Weightbearing  PTA Visit #: 2/5     Time In: 9:36 am  Time Out: 10:15 pm  Total Billable Time: 38 minutes    SUBJECTIVE     Pt reports: she is stiff today.  She has 2/10 pain today.  She kept ice on it all day yesterday because it was hurting her and it was stiff.  Patient states the walking at work is still difficult for her.      She was compliant with home exercise program.  Response to previous treatment: Patient felt good after last visit.    Functional change: Patient is able to bend her knee more.     Pain: 2/10  Location: left knee , lower legs and upper legs       OBJECTIVE     Objective Measures updated at progress report unless specified.       Left Knee flexion: 105 degrees      Treatment     Stacey received the treatments listed below:      THERAPEUTIC EXERCISES to develop strength, endurance, ROM, flexibility, posture and core stabilization for (38) minutes including:    Intervention Performed Today    Recumbent bike for range of motion x No level 5 minutes   passive range of motion seated left knee flexion > 90 degrees in prone X 8 minutes   Knee over toe stretch x 5s 5x left lower extremity only   Heel prop with towel  3 minutes, gravity assisted   Quad set  3 minutes   SAQ + NMES  3 minutes    LAQ + NMES   3  minutes    Straight leg raise   2 pound, 3 sets of 10 repetition, 2 second hold   Supine hip adduction with ball  3 minutes, legs extended   Supine hip abduction with belt  3 minutes, legs extended   Side lying hip abduction  2 pound, 3 sets of 10 repetition, no assist, no brace   Prone hip extension  2 pound, 3 sets of 10 repetition, no assist, no brace   Side lying hip adduction  2 pound, 3 sets of 10 repetition, no assist, no brace   Sit to stands  2 set of 5 repetition with upper extremity assist   Fitting of Rolling walker  Patient's rolling walker too high.    Heel slides  4 minutes with 5s holds, 2 straps, slide board with towel   Gluteal sets  3 second hold, 3 sets of 10 repetition    Single leg stance  1 upper extremity, 30 seconds x 3 trials, eyes open  1 upper extremity, 30 seconds x 2 trials, eyes closed   Nustep  Level 1, 5 minutes for weight bearing and range of motion    Prone knee hangs  3 minutes   Mini squats 0-45 degrees   1 sets of 10 repetition, one upper extremity in parallel bars   MOBO board  2 positions, 2 minutes each   Standing toe raises  3 set of 10 repetition    Reassessment     Hamstring curls  Standing, 3 sets of 10 repetition    Stationary bike  No level, 5 minutes   Leg Press/ Shuttle X  X  X Double leg press, 5 cords, 3 sets of 10 repetition   Double heel raises: 5 cords, 3 sets of 10 repetition   Single leg press: 4 cords, 3 sets of 10 repetition    Ankle 4 way  red theraband, 1 set of 10 repetition each direction   Standing hamstring curls x 3x10   Standing weight shifts  30x5s hold   Standing marches  3 sets of 10 repetition each lower extremity    Standing eccentric heel raises X 3 sets of 10 repetition    Step ups, 4 inch x 3x10 left   Bridges x 3x10 with ball   Prone knee flexion with strap X 3 minutes     Plan for Next Visit: try upright bike next visit       .NEUROMUSCULAR RE-EDUCATION ACTIVITIES to improve Balance, Coordination, Kinesthetic, Sense, Proprioception and  Posture for (0) minutes.  The following were included:    Intervention Performed Today    Left lower extremity step with weight shift, unlocking of knee  1 sets of 10 repetition    Left lower extremity stepping over orange beam with weight shifting and unlocking of knee  2 sets of 10 repetition    bilateral lower extremity step with even step length and weight shift  1 set of 10 reps   Gait training with straight cane with appropriate step length, weight shifting and lifting of toes  6 minutes               Plan for Next Visit:          MANUAL THERAPY TECHNIQUES were applied for (0) minutes, including:    Manual Intervention Performed Today    Soft Tissue Mobilization  L anterior knee scar mobilizations    Joint Mobilizations  Patellar mobilizations in all directions    Mobilization with movement     Patellar mobilizations  Medial and latera, superior and inferior glides   Manual lymph drainage  Lower legs, posterior/anterior knee, upper leg   Functional Dry Needling        Plan for Next Visit:        Stacey received electrical stimulation for neuromuscular re-education for quad strengthening for 0 minutes to left quad with quad set.       Patient Education and Home Exercises     Home Exercises Provided and Patient Education Provided     Education provided:   - Patient educated once again on proper wearing of locked hinged knee brace.     Written Home Exercises Provided: Patient instructed to cont prior HEP. Exercises were reviewed and Stacey was able to demonstrate them prior to the end of the session.  Stacey demonstrated good  understanding of the education provided. See EMR under Patient Instructions for exercises provided during therapy sessions    ASSESSMENT     Patient presents to therapy late today.  Patient able to perform full revolutions on bike and was short of breath at end of 5 minutes.  Patient required verbal cues on bike as well as with gait to prevent excessive lateral lean.  Patient only able to  get to 105 with knee flexion despite using prone passive range of motion to left knee as well as active assisted prone knee flexion with strap.  Patient required maximum verbal cues to breathe through stretching to allow for more range of motion.  Patient given limited rest breaks between each exercise and was able to perform more standing exercises today.      Stacey Is progressing well towards her goals.   Pt prognosis is Good.     Pt will continue to benefit from skilled outpatient physical therapy to address the deficits listed in the problem list box on initial evaluation, provide pt/family education and to maximize pt's level of independence in the home and community environment.     Pt's spiritual, cultural and educational needs considered and pt agreeable to plan of care and goals.     Anticipated barriers to physical therapy: co-morbidities, sedentary lifestyle, adherence to treatment plan and coping style    Goals: Reviewed 07/05/2022  Short Term Goals: 6 weeks  1. SIGNS & SYMPTOMS: Recent signs and systems trend is improving in order to progress towards LTG's. (GOAL MET 6/16/2022)  2. MOBILITY: Patient will improve AROM to 50% of stated goals, listed in objective measures above, in order to progress towards independence with functional activities.  (PROGRESSING 6/16/2022)  3. STRENGTH: Patient will improve strength to 50% of stated goals, listed in objective measures above, in order to progress towards independence with functional activities. (make sure strength goals are set) (PROGRESSING 6/16/2022)  4. GAIT IMPROVING: Patient will demonstrate improved gait mechanics including symmetrical stance time and step length B in order to improve functional mobility, improve quality of life, and decrease risk of further injury or fall.  (PROGRESSNG 6/16/2022)  5. HEP: Patient will demonstrate independence with HEP in order to progress toward functional independence.            Long Term Goals: 12 weeks   1. PAIN:  Pt will report worst pain of 3/10 in order to progress toward max functional ability and improve quality of life (GOAL MET 6/9/2022)  2. FUNCTION: Patient will demonstrate improved function as indicated by a functional limitation score of less than or equal to 60 out of 100 on FOTO. (PROGRESSING 6/16/2022)  3. MOBILITY: Patient will improve AROM to stated goals, listed in objective measures above, in order to return to maximal functional potential and improve quality of life. (PROGRESSING 6/16/2022)  4. STRENGTH: Patient will improve strength to stated goals, listed in objective measures above, in order to improve functional independence and quality of life. (PROGRESSING 6/16/2022)  5. GAIT NORMAL: Patient will demonstrate normalized gait mechanics with minimal compensation in order to return to PLOF. (PROGRESSING 6/16/2022)      PLAN     Continue with current plan of care from 5/18/2022 to 8/18/2022.  Progress patient's exercises and activities per protocol.     Mckayla Melara, PT

## 2022-07-06 ENCOUNTER — OFFICE VISIT (OUTPATIENT)
Dept: SPORTS MEDICINE | Facility: CLINIC | Age: 57
End: 2022-07-06
Payer: COMMERCIAL

## 2022-07-06 VITALS — RESPIRATION RATE: 18 BRPM | HEIGHT: 62 IN | BODY MASS INDEX: 35.3 KG/M2 | WEIGHT: 191.81 LBS

## 2022-07-06 DIAGNOSIS — S83.232A COMPLEX TEAR OF MEDIAL MENISCUS OF LEFT KNEE AS CURRENT INJURY, INITIAL ENCOUNTER: ICD-10-CM

## 2022-07-06 DIAGNOSIS — M25.562 ACUTE PAIN OF LEFT KNEE: Primary | ICD-10-CM

## 2022-07-06 DIAGNOSIS — S83.232A COMPLEX TEAR OF MEDIAL MENISCUS OF LEFT KNEE AS CURRENT INJURY, INITIAL ENCOUNTER: Primary | ICD-10-CM

## 2022-07-06 PROCEDURE — 99499 NO LOS: ICD-10-PCS | Mod: S$GLB,,, | Performed by: STUDENT IN AN ORGANIZED HEALTH CARE EDUCATION/TRAINING PROGRAM

## 2022-07-06 PROCEDURE — 1159F PR MEDICATION LIST DOCUMENTED IN MEDICAL RECORD: ICD-10-PCS | Mod: CPTII,S$GLB,, | Performed by: STUDENT IN AN ORGANIZED HEALTH CARE EDUCATION/TRAINING PROGRAM

## 2022-07-06 PROCEDURE — 76882 PR  US,EXTREMITY,NONVASCULAR,REAL-TIME IMAGE,LIMITED: ICD-10-PCS | Mod: S$GLB,,, | Performed by: STUDENT IN AN ORGANIZED HEALTH CARE EDUCATION/TRAINING PROGRAM

## 2022-07-06 PROCEDURE — 3008F PR BODY MASS INDEX (BMI) DOCUMENTED: ICD-10-PCS | Mod: CPTII,S$GLB,, | Performed by: STUDENT IN AN ORGANIZED HEALTH CARE EDUCATION/TRAINING PROGRAM

## 2022-07-06 PROCEDURE — 99999 PR PBB SHADOW E&M-EST. PATIENT-LVL IV: CPT | Mod: PBBFAC,,, | Performed by: STUDENT IN AN ORGANIZED HEALTH CARE EDUCATION/TRAINING PROGRAM

## 2022-07-06 PROCEDURE — 99999 PR PBB SHADOW E&M-EST. PATIENT-LVL IV: ICD-10-PCS | Mod: PBBFAC,,, | Performed by: STUDENT IN AN ORGANIZED HEALTH CARE EDUCATION/TRAINING PROGRAM

## 2022-07-06 PROCEDURE — 3008F BODY MASS INDEX DOCD: CPT | Mod: CPTII,S$GLB,, | Performed by: STUDENT IN AN ORGANIZED HEALTH CARE EDUCATION/TRAINING PROGRAM

## 2022-07-06 PROCEDURE — 76882 US LMTD JT/FCL EVL NVASC XTR: CPT | Mod: S$GLB,,, | Performed by: STUDENT IN AN ORGANIZED HEALTH CARE EDUCATION/TRAINING PROGRAM

## 2022-07-06 PROCEDURE — 1159F MED LIST DOCD IN RCRD: CPT | Mod: CPTII,S$GLB,, | Performed by: STUDENT IN AN ORGANIZED HEALTH CARE EDUCATION/TRAINING PROGRAM

## 2022-07-06 PROCEDURE — 99499 UNLISTED E&M SERVICE: CPT | Mod: S$GLB,,, | Performed by: STUDENT IN AN ORGANIZED HEALTH CARE EDUCATION/TRAINING PROGRAM

## 2022-07-06 NOTE — Clinical Note
No baseline to compare, but no large extrusion noted with increased weightbearing.  Thanks for sending.

## 2022-07-06 NOTE — PROGRESS NOTES
FOCUSED:  1. Diagnostic Extremity - [facility]-Sports Ultrasound was recommended due to left medial meniscus evaluation.  2. Diagnostic Extremity - [facility]-Sports Ultrasound Performed: Demetrius Page Extremity Study: left medial meniscus.    TECHNIQUE: Real time ultrasound examination of the left medial meniscus was performed with SonGeofusionte Edge 2, 9-L MHz linear probe(s).     FINDINGS: The images are of adequate diagnostic quality with identification of all echogenic structures made except for the vascular structures unless otherwise noted. There is no sonographic evidence of periosteal abnormalities, soft tissue edema, musculotendinous or nerve irregularities.  The left medial meniscus was visualized in short axis.  The anterior portion of the meniscus nonweightbearing measured 0.58 cm, the posterior aspect of the medial meniscus measured nonweightbearing was 0.59 cm from the femoral side and 0.62 cm from the tibial side there was dynamic reduction of the meniscus with valgus motion.  There appeared to be less than 0.1 cm extrusion dynamically with sit to stand motion.. The rest of the sonographic examination was unremarkable.  Ultrasound images were directly reviewed with the patient and then a treatment plan was discussed.  The images were saved and stored for documentation in the EMR.     IMPRESSION:  Left medial meniscus evaluation baseline minimal extrusion appreciated 0.5 to 0.6 cm nonweightbearing.  With increased weight-bearing and dynamic motion minimal extrusion noted to approximately 0.6-0.7cm.      Demetrius Page

## 2022-07-07 ENCOUNTER — CLINICAL SUPPORT (OUTPATIENT)
Dept: REHABILITATION | Facility: HOSPITAL | Age: 57
End: 2022-07-07
Payer: COMMERCIAL

## 2022-07-07 DIAGNOSIS — Z74.09 DECREASED FUNCTIONAL MOBILITY AND ENDURANCE: Primary | ICD-10-CM

## 2022-07-07 PROCEDURE — 97110 THERAPEUTIC EXERCISES: CPT

## 2022-07-07 NOTE — PROGRESS NOTES
[facility]   Physical Therapy Treatment Note    Name: Stacey Wade  Clinic Number: [id number]    Therapy Diagnosis:   Encounter Diagnosis   Name Primary?    Decreased functional mobility and endurance Yes     Physician: Savannah Lindsey MD    Visit Date: 7/7/2022     Physician Orders: PT Eval and Treat  Medical Diagnosis from Referral: Complex tear of medial meniscus of left knee as current injury, initial encounter  Evaluation Date: 5/18/2022  Authorization Period Expiration: 12/31/2022  Plan of Care Expiration: 8/18/2022  Progress Note Due: 7/16/2022  Visit # / Visits authorized: 16/20 (+1 for evaluation)  FOTO: 1/3      Precautions: Standard, Fall and Weightbearing  PTA Visit #: 2/5     Time In: 11:00 am (PN + FOTO Next Session)  Time Out: 11:58 pm  Total Billable Time: 53 minutes    SUBJECTIVE     Pt reports: stiffness and soreness in the knee. Continues to use ace wrap on the knee during the workday due to swelling and feelings of instability     She was compliant with home exercise program.  Response to previous treatment: Patient felt good after last visit.    Functional change: Patient is able to bend her knee more.     Pain: 2/10  Location: left knee , lower legs and upper legs       OBJECTIVE     Objective Measures updated at progress report unless specified.       Left Knee flexion: 117 degrees      Treatment     Stacey received the treatments listed below:      THERAPEUTIC EXERCISES to develop strength, endurance, ROM, flexibility, posture and core stabilization for (53) minutes including:    Intervention Performed Today    Recumbent bike for range of motion x No level 5 minutes with 10s holds at max knee flexion ROM    passive range of motion supine left knee flexion (knee to chest)  X 4 minutes   Passive range of motion prone knee flexion  x 4 minutes    Double knee to chest knee flexion stretch  x 4 minutes with 10s holds with feet propped on swiss ball    Prone  quadriceps stretch with strap x 4 x 1 minute    Quadruped knee flexion stretch (moving into child's pose position)  x 3 minutes with 5s holds    Knee over toe stretch x 5s 5x left lower extremity only   Straight leg raise   2 pound, 3 sets of 10 repetition, 2 second hold   Supine hip adduction with ball  3 minutes, legs extended   Supine hip abduction with belt  3 minutes, legs extended   Prone hip extension  2 pound, 3 sets of 10 repetition, no assist, no brace   Side lying hip adduction  2 pound, 3 sets of 10 repetition, no assist, no brace   Sit to stands x 2 set of 10 repetition with no upper extremity assist   Single leg stance x 30s B with B fingertip support, then regressed    Tandem stance  x 2x30s B with B fingertiip support   MOBO board  2 positions, 2 minutes each   Leg Press/ Shuttle  Double leg press, 5 cords, 3 sets of 10 repetition   Double heel raises: 5 cords, 3 sets of 10 repetition   Single leg press: 4 cords, 3 sets of 10 repetition    Ankle 4 way  red theraband, 1 set of 10 repetition each direction   Prone hamstring curls x 3x10   Standing marches  3 sets of 10 repetition each lower extremity    Standing eccentric heel raises X 3 sets of 10 repetition    Step ups, 4 inch  3x10 left   Bridges  3x10 with ball     Plan for Next Visit: try upright bike next visit       .NEUROMUSCULAR RE-EDUCATION ACTIVITIES to improve Balance, Coordination, Kinesthetic, Sense, Proprioception and Posture for (0) minutes.  The following were included:    Intervention Performed Today    Left lower extremity step with weight shift, unlocking of knee  1 sets of 10 repetition    Left lower extremity stepping over orange beam with weight shifting and unlocking of knee  2 sets of 10 repetition    bilateral lower extremity step with even step length and weight shift  1 set of 10 reps   Gait training with straight cane with appropriate step length, weight shifting and lifting of toes  6 minutes               Plan for Next  Visit:          MANUAL THERAPY TECHNIQUES were applied for (0) minutes, including:    Manual Intervention Performed Today    Soft Tissue Mobilization  L anterior knee scar mobilizations    Joint Mobilizations  Patellar mobilizations in all directions    Mobilization with movement     Patellar mobilizations  Medial and latera, superior and inferior glides   Manual lymph drainage  Lower legs, posterior/anterior knee, upper leg   Functional Dry Needling        Plan for Next Visit:        Stacey received electrical stimulation for neuromuscular re-education for quad strengthening for 0 minutes to left quad with quad set.       Patient Education and Home Exercises     Home Exercises Provided and Patient Education Provided     Education provided:   - Patient educated once again on proper wearing of locked hinged knee brace.     Written Home Exercises Provided: Patient instructed to cont prior HEP. Exercises were reviewed and Stacey was able to demonstrate them prior to the end of the session.  Stacey demonstrated good  understanding of the education provided. See EMR under Patient Instructions for exercises provided during therapy sessions    ASSESSMENT     Patient tolerated session well today. Increased focus on knee flexion interventions today with increased range to 117 degrees noted following session. Increased step height for step ups and incorporated sit to stands to increase quadriceps strength.        Stacey Is progressing well towards her goals.   Pt prognosis is Good.     Pt will continue to benefit from skilled outpatient physical therapy to address the deficits listed in the problem list box on initial evaluation, provide pt/family education and to maximize pt's level of independence in the home and community environment.     Pt's spiritual, cultural and educational needs considered and pt agreeable to plan of care and goals.     Anticipated barriers to physical therapy: co-morbidities, sedentary lifestyle,  adherence to treatment plan and coping style    Goals: Reviewed 07/07/2022  Short Term Goals: 6 weeks  1. SIGNS & SYMPTOMS: Recent signs and systems trend is improving in order to progress towards LTG's. (GOAL MET 6/16/2022)  2. MOBILITY: Patient will improve AROM to 50% of stated goals, listed in objective measures above, in order to progress towards independence with functional activities.  (PROGRESSING 6/16/2022)  3. STRENGTH: Patient will improve strength to 50% of stated goals, listed in objective measures above, in order to progress towards independence with functional activities. (make sure strength goals are set) (PROGRESSING 6/16/2022)  4. GAIT IMPROVING: Patient will demonstrate improved gait mechanics including symmetrical stance time and step length B in order to improve functional mobility, improve quality of life, and decrease risk of further injury or fall.  (PROGRESSNG 6/16/2022)  5. HEP: Patient will demonstrate independence with HEP in order to progress toward functional independence.            Long Term Goals: 12 weeks   1. PAIN: Pt will report worst pain of 3/10 in order to progress toward max functional ability and improve quality of life (GOAL MET 6/9/2022)  2. FUNCTION: Patient will demonstrate improved function as indicated by a functional limitation score of less than or equal to 60 out of 100 on FOTO. (PROGRESSING 6/16/2022)  3. MOBILITY: Patient will improve AROM to stated goals, listed in objective measures above, in order to return to maximal functional potential and improve quality of life. (PROGRESSING 6/16/2022)  4. STRENGTH: Patient will improve strength to stated goals, listed in objective measures above, in order to improve functional independence and quality of life. (PROGRESSING 6/16/2022)  5. GAIT NORMAL: Patient will demonstrate normalized gait mechanics with minimal compensation in order to return to PLOF. (PROGRESSING 6/16/2022)      PLAN     Continue with current plan of  care from 5/18/2022 to 8/18/2022.  Progress patient's exercises and activities per protocol.     Bell Mullen, PT

## 2022-07-08 ENCOUNTER — CLINICAL SUPPORT (OUTPATIENT)
Dept: REHABILITATION | Facility: HOSPITAL | Age: 57
End: 2022-07-08
Payer: COMMERCIAL

## 2022-07-08 ENCOUNTER — TELEPHONE (OUTPATIENT)
Dept: ORTHOPEDICS | Facility: CLINIC | Age: 57
End: 2022-07-08
Payer: COMMERCIAL

## 2022-07-08 DIAGNOSIS — Z74.09 DECREASED FUNCTIONAL MOBILITY AND ENDURANCE: Primary | ICD-10-CM

## 2022-07-08 PROCEDURE — 97110 THERAPEUTIC EXERCISES: CPT

## 2022-07-08 NOTE — PROGRESS NOTES
[facility] OUTPATIENT THERAPY AND WELLNESS   Physical Therapy Treatment Note    Name: Stacey Wade  Clinic Number: [id number]    Therapy Diagnosis:   Encounter Diagnosis   Name Primary?    Decreased functional mobility and endurance Yes     Physician: Savannah Lindsey MD    Visit Date: 7/8/2022     Physician Orders: PT Eval and Treat  Medical Diagnosis from Referral: Complex tear of medial meniscus of left knee as current injury, initial encounter  Evaluation Date: 5/18/2022  Authorization Period Expiration: 12/31/2022  Plan of Care Expiration: 8/18/2022  Progress Note Due: 7/16/2022  Visit # / Visits authorized: 17/20 (+1 for evaluation)  FOTO: 1/3      Precautions: Standard, Fall and Weightbearing  PTA Visit #: 2/5     Time In: 9:30 am   Time Out: 10:20 am  Total Billable Time: 45 minutes    SUBJECTIVE     Pt reports:  She had some increased knee pain yesterday at work.  She did well after last visit.  She continues to wrap her knee in an ACE bandage for work as well.     She was compliant with home exercise program.  Response to previous treatment: Patient felt good after last visit.    Functional change: Patient is able to bend her knee more.     Pain: 2/10  Location: left knee , lower legs and upper legs       OBJECTIVE     Objective Measures updated at progress report unless specified.       Left Knee flexion: 110 degrees prone with towel under quad      Treatment     Stacey received the treatments listed below:      THERAPEUTIC EXERCISES to develop strength, endurance, ROM, flexibility, posture and core stabilization for (45) minutes including:    Intervention Performed Today    Recumbent bike for range of motion x No level 5 minutes with 10s holds at max knee flexion ROM    passive range of motion supine left knee flexion (knee to chest)  X 4 minutes   Passive range of motion prone knee flexion  x 4 minutes    Double knee to chest knee flexion stretch  x 4 minutes with 10s holds with feet propped on  swiss ball    Prone quadriceps stretch with strap x 4 minute, 10 second hold   Quadruped knee flexion stretch (moving into child's pose position)  x 3 minutes with 5s holds    Knee over toe stretch x 5s 5x left lower extremity only   Straight leg raise   2 pound, 3 sets of 10 repetition, 2 second hold   Supine hip adduction with ball  3 minutes, legs extended   Supine hip abduction with belt  3 minutes, legs extended   Prone hip extension  2 pound, 3 sets of 10 repetition, no assist, no brace   Side lying hip adduction  2 pound, 3 sets of 10 repetition, no assist, no brace   Sit to stands x 2 set of 10 repetition with no upper extremity assist   Single leg stance x 30s B with B fingertip support     Tandem stance  x 2x30s B with B fingertiip support   MOBO board  2 positions, 2 minutes each   Leg Press/ Shuttle  Double leg press, 5 cords, 3 sets of 10 repetition   Double heel raises: 5 cords, 3 sets of 10 repetition   Single leg press: 4 cords, 3 sets of 10 repetition    Ankle 4 way  red theraband, 1 set of 10 repetition each direction   Prone hamstring curls x 3x10   Standing marches  3 sets of 10 repetition each lower extremity    Standing eccentric heel raises X 3 sets of 10 repetition    Step ups, 4 inch  3x10 left   Bridges  3x10 with ball     Plan for Next Visit: try upright bike next visit       .NEUROMUSCULAR RE-EDUCATION ACTIVITIES to improve Balance, Coordination, Kinesthetic, Sense, Proprioception and Posture for (0) minutes.  The following were included:    Intervention Performed Today    Left lower extremity step with weight shift, unlocking of knee  1 sets of 10 repetition    Left lower extremity stepping over orange beam with weight shifting and unlocking of knee  2 sets of 10 repetition    bilateral lower extremity step with even step length and weight shift  1 set of 10 reps   Gait training with straight cane with appropriate step length, weight shifting and lifting of toes  6 minutes                Plan for Next Visit:          MANUAL THERAPY TECHNIQUES were applied for (0) minutes, including:    Manual Intervention Performed Today    Soft Tissue Mobilization  L anterior knee scar mobilizations    Joint Mobilizations  Patellar mobilizations in all directions    Mobilization with movement     Patellar mobilizations  Medial and latera, superior and inferior glides   Manual lymph drainage  Lower legs, posterior/anterior knee, upper leg   Functional Dry Needling        Plan for Next Visit:        Stacey received electrical stimulation for neuromuscular re-education for quad strengthening for 0 minutes to left quad with quad set.       Patient Education and Home Exercises     Home Exercises Provided and Patient Education Provided     Education provided:   - Patient educated once again on proper wearing of locked hinged knee brace.     Written Home Exercises Provided: Patient instructed to cont prior HEP. Exercises were reviewed and Stacey was able to demonstrate them prior to the end of the session.  Stacey demonstrated good  understanding of the education provided. See EMR under Patient Instructions for exercises provided during therapy sessions    ASSESSMENT     Patient tolerated exercises well and was able to move from one exercise to the next with limited rest breaks.  Patient able to walk around clinic without cane but demonstrates decreased miguel and step length to prevent excessive lateral weight shift. Patient did have overall fatigue at end of therapy session.        Stacey Is progressing well towards her goals.   Pt prognosis is Good.     Pt will continue to benefit from skilled outpatient physical therapy to address the deficits listed in the problem list box on initial evaluation, provide pt/family education and to maximize pt's level of independence in the home and community environment.     Pt's spiritual, cultural and educational needs considered and pt agreeable to plan of care and goals.      Anticipated barriers to physical therapy: co-morbidities, sedentary lifestyle, adherence to treatment plan and coping style    Goals: Reviewed 07/08/2022  Short Term Goals: 6 weeks  1. SIGNS & SYMPTOMS: Recent signs and systems trend is improving in order to progress towards LTG's. (GOAL MET 6/16/2022)  2. MOBILITY: Patient will improve AROM to 50% of stated goals, listed in objective measures above, in order to progress towards independence with functional activities.  (PROGRESSING 6/16/2022)  3. STRENGTH: Patient will improve strength to 50% of stated goals, listed in objective measures above, in order to progress towards independence with functional activities. (make sure strength goals are set) (PROGRESSING 6/16/2022)  4. GAIT IMPROVING: Patient will demonstrate improved gait mechanics including symmetrical stance time and step length B in order to improve functional mobility, improve quality of life, and decrease risk of further injury or fall.  (PROGRESSNG 6/16/2022)  5. HEP: Patient will demonstrate independence with HEP in order to progress toward functional independence.            Long Term Goals: 12 weeks   1. PAIN: Pt will report worst pain of 3/10 in order to progress toward max functional ability and improve quality of life (GOAL MET 6/9/2022)  2. FUNCTION: Patient will demonstrate improved function as indicated by a functional limitation score of less than or equal to 60 out of 100 on FOTO. (PROGRESSING 6/16/2022)  3. MOBILITY: Patient will improve AROM to stated goals, listed in objective measures above, in order to return to maximal functional potential and improve quality of life. (PROGRESSING 6/16/2022)  4. STRENGTH: Patient will improve strength to stated goals, listed in objective measures above, in order to improve functional independence and quality of life. (PROGRESSING 6/16/2022)  5. GAIT NORMAL: Patient will demonstrate normalized gait mechanics with minimal compensation in order to  return to PLOF. (PROGRESSING 6/16/2022)      PLAN     Continue with current plan of care from 5/18/2022 to 8/18/2022.  Progress patient's exercises and activities per protocol.     Mckayla Melara, PT

## 2022-07-11 ENCOUNTER — TELEPHONE (OUTPATIENT)
Dept: ORTHOPEDICS | Facility: CLINIC | Age: 57
End: 2022-07-11
Payer: COMMERCIAL

## 2022-07-11 ENCOUNTER — TELEPHONE (OUTPATIENT)
Dept: FAMILY MEDICINE | Facility: CLINIC | Age: 57
End: 2022-07-11

## 2022-07-11 NOTE — TELEPHONE ENCOUNTER
Spoke with miss frank, she is going to contact us once she is done with quarantine and is negative for covid to get her rescheduled.

## 2022-07-11 NOTE — TELEPHONE ENCOUNTER
----- Message from Izzy Moses sent at 7/11/2022  9:19 AM CDT -----  Contact: Stacey self [phone]  [email or web address]     Patient is calling for Medical Advice regarding:    How long has patient had these symptoms:    Pharmacy name and phone#:    Would like response via [employer]: call back    Comments: Pt is requesting a call back from the nurse because she needs to reschedule her appt, because pt has tested positive for covid again

## 2022-07-11 NOTE — TELEPHONE ENCOUNTER
----- Message from Ashley Grider sent at 7/11/2022  8:08 AM CDT -----  Contact: monika  Pt stated that on Saturday morning she was experiencing sinus issues, she went to  and tested positive for covid. Please call her back at  [phone].            Thanks  DD

## 2022-07-11 NOTE — TELEPHONE ENCOUNTER
Spoke with pt and pt states that Saturday she tested positive for covid. Pt symptoms are sinus pressure, body aches, chills, runny nose, sore throat, coughing up light green phlem.  No fever today and UC did prescribe pt some cough syrup and flovent. Pt also has been taking mucinex and sudafed. Pt just wanted to inform you that she tested positive.

## 2022-07-15 ENCOUNTER — LAB VISIT (OUTPATIENT)
Dept: LAB | Facility: HOSPITAL | Age: 57
End: 2022-07-15
Attending: FAMILY MEDICINE
Payer: COMMERCIAL

## 2022-07-15 ENCOUNTER — OFFICE VISIT (OUTPATIENT)
Dept: FAMILY MEDICINE | Facility: CLINIC | Age: 57
End: 2022-07-15
Payer: COMMERCIAL

## 2022-07-15 VITALS
HEIGHT: 62 IN | BODY MASS INDEX: 34.56 KG/M2 | WEIGHT: 187.81 LBS | HEART RATE: 100 BPM | OXYGEN SATURATION: 97 % | DIASTOLIC BLOOD PRESSURE: 89 MMHG | TEMPERATURE: 98 F | SYSTOLIC BLOOD PRESSURE: 133 MMHG

## 2022-07-15 DIAGNOSIS — I10 ESSENTIAL HYPERTENSION: Chronic | ICD-10-CM

## 2022-07-15 DIAGNOSIS — N32.81 OVERACTIVE BLADDER: ICD-10-CM

## 2022-07-15 DIAGNOSIS — Z00.00 PREVENTATIVE HEALTH CARE: ICD-10-CM

## 2022-07-15 DIAGNOSIS — E66.9 OBESITY (BMI 30.0-34.9): ICD-10-CM

## 2022-07-15 DIAGNOSIS — U07.1 COVID-19 VIRUS INFECTION: ICD-10-CM

## 2022-07-15 DIAGNOSIS — Z00.00 PREVENTATIVE HEALTH CARE: Primary | ICD-10-CM

## 2022-07-15 PROBLEM — E66.811 OBESITY (BMI 30.0-34.9): Status: ACTIVE | Noted: 2022-04-22

## 2022-07-15 LAB
ALBUMIN SERPL BCP-MCNC: 4.3 G/DL (ref 3.5–5.2)
ALP SERPL-CCNC: 172 U/L (ref 55–135)
ALT SERPL W/O P-5'-P-CCNC: 12 U/L (ref 10–44)
ANION GAP SERPL CALC-SCNC: 9 MMOL/L (ref 8–16)
AST SERPL-CCNC: 14 U/L (ref 10–40)
BILIRUB SERPL-MCNC: 0.4 MG/DL (ref 0.1–1)
BUN SERPL-MCNC: 8 MG/DL (ref 6–20)
CALCIUM SERPL-MCNC: 10.7 MG/DL (ref 8.7–10.5)
CHLORIDE SERPL-SCNC: 106 MMOL/L (ref 95–110)
CHOLEST SERPL-MCNC: 211 MG/DL (ref 120–199)
CHOLEST/HDLC SERPL: 3.6 {RATIO} (ref 2–5)
CO2 SERPL-SCNC: 26 MMOL/L (ref 23–29)
CREAT SERPL-MCNC: 1.1 MG/DL (ref 0.5–1.4)
CTP QC/QA: YES
EST. GFR  (AFRICAN AMERICAN): >60 ML/MIN/1.73 M^2
EST. GFR  (NON AFRICAN AMERICAN): 57.1 ML/MIN/1.73 M^2
GLUCOSE SERPL-MCNC: 88 MG/DL (ref 70–110)
HDLC SERPL-MCNC: 58 MG/DL (ref 40–75)
HDLC SERPL: 27.5 % (ref 20–50)
LDLC SERPL CALC-MCNC: 133.2 MG/DL (ref 63–159)
NONHDLC SERPL-MCNC: 153 MG/DL
POTASSIUM SERPL-SCNC: 3.6 MMOL/L (ref 3.5–5.1)
PROT SERPL-MCNC: 7.9 G/DL (ref 6–8.4)
SARS-COV-2 RDRP RESP QL NAA+PROBE: NEGATIVE
SODIUM SERPL-SCNC: 141 MMOL/L (ref 136–145)
TRIGL SERPL-MCNC: 99 MG/DL (ref 30–150)
TSH SERPL DL<=0.005 MIU/L-ACNC: 1.01 UIU/ML (ref 0.4–4)

## 2022-07-15 PROCEDURE — 99396 PR PREVENTIVE VISIT,EST,40-64: ICD-10-PCS | Mod: S$GLB,,, | Performed by: FAMILY MEDICINE

## 2022-07-15 PROCEDURE — 3075F PR MOST RECENT SYSTOLIC BLOOD PRESS GE 130-139MM HG: ICD-10-PCS | Mod: CPTII,S$GLB,, | Performed by: FAMILY MEDICINE

## 2022-07-15 PROCEDURE — 3008F BODY MASS INDEX DOCD: CPT | Mod: CPTII,S$GLB,, | Performed by: FAMILY MEDICINE

## 2022-07-15 PROCEDURE — 1160F PR REVIEW ALL MEDS BY PRESCRIBER/CLIN PHARMACIST DOCUMENTED: ICD-10-PCS | Mod: CPTII,S$GLB,, | Performed by: FAMILY MEDICINE

## 2022-07-15 PROCEDURE — 3075F SYST BP GE 130 - 139MM HG: CPT | Mod: CPTII,S$GLB,, | Performed by: FAMILY MEDICINE

## 2022-07-15 PROCEDURE — 99396 PREV VISIT EST AGE 40-64: CPT | Mod: S$GLB,,, | Performed by: FAMILY MEDICINE

## 2022-07-15 PROCEDURE — 84443 ASSAY THYROID STIM HORMONE: CPT | Performed by: FAMILY MEDICINE

## 2022-07-15 PROCEDURE — 80053 COMPREHEN METABOLIC PANEL: CPT | Performed by: FAMILY MEDICINE

## 2022-07-15 PROCEDURE — 99999 PR PBB SHADOW E&M-EST. PATIENT-LVL V: CPT | Mod: PBBFAC,,, | Performed by: FAMILY MEDICINE

## 2022-07-15 PROCEDURE — 3079F PR MOST RECENT DIASTOLIC BLOOD PRESSURE 80-89 MM HG: ICD-10-PCS | Mod: CPTII,S$GLB,, | Performed by: FAMILY MEDICINE

## 2022-07-15 PROCEDURE — 80061 LIPID PANEL: CPT | Performed by: FAMILY MEDICINE

## 2022-07-15 PROCEDURE — U0002 COVID-19 LAB TEST NON-CDC: HCPCS | Mod: QW,S$GLB,, | Performed by: FAMILY MEDICINE

## 2022-07-15 PROCEDURE — 1160F RVW MEDS BY RX/DR IN RCRD: CPT | Mod: CPTII,S$GLB,, | Performed by: FAMILY MEDICINE

## 2022-07-15 PROCEDURE — 36415 COLL VENOUS BLD VENIPUNCTURE: CPT | Mod: PO | Performed by: FAMILY MEDICINE

## 2022-07-15 PROCEDURE — 99999 PR PBB SHADOW E&M-EST. PATIENT-LVL V: ICD-10-PCS | Mod: PBBFAC,,, | Performed by: FAMILY MEDICINE

## 2022-07-15 PROCEDURE — 3008F PR BODY MASS INDEX (BMI) DOCUMENTED: ICD-10-PCS | Mod: CPTII,S$GLB,, | Performed by: FAMILY MEDICINE

## 2022-07-15 PROCEDURE — 1159F PR MEDICATION LIST DOCUMENTED IN MEDICAL RECORD: ICD-10-PCS | Mod: CPTII,S$GLB,, | Performed by: FAMILY MEDICINE

## 2022-07-15 PROCEDURE — U0002: ICD-10-PCS | Mod: QW,S$GLB,, | Performed by: FAMILY MEDICINE

## 2022-07-15 PROCEDURE — 3079F DIAST BP 80-89 MM HG: CPT | Mod: CPTII,S$GLB,, | Performed by: FAMILY MEDICINE

## 2022-07-15 PROCEDURE — 1159F MED LIST DOCD IN RCRD: CPT | Mod: CPTII,S$GLB,, | Performed by: FAMILY MEDICINE

## 2022-07-15 RX ORDER — MONTELUKAST SODIUM 10 MG/1
1 TABLET ORAL NIGHTLY
COMMUNITY
Start: 2022-04-06 | End: 2022-07-15

## 2022-07-15 RX ORDER — OMALIZUMAB 150 MG/ML
300 INJECTION, SOLUTION SUBCUTANEOUS
COMMUNITY
Start: 2022-03-21

## 2022-07-15 RX ORDER — FAMOTIDINE 40 MG/1
20 TABLET, FILM COATED ORAL
COMMUNITY
Start: 2022-04-06 | End: 2022-07-15

## 2022-07-15 RX ORDER — FLUTICASONE PROPIONATE 100 UG/1
POWDER, METERED RESPIRATORY (INHALATION)
COMMUNITY
Start: 2022-07-09 | End: 2023-02-23

## 2022-07-15 RX ORDER — FLUTICASONE PROPIONATE 100 UG/1
100 POWDER, METERED RESPIRATORY (INHALATION)
COMMUNITY
Start: 2022-07-09 | End: 2023-02-23

## 2022-07-15 NOTE — PROGRESS NOTES
CHIEF COMPLAINT:  This is a 56-year-old female here for preventive health exam.     SUBJECTIVE:  Patient is doing well without complaints.  She had minor upper respiratory symptoms with COVID-19 infection last week.  She tested positive on July 7. She is status post left knee surgery in May 2022 and reports no pain. She has essential hypertension for which she takes Dyazide daily.  Her blood pressure today is 133/93. She's being followed by breast screening because of fibrocystic disease.  She had nodule in right breast which has improved since she discontinued caffeine.  She continues to take Xolair injections for chronic urticaria.  She also takes Singulair, doxepin, and famotidine.  She is obese with a BMI of 34.35. She takes Mybetriq 25 mg daily for overactive bladder.     Eye exam 2021.  Mammogram  February 2022.   Pap smear October 2021 per GYN.  Colonoscopy January 2018, due again in January 2028.  Tdap October 2016. Flu vaccine  March 2022. COVID-19 vaccine September, October 2021, April 2022.      ROS:  GENERAL: Patient denies fever, chills, night sweats. Patient denies weight gain. Patient denies anorexia, fatigue, weakness or swollen glands.  Positive for weight loss.  SKIN: Patient denies rash or hair loss.  HEENT: Patient denies sore throat, ear pain, hearing loss, nasal congestion, or runny nose. Patient denies visual disturbance, eye irritation or discharge.  LUNGS: Patient denies cough, wheeze or hemoptysis.  CARDIOVASCULAR: Patient denies shortness of breath, palpitations, syncope or lower extremity edema.  GI: Patient denies abdominal pain, nausea, vomiting, diarrhea, constipation, blood in stool or melena.  GENITOURINARY: Patient denies pelvic pain, vaginal discharge, itch or odor. Patient denies irregular vaginal bleeding. Patient denies dysuria, frequency, hematuria, nocturia, urgency or incontinence.  BREASTS: Patient denies breast pain, mass or nipple discharge.  MUSCULOSKELETAL: Patient  denies joint swelling, redness or warmth. Positive for ankle and foot pain.  NEUROLOGIC: Patient denies headache, vertigo, paresthesias, weakness in limb, dysarthria, dysphagia or abnormality of gait.  PSYCHIATRIC: Patient denies anxiety, depression, or memory loss.     OBJECTIVE:   GENERAL: Well-developed well-nourished, obese, black female alert and oriented x3, in no acute distress. Memory, judgment and cognition without deficit.  Weight loss of 10 pounds in the last year.  SKIN: Clear without rash. Normal color and tone.  HEENT: Eyes: Clear conjunctivae. Pupils equal reactive to light and accommodation. Ears: Clear TMs. Clear canals. Nose: Without congestion. Pharynx: Without injection or exudates.  NECK: Supple, normal range of motion. No masses, lymphadenopathy or enlarged thyroid. No JVD. Carotids 2+ and equal. No bruits.  LUNGS: Clear to auscultation. Normal respiratory effort.  CARDIOVASCULAR: Regular rhythm, normal S1, S2 without murmur, gallop or rub.  BACK: No CVA or spinal tenderness.  BREASTS: No masses, tenderness or nipple discharge.  ABDOMEN: Normal appearance. Active bowel sounds. Soft, nontender without mass or organomegaly. No rebound or guarding.  EXTREMITIES: Without cyanosis, clubbing or edema. Distal pulses 2+ and equal. Normal range of motion in all extremities. No joint effusion, erythema or warmth. Ankle/foot splints in place.  NEUROLOGIC: Cranial nerves II through XII without deficit. Motor strength equal bilaterally. Sensation normal to touch. Deep tendon reflexes 2+ and equal. Gait without abnormality. No tremor. Negative cerebellar signs.  PELVIC: Deferred to OBGYN.    COVID 19 rapid testing: Negative.    ASSESSMENT:  1. Preventative health care    2. Essential hypertension    3. Overactive bladder    4. Obesity (BMI 30.0-34.9)    5. COVID-19 virus infection      PLAN:  1. Weight reduction. Exercise regularly.  2. Age-appropriate counseling.  3. Fasting lab.  4. Refill medications as  needed.  5. Follow up annually.    This note is generated with speech recognition software and is subject to transcription error and sound alike phrases that may be missed by proofreading.

## 2022-07-15 NOTE — LETTER
July 15, 2022    Stacey Wade  [address]  [facility] [address]             [facility]  Family Medicine  [address] [facility] [address] [address]  Phone: [phone]  Fax: [phone]   July 15, 2022     Patient: Stacey Wade   YOB: 1965   Date of Visit: 7/15/2022       To Whom it May Concern:    Stacey Wade was seen in my clinic on 7/15/2022. She may return to work on 07/15/2022.    Please excuse her from any classes or work missed.    If you have any questions or concerns, please don't hesitate to call.    Sincerely,         Savannah Lindsey MD

## 2022-07-18 ENCOUNTER — CLINICAL SUPPORT (OUTPATIENT)
Dept: REHABILITATION | Facility: HOSPITAL | Age: 57
End: 2022-07-18
Payer: COMMERCIAL

## 2022-07-18 DIAGNOSIS — Z74.09 DECREASED FUNCTIONAL MOBILITY AND ENDURANCE: Primary | ICD-10-CM

## 2022-07-18 PROCEDURE — 97110 THERAPEUTIC EXERCISES: CPT

## 2022-07-18 NOTE — PROGRESS NOTES
[facility]   Physical Therapy Treatment Note    Name: Stacey Wade  Clinic Number: [id number]    Therapy Diagnosis:   Encounter Diagnosis   Name Primary?    Decreased functional mobility and endurance Yes     Physician: Savannah Lindsey MD    Visit Date: 7/18/2022     Physician Orders: PT Eval and Treat  Medical Diagnosis from Referral: Complex tear of medial meniscus of left knee as current injury, initial encounter  Evaluation Date: 5/18/2022  Authorization Period Expiration: 12/31/2022  Plan of Care Expiration: 8/18/2022  Progress Note Due: 7/16/2022  Visit # / Visits authorized: 18/20 (+1 for evaluation)  FOTO: 1/3      Precautions: Standard, Fall and Weightbearing  PTA Visit #: 2/5     Time In: 1000   Time Out: 1053  Total Billable Time: 53 minutes    SUBJECTIVE     Pt reports: knee feels a little stiff from not moving it much because she was sick last week but feeling better overall    She was compliant with home exercise program.  Response to previous treatment: no adverse effects  Functional change: Patient is able to bend her knee more.     Pain: 2/10  Location: left knee , lower legs and upper legs       OBJECTIVE     Objective Measures updated at progress report unless specified.       Left Knee flexion: 110 degrees prone with towel under quad      Treatment     Stacey received the treatments listed below:      THERAPEUTIC EXERCISES to develop strength, endurance, ROM, flexibility, posture and core stabilization for (53) minutes including:    Intervention Performed Today    Recumbent bike for range of motion X No level 5 minutes with 10s holds at max knee flexion ROM    passive range of motion supine left knee flexion (knee to chest)  X 4 minutes   Passive range of motion prone knee flexion  X 4 minutes    Double knee to chest knee flexion stretch  X 4 minutes with 10s holds with feet propped on swiss ball    Prone quadriceps stretch with strap X 4 minute, 10 second  hold   Quadruped knee flexion stretch (moving into child's pose position)  X 3 minutes with 5s holds    Knee over toe stretch X 5s 5x left lower extremity only   Straight leg raise   2 pound, 3 sets of 10 repetition, 2 second hold   Supine hip adduction with ball  3 minutes, legs extended   Supine hip abduction with belt  3 minutes, legs extended   Prone hip extension  2 pound, 3 sets of 10 repetition, no assist, no brace   Side lying hip adduction  2 pound, 3 sets of 10 repetition, no assist, no brace   Sit to stands X 3x10 with no upper extremity assist   Single leg stance X 2x30s B    Tandem stance  X 2x30s B    MOBO board  2 positions, 2 minutes each   Leg Press/ Shuttle X  X  X Double leg press, 5 cords, 3 sets of 10 repetition   Double heel raises: 5 cords, 3 sets of 10 repetition   Single leg press: 4 cords, 3 sets of 10 repetition    Ankle 4 way  red theraband, 1 set of 10 repetition each direction   Prone hamstring curls  3x10   Standing marches  3 sets of 10 repetition each lower extremity    Standing eccentric heel raises X 3 sets of 10 repetition    Step ups, 4 inch X 3x10 left   Bridges  3x10 with ball     Plan for Next Visit: try upright bike next visit       .NEUROMUSCULAR RE-EDUCATION ACTIVITIES to improve Balance, Coordination, Kinesthetic, Sense, Proprioception and Posture for (0) minutes.  The following were included:    Intervention Performed Today    Left lower extremity step with weight shift, unlocking of knee  1 sets of 10 repetition    Left lower extremity stepping over orange beam with weight shifting and unlocking of knee  2 sets of 10 repetition    bilateral lower extremity step with even step length and weight shift  1 set of 10 reps   Gait training with straight cane with appropriate step length, weight shifting and lifting of toes  6 minutes               Plan for Next Visit:        MANUAL THERAPY TECHNIQUES were applied for (0) minutes, including:    Manual Intervention Performed  Today    Soft Tissue Mobilization  L anterior knee scar mobilizations    Joint Mobilizations  Patellar mobilizations in all directions    Mobilization with movement     Patellar mobilizations  Medial and latera, superior and inferior glides   Manual lymph drainage  Lower legs, posterior/anterior knee, upper leg   Functional Dry Needling        Plan for Next Visit:        Stacey received electrical stimulation for neuromuscular re-education for quad strengthening for 0 minutes to left quad with quad set.       Patient Education and Home Exercises     Home Exercises Provided and Patient Education Provided     Education provided:   - Patient educated once again on proper wearing of locked hinged knee brace.     Written Home Exercises Provided: Patient instructed to cont prior HEP. Exercises were reviewed and Stacey was able to demonstrate them prior to the end of the session.  Stacey demonstrated good  understanding of the education provided. See EMR under Patient Instructions for exercises provided during therapy sessions    ASSESSMENT     Pt tolerated session well. Pt demonstrated improvements in single leg balance requiring less upper extremity support. Pt initially required verbal cueing to evenly distribute weight in tandem stance. Progressed sit to stands by increasing repetitions, pt tolerated progression with no difficulty. Pt presented to therapy with complaints of stiffness but reported decreased stiffness following today's session.    Stacey Is progressing well towards her goals.   Pt prognosis is Good.     Pt will continue to benefit from skilled outpatient physical therapy to address the deficits listed in the problem list box on initial evaluation, provide pt/family education and to maximize pt's level of independence in the home and community environment.     Pt's spiritual, cultural and educational needs considered and pt agreeable to plan of care and goals.     Anticipated barriers to physical therapy:  co-morbidities, sedentary lifestyle, adherence to treatment plan and coping style    Goals: Reviewed 07/18/2022  Short Term Goals: 6 weeks  1. SIGNS & SYMPTOMS: Recent signs and systems trend is improving in order to progress towards LTG's. (GOAL MET 6/16/2022)  2. MOBILITY: Patient will improve AROM to 50% of stated goals, listed in objective measures above, in order to progress towards independence with functional activities.  (PROGRESSING 6/16/2022)  3. STRENGTH: Patient will improve strength to 50% of stated goals, listed in objective measures above, in order to progress towards independence with functional activities. (make sure strength goals are set) (PROGRESSING 6/16/2022)  4. GAIT IMPROVING: Patient will demonstrate improved gait mechanics including symmetrical stance time and step length B in order to improve functional mobility, improve quality of life, and decrease risk of further injury or fall.  (PROGRESSNG 6/16/2022)  5. HEP: Patient will demonstrate independence with HEP in order to progress toward functional independence.            Long Term Goals: 12 weeks   1. PAIN: Pt will report worst pain of 3/10 in order to progress toward max functional ability and improve quality of life (GOAL MET 6/9/2022)  2. FUNCTION: Patient will demonstrate improved function as indicated by a functional limitation score of less than or equal to 60 out of 100 on FOTO. (PROGRESSING 6/16/2022)  3. MOBILITY: Patient will improve AROM to stated goals, listed in objective measures above, in order to return to maximal functional potential and improve quality of life. (PROGRESSING 6/16/2022)  4. STRENGTH: Patient will improve strength to stated goals, listed in objective measures above, in order to improve functional independence and quality of life. (PROGRESSING 6/16/2022)  5. GAIT NORMAL: Patient will demonstrate normalized gait mechanics with minimal compensation in order to return to PLOF. (PROGRESSING 6/16/2022)      PLAN      Continue with current plan of care from 5/18/2022 to 8/18/2022.  Progress patient's exercises and activities per protocol.     Bell Mullen, PT

## 2022-07-18 NOTE — PROGRESS NOTES
[facility]   Physical Therapy Treatment Note    Name: Stacey Wade  Clinic Number: [id number]    Therapy Diagnosis:   No diagnosis found.  Physician: Savannah Lindsey MD    Visit Date: 7/18/2022     Physician Orders: PT Eval and Treat  Medical Diagnosis from Referral: Complex tear of medial meniscus of left knee as current injury, initial encounter  Evaluation Date: 5/18/2022  Authorization Period Expiration: 12/31/2022  Plan of Care Expiration: 8/18/2022  Progress Note Due: 7/16/2022  Visit # / Visits authorized: 17/20 (+1 for evaluation)  FOTO: 1/3      Precautions: Standard, Fall and Weightbearing  PTA Visit #: 2/5     Time In: 9:30 am   Time Out: 10:20 am  Total Billable Time: 45 minutes    SUBJECTIVE     Pt reports:  She had some increased knee pain yesterday at work.  She did well after last visit.  She continues to wrap her knee in an ACE bandage for work as well.     She was compliant with home exercise program.  Response to previous treatment: Patient felt good after last visit.    Functional change: Patient is able to bend her knee more.     Pain: 2/10  Location: left knee , lower legs and upper legs       OBJECTIVE     Objective Measures updated at progress report unless specified.       Left Knee flexion: 110 degrees prone with towel under quad      Treatment     Staecy received the treatments listed below:      THERAPEUTIC EXERCISES to develop strength, endurance, ROM, flexibility, posture and core stabilization for (45) minutes including:    Intervention Performed Today    Recumbent bike for range of motion x No level 5 minutes with 10s holds at max knee flexion ROM    passive range of motion supine left knee flexion (knee to chest)  X 4 minutes   Passive range of motion prone knee flexion  x 4 minutes    Double knee to chest knee flexion stretch  x 4 minutes with 10s holds with feet propped on swiss ball    Prone quadriceps stretch with strap x 4 minute, 10 second  hold   Quadruped knee flexion stretch (moving into child's pose position)  x 3 minutes with 5s holds    Knee over toe stretch x 5s 5x left lower extremity only   Straight leg raise   2 pound, 3 sets of 10 repetition, 2 second hold   Supine hip adduction with ball  3 minutes, legs extended   Supine hip abduction with belt  3 minutes, legs extended   Prone hip extension  2 pound, 3 sets of 10 repetition, no assist, no brace   Side lying hip adduction  2 pound, 3 sets of 10 repetition, no assist, no brace   Sit to stands x 2 set of 10 repetition with no upper extremity assist   Single leg stance x 30s B with B fingertip support     Tandem stance  x 2x30s B with B fingertiip support   MOBO board  2 positions, 2 minutes each   Leg Press/ Shuttle  Double leg press, 5 cords, 3 sets of 10 repetition   Double heel raises: 5 cords, 3 sets of 10 repetition   Single leg press: 4 cords, 3 sets of 10 repetition    Ankle 4 way  red theraband, 1 set of 10 repetition each direction   Prone hamstring curls x 3x10   Standing marches  3 sets of 10 repetition each lower extremity    Standing eccentric heel raises X 3 sets of 10 repetition    Step ups, 4 inch  3x10 left   Bridges  3x10 with ball     Plan for Next Visit: try upright bike next visit       .NEUROMUSCULAR RE-EDUCATION ACTIVITIES to improve Balance, Coordination, Kinesthetic, Sense, Proprioception and Posture for (0) minutes.  The following were included:    Intervention Performed Today    Left lower extremity step with weight shift, unlocking of knee  1 sets of 10 repetition    Left lower extremity stepping over orange beam with weight shifting and unlocking of knee  2 sets of 10 repetition    bilateral lower extremity step with even step length and weight shift  1 set of 10 reps   Gait training with straight cane with appropriate step length, weight shifting and lifting of toes  6 minutes               Plan for Next Visit:          MANUAL THERAPY TECHNIQUES were applied for  (0) minutes, including:    Manual Intervention Performed Today    Soft Tissue Mobilization  L anterior knee scar mobilizations    Joint Mobilizations  Patellar mobilizations in all directions    Mobilization with movement     Patellar mobilizations  Medial and latera, superior and inferior glides   Manual lymph drainage  Lower legs, posterior/anterior knee, upper leg   Functional Dry Needling        Plan for Next Visit:        Stacey received electrical stimulation for neuromuscular re-education for quad strengthening for 0 minutes to left quad with quad set.       Patient Education and Home Exercises     Home Exercises Provided and Patient Education Provided     Education provided:   - Patient educated once again on proper wearing of locked hinged knee brace.     Written Home Exercises Provided: Patient instructed to cont prior HEP. Exercises were reviewed and Stacey was able to demonstrate them prior to the end of the session.  Stacey demonstrated good  understanding of the education provided. See EMR under Patient Instructions for exercises provided during therapy sessions    ASSESSMENT     Patient tolerated exercises well and was able to move from one exercise to the next with limited rest breaks.  Patient able to walk around clinic without cane but demonstrates decreased miguel and step length to prevent excessive lateral weight shift. Patient did have overall fatigue at end of therapy session.        Stacey Is progressing well towards her goals.   Pt prognosis is Good.     Pt will continue to benefit from skilled outpatient physical therapy to address the deficits listed in the problem list box on initial evaluation, provide pt/family education and to maximize pt's level of independence in the home and community environment.     Pt's spiritual, cultural and educational needs considered and pt agreeable to plan of care and goals.     Anticipated barriers to physical therapy: co-morbidities, sedentary  lifestyle, adherence to treatment plan and coping style    Goals: Reviewed 07/18/2022  Short Term Goals: 6 weeks  1. SIGNS & SYMPTOMS: Recent signs and systems trend is improving in order to progress towards LTG's. (GOAL MET 6/16/2022)  2. MOBILITY: Patient will improve AROM to 50% of stated goals, listed in objective measures above, in order to progress towards independence with functional activities.  (PROGRESSING 6/16/2022)  3. STRENGTH: Patient will improve strength to 50% of stated goals, listed in objective measures above, in order to progress towards independence with functional activities. (make sure strength goals are set) (PROGRESSING 6/16/2022)  4. GAIT IMPROVING: Patient will demonstrate improved gait mechanics including symmetrical stance time and step length B in order to improve functional mobility, improve quality of life, and decrease risk of further injury or fall.  (PROGRESSNG 6/16/2022)  5. HEP: Patient will demonstrate independence with HEP in order to progress toward functional independence.            Long Term Goals: 12 weeks   1. PAIN: Pt will report worst pain of 3/10 in order to progress toward max functional ability and improve quality of life (GOAL MET 6/9/2022)  2. FUNCTION: Patient will demonstrate improved function as indicated by a functional limitation score of less than or equal to 60 out of 100 on FOTO. (PROGRESSING 6/16/2022)  3. MOBILITY: Patient will improve AROM to stated goals, listed in objective measures above, in order to return to maximal functional potential and improve quality of life. (PROGRESSING 6/16/2022)  4. STRENGTH: Patient will improve strength to stated goals, listed in objective measures above, in order to improve functional independence and quality of life. (PROGRESSING 6/16/2022)  5. GAIT NORMAL: Patient will demonstrate normalized gait mechanics with minimal compensation in order to return to PLOF. (PROGRESSING 6/16/2022)      PLAN     Continue with current  plan of care from 5/18/2022 to 8/18/2022.  Progress patient's exercises and activities per protocol.     Bell Mullen, PT

## 2022-07-20 ENCOUNTER — PATIENT MESSAGE (OUTPATIENT)
Dept: ORTHOPEDICS | Facility: CLINIC | Age: 57
End: 2022-07-20
Payer: COMMERCIAL

## 2022-07-21 ENCOUNTER — TELEPHONE (OUTPATIENT)
Dept: UROLOGY | Facility: CLINIC | Age: 57
End: 2022-07-21
Payer: COMMERCIAL

## 2022-07-22 ENCOUNTER — PATIENT MESSAGE (OUTPATIENT)
Dept: UROLOGY | Facility: CLINIC | Age: 57
End: 2022-07-22
Payer: COMMERCIAL

## 2022-07-22 ENCOUNTER — PATIENT MESSAGE (OUTPATIENT)
Dept: FAMILY MEDICINE | Facility: CLINIC | Age: 57
End: 2022-07-22
Payer: COMMERCIAL

## 2022-07-22 ENCOUNTER — TELEPHONE (OUTPATIENT)
Dept: UROLOGY | Facility: CLINIC | Age: 57
End: 2022-07-22
Payer: COMMERCIAL

## 2022-07-22 NOTE — TELEPHONE ENCOUNTER
Called pt back but no answer and Voicemail was not set up.    ----- Message from Kati Taylor sent at 7/22/2022 10:12 AM CDT -----  .Type:  Patient Returning Call    Who Called:Pt   Who Left Message for Patient:PRESTON DRAKE  Does the patient know what this is regarding?:Appt   Would the patient rather a call back or a response via [facility]? Call back   Best Call Back Number:.[phone]    Additional Information: Pt states she is returning a missed call     Thanks merle

## 2022-07-27 ENCOUNTER — OFFICE VISIT (OUTPATIENT)
Dept: ORTHOPEDICS | Facility: CLINIC | Age: 57
End: 2022-07-27
Payer: COMMERCIAL

## 2022-07-27 VITALS — WEIGHT: 187.81 LBS | HEIGHT: 62 IN | BODY MASS INDEX: 34.56 KG/M2

## 2022-07-27 DIAGNOSIS — S83.232D COMPLEX TEAR OF MEDIAL MENISCUS OF LEFT KNEE AS CURRENT INJURY, SUBSEQUENT ENCOUNTER: Primary | ICD-10-CM

## 2022-07-27 DIAGNOSIS — Z98.890 POST-OPERATIVE STATE: ICD-10-CM

## 2022-07-27 PROCEDURE — 99024 PR POST-OP FOLLOW-UP VISIT: ICD-10-PCS | Mod: S$GLB,,, | Performed by: PHYSICIAN ASSISTANT

## 2022-07-27 PROCEDURE — 99999 PR PBB SHADOW E&M-EST. PATIENT-LVL V: CPT | Mod: PBBFAC,,, | Performed by: PHYSICIAN ASSISTANT

## 2022-07-27 PROCEDURE — 3008F PR BODY MASS INDEX (BMI) DOCUMENTED: ICD-10-PCS | Mod: CPTII,S$GLB,, | Performed by: PHYSICIAN ASSISTANT

## 2022-07-27 PROCEDURE — 1159F PR MEDICATION LIST DOCUMENTED IN MEDICAL RECORD: ICD-10-PCS | Mod: CPTII,S$GLB,, | Performed by: PHYSICIAN ASSISTANT

## 2022-07-27 PROCEDURE — 1159F MED LIST DOCD IN RCRD: CPT | Mod: CPTII,S$GLB,, | Performed by: PHYSICIAN ASSISTANT

## 2022-07-27 PROCEDURE — 1160F PR REVIEW ALL MEDS BY PRESCRIBER/CLIN PHARMACIST DOCUMENTED: ICD-10-PCS | Mod: CPTII,S$GLB,, | Performed by: PHYSICIAN ASSISTANT

## 2022-07-27 PROCEDURE — 99024 POSTOP FOLLOW-UP VISIT: CPT | Mod: S$GLB,,, | Performed by: PHYSICIAN ASSISTANT

## 2022-07-27 PROCEDURE — 3008F BODY MASS INDEX DOCD: CPT | Mod: CPTII,S$GLB,, | Performed by: PHYSICIAN ASSISTANT

## 2022-07-27 PROCEDURE — 99999 PR PBB SHADOW E&M-EST. PATIENT-LVL V: ICD-10-PCS | Mod: PBBFAC,,, | Performed by: PHYSICIAN ASSISTANT

## 2022-07-27 PROCEDURE — 1160F RVW MEDS BY RX/DR IN RCRD: CPT | Mod: CPTII,S$GLB,, | Performed by: PHYSICIAN ASSISTANT

## 2022-07-27 NOTE — PATIENT INSTRUCTIONS
Assessment:  Stacey Wade is a  56 y.o. 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     Encounter Diagnoses   Name Primary?    Complex tear of medial meniscus of left knee as current injury, subsequent encounter Yes    Post-operative state         Plan:  Continue physical therapy: at [facility]- can do once weekly for increased strength  Continue seated work restrictions  Recheck in 6 weeks    Follow-up: 6 weeks or sooner if there are any problems between now and then.    Leave Review:   Google: Leave Google Review  Healthgrades: Leave Healthgrades Review    After Hours Number: [phone]

## 2022-07-27 NOTE — PROGRESS NOTES
Patient ID: Stacey Wade  YOB: 1965  MRN: [id number]    Chief Complaint: Post-op Evaluation of the Left Knee    History of Present Illness: Stacey Wade is a  56 y.o. 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 Miss Ibarra reports her pain as a 0/10 without any pain medication. She recently had COVID and had to miss her last post-op and some PT appointments during that time. She has since started back on PT, but will need more appointments ordered if she has to continue PT. She is beginning to notice improvement in her knee and has no complaints about her incisions. She did get her US with Dr. Page on 7/6/22. She is fully weightbearing without her brace and uses a cane to assist in ambulation.     Plan (6/20/22):  · Continue physical therapy increasing visits to 3x weekly to focus on range of motion  · Physical therapy using meniscus repair protocol  · Can stop using brace  · Can gradually move to full weight bearing   · Ultrasound with Dr. Page to check meniscus extrusion.   · Follow up in 2 weeks    HPI    Past Medical History:   Past Medical History:   Diagnosis Date    Allergic rhinitis     Anxiety     Arthritis     Hypertension     Urticaria      Past Surgical History:   Procedure Laterality Date    CHONDROPLASTY OF KNEE Left 05/13/2022    Procedure: CHONDROPLASTY, KNEE;  Surgeon: Wes Faulkner MD;  Location: [facility] OR;  Service: Orthopedics;  Laterality: Left;    COLONOSCOPY N/A 01/18/2018    Procedure: COLONOSCOPY;  Surgeon: Yong Smith MD;  Location: [facility];  Service: Endoscopy;  Laterality: N/A;    HERNIA REPAIR Left      Family History   Problem Relation Age of Onset    Lung cancer Paternal Grandfather     Ovarian cancer Maternal Grandmother     Hyperlipidemia Mother     Hypertension Mother      Breast cancer Mother 60    Arthritis Mother     Lung cancer Father     Breast cancer Maternal Aunt 53    Heart failure Other         Great aunt    Prostate cancer Brother     Brain cancer Sister     Diabetes Neg Hx     Pseudochol deficiency Neg Hx     Malignant hyperthermia Neg Hx      Social History     Socioeconomic History    Marital status: Single   Tobacco Use    Smoking status: Never Smoker    Smokeless tobacco: Never Used   Substance and Sexual Activity    Alcohol use: No    Drug use: No    Sexual activity: Not Currently     Partners: Male     Birth control/protection: None   Social History Narrative    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     Medication List with Changes/Refills   Current Medications    BETAMETHASONE DIPROPIONATE 0.05 % CREAM    APPLY TOPICALLY 2 TIMES DAILY.    BUTALBITAL-ACETAMINOPHEN-CAFFEINE -40 MG (FIORICET, ESGIC) -40 MG PER TABLET    TAKE ONE TABLET BY MOUTH EVERY 6 HOURS AS NEEDED FOR PAIN OR FOR HEADACHE    CLORAZEPATE (TRANXENE) 3.75 MG TAB    Take 3.75 mg by mouth as needed.    DICLOFENAC SODIUM (VOLTAREN) 1 % GEL    Apply 2 g topically 4 (four) times daily.    DOXEPIN (SINEQUAN) 10 MG CAPSULE    TAKE 2 CAPSULES BY MOUTH 3 TIMES A DAY    EPINEPHRINE (EPIPEN) 0.3 MG/0.3 ML ATIN    Inject 0.3 mg into the muscle daily as needed.    FAMOTIDINE (PEPCID) 40 MG TABLET    Take 20 mg by mouth.    FEXOFENADINE (ALLEGRA) 180 MG TABLET    TAKE ONE TABLET BY MOUTH EVERY DAY    FLOVENT DISKUS 100 MCG/ACTUATION INHALER    Inhale into the lungs.    FLUTICASONE PROPIONATE (FLOVENT DISKUS) 100 MCG/ACTUATION INHALER    Inhale 100 mcg into the lungs.    HYDROXYZINE HCL (ATARAX) 25 MG TABLET    TAKE 1 TABLET BY MOUTH FOUR TIMES A DAY AS NEEDED FOR ITCHING    MIRABEGRON (MYRBETRIQ) 25 MG TB24 ER TABLET    Take 1 tablet (25 mg total) by mouth once daily.    MOMETASONE (NASONEX) 50 MCG/ACTUATION  NASAL SPRAY    INHALE 2 SPRAYS BY NASAL ROUTE ONCE DAILY    MONTELUKAST (SINGULAIR) 10 MG TABLET    Take 1 tablet (10 mg total) by mouth once daily.    OMALIZUMAB (XOLAIR) 150 MG/ML INJECTION    Inject 300 mg into the skin.    POTASSIUM CHLORIDE (MICRO-K) 10 MEQ CPSR    Take 1 capsule (10 mEq total) by mouth once daily.    RIZATRIPTAN (MAXALT) 10 MG TABLET    Take 1 tablet (10 mg total) by mouth as needed for Migraine (May repeat in 2 hrs.  No more than 2 tablets in 24 hrs.).    TRIAMTERENE-HYDROCHLOROTHIAZIDE 37.5-25 MG (DYAZIDE) 37.5-25 MG PER CAPSULE    Take 1 capsule by mouth daily as needed.    XOLAIR 150 MG/ML INJECTION         Review of patient's allergies indicates:   Allergen Reactions    Benzalkonium chloride     Black pepper      Other reaction(s): Hives    Chocolate flavor      Other reaction(s): Hives    Citrus and derivatives Hives     LEMON PRODUCTS    Grapefruit Hives     Other reaction(s): Hives    Ibuprofen Swelling    Latex      Other reaction(s): Hives    Lemon balm (casper officinalis) Hives     Anything with lemon in it    Naproxen Swelling    Neomycin-bacitracin-polymyxin      Other reaction(s): Rash    Oats (richard) Hives     Oat foods (oatmeal, etc...)    Vegetable acetoglycerides Hives     Vegetable gums    Wheat containing prod     Wheat flour Hives     Review of Systems   Constitutional: Negative for chills and fever.   HENT: Negative for sore throat.    Eyes: Negative for pain.   Cardiovascular: Negative for chest pain and leg swelling.   Respiratory: Negative for cough and shortness of breath.    Skin: Negative for itching and rash.   Musculoskeletal: Positive for joint pain and myalgias.   Gastrointestinal: Negative for abdominal pain, nausea and vomiting.   Genitourinary: Negative for dysuria.   Neurological: Negative for dizziness, numbness and paresthesias.       Physical Exam:   Body mass index is 34.35 kg/m².  There were no vitals filed for this visit.   GENERAL:  Well appearing, appropriate for stated age, no acute distress.  CARDIOVASCULAR: Pulses regular by peripheral palpation.  PULMONARY: Respirations are even and non-labored.  NEURO: Awake, alert, and oriented x 3.  PSYCH: Mood & affect are appropriate.  HEENT: Head is normocephalic and atraumatic.  General    Nursing note and vitals reviewed.          Right Knee Exam   Right knee exam is normal.    Inspection   Effusion: absent    Tenderness   The patient is experiencing no tenderness.     Range of Motion   Extension: 0   Flexion: 120     Tests   Ligament Examination Lachman: normal (-1 to 2mm) PCL-Posterior Drawer: normal (0 to 2mm)     MCL - Valgus: normal (0 to 2mm)  LCL - Varus: normal    Other   Sensation: normal    Left Knee Exam   Left knee exam is normal.    Inspection   Effusion: absent    Tenderness   The patient is experiencing no tenderness.     Range of Motion   Extension: 0   Flexion: 120     Tests   Stability Lachman: normal (-1 to 2mm) PCL-Posterior Drawer: normal (0 to 2mm)  MCL - Valgus: normal (0 to 2mm)  LCL - Varus: normal (0 to 2mm)    Other   Sensation: normal    Muscle Strength   Right Lower Extremity   Hip Abduction: 5/5   Quadriceps:  5/5   Hamstrin/5   Left Lower Extremity   Hip Abduction: 5/5   Quadriceps:  5/5   Hamstrin/5     Vascular Exam     Right Pulses  Dorsalis Pedis:      2+  Posterior Tibial:      2+        Left Pulses  Dorsalis Pedis:      2+  Posterior Tibial:      2+              Imaging:    X-Ray Knee 1 or 2 View Left  Narrative: EXAMINATION:  XR KNEE 1 OR 2 VIEW LEFT    CLINICAL HISTORY:  intra op;    TECHNIQUE:  Single fluoroscopic view of the left knee is submitted during ongoing surgical procedure.    COMPARISON:  None    FINDINGS:  Single view of the left knee is submitted during ongoing surgical procedure.  No acute abnormality seen on this single image.  Impression: Fluoroscopic view of the left knee for ongoing procedure.    Electronically signed by: Stephen  Lisa  Date:    05/13/2022  Time:    12:35  SURG [place] Surgery Fluoro Usage  See OP Notes for results.     IMPRESSION: See OP Notes for results.     This procedure was auto-finalized by: Virtual Radiologist      Relevant imaging results reviewed and interpreted by me, discussed with the patient and / or family today.     Other Tests:         Patient Instructions   Assessment:  Stacey Wade is a  56 y.o. 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     Plan:   Continue physical therapy: at [facility]- can do once weekly for increased strength   Continue seated work restrictions   Recheck in 6 weeks    Follow-up: 6 weeks  or sooner if there are any problems between now and then.    Leave Review:    Google: Leave Google Review   Healthgrades: Leave Healthgrades Review    After Hours Number: [phone]        Provider Note/Medical Decision Making:        I discussed worrisome and red flag signs and symptoms with the patient. The patient expressed understanding and agreed to alert me immediately or to go to the emergency room if they experience any of these.    Treatment plan was developed with input from the patient/family, and they expressed understanding and agreement with the plan. All questions were answered today.    Disclaimer: This note was prepared using a voice recognition system and is likely to have sound alike errors within the text.

## 2022-08-02 ENCOUNTER — PATIENT MESSAGE (OUTPATIENT)
Dept: UROLOGY | Facility: CLINIC | Age: 57
End: 2022-08-02
Payer: COMMERCIAL

## 2022-08-16 ENCOUNTER — OFFICE VISIT (OUTPATIENT)
Dept: UROLOGY | Facility: CLINIC | Age: 57
End: 2022-08-16
Payer: COMMERCIAL

## 2022-08-16 ENCOUNTER — CLINICAL SUPPORT (OUTPATIENT)
Dept: REHABILITATION | Facility: HOSPITAL | Age: 57
End: 2022-08-16
Payer: COMMERCIAL

## 2022-08-16 VITALS
DIASTOLIC BLOOD PRESSURE: 86 MMHG | HEART RATE: 98 BPM | TEMPERATURE: 98 F | BODY MASS INDEX: 34.41 KG/M2 | SYSTOLIC BLOOD PRESSURE: 130 MMHG | HEIGHT: 62 IN | WEIGHT: 187 LBS

## 2022-08-16 DIAGNOSIS — N32.81 OVERACTIVE BLADDER: ICD-10-CM

## 2022-08-16 DIAGNOSIS — Z74.09 DECREASED FUNCTIONAL MOBILITY AND ENDURANCE: Primary | ICD-10-CM

## 2022-08-16 DIAGNOSIS — S83.232D COMPLEX TEAR OF MEDIAL MENISCUS OF LEFT KNEE AS CURRENT INJURY, SUBSEQUENT ENCOUNTER: ICD-10-CM

## 2022-08-16 DIAGNOSIS — N39.41 URGE INCONTINENCE OF URINE: ICD-10-CM

## 2022-08-16 PROCEDURE — 3079F PR MOST RECENT DIASTOLIC BLOOD PRESSURE 80-89 MM HG: ICD-10-PCS | Mod: CPTII,S$GLB,, | Performed by: UROLOGY

## 2022-08-16 PROCEDURE — 99203 OFFICE O/P NEW LOW 30 MIN: CPT | Mod: S$GLB,,, | Performed by: UROLOGY

## 2022-08-16 PROCEDURE — 3075F SYST BP GE 130 - 139MM HG: CPT | Mod: CPTII,S$GLB,, | Performed by: UROLOGY

## 2022-08-16 PROCEDURE — 3008F PR BODY MASS INDEX (BMI) DOCUMENTED: ICD-10-PCS | Mod: CPTII,S$GLB,, | Performed by: UROLOGY

## 2022-08-16 PROCEDURE — 3075F PR MOST RECENT SYSTOLIC BLOOD PRESS GE 130-139MM HG: ICD-10-PCS | Mod: CPTII,S$GLB,, | Performed by: UROLOGY

## 2022-08-16 PROCEDURE — 1160F PR REVIEW ALL MEDS BY PRESCRIBER/CLIN PHARMACIST DOCUMENTED: ICD-10-PCS | Mod: CPTII,S$GLB,, | Performed by: UROLOGY

## 2022-08-16 PROCEDURE — 1160F RVW MEDS BY RX/DR IN RCRD: CPT | Mod: CPTII,S$GLB,, | Performed by: UROLOGY

## 2022-08-16 PROCEDURE — 99999 PR PBB SHADOW E&M-EST. PATIENT-LVL V: CPT | Mod: PBBFAC,,, | Performed by: UROLOGY

## 2022-08-16 PROCEDURE — 97110 THERAPEUTIC EXERCISES: CPT

## 2022-08-16 PROCEDURE — 3079F DIAST BP 80-89 MM HG: CPT | Mod: CPTII,S$GLB,, | Performed by: UROLOGY

## 2022-08-16 PROCEDURE — 99999 PR PBB SHADOW E&M-EST. PATIENT-LVL V: ICD-10-PCS | Mod: PBBFAC,,, | Performed by: UROLOGY

## 2022-08-16 PROCEDURE — 3008F BODY MASS INDEX DOCD: CPT | Mod: CPTII,S$GLB,, | Performed by: UROLOGY

## 2022-08-16 PROCEDURE — 99203 PR OFFICE/OUTPT VISIT, NEW, LEVL III, 30-44 MIN: ICD-10-PCS | Mod: S$GLB,,, | Performed by: UROLOGY

## 2022-08-16 PROCEDURE — 1159F MED LIST DOCD IN RCRD: CPT | Mod: CPTII,S$GLB,, | Performed by: UROLOGY

## 2022-08-16 PROCEDURE — 1159F PR MEDICATION LIST DOCUMENTED IN MEDICAL RECORD: ICD-10-PCS | Mod: CPTII,S$GLB,, | Performed by: UROLOGY

## 2022-08-16 NOTE — PROGRESS NOTES
[facility]   Physical Therapy Treatment Note    Name: Stacey Wade  Clinic Number: [id number]    Therapy Diagnosis:   Encounter Diagnosis   Name Primary?    Complex tear of medial meniscus of left knee as current injury, subsequent encounter      Physician: Megha Kwon, *    Visit Date: 8/16/2022     Physician Orders: PT Eval and Treat  Medical Diagnosis from Referral: Complex tear of medial meniscus of left knee as current injury, initial encounter  Evaluation Date: 5/18/2022  Authorization Period Expiration: 12/31/2022  Plan of Care Expiration: 11/16/2022  Progress Note Due: 9/16/2022  Visit # / Visits authorized: 18/20 (+1 for evaluation)  FOTO: 1/3        Precautions: Standard, Fall and Weightbearing  PTA Visit #: 0/5     Surgery: 5/13/2022    Time In: 1015  Time Out: 1100  Total Billable Time: 45 minutes    SUBJECTIVE     Pt reports: that she is returning to the clinic due to significant stiffness at the left knee.  Still also reports of mild to moderate pain with various activities such as deep squatting (picking up objects from the floor), kneeling, and descending steps.    She was compliant with home exercise program.  Response to previous treatment: N/A  Functional change: N/A    Pain: 2/10 left knee / 3/10 right knee  Location: bilateral patellofemoral region      OBJECTIVE     Objective Measures updated at progress report unless specified.     Lower Extremity ROM:   lower extremities ROM 8/16/2022 6/16/2022   Knee Flexion 108 82   Knee Flexion 0 0       Sit to stand (30 seconds): 10 repetitions      Lower Extremity Strength   LE MMT Right/Left  8/16/2022 Right/Left  6/16/2022   HIP Flexion 4+/5 4+/5 / 3-/5   Hip Abduction 3+/5 / 3/5 NT   Hip Extension 3+/5 / 3+/5 NT   Hip Adduction 4+/5 / 4+/5 NT   Knee Flexion 4+/5 / 4/5 4+/5 / NT   Knee Extension 4+/5 / 4+/5  4+/5 / NT   Ankle Dorsiflexion 5/5 / 5/5 5/5 / 4/5   Ankle Plantarflexion 3/5 / 4-/5 NT / NT      Gait: decreased walking velocity, decreased step length      Treatment     Stacey received the treatments listed below:      THERAPEUTIC EXERCISES to develop strength, endurance, ROM, flexibility, posture and core stabilization for (53) minutes including:    Intervention Performed Today    Recumbent bike for range of motion X No level 5 minutes with 10s holds at max knee flexion ROM    Supine knee flexion with stretch rite X 10 second holds x 2 minutes   Prone left quad stretch with stretch rite X 10 second holds x 2 minutes   Double knee to chest knee flexion stretch  X 4 minutes with 10s holds with feet propped on swiss ball    Quadruped knee flexion stretch (moving into child's pose position)  X 3 minutes with 5s holds    Knee over toe stretch  5s 5x left lower extremity only   Straight leg raise   2 pound, 3 sets of 10 repetition, 2 second hold   Supine hip adduction with ball  3 minutes, legs extended   Supine hip abduction with belt  3 minutes, legs extended   Prone hip extension  2 pound, 3 sets of 10 repetition, no assist, no brace   Side lying hip adduction  2 pound, 3 sets of 10 repetition, no assist, no brace   Sit to stands  3x10 with no upper extremity assist   Single leg stance  2x30s B    Tandem stance   2x30s B    MOBO board  2 positions, 2 minutes each   Leg Press/ Shuttle  Double leg press, 5 cords, 3 sets of 10 repetition   Double heel raises: 5 cords, 3 sets of 10 repetition   Single leg press: 4 cords, 3 sets of 10 repetition    Ankle 4 way  red theraband, 1 set of 10 repetition each direction   Prone hamstring curls  3x10   Standing marches  3 sets of 10 repetition each lower extremity    Standing eccentric heel raises  3 sets of 10 repetition    Step ups, 8 inch x 8 inch with upper extremities assist   Bridges  3x10 with ball   Sit to stands x X 30 seconds     Plan for Next Visit: try upright bike next visit       .NEUROMUSCULAR RE-EDUCATION ACTIVITIES to improve Balance, Coordination,  Kinesthetic, Sense, Proprioception and Posture for (0) minutes.  The following were included:    Intervention Performed Today    Left lower extremity step with weight shift, unlocking of knee  1 sets of 10 repetition    Left lower extremity stepping over orange beam with weight shifting and unlocking of knee  2 sets of 10 repetition    bilateral lower extremity step with even step length and weight shift  1 set of 10 reps   Gait training with straight cane with appropriate step length, weight shifting and lifting of toes  6 minutes               Plan for Next Visit:        MANUAL THERAPY TECHNIQUES were applied for (0) minutes, including:    Manual Intervention Performed Today    Soft Tissue Mobilization  L anterior knee scar mobilizations    Joint Mobilizations  Patellar mobilizations in all directions    Mobilization with movement     Patellar mobilizations  Medial and latera, superior and inferior glides   Manual lymph drainage  Lower legs, posterior/anterior knee, upper leg   Functional Dry Needling        Plan for Next Visit:        Stacey received electrical stimulation for neuromuscular re-education for quad strengthening for 0 minutes to left quad with quad set.       Patient Education and Home Exercises     Home Exercises Provided and Patient Education Provided     Education provided:   - Patient educated once again on proper wearing of locked hinged knee brace.     Written Home Exercises Provided: Patient instructed to cont prior HEP. Exercises were reviewed and Stacey was able to demonstrate them prior to the end of the session.  Stacey demonstrated good  understanding of the education provided. See EMR under Patient Instructions for exercises provided during therapy sessions    ASSESSMENT     Patient returns to the clinic after a few weeks of performing exercises at home.  She does still demonstrate ROM deficits into knee flexion at the left knee.  There is also complaint of pain at the right knee.   Bilateral LEs still demonstrate significant weakness (especially the hip - force absorbing - musculature).  As a result of ROM deficits, weakness, and pain/apprehension, the patient does still demonstrate problems with walking, ascending/descending stairs, squatting and functional activities such as getting onto and off of the floor.    Stacey Is progressing well towards her goals.   Pt prognosis is Good.     Pt will continue to benefit from skilled outpatient physical therapy to address the deficits listed in the problem list box on initial evaluation, provide pt/family education and to maximize pt's level of independence in the home and community environment.     Pt's spiritual, cultural and educational needs considered and pt agreeable to plan of care and goals.     Anticipated barriers to physical therapy: co-morbidities, sedentary lifestyle, adherence to treatment plan and coping style    Goals: Reviewed 08/16/2022  Short Term Goals: 4 weeks  1. SIGNS & SYMPTOMS: Recent signs and systems trend is improving in order to progress towards LTG's. (GOAL MET 6/16/2022)  2. MOBILITY: Patient will increase AROM at the bilateral knees to 120 flexion for better ability to tolerate squatting to  objects  3. STRENGTH: Patient will improve strength of lower extremities musculature to 3+/5 for improved ability to perform squatting and descending steps and decrease pain with gait  4. GAIT IMPROVING: Patient will improve gait by walking at 1.6 mph x 5 minutes with minimal to no lateral lurch  5. HEP: Patient will demonstrate independence with HEP in order to progress toward functional independence.            Long Term Goals: 6 weeks   1. PAIN: Pt will report worst pain of 3/10 in order to progress toward max functional ability and improve quality of life (GOAL MET 6/9/2022)  2. MoBILITY: Patient will increase AROM at the bilateral knees to 120 flexion for better ability to tolerate squatting to   objects  3. STRENGTH: Patient will improve strength of lower extremities musculature to 4-/5 for improved ability to perform squatting and descending steps and decreased pain with gait  4. GAIT NORMAL: Patient will improve gait by walking at 1.8 mph x 10 minutes with minimal to no lateral lurch  5. Patient will report of ability to return to kneeling to pick to perform housework and assist with picking up dropped items on the floor with pain at or less than 2/10      PLAN     New plan of care for the patient to be seen 2 times per week for 8 weeks from 8/16/2022 - 11/16/2022 to work on improved ROM, improved lower extremities strength, improved squat and lunge capability, improved gait mechanics, and improved ability to perform stair ascending/descending.  Progress patient's exercises and activities per protocol.     Ashish Guaman, PT

## 2022-08-18 NOTE — PLAN OF CARE
[facility]   Physical Therapy Treatment Note    Name: Stacey Wade  Clinic Number: [id number]    Therapy Diagnosis:   Encounter Diagnosis   Name Primary?    Complex tear of medial meniscus of left knee as current injury, subsequent encounter      Physician: Megha Kwon, *    Visit Date: 8/16/2022     Physician Orders: PT Eval and Treat  Medical Diagnosis from Referral: Complex tear of medial meniscus of left knee as current injury, initial encounter  Evaluation Date: 5/18/2022  Authorization Period Expiration: 12/31/2022  Plan of Care Expiration: 8/18/2022  Progress Note Due: 7/16/2022  Visit # / Visits authorized: 18/20 (+1 for evaluation)  FOTO: 1/3        Precautions: Standard, Fall and Weightbearing  PTA Visit #: 0/5     Surgery: 5/13/2022    Time In: 1000   Time Out: 1053  Total Billable Time: 53 minutes    SUBJECTIVE     Pt reports: that she is returning to the clinic due to significant stiffness at the left knee.  Still also reports of mild to moderate pain with various activities such as deep squatting (picking up objects from the floor), kneeling, and descending steps.    She was compliant with home exercise program.  Response to previous treatment: N/A  Functional change: N/A    Pain: 2/10 left knee / 3/10 right knee  Location: bilateral patellofemoral region      OBJECTIVE     Objective Measures updated at progress report unless specified.     Lower Extremity ROM:   lower extremities ROM 8/16/2022 6/16/2022   Knee Flexion 108 82   Knee Flexion 0 0       Sit to stand (30 seconds): 10 repetitions      Lower Extremity Strength   LE MMT Right/Left  8/16/2022 Right/Left  6/16/2022   HIP Flexion 4+/5 4+/5 / 3-/5   Hip Abduction 3+/5 / 3/5 NT   Hip Extension 3+/5 / 3+/5 NT   Hip Adduction 4+/5 / 4+/5 NT   Knee Flexion 4+/5 / 4/5 4+/5 / NT   Knee Extension 4+/5 / 4+/5  4+/5 / NT   Ankle Dorsiflexion 5/5 / 5/5 5/5 / 4/5   Ankle Plantarflexion 3/5 / 4-/5 NT / NT      Gait: decreased walking velocity, decreased step length      Treatment     Stacey received the treatments listed below:      THERAPEUTIC EXERCISES to develop strength, endurance, ROM, flexibility, posture and core stabilization for (53) minutes including:    Intervention Performed Today    Recumbent bike for range of motion X No level 5 minutes with 10s holds at max knee flexion ROM    Supine knee flexion with stretch rite X 10 second holds x 2 minutes   Prone left quad stretch with stretch rite X 10 second holds x 2 minutes   Double knee to chest knee flexion stretch  X 4 minutes with 10s holds with feet propped on swiss ball    Quadruped knee flexion stretch (moving into child's pose position)  X 3 minutes with 5s holds    Knee over toe stretch  5s 5x left lower extremity only   Straight leg raise   2 pound, 3 sets of 10 repetition, 2 second hold   Supine hip adduction with ball  3 minutes, legs extended   Supine hip abduction with belt  3 minutes, legs extended   Prone hip extension  2 pound, 3 sets of 10 repetition, no assist, no brace   Side lying hip adduction  2 pound, 3 sets of 10 repetition, no assist, no brace   Sit to stands  3x10 with no upper extremity assist   Single leg stance  2x30s B    Tandem stance   2x30s B    MOBO board  2 positions, 2 minutes each   Leg Press/ Shuttle  Double leg press, 5 cords, 3 sets of 10 repetition   Double heel raises: 5 cords, 3 sets of 10 repetition   Single leg press: 4 cords, 3 sets of 10 repetition    Ankle 4 way  red theraband, 1 set of 10 repetition each direction   Prone hamstring curls  3x10   Standing marches  3 sets of 10 repetition each lower extremity    Standing eccentric heel raises  3 sets of 10 repetition    Step ups, 8 inch x 8 inch with upper extremities assist   Bridges  3x10 with ball   Sit to stands x X 30 seconds     Plan for Next Visit: try upright bike next visit       .NEUROMUSCULAR RE-EDUCATION ACTIVITIES to improve Balance, Coordination,  Kinesthetic, Sense, Proprioception and Posture for (0) minutes.  The following were included:    Intervention Performed Today    Left lower extremity step with weight shift, unlocking of knee  1 sets of 10 repetition    Left lower extremity stepping over orange beam with weight shifting and unlocking of knee  2 sets of 10 repetition    bilateral lower extremity step with even step length and weight shift  1 set of 10 reps   Gait training with straight cane with appropriate step length, weight shifting and lifting of toes  6 minutes               Plan for Next Visit:        MANUAL THERAPY TECHNIQUES were applied for (0) minutes, including:    Manual Intervention Performed Today    Soft Tissue Mobilization  L anterior knee scar mobilizations    Joint Mobilizations  Patellar mobilizations in all directions    Mobilization with movement     Patellar mobilizations  Medial and latera, superior and inferior glides   Manual lymph drainage  Lower legs, posterior/anterior knee, upper leg   Functional Dry Needling        Plan for Next Visit:        Stacey received electrical stimulation for neuromuscular re-education for quad strengthening for 0 minutes to left quad with quad set.       Patient Education and Home Exercises     Home Exercises Provided and Patient Education Provided     Education provided:   - Patient educated once again on proper wearing of locked hinged knee brace.     Written Home Exercises Provided: Patient instructed to cont prior HEP. Exercises were reviewed and Stacey was able to demonstrate them prior to the end of the session.  Stacey demonstrated good  understanding of the education provided. See EMR under Patient Instructions for exercises provided during therapy sessions    ASSESSMENT     Patient returns to the clinic after a few weeks of performing exercises at home.  She does still demonstrate ROM deficits into knee flexion at the left knee.  There is also complaint of pain at the right knee.   Bilateral LEs still demonstrate significant weakness (especially the hip - force absorbing - musculature).  As a result of ROM deficits, weakness, and pain/apprehension, the patient does still demonstrate problems with walking, ascending/descending stairs, squatting and functional activities such as getting onto and off of the floor.    Stacey Is progressing well towards her goals.   Pt prognosis is Good.     Pt will continue to benefit from skilled outpatient physical therapy to address the deficits listed in the problem list box on initial evaluation, provide pt/family education and to maximize pt's level of independence in the home and community environment.     Pt's spiritual, cultural and educational needs considered and pt agreeable to plan of care and goals.     Anticipated barriers to physical therapy: co-morbidities, sedentary lifestyle, adherence to treatment plan and coping style    Goals: Reviewed 08/16/2022  Short Term Goals: 4 weeks  1. SIGNS & SYMPTOMS: Recent signs and systems trend is improving in order to progress towards LTG's. (GOAL MET 6/16/2022)  2. MOBILITY: Patient will increase AROM at the bilateral knees to 120 flexion for better ability to tolerate squatting to  objects  3. STRENGTH: Patient will improve strength of lower extremities musculature to 3+/5 for improved ability to perform squatting and descending steps and decrease pain with gait  4. GAIT IMPROVING: Patient will improve gait by walking at 1.6 mph x 5 minutes with minimal to no lateral lurch  5. HEP: Patient will demonstrate independence with HEP in order to progress toward functional independence.            Long Term Goals: 6 weeks   1. PAIN: Pt will report worst pain of 3/10 in order to progress toward max functional ability and improve quality of life (GOAL MET 6/9/2022)  2. MoBILITY: Patient will increase AROM at the bilateral knees to 120 flexion for better ability to tolerate squatting to   objects  3. STRENGTH: Patient will improve strength of lower extremities musculature to 4-/5 for improved ability to perform squatting and descending steps and decreased pain with gait  4. GAIT NORMAL: Patient will improve gait by walking at 1.8 mph x 10 minutes with minimal to no lateral lurch  5. Patient will report of ability to return to kneeling to pick to perform housework and assist with picking up dropped items on the floor with pain at or less than 2/10      PLAN     New plan of care for the patient to be seen 2 times per week for 8 weeks from 8/16/2022 - 11/16/2022 to work on improved ROM, improved lower extremities strength, improved squat and lunge capability, improved gait mechanics, and improved ability to perform stair ascending/descending.  Progress patient's exercises and activities per protocol.     Ashish Guaman, PT

## 2022-08-21 NOTE — PROGRESS NOTES
Chief Complaint:  OAB    HPI:   Stacey Wade is a 56 y.o. female that presents today as a referral from Dr. Lindsey for OAB.  Patient notes worsening issues with frequency and urgency the last few years.  Patient notes that she will have urge incontinence 2-3 times per week and wears one pad per day for this.  She has previously tried Detrol as well as Myrbetriq.  She feels that the Myrbetriq is working for her currently.  She will also intermittently take azo.  She will also drink 3-4 thick cups of coffee per day, but notes that she has reduced this recently.  She denies any prior urologic procedures or gross hematuria.  She does have family history of prostate cancer in her brother and her dad.    PMH:  Past Medical History:   Diagnosis Date    Allergic rhinitis     Anxiety     Arthritis     Hypertension     Urticaria        PSH:  Past Surgical History:   Procedure Laterality Date    CHONDROPLASTY OF KNEE Left 05/13/2022    Procedure: CHONDROPLASTY, KNEE;  Surgeon: Wes Faulkner MD;  [facility];  Service: Orthopedics;  Laterality: Left;    COLONOSCOPY N/A 01/18/2018    Procedure: COLONOSCOPY;  Surgeon: Yong Smith MD;  Location[facility];  Service: Endoscopy;  Laterality: N/A;    HERNIA REPAIR Left        Family History:  Family History   Problem Relation Age of Onset    Lung cancer Paternal Grandfather     Ovarian cancer Maternal Grandmother     Hyperlipidemia Mother     Hypertension Mother     Breast cancer Mother 60    Arthritis Mother     Lung cancer Father     Breast cancer Maternal Aunt 53    Heart failure Other         Great aunt    Prostate cancer Brother     Brain cancer Sister     Diabetes Neg Hx     Pseudochol deficiency Neg Hx     Malignant hyperthermia Neg Hx        Social History:  Social History     Tobacco Use    Smoking status: Never Smoker    Smokeless tobacco: Never Used   Substance Use Topics    Alcohol use: No    Drug use: No        Review of  Systems:  General: No fever, chills  Skin: No rashes  Chest:  Denies cough and sputum production  Heart: Denies chest pain  Resp: Denies dyspnea  Abdomen: Denies diarrhea, abdominal pain, hematemesis, or blood in stool.  Musculoskeletal: No joint stiffness or swelling. Denies back pain.  : see HPI  Neuro: no dizziness or weakness    Allergies:  Benzalkonium chloride, Black pepper, Chocolate flavor, Citrus and derivatives, Grapefruit, Ibuprofen, Latex, Lemon balm (casper officinalis), Naproxen, Neomycin-bacitracin-polymyxin, Oats (rihcard), Vegetable acetoglycerides, Wheat containing prod, and Wheat flour    Medications:    Current Outpatient Medications:     betamethasone dipropionate 0.05 % cream, APPLY TOPICALLY 2 TIMES DAILY., Disp: 45 g, Rfl: 2    butalbital-acetaminophen-caffeine -40 mg (FIORICET, ESGIC) -40 mg per tablet, TAKE ONE TABLET BY MOUTH EVERY 6 HOURS AS NEEDED FOR PAIN OR FOR HEADACHE, Disp: , Rfl:     clorazepate (TRANXENE) 3.75 MG Tab, Take 3.75 mg by mouth as needed., Disp: , Rfl:     diclofenac sodium (VOLTAREN) 1 % Gel, Apply 2 g topically 4 (four) times daily., Disp: 100 g, Rfl: 2    doxepin (SINEQUAN) 10 MG capsule, TAKE 2 CAPSULES BY MOUTH 3 TIMES A DAY, Disp: 180 capsule, Rfl: 1    EPINEPHrine (EPIPEN) 0.3 mg/0.3 mL AtIn, Inject 0.3 mg into the muscle daily as needed., Disp: , Rfl:     famotidine (PEPCID) 40 MG tablet, Take 20 mg by mouth., Disp: , Rfl:     fexofenadine (ALLEGRA) 180 MG tablet, TAKE ONE TABLET BY MOUTH EVERY DAY, Disp: 30 tablet, Rfl: 0    FLOVENT DISKUS 100 mcg/actuation inhaler, Inhale into the lungs., Disp: , Rfl:     fluticasone propionate (FLOVENT DISKUS) 100 mcg/actuation inhaler, Inhale 100 mcg into the lungs., Disp: , Rfl:     hydrOXYzine HCL (ATARAX) 25 MG tablet, TAKE 1 TABLET BY MOUTH FOUR TIMES A DAY AS NEEDED FOR ITCHING, Disp: 120 tablet, Rfl: 1    mirabegron (MYRBETRIQ) 25 mg Tb24 ER tablet, Take 1 tablet (25 mg total) by mouth once  daily., Disp: 30 tablet, Rfl: 5    mometasone (NASONEX) 50 mcg/actuation nasal spray, INHALE 2 SPRAYS BY NASAL ROUTE ONCE DAILY, Disp: 17 g, Rfl: 1    montelukast (SINGULAIR) 10 mg tablet, Take 1 tablet (10 mg total) by mouth once daily., Disp: 90 tablet, Rfl: 3    omalizumab (XOLAIR) 150 mg/mL injection, Inject 300 mg into the skin., Disp: , Rfl:     potassium chloride (MICRO-K) 10 MEQ CpSR, Take 1 capsule (10 mEq total) by mouth once daily. (Patient taking differently: Take 10 mEq by mouth as needed.), Disp: 90 capsule, Rfl: 0    rizatriptan (MAXALT) 10 MG tablet, TAKE ONE TABLET BY MOUTH AS NEEDED FOR migraine MAY REPEAT in TWO hours. no more THAN TWO TABLETS in 24 hours, Disp: 27 tablet, Rfl: 3    triamterene-hydrochlorothiazide 37.5-25 mg (DYAZIDE) 37.5-25 mg per capsule, Take 1 capsule by mouth daily as needed., Disp: , Rfl:     XOLAIR 150 mg/mL injection, , Disp: , Rfl:   No current facility-administered medications for this visit.    Facility-Administered Medications Ordered in Other Visits:     0.9%  NaCl infusion, , Intravenous, Continuous, Megha Kwon PA-C    chlorhexidine 0.12 % solution 10 mL, 10 mL, Mouth/Throat, On Call Procedure, Megha Kwon PA-C    Physical Exam:  Vitals:    08/16/22 1123   BP: 130/86   Pulse: 98   Temp: 97.6 °F (36.4 °C)     Body mass index is 34.2 kg/m².  General: awake, alert, cooperative  Head: NC/AT  Ears: external ears normal  Eyes: sclera normal  Lungs: normal inspiration, NAD  Heart: well-perfused  Skin: The skin is warm and dry  Ext: No c/c/e.  Neuro: grossly intact, AOx3    RADIOLOGY:  No recent relevant imaging available for review.    LABS:  I personally reviewed the following lab values:  Lab Results   Component Value Date    WBC 7.69 05/02/2022    HGB 13.5 05/02/2022    HCT 44.1 05/02/2022     05/02/2022     07/15/2022    K 3.6 07/15/2022     07/15/2022    CREATININE 1.1 07/15/2022    BUN 8 07/15/2022    CO2 26 07/15/2022     TSH 1.013 07/15/2022    INR 1.0 05/02/2022    CHOL 211 (H) 07/15/2022    TRIG 99 07/15/2022    HDL 58 07/15/2022    ALT 12 07/15/2022    AST 14 07/15/2022     Assessment/Plan:   Stacey Wade is a 56 y.o. female with:    OAB - he continue Myrbetriq, recommended decreasing her coffee intake, follow-up three months for symptom check    Thank you for allowing me the opportunity to participate in this patient's care.     Mamadou Armstrong MD  Urology

## 2022-08-22 ENCOUNTER — PATIENT MESSAGE (OUTPATIENT)
Dept: ORTHOPEDICS | Facility: CLINIC | Age: 57
End: 2022-08-22
Payer: COMMERCIAL

## 2022-08-23 ENCOUNTER — CLINICAL SUPPORT (OUTPATIENT)
Dept: REHABILITATION | Facility: HOSPITAL | Age: 57
End: 2022-08-23
Payer: COMMERCIAL

## 2022-08-23 ENCOUNTER — DOCUMENTATION ONLY (OUTPATIENT)
Dept: REHABILITATION | Facility: HOSPITAL | Age: 57
End: 2022-08-23
Payer: COMMERCIAL

## 2022-08-23 DIAGNOSIS — Z74.09 DECREASED FUNCTIONAL MOBILITY AND ENDURANCE: Primary | ICD-10-CM

## 2022-08-23 PROCEDURE — 97110 THERAPEUTIC EXERCISES: CPT | Mod: CQ

## 2022-08-23 NOTE — PROGRESS NOTES
[facility]   Physical Therapy Treatment Note    Name: Stacey Wade  Clinic Number: [id number]    Therapy Diagnosis:   Encounter Diagnosis   Name Primary?    Decreased functional mobility and endurance Yes     Physician: Megha Kwon, *    Visit Date: 8/23/2022     Physician Orders: PT Eval and Treat  Medical Diagnosis from Referral: Complex tear of medial meniscus of left knee as current injury, initial encounter  Evaluation Date: 5/18/2022  Authorization Period Expiration: 12/31/2022  Plan of Care Expiration: 11/16/2022  Progress Note Due: 9/16/2022  Visit # / Visits authorized: 19/20 (+1 for evaluation)  FOTO: 1/3     Precautions: Standard, Fall and Weightbearing  PTA Visit #: 1/5     Surgery: 5/13/2022    Time In: 710 (late arrival)  Time Out: 743  Total Billable Time: 30 minutes    SUBJECTIVE     Pt reports: her pain is below a 5 but it is bothering her a good bit. She states she applies KT tape to both knees at home and that has been helping.     She was compliant with home exercise program.  Response to previous treatment: N/A  Functional change: N/A    Pain: 5/10 left knee / 3/10 right knee  Location: bilateral patellofemoral region      OBJECTIVE     Objective Measures updated at progress report unless specified.       Treatment     Stacey received the treatments listed below:      THERAPEUTIC EXERCISES to develop strength, endurance, ROM, flexibility, posture and core stabilization for (30) minutes including:    Intervention Performed Today    Recumbent bike for range of motion X No level 5 minutes with 10s holds at max knee flexion ROM    Supine knee flexion with stretch rite X 10 second holds x 10   Prone left quad stretch with stretch rite X 10 second holds x 10   Double knee to chest knee flexion stretch  X    Quadruped knee flexion stretch (moving into child's pose position)  X 10 second holds x 10   Knee over toe stretch  5s 5x left lower extremity only    Straight leg raise   2 pound, 3 sets of 10 repetition, 2 second hold   Supine hip adduction with ball  3 minutes, legs extended   Supine hip abduction with belt  3 minutes, legs extended   Prone hip extension  2 pound, 3 sets of 10 repetition, no assist, no brace   Side lying hip adduction  2 pound, 3 sets of 10 repetition, no assist, no brace   Sit to stands  3x10 with no upper extremity assist   Single leg stance  2x30s B    Tandem stance   2x30s B    MOBO board  2 positions, 2 minutes each   Leg Press/ Shuttle  Double leg press, 5 cords, 3 sets of 10 repetition   Double heel raises: 5 cords, 3 sets of 10 repetition   Single leg press: 4 cords, 3 sets of 10 repetition    Ankle 4 way  red theraband, 1 set of 10 repetition each direction   Prone hamstring curls  3x10   Standing marches  3 sets of 10 repetition each lower extremity    Standing eccentric heel raises  3 sets of 10 repetition    Step ups, 8 inch  8 inch with upper extremities assist   Bridges x 3x10 with ball   Sit to stands x 3x8   Leg press x Double 4 plates 3x10  Single 3 plates 2x10 each     Plan for Next Visit: try upright bike next visit       .NEUROMUSCULAR RE-EDUCATION ACTIVITIES to improve Balance, Coordination, Kinesthetic, Sense, Proprioception and Posture for (0) minutes.  The following were included:    Intervention Performed Today    Left lower extremity step with weight shift, unlocking of knee  1 sets of 10 repetition    Left lower extremity stepping over orange beam with weight shifting and unlocking of knee  2 sets of 10 repetition    bilateral lower extremity step with even step length and weight shift  1 set of 10 reps   Gait training with straight cane with appropriate step length, weight shifting and lifting of toes  6 minutes               Plan for Next Visit:        MANUAL THERAPY TECHNIQUES were applied for (0) minutes, including:    Manual Intervention Performed Today    Soft Tissue Mobilization  L anterior knee scar  mobilizations    Joint Mobilizations  Patellar mobilizations in all directions    Mobilization with movement     Patellar mobilizations  Medial and latera, superior and inferior glides   Manual lymph drainage  Lower legs, posterior/anterior knee, upper leg   Functional Dry Needling        Plan for Next Visit:        Stacey received electrical stimulation for neuromuscular re-education for quad strengthening for 0 minutes to left quad with quad set.       Patient Education and Home Exercises     Home Exercises Provided and Patient Education Provided     Education provided:   - Patient educated once again on proper wearing of locked hinged knee brace.     Written Home Exercises Provided: Patient instructed to cont prior HEP. Exercises were reviewed and Stacey was able to demonstrate them prior to the end of the session.  Stacey demonstrated good  understanding of the education provided. See EMR under Patient Instructions for exercises provided during therapy sessions    ASSESSMENT     Late arrival to session today, not all planned interventions implemented. Patient did well with session today with moderate complaints of discomfort. Patient with pain at end range of flexion on left side. Patient very challenged by bridges. Added Leg press, patient with good fatigue noted. Patient left session with reports of soreness.     Stacey Is progressing well towards her goals.   Pt prognosis is Good.     Pt will continue to benefit from skilled outpatient physical therapy to address the deficits listed in the problem list box on initial evaluation, provide pt/family education and to maximize pt's level of independence in the home and community environment.     Pt's spiritual, cultural and educational needs considered and pt agreeable to plan of care and goals.     Anticipated barriers to physical therapy: co-morbidities, sedentary lifestyle, adherence to treatment plan and coping style    Goals: Reviewed 08/23/2022  Short Term  Goals: 4 weeks  1. SIGNS & SYMPTOMS: Recent signs and systems trend is improving in order to progress towards LTG's. (GOAL MET 6/16/2022)  2. MOBILITY: Patient will increase AROM at the bilateral knees to 120 flexion for better ability to tolerate squatting to  objects  3. STRENGTH: Patient will improve strength of lower extremities musculature to 3+/5 for improved ability to perform squatting and descending steps and decrease pain with gait  4. GAIT IMPROVING: Patient will improve gait by walking at 1.6 mph x 5 minutes with minimal to no lateral lurch  5. HEP: Patient will demonstrate independence with HEP in order to progress toward functional independence.            Long Term Goals: 6 weeks   1. PAIN: Pt will report worst pain of 3/10 in order to progress toward max functional ability and improve quality of life (GOAL MET 6/9/2022)  2. MoBILITY: Patient will increase AROM at the bilateral knees to 120 flexion for better ability to tolerate squatting to  objects  3. STRENGTH: Patient will improve strength of lower extremities musculature to 4-/5 for improved ability to perform squatting and descending steps and decreased pain with gait  4. GAIT NORMAL: Patient will improve gait by walking at 1.8 mph x 10 minutes with minimal to no lateral lurch  5. Patient will report of ability to return to kneeling to pick to perform housework and assist with picking up dropped items on the floor with pain at or less than 2/10      PLAN     New plan of care for the patient to be seen 2 times per week for 8 weeks from 8/16/2022 - 11/16/2022 to work on improved ROM, improved lower extremities strength, improved squat and lunge capability, improved gait mechanics, and improved ability to perform stair ascending/descending.  Progress patient's exercises and activities per protocol.     Lanie Kumari, PTA

## 2022-08-23 NOTE — PROGRESS NOTES
PT/PTA met face to face to discuss patient's treatment plan and progress towards established goals. Patient will be seen by physical therapist every sixth visit and minimally once per month.     Lanie Kumari PTA

## 2022-08-29 RX ORDER — TRIAMTERENE AND HYDROCHLOROTHIAZIDE 37.5; 25 MG/1; MG/1
1 CAPSULE ORAL DAILY
Qty: 90 CAPSULE | Refills: 3 | Status: SHIPPED | OUTPATIENT
Start: 2022-08-29 | End: 2023-10-11

## 2022-08-29 NOTE — TELEPHONE ENCOUNTER
No new care gaps identified.  [facility] Embedded Care Gaps. Reference number: [id number]. 8/29/2022   3:49:30 PM CDT

## 2022-08-30 ENCOUNTER — CLINICAL SUPPORT (OUTPATIENT)
Dept: REHABILITATION | Facility: HOSPITAL | Age: 57
End: 2022-08-30
Payer: COMMERCIAL

## 2022-08-30 DIAGNOSIS — Z74.09 DECREASED FUNCTIONAL MOBILITY AND ENDURANCE: Primary | ICD-10-CM

## 2022-08-30 PROCEDURE — 97110 THERAPEUTIC EXERCISES: CPT | Mod: CQ

## 2022-08-30 NOTE — PROGRESS NOTES
[facility]   Physical Therapy Treatment Note    Name: Stacey Wade  Clinic Number: [id number]    Therapy Diagnosis:   Encounter Diagnosis   Name Primary?    Decreased functional mobility and endurance Yes     Physician: Megha Kwon, *    Visit Date: 8/30/2022     Physician Orders: PT Eval and Treat  Medical Diagnosis from Referral: Complex tear of medial meniscus of left knee as current injury, initial encounter  Evaluation Date: 5/18/2022  Authorization Period Expiration: 12/31/2022  Plan of Care Expiration: 11/16/2022  Progress Note Due: 9/16/2022  Visit # / Visits authorized: 20/20 (+1 for evaluation)  FOTO: 1/3     Precautions: Standard, Fall and Weightbearing  PTA Visit #: 2/5     Surgery: 5/13/2022    Time In: 0755 (late arrival)  Time Out: 0835  Total Billable Time: 40 minutes    SUBJECTIVE     Pt reports: her pain was high when she woke up this morning, around an 8. It has improved as she has moved around this morning, reporting a 5/10 at start of treatment.     She was compliant with home exercise program.  Response to previous treatment: N/A  Functional change: N/A    Pain: 5/10 left knee / 3/10 right knee  Location: bilateral patellofemoral region      OBJECTIVE     Objective Measures updated at progress report unless specified.       Treatment     Stacey received the treatments listed below:      THERAPEUTIC EXERCISES to develop strength, endurance, ROM, flexibility, posture and core stabilization for (40) minutes including:    Intervention Performed Today    Recumbent bike for range of motion X 7 minutes level 1   Supine knee flexion with stretch rite X 10 second holds x 10   Prone left quad stretch with stretch rite X 10 second holds x 10   Double knee to chest knee flexion stretch  X 10 second holds x 10   Knee flexion stretch foot on chair x 10 second holds x 10 bilateral   Quadruped knee flexion stretch (moving into child's pose position)  X 10 second  holds x 10   Knee over toe stretch  5s 5x left lower extremity only   Straight leg raise   2 pound, 3 sets of 10 repetition, 2 second hold   Supine hip adduction with ball  3 minutes, legs extended   Supine hip abduction with belt  3 minutes, legs extended   Prone hip extension  2 pound, 3 sets of 10 repetition, no assist, no brace   Side lying hip adduction  2 pound, 3 sets of 10 repetition, no assist, no brace   Sit to stands  3x10 with no upper extremity assist   Single leg stance  2x30s B    Tandem stance   2x30s B    MOBO board  2 positions, 2 minutes each   Leg Press/ Shuttle  Double leg press, 5 cords, 3 sets of 10 repetition   Double heel raises: 5 cords, 3 sets of 10 repetition   Single leg press: 4 cords, 3 sets of 10 repetition    Ankle 4 way  red theraband, 1 set of 10 repetition each direction   Prone hamstring curls x 3x10   Standing marches  3 sets of 10 repetition each lower extremity    Standing eccentric heel raises  3 sets of 10 repetition    Step ups, 8 inch  8 inch with upper extremities assist   Bridges x 3x10 with ball   Sit to stands x 3x10   Leg press x Double 4 plates 3x10  Single 3 plates 2x10 each     Plan for Next Visit:        .NEUROMUSCULAR RE-EDUCATION ACTIVITIES to improve Balance, Coordination, Kinesthetic, Sense, Proprioception and Posture for (0) minutes.  The following were included:    Intervention Performed Today    Left lower extremity step with weight shift, unlocking of knee  1 sets of 10 repetition    Left lower extremity stepping over orange beam with weight shifting and unlocking of knee  2 sets of 10 repetition    bilateral lower extremity step with even step length and weight shift  1 set of 10 reps   Gait training with straight cane with appropriate step length, weight shifting and lifting of toes  6 minutes               Plan for Next Visit:        MANUAL THERAPY TECHNIQUES were applied for (0) minutes, including:    Manual Intervention Performed Today    Soft Tissue  Mobilization  L anterior knee scar mobilizations    Joint Mobilizations  Patellar mobilizations in all directions    Mobilization with movement     Patellar mobilizations  Medial and latera, superior and inferior glides   Manual lymph drainage  Lower legs, posterior/anterior knee, upper leg   Functional Dry Needling        Plan for Next Visit:        Stacey received electrical stimulation for neuromuscular re-education for quad strengthening for 0 minutes to left quad with quad set.       Patient Education and Home Exercises     Home Exercises Provided and Patient Education Provided     Education provided:   - Patient educated once again on proper wearing of locked hinged knee brace.     Written Home Exercises Provided: Patient instructed to cont prior HEP. Exercises were reviewed and Stacey was able to demonstrate them prior to the end of the session.  Stacey demonstrated good  understanding of the education provided. See EMR under Patient Instructions for exercises provided during therapy sessions    ASSESSMENT     Late arrival to session today, but therapist able to implement most planned interventions. Patient with reports of increased pain in knees bilaterally while on recumbent bike, pain improved with time. Improved tolerance to strengthening activities today. Patient with guarding present with end of range knee flexion activities. Patient left session with reports of decreased pain.     Stacey Is progressing well towards her goals.   Pt prognosis is Good.     Pt will continue to benefit from skilled outpatient physical therapy to address the deficits listed in the problem list box on initial evaluation, provide pt/family education and to maximize pt's level of independence in the home and community environment.     Pt's spiritual, cultural and educational needs considered and pt agreeable to plan of care and goals.     Anticipated barriers to physical therapy: co-morbidities, sedentary lifestyle, adherence  to treatment plan and coping style    Goals: Reviewed 08/30/2022  Short Term Goals: 4 weeks  SIGNS & SYMPTOMS: Recent signs and systems trend is improving in order to progress towards LTG's. (GOAL MET 6/16/2022)  MOBILITY: Patient will increase AROM at the bilateral knees to 120 flexion for better ability to tolerate squatting to  objects  STRENGTH: Patient will improve strength of lower extremities musculature to 3+/5 for improved ability to perform squatting and descending steps and decrease pain with gait  GAIT IMPROVING: Patient will improve gait by walking at 1.6 mph x 5 minutes with minimal to no lateral lurch  HEP: Patient will demonstrate independence with HEP in order to progress toward functional independence.            Long Term Goals: 6 weeks   PAIN: Pt will report worst pain of 3/10 in order to progress toward max functional ability and improve quality of life (GOAL MET 6/9/2022)  MoBILITY: Patient will increase AROM at the bilateral knees to 120 flexion for better ability to tolerate squatting to  objects  STRENGTH: Patient will improve strength of lower extremities musculature to 4-/5 for improved ability to perform squatting and descending steps and decreased pain with gait  GAIT NORMAL: Patient will improve gait by walking at 1.8 mph x 10 minutes with minimal to no lateral lurch  Patient will report of ability to return to kneeling to pick to perform housework and assist with picking up dropped items on the floor with pain at or less than 2/10      PLAN     New plan of care for the patient to be seen 2 times per week for 8 weeks from 8/16/2022 - 11/16/2022 to work on improved ROM, improved lower extremities strength, improved squat and lunge capability, improved gait mechanics, and improved ability to perform stair ascending/descending.  Progress patient's exercises and activities per protocol.     Lanie Kumari, PTA

## 2022-09-02 ENCOUNTER — CLINICAL SUPPORT (OUTPATIENT)
Dept: REHABILITATION | Facility: HOSPITAL | Age: 57
End: 2022-09-02
Payer: COMMERCIAL

## 2022-09-02 DIAGNOSIS — Z74.09 DECREASED FUNCTIONAL MOBILITY AND ENDURANCE: Primary | ICD-10-CM

## 2022-09-02 PROCEDURE — 97110 THERAPEUTIC EXERCISES: CPT | Mod: CQ

## 2022-09-02 NOTE — PROGRESS NOTES
[facility]   Physical Therapy Treatment Note    Name: Stacey Wade  Clinic Number: [id number]    Therapy Diagnosis:   Encounter Diagnosis   Name Primary?    Decreased functional mobility and endurance Yes     Physician: Megha Kwon, *    Visit Date: 9/2/2022     Physician Orders: PT Eval and Treat  Medical Diagnosis from Referral: Complex tear of medial meniscus of left knee as current injury, initial encounter  Evaluation Date: 5/18/2022  Authorization Period Expiration: 12/31/2022  Plan of Care Expiration: 11/16/2022  Progress Note Due: 9/16/2022  Visit # / Visits authorized: 21/35 (+1 for evaluation)  FOTO: 1/3     Precautions: Standard, Fall and Weightbearing  PTA Visit #: 3/5     Surgery: 5/13/2022    Time In: 0905  Time Out: 0945  Total Billable Time: 40 minutes    SUBJECTIVE     Pt reports: she is not having any pain today and she is feeling good. She states she has been feeling pretty good since last visit.     She was compliant with home exercise program.  Response to previous treatment: N/A  Functional change: N/A    Pain: 0/10 left knee / 3/10 right knee  Location: bilateral patellofemoral region      OBJECTIVE     Objective Measures updated at progress report unless specified.     Post treatment left knee flexion : 120 degrees      Treatment     Stacey received the treatments listed below:      THERAPEUTIC EXERCISES to develop strength, endurance, ROM, flexibility, posture and core stabilization for (40) minutes including:    Intervention Performed Today    Recumbent bike for range of motion X 7 minutes level 1   Supine knee flexion with stretch rite X 10 second holds x 10   Prone left quad stretch with stretch rite X 10 second holds x 10   Double knee to chest knee flexion stretch  X 10 second holds x 10   Standing hamstring stretch on chair x 10 second holds x 10   Knee flexion stretch foot on chair x 10 second holds x 10 bilateral   Quadruped knee flexion  stretch (moving into child's pose position)  X 10 second holds x 10   Knee over toe stretch  5s 5x left lower extremity only   Straight leg raise   2 pound, 3 sets of 10 repetition, 2 second hold   Supine hip adduction with ball  3 minutes, legs extended   Supine hip abduction with belt  3 minutes, legs extended   Prone hip extension  2 pound, 3 sets of 10 repetition, no assist, no brace   Side lying hip adduction  2 pound, 3 sets of 10 repetition, no assist, no brace   Sit to stands  3x10 with no upper extremity assist   Single leg stance  2x30s B    Tandem stance   2x30s B    MOBO board  2 positions, 2 minutes each   Ankle 4 way  red theraband, 1 set of 10 repetition each direction   Prone hamstring curls  3x10   Standing marches  3 sets of 10 repetition each lower extremity    Standing eccentric heel raises  3 sets of 10 repetition    Step ups, 8 inch  8 inch with upper extremities assist   Bridges x 3x10 with ball   Sit to stands x 3x10   Leg press x Double 4 plates 3x10  Single 4 plates 2x10 each   Single leg balance x 3x30 each bilateral     Plan for Next Visit:        .NEUROMUSCULAR RE-EDUCATION ACTIVITIES to improve Balance, Coordination, Kinesthetic, Sense, Proprioception and Posture for (0) minutes.  The following were included:    Intervention Performed Today    Left lower extremity step with weight shift, unlocking of knee  1 sets of 10 repetition    Left lower extremity stepping over orange beam with weight shifting and unlocking of knee  2 sets of 10 repetition    bilateral lower extremity step with even step length and weight shift  1 set of 10 reps   Gait training with straight cane with appropriate step length, weight shifting and lifting of toes  6 minutes               Plan for Next Visit:        MANUAL THERAPY TECHNIQUES were applied for (0) minutes, including:    Manual Intervention Performed Today    Soft Tissue Mobilization  L anterior knee scar mobilizations    Joint Mobilizations  Patellar  "mobilizations in all directions    Mobilization with movement     Patellar mobilizations  Medial and latera, superior and inferior glides   Manual lymph drainage  Lower legs, posterior/anterior knee, upper leg   Functional Dry Needling        Plan for Next Visit:        Stacey received electrical stimulation for neuromuscular re-education for quad strengthening for 0 minutes to left quad with quad set.       Patient Education and Home Exercises     Home Exercises Provided and Patient Education Provided     Education provided:   - use of cane for long periods of walking or as a "just in case" at work    Written Home Exercises Provided: Patient instructed to cont prior HEP. Exercises were reviewed and Stacey was able to demonstrate them prior to the end of the session.  Stacey demonstrated good  understanding of the education provided. See EMR under Patient Instructions for exercises provided during therapy sessions    ASSESSMENT     Patient did well with session today with minimal complaints of discomfort. Strength progressions made with leg press, patient with good fatigue noted. Added single leg balance and hamstring stretch, patient with good performance. Patient left session with minimal soreness and no reports of increased pain.     Stacey Is progressing well towards her goals.   Pt prognosis is Good.     Pt will continue to benefit from skilled outpatient physical therapy to address the deficits listed in the problem list box on initial evaluation, provide pt/family education and to maximize pt's level of independence in the home and community environment.     Pt's spiritual, cultural and educational needs considered and pt agreeable to plan of care and goals.     Anticipated barriers to physical therapy: co-morbidities, sedentary lifestyle, adherence to treatment plan and coping style    Goals: Reviewed 09/02/2022  Short Term Goals: 4 weeks  SIGNS & SYMPTOMS: Recent signs and systems trend is improving in " order to progress towards LTG's. (GOAL MET 6/16/2022)  MOBILITY: Patient will increase AROM at the bilateral knees to 120 flexion for better ability to tolerate squatting to  objects  STRENGTH: Patient will improve strength of lower extremities musculature to 3+/5 for improved ability to perform squatting and descending steps and decrease pain with gait  GAIT IMPROVING: Patient will improve gait by walking at 1.6 mph x 5 minutes with minimal to no lateral lurch  HEP: Patient will demonstrate independence with HEP in order to progress toward functional independence.            Long Term Goals: 6 weeks   PAIN: Pt will report worst pain of 3/10 in order to progress toward max functional ability and improve quality of life (GOAL MET 6/9/2022)  MoBILITY: Patient will increase AROM at the bilateral knees to 120 flexion for better ability to tolerate squatting to  objects  STRENGTH: Patient will improve strength of lower extremities musculature to 4-/5 for improved ability to perform squatting and descending steps and decreased pain with gait  GAIT NORMAL: Patient will improve gait by walking at 1.8 mph x 10 minutes with minimal to no lateral lurch  Patient will report of ability to return to kneeling to pick to perform housework and assist with picking up dropped items on the floor with pain at or less than 2/10      PLAN     New plan of care for the patient to be seen 2 times per week for 8 weeks from 8/16/2022 - 11/16/2022 to work on improved ROM, improved lower extremities strength, improved squat and lunge capability, improved gait mechanics, and improved ability to perform stair ascending/descending.  Progress patient's exercises and activities per protocol.     Lanie Kumari, PTA

## 2022-09-06 ENCOUNTER — CLINICAL SUPPORT (OUTPATIENT)
Dept: REHABILITATION | Facility: HOSPITAL | Age: 57
End: 2022-09-06
Payer: COMMERCIAL

## 2022-09-06 DIAGNOSIS — Z74.09 DECREASED FUNCTIONAL MOBILITY AND ENDURANCE: Primary | ICD-10-CM

## 2022-09-06 PROCEDURE — 97110 THERAPEUTIC EXERCISES: CPT | Mod: CQ

## 2022-09-06 PROCEDURE — 97112 NEUROMUSCULAR REEDUCATION: CPT | Mod: CQ

## 2022-09-06 NOTE — PROGRESS NOTES
[facility]   Physical Therapy Treatment Note    Name: Stacey Wade  Clinic Number: [id number]    Therapy Diagnosis:   Encounter Diagnosis   Name Primary?    Decreased functional mobility and endurance Yes     Physician: Megha Kwon, *    Visit Date: 9/6/2022     Physician Orders: PT Eval and Treat  Medical Diagnosis from Referral: Complex tear of medial meniscus of left knee as current injury, initial encounter  Evaluation Date: 5/18/2022  Authorization Period Expiration: 12/31/2022  Plan of Care Expiration: 11/16/2022  Progress Note Due: 9/16/2022  Visit # / Visits authorized: 22/35 (+1 for evaluation)  FOTO: 1/3     Precautions: Standard, Fall and Weightbearing  PTA Visit #: 4/5     Surgery: 5/13/2022    Time In: 0845 (late arrival)  Time Out: 0915  Total Billable Time: 30 minutes    SUBJECTIVE     Pt reports: only a little bit of pain today. She states she has been trying to not use her cane at home and has been doing well with it.     She was compliant with home exercise program.  Response to previous treatment: N/A  Functional change: N/A    Pain: 1/10 left knee / 3/10 right knee  Location: bilateral patellofemoral region      OBJECTIVE     Objective Measures updated at progress report unless specified.     Post treatment left knee flexion : 120 degrees      Treatment     Stacey received the treatments listed below:      THERAPEUTIC EXERCISES to develop strength, endurance, ROM, flexibility, posture and core stabilization for (18) minutes including:    Intervention Performed Today    Recumbent bike for range of motion X 7 minutes level 1   Supine knee flexion with stretch rite  10 second holds x 10   Prone left quad stretch with stretch rite  10 second holds x 10   Double knee to chest knee flexion stretch   10 second holds x 10   Standing hamstring stretch on chair x 10 second holds x 10   Knee flexion stretch foot on chair x 10 second holds x 10 bilateral   Quadruped  knee flexion stretch (moving into child's pose position)   10 second holds x 10   Knee over toe stretch  5s 5x left lower extremity only   Straight leg raise   2 pound, 3 sets of 10 repetition, 2 second hold   Supine hip adduction with ball  3 minutes, legs extended   Supine hip abduction with belt  3 minutes, legs extended   Prone hip extension  2 pound, 3 sets of 10 repetition, no assist, no brace   Side lying hip adduction  2 pound, 3 sets of 10 repetition, no assist, no brace   Sit to stands  3x10 with no upper extremity assist   Single leg stance  2x30s B    Tandem stance   2x30s B    MOBO board  2 positions, 2 minutes each   Ankle 4 way  red theraband, 1 set of 10 repetition each direction   Prone hamstring curls  3x10   Standing marches  3 sets of 10 repetition each lower extremity    Standing eccentric heel raises  3 sets of 10 repetition    Step ups, 8 inch  8 inch with upper extremities assist   Bridges  3x10 with ball   Sit to stands  3x10   Leg press x Double 4 plates 3x10  Single 4 plates 2x10 each   Single leg balance  3x30 each bilateral     Plan for Next Visit:        .NEUROMUSCULAR RE-EDUCATION ACTIVITIES to improve Balance, Coordination, Kinesthetic, Sense, Proprioception and Posture for (12) minutes.  The following were included:    Intervention Performed Today    Left lower extremity step with weight shift, unlocking of knee  1 sets of 10 repetition    Left lower extremity stepping over orange beam with weight shifting and unlocking of knee  2 sets of 10 repetition    bilateral lower extremity step with even step length and weight shift  1 set of 10 reps   Gait training with straight cane with appropriate step length, weight shifting and lifting of toes  6 minutes   Step ups x 3x10 each bilateral   Lateral step ups x 3x10 each bilateral     Plan for Next Visit:        MANUAL THERAPY TECHNIQUES were applied for (0) minutes, including:    Manual Intervention Performed Today    Soft Tissue  "Mobilization  L anterior knee scar mobilizations    Joint Mobilizations  Patellar mobilizations in all directions    Mobilization with movement     Patellar mobilizations  Medial and latera, superior and inferior glides   Manual lymph drainage  Lower legs, posterior/anterior knee, upper leg   Functional Dry Needling        Plan for Next Visit:        Stacey received electrical stimulation for neuromuscular re-education for quad strengthening for 0 minutes to left quad with quad set.       Patient Education and Home Exercises     Home Exercises Provided and Patient Education Provided     Education provided:   - use of cane for long periods of walking or as a "just in case" at work    Written Home Exercises Provided: Patient instructed to cont prior HEP. Exercises were reviewed and Stacey was able to demonstrate them prior to the end of the session.  Stacey demonstrated good  understanding of the education provided. See EMR under Patient Instructions for exercises provided during therapy sessions    ASSESSMENT     Late arrival to session today, not all planned interventions implemented. Added step ups today, patient challenged with good fatigue noted, rest breaks taken as needed. Good performance of leg press activities, patient displaying improved control and strength. Patient left session with soreness.     Stacey Is progressing well towards her goals.   Pt prognosis is Good.     Pt will continue to benefit from skilled outpatient physical therapy to address the deficits listed in the problem list box on initial evaluation, provide pt/family education and to maximize pt's level of independence in the home and community environment.     Pt's spiritual, cultural and educational needs considered and pt agreeable to plan of care and goals.     Anticipated barriers to physical therapy: co-morbidities, sedentary lifestyle, adherence to treatment plan and coping style    Goals: Reviewed 09/06/2022  Short Term Goals: 4 " weeks  SIGNS & SYMPTOMS: Recent signs and systems trend is improving in order to progress towards LTG's. (GOAL MET 6/16/2022)  MOBILITY: Patient will increase AROM at the bilateral knees to 120 flexion for better ability to tolerate squatting to  objects  STRENGTH: Patient will improve strength of lower extremities musculature to 3+/5 for improved ability to perform squatting and descending steps and decrease pain with gait  GAIT IMPROVING: Patient will improve gait by walking at 1.6 mph x 5 minutes with minimal to no lateral lurch  HEP: Patient will demonstrate independence with HEP in order to progress toward functional independence.            Long Term Goals: 6 weeks   PAIN: Pt will report worst pain of 3/10 in order to progress toward max functional ability and improve quality of life (GOAL MET 6/9/2022)  MoBILITY: Patient will increase AROM at the bilateral knees to 120 flexion for better ability to tolerate squatting to  objects  STRENGTH: Patient will improve strength of lower extremities musculature to 4-/5 for improved ability to perform squatting and descending steps and decreased pain with gait  GAIT NORMAL: Patient will improve gait by walking at 1.8 mph x 10 minutes with minimal to no lateral lurch  Patient will report of ability to return to kneeling to pick to perform housework and assist with picking up dropped items on the floor with pain at or less than 2/10      PLAN     New plan of care for the patient to be seen 2 times per week for 8 weeks from 8/16/2022 - 11/16/2022 to work on improved ROM, improved lower extremities strength, improved squat and lunge capability, improved gait mechanics, and improved ability to perform stair ascending/descending.  Progress patient's exercises and activities per protocol.     Lanei Kumari, PTA

## 2022-09-07 ENCOUNTER — OFFICE VISIT (OUTPATIENT)
Dept: ORTHOPEDICS | Facility: CLINIC | Age: 57
End: 2022-09-07
Payer: COMMERCIAL

## 2022-09-07 VITALS — BODY MASS INDEX: 34.4 KG/M2 | WEIGHT: 186.94 LBS | HEIGHT: 62 IN

## 2022-09-07 DIAGNOSIS — I10 ESSENTIAL HYPERTENSION: ICD-10-CM

## 2022-09-07 DIAGNOSIS — E66.01 SEVERE OBESITY (BMI 35.0-39.9) WITH COMORBIDITY: ICD-10-CM

## 2022-09-07 DIAGNOSIS — S83.232D COMPLEX TEAR OF MEDIAL MENISCUS OF LEFT KNEE AS CURRENT INJURY, SUBSEQUENT ENCOUNTER: Primary | ICD-10-CM

## 2022-09-07 DIAGNOSIS — Z98.890 S/P ARTHROSCOPIC SURGERY OF LEFT KNEE: ICD-10-CM

## 2022-09-07 DIAGNOSIS — M17.12 PRIMARY OSTEOARTHRITIS OF LEFT KNEE: ICD-10-CM

## 2022-09-07 PROCEDURE — 1159F PR MEDICATION LIST DOCUMENTED IN MEDICAL RECORD: ICD-10-PCS | Mod: CPTII,S$GLB,, | Performed by: PHYSICIAN ASSISTANT

## 2022-09-07 PROCEDURE — 3008F PR BODY MASS INDEX (BMI) DOCUMENTED: ICD-10-PCS | Mod: CPTII,S$GLB,, | Performed by: PHYSICIAN ASSISTANT

## 2022-09-07 PROCEDURE — 1159F MED LIST DOCD IN RCRD: CPT | Mod: CPTII,S$GLB,, | Performed by: PHYSICIAN ASSISTANT

## 2022-09-07 PROCEDURE — 1160F RVW MEDS BY RX/DR IN RCRD: CPT | Mod: CPTII,S$GLB,, | Performed by: PHYSICIAN ASSISTANT

## 2022-09-07 PROCEDURE — 99999 PR PBB SHADOW E&M-EST. PATIENT-LVL V: ICD-10-PCS | Mod: PBBFAC,,, | Performed by: PHYSICIAN ASSISTANT

## 2022-09-07 PROCEDURE — 99213 PR OFFICE/OUTPT VISIT, EST, LEVL III, 20-29 MIN: ICD-10-PCS | Mod: S$GLB,,, | Performed by: PHYSICIAN ASSISTANT

## 2022-09-07 PROCEDURE — 99999 PR PBB SHADOW E&M-EST. PATIENT-LVL V: CPT | Mod: PBBFAC,,, | Performed by: PHYSICIAN ASSISTANT

## 2022-09-07 PROCEDURE — 3008F BODY MASS INDEX DOCD: CPT | Mod: CPTII,S$GLB,, | Performed by: PHYSICIAN ASSISTANT

## 2022-09-07 PROCEDURE — 1160F PR REVIEW ALL MEDS BY PRESCRIBER/CLIN PHARMACIST DOCUMENTED: ICD-10-PCS | Mod: CPTII,S$GLB,, | Performed by: PHYSICIAN ASSISTANT

## 2022-09-07 PROCEDURE — 99213 OFFICE O/P EST LOW 20 MIN: CPT | Mod: S$GLB,,, | Performed by: PHYSICIAN ASSISTANT

## 2022-09-07 NOTE — PATIENT INSTRUCTIONS
Assessment:  Stacey Wade is a 56 y.o. 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    Complex tear of medial meniscus of left knee as current injury, subsequent encounter Yes    Severe obesity (BMI 35.0-39.9) with comorbidity     Essential hypertension     Primary osteoarthritis of left knee     S/P arthroscopic surgery of left knee       Plan:  Continue work restrictions of: frequent sitting breaks, no prolonged standing, or kneeling.   Follow up in 2 months    Follow-up: 2 months or sooner if there are any problems between now and then.    Leave Review:   Google: Leave Google Review  Healthgrades: Leave Healthgrades Review    After Hours Number: [phone]

## 2022-09-07 NOTE — PROGRESS NOTES
Patient ID: Stacey Wade  YOB: 1965  MRN: [id number]    Chief Complaint: Post-op Evaluation of the Left Knee    History of Present Illness: Stacey Wade is a  56 y.o. 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 Her pain today is a 1/10 without any pain medication. She is still in PT here at the [facility] and reports having enough appointments approved through the end of this month. She reports that she still uses the cane for some longer distance ambulation only for stabilization which states that she does not necessarily need, weaning off of it. Other than that, she has no complaints or updates for this visit.    HPI  Plan (7/27/22):  Continue physical therapy: at [facility]- can do once weekly for increased strength  Continue seated work restrictions  Recheck in 6 weeks  Past Medical History:   Past Medical History:   Diagnosis Date    Allergic rhinitis     Anxiety     Arthritis     Hypertension     Urticaria      Past Surgical History:   Procedure Laterality Date    CHONDROPLASTY OF KNEE Left 05/13/2022    Procedure: CHONDROPLASTY, KNEE;  Surgeon: Wes Faulkner MD;  Location: [facility] OR;  Service: Orthopedics;  Laterality: Left;    COLONOSCOPY N/A 01/18/2018    Procedure: COLONOSCOPY;  Surgeon: Yong Smith MD;  Location: [facility];  Service: Endoscopy;  Laterality: N/A;    HERNIA REPAIR Left      Family History   Problem Relation Age of Onset    Lung cancer Paternal Grandfather     Ovarian cancer Maternal Grandmother     Hyperlipidemia Mother     Hypertension Mother     Breast cancer Mother 60    Arthritis Mother     Lung cancer Father     Breast cancer Maternal Aunt 53    Heart failure Other         Great aunt    Prostate cancer Brother     Brain cancer Sister     Diabetes Neg Hx     Pseudochol deficiency Neg Hx      Malignant hyperthermia Neg Hx      Social History     Socioeconomic History    Marital status: Single   Tobacco Use    Smoking status: Never    Smokeless tobacco: Never   Substance and Sexual Activity    Alcohol use: No    Drug use: No    Sexual activity: Not Currently     Partners: Male     Birth control/protection: None   Social History Narrative    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     Medication List with Changes/Refills   Current Medications    BETAMETHASONE DIPROPIONATE 0.05 % CREAM    APPLY TOPICALLY 2 TIMES DAILY.    BUTALBITAL-ACETAMINOPHEN-CAFFEINE -40 MG (FIORICET, ESGIC) -40 MG PER TABLET    TAKE ONE TABLET BY MOUTH EVERY 6 HOURS AS NEEDED FOR PAIN OR FOR HEADACHE    CLORAZEPATE (TRANXENE) 3.75 MG TAB    Take 3.75 mg by mouth as needed.    DICLOFENAC SODIUM (VOLTAREN) 1 % GEL    Apply 2 g topically 4 (four) times daily.    DOXEPIN (SINEQUAN) 10 MG CAPSULE    TAKE 2 CAPSULES BY MOUTH 3 TIMES A DAY    EPINEPHRINE (EPIPEN) 0.3 MG/0.3 ML ATIN    Inject 0.3 mg into the muscle daily as needed.    FAMOTIDINE (PEPCID) 40 MG TABLET    Take 20 mg by mouth.    FEXOFENADINE (ALLEGRA) 180 MG TABLET    TAKE ONE TABLET BY MOUTH EVERY DAY    FLOVENT DISKUS 100 MCG/ACTUATION INHALER    Inhale into the lungs.    FLUTICASONE PROPIONATE (FLOVENT DISKUS) 100 MCG/ACTUATION INHALER    Inhale 100 mcg into the lungs.    HYDROXYZINE HCL (ATARAX) 25 MG TABLET    TAKE 1 TABLET BY MOUTH FOUR TIMES A DAY AS NEEDED FOR ITCHING    MIRABEGRON (MYRBETRIQ) 25 MG TB24 ER TABLET    Take 1 tablet (25 mg total) by mouth once daily.    MOMETASONE (NASONEX) 50 MCG/ACTUATION NASAL SPRAY    INHALE 2 SPRAYS BY NASAL ROUTE ONCE DAILY    MONTELUKAST (SINGULAIR) 10 MG TABLET    Take 1 tablet (10 mg total) by mouth once daily.    OMALIZUMAB (XOLAIR) 150 MG/ML INJECTION    Inject 300 mg into the skin.    POTASSIUM CHLORIDE (MICRO-K) 10 MEQ CPSR    Take 1  capsule (10 mEq total) by mouth once daily.    RIZATRIPTAN (MAXALT) 10 MG TABLET    TAKE ONE TABLET BY MOUTH AS NEEDED FOR migraine MAY REPEAT in TWO hours. no more THAN TWO TABLETS in 24 hours    TRIAMTERENE-HYDROCHLOROTHIAZIDE 37.5-25 MG (DYAZIDE) 37.5-25 MG PER CAPSULE    Take 1 capsule by mouth once daily.    XOLAIR 150 MG/ML INJECTION         Review of patient's allergies indicates:   Allergen Reactions    Benzalkonium chloride     Black pepper      Other reaction(s): Hives    Chocolate flavor      Other reaction(s): Hives    Citrus and derivatives Hives     LEMON PRODUCTS    Grapefruit Hives     Other reaction(s): Hives    Ibuprofen Swelling    Latex      Other reaction(s): Hives    Lemon balm (casper officinalis) Hives     Anything with lemon in it    Naproxen Swelling    Neomycin-bacitracin-polymyxin      Other reaction(s): Rash    Oats (richard) Hives     Oat foods (oatmeal, etc...)    Vegetable acetoglycerides Hives     Vegetable gums    Wheat containing prod     Wheat flour Hives     Review of Systems   Constitutional: Negative for chills and fever.   HENT:  Negative for sore throat.    Eyes:  Negative for pain.   Cardiovascular:  Negative for chest pain and leg swelling.   Respiratory:  Negative for cough and shortness of breath.    Skin:  Negative for itching and rash.   Musculoskeletal:  Negative for joint pain, joint swelling, myalgias and neck pain.   Gastrointestinal:  Negative for abdominal pain, nausea and vomiting.   Genitourinary:  Negative for dysuria.   Neurological:  Negative for dizziness, numbness and paresthesias.     Physical Exam:   Body mass index is 34.19 kg/m².  There were no vitals filed for this visit.   GENERAL: Well appearing, appropriate for stated age, no acute distress.  CARDIOVASCULAR: Pulses regular by peripheral palpation.  PULMONARY: Respirations are even and non-labored.  NEURO: Awake, alert, and oriented x 3.  PSYCH: Mood & affect are appropriate.  HEENT: Head is  normocephalic and atraumatic.  General    Nursing note and vitals reviewed.          Right Knee Exam   Right knee exam is normal.    Inspection   Effusion: absent    Tenderness   The patient is experiencing no tenderness.     Range of Motion   Extension:  0   Right knee flexion: 125.     Tests   Ligament Examination   Lachman: normal (-1 to 2mm)   PCL-Posterior Drawer: normal (0 to 2mm)     MCL - Valgus: normal (0 to 2mm)  LCL - Varus: normal    Other   Sensation: normal    Left Knee Exam   Left knee exam is normal.    Inspection   Scars: present (post op changes healed)  Effusion: absent    Tenderness   The patient is experiencing no tenderness.     Range of Motion   Extension:  0   Left knee flexion: 115.     Tests   Stability   Lachman: normal (-1 to 2mm)   PCL-Posterior Drawer: normal (0 to 2mm)  MCL - Valgus: normal (0 to 2mm)  LCL - Varus: normal (0 to 2mm)    Other   Sensation: normal    Muscle Strength   Right Lower Extremity   Hip Abduction: 5/5   Quadriceps:  5/5   Hamstrin/5   Left Lower Extremity   Hip Abduction: 5/5   Quadriceps:  5/5   Hamstrin/5     Vascular Exam     Right Pulses  Dorsalis Pedis:      2+  Posterior Tibial:      2+        Left Pulses  Dorsalis Pedis:      2+  Posterior Tibial:      2+      All compartments are soft and compressible. Calf soft non-tender. Intact EHL, FHL, gastroc soleus, and tibialis anterior. Sensation intact to light touch in superficial peroneal, deep peroneal, tibial, sural, and saphenous nerve distributions. Foot warm and well perfused with capillary refill of less than 2 seconds and palpable pedal pulses.       Imaging:    X-Ray Knee 1 or 2 View Left  Narrative: EXAMINATION:  XR KNEE 1 OR 2 VIEW LEFT    CLINICAL HISTORY:  intra op;    TECHNIQUE:  Single fluoroscopic view of the left knee is submitted during ongoing surgical procedure.    COMPARISON:  None    FINDINGS:  Single view of the left knee is submitted during ongoing surgical procedure.  No acute  abnormality seen on this single image.  Impression: Fluoroscopic view of the left knee for ongoing procedure.    Electronically signed by: Stephen Gonzalez  Date:    05/13/2022  Time:    12:35  SURG FL Surgery Fluoro Usage  See OP Notes for results.     IMPRESSION: See OP Notes for results.     This procedure was auto-finalized by: Virtual Radiologist      Relevant imaging results reviewed and interpreted by me, discussed with the patient and / or family today.     Other Tests:     Patient Instructions   Assessment:  Stacey Wade is a 56 y.o. 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    Complex tear of medial meniscus of left knee as current injury, subsequent encounter Yes    Severe obesity (BMI 35.0-39.9) with comorbidity     Essential hypertension     Primary osteoarthritis of left knee     S/P arthroscopic surgery of left knee       Plan:  Continue work restrictions of: frequent sitting breaks, no prolonged standing, or kneeling.   Follow up in 2 months    Follow-up: 2 months or sooner if there are any problems between now and then.    Leave Review:   Google: Leave Google Review  Healthgrades: Leave Healthgrades Review    After Hours Number: [phone]      Provider Note/Medical Decision Making:       I discussed worrisome and red flag signs and symptoms with the patient. The patient expressed understanding and agreed to alert me immediately or to go to the emergency room if they experience any of these.   Treatment plan was developed with input from the patient/family, and they expressed understanding and agreement with the plan. All questions were answered today.      Disclaimer: This note was prepared using a voice recognition system and is likely to have sound alike errors within the text.

## 2022-09-09 ENCOUNTER — CLINICAL SUPPORT (OUTPATIENT)
Dept: REHABILITATION | Facility: HOSPITAL | Age: 57
End: 2022-09-09
Payer: COMMERCIAL

## 2022-09-09 DIAGNOSIS — Z74.09 DECREASED FUNCTIONAL MOBILITY AND ENDURANCE: Primary | ICD-10-CM

## 2022-09-09 PROCEDURE — 97112 NEUROMUSCULAR REEDUCATION: CPT

## 2022-09-09 PROCEDURE — 97110 THERAPEUTIC EXERCISES: CPT

## 2022-09-14 ENCOUNTER — CLINICAL SUPPORT (OUTPATIENT)
Dept: REHABILITATION | Facility: HOSPITAL | Age: 57
End: 2022-09-14
Payer: COMMERCIAL

## 2022-09-14 DIAGNOSIS — Z74.09 DECREASED FUNCTIONAL MOBILITY AND ENDURANCE: Primary | ICD-10-CM

## 2022-09-14 PROCEDURE — 97110 THERAPEUTIC EXERCISES: CPT | Mod: CQ

## 2022-09-14 PROCEDURE — 97112 NEUROMUSCULAR REEDUCATION: CPT | Mod: CQ

## 2022-09-14 NOTE — PROGRESS NOTES
[facility]   Physical Therapy Treatment Note    Name: Stacey Wade  Clinic Number: [id number]    Therapy Diagnosis:   Encounter Diagnosis   Name Primary?    Decreased functional mobility and endurance Yes     Physician: Megha Kwon, *    Visit Date: 9/14/2022     Physician Orders: PT Eval and Treat  Medical Diagnosis from Referral: Complex tear of medial meniscus of left knee as current injury, initial encounter  Evaluation Date: 5/18/2022  Authorization Period Expiration: 12/31/2022  Plan of Care Expiration: 11/16/2022  Progress Note Due: 9/16/2022  Visit # / Visits authorized: 23/35 (+1 for evaluation)  FOTO: 1/3     Precautions: Standard, Fall and Weightbearing  PTA Visit #: 5/5     Surgery: 5/13/2022    Time In: 0830 (late arrival)  Time Out: 0910  Total Billable Time: 40 minutes    SUBJECTIVE     Pt reports: feeling good and not having any pain today. She still keeps the cane at work as she feels she needs it.     She was compliant with home exercise program.  Response to previous treatment: N/A  Functional change: N/A    Pain: 0/10 left knee / 3/10 right knee  Location: bilateral patellofemoral region      OBJECTIVE     Objective Measures updated at progress report unless specified.     Post treatment left knee flexion : 120 degrees      Treatment     Stacey received the treatments listed below:      THERAPEUTIC EXERCISES to develop strength, endurance, ROM, flexibility, posture and core stabilization for (25) minutes including:    Intervention Performed Today    Recumbent bike for range of motion X 5 minutes level 1   Supine knee flexion with stretch rite  10 second holds x 10   Prone left quad stretch with stretch rite  10 second holds x 10   Double knee to chest knee flexion stretch   10 second holds x 10   Standing hamstring stretch on chair x 10 second holds x 10   Knee flexion stretch foot on chair x 10 second holds x 10 bilateral   Quadruped knee flexion  stretch (moving into child's pose position)   10 second holds x 10   Knee over toe stretch  5s 5x left lower extremity only   Straight leg raise   2 pound, 3 sets of 10 repetition, 2 second hold   Supine hip adduction with ball  3 minutes, legs extended   Supine hip abduction with belt  3 minutes, legs extended   Prone hip extension  2 pound, 3 sets of 10 repetition, no assist, no brace   Side lying hip adduction  2 pound, 3 sets of 10 repetition, no assist, no brace   Sit to stands  3x10 with no upper extremity assist   Single leg stance  2x30s B    Tandem stance   2x30s B    MOBO board  2 positions, 2 minutes each   Ankle 4 way  red theraband, 1 set of 10 repetition each direction   Prone hamstring curls  3x10   Standing marches  3 sets of 10 repetition each lower extremity    Standing eccentric heel raises x 3 sets of 10 repetition    Standing gastroc stretch wedge x 3x30s    Step ups, 8 inch  8 inch with upper extremities assist   Bridges  3x10 with ball   Sit to stands  3x10   Leg press x Double 4 plates 3x10  Single 4 plates 2x10 each   Single leg balance x 3x30 each bilateral     Plan for Next Visit:        .NEUROMUSCULAR RE-EDUCATION ACTIVITIES to improve Balance, Coordination, Kinesthetic, Sense, Proprioception and Posture for (15) minutes.  The following were included:    Intervention Performed Today    Left lower extremity step with weight shift, unlocking of knee  1 sets of 10 repetition    Left lower extremity stepping over orange beam with weight shifting and unlocking of knee  2 sets of 10 repetition    bilateral lower extremity step with even step length and weight shift  1 set of 10 reps   Gait training with straight cane with appropriate step length, weight shifting and lifting of toes  6 minutes   Step ups x 3x10 each bilateral   Lateral step ups x 3x10 each bilateral   Side stepping x Red band 5 parallel bar laps   Monster walks x Forwards/backwards red band 3 parallel bar laps     Plan for Next  "Visit:        MANUAL THERAPY TECHNIQUES were applied for (0) minutes, including:    Manual Intervention Performed Today    Soft Tissue Mobilization  L anterior knee scar mobilizations    Joint Mobilizations  Patellar mobilizations in all directions    Mobilization with movement     Patellar mobilizations  Medial and latera, superior and inferior glides   Manual lymph drainage  Lower legs, posterior/anterior knee, upper leg   Functional Dry Needling        Plan for Next Visit:        Stacey received electrical stimulation for neuromuscular re-education for quad strengthening for 0 minutes to left quad with quad set.       Patient Education and Home Exercises     Home Exercises Provided and Patient Education Provided     Education provided:   - use of cane for long periods of walking or as a "just in case" at work    Written Home Exercises Provided: Patient instructed to cont prior HEP. Exercises were reviewed and Stacey was able to demonstrate them prior to the end of the session.  Stacey demonstrated good  understanding of the education provided. See EMR under Patient Instructions for exercises provided during therapy sessions    ASSESSMENT     Late arrival to session today, not all planned interventions implemented. Side stepping and monster walks added today, patient challenged with good fatigue noted. Some discomfort with hamstring stretches today. Decreased antalgic gait noted upon entrance of session. Patient left treatment with reports of soreness.     Stacey Is progressing well towards her goals.   Pt prognosis is Good.     Pt will continue to benefit from skilled outpatient physical therapy to address the deficits listed in the problem list box on initial evaluation, provide pt/family education and to maximize pt's level of independence in the home and community environment.     Pt's spiritual, cultural and educational needs considered and pt agreeable to plan of care and goals.     Anticipated barriers to " physical therapy: co-morbidities, sedentary lifestyle, adherence to treatment plan and coping style    Goals: Reviewed 09/14/2022  Short Term Goals: 4 weeks  SIGNS & SYMPTOMS: Recent signs and systems trend is improving in order to progress towards LTG's. (GOAL MET 6/16/2022)  MOBILITY: Patient will increase AROM at the bilateral knees to 120 flexion for better ability to tolerate squatting to  objects  STRENGTH: Patient will improve strength of lower extremities musculature to 3+/5 for improved ability to perform squatting and descending steps and decrease pain with gait  GAIT IMPROVING: Patient will improve gait by walking at 1.6 mph x 5 minutes with minimal to no lateral lurch  HEP: Patient will demonstrate independence with HEP in order to progress toward functional independence.            Long Term Goals: 6 weeks   PAIN: Pt will report worst pain of 3/10 in order to progress toward max functional ability and improve quality of life (GOAL MET 6/9/2022)  MoBILITY: Patient will increase AROM at the bilateral knees to 120 flexion for better ability to tolerate squatting to  objects  STRENGTH: Patient will improve strength of lower extremities musculature to 4-/5 for improved ability to perform squatting and descending steps and decreased pain with gait  GAIT NORMAL: Patient will improve gait by walking at 1.8 mph x 10 minutes with minimal to no lateral lurch  Patient will report of ability to return to kneeling to pick to perform housework and assist with picking up dropped items on the floor with pain at or less than 2/10      PLAN     New plan of care for the patient to be seen 2 times per week for 8 weeks from 8/16/2022 - 11/16/2022 to work on improved ROM, improved lower extremities strength, improved squat and lunge capability, improved gait mechanics, and improved ability to perform stair ascending/descending.  Progress patient's exercises and activities per protocol.     Lanie Kumari,  PTA

## 2022-09-14 NOTE — PROGRESS NOTES
[facility]   Physical Therapy Treatment Note    Name: Stacey Wade  Clinic Number: [id number]    Therapy Diagnosis:   Encounter Diagnosis   Name Primary?    Decreased functional mobility and endurance Yes     Physician: Megha Kwon, *    Visit Date: 9/9/2022     Physician Orders: PT Eval and Treat  Medical Diagnosis from Referral: Complex tear of medial meniscus of left knee as current injury, initial encounter  Evaluation Date: 5/18/2022  Authorization Period Expiration: 12/31/2022  Plan of Care Expiration: 11/16/2022  Progress Note Due: 9/16/2022  Visit # / Visits authorized: 23/35 (+1 for evaluation)  FOTO: 1/3     Precautions: Standard, Fall and Weightbearing  PTA Visit #: 0/5     Surgery: 5/13/2022    Time In: 0800 (late arrival)  Time Out: 0855  Total Billable Time: 54 minutes    SUBJECTIVE     Pt reports: increased soreness in the knee today. States the doctor told her to stop using her cane so she has been trying to walk around more without it which has been aggravating the knee     She was compliant with home exercise program.  Response to previous treatment: N/A  Functional change: N/A    Pain: 3/10 left knee   Location: bilateral patellofemoral region      OBJECTIVE     Objective Measures updated at progress report unless specified.     Post treatment left knee flexion : 120 degrees      Treatment     Stacey received the treatments listed below:      THERAPEUTIC EXERCISES to develop strength, endurance, ROM, flexibility, posture and core stabilization for (42) minutes including:    Intervention Performed Today    Recumbent bike for range of motion X 7 minutes level 1   Supine knee flexion with stretch rite  10 second holds x 10   Prone left quad stretch with stretch rite  10 second holds x 10   Double knee to chest knee flexion stretch   10 second holds x 10   Standing hamstring stretch on chair  10 second holds x 10   Knee flexion stretch foot on chair  10  second holds x 10 bilateral   Quadruped knee flexion stretch (moving into child's pose position)   10 second holds x 10   Knee over toe stretch  5s 5x left lower extremity only   Straight leg raise   2 pound, 3 sets of 10 repetition, 2 second hold   Supine hip adduction with ball  3 minutes, legs extended   Supine hip abduction with belt  3 minutes, legs extended   Prone hip extension  2 pound, 3 sets of 10 repetition, no assist, no brace   Side lying hip adduction  2 pound, 3 sets of 10 repetition, no assist, no brace   MOBO board  2 positions, 2 minutes each   Ankle 4 way  red theraband, 1 set of 10 repetition each direction   Standing  hamstring curls x 3x10 B    Standing marches x 3 sets of 10 repetition each lower extremity    Standing eccentric heel raises x 3 sets of 10 repetition with orange beam    Bridges x 3x10    Squats with upper extremity support  x 3x10   Leg press X  x Double 4 plates 3x10  Single 4 plates 2x10 each   Single leg balance x 3x30 each bilateral   Side stepping  x Yellow band, 6 laps      Plan for Next Visit:        .NEUROMUSCULAR RE-EDUCATION ACTIVITIES to improve Balance, Coordination, Kinesthetic, Sense, Proprioception and Posture for (12) minutes.  The following were included:    Intervention Performed Today    Left lower extremity step with weight shift, unlocking of knee  1 sets of 10 repetition    Left lower extremity stepping over orange beam with weight shifting and unlocking of knee  2 sets of 10 repetition    bilateral lower extremity step with even step length and weight shift  1 set of 10 reps   Gait training with straight cane with appropriate step length, weight shifting and lifting of toes  6 minutes   Step ups x 6 inch, 2x10 each bilateral   Lateral step ups x 6 inch, 2x10 each bilateral   Tandem walking  x 10 laps           Plan for Next Visit:        MANUAL THERAPY TECHNIQUES were applied for (0) minutes, including:    Manual Intervention Performed Today    Soft Tissue  "Mobilization  L anterior knee scar mobilizations    Joint Mobilizations  Patellar mobilizations in all directions    Mobilization with movement     Patellar mobilizations  Medial and latera, superior and inferior glides   Manual lymph drainage  Lower legs, posterior/anterior knee, upper leg   Functional Dry Needling        Plan for Next Visit:        Stacey received electrical stimulation for neuromuscular re-education for quad strengthening for 0 minutes to left quad with quad set.       Patient Education and Home Exercises     Home Exercises Provided and Patient Education Provided     Education provided:   - use of cane for long periods of walking or as a "just in case" at work    Written Home Exercises Provided: Patient instructed to cont prior HEP. Exercises were reviewed and Stacey was able to demonstrate them prior to the end of the session.  Stacey demonstrated good  understanding of the education provided. See EMR under Patient Instructions for exercises provided during therapy sessions    ASSESSMENT     Pt tolerated session well today and reports that she is fatigued but that her knee feels much better following session. Progressed from sit to stand to squats in order to further improve functional quadriceps strength. Added tandem walking to improve standing stability.     Stacey Is progressing well towards her goals.   Pt prognosis is Good.     Pt will continue to benefit from skilled outpatient physical therapy to address the deficits listed in the problem list box on initial evaluation, provide pt/family education and to maximize pt's level of independence in the home and community environment.     Pt's spiritual, cultural and educational needs considered and pt agreeable to plan of care and goals.     Anticipated barriers to physical therapy: co-morbidities, sedentary lifestyle, adherence to treatment plan and coping style    Goals: Reviewed 09/14/2022  Short Term Goals: 4 weeks  SIGNS & SYMPTOMS: " Recent signs and systems trend is improving in order to progress towards LTG's. (GOAL MET 6/16/2022)  MOBILITY: Patient will increase AROM at the bilateral knees to 120 flexion for better ability to tolerate squatting to  objects  STRENGTH: Patient will improve strength of lower extremities musculature to 3+/5 for improved ability to perform squatting and descending steps and decrease pain with gait  GAIT IMPROVING: Patient will improve gait by walking at 1.6 mph x 5 minutes with minimal to no lateral lurch  HEP: Patient will demonstrate independence with HEP in order to progress toward functional independence.            Long Term Goals: 6 weeks   PAIN: Pt will report worst pain of 3/10 in order to progress toward max functional ability and improve quality of life (GOAL MET 6/9/2022)  MoBILITY: Patient will increase AROM at the bilateral knees to 120 flexion for better ability to tolerate squatting to  objects  STRENGTH: Patient will improve strength of lower extremities musculature to 4-/5 for improved ability to perform squatting and descending steps and decreased pain with gait  GAIT NORMAL: Patient will improve gait by walking at 1.8 mph x 10 minutes with minimal to no lateral lurch  Patient will report of ability to return to kneeling to pick to perform housework and assist with picking up dropped items on the floor with pain at or less than 2/10      PLAN     New plan of care for the patient to be seen 2 times per week for 8 weeks from 8/16/2022 - 11/16/2022 to work on improved ROM, improved lower extremities strength, improved squat and lunge capability, improved gait mechanics, and improved ability to perform stair ascending/descending.  Progress patient's exercises and activities per protocol.     Bell Mullen, PT

## 2022-09-16 ENCOUNTER — CLINICAL SUPPORT (OUTPATIENT)
Dept: REHABILITATION | Facility: HOSPITAL | Age: 57
End: 2022-09-16
Payer: COMMERCIAL

## 2022-09-16 DIAGNOSIS — Z74.09 DECREASED FUNCTIONAL MOBILITY AND ENDURANCE: Primary | ICD-10-CM

## 2022-09-16 PROCEDURE — 97110 THERAPEUTIC EXERCISES: CPT

## 2022-09-16 NOTE — PROGRESS NOTES
[facility]   Physical Therapy Treatment Note    Name: Stacey Wade  Clinic Number: [id number]    Therapy Diagnosis:   Encounter Diagnosis   Name Primary?    Decreased functional mobility and endurance Yes     Physician: Megha Kwon, *    Visit Date: 9/16/2022     Physician Orders: PT Eval and Treat  Medical Diagnosis from Referral: Complex tear of medial meniscus of left knee as current injury, initial encounter  Evaluation Date: 5/18/2022  Authorization Period Expiration: 12/31/2022  Plan of Care Expiration: 11/16/2022  Progress Note Due: 9/16/2022  Visit # / Visits authorized: 23/35 (+1 for evaluation)  FOTO: 1/3     Precautions: Standard, Fall and Weightbearing  PTA Visit #: 0/5     Surgery: 5/13/2022    Time In: 0745  Time Out: 0830  Total Billable Time: 45 minutes    SUBJECTIVE     Pt reports: that she has experienced significant pain relief at the left knee.    She was compliant with home exercise program.  Response to previous treatment: N/A  Functional change: N/A    Pain: 0/10 left knee / 3/10 right knee  Location: bilateral patellofemoral region      OBJECTIVE     Objective Measures updated at progress report unless specified.     Lower Extremity ROM:   lower extremities ROM 9/12/2022 8/16/2022 6/16/2022   Knee Flexion 118 108 82   Knee Flexion 0 0 0         Sit to stand (30 seconds): 14 repetitions        Lower Extremity Strength   LE MMT Right/Left  9/12/2022 Right/Left  8/16/2022 Right/Left  6/16/2022   HIP Flexion 4+/5 / 4+/5 4+/5 / 4+/5 4+/5 / 3-/5   Hip Abduction 4-/5 / 4-/5 3+/5 / 3/5 NT   Hip Extension 4-/5 / 4-/5 3+/5 / 3+/5 NT   Hip Adduction NT / NT 4+/5 / 4+/5 NT   Knee Flexion 4+/5 / 4+/5 4+/5 / 4/5 4+/5 / NT   Knee Extension 4+/5 / 4+/5 4+/5 / 4+/5  4+/5 / NT   Ankle Dorsiflexion 5/5 / 5/5 5/5 / 5/5 5/5 / 4/5   Ankle Plantarflexion 4-/5 / 4-/5 3/5 / 4-/5 NT / NT     Gait: No significant limp noted but there is decreased walking velocity    Treatment      Stacey received the treatments listed below:      THERAPEUTIC EXERCISES to develop strength, endurance, ROM, flexibility, posture and core stabilization for (30) minutes including:    Intervention Performed Today    Recumbent bike for range of motion x 5 minutes level 1        Supine knee flexion with stretch rite  10 second holds x 10   Prone left quad stretch with stretch rite  10 second holds x 10   Double knee to chest knee flexion stretch   10 second holds x 10   Standing hamstring stretch on chair x 10 second holds x 10   Knee flexion stretch foot on chair x 10 second holds x 10 bilateral   Quadruped knee flexion stretch (moving into child's pose position)   10 second holds x 10   Knee over toe stretch  5s 5x left lower extremity only   Straight leg raise   2 pound, 3 sets of 10 repetition, 2 second hold   Supine hip adduction with ball  3 minutes, legs extended   Supine hip abduction with belt  3 minutes, legs extended   Prone hip extension  2 pound, 3 sets of 10 repetition, no assist, no brace   Side lying hip adduction  2 pound, 3 sets of 10 repetition, no assist, no brace   Sit to stands  3x10 with no upper extremity assist   Single leg stance  2x30s B    Tandem stance   2x30s B    MOBO board  2 positions, 2 minutes each   Ankle 4 way  red theraband, 1 set of 10 repetition each direction   Prone hamstring curls  3x10   Standing marches  3 sets of 10 repetition each lower extremity    Standing eccentric heel raises x 3 sets of 10 repetition    Standing gastroc stretch wedge x 3x30s    Step ups, 8 inch x 8 inch with upper extremities assist   Bridges  3x10 with ball   Sit to stands x 3x10 - 18 inch   Leg press x Single 3 plates 3x10 each   Single leg balance x 3x30 each bilateral   Nautilus knee Extension x 2 plates - single leg - 3x10     Plan for Next Visit:        .NEUROMUSCULAR RE-EDUCATION ACTIVITIES to improve Balance, Coordination, Kinesthetic, Sense, Proprioception and Posture for (15) minutes.   "The following were included:    Intervention Performed Today    Left lower extremity step with weight shift, unlocking of knee  1 sets of 10 repetition    Left lower extremity stepping over orange beam with weight shifting and unlocking of knee  2 sets of 10 repetition    bilateral lower extremity step with even step length and weight shift  1 set of 10 reps   Gait training with straight cane with appropriate step length, weight shifting and lifting of toes  6 minutes   Step ups  3x10 each bilateral - 8 inch with a mat to stabilize   Lateral step ups  3x10 each bilateral   Side stepping x Red band 5 parallel bar laps   Monster walks x Forwards/backwards red band 3 parallel bar laps     Plan for Next Visit:        MANUAL THERAPY TECHNIQUES were applied for (0) minutes, including:    Manual Intervention Performed Today    Soft Tissue Mobilization  L anterior knee scar mobilizations    Joint Mobilizations  Patellar mobilizations in all directions    Mobilization with movement     Patellar mobilizations  Medial and latera, superior and inferior glides   Manual lymph drainage  Lower legs, posterior/anterior knee, upper leg   Functional Dry Needling        Plan for Next Visit:        Stacey received electrical stimulation for neuromuscular re-education for quad strengthening for 0 minutes to left quad with quad set.       Patient Education and Home Exercises     Home Exercises Provided and Patient Education Provided     Education provided:   - use of cane for long periods of walking or as a "just in case" at work    Written Home Exercises Provided: Patient instructed to cont prior HEP. Exercises were reviewed and Stacey was able to demonstrate them prior to the end of the session.  Stacey demonstrated good  understanding of the education provided. See EMR under Patient Instructions for exercises provided during therapy sessions    ASSESSMENT     Patient with good performance of all exercises. She has demonstrated " increased ROM at the knee and reports of decreased pain.  There is also improved strength and improved performance with closed chain exercises.  Will continue to progress into closed chain exercises as long as pain remains decreased.    Stacey Is progressing well towards her goals.   Pt prognosis is Good.     Pt will continue to benefit from skilled outpatient physical therapy to address the deficits listed in the problem list box on initial evaluation, provide pt/family education and to maximize pt's level of independence in the home and community environment.     Pt's spiritual, cultural and educational needs considered and pt agreeable to plan of care and goals.     Anticipated barriers to physical therapy: co-morbidities, sedentary lifestyle, adherence to treatment plan and coping style    Goals: Reviewed 09/16/2022  Short Term Goals: 4 weeks  SIGNS & SYMPTOMS: Recent signs and systems trend is improving in order to progress towards LTG's. (GOAL MET 6/16/2022)  MOBILITY: Patient will increase AROM at the bilateral knees to 120 flexion for better ability to tolerate squatting to  objects PROGRESSING  STRENGTH: Patient will improve strength of lower extremities musculature to 3+/5 for improved ability to perform squatting and descending steps and decrease pain with gait   GAIT IMPROVING: Patient will improve gait by walking at 1.6 mph x 5 minutes with minimal to no lateral lurch  HEP: Patient will demonstrate independence with HEP in order to progress toward functional independence.            Long Term Goals: 6 weeks   PAIN: Pt will report worst pain of 3/10 in order to progress toward max functional ability and improve quality of life (GOAL MET 6/9/2022)  MoBILITY: Patient will increase AROM at the bilateral knees to 120 flexion for better ability to tolerate squatting to  objects  STRENGTH: Patient will improve strength of lower extremities musculature to 4-/5 for improved ability to perform  squatting and descending steps and decreased pain with gait  GAIT NORMAL: Patient will improve gait by walking at 1.8 mph x 10 minutes with minimal to no lateral lurch  Patient will report of ability to return to kneeling to pick to perform housework and assist with picking up dropped items on the floor with pain at or less than 2/10      PLAN     New plan of care for the patient to be seen 2 times per week for 8 weeks from 8/16/2022 - 11/16/2022 to work on improved ROM, improved lower extremities strength, improved squat and lunge capability, improved gait mechanics, and improved ability to perform stair ascending/descending.  Progress patient's exercises and activities per protocol.     Ashish Guamna, PT

## 2022-09-19 NOTE — PROGRESS NOTES
Patient ID: Stacey Wdae is a 56 y.o. female.    Chief Complaint: elevated risk for breast cancer f/u    HPI: Patient presents for 6 month f/u breast exam.     Pt has been calculated to be high risk for breast cancer- Her breast cancer risk assessment score of 27.17% was calculated at the time of her annual mammogram 1/5/2021. June 2018 had linda MRI and was wnl.     Pt has chosen to not have MRI's of the breast due to the expensive copay associated with the test .     Mammogram was performed  2/15/2022- dense breasts and risk score was 24.4%.     Has a history of a breast mass noted during her routine gyn exam back in March 2017. Negative right breast imaging with mammo and ultrasound at that time. We have been checking it clinically at 3- 6 mon intervals - had an episode of it increasing increase in size slightly last year. Pt had been using more caffeine than usual. Recommended seeing surgeon and pt wanted to decrease her caffeine to see if it would decrease the size of the area. It did go back to her baseline measurement. Pt denies any change. No new areas of concern.     MRI on 6/12/18 was wnl    Pt not exercising - caring for her mother- had left knee surgery and has been in PT     Pt is completely off her hormones      Review of Systems   Constitutional: Negative.  Negative for appetite change and unexpected weight change.   HENT: Negative.     Eyes: Negative.  Negative for visual disturbance.   Respiratory: Negative.  Negative for cough and shortness of breath.    Cardiovascular: Negative.  Negative for chest pain.   Gastrointestinal: Negative.  Negative for abdominal pain and diarrhea.        No reflux   Endocrine: Negative.    Genitourinary: Negative.  Negative for frequency.   Musculoskeletal: Negative.  Negative for back pain.   Allergic/Immunologic: Negative.    Neurological: Negative.  Negative for headaches.   Hematological: Negative.  Negative for adenopathy.   Psychiatric/Behavioral: Negative.   The patient is not nervous/anxious.    Breast: pt denies any nipple discharge or palpable mass that she has noted. Has had bilateral nipple tenderness intermittently. She has continued to decrease her cafeine intake.     Current Outpatient Medications   Medication Sig Dispense Refill    betamethasone dipropionate 0.05 % cream APPLY TOPICALLY 2 TIMES DAILY. 45 g 2    butalbital-acetaminophen-caffeine -40 mg (FIORICET, ESGIC) -40 mg per tablet TAKE ONE TABLET BY MOUTH EVERY 6 HOURS AS NEEDED FOR PAIN OR FOR HEADACHE      clorazepate (TRANXENE) 3.75 MG Tab Take 3.75 mg by mouth as needed.      diclofenac sodium (VOLTAREN) 1 % Gel Apply 2 g topically 4 (four) times daily. 100 g 2    doxepin (SINEQUAN) 10 MG capsule TAKE 2 CAPSULES BY MOUTH 3 TIMES A  capsule 1    EPINEPHrine (EPIPEN) 0.3 mg/0.3 mL AtIn Inject 0.3 mg into the muscle daily as needed.      famotidine (PEPCID) 40 MG tablet Take 20 mg by mouth.      fexofenadine (ALLEGRA) 180 MG tablet TAKE ONE TABLET BY MOUTH EVERY DAY 30 tablet 0    FLOVENT DISKUS 100 mcg/actuation inhaler Inhale into the lungs.      fluticasone propionate (FLOVENT DISKUS) 100 mcg/actuation inhaler Inhale 100 mcg into the lungs.      hydrOXYzine HCL (ATARAX) 25 MG tablet TAKE 1 TABLET BY MOUTH FOUR TIMES A DAY AS NEEDED FOR ITCHING 120 tablet 1    mirabegron (MYRBETRIQ) 25 mg Tb24 ER tablet Take 1 tablet (25 mg total) by mouth once daily. 30 tablet 5    mometasone (NASONEX) 50 mcg/actuation nasal spray INHALE 2 SPRAYS BY NASAL ROUTE ONCE DAILY 17 g 1    montelukast (SINGULAIR) 10 mg tablet Take 1 tablet (10 mg total) by mouth once daily. 90 tablet 3    omalizumab (XOLAIR) 150 mg/mL injection Inject 300 mg into the skin.      potassium chloride (MICRO-K) 10 MEQ CpSR Take 1 capsule (10 mEq total) by mouth once daily. (Patient taking differently: Take 10 mEq by mouth as needed.) 90 capsule 0    rizatriptan (MAXALT) 10 MG tablet TAKE ONE TABLET BY MOUTH AS NEEDED FOR  migraine MAY REPEAT in TWO hours. no more THAN TWO TABLETS in 24 hours 27 tablet 3    triamterene-hydrochlorothiazide 37.5-25 mg (DYAZIDE) 37.5-25 mg per capsule Take 1 capsule by mouth once daily. 90 capsule 3    XOLAIR 150 mg/mL injection        No current facility-administered medications for this visit.     Facility-Administered Medications Ordered in Other Visits   Medication Dose Route Frequency Provider Last Rate Last Admin    0.9%  NaCl infusion   Intravenous Continuous Megha Kwon PA-C        chlorhexidine 0.12 % solution 10 mL  10 mL Mouth/Throat On Call Procedure Megha Kwon PA-C           Review of patient's allergies indicates:   Allergen Reactions    Benzalkonium chloride     Black pepper      Other reaction(s): Hives    Chocolate flavor      Other reaction(s): Hives    Citrus and derivatives Hives     LEMON PRODUCTS    Furosemide     Grapefruit      Other reaction(s): Hives    Latex      Other reaction(s): Hives    Lemon balm (casper officinalis) Hives    Neomycin-bacitracin-polymyxin      Other reaction(s): Rash    Oats (richard) Hives     Oat foods (oatmeal, etc...)    Wheat containing prod        Past Medical History:   Diagnosis Date    Allergic rhinitis     Anxiety     Arthritis     Hypertension     Urticaria        Past Surgical History:   Procedure Laterality Date    CHONDROPLASTY OF KNEE Left 05/13/2022    Procedure: CHONDROPLASTY, KNEE;  Surgeon: Wes Faulkner MD;  Location: [facility] OR;  Service: Orthopedics;  Laterality: Left;    COLONOSCOPY N/A 01/18/2018    Procedure: COLONOSCOPY;  Surgeon: Yong Smith MD;  Location: [facility];  Service: Endoscopy;  Laterality: N/A;    HERNIA REPAIR Left        Family History   Problem Relation Age of Onset    Lung cancer Paternal Grandfather     Ovarian cancer Maternal Grandmother     Hyperlipidemia Mother     Hypertension Mother     Breast cancer Mother 60    Arthritis Mother     Lung cancer Father     Breast cancer Maternal  Aunt 53    Heart failure Other         Great aunt    Prostate cancer Brother     Brain cancer Sister     Diabetes Neg Hx     Pseudochol deficiency Neg Hx     Malignant hyperthermia Neg Hx        Social History     Socioeconomic History    Marital status: Single   Tobacco Use    Smoking status: Never    Smokeless tobacco: Never   Substance and Sexual Activity    Alcohol use: No    Drug use: No    Sexual activity: Not Currently     Partners: Male     Birth control/protection: None   Social History Narrative    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       There were no vitals filed for this visit.    Physical Exam  Vitals reviewed.   Constitutional:       Appearance: Normal appearance. She is well-developed.   HENT:      Head: Normocephalic and atraumatic.      Right Ear: External ear normal.      Left Ear: External ear normal.      Mouth/Throat:      Pharynx: No oropharyngeal exudate.   Eyes:      General: No scleral icterus.        Right eye: No discharge.         Left eye: No discharge.      Conjunctiva/sclera: Conjunctivae normal.      Pupils: Pupils are equal, round, and reactive to light.   Neck:      Thyroid: No thyromegaly.   Cardiovascular:      Rate and Rhythm: Normal rate and regular rhythm.      Pulses: Normal pulses.      Heart sounds: Normal heart sounds.   Pulmonary:      Effort: Pulmonary effort is normal.      Breath sounds: Normal breath sounds.   Chest:   Breasts:     Right: No inverted nipple, mass, nipple discharge, skin change or tenderness.      Left: No inverted nipple, mass, nipple discharge, skin change or tenderness.   Abdominal:      General: Bowel sounds are normal.      Palpations: Abdomen is soft.   Musculoskeletal:         General: Normal range of motion.      Right shoulder: No crepitus. Normal strength.      Cervical back: Normal range of motion and neck supple.   Lymphadenopathy:      Head:      Right side of  head: No submental, submandibular, tonsillar, preauricular, posterior auricular or occipital adenopathy.      Left side of head: No submental, submandibular, tonsillar, preauricular, posterior auricular or occipital adenopathy.      Cervical: No cervical adenopathy.      Right cervical: No superficial or posterior cervical adenopathy.     Left cervical: No superficial or posterior cervical adenopathy.      Upper Body:      Right upper body: No supraclavicular or axillary adenopathy.      Left upper body: No supraclavicular or axillary adenopathy.   Skin:     General: Skin is warm and dry.      Coloration: Skin is not pale.      Findings: No erythema or rash (fine barely visible papules over chin. No other areas noted).   Neurological:      General: No focal deficit present.      Mental Status: She is alert and oriented to person, place, and time.      Deep Tendon Reflexes: Reflexes are normal and symmetric.   Psychiatric:         Mood and Affect: Mood normal.         Behavior: Behavior normal.         Thought Content: Thought content normal.         Judgment: Judgment normal.     IMAGIN/15/2022- wnl- dense breasts- risk score 24.4%    Menarche at 18 y/o      no history of radiation to the neck or chest wall.       FH: Maternal aunt breast cancer at 51 y/o, Maternal GM ovarian cancer, Father lung and prostate cancer, Brother - prostate cancer    Assessment & Plan:  1. Area of prominent glandular tissue noted at the 3 oclock location of the right breast. Fibrous texture and blends more medially and laterally- stable measurements today  2. Mammogram 2/15/2022 - risk score 24.4 % - decreasing  3. linda screening mammogram 2023 and exam  4. BSE recommended monthly- call for any changes.    5. Encouraged to get back into exercise and wt loss- pt agrees  6. Discussed genetic testing - pt declines at this time  7. Pt declines tamoxifen use for chemoprevention due to potential side effects   8. RTC 2023 for  breast exam and mammo

## 2022-09-21 ENCOUNTER — CLINICAL SUPPORT (OUTPATIENT)
Dept: REHABILITATION | Facility: HOSPITAL | Age: 57
End: 2022-09-21
Payer: COMMERCIAL

## 2022-09-21 DIAGNOSIS — Z74.09 DECREASED FUNCTIONAL MOBILITY AND ENDURANCE: Primary | ICD-10-CM

## 2022-09-21 PROCEDURE — 97110 THERAPEUTIC EXERCISES: CPT | Mod: CQ

## 2022-09-21 NOTE — PROGRESS NOTES
[facility]   Physical Therapy Treatment Note    Name: Stacey Wade  Clinic Number: [id number]    Therapy Diagnosis:   Encounter Diagnosis   Name Primary?    Decreased functional mobility and endurance Yes     Physician: Megha Kwon, *    Visit Date: 9/21/2022     Physician Orders: PT Eval and Treat  Medical Diagnosis from Referral: Complex tear of medial meniscus of left knee as current injury, initial encounter  Evaluation Date: 5/18/2022  Authorization Period Expiration: 12/31/2022  Plan of Care Expiration: 11/16/2022  Progress Note Due: 10/16/2022  Visit # / Visits authorized: 24/35 (+1 for evaluation)  FOTO: 1/3     Precautions: Standard, Fall and Weightbearing  PTA Visit #: 1/5     Surgery: 5/13/2022    Time In: 0830 (late arrival)  Time Out: 0900  Total Billable Time: 30 minutes    SUBJECTIVE     Pt reports: increased soreness today from new strengthening exercises last session.     She was compliant with home exercise program.  Response to previous treatment: N/A  Functional change: N/A    Pain: 0/10 left knee / 0/10 right knee  Location: bilateral patellofemoral region      OBJECTIVE     Objective Measures updated at progress report unless specified.     Treatment     Stacey received the treatments listed below:      THERAPEUTIC EXERCISES to develop strength, endurance, ROM, flexibility, posture and core stabilization for (30) minutes including:    Intervention Performed Today    Recumbent bike for range of motion x 5 minutes level 1        Supine knee flexion with stretch rite  10 second holds x 10   Prone left quad stretch with stretch rite  10 second holds x 10   Double knee to chest knee flexion stretch   10 second holds x 10   Standing hamstring stretch on chair x 10 second holds x 10   Knee flexion stretch foot on chair x 10 second holds x 10 bilateral   Quadruped knee flexion stretch (moving into child's pose position)   10 second holds x 10   Knee over toe  stretch  5s 5x left lower extremity only   Straight leg raise   2 pound, 3 sets of 10 repetition, 2 second hold   Supine hip adduction with ball  3 minutes, legs extended   Supine hip abduction with belt  3 minutes, legs extended   Prone hip extension  2 pound, 3 sets of 10 repetition, no assist, no brace   Side lying hip adduction  2 pound, 3 sets of 10 repetition, no assist, no brace   Sit to stands  3x10 with no upper extremity assist   Single leg stance  2x30s B    Tandem stance   2x30s B    MOBO board  2 positions, 2 minutes each   Ankle 4 way  red theraband, 1 set of 10 repetition each direction   Prone hamstring curls  3x10   Standing marches  3 sets of 10 repetition each lower extremity    Standing eccentric heel raises x 3 sets of 10 repetition    Standing gastroc stretch wedge x 3x30s    Step ups, 8 inch x 8 inch with one upper extremities assist   Bridges  3x10 with ball   Sit to stands x 3x10 - 18 inch   Leg press  Single 3 plates 3x10 each   Single leg balance x 3x30 each bilateral   Nautilus knee Extension  2 plates - single leg - 3x10     Plan for Next Visit:        .NEUROMUSCULAR RE-EDUCATION ACTIVITIES to improve Balance, Coordination, Kinesthetic, Sense, Proprioception and Posture for (0) minutes.  The following were included:    Intervention Performed Today    Left lower extremity step with weight shift, unlocking of knee  1 sets of 10 repetition    Left lower extremity stepping over orange beam with weight shifting and unlocking of knee  2 sets of 10 repetition    bilateral lower extremity step with even step length and weight shift  1 set of 10 reps   Gait training with straight cane with appropriate step length, weight shifting and lifting of toes  6 minutes   Step ups  3x10 each bilateral - 8 inch with a mat to stabilize   Lateral step ups  3x10 each bilateral   Side stepping  Red band 5 parallel bar laps   Monster walks  Forwards/backwards red band 3 parallel bar laps     Plan for Next Visit:  "       MANUAL THERAPY TECHNIQUES were applied for (0) minutes, including:    Manual Intervention Performed Today    Soft Tissue Mobilization  L anterior knee scar mobilizations    Joint Mobilizations  Patellar mobilizations in all directions    Mobilization with movement     Patellar mobilizations  Medial and latera, superior and inferior glides   Manual lymph drainage  Lower legs, posterior/anterior knee, upper leg   Functional Dry Needling        Plan for Next Visit:        Stacey received electrical stimulation for neuromuscular re-education for quad strengthening for 0 minutes to left quad with quad set.       Patient Education and Home Exercises     Home Exercises Provided and Patient Education Provided     Education provided:   - use of cane for long periods of walking or as a "just in case" at work    Written Home Exercises Provided: Patient instructed to cont prior HEP. Exercises were reviewed and Stacey was able to demonstrate them prior to the end of the session.  Stacey demonstrated good  understanding of the education provided. See EMR under Patient Instructions for exercises provided during therapy sessions    ASSESSMENT   Late arrival to session today, not all planned interventions implemented. Patient did well with session today with minimal complaints of discomfort. Increased pain and antalgic gait at entrance of session, good response to stretching. Improved performance of all activities with minimal verbal cues required for decreased compensation. Patient left session with less antalgic gait and decreased pain.     Stacey Is progressing well towards her goals.   Pt prognosis is Good.     Pt will continue to benefit from skilled outpatient physical therapy to address the deficits listed in the problem list box on initial evaluation, provide pt/family education and to maximize pt's level of independence in the home and community environment.     Pt's spiritual, cultural and educational needs " considered and pt agreeable to plan of care and goals.     Anticipated barriers to physical therapy: co-morbidities, sedentary lifestyle, adherence to treatment plan and coping style    Goals: Reviewed 09/21/2022  Short Term Goals: 4 weeks  SIGNS & SYMPTOMS: Recent signs and systems trend is improving in order to progress towards LTG's. (GOAL MET 6/16/2022)  MOBILITY: Patient will increase AROM at the bilateral knees to 120 flexion for better ability to tolerate squatting to  objects PROGRESSING  STRENGTH: Patient will improve strength of lower extremities musculature to 3+/5 for improved ability to perform squatting and descending steps and decrease pain with gait   GAIT IMPROVING: Patient will improve gait by walking at 1.6 mph x 5 minutes with minimal to no lateral lurch  HEP: Patient will demonstrate independence with HEP in order to progress toward functional independence.            Long Term Goals: 6 weeks   PAIN: Pt will report worst pain of 3/10 in order to progress toward max functional ability and improve quality of life (GOAL MET 6/9/2022)  MoBILITY: Patient will increase AROM at the bilateral knees to 120 flexion for better ability to tolerate squatting to  objects  STRENGTH: Patient will improve strength of lower extremities musculature to 4-/5 for improved ability to perform squatting and descending steps and decreased pain with gait  GAIT NORMAL: Patient will improve gait by walking at 1.8 mph x 10 minutes with minimal to no lateral lurch  Patient will report of ability to return to kneeling to pick to perform housework and assist with picking up dropped items on the floor with pain at or less than 2/10      PLAN     New plan of care for the patient to be seen 2 times per week for 8 weeks from 8/16/2022 - 11/16/2022 to work on improved ROM, improved lower extremities strength, improved squat and lunge capability, improved gait mechanics, and improved ability to perform stair  ascending/descending.  Progress patient's exercises and activities per protocol.     Lanie Kumari, PTA

## 2022-09-22 ENCOUNTER — OFFICE VISIT (OUTPATIENT)
Dept: HEMATOLOGY/ONCOLOGY | Facility: CLINIC | Age: 57
End: 2022-09-22
Payer: COMMERCIAL

## 2022-09-22 VITALS
DIASTOLIC BLOOD PRESSURE: 84 MMHG | HEART RATE: 79 BPM | BODY MASS INDEX: 35.09 KG/M2 | RESPIRATION RATE: 16 BRPM | TEMPERATURE: 98 F | SYSTOLIC BLOOD PRESSURE: 144 MMHG | HEIGHT: 62 IN | WEIGHT: 190.69 LBS | OXYGEN SATURATION: 98 %

## 2022-09-22 DIAGNOSIS — Z91.89 AT HIGH RISK FOR BREAST CANCER: Primary | ICD-10-CM

## 2022-09-22 DIAGNOSIS — Z80.3 FAMILY HISTORY OF BREAST CANCER: ICD-10-CM

## 2022-09-22 DIAGNOSIS — Z71.9 HEALTH EDUCATION/COUNSELING: ICD-10-CM

## 2022-09-22 DIAGNOSIS — Z71.89 COUNSELING ON HEALTH PROMOTION AND DISEASE PREVENTION: ICD-10-CM

## 2022-09-22 DIAGNOSIS — Z12.39 ENCOUNTER FOR BREAST CANCER SCREENING USING NON-MAMMOGRAM MODALITY: ICD-10-CM

## 2022-09-22 DIAGNOSIS — Z71.89 COUNSELING AND COORDINATION OF CARE: ICD-10-CM

## 2022-09-22 PROCEDURE — 99214 PR OFFICE/OUTPT VISIT, EST, LEVL IV, 30-39 MIN: ICD-10-PCS | Mod: S$GLB,,, | Performed by: NURSE PRACTITIONER

## 2022-09-22 PROCEDURE — 3008F BODY MASS INDEX DOCD: CPT | Mod: CPTII,S$GLB,, | Performed by: NURSE PRACTITIONER

## 2022-09-22 PROCEDURE — 3079F DIAST BP 80-89 MM HG: CPT | Mod: CPTII,S$GLB,, | Performed by: NURSE PRACTITIONER

## 2022-09-22 PROCEDURE — 1159F PR MEDICATION LIST DOCUMENTED IN MEDICAL RECORD: ICD-10-PCS | Mod: CPTII,S$GLB,, | Performed by: NURSE PRACTITIONER

## 2022-09-22 PROCEDURE — 1159F MED LIST DOCD IN RCRD: CPT | Mod: CPTII,S$GLB,, | Performed by: NURSE PRACTITIONER

## 2022-09-22 PROCEDURE — 3079F PR MOST RECENT DIASTOLIC BLOOD PRESSURE 80-89 MM HG: ICD-10-PCS | Mod: CPTII,S$GLB,, | Performed by: NURSE PRACTITIONER

## 2022-09-22 PROCEDURE — 99999 PR PBB SHADOW E&M-EST. PATIENT-LVL V: CPT | Mod: PBBFAC,,, | Performed by: NURSE PRACTITIONER

## 2022-09-22 PROCEDURE — 3077F PR MOST RECENT SYSTOLIC BLOOD PRESSURE >= 140 MM HG: ICD-10-PCS | Mod: CPTII,S$GLB,, | Performed by: NURSE PRACTITIONER

## 2022-09-22 PROCEDURE — 99999 PR PBB SHADOW E&M-EST. PATIENT-LVL V: ICD-10-PCS | Mod: PBBFAC,,, | Performed by: NURSE PRACTITIONER

## 2022-09-22 PROCEDURE — 3077F SYST BP >= 140 MM HG: CPT | Mod: CPTII,S$GLB,, | Performed by: NURSE PRACTITIONER

## 2022-09-22 PROCEDURE — 99214 OFFICE O/P EST MOD 30 MIN: CPT | Mod: S$GLB,,, | Performed by: NURSE PRACTITIONER

## 2022-09-22 PROCEDURE — 3008F PR BODY MASS INDEX (BMI) DOCUMENTED: ICD-10-PCS | Mod: CPTII,S$GLB,, | Performed by: NURSE PRACTITIONER

## 2022-09-22 RX ORDER — DEXCHLORPHENIRAMINE MALEATE, DEXTROMETHORPHAN HBR, PHENYLEPHRINE HCL 1; 10; 5 MG/5ML; MG/5ML; MG/5ML
10 SYRUP ORAL 3 TIMES DAILY PRN
COMMUNITY
Start: 2022-07-20 | End: 2023-02-23

## 2022-09-23 ENCOUNTER — CLINICAL SUPPORT (OUTPATIENT)
Dept: REHABILITATION | Facility: HOSPITAL | Age: 57
End: 2022-09-23
Payer: COMMERCIAL

## 2022-09-23 DIAGNOSIS — Z74.09 DECREASED FUNCTIONAL MOBILITY AND ENDURANCE: Primary | ICD-10-CM

## 2022-09-23 PROCEDURE — 97110 THERAPEUTIC EXERCISES: CPT

## 2022-09-23 NOTE — PROGRESS NOTES
[facility]   Physical Therapy Treatment Note    Name: Stacey Wade  Clinic Number: [id number]    Therapy Diagnosis:   Encounter Diagnosis   Name Primary?    Decreased functional mobility and endurance Yes     Physician: Megha Kwon, *    Visit Date: 9/23/2022     Physician Orders: PT Eval and Treat  Medical Diagnosis from Referral: Complex tear of medial meniscus of left knee as current injury, initial encounter  Evaluation Date: 5/18/2022  Authorization Period Expiration: 12/31/2022  Plan of Care Expiration: 11/16/2022  Progress Note Due: 10/16/2022  Visit # / Visits authorized: 24/35 (+1 for evaluation)  FOTO: 1/3     Precautions: Standard, Fall and Weightbearing  PTA Visit #: 1/5     Surgery: 5/13/2022    Time In: 0745  Time Out: 0830  Total Billable Time: 45 minutes    SUBJECTIVE     Pt reports: increased soreness today from new strengthening exercises last session.     She was compliant with home exercise program.  Response to previous treatment: N/A  Functional change: N/A    Pain: 0/10 left knee / 0/10 right knee  Location: bilateral patellofemoral region      OBJECTIVE     Objective Measures updated at progress report unless specified.     Treatment     Stacey received the treatments listed below:      THERAPEUTIC EXERCISES to develop strength, endurance, ROM, flexibility, posture and core stabilization for (45) minutes including:    Intervention Performed Today    Recumbent bike for range of motion x 5 minutes level 1        Supine knee flexion with stretch rite  10 second holds x 10   Prone left quad stretch with stretch rite  10 second holds x 10   Double knee to chest knee flexion stretch   10 second holds x 10   Standing hamstring stretch on chair  10 second holds x 10   Knee flexion stretch foot on chair x 10 second holds x 10 bilateral   Quadruped knee flexion stretch (moving into child's pose position)   10 second holds x 10   Knee over toe stretch  5s 5x  left lower extremity only   Straight leg raise   2 pound, 3 sets of 10 repetition, 2 second hold   Supine hip adduction with ball  3 minutes, legs extended   Supine hip abduction with belt  3 minutes, legs extended   Prone hip extension  2 pound, 3 sets of 10 repetition, no assist, no brace   Side lying hip adduction  2 pound, 3 sets of 10 repetition, no assist, no brace   Sit to stands  3x10 with no upper extremity assist   Single leg stance  2x30s B    Tandem stance   2x30s B    MOBO board  2 positions, 2 minutes each   Ankle 4 way  red theraband, 1 set of 10 repetition each direction   Prone hamstring curls  3x10   Standing marches  3 sets of 10 repetition each lower extremity    Standing single leg heel raises x 3 sets of 10 repetition    Standing gastroc stretch wedge x 3x30s    Step ups, 8 inch x 8 inch with one upper extremities assist - 2x10   Step downs x 6 inch with upper extremity assist - 2x10   Bridges  3x10 with ball   Sit to stands x 3x10 - 18 inch   Leg press  Single 3 plates 3x10 each   Single leg balance x 3x30 each bilateral   Nautilus knee Extension x 2 plates - single leg - 3x10     Plan for Next Visit:        .NEUROMUSCULAR RE-EDUCATION ACTIVITIES to improve Balance, Coordination, Kinesthetic, Sense, Proprioception and Posture for (0) minutes.  The following were included:    Intervention Performed Today    Left lower extremity step with weight shift, unlocking of knee  1 sets of 10 repetition    Left lower extremity stepping over orange beam with weight shifting and unlocking of knee  2 sets of 10 repetition    bilateral lower extremity step with even step length and weight shift  1 set of 10 reps   Gait training with straight cane with appropriate step length, weight shifting and lifting of toes  6 minutes   Step ups  3x10 each bilateral - 8 inch with a mat to stabilize   Lateral step ups  3x10 each bilateral   Side stepping x Red band 5 parallel bar laps   Juanito walks  Forwards/backwards  "red band 3 parallel bar laps     Plan for Next Visit:        MANUAL THERAPY TECHNIQUES were applied for (5) minutes, including:    Manual Intervention Performed Today    Soft Tissue Mobilization x Left quadriceps and gastroc regions   Joint Mobilizations  Patellar mobilizations in all directions    Mobilization with movement     Patellar mobilizations  Medial and latera, superior and inferior glides   Manual lymph drainage  Lower legs, posterior/anterior knee, upper leg   Functional Dry Needling        Plan for Next Visit:        Stacey received electrical stimulation for neuromuscular re-education for quad strengthening for 0 minutes to left quad with quad set.       Patient Education and Home Exercises     Home Exercises Provided and Patient Education Provided     Education provided:   - use of cane for long periods of walking or as a "just in case" at work    Written Home Exercises Provided: Patient instructed to cont prior HEP. Exercises were reviewed and Stacey was able to demonstrate them prior to the end of the session.  Stacey demonstrated good  understanding of the education provided. See EMR under Patient Instructions for exercises provided during therapy sessions    ASSESSMENT   Patient reports of minimal soreness.  She does report that she is developing more confidence walking without assistive device.  Worked again on step ups and step downs.  No complaints.  However, there is still a requirement of upper extremities assist to perform steps at this time.    Stacey Is progressing well towards her goals.   Pt prognosis is Good.     Pt will continue to benefit from skilled outpatient physical therapy to address the deficits listed in the problem list box on initial evaluation, provide pt/family education and to maximize pt's level of independence in the home and community environment.     Pt's spiritual, cultural and educational needs considered and pt agreeable to plan of care and goals.     Anticipated " barriers to physical therapy: co-morbidities, sedentary lifestyle, adherence to treatment plan and coping style    Goals: Reviewed 09/23/2022  Short Term Goals: 4 weeks  SIGNS & SYMPTOMS: Recent signs and systems trend is improving in order to progress towards LTG's. (GOAL MET 6/16/2022)  MOBILITY: Patient will increase AROM at the bilateral knees to 120 flexion for better ability to tolerate squatting to  objects PROGRESSING  STRENGTH: Patient will improve strength of lower extremities musculature to 3+/5 for improved ability to perform squatting and descending steps and decrease pain with gait   GAIT IMPROVING: Patient will improve gait by walking at 1.6 mph x 5 minutes with minimal to no lateral lurch  HEP: Patient will demonstrate independence with HEP in order to progress toward functional independence.            Long Term Goals: 6 weeks   PAIN: Pt will report worst pain of 3/10 in order to progress toward max functional ability and improve quality of life (GOAL MET 6/9/2022)  MoBILITY: Patient will increase AROM at the bilateral knees to 120 flexion for better ability to tolerate squatting to  objects  STRENGTH: Patient will improve strength of lower extremities musculature to 4-/5 for improved ability to perform squatting and descending steps and decreased pain with gait  GAIT NORMAL: Patient will improve gait by walking at 1.8 mph x 10 minutes with minimal to no lateral lurch  Patient will report of ability to return to kneeling to pick to perform housework and assist with picking up dropped items on the floor with pain at or less than 2/10      PLAN     New plan of care for the patient to be seen 2 times per week for 8 weeks from 8/16/2022 - 11/16/2022 to work on improved ROM, improved lower extremities strength, improved squat and lunge capability, improved gait mechanics, and improved ability to perform stair ascending/descending.  Progress patient's exercises and activities per protocol.      Ashish Guaman, PT

## 2022-09-27 ENCOUNTER — OFFICE VISIT (OUTPATIENT)
Dept: FAMILY MEDICINE | Facility: CLINIC | Age: 57
End: 2022-09-27
Payer: COMMERCIAL

## 2022-09-27 ENCOUNTER — LAB VISIT (OUTPATIENT)
Dept: LAB | Facility: HOSPITAL | Age: 57
End: 2022-09-27
Attending: FAMILY MEDICINE
Payer: COMMERCIAL

## 2022-09-27 VITALS
OXYGEN SATURATION: 97 % | DIASTOLIC BLOOD PRESSURE: 84 MMHG | SYSTOLIC BLOOD PRESSURE: 125 MMHG | WEIGHT: 189.81 LBS | BODY MASS INDEX: 34.93 KG/M2 | TEMPERATURE: 97 F | HEIGHT: 62 IN | HEART RATE: 69 BPM

## 2022-09-27 DIAGNOSIS — G89.29 CHRONIC PAIN OF BOTH KNEES: ICD-10-CM

## 2022-09-27 DIAGNOSIS — I10 ESSENTIAL HYPERTENSION: Chronic | ICD-10-CM

## 2022-09-27 DIAGNOSIS — Z23 NEEDS FLU SHOT: ICD-10-CM

## 2022-09-27 DIAGNOSIS — E66.9 OBESITY (BMI 30.0-34.9): ICD-10-CM

## 2022-09-27 DIAGNOSIS — M25.561 CHRONIC PAIN OF BOTH KNEES: ICD-10-CM

## 2022-09-27 DIAGNOSIS — I10 ESSENTIAL HYPERTENSION: Primary | Chronic | ICD-10-CM

## 2022-09-27 DIAGNOSIS — M25.562 CHRONIC PAIN OF BOTH KNEES: ICD-10-CM

## 2022-09-27 PROCEDURE — 1159F PR MEDICATION LIST DOCUMENTED IN MEDICAL RECORD: ICD-10-PCS | Mod: CPTII,S$GLB,, | Performed by: FAMILY MEDICINE

## 2022-09-27 PROCEDURE — 3079F PR MOST RECENT DIASTOLIC BLOOD PRESSURE 80-89 MM HG: ICD-10-PCS | Mod: CPTII,S$GLB,, | Performed by: FAMILY MEDICINE

## 2022-09-27 PROCEDURE — 3074F SYST BP LT 130 MM HG: CPT | Mod: CPTII,S$GLB,, | Performed by: FAMILY MEDICINE

## 2022-09-27 PROCEDURE — 80048 BASIC METABOLIC PNL TOTAL CA: CPT | Performed by: FAMILY MEDICINE

## 2022-09-27 PROCEDURE — 90686 IIV4 VACC NO PRSV 0.5 ML IM: CPT | Mod: S$GLB,,, | Performed by: FAMILY MEDICINE

## 2022-09-27 PROCEDURE — 99999 PR PBB SHADOW E&M-EST. PATIENT-LVL V: ICD-10-PCS | Mod: PBBFAC,,, | Performed by: FAMILY MEDICINE

## 2022-09-27 PROCEDURE — 99214 OFFICE O/P EST MOD 30 MIN: CPT | Mod: 25,S$GLB,, | Performed by: FAMILY MEDICINE

## 2022-09-27 PROCEDURE — 90471 IMMUNIZATION ADMIN: CPT | Mod: S$GLB,,, | Performed by: FAMILY MEDICINE

## 2022-09-27 PROCEDURE — 99999 PR PBB SHADOW E&M-EST. PATIENT-LVL V: CPT | Mod: PBBFAC,,, | Performed by: FAMILY MEDICINE

## 2022-09-27 PROCEDURE — 3008F PR BODY MASS INDEX (BMI) DOCUMENTED: ICD-10-PCS | Mod: CPTII,S$GLB,, | Performed by: FAMILY MEDICINE

## 2022-09-27 PROCEDURE — 90471 FLU VACCINE (QUAD) GREATER THAN OR EQUAL TO 3YO PRESERVATIVE FREE IM: ICD-10-PCS | Mod: S$GLB,,, | Performed by: FAMILY MEDICINE

## 2022-09-27 PROCEDURE — 3008F BODY MASS INDEX DOCD: CPT | Mod: CPTII,S$GLB,, | Performed by: FAMILY MEDICINE

## 2022-09-27 PROCEDURE — 90686 FLU VACCINE (QUAD) GREATER THAN OR EQUAL TO 3YO PRESERVATIVE FREE IM: ICD-10-PCS | Mod: S$GLB,,, | Performed by: FAMILY MEDICINE

## 2022-09-27 PROCEDURE — 99214 PR OFFICE/OUTPT VISIT, EST, LEVL IV, 30-39 MIN: ICD-10-PCS | Mod: 25,S$GLB,, | Performed by: FAMILY MEDICINE

## 2022-09-27 PROCEDURE — 1159F MED LIST DOCD IN RCRD: CPT | Mod: CPTII,S$GLB,, | Performed by: FAMILY MEDICINE

## 2022-09-27 PROCEDURE — 36415 COLL VENOUS BLD VENIPUNCTURE: CPT | Mod: PO | Performed by: FAMILY MEDICINE

## 2022-09-27 PROCEDURE — 3074F PR MOST RECENT SYSTOLIC BLOOD PRESSURE < 130 MM HG: ICD-10-PCS | Mod: CPTII,S$GLB,, | Performed by: FAMILY MEDICINE

## 2022-09-27 PROCEDURE — 3079F DIAST BP 80-89 MM HG: CPT | Mod: CPTII,S$GLB,, | Performed by: FAMILY MEDICINE

## 2022-09-27 RX ORDER — POTASSIUM CHLORIDE 750 MG/1
10 CAPSULE, EXTENDED RELEASE ORAL DAILY
Qty: 90 CAPSULE | Refills: 3 | Status: SHIPPED | OUTPATIENT
Start: 2022-09-27 | End: 2023-10-12

## 2022-09-27 NOTE — PROGRESS NOTES
CHIEF COMPLAINT:  This is a 56-year-old female here for follow up of chronic medical conditions.     SUBJECTIVE:  Patient complains of persistent pain in her knees status post surgery in May 2022. She has essential hypertension for which she takes Dyazide daily.  Her blood pressure today is 125/84. She's being followed by breast screening because of fibrocystic disease.  She had nodule in right breast which has improved since she discontinued caffeine.  She continues to take Xolair injections for chronic urticaria.  She also takes Singulair, doxepin, and famotidine.  She is obese with a BMI of 34.35. She takes Mybetriq 25 mg daily for overactive bladder.     ROS:  GENERAL: Patient denies fever, chills, night sweats. Patient denies weight gain. Patient denies anorexia, fatigue, weakness or swollen glands.  Positive for weight loss.  SKIN: Patient denies rash or hair loss.  HEENT: Patient denies sore throat, ear pain, hearing loss, nasal congestion, or runny nose. Patient denies visual disturbance, eye irritation or discharge.  LUNGS: Patient denies cough, wheeze or hemoptysis.  CARDIOVASCULAR: Patient denies shortness of breath, palpitations, syncope or lower extremity edema.  GI: Patient denies abdominal pain, nausea, vomiting, diarrhea, constipation, blood in stool or melena.  GENITOURINARY: Patient denies pelvic pain, vaginal discharge, itch or odor. Patient denies irregular vaginal bleeding. Patient denies dysuria, frequency, hematuria, nocturia, urgency or incontinence.  BREASTS: Patient denies breast pain, mass or nipple discharge.  MUSCULOSKELETAL: Patient denies joint swelling, redness or warmth. Positive for ankle and foot pain.  NEUROLOGIC: Patient denies headache, vertigo, paresthesias, weakness in limb, dysarthria, dysphagia or abnormality of gait.  PSYCHIATRIC: Patient denies anxiety, depression, or memory loss.     OBJECTIVE:   GENERAL: Well-developed well-nourished, obese, black female alert and  oriented x3, in no acute distress. Memory, judgment and cognition without deficit.    SKIN: Clear without rash. Normal color and tone.  HEENT: Eyes: Clear conjunctivae. Pupils equal reactive to light and accommodation. Ears: Clear TMs. Clear canals. Nose: Without congestion. Pharynx: Without injection or exudates.  NECK: Supple, normal range of motion. No masses, lymphadenopathy or enlarged thyroid. No JVD. Carotids 2+ and equal. No bruits.  LUNGS: Clear to auscultation. Normal respiratory effort.  CARDIOVASCULAR: Regular rhythm, normal S1, S2 without murmur, gallop or rub.  EXTREMITIES: Without cyanosis, clubbing or edema. Distal pulses 2+ and equal. Normal range of motion in all extremities. No joint effusion, erythema or warmth.  Knees wrapped in Ace bandages.  NEUROLOGIC: Motor strength equal bilaterally. Sensation normal to touch.  Gait without abnormality. No tremor.    Recent lab reviewed and acceptable except for elevated calcium and GFR.    ASSESSMENT:  1. Essential hypertension    2. Chronic pain of both knees    3. Obesity (BMI 30.0-34.9)      PLAN:  1. Check BMP.  2. Refill potassium supplement.    3. Influenza vaccine.  4. Return to clinic in 9 months for preventive health exam.    30 minutes of total time spent on the encounter, which includes face to face time and non-face to face time preparing to see the patient (eg, review of tests), Obtaining and/or reviewing separately obtained history, Documenting clinical information in the electronic or other health record, Independently interpreting results (not separately reported) and communicating results to the patient/family/caregiver, or Care coordination (not separately reported).     This note is generated with speech recognition software and is subject to transcription error and sound alike phrases that may be missed by proofreading.

## 2022-09-28 ENCOUNTER — CLINICAL SUPPORT (OUTPATIENT)
Dept: REHABILITATION | Facility: HOSPITAL | Age: 57
End: 2022-09-28
Payer: COMMERCIAL

## 2022-09-28 DIAGNOSIS — Z74.09 DECREASED FUNCTIONAL MOBILITY AND ENDURANCE: Primary | ICD-10-CM

## 2022-09-28 LAB
ANION GAP SERPL CALC-SCNC: 9 MMOL/L (ref 8–16)
BUN SERPL-MCNC: 13 MG/DL (ref 6–20)
CALCIUM SERPL-MCNC: 10.3 MG/DL (ref 8.7–10.5)
CHLORIDE SERPL-SCNC: 105 MMOL/L (ref 95–110)
CO2 SERPL-SCNC: 26 MMOL/L (ref 23–29)
CREAT SERPL-MCNC: 1 MG/DL (ref 0.5–1.4)
EST. GFR  (NO RACE VARIABLE): >60 ML/MIN/1.73 M^2
GLUCOSE SERPL-MCNC: 89 MG/DL (ref 70–110)
POTASSIUM SERPL-SCNC: 3.8 MMOL/L (ref 3.5–5.1)
SODIUM SERPL-SCNC: 140 MMOL/L (ref 136–145)

## 2022-09-28 PROCEDURE — 97112 NEUROMUSCULAR REEDUCATION: CPT | Mod: CQ

## 2022-09-28 PROCEDURE — 97110 THERAPEUTIC EXERCISES: CPT | Mod: CQ

## 2022-09-28 NOTE — PROGRESS NOTES
[facility] OUTPATIENT THERAPY AND WELLNESS   Physical Therapy Treatment Note    Name: Stacey Wade  Clinic Number: [id number]    Therapy Diagnosis:   Encounter Diagnosis   Name Primary?    Decreased functional mobility and endurance Yes     Physician: Megha Kwon, *    Visit Date: 9/28/2022     Physician Orders: PT Eval and Treat  Medical Diagnosis from Referral: Complex tear of medial meniscus of left knee as current injury, initial encounter  Evaluation Date: 5/18/2022  Authorization Period Expiration: 12/31/2022  Plan of Care Expiration: 11/16/2022  Progress Note Due: 10/16/2022  Visit # / Visits authorized: 25/35 (+1 for evaluation)  FOTO: 1/3     Precautions: Standard, Fall and Weightbearing  PTA Visit #: 2/5     Surgery: 5/13/2022    Time In: 0820  Time Out: 0858  Total Billable Time: 38 minutes    SUBJECTIVE     Pt reports: increased soreness today. She states she had increased pain from navigating stairs at work. She iced it yesterday evening and that helped, she woke up in less pain this morning.     She was compliant with home exercise program.  Response to previous treatment: N/A  Functional change: N/A    Pain: 3/10 left knee / 0/10 right knee  Location: bilateral patellofemoral region      OBJECTIVE     Objective Measures updated at progress report unless specified.     Treatment     Stacey received the treatments listed below:      THERAPEUTIC EXERCISES to develop strength, endurance, ROM, flexibility, posture and core stabilization for (28) minutes including:    Intervention Performed Today    Recumbent bike for range of motion x 5 minutes level 2        Supine knee flexion with stretch rite  10 second holds x 10   Prone left quad stretch with stretch rite  10 second holds x 10   Double knee to chest knee flexion stretch   10 second holds x 10   Standing hamstring stretch on chair x 10 second holds x 10   Knee flexion stretch foot on chair x 10 second holds x 10 bilateral   Quadruped knee  flexion stretch (moving into child's pose position)   10 second holds x 10   Knee over toe stretch  5s 5x left lower extremity only   Straight leg raise   2 pound, 3 sets of 10 repetition, 2 second hold   Supine hip adduction with ball  3 minutes, legs extended   Supine hip abduction with belt  3 minutes, legs extended   Prone hip extension  2 pound, 3 sets of 10 repetition, no assist, no brace   Side lying hip adduction  2 pound, 3 sets of 10 repetition, no assist, no brace   Sit to stands  3x10 with no upper extremity assist   Single leg stance  2x30s B    Tandem stance   2x30s B    MOBO board  2 positions, 2 minutes each   Ankle 4 way  red theraband, 1 set of 10 repetition each direction   Prone hamstring curls  3x10   Standing marches  3 sets of 10 repetition each lower extremity    Standing single leg heel raises x 3 sets of 10 repetition    Standing gastroc stretch wedge x 3x30s    Step ups, 8 inch  8 inch with one upper extremities assist - 2x10   Step downs x 6 inch with upper extremity assist - 2x10   Bridges  3x10 with ball   Sit to stands x 3x10 - 18 inch   Leg press  Single 3 plates 3x10 each   Single leg balance x 3x30 each bilateral   Nautilus knee Extension x 2 plates - single leg - 3x10     Plan for Next Visit:        NEUROMUSCULAR RE-EDUCATION ACTIVITIES to improve Balance, Coordination, Kinesthetic, Sense, Proprioception and Posture for (10) minutes.  The following were included:    Intervention Performed Today    Left lower extremity step with weight shift, unlocking of knee  1 sets of 10 repetition    Left lower extremity stepping over orange beam with weight shifting and unlocking of knee  2 sets of 10 repetition    bilateral lower extremity step with even step length and weight shift  1 set of 10 reps   Gait training with straight cane with appropriate step length, weight shifting and lifting of toes  6 minutes   Step ups x 3x10 each bilateral - 6 inch at stairs   Lateral step ups  3x10 each  "bilateral   Side stepping x Red band 5 parallel bar laps   Monster walks  Forwards/backwards red band 3 parallel bar laps   Stair activity x 6 laps, right leg lead ascend, left leg lead descend     Plan for Next Visit:        MANUAL THERAPY TECHNIQUES were applied for (0) minutes, including:    Manual Intervention Performed Today    Soft Tissue Mobilization  Left quadriceps and gastroc regions   Joint Mobilizations  Patellar mobilizations in all directions    Mobilization with movement     Patellar mobilizations  Medial and latera, superior and inferior glides   Manual lymph drainage  Lower legs, posterior/anterior knee, upper leg   Functional Dry Needling        Plan for Next Visit:        Stacey received electrical stimulation for neuromuscular re-education for quad strengthening for 0 minutes to left quad with quad set.       Patient Education and Home Exercises     Home Exercises Provided and Patient Education Provided     Education provided:   - use of cane for long periods of walking or as a "just in case" at work    Written Home Exercises Provided: Patient instructed to cont prior HEP. Exercises were reviewed and Stacey was able to demonstrate them prior to the end of the session.  Stacey demonstrated good  understanding of the education provided. See EMR under Patient Instructions for exercises provided during therapy sessions    ASSESSMENT   Patient entered session with cane today due to increased pain. Added stair activity for education of navigating stairs, patient with good response. Challenged by step down activity, some discomfort and soreness reported afterwards. Patient left session with good fatigue and soreness in left knee.     Stacey Is progressing well towards her goals.   Pt prognosis is Good.     Pt will continue to benefit from skilled outpatient physical therapy to address the deficits listed in the problem list box on initial evaluation, provide pt/family education and to maximize pt's " level of independence in the home and community environment.     Pt's spiritual, cultural and educational needs considered and pt agreeable to plan of care and goals.     Anticipated barriers to physical therapy: co-morbidities, sedentary lifestyle, adherence to treatment plan and coping style    Goals: Reviewed 09/28/2022  Short Term Goals: 4 weeks  SIGNS & SYMPTOMS: Recent signs and systems trend is improving in order to progress towards LTG's. (GOAL MET 6/16/2022)  MOBILITY: Patient will increase AROM at the bilateral knees to 120 flexion for better ability to tolerate squatting to  objects PROGRESSING  STRENGTH: Patient will improve strength of lower extremities musculature to 3+/5 for improved ability to perform squatting and descending steps and decrease pain with gait   GAIT IMPROVING: Patient will improve gait by walking at 1.6 mph x 5 minutes with minimal to no lateral lurch  HEP: Patient will demonstrate independence with HEP in order to progress toward functional independence.            Long Term Goals: 6 weeks   PAIN: Pt will report worst pain of 3/10 in order to progress toward max functional ability and improve quality of life (GOAL MET 6/9/2022)  MoBILITY: Patient will increase AROM at the bilateral knees to 120 flexion for better ability to tolerate squatting to  objects  STRENGTH: Patient will improve strength of lower extremities musculature to 4-/5 for improved ability to perform squatting and descending steps and decreased pain with gait  GAIT NORMAL: Patient will improve gait by walking at 1.8 mph x 10 minutes with minimal to no lateral lurch  Patient will report of ability to return to kneeling to pick to perform housework and assist with picking up dropped items on the floor with pain at or less than 2/10      PLAN     New plan of care for the patient to be seen 2 times per week for 8 weeks from 8/16/2022 - 11/16/2022 to work on improved ROM, improved lower extremities strength,  improved squat and lunge capability, improved gait mechanics, and improved ability to perform stair ascending/descending.  Progress patient's exercises and activities per protocol.     Lanie Kumari, PTA

## 2022-09-30 ENCOUNTER — CLINICAL SUPPORT (OUTPATIENT)
Dept: REHABILITATION | Facility: HOSPITAL | Age: 57
End: 2022-09-30
Payer: COMMERCIAL

## 2022-09-30 DIAGNOSIS — Z74.09 DECREASED FUNCTIONAL MOBILITY AND ENDURANCE: Primary | ICD-10-CM

## 2022-09-30 NOTE — PROGRESS NOTES
[facility]   Physical Therapy Treatment Note    Name: Stacey Wade  Clinic Number: [id number]    Therapy Diagnosis:   No diagnosis found.    Physician: Megha Kwon, *    Visit Date: 9/30/2022     Physician Orders: PT Eval and Treat  Medical Diagnosis from Referral: Complex tear of medial meniscus of left knee as current injury, initial encounter  Evaluation Date: 5/18/2022  Authorization Period Expiration: 12/31/2022  Plan of Care Expiration: 11/16/2022  Progress Note Due: 10/16/2022  Visit # / Visits authorized: 25/35 (+1 for evaluation)  FOTO: 1/3     Precautions: Standard, Fall and Weightbearing  PTA Visit #: 0/5     Surgery: 5/13/2022    Time In: 0800  Time Out: 0845  Total Billable Time: 45 minutes    SUBJECTIVE     Pt reports: that she slipped on Monday and irritated her knee.  She does report that pain is decreasing but does still report of pain at the left medial knee region.    She was compliant with home exercise program.  Response to previous treatment: N/A  Functional change: N/A    Pain: 3/10 left knee / 0/10 right knee  Location: bilateral patellofemoral region      OBJECTIVE     Objective Measures updated at progress report unless specified.     Treatment     Stacey received the treatments listed below:      THERAPEUTIC EXERCISES to develop strength, endurance, ROM, flexibility, posture and core stabilization for (30) minutes including:    Intervention Performed Today    Recumbent bike for range of motion x 5 minutes level 2        Supine knee flexion with stretch rite  10 second holds x 10   Prone left quad stretch with stretch rite  10 second holds x 10   Double knee to chest knee flexion stretch   10 second holds x 10   Standing hamstring stretch on chair x 10 second holds x 10   Knee flexion stretch foot on chair x 10 second holds x 10 bilateral   Quadruped knee flexion stretch (moving into child's pose position)   10 second holds x 10   Knee over toe  stretch  5s 5x left lower extremity only   Straight leg raise   2 pound, 3 sets of 10 repetition, 2 second hold   Supine hip adduction with ball  3 minutes, legs extended   Supine hip abduction with belt  3 minutes, legs extended   Prone hip extension  2 pound, 3 sets of 10 repetition, no assist, no brace   Side lying hip adduction  2 pound, 3 sets of 10 repetition, no assist, no brace   Sit to stands  3x10 with no upper extremity assist   Single leg stance  2x30s B    Tandem stance   2x30s B    MOBO board  2 positions, 2 minutes each   Ankle 4 way  red theraband, 1 set of 10 repetition each direction   Prone hamstring curls  3x10   Standing marches  3 sets of 10 repetition each lower extremity    Standing single leg heel raises x 3 sets of 10 repetition    Standing gastroc stretch wedge x 10 second holds x 2 minutes   Step ups, 8 inch  8 inch with one upper extremities assist - 2x10   Step downs  6 inch with upper extremity assist - 2x10   Bridges  3x10 with ball   Sit to stands  3x10 - 18 inch   Leg press  Single 3 plates 3x10 each   Single leg balance  3x30 each bilateral   Nautilus knee Extension x 1 plates - single leg - 3x10   Standing hip Strengthening X  X  x Hip Flexion - 2x10  Hip Abduction - 2x10  Hip Extension - 2x10     Plan for Next Visit:        NEUROMUSCULAR RE-EDUCATION ACTIVITIES to improve Balance, Coordination, Kinesthetic, Sense, Proprioception and Posture for (0) minutes.  The following were included:    Intervention Performed Today    Left lower extremity step with weight shift, unlocking of knee  1 sets of 10 repetition    Left lower extremity stepping over orange beam with weight shifting and unlocking of knee  2 sets of 10 repetition    bilateral lower extremity step with even step length and weight shift  1 set of 10 reps   Gait training with straight cane with appropriate step length, weight shifting and lifting of toes  6 minutes   Step ups  3x10 each bilateral - 6 inch at stairs  "  Lateral step ups  3x10 each bilateral   Side stepping  Red band 5 parallel bar laps   Monster walks  Forwards/backwards red band 3 parallel bar laps   Stair activity  6 laps, right leg lead ascend, left leg lead descend     Plan for Next Visit:        MANUAL THERAPY TECHNIQUES were applied for (0) minutes, including:    Manual Intervention Performed Today    Soft Tissue Mobilization  Left quadriceps and gastroc regions   Joint Mobilizations  Patellar mobilizations in all directions    Mobilization with movement     Patellar mobilizations  Medial and latera, superior and inferior glides   Manual lymph drainage  Lower legs, posterior/anterior knee, upper leg   Measuring for joint size x 50 cm above knee, 28 cm below knee - stockinette issued above knee, Size E below the knee     Plan for Next Visit:        Stacey received electrical stimulation for neuromuscular re-education for quad strengthening for 0 minutes to left quad with quad set.       Patient Education and Home Exercises     Home Exercises Provided and Patient Education Provided     Education provided:   - use of cane for long periods of walking or as a "just in case" at work    Written Home Exercises Provided: Patient instructed to cont prior HEP. Exercises were reviewed and Stacey was able to demonstrate them prior to the end of the session.  Stacey demonstrated good  understanding of the education provided. See EMR under Patient Instructions for exercises provided during therapy sessions    ASSESSMENT   Patient did report that pain was decreasing but was limping a bit today.  With valgus stress, there was some increased pain but not severe.  There is a lateral lurch during.  Therefore, worked on a few open exercises for pain relief and mobility.  There was significant weakness during performance of Nautilus knee extension exercises.  With increased weight, there was some pain so stuck with 1 plate on this visit.  Also worked on standing hip exercises so " the patient could weight bear on the knee while using the upper extremities for support.  Also work on being upright while performing these exercises.  It seems as though the patient had a bit of an acute flare-up after twisting her knee on a step this week.  However, the pain is significant decreased compared to earlier in the week.  Will check again at the beginning of next week to see if the pain continues to decrease.    Stacey Is progressing well towards her goals.   Pt prognosis is Good.     Pt will continue to benefit from skilled outpatient physical therapy to address the deficits listed in the problem list box on initial evaluation, provide pt/family education and to maximize pt's level of independence in the home and community environment.     Pt's spiritual, cultural and educational needs considered and pt agreeable to plan of care and goals.     Anticipated barriers to physical therapy: co-morbidities, sedentary lifestyle, adherence to treatment plan and coping style    Goals: Reviewed 09/30/2022  Short Term Goals: 4 weeks  SIGNS & SYMPTOMS: Recent signs and systems trend is improving in order to progress towards LTG's. (GOAL MET 6/16/2022)  MOBILITY: Patient will increase AROM at the bilateral knees to 120 flexion for better ability to tolerate squatting to  objects PROGRESSING  STRENGTH: Patient will improve strength of lower extremities musculature to 3+/5 for improved ability to perform squatting and descending steps and decrease pain with gait   GAIT IMPROVING: Patient will improve gait by walking at 1.6 mph x 5 minutes with minimal to no lateral lurch  HEP: Patient will demonstrate independence with HEP in order to progress toward functional independence.            Long Term Goals: 6 weeks   PAIN: Pt will report worst pain of 3/10 in order to progress toward max functional ability and improve quality of life (GOAL MET 6/9/2022)  MoBILITY: Patient will increase AROM at the bilateral knees to  120 flexion for better ability to tolerate squatting to  objects  STRENGTH: Patient will improve strength of lower extremities musculature to 4-/5 for improved ability to perform squatting and descending steps and decreased pain with gait  GAIT NORMAL: Patient will improve gait by walking at 1.8 mph x 10 minutes with minimal to no lateral lurch  Patient will report of ability to return to kneeling to pick to perform housework and assist with picking up dropped items on the floor with pain at or less than 2/10      PLAN     New plan of care for the patient to be seen 2 times per week for 8 weeks from 8/16/2022 - 11/16/2022 to work on improved ROM, improved lower extremities strength, improved squat and lunge capability, improved gait mechanics, and improved ability to perform stair ascending/descending.  Progress patient's exercises and activities per protocol.     Ashish Guaman, PT

## 2022-10-26 ENCOUNTER — PATIENT MESSAGE (OUTPATIENT)
Dept: FAMILY MEDICINE | Facility: CLINIC | Age: 57
End: 2022-10-26
Payer: COMMERCIAL

## 2022-10-26 RX ORDER — METHYLPREDNISOLONE 4 MG/1
TABLET ORAL
Qty: 1 EACH | Refills: 0 | Status: SHIPPED | OUTPATIENT
Start: 2022-10-26 | End: 2023-02-23

## 2022-11-07 ENCOUNTER — OFFICE VISIT (OUTPATIENT)
Dept: ORTHOPEDICS | Facility: CLINIC | Age: 57
End: 2022-11-07
Payer: COMMERCIAL

## 2022-11-07 VITALS — WEIGHT: 189 LBS | BODY MASS INDEX: 34.78 KG/M2 | HEIGHT: 62 IN

## 2022-11-07 DIAGNOSIS — S83.232D COMPLEX TEAR OF MEDIAL MENISCUS OF LEFT KNEE AS CURRENT INJURY, SUBSEQUENT ENCOUNTER: ICD-10-CM

## 2022-11-07 DIAGNOSIS — Z98.890 S/P ARTHROSCOPIC SURGERY OF LEFT KNEE: Primary | ICD-10-CM

## 2022-11-07 PROCEDURE — 3008F PR BODY MASS INDEX (BMI) DOCUMENTED: ICD-10-PCS | Mod: CPTII,S$GLB,, | Performed by: ORTHOPAEDIC SURGERY

## 2022-11-07 PROCEDURE — 99213 PR OFFICE/OUTPT VISIT, EST, LEVL III, 20-29 MIN: ICD-10-PCS | Mod: S$GLB,,, | Performed by: ORTHOPAEDIC SURGERY

## 2022-11-07 PROCEDURE — 99999 PR PBB SHADOW E&M-EST. PATIENT-LVL IV: CPT | Mod: PBBFAC,,, | Performed by: ORTHOPAEDIC SURGERY

## 2022-11-07 PROCEDURE — 99213 OFFICE O/P EST LOW 20 MIN: CPT | Mod: S$GLB,,, | Performed by: ORTHOPAEDIC SURGERY

## 2022-11-07 PROCEDURE — 99999 PR PBB SHADOW E&M-EST. PATIENT-LVL IV: ICD-10-PCS | Mod: PBBFAC,,, | Performed by: ORTHOPAEDIC SURGERY

## 2022-11-07 PROCEDURE — 1159F MED LIST DOCD IN RCRD: CPT | Mod: CPTII,S$GLB,, | Performed by: ORTHOPAEDIC SURGERY

## 2022-11-07 PROCEDURE — 1159F PR MEDICATION LIST DOCUMENTED IN MEDICAL RECORD: ICD-10-PCS | Mod: CPTII,S$GLB,, | Performed by: ORTHOPAEDIC SURGERY

## 2022-11-07 PROCEDURE — 3008F BODY MASS INDEX DOCD: CPT | Mod: CPTII,S$GLB,, | Performed by: ORTHOPAEDIC SURGERY

## 2022-11-07 NOTE — PROGRESS NOTES
Patient ID: Stacey Wade  YOB: 1965  MRN: [id number]    Chief Complaint: Pain of the Left Knee    History of Present Illness: Stacey Wade is a  57 y.o. 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 She completed PT at [facility] on 9/30/2022. Overall, she is feeling much better, and reports that the only issue is that she cannot put direct pressure on her Knee.     Recall from her last visit on 9/7/2022: Miss Ibarra is here today for her 4mo s/p L Knee scope, meniscus compartmentalization (5/13/2022). Her pain today is a 1/10 without any pain medication. She is still in PT here at [facility] and reports having enough appointments approved through the end of this month. She reports that she still uses the cane for some longer distance ambulation only for stabilization which states that she does not necessarily need, weaning off of it. Other than that, she has no complaints or updates for this visit.    Pain  Pertinent negatives include no abdominal pain, chest pain, chills, coughing, fever, joint swelling, myalgias, nausea, neck pain, numbness, rash, sore throat or vomiting.   Plan (7/27/22):  Continue physical therapy: at [facility]- can do once weekly for increased strength  Continue seated work restrictions  Recheck in 6 weeks  Past Medical History:   Past Medical History:   Diagnosis Date    Allergic rhinitis     Anxiety     Arthritis     Hypertension     Urticaria      Past Surgical History:   Procedure Laterality Date    CHONDROPLASTY OF KNEE Left 05/13/2022    Procedure: CHONDROPLASTY, KNEE;  Surgeon: Wes Faulkner MD;  Location: [facility] OR;  Service: Orthopedics;  Laterality: Left;    COLONOSCOPY N/A 01/18/2018    Procedure: COLONOSCOPY;  Surgeon: Yong Smith MD;  Location: [facility];  Service:  Endoscopy;  Laterality: N/A;    HERNIA REPAIR Left      Family History   Problem Relation Age of Onset    Lung cancer Paternal Grandfather     Ovarian cancer Maternal Grandmother     Hyperlipidemia Mother     Hypertension Mother     Breast cancer Mother 60    Arthritis Mother     Lung cancer Father     Breast cancer Maternal Aunt 53    Heart failure Other         Great aunt    Prostate cancer Brother     Brain cancer Sister     Diabetes Neg Hx     Pseudochol deficiency Neg Hx     Malignant hyperthermia Neg Hx      Social History     Socioeconomic History    Marital status: Single   Tobacco Use    Smoking status: Never    Smokeless tobacco: Never   Substance and Sexual Activity    Alcohol use: No    Drug use: No    Sexual activity: Not Currently     Partners: Male     Birth control/protection: None   Social History Narrative    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     Medication List with Changes/Refills   Current Medications    BETAMETHASONE DIPROPIONATE 0.05 % CREAM    APPLY TOPICALLY 2 TIMES DAILY.    BUTALBITAL-ACETAMINOPHEN-CAFFEINE -40 MG (FIORICET, ESGIC) -40 MG PER TABLET    TAKE ONE TABLET BY MOUTH EVERY 6 HOURS AS NEEDED FOR PAIN OR FOR HEADACHE    CLORAZEPATE (TRANXENE) 3.75 MG TAB    Take 3.75 mg by mouth as needed.    DICLOFENAC SODIUM (VOLTAREN) 1 % GEL    Apply 2 g topically 4 (four) times daily.    DOXEPIN (SINEQUAN) 10 MG CAPSULE    TAKE 2 CAPSULES BY MOUTH 3 TIMES A DAY    EPINEPHRINE (EPIPEN) 0.3 MG/0.3 ML ATIN    Inject 0.3 mg into the muscle daily as needed.    FAMOTIDINE (PEPCID) 40 MG TABLET    Take 20 mg by mouth.    FEXOFENADINE (ALLEGRA) 180 MG TABLET    TAKE ONE TABLET BY MOUTH EVERY DAY    FLOVENT DISKUS 100 MCG/ACTUATION INHALER    Inhale into the lungs.    FLUTICASONE PROPIONATE (FLOVENT DISKUS) 100 MCG/ACTUATION INHALER    Inhale 100 mcg into the lungs.    HYDROXYZINE HCL (ATARAX) 25 MG TABLET     TAKE 1 TABLET BY MOUTH FOUR TIMES A DAY AS NEEDED FOR ITCHING    METHYLPREDNISOLONE (MEDROL DOSEPACK) 4 MG TABLET    Use as directed.    MIRABEGRON (MYRBETRIQ) 25 MG TB24 ER TABLET    Take 1 tablet (25 mg total) by mouth once daily.    MOMETASONE (NASONEX) 50 MCG/ACTUATION NASAL SPRAY    INHALE 2 SPRAYS BY NASAL ROUTE ONCE DAILY    MONTELUKAST (SINGULAIR) 10 MG TABLET    Take 1 tablet (10 mg total) by mouth once daily.    OMALIZUMAB (XOLAIR) 150 MG/ML INJECTION    Inject 300 mg into the skin.    POTASSIUM CHLORIDE (MICRO-K) 10 MEQ CPSR    Take 1 capsule (10 mEq total) by mouth once daily.    RIZATRIPTAN (MAXALT) 10 MG TABLET    TAKE ONE TABLET BY MOUTH AS NEEDED FOR migraine MAY REPEAT in TWO hours. no more THAN TWO TABLETS in 24 hours    TRIAMTERENE-HYDROCHLOROTHIAZIDE 37.5-25 MG (DYAZIDE) 37.5-25 MG PER CAPSULE    Take 1 capsule by mouth once daily.    WESTUSSIN DM 1-5-10 MG/5 ML SYRP    Take 10 mLs by mouth 3 (three) times daily as needed.    XOLAIR 150 MG/ML INJECTION         Review of patient's allergies indicates:   Allergen Reactions    Benzalkonium chloride     Black pepper      Other reaction(s): Hives    Chocolate flavor      Other reaction(s): Hives    Citrus and derivatives Hives     LEMON PRODUCTS    Grapefruit Hives     Other reaction(s): Hives    Ibuprofen Swelling    Latex      Other reaction(s): Hives    Lemon balm (casper officinalis) Hives     Anything with lemon in it    Naproxen Swelling    Neomycin-bacitracin-polymyxin      Other reaction(s): Rash    Oats (richard) Hives     Oat foods (oatmeal, etc...)    Vegetable acetoglycerides Hives     Vegetable gums    Wheat containing prod     Wheat flour Hives     Review of Systems   Constitutional: Negative for chills and fever.   HENT:  Negative for sore throat.    Eyes:  Negative for pain.   Cardiovascular:  Negative for chest pain and leg swelling.   Respiratory:  Negative for cough and shortness of breath.    Skin:  Negative for itching and rash.    Musculoskeletal:  Negative for joint pain, joint swelling, myalgias and neck pain.   Gastrointestinal:  Negative for abdominal pain, nausea and vomiting.   Genitourinary:  Negative for dysuria.   Neurological:  Negative for dizziness, numbness and paresthesias.     Physical Exam:   Body mass index is 34.57 kg/m².  There were no vitals filed for this visit.   GENERAL: Well appearing, appropriate for stated age, no acute distress.  CARDIOVASCULAR: Pulses regular by peripheral palpation.  PULMONARY: Respirations are even and non-labored.  NEURO: Awake, alert, and oriented x 3.  PSYCH: Mood & affect are appropriate.  HEENT: Head is normocephalic and atraumatic.  General    Nursing note and vitals reviewed.          Right Knee Exam   Right knee exam is normal.    Inspection   Effusion: absent    Tenderness   The patient is experiencing no tenderness.     Range of Motion   Extension:  0   Right knee flexion: 125.     Tests   Ligament Examination   Lachman: normal (-1 to 2mm)   PCL-Posterior Drawer: normal (0 to 2mm)     MCL - Valgus: normal (0 to 2mm)  LCL - Varus: normal    Other   Sensation: normal    Left Knee Exam   Left knee exam is normal.    Inspection   Scars: present (post op changes healed)  Effusion: absent    Tenderness   The patient is experiencing no tenderness.     Range of Motion   Extension:  0   Left knee flexion: 115.     Tests   Stability   Lachman: normal (-1 to 2mm)   PCL-Posterior Drawer: normal (0 to 2mm)  MCL - Valgus: normal (0 to 2mm)  LCL - Varus: normal (0 to 2mm)    Other   Sensation: normal    Muscle Strength   Right Lower Extremity   Hip Abduction: 5/5   Quadriceps:  5/5   Hamstrin/5   Left Lower Extremity   Hip Abduction: 5/5   Quadriceps:  5/5   Hamstrin/5     Vascular Exam     Right Pulses  Dorsalis Pedis:      2+  Posterior Tibial:      2+        Left Pulses  Dorsalis Pedis:      2+  Posterior Tibial:      2+      All compartments are soft and compressible. Calf soft  non-tender. Intact EHL, FHL, gastroc soleus, and tibialis anterior. Sensation intact to light touch in superficial peroneal, deep peroneal, tibial, sural, and saphenous nerve distributions. Foot warm and well perfused with capillary refill of less than 2 seconds and palpable pedal pulses.       Imaging:    X-Ray Knee 1 or 2 View Left  Narrative: EXAMINATION:  XR KNEE 1 OR 2 VIEW LEFT    CLINICAL HISTORY:  intra op;    TECHNIQUE:  Single fluoroscopic view of the left knee is submitted during ongoing surgical procedure.    COMPARISON:  None    FINDINGS:  Single view of the left knee is submitted during ongoing surgical procedure.  No acute abnormality seen on this single image.  Impression: Fluoroscopic view of the left knee for ongoing procedure.    Electronically signed by: Stephen Gonzalez  Date:    05/13/2022  Time:    12:35  SURG FL Surgery Fluoro Usage  See OP Notes for results.     IMPRESSION: See OP Notes for results.     This procedure was auto-finalized by: Virtual Radiologist      Relevant imaging results reviewed and interpreted by me, discussed with the patient and / or family today.     Other Tests:     Patient Instructions   Assessment:  Stacey Wade is a 57 y.o. 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    S/P arthroscopic surgery of left knee Yes    Complex tear of medial meniscus of left knee as current injury, subsequent encounter         Plan:  Advance work restrictions: occasional sitting breaks, occasional prolonged standing, note kneeling.   Follow up in 3 months    Follow-up: 3 months or sooner if there are any problems between now and then.    Leave Review:   Google: Leave Google Review  Healthgrades: Leave Healthgrades Review    After Hours Number: [phone]      Provider Note/Medical Decision Making:       I discussed worrisome  and red flag signs and symptoms with the patient. The patient expressed understanding and agreed to alert me immediately or to go to the emergency room if they experience any of these.   Treatment plan was developed with input from the patient/family, and they expressed understanding and agreement with the plan. All questions were answered today.      Disclaimer: This note was prepared using a voice recognition system and is likely to have sound alike errors within the text.

## 2022-11-07 NOTE — PATIENT INSTRUCTIONS
Assessment:  Stacey Wade is a 57 y.o. 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    S/P arthroscopic surgery of left knee Yes    Complex tear of medial meniscus of left knee as current injury, subsequent encounter         Plan:  Advance work restrictions: occasional sitting breaks, occasional prolonged standing, note kneeling.   Follow up in 3 months    Follow-up: 3 months or sooner if there are any problems between now and then.    Leave Review:   Google: Leave Google Review  Healthgrades: Leave Healthgrades Review    After Hours Number: [phone]

## 2022-11-09 NOTE — TELEPHONE ENCOUNTER
No new care gaps identified.  [facility] Embedded Care Gaps. Reference number: [id number]. 11/09/2022   5:11:36 PM CST

## 2022-11-10 RX ORDER — BUTALBITAL, ACETAMINOPHEN AND CAFFEINE 50; 325; 40 MG/1; MG/1; MG/1
TABLET ORAL
Qty: 30 TABLET | Refills: 0 | Status: SHIPPED | OUTPATIENT
Start: 2022-11-10 | End: 2023-02-23

## 2022-12-05 ENCOUNTER — IMMUNIZATION (OUTPATIENT)
Dept: PHARMACY | Facility: CLINIC | Age: 57
End: 2022-12-05
Payer: COMMERCIAL

## 2022-12-05 DIAGNOSIS — Z23 NEED FOR VACCINATION: Primary | ICD-10-CM

## 2023-01-18 ENCOUNTER — TELEPHONE (OUTPATIENT)
Dept: FAMILY MEDICINE | Facility: CLINIC | Age: 58
End: 2023-01-18
Payer: COMMERCIAL

## 2023-01-18 NOTE — TELEPHONE ENCOUNTER
----- Message from Dorothy Kern sent at 1/18/2023 11:05 AM CST -----  Type:  Same Day Appointment Request    Caller is requesting a same day appointment.  Caller declined first available appointment listed below.    Name of Caller:patient  When is the first available appointment?2/16  Symptoms:fever,urgent care follow up BP  Best Call Back Number:[phone]  Additional Information: na

## 2023-01-19 ENCOUNTER — OFFICE VISIT (OUTPATIENT)
Dept: INTERNAL MEDICINE | Facility: CLINIC | Age: 58
End: 2023-01-19
Payer: COMMERCIAL

## 2023-01-19 VITALS
SYSTOLIC BLOOD PRESSURE: 136 MMHG | WEIGHT: 194 LBS | DIASTOLIC BLOOD PRESSURE: 82 MMHG | BODY MASS INDEX: 35.7 KG/M2 | HEIGHT: 62 IN | TEMPERATURE: 98 F | HEART RATE: 81 BPM | RESPIRATION RATE: 16 BRPM | OXYGEN SATURATION: 99 %

## 2023-01-19 DIAGNOSIS — J06.9 VIRAL URI WITH COUGH: Primary | ICD-10-CM

## 2023-01-19 DIAGNOSIS — I10 ESSENTIAL HYPERTENSION: Chronic | ICD-10-CM

## 2023-01-19 PROCEDURE — 3079F PR MOST RECENT DIASTOLIC BLOOD PRESSURE 80-89 MM HG: ICD-10-PCS | Mod: CPTII,S$GLB,, | Performed by: NURSE PRACTITIONER

## 2023-01-19 PROCEDURE — 1159F PR MEDICATION LIST DOCUMENTED IN MEDICAL RECORD: ICD-10-PCS | Mod: CPTII,S$GLB,, | Performed by: NURSE PRACTITIONER

## 2023-01-19 PROCEDURE — 3008F BODY MASS INDEX DOCD: CPT | Mod: CPTII,S$GLB,, | Performed by: NURSE PRACTITIONER

## 2023-01-19 PROCEDURE — 1160F RVW MEDS BY RX/DR IN RCRD: CPT | Mod: CPTII,S$GLB,, | Performed by: NURSE PRACTITIONER

## 2023-01-19 PROCEDURE — 99214 OFFICE O/P EST MOD 30 MIN: CPT | Mod: S$GLB,,, | Performed by: NURSE PRACTITIONER

## 2023-01-19 PROCEDURE — 99999 PR PBB SHADOW E&M-EST. PATIENT-LVL V: ICD-10-PCS | Mod: PBBFAC,,, | Performed by: NURSE PRACTITIONER

## 2023-01-19 PROCEDURE — 1159F MED LIST DOCD IN RCRD: CPT | Mod: CPTII,S$GLB,, | Performed by: NURSE PRACTITIONER

## 2023-01-19 PROCEDURE — 1160F PR REVIEW ALL MEDS BY PRESCRIBER/CLIN PHARMACIST DOCUMENTED: ICD-10-PCS | Mod: CPTII,S$GLB,, | Performed by: NURSE PRACTITIONER

## 2023-01-19 PROCEDURE — 99999 PR PBB SHADOW E&M-EST. PATIENT-LVL V: CPT | Mod: PBBFAC,,, | Performed by: NURSE PRACTITIONER

## 2023-01-19 PROCEDURE — 3079F DIAST BP 80-89 MM HG: CPT | Mod: CPTII,S$GLB,, | Performed by: NURSE PRACTITIONER

## 2023-01-19 PROCEDURE — 3075F SYST BP GE 130 - 139MM HG: CPT | Mod: CPTII,S$GLB,, | Performed by: NURSE PRACTITIONER

## 2023-01-19 PROCEDURE — 99214 PR OFFICE/OUTPT VISIT, EST, LEVL IV, 30-39 MIN: ICD-10-PCS | Mod: S$GLB,,, | Performed by: NURSE PRACTITIONER

## 2023-01-19 PROCEDURE — 3075F PR MOST RECENT SYSTOLIC BLOOD PRESS GE 130-139MM HG: ICD-10-PCS | Mod: CPTII,S$GLB,, | Performed by: NURSE PRACTITIONER

## 2023-01-19 PROCEDURE — 3008F PR BODY MASS INDEX (BMI) DOCUMENTED: ICD-10-PCS | Mod: CPTII,S$GLB,, | Performed by: NURSE PRACTITIONER

## 2023-01-19 RX ORDER — AZELASTINE 1 MG/ML
1 SPRAY, METERED NASAL 2 TIMES DAILY
Qty: 90 ML | Refills: 1 | Status: SHIPPED | OUTPATIENT
Start: 2023-01-19 | End: 2023-02-23

## 2023-01-19 NOTE — LETTER
January 19, 2023      The [address]  [address] [facility]  [place] [address]  Phone: [phone]  Fax: [phone]       Patient: Stacey Wade   YOB: 1965  Date of Visit: 01/19/2023    To Whom It May Concern:    Dafne Wade  was at [facility] on 01/19/2023. The patient may return to work/school on 01/23/2023  with no restrictions. If you have any questions or concerns, or if I can be of further assistance, please do not hesitate to contact me.    Sincerely,    Marques Batista, NP

## 2023-01-19 NOTE — PROGRESS NOTES
Chief Complaint  Chief Complaint   Patient presents with    Follow-up         HPI     HPI  Stacey Wade is a 57 y.o. female with medical diagnoses as listed in the medical history and problem list that presents for Viral URI.  This patient is new to me.     Viral URI: Visited Urgent care twice this week after experiencing sore throat, sinus pressure, cough, post nasal drip and headache, body aches, chills, and night sweats. Flu, Covid and strep throat were ruled out. Today cough, sinus pressure and post nasal drip remain today. Was prescribed Tessalon Perles at Urgent care visit. Using Mucinex, Sudafed and left over Robitussin-DM from previous visit. Vitals are stable today in clinic.      History     PAST MEDICAL HISTORY:  Past Medical History:   Diagnosis Date    Allergic rhinitis     Anxiety     Arthritis     Hypertension     Urticaria        PAST SURGICAL HISTORY:  Past Surgical History:   Procedure Laterality Date    CHONDROPLASTY OF KNEE Left 05/13/2022    Procedure: CHONDROPLASTY, KNEE;  Surgeon: Wes Faulkner MD;  Location: [facility] OR;  Service: Orthopedics;  Laterality: Left;    COLONOSCOPY N/A 01/18/2018    Procedure: COLONOSCOPY;  Surgeon: Yong Smith MD;  Location: [facility] ENDO;  Service: Endoscopy;  Laterality: N/A;    HERNIA REPAIR Left        SOCIAL HISTORY:  Social History     Socioeconomic History    Marital status: Single   Tobacco Use    Smoking status: Never    Smokeless tobacco: Never   Substance and Sexual Activity    Alcohol use: No    Drug use: No    Sexual activity: Not Currently     Partners: Male     Birth control/protection: None   Social History Narrative    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       FAMILY HISTORY:  Family History   Problem Relation Age of Onset    Lung cancer Paternal Grandfather     Ovarian cancer Maternal Grandmother     Hyperlipidemia Mother     Hypertension Mother     Breast  cancer Mother 60    Arthritis Mother     Lung cancer Father     Breast cancer Maternal Aunt 53    Heart failure Other         Great aunt    Prostate cancer Brother     Brain cancer Sister     Diabetes Neg Hx     Pseudochol deficiency Neg Hx     Malignant hyperthermia Neg Hx        ALLERGIES AND MEDICATIONS: updated and reviewed.  Review of patient's allergies indicates:   Allergen Reactions    Benzalkonium chloride     Black pepper      Other reaction(s): Hives    Chocolate flavor      Other reaction(s): Hives    Citrus and derivatives Hives     LEMON PRODUCTS    Grapefruit Hives     Other reaction(s): Hives    Ibuprofen Swelling    Latex      Other reaction(s): Hives    Lemon balm (casper officinalis) Hives     Anything with lemon in it    Naproxen Swelling    Neomycin-bacitracin-polymyxin      Other reaction(s): Rash    Oats (richard) Hives     Oat foods (oatmeal, etc...)    Vegetable acetoglycerides Hives     Vegetable gums    Wheat containing prod     Wheat flour Hives     Current Outpatient Medications   Medication Sig Dispense Refill    betamethasone dipropionate 0.05 % cream APPLY TOPICALLY 2 TIMES DAILY. 45 g 0    butalbital-acetaminophen-caffeine -40 mg (FIORICET, ESGIC) -40 mg per tablet TAKE ONE TABLET BY MOUTH EVERY 6 HOURS AS NEEDED FOR PAIN OR FOR HEADACHE 30 tablet 0    clorazepate (TRANXENE) 3.75 MG Tab TAKE 1 TABLET BY MOUTH EVERY DAY AS NEEDED 30 tablet 0    diclofenac sodium (VOLTAREN) 1 % Gel Apply 2 g topically 4 (four) times daily. 100 g 2    doxepin (SINEQUAN) 10 MG capsule TAKE TWO CAPSULES BY MOUTH THREE TIMES DAILY 180 capsule 1    EPINEPHrine (EPIPEN) 0.3 mg/0.3 mL AtIn Inject 0.3 mg into the muscle daily as needed.      famotidine (PEPCID) 40 MG tablet Take 20 mg by mouth.      fexofenadine (ALLEGRA) 180 MG tablet TAKE ONE TABLET BY MOUTH EVERY DAY 30 tablet 0    FLOVENT DISKUS 100 mcg/actuation inhaler Inhale into the lungs.      fluticasone propionate (FLOVENT DISKUS) 100  mcg/actuation inhaler Inhale 100 mcg into the lungs.      hydrOXYzine HCL (ATARAX) 25 MG tablet TAKE ONE TABLET BY MOUTH FOUR TIMES DAILY AS NEEDED FOR ITCHING 120 tablet 1    methylPREDNISolone (MEDROL DOSEPACK) 4 mg tablet Use as directed. 1 each 0    mirabegron (MYRBETRIQ) 25 mg Tb24 ER tablet Take 1 tablet (25 mg total) by mouth once daily. 30 tablet 3    mometasone (NASONEX) 50 mcg/actuation nasal spray INHALE 2 SPRAYS BY NASAL ROUTE ONCE DAILY 17 g 1    montelukast (SINGULAIR) 10 mg tablet Take 1 tablet (10 mg total) by mouth once daily. 30 tablet 0    omalizumab (XOLAIR) 150 mg/mL injection Inject 300 mg into the skin.      potassium chloride (MICRO-K) 10 MEQ CpSR Take 1 capsule (10 mEq total) by mouth once daily. 90 capsule 3    rizatriptan (MAXALT) 10 MG tablet TAKE ONE TABLET BY MOUTH AS NEEDED FOR migraine MAY REPEAT in TWO hours. no more THAN TWO TABLETS in 24 hours Strength: 10 mg 27 tablet 1    triamterene-hydrochlorothiazide 37.5-25 mg (DYAZIDE) 37.5-25 mg per capsule Take 1 capsule by mouth once daily. 90 capsule 3    WESTUSSIN DM 1-5-10 mg/5 mL Syrp Take 10 mLs by mouth 3 (three) times daily as needed.      XOLAIR 150 mg/mL injection       azelastine (ASTELIN) 137 mcg (0.1 %) nasal spray 1 spray (137 mcg total) by Nasal route 2 (two) times daily. 90 mL 1     No current facility-administered medications for this visit.     Facility-Administered Medications Ordered in Other Visits   Medication Dose Route Frequency Provider Last Rate Last Admin    0.9%  NaCl infusion   Intravenous Continuous Megha Kwon PA-C        chlorhexidine 0.12 % solution 10 mL  10 mL Mouth/Throat On Call Procedure Megha Kwon PA-C               Exam     ROS  Review of Systems   Constitutional:  Negative for appetite change, chills, fatigue and fever.   HENT:  Positive for congestion, postnasal drip and sinus pressure. Negative for ear pain, rhinorrhea, sneezing and sore throat.    Respiratory:  Positive for  "cough. Negative for shortness of breath.    Cardiovascular:  Negative for chest pain and palpitations.   Gastrointestinal:  Negative for abdominal pain, constipation, diarrhea, nausea and vomiting.   Genitourinary:  Negative for dysuria.   Musculoskeletal:  Negative for arthralgias.   Neurological:  Negative for headaches.   Psychiatric/Behavioral:  Negative for sleep disturbance.          Physical Exam  Vitals:    01/19/23 0729   BP: 136/82   BP Location: Right arm   Patient Position: Sitting   BP Method: Large (Manual)   Pulse: 81   Resp: 16   Temp: 98 °F (36.7 °C)   TempSrc: Tympanic   SpO2: 99%   Weight: 88 kg (194 lb 0.1 oz)   Height: 5' 2" (1.575 m)    Body mass index is 35.48 kg/m².  Weight: 88 kg (194 lb 0.1 oz)   Height: 5' 2" (157.5 cm)   Physical Exam  Constitutional:       General: She is not in acute distress.     Appearance: Normal appearance. She is obese.   HENT:      Head: Normocephalic and atraumatic.      Right Ear: External ear normal. A middle ear effusion is present.      Left Ear: External ear normal.      Nose: Congestion present.      Mouth/Throat:      Mouth: Mucous membranes are moist.   Eyes:      Pupils: Pupils are equal, round, and reactive to light.   Cardiovascular:      Rate and Rhythm: Normal rate and regular rhythm.   Pulmonary:      Effort: Pulmonary effort is normal. No respiratory distress.      Breath sounds: Normal breath sounds. No wheezing.   Abdominal:      General: Bowel sounds are normal.      Palpations: Abdomen is soft.   Musculoskeletal:      Cervical back: Neck supple.   Lymphadenopathy:      Cervical: No cervical adenopathy.   Skin:     General: Skin is warm and dry.   Neurological:      Mental Status: She is alert and oriented to person, place, and time.   Psychiatric:         Mood and Affect: Mood normal.           Health Maintenance         Date Due Completion Date    Hemoglobin A1c (Diabetic Prevention Screening) Never done ---    Mammogram 02/15/2023 2/15/2022 "    Override on 10/18/2012: Done    Lipid Panel 07/15/2023 7/15/2022    Cervical Cancer Screening 10/07/2026 10/7/2021    Override on 1/30/2013: Done    TETANUS VACCINE 10/10/2026 10/10/2016    Colorectal Cancer Screening 01/18/2028 1/18/2018              Assessment & Plan     Assessment & Plan  Problem List Items Addressed This Visit          Cardiac/Vascular    Essential hypertension (Chronic)  -The current medical regimen is effective;  continue present plan and medications.      Other Visit Diagnoses       Viral URI with cough    -  Primary  -We discussed effective administration of Astelin, adverse effects and side effects with education given for reinforcement  - Continued symptomatic treatment at home using aforementioned medications in HPI    Relevant Medications    azelastine (ASTELIN) 137 mcg (0.1 %) nasal spray              Health Maintenance reviewed: Deferred per patient    Follow-up: RTC as needed       30+ minutes of total time spent on the encounter, which includes face to face time and non-face to face time preparing to see the patient (eg, review of tests), Obtaining and/or reviewing separately obtained history, documenting clinical information in the electronic or other health record, independently interpreting results (not separately reported) and communicating results to the patient/family/caregiver, or Care coordination (not separately reported).

## 2023-01-19 NOTE — PATIENT INSTRUCTIONS
-Sudafed  -Increase water intake   -Add B Vitamins  -Add Vitamin C and Zinc to diet  -Start Nasonex 1 spray each nostril once daily  -Start Astelin 1 spray each nostril twice daily  - Continue Mucinex as needed

## 2023-01-27 ENCOUNTER — OFFICE VISIT (OUTPATIENT)
Dept: UROLOGY | Facility: CLINIC | Age: 58
End: 2023-01-27
Payer: COMMERCIAL

## 2023-01-27 VITALS
DIASTOLIC BLOOD PRESSURE: 79 MMHG | TEMPERATURE: 97 F | SYSTOLIC BLOOD PRESSURE: 113 MMHG | WEIGHT: 194 LBS | HEART RATE: 74 BPM | BODY MASS INDEX: 35.7 KG/M2 | HEIGHT: 62 IN

## 2023-01-27 DIAGNOSIS — N39.41 URGE INCONTINENCE: Primary | ICD-10-CM

## 2023-01-27 PROCEDURE — 3078F PR MOST RECENT DIASTOLIC BLOOD PRESSURE < 80 MM HG: ICD-10-PCS | Mod: CPTII,S$GLB,, | Performed by: UROLOGY

## 2023-01-27 PROCEDURE — 3074F PR MOST RECENT SYSTOLIC BLOOD PRESSURE < 130 MM HG: ICD-10-PCS | Mod: CPTII,S$GLB,, | Performed by: UROLOGY

## 2023-01-27 PROCEDURE — 1160F PR REVIEW ALL MEDS BY PRESCRIBER/CLIN PHARMACIST DOCUMENTED: ICD-10-PCS | Mod: CPTII,S$GLB,, | Performed by: UROLOGY

## 2023-01-27 PROCEDURE — 1159F MED LIST DOCD IN RCRD: CPT | Mod: CPTII,S$GLB,, | Performed by: UROLOGY

## 2023-01-27 PROCEDURE — 3074F SYST BP LT 130 MM HG: CPT | Mod: CPTII,S$GLB,, | Performed by: UROLOGY

## 2023-01-27 PROCEDURE — 99213 PR OFFICE/OUTPT VISIT, EST, LEVL III, 20-29 MIN: ICD-10-PCS | Mod: S$GLB,,, | Performed by: UROLOGY

## 2023-01-27 PROCEDURE — 3008F PR BODY MASS INDEX (BMI) DOCUMENTED: ICD-10-PCS | Mod: CPTII,S$GLB,, | Performed by: UROLOGY

## 2023-01-27 PROCEDURE — 3078F DIAST BP <80 MM HG: CPT | Mod: CPTII,S$GLB,, | Performed by: UROLOGY

## 2023-01-27 PROCEDURE — 1160F RVW MEDS BY RX/DR IN RCRD: CPT | Mod: CPTII,S$GLB,, | Performed by: UROLOGY

## 2023-01-27 PROCEDURE — 1159F PR MEDICATION LIST DOCUMENTED IN MEDICAL RECORD: ICD-10-PCS | Mod: CPTII,S$GLB,, | Performed by: UROLOGY

## 2023-01-27 PROCEDURE — 99999 PR PBB SHADOW E&M-EST. PATIENT-LVL V: ICD-10-PCS | Mod: PBBFAC,,, | Performed by: UROLOGY

## 2023-01-27 PROCEDURE — 3008F BODY MASS INDEX DOCD: CPT | Mod: CPTII,S$GLB,, | Performed by: UROLOGY

## 2023-01-27 PROCEDURE — 99213 OFFICE O/P EST LOW 20 MIN: CPT | Mod: S$GLB,,, | Performed by: UROLOGY

## 2023-01-27 PROCEDURE — 99999 PR PBB SHADOW E&M-EST. PATIENT-LVL V: CPT | Mod: PBBFAC,,, | Performed by: UROLOGY

## 2023-01-28 NOTE — PROGRESS NOTES
Chief Complaint:  OAB    HPI:   01/27/2023 - patient returns today for follow-up, has been taking the Myrbetriq as prescribed and notes that while it does help she continues to have issues with urgency and urge incontinence, she has reduced her coffee intake, now really only having one cup in the morning, still wearing pads, 3/day, no SEs that she has noticed, no GH or dysuria    08/16/2023 - 57 yo female that presents OAB.  Patient notes worsening issues with frequency and urgency the last few years.  Patient notes that she will have urge incontinence 2-3 times per week and wears one pad per day for this.  She has previously tried Detrol as well as Myrbetriq.  She feels that the Myrbetriq is working for her currently.  She will also intermittently take azo.  She will also drink 3-4 thick cups of coffee per day, but notes that she has reduced this recently.  She denies any prior urologic procedures or gross hematuria.  She does have family history of prostate cancer in her brother and her dad.    PMH:  Past Medical History:   Diagnosis Date    Allergic rhinitis     Anxiety     Arthritis     Hypertension     Urticaria        PSH:  Past Surgical History:   Procedure Laterality Date    CHONDROPLASTY OF KNEE Left 05/13/2022    Procedure: CHONDROPLASTY, KNEE;  Surgeon: Wes Faulkner MD;  Location: [facility] OR;  Service: Orthopedics;  Laterality: Left;    COLONOSCOPY N/A 01/18/2018    Procedure: COLONOSCOPY;  Surgeon: Yong Smith MD;  Location: [facility];  Service: Endoscopy;  Laterality: N/A;    HERNIA REPAIR Left        Family History:  Family History   Problem Relation Age of Onset    Lung cancer Paternal Grandfather     Ovarian cancer Maternal Grandmother     Hyperlipidemia Mother     Hypertension Mother     Breast cancer Mother 60    Arthritis Mother     Lung cancer Father     Breast cancer Maternal Aunt 53    Heart failure Other         Great aunt    Prostate cancer Brother     Brain cancer Sister      Evaluated by AARON supervising physician available for consult    Patient states she has been sick for the past one week with sinus and chest congestion and drainage, coughing yellow sputum, wheezing, left upper back pain. She has emphysema sees Dr. Trixie Bardales. Uses an inhaler at home. Former smoker. Has had intermittent fevers. No vomiting. The history is provided by the patient. URI   Presenting symptoms: congestion, cough, fever, rhinorrhea and sore throat    Onset quality:  Gradual  Duration:  1 week  Progression:  Worsening  Chronicity:  New  Associated symptoms: wheezing    Risk factors: chronic respiratory disease        Review of Systems   Constitutional: Positive for fever. HENT: Positive for congestion, rhinorrhea and sore throat. Respiratory: Positive for cough and wheezing. Cardiovascular: Negative for chest pain. Gastrointestinal: Negative for vomiting. PAST MEDICAL HISTORY   has a past medical history of Dental crown present, Diverticulosis, Full dentures, GERD (gastroesophageal reflux disease), Hyperlipidemia, Hypertension, Hyperthyroidism, IBS (irritable bowel syndrome), and Localized osteoarthrosis not specified whether primary or secondary, lower leg (3/20/2015). PAST SURGICAL HISTORY   has a past surgical history that includes Appendectomy; Tubal ligation; Foot surgery; Cystocopy; Dental surgery; Anterior cruciate ligament repair (Left, 2/21/2014); knee surgery (02/2014); Coronary angioplasty (11/26/14); Colonoscopy (2006); and Colonoscopy (2016). FAMILY HISTORY  family history includes Cancer in her brother and father; Heart Disease in her father and mother; High Blood Pressure in her mother. SOCIAL HISTORY   reports that she quit smoking about 10 years ago. Her smoking use included cigarettes. She has a 60.00 pack-year smoking history. She has never used smokeless tobacco. She reports that she does not drink alcohol or use drugs.     HOME MEDICATIONS     Prior to Admission medications    Medication Sig Start Date End Date Taking? Authorizing Provider   pantoprazole (PROTONIX) 40 MG tablet TAKE ONE TABLET BY MOUTH DAILY 4/12/19   [facility], DO   PROAIR  (88 Base) MCG/ACT inhaler INHALE TWO PUFFS BY MOUTH EVERY 6 HOURS AS NEEDED FOR WHEEZING 3/30/19   Ermelinda Moreland MD   rosuvastatin (CRESTOR) 20 MG tablet TAKE ONE TABLET BY MOUTH DAILY 3/15/19   [facility], DO   amLODIPine (NORVASC) 5 MG tablet TAKE ONE TABLET BY MOUTH DAILY FOR HIGH BLOOD PRESSURE 6/29/18   Joseph Negro MD   metoprolol tartrate (LOPRESSOR) 25 MG tablet TAKE ONE-HALF TABLET BY MOUTH TWICE A DAY 6/29/18   Joseph Negro MD   albuterol sulfate HFA (PROAIR HFA) 108 (90 Base) MCG/ACT inhaler Inhale 2 puffs into the lungs every 6 hours as needed for Wheezing 6/27/18   Ermelinda Moreland MD   butalbital-acetaminophen-caffeine (FIORICET, [facility]) -21 MG per tablet Take 1 tablet by mouth every 4 hours as needed for Headaches 4/26/18   [facility], DO   furosemide (LASIX) 40 MG tablet Take 1 tablet by mouth daily for 2 days 4/12/18 4/14/18  ARLENE Adams CNP   Misc. Devices (WALKER) MISC 1 each by Does not apply route daily Dispense and Fit for injury to lower extremity and weakness.  4/5/18   ARLENE Parsons CNP   LIDODERM 5 % APPLY 1 PATCH TO AFFECTED AREA FOR 12 HOURS IN A 24 HOUR PERIOD 2/6/18   Mikayla Niño DO   fluticasone [facility]) 50 MCG/ACT nasal spray 2 sprays by Nasal route daily 3/10/17   ARLENE Robert CNP   cyclobenzaprine (FLEXERIL) 10 MG tablet Take 1 tablet by mouth every 8 hours as needed for Muscle spasms 3/10/17   ARLENE Robert CNP   ibandronate (BONIVA) 150 MG tablet TAKE 1 TABLET BY MOUTH ONCE A MONTH ON THE SAME DATE EACH MONTH BEFORE BREAKFAST, ON AN EMPTY STOMACH: REMAIN UPRIGHT FOR 60 MINUTES 12/5/16   [facility], DO   fludrocortisone (FLORINEF) 0.1 MG tablet TAKE ONE TABLET BY MOUTH DAILY 12/5/16 Diabetes Neg Hx     Pseudochol deficiency Neg Hx     Malignant hyperthermia Neg Hx        Social History:  Social History     Tobacco Use    Smoking status: Never    Smokeless tobacco: Never   Substance Use Topics    Alcohol use: No    Drug use: No        Review of Systems:  General: No fever, chills  Skin: No rashes  Chest:  Denies cough and sputum production  Heart: Denies chest pain  Resp: Denies dyspnea  Abdomen: Denies diarrhea, abdominal pain, hematemesis, or blood in stool.  Musculoskeletal: No joint stiffness or swelling. Denies back pain.  : see HPI  Neuro: no dizziness or weakness    Allergies:  Benzalkonium chloride, Black pepper, Chocolate flavor, Citrus and derivatives, Grapefruit, Ibuprofen, Latex, Lemon balm (casper officinalis), Naproxen, Neomycin-bacitracin-polymyxin, Oats (richard), Vegetable acetoglycerides, Wheat containing prod, and Wheat flour    Medications:    Current Outpatient Medications:     azelastine (ASTELIN) 137 mcg (0.1 %) nasal spray, 1 spray (137 mcg total) by Nasal route 2 (two) times daily., Disp: 90 mL, Rfl: 1    betamethasone dipropionate 0.05 % cream, APPLY TOPICALLY 2 TIMES DAILY., Disp: 45 g, Rfl: 0    butalbital-acetaminophen-caffeine -40 mg (FIORICET, ESGIC) -40 mg per tablet, TAKE ONE TABLET BY MOUTH EVERY 6 HOURS AS NEEDED FOR PAIN OR FOR HEADACHE, Disp: 30 tablet, Rfl: 0    clorazepate (TRANXENE) 3.75 MG Tab, TAKE 1 TABLET BY MOUTH EVERY DAY AS NEEDED, Disp: 30 tablet, Rfl: 0    diclofenac sodium (VOLTAREN) 1 % Gel, Apply 2 g topically 4 (four) times daily., Disp: 100 g, Rfl: 2    doxepin (SINEQUAN) 10 MG capsule, TAKE TWO CAPSULES BY MOUTH THREE TIMES DAILY, Disp: 180 capsule, Rfl: 1    EPINEPHrine (EPIPEN) 0.3 mg/0.3 mL AtIn, Inject 0.3 mg into the muscle daily as needed., Disp: , Rfl:     famotidine (PEPCID) 40 MG tablet, Take 20 mg by mouth., Disp: , Rfl:     fexofenadine (ALLEGRA) 180 MG tablet, TAKE ONE TABLET BY MOUTH EVERY DAY, Disp: 30 tablet, Rfl:  0    FLOVENT DISKUS 100 mcg/actuation inhaler, Inhale into the lungs., Disp: , Rfl:     fluticasone propionate (FLOVENT DISKUS) 100 mcg/actuation inhaler, Inhale 100 mcg into the lungs., Disp: , Rfl:     hydrOXYzine HCL (ATARAX) 25 MG tablet, TAKE ONE TABLET BY MOUTH FOUR TIMES DAILY AS NEEDED FOR ITCHING, Disp: 120 tablet, Rfl: 1    methylPREDNISolone (MEDROL DOSEPACK) 4 mg tablet, Use as directed., Disp: 1 each, Rfl: 0    mirabegron (MYRBETRIQ) 25 mg Tb24 ER tablet, Take 1 tablet (25 mg total) by mouth once daily., Disp: 30 tablet, Rfl: 3    mometasone (NASONEX) 50 mcg/actuation nasal spray, INHALE 2 SPRAYS BY NASAL ROUTE ONCE DAILY, Disp: 17 g, Rfl: 1    montelukast (SINGULAIR) 10 mg tablet, Take 1 tablet (10 mg total) by mouth once daily., Disp: 30 tablet, Rfl: 0    omalizumab (XOLAIR) 150 mg/mL injection, Inject 300 mg into the skin., Disp: , Rfl:     potassium chloride (MICRO-K) 10 MEQ CpSR, Take 1 capsule (10 mEq total) by mouth once daily., Disp: 90 capsule, Rfl: 3    rizatriptan (MAXALT) 10 MG tablet, TAKE ONE TABLET BY MOUTH AS NEEDED FOR migraine MAY REPEAT in TWO hours. no more THAN TWO TABLETS in 24 hours Strength: 10 mg, Disp: 27 tablet, Rfl: 1    triamterene-hydrochlorothiazide 37.5-25 mg (DYAZIDE) 37.5-25 mg per capsule, Take 1 capsule by mouth once daily., Disp: 90 capsule, Rfl: 3    WESTUSSIN DM 1-5-10 mg/5 mL Syrp, Take 10 mLs by mouth 3 (three) times daily as needed., Disp: , Rfl:     XOLAIR 150 mg/mL injection, , Disp: , Rfl:   No current facility-administered medications for this visit.    Facility-Administered Medications Ordered in Other Visits:     0.9%  NaCl infusion, , Intravenous, Continuous, Megha Kwon PA-C    chlorhexidine 0.12 % solution 10 mL, 10 mL, Mouth/Throat, On Call Procedure, Megha Kwon PA-C    Physical Exam:  Vitals:    01/27/23 1609   BP: 113/79   Pulse: 74   Temp: 97.3 °F (36.3 °C)     Body mass index is 35.48 kg/m².  General: awake, alert,  cooperative  Head: NC/AT  Ears: external ears normal  Eyes: sclera normal  Lungs: normal inspiration, NAD  Heart: well-perfused  Skin: The skin is warm and dry  Ext: No c/c/e.  Neuro: grossly intact, AOx3    RADIOLOGY:  No recent relevant imaging available for review.    LABS:  I personally reviewed the following lab values:  Lab Results   Component Value Date    WBC 7.69 05/02/2022    HGB 13.5 05/02/2022    HCT 44.1 05/02/2022     05/02/2022     09/27/2022    K 3.8 09/27/2022     09/27/2022    CREATININE 1.0 09/27/2022    BUN 13 09/27/2022    CO2 26 09/27/2022    TSH 1.013 07/15/2022    INR 1.0 05/02/2022    CHOL 211 (H) 07/15/2022    TRIG 99 07/15/2022    HDL 58 07/15/2022    ALT 12 07/15/2022    AST 14 07/15/2022     Assessment/Plan:   Stacey Wade is a 57 y.o. female with:    OAB - continue Myrbetriq, not interested in increasing her dose currently, follow-up four months for symptom check      Mamadou Armstrong MD  Urology         specimens: yes    Patient Progress  Patient progress: stable    Xr Chest Standard (2 Vw)    Result Date: 5/16/2019  EXAMINATION: TWO XRAY VIEWS OF THE CHEST 5/16/2019 7:54 pm COMPARISON: None. HISTORY: ORDERING SYSTEM PROVIDED HISTORY: coughr/o pneumonia TECHNOLOGIST PROVIDED HISTORY: Reason for exam:->coughr/o pneumonia Ordering Physician Provided Reason for Exam: cough, congestion Acuity: Acute Type of Exam: Initial FINDINGS: Two views of the chest are submitted for review. Cardiac silhouette is normal in size. Right lower lobe airspace disease noted, most compatible with pneumonia. Follow-up plain film in 4-6 weeks recommended to confirm resolution. Trachea is midline. Osseous structures and soft tissues are grossly intact. Right lower lobe airspace disease, most compatible with bronchopneumonia. Follow-up plain film in 4-6 weeks recommended to confirm resolution. 8:53 PM  Recheck patient is stable. Discussed test results with her. X-ray showing right lower lobe airspace disease. She is started on doxycycline. Short course of prednisone. Albuterol inhaler and Phenergan with codeine as needed for cough. Advised close follow up with her doctor 48 hours reevaluation. Discussed strict return precautions worsening pain, shortness of breath, fevers or vomiting. She understands and agrees. The patient has pneumonia, but does not have HYPOXIA, VOMITING, SIGNIFICANT CO-MORBIDITIES, TOXICITY, OR SEVERE SEPSIS, thus I consider the discharge disposition reasonable. Please note that this chart was generated using Dragon dictation software.  Although every effort was made to ensure the accuracy of this automated transcription, some errors in transcription may have occurred              Terrence Pizano PA-C  05/16/19 0014

## 2023-02-06 ENCOUNTER — OFFICE VISIT (OUTPATIENT)
Dept: ORTHOPEDICS | Facility: CLINIC | Age: 58
End: 2023-02-06
Payer: COMMERCIAL

## 2023-02-06 VITALS — BODY MASS INDEX: 35.7 KG/M2 | HEIGHT: 62 IN | WEIGHT: 194 LBS

## 2023-02-06 DIAGNOSIS — Z98.890 S/P ARTHROSCOPIC SURGERY OF LEFT KNEE: ICD-10-CM

## 2023-02-06 DIAGNOSIS — E66.01 SEVERE OBESITY (BMI 35.0-39.9) WITH COMORBIDITY: ICD-10-CM

## 2023-02-06 DIAGNOSIS — M17.12 PRIMARY OSTEOARTHRITIS OF LEFT KNEE: Primary | ICD-10-CM

## 2023-02-06 PROCEDURE — 99999 PR PBB SHADOW E&M-EST. PATIENT-LVL V: CPT | Mod: PBBFAC,,, | Performed by: ORTHOPAEDIC SURGERY

## 2023-02-06 PROCEDURE — 99999 PR PBB SHADOW E&M-EST. PATIENT-LVL V: ICD-10-PCS | Mod: PBBFAC,,, | Performed by: ORTHOPAEDIC SURGERY

## 2023-02-06 PROCEDURE — 1159F MED LIST DOCD IN RCRD: CPT | Mod: CPTII,S$GLB,, | Performed by: ORTHOPAEDIC SURGERY

## 2023-02-06 PROCEDURE — 99214 PR OFFICE/OUTPT VISIT, EST, LEVL IV, 30-39 MIN: ICD-10-PCS | Mod: S$GLB,,, | Performed by: ORTHOPAEDIC SURGERY

## 2023-02-06 PROCEDURE — 99214 OFFICE O/P EST MOD 30 MIN: CPT | Mod: S$GLB,,, | Performed by: ORTHOPAEDIC SURGERY

## 2023-02-06 PROCEDURE — 1159F PR MEDICATION LIST DOCUMENTED IN MEDICAL RECORD: ICD-10-PCS | Mod: CPTII,S$GLB,, | Performed by: ORTHOPAEDIC SURGERY

## 2023-02-06 PROCEDURE — 3008F BODY MASS INDEX DOCD: CPT | Mod: CPTII,S$GLB,, | Performed by: ORTHOPAEDIC SURGERY

## 2023-02-06 PROCEDURE — 3008F PR BODY MASS INDEX (BMI) DOCUMENTED: ICD-10-PCS | Mod: CPTII,S$GLB,, | Performed by: ORTHOPAEDIC SURGERY

## 2023-02-06 NOTE — PATIENT INSTRUCTIONS
Assessment:  Stacey Wade is a 57 y.o. 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    Primary osteoarthritis of left knee Yes    S/P arthroscopic surgery of left knee     Severe obesity (BMI 35.0-39.9) with comorbidity        Plan:  No work restrictions  Visco left knee     Although there is not a cure for arthritis, there are effective ways to improve symptoms.     Exercise & Activity:  I recommend low impact activities such as elliptical and bicycle   Walking is great for arthritis: https://www.ConnectSolutions.com/3-reasons-walking-with-knee-arthritis/  If walking long distances, I recommend good quality well-cushioned shoes. Varsity sports can help you find the right ones: https://www.DynamicssityCloudTalk.[employer]/  Aquatic and pool therapy is often helpful because it lessens the impact on the joint, strengthens the leg and thigh muscles, and helps to control swelling.   Knee motion is important to the health of the knee.     Knee Braces:  A compression knee sleeve can help limit swelling and provide proprioceptive feedback.     Prescriptions & Medications:  I do recommend formal physical therapy or at minimum a home exercise program.   Over the counter analgesic (pain-relieving) medications can help. Examples are Tylenol, Ibuprofen, and Aleve. Check with your primary care physician to make sure you don't have contra-indications to taking those medicines.  Some over the counter supplement solutions such as glucosamine and chondroitin may help with symptoms, although the evidence is mixed.    Healthy Lifestyle:  Excess body weight can have a negative impact on joint health and on pain. I recommend healthy lifestyle choices including nutrition and exercise that help reach and maintain an ideal body weight. Tips for Exercise:  https://www.TweetMySong.com.Photozeen/13-exercise-tips-for-a-healthier-you/  Some diets cause increased inflammation. I recommend a balanced wholesome diet including some foods such as olives that are shown to decrease inflammation. More diet information available here: https://www.[employer]itness/8-best-foods-for-knee-arthritis/      Follow-up: visco or sooner if there are any problems between now and then.    Leave Review:   Google: Leave Google Review  Healthgrades: Leave Healthgrades Review    After Hours Number: [phone]

## 2023-02-06 NOTE — PROGRESS NOTES
Patient ID: Stacey Wade  YOB: 1965  MRN: [id number]    Chief Complaint: Post-op Evaluation of the Left Knee      History of Present Illness: Stacey Wade is a  57 y.o. 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 She is not currently in PT. Her knee is doing great, just hurts a little with more strenuous activity. Overall, she has no complaints.    HPI 11/7/22:  Patient presents to the clinic today c/o 0/10 Left Knee pain 6 months s/p scope, meniscus compartmentalization (5/13/2022). She completed PT at [facility] on 9/30/2022. Overall, she is feeling much better, and reports that the only issue is that she cannot put direct pressure on her Knee.   Plan 11/7/22:  Advance work restrictions: occasional sitting breaks, occasional prolonged standing, note kneeling.   Follow up in 3 months         Pain  Pertinent negatives include no abdominal pain, chest pain, chills, coughing, fever, joint swelling, myalgias, nausea, neck pain, numbness, rash, sore throat or vomiting.     Past Medical History:   Past Medical History:   Diagnosis Date    Allergic rhinitis     Anxiety     Arthritis     Hypertension     Urticaria      Past Surgical History:   Procedure Laterality Date    CHONDROPLASTY OF KNEE Left 05/13/2022    Procedure: CHONDROPLASTY, KNEE;  Surgeon: Wes Faulkner MD;  Location: [facility] OR;  Service: Orthopedics;  Laterality: Left;    COLONOSCOPY N/A 01/18/2018    Procedure: COLONOSCOPY;  Surgeon: Yong Smith MD;  Location: [facility];  Service: Endoscopy;  Laterality: N/A;    HERNIA REPAIR Left      Family History   Problem Relation Age of Onset    Lung cancer Paternal Grandfather     Ovarian cancer Maternal Grandmother     Hyperlipidemia Mother     Hypertension Mother     Breast cancer  Mother 60    Arthritis Mother     Lung cancer Father     Breast cancer Maternal Aunt 53    Heart failure Other         Great aunt    Prostate cancer Brother     Brain cancer Sister     Diabetes Neg Hx     Pseudochol deficiency Neg Hx     Malignant hyperthermia Neg Hx      Social History     Socioeconomic History    Marital status: Single   Tobacco Use    Smoking status: Never    Smokeless tobacco: Never   Substance and Sexual Activity    Alcohol use: No    Drug use: No    Sexual activity: Not Currently     Partners: Male     Birth control/protection: None   Social History Narrative    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     Medication List with Changes/Refills   Current Medications    AZELASTINE (ASTELIN) 137 MCG (0.1 %) NASAL SPRAY    1 spray (137 mcg total) by Nasal route 2 (two) times daily.    BETAMETHASONE DIPROPIONATE 0.05 % CREAM    APPLY TOPICALLY 2 TIMES DAILY.    BUTALBITAL-ACETAMINOPHEN-CAFFEINE -40 MG (FIORICET, ESGIC) -40 MG PER TABLET    TAKE ONE TABLET BY MOUTH EVERY 6 HOURS AS NEEDED FOR PAIN OR FOR HEADACHE    CLORAZEPATE (TRANXENE) 3.75 MG TAB    TAKE 1 TABLET BY MOUTH EVERY DAY AS NEEDED    DICLOFENAC SODIUM (VOLTAREN) 1 % GEL    Apply 2 g topically 4 (four) times daily.    DOXEPIN (SINEQUAN) 10 MG CAPSULE    TAKE TWO CAPSULES BY MOUTH THREE TIMES DAILY    EPINEPHRINE (EPIPEN) 0.3 MG/0.3 ML ATIN    Inject 0.3 mg into the muscle daily as needed.    FAMOTIDINE (PEPCID) 40 MG TABLET    Take 20 mg by mouth.    FEXOFENADINE (ALLEGRA) 180 MG TABLET    TAKE ONE TABLET BY MOUTH EVERY DAY    FLOVENT DISKUS 100 MCG/ACTUATION INHALER    Inhale into the lungs.    FLUTICASONE PROPIONATE (FLOVENT DISKUS) 100 MCG/ACTUATION INHALER    Inhale 100 mcg into the lungs.    HYDROXYZINE HCL (ATARAX) 25 MG TABLET    TAKE ONE TABLET BY MOUTH FOUR TIMES DAILY AS NEEDED FOR ITCHING    METHYLPREDNISOLONE (MEDROL DOSEPACK) 4 MG TABLET     Use as directed.    MIRABEGRON (MYRBETRIQ) 25 MG TB24 ER TABLET    TAKE ONE TABLET BY MOUTH EVERY DAY    MOMETASONE (NASONEX) 50 MCG/ACTUATION NASAL SPRAY    INHALE 2 SPRAYS BY NASAL ROUTE ONCE DAILY    MONTELUKAST (SINGULAIR) 10 MG TABLET    Take 1 tablet (10 mg total) by mouth once daily.    OMALIZUMAB (XOLAIR) 150 MG/ML INJECTION    Inject 300 mg into the skin.    POTASSIUM CHLORIDE (MICRO-K) 10 MEQ CPSR    Take 1 capsule (10 mEq total) by mouth once daily.    RIZATRIPTAN (MAXALT) 10 MG TABLET    TAKE ONE TABLET BY MOUTH AS NEEDED FOR migraine MAY REPEAT in TWO hours. no more THAN TWO TABLETS in 24 hours Strength: 10 mg    TRIAMTERENE-HYDROCHLOROTHIAZIDE 37.5-25 MG (DYAZIDE) 37.5-25 MG PER CAPSULE    Take 1 capsule by mouth once daily.    WESTUSSIN DM 1-5-10 MG/5 ML SYRP    Take 10 mLs by mouth 3 (three) times daily as needed.    XOLAIR 150 MG/ML INJECTION         Review of patient's allergies indicates:   Allergen Reactions    Benzalkonium chloride     Black pepper      Other reaction(s): Hives    Chocolate flavor      Other reaction(s): Hives    Citrus and derivatives Hives     LEMON PRODUCTS    Grapefruit Hives     Other reaction(s): Hives    Ibuprofen Swelling    Latex      Other reaction(s): Hives    Lemon balm (casper officinalis) Hives     Anything with lemon in it    Naproxen Swelling    Neomycin-bacitracin-polymyxin      Other reaction(s): Rash    Oats (richard) Hives     Oat foods (oatmeal, etc...)    Vegetable acetoglycerides Hives     Vegetable gums    Wheat containing prod     Wheat flour Hives     Review of Systems   Constitutional: Negative for chills and fever.   HENT:  Negative for sore throat.    Eyes:  Negative for pain.   Cardiovascular:  Negative for chest pain and leg swelling.   Respiratory:  Negative for cough and shortness of breath.    Skin:  Negative for itching and rash.   Musculoskeletal:  Negative for joint pain, joint swelling, myalgias and neck pain.   Gastrointestinal:  Negative  for abdominal pain, nausea and vomiting.   Genitourinary:  Negative for dysuria.   Neurological:  Negative for dizziness, numbness and paresthesias.     Physical Exam:   Body mass index is 35.48 kg/m².  There were no vitals filed for this visit.   GENERAL: Well appearing, appropriate for stated age, no acute distress.  CARDIOVASCULAR: Pulses regular by peripheral palpation.  PULMONARY: Respirations are even and non-labored.  NEURO: Awake, alert, and oriented x 3.  PSYCH: Mood & affect are appropriate.  HEENT: Head is normocephalic and atraumatic.  General    Nursing note and vitals reviewed.          Right Knee Exam   Right knee exam is normal.    Inspection   Effusion: absent    Tenderness   The patient is experiencing no tenderness.     Range of Motion   Extension:  0   Right knee flexion: 125.     Tests   Ligament Examination   Lachman: normal (-1 to 2mm)   PCL-Posterior Drawer: normal (0 to 2mm)     MCL - Valgus: normal (0 to 2mm)  LCL - Varus: normal    Other   Sensation: normal    Left Knee Exam     Inspection   Scars: present (post op changes healed)  Swelling: present  Effusion: absent    Tenderness   The patient tender to palpation of the medial joint line and patella.    Range of Motion   Extension:  0 normal   Flexion:  normal Left knee flexion: 115.    Tests   Stability   Lachman: normal (-1 to 2mm)   PCL-Posterior Drawer: normal (0 to 2mm)  MCL - Valgus: normal (0 to 2mm)  LCL - Varus: normal (0 to 2mm)  Patella   Passive Patellar Tilt: lateral tilt    Other   Sensation: normal    Muscle Strength   Right Lower Extremity   Hip Abduction: 5/5   Quadriceps:  5/5   Hamstrin/5   Left Lower Extremity   Hip Abduction: 5/5   Quadriceps:  5/5   Hamstrin/5     Vascular Exam     Right Pulses  Dorsalis Pedis:      2+  Posterior Tibial:      2+        Left Pulses  Dorsalis Pedis:      2+  Posterior Tibial:      2+      All compartments are soft and compressible. Calf soft non-tender. Intact EHL, FHL,  gastroc soleus, and tibialis anterior. Sensation intact to light touch in superficial peroneal, deep peroneal, tibial, sural, and saphenous nerve distributions. Foot warm and well perfused with capillary refill of less than 2 seconds and palpable pedal pulses.       Imaging:    X-Ray Knee 1 or 2 View Left  Narrative: EXAMINATION:  XR KNEE 1 OR 2 VIEW LEFT    CLINICAL HISTORY:  intra op;    TECHNIQUE:  Single fluoroscopic view of the left knee is submitted during ongoing surgical procedure.    COMPARISON:  None    FINDINGS:  Single view of the left knee is submitted during ongoing surgical procedure.  No acute abnormality seen on this single image.  Impression: Fluoroscopic view of the left knee for ongoing procedure.    Electronically signed by: Stephen Gonzalez  Date:    05/13/2022  Time:    12:35  SURG [place] Surgery Fluoro Usage  See OP Notes for results.     IMPRESSION: See OP Notes for results.     This procedure was auto-finalized by: Virtual Radiologist        Relevant imaging results reviewed and interpreted by me, discussed with the patient and / or family today.     Other Tests:     Patient Instructions   Assessment:  Stacey Wade is a 57 y.o. 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    Primary osteoarthritis of left knee Yes    S/P arthroscopic surgery of left knee     Severe obesity (BMI 35.0-39.9) with comorbidity        Plan:  No work restrictions  Visco left knee     Although there is not a cure for arthritis, there are effective ways to improve symptoms.     Exercise & Activity:  I recommend low impact activities such as elliptical and bicycle   Walking is great for arthritis: https://www.Acarix.com/3-reasons-walking-with-knee-arthritis/  If walking long distances, I recommend good quality well-cushioned shoes. Varsity  sports can help you find the right ones: https://www.KickSport.[employer]/  Aquatic and pool therapy is often helpful because it lessens the impact on the joint, strengthens the leg and thigh muscles, and helps to control swelling.   Knee motion is important to the health of the knee.     Knee Braces:  A compression knee sleeve can help limit swelling and provide proprioceptive feedback.     Prescriptions & Medications:  I do recommend formal physical therapy or at minimum a home exercise program.   Over the counter analgesic (pain-relieving) medications can help. Examples are Tylenol, Ibuprofen, and Aleve. Check with your primary care physician to make sure you don't have contra-indications to taking those medicines.  Some over the counter supplement solutions such as glucosamine and chondroitin may help with symptoms, although the evidence is mixed.    Healthy Lifestyle:  Excess body weight can have a negative impact on joint health and on pain. I recommend healthy lifestyle choices including nutrition and exercise that help reach and maintain an ideal body weight. Tips for Exercise: https://www.SitScape.[employer]/13-exercise-tips-for-a-healthier-you/  Some diets cause increased inflammation. I recommend a balanced wholesome diet including some foods such as olives that are shown to decrease inflammation. More diet information available here: https://www.SitScape.[employer]/8-best-foods-for-knee-arthritis/      Follow-up: visco or sooner if there are any problems between now and then.    Leave Review:   Google: Leave Google Review  Healthgrades: Leave Healthgrades Review    After Hours Number: [phone]      Provider Note/Medical Decision Making: MEDICAL NECESSITY FOR VISCOSUPPLEMENTATION: After thorough evaluation of the patient, I have determined that visco-supplementation is medically necessary. The patient has painful DJD of the knee with failure of conservative therapy including lifestyle modifications  and rehabilitation exercises. Oral analgesis/NSAIDs have not adequately controlled symptoms and there is radiographic evidence of joint space narrowing, subchondral sclerosis, and some early osteophytic changes Kellgren- Arthur grade 2 or greater, or in lack of radiographic evidence, there is arthroscopic or other evidence of chondrosis.    Overall the patient has improved.  However she continues to have some symptoms that I think are related to her underlying osteoarthritis.  I think she would be a good candidate for viscosupplementation considering her mild-to-moderate osteoarthritis.      I discussed worrisome and red flag signs and symptoms with the patient. The patient expressed understanding and agreed to alert me immediately or to go to the emergency room if they experience any of these.   Treatment plan was developed with input from the patient/family, and they expressed understanding and agreement with the plan. All questions were answered today.      Disclaimer: This note was prepared using a voice recognition system and is likely to have sound alike errors within the text.

## 2023-02-07 NOTE — PROGRESS NOTES
Patient ID: Stacey Wade is a 57 y.o. female.    Chief Complaint: elevated risk for breast cancer f/u    HPI: Patient presents for 6 month f/u breast exam.     Pt has been calculated to be high risk for breast cancer- Her breast cancer risk assessment score of 24.4% was calculated at the time of her annual mammogram 2/15/2-22. June 2018 had linda MRI and was wnl.     Pt has chosen to not have MRI's of the breast due to the expensive copay associated with the test .     Mammogram was performed  2/15/2022- dense breasts and risk score was 24.4%.     Has a history of a breast mass noted during her routine gyn exam back in March 2017. Negative right breast imaging with mammo and ultrasound at that time. We have been checking it clinically at 3- 6 mon intervals - had an episode of it increasing increase in size slightly last year. Pt had been using more caffeine than usual. Recommended seeing surgeon and pt wanted to decrease her caffeine to see if it would decrease the size of the area. It did go back to her baseline measurement. Pt denies any change. No new areas of concern.     MRI on 6/12/18 was wnl    Pt not exercising - caring for her mother- had left knee surgery and has recovering     Denies any breast changes or concerns- no change in FH    Pt is completely off her hormones      Review of Systems   Constitutional: Negative.  Negative for appetite change and unexpected weight change.   HENT: Negative.     Eyes: Negative.  Negative for visual disturbance.   Respiratory: Negative.  Negative for cough and shortness of breath.    Cardiovascular: Negative.  Negative for chest pain.   Gastrointestinal: Negative.  Negative for abdominal pain and diarrhea.        No reflux   Endocrine: Negative.    Genitourinary: Negative.  Negative for frequency.   Musculoskeletal: Negative.  Negative for back pain.   Allergic/Immunologic: Negative.    Neurological: Negative.  Negative for headaches.   Hematological: Negative.   Negative for adenopathy.   Psychiatric/Behavioral: Negative.  The patient is not nervous/anxious.    Breast: pt denies any nipple discharge or palpable mass that she has noted. Has had bilateral nipple tenderness intermittently. She has continued to decrease her cafeine intake.     Current Outpatient Medications   Medication Sig Dispense Refill    azelastine (ASTELIN) 137 mcg (0.1 %) nasal spray 1 spray (137 mcg total) by Nasal route 2 (two) times daily. 90 mL 1    betamethasone dipropionate 0.05 % cream APPLY TOPICALLY 2 TIMES DAILY. 45 g 0    butalbital-acetaminophen-caffeine -40 mg (FIORICET, ESGIC) -40 mg per tablet TAKE ONE TABLET BY MOUTH EVERY 6 HOURS AS NEEDED FOR PAIN OR FOR HEADACHE 30 tablet 0    clorazepate (TRANXENE) 3.75 MG Tab TAKE 1 TABLET BY MOUTH EVERY DAY AS NEEDED 30 tablet 0    diclofenac sodium (VOLTAREN) 1 % Gel Apply 2 g topically 4 (four) times daily. 100 g 2    doxepin (SINEQUAN) 10 MG capsule TAKE TWO CAPSULES BY MOUTH THREE TIMES DAILY 540 capsule 2    EPINEPHrine (EPIPEN) 0.3 mg/0.3 mL AtIn Inject 0.3 mg into the muscle daily as needed.      famotidine (PEPCID) 40 MG tablet Take 20 mg by mouth.      fexofenadine (ALLEGRA) 180 MG tablet TAKE ONE TABLET BY MOUTH EVERY DAY 30 tablet 0    FLOVENT DISKUS 100 mcg/actuation inhaler Inhale into the lungs.      fluticasone propionate (FLOVENT DISKUS) 100 mcg/actuation inhaler Inhale 100 mcg into the lungs.      hydrOXYzine HCL (ATARAX) 25 MG tablet TAKE ONE TABLET BY MOUTH FOUR TIMES DAILY AS NEEDED FOR ITCHING 120 tablet 1    methylPREDNISolone (MEDROL DOSEPACK) 4 mg tablet Use as directed. 1 each 0    mirabegron (MYRBETRIQ) 25 mg Tb24 ER tablet TAKE ONE TABLET BY MOUTH EVERY DAY 90 tablet 2    mometasone (NASONEX) 50 mcg/actuation nasal spray INHALE 2 SPRAYS BY NASAL ROUTE ONCE DAILY 17 g 1    montelukast (SINGULAIR) 10 mg tablet Take 1 tablet (10 mg total) by mouth once daily. 30 tablet 0    omalizumab (XOLAIR) 150 mg/mL injection  Inject 300 mg into the skin.      potassium chloride (MICRO-K) 10 MEQ CpSR Take 1 capsule (10 mEq total) by mouth once daily. 90 capsule 3    rizatriptan (MAXALT) 10 MG tablet TAKE ONE TABLET BY MOUTH AS NEEDED FOR migraine MAY REPEAT in TWO hours. no more THAN TWO TABLETS in 24 hours Strength: 10 mg 27 tablet 1    triamterene-hydrochlorothiazide 37.5-25 mg (DYAZIDE) 37.5-25 mg per capsule Take 1 capsule by mouth once daily. 90 capsule 3    WESTUSSIN DM 1-5-10 mg/5 mL Syrp Take 10 mLs by mouth 3 (three) times daily as needed.      XOLAIR 150 mg/mL injection        No current facility-administered medications for this visit.     Facility-Administered Medications Ordered in Other Visits   Medication Dose Route Frequency Provider Last Rate Last Admin    0.9%  NaCl infusion   Intravenous Continuous Megha Kwon PA-C        chlorhexidine 0.12 % solution 10 mL  10 mL Mouth/Throat On Call Procedure Megha Kwon PA-C           Review of patient's allergies indicates:   Allergen Reactions    Benzalkonium chloride     Black pepper      Other reaction(s): Hives    Chocolate flavor      Other reaction(s): Hives    Citrus and derivatives Hives     LEMON PRODUCTS    Furosemide     Grapefruit      Other reaction(s): Hives    Latex      Other reaction(s): Hives    Lemon balm (casper officinalis) Hives    Neomycin-bacitracin-polymyxin      Other reaction(s): Rash    Oats (richard) Hives     Oat foods (oatmeal, etc...)    Wheat containing prod        Past Medical History:   Diagnosis Date    Allergic rhinitis     Anxiety     Arthritis     Hypertension     Urticaria        Past Surgical History:   Procedure Laterality Date    CHONDROPLASTY OF KNEE Left 05/13/2022    Procedure: CHONDROPLASTY, KNEE;  Surgeon: Wes Faulkner MD;  Location: [facility] OR;  Service: Orthopedics;  Laterality: Left;    COLONOSCOPY N/A 01/18/2018    Procedure: COLONOSCOPY;  Surgeon: Yong Smith MD;  Location: [facility];  Service:  Endoscopy;  Laterality: N/A;    HERNIA REPAIR Left        Family History   Problem Relation Age of Onset    Lung cancer Paternal Grandfather     Ovarian cancer Maternal Grandmother     Hyperlipidemia Mother     Hypertension Mother     Breast cancer Mother 60    Arthritis Mother     Lung cancer Father     Breast cancer Maternal Aunt 53    Heart failure Other         Great aunt    Prostate cancer Brother     Brain cancer Sister     Diabetes Neg Hx     Pseudochol deficiency Neg Hx     Malignant hyperthermia Neg Hx        Social History     Socioeconomic History    Marital status: Single   Tobacco Use    Smoking status: Never    Smokeless tobacco: Never   Substance and Sexual Activity    Alcohol use: No    Drug use: No    Sexual activity: Not Currently     Partners: Male     Birth control/protection: None   Social History Narrative    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       There were no vitals filed for this visit.    Physical Exam  Vitals reviewed.   Constitutional:       Appearance: Normal appearance. She is well-developed.   HENT:      Head: Normocephalic and atraumatic.      Right Ear: External ear normal.      Left Ear: External ear normal.      Mouth/Throat:      Pharynx: No oropharyngeal exudate.   Eyes:      General: No scleral icterus.        Right eye: No discharge.         Left eye: No discharge.      Conjunctiva/sclera: Conjunctivae normal.      Pupils: Pupils are equal, round, and reactive to light.   Neck:      Thyroid: No thyromegaly.   Cardiovascular:      Rate and Rhythm: Normal rate and regular rhythm.      Pulses: Normal pulses.      Heart sounds: Normal heart sounds.   Pulmonary:      Effort: Pulmonary effort is normal.      Breath sounds: Normal breath sounds.   Chest:   Breasts:     Right: No inverted nipple, mass, nipple discharge, skin change or tenderness.      Left: No inverted nipple, mass, nipple discharge,  skin change or tenderness.   Abdominal:      General: Bowel sounds are normal.      Palpations: Abdomen is soft.   Musculoskeletal:         General: Normal range of motion.      Right shoulder: No crepitus. Normal strength.      Cervical back: Normal range of motion and neck supple.   Lymphadenopathy:      Head:      Right side of head: No submental, submandibular, tonsillar, preauricular, posterior auricular or occipital adenopathy.      Left side of head: No submental, submandibular, tonsillar, preauricular, posterior auricular or occipital adenopathy.      Cervical: No cervical adenopathy.      Right cervical: No superficial or posterior cervical adenopathy.     Left cervical: No superficial or posterior cervical adenopathy.      Upper Body:      Right upper body: No supraclavicular or axillary adenopathy.      Left upper body: No supraclavicular or axillary adenopathy.   Skin:     General: Skin is warm and dry.      Coloration: Skin is not pale.      Findings: No erythema or rash (fine barely visible papules over chin. No other areas noted).   Neurological:      General: No focal deficit present.      Mental Status: She is alert and oriented to person, place, and time.      Deep Tendon Reflexes: Reflexes are normal and symmetric.   Psychiatric:         Mood and Affect: Mood normal.         Behavior: Behavior normal.         Thought Content: Thought content normal.         Judgment: Judgment normal.     IMAGING: mammogram today- risk score is 24.43% -results pending    Menarche at 20 y/o      no history of radiation to the neck or chest wall.       FH: Maternal aunt breast cancer at 49 y/o, Maternal GM ovarian cancer, Father lung and prostate cancer, Brother - prostate cancer    Assessment & Plan:  1. Area of prominent glandular tissue noted at the 3 oclock location of the right breast. Fibrous texture and blends more medially and laterally- stable measurements today  2. Mammogram today- results pending- risk  score is 24.43%  3. linda screening mammogram Feb 2024 and exam  4. BSE recommended monthly- call for any changes.    5. Encouraged to get back into exercise and wt loss- pt agrees  6. Discussed genetic testing - pt declines at this time  7. Pt declines tamoxifen use for chemoprevention due to potential side effects   8. RTC August 2023 for breast exam

## 2023-02-13 ENCOUNTER — PATIENT MESSAGE (OUTPATIENT)
Dept: ORTHOPEDICS | Facility: CLINIC | Age: 58
End: 2023-02-13
Payer: COMMERCIAL

## 2023-02-21 ENCOUNTER — HOSPITAL ENCOUNTER (OUTPATIENT)
Dept: RADIOLOGY | Facility: HOSPITAL | Age: 58
Discharge: HOME | End: 2023-02-21
Attending: NURSE PRACTITIONER
Payer: COMMERCIAL

## 2023-02-21 ENCOUNTER — OFFICE VISIT (OUTPATIENT)
Dept: SURGERY | Facility: CLINIC | Age: 58
End: 2023-02-21
Payer: COMMERCIAL

## 2023-02-21 VITALS — HEIGHT: 62 IN | WEIGHT: 194 LBS | BODY MASS INDEX: 35.7 KG/M2

## 2023-02-21 VITALS
HEART RATE: 76 BPM | WEIGHT: 197.56 LBS | DIASTOLIC BLOOD PRESSURE: 70 MMHG | SYSTOLIC BLOOD PRESSURE: 120 MMHG | HEIGHT: 62 IN | RESPIRATION RATE: 16 BRPM | BODY MASS INDEX: 36.35 KG/M2 | OXYGEN SATURATION: 97 %

## 2023-02-21 DIAGNOSIS — Z80.3 FAMILY HISTORY OF BREAST CANCER: ICD-10-CM

## 2023-02-21 DIAGNOSIS — Z91.89 AT HIGH RISK FOR BREAST CANCER: ICD-10-CM

## 2023-02-21 DIAGNOSIS — Z71.89 COUNSELING AND COORDINATION OF CARE: ICD-10-CM

## 2023-02-21 DIAGNOSIS — Z91.89 AT HIGH RISK FOR BREAST CANCER: Primary | ICD-10-CM

## 2023-02-21 DIAGNOSIS — Z71.89 COUNSELING ON HEALTH PROMOTION AND DISEASE PREVENTION: ICD-10-CM

## 2023-02-21 DIAGNOSIS — Z12.39 ENCOUNTER FOR BREAST CANCER SCREENING USING NON-MAMMOGRAM MODALITY: ICD-10-CM

## 2023-02-21 PROCEDURE — 77067 SCR MAMMO BI INCL CAD: CPT | Mod: TC

## 2023-02-21 PROCEDURE — 1159F PR MEDICATION LIST DOCUMENTED IN MEDICAL RECORD: ICD-10-PCS | Mod: CPTII,S$GLB,, | Performed by: NURSE PRACTITIONER

## 2023-02-21 PROCEDURE — 3074F SYST BP LT 130 MM HG: CPT | Mod: CPTII,S$GLB,, | Performed by: NURSE PRACTITIONER

## 2023-02-21 PROCEDURE — 3008F BODY MASS INDEX DOCD: CPT | Mod: CPTII,S$GLB,, | Performed by: NURSE PRACTITIONER

## 2023-02-21 PROCEDURE — 3008F PR BODY MASS INDEX (BMI) DOCUMENTED: ICD-10-PCS | Mod: CPTII,S$GLB,, | Performed by: NURSE PRACTITIONER

## 2023-02-21 PROCEDURE — 77067 MAMMO DIGITAL SCREENING BILAT WITH TOMO: ICD-10-PCS | Mod: 26,,, | Performed by: RADIOLOGY

## 2023-02-21 PROCEDURE — 3078F DIAST BP <80 MM HG: CPT | Mod: CPTII,S$GLB,, | Performed by: NURSE PRACTITIONER

## 2023-02-21 PROCEDURE — 99999 PR PBB SHADOW E&M-EST. PATIENT-LVL V: ICD-10-PCS | Mod: PBBFAC,,, | Performed by: NURSE PRACTITIONER

## 2023-02-21 PROCEDURE — 3074F PR MOST RECENT SYSTOLIC BLOOD PRESSURE < 130 MM HG: ICD-10-PCS | Mod: CPTII,S$GLB,, | Performed by: NURSE PRACTITIONER

## 2023-02-21 PROCEDURE — 77063 BREAST TOMOSYNTHESIS BI: CPT | Mod: 26,,, | Performed by: RADIOLOGY

## 2023-02-21 PROCEDURE — 1159F MED LIST DOCD IN RCRD: CPT | Mod: CPTII,S$GLB,, | Performed by: NURSE PRACTITIONER

## 2023-02-21 PROCEDURE — 99214 PR OFFICE/OUTPT VISIT, EST, LEVL IV, 30-39 MIN: ICD-10-PCS | Mod: TV,S$GLB,, | Performed by: NURSE PRACTITIONER

## 2023-02-21 PROCEDURE — 99999 PR PBB SHADOW E&M-EST. PATIENT-LVL V: CPT | Mod: PBBFAC,,, | Performed by: NURSE PRACTITIONER

## 2023-02-21 PROCEDURE — 3078F PR MOST RECENT DIASTOLIC BLOOD PRESSURE < 80 MM HG: ICD-10-PCS | Mod: CPTII,S$GLB,, | Performed by: NURSE PRACTITIONER

## 2023-02-21 PROCEDURE — 77067 SCR MAMMO BI INCL CAD: CPT | Mod: 26,,, | Performed by: RADIOLOGY

## 2023-02-21 PROCEDURE — 1160F PR REVIEW ALL MEDS BY PRESCRIBER/CLIN PHARMACIST DOCUMENTED: ICD-10-PCS | Mod: CPTII,S$GLB,, | Performed by: NURSE PRACTITIONER

## 2023-02-21 PROCEDURE — 77063 MAMMO DIGITAL SCREENING BILAT WITH TOMO: ICD-10-PCS | Mod: 26,,, | Performed by: RADIOLOGY

## 2023-02-21 PROCEDURE — 1160F RVW MEDS BY RX/DR IN RCRD: CPT | Mod: CPTII,S$GLB,, | Performed by: NURSE PRACTITIONER

## 2023-02-21 PROCEDURE — 99214 OFFICE O/P EST MOD 30 MIN: CPT | Mod: TV,S$GLB,, | Performed by: NURSE PRACTITIONER

## 2023-02-21 RX ORDER — ASPIRIN 81 MG/1
1 TABLET ORAL EVERY MORNING
COMMUNITY

## 2023-02-21 RX ORDER — BENZONATATE 200 MG/1
CAPSULE ORAL
COMMUNITY
Start: 2023-01-16 | End: 2023-02-23

## 2023-02-23 ENCOUNTER — OFFICE VISIT (OUTPATIENT)
Dept: FAMILY MEDICINE | Facility: CLINIC | Age: 58
End: 2023-02-23
Payer: COMMERCIAL

## 2023-02-23 VITALS
TEMPERATURE: 97 F | DIASTOLIC BLOOD PRESSURE: 68 MMHG | BODY MASS INDEX: 36.33 KG/M2 | WEIGHT: 197.44 LBS | SYSTOLIC BLOOD PRESSURE: 124 MMHG | OXYGEN SATURATION: 98 % | HEIGHT: 62 IN | HEART RATE: 102 BPM

## 2023-02-23 DIAGNOSIS — N32.81 OVERACTIVE BLADDER: ICD-10-CM

## 2023-02-23 DIAGNOSIS — Z00.00 PREVENTATIVE HEALTH CARE: Primary | ICD-10-CM

## 2023-02-23 DIAGNOSIS — L50.9 URTICARIA: ICD-10-CM

## 2023-02-23 DIAGNOSIS — I10 ESSENTIAL HYPERTENSION: Chronic | ICD-10-CM

## 2023-02-23 PROCEDURE — 1159F PR MEDICATION LIST DOCUMENTED IN MEDICAL RECORD: ICD-10-PCS | Mod: CPTII,S$GLB,, | Performed by: FAMILY MEDICINE

## 2023-02-23 PROCEDURE — 1160F RVW MEDS BY RX/DR IN RCRD: CPT | Mod: CPTII,S$GLB,, | Performed by: FAMILY MEDICINE

## 2023-02-23 PROCEDURE — 3074F SYST BP LT 130 MM HG: CPT | Mod: CPTII,S$GLB,, | Performed by: FAMILY MEDICINE

## 2023-02-23 PROCEDURE — 99999 PR PBB SHADOW E&M-EST. PATIENT-LVL V: ICD-10-PCS | Mod: PBBFAC,,, | Performed by: FAMILY MEDICINE

## 2023-02-23 PROCEDURE — 3008F BODY MASS INDEX DOCD: CPT | Mod: CPTII,S$GLB,, | Performed by: FAMILY MEDICINE

## 2023-02-23 PROCEDURE — 99396 PR PREVENTIVE VISIT,EST,40-64: ICD-10-PCS | Mod: S$GLB,,, | Performed by: FAMILY MEDICINE

## 2023-02-23 PROCEDURE — 3078F DIAST BP <80 MM HG: CPT | Mod: CPTII,S$GLB,, | Performed by: FAMILY MEDICINE

## 2023-02-23 PROCEDURE — 99396 PREV VISIT EST AGE 40-64: CPT | Mod: S$GLB,,, | Performed by: FAMILY MEDICINE

## 2023-02-23 PROCEDURE — 3074F PR MOST RECENT SYSTOLIC BLOOD PRESSURE < 130 MM HG: ICD-10-PCS | Mod: CPTII,S$GLB,, | Performed by: FAMILY MEDICINE

## 2023-02-23 PROCEDURE — 1159F MED LIST DOCD IN RCRD: CPT | Mod: CPTII,S$GLB,, | Performed by: FAMILY MEDICINE

## 2023-02-23 PROCEDURE — 1160F PR REVIEW ALL MEDS BY PRESCRIBER/CLIN PHARMACIST DOCUMENTED: ICD-10-PCS | Mod: CPTII,S$GLB,, | Performed by: FAMILY MEDICINE

## 2023-02-23 PROCEDURE — 99999 PR PBB SHADOW E&M-EST. PATIENT-LVL V: CPT | Mod: PBBFAC,,, | Performed by: FAMILY MEDICINE

## 2023-02-23 PROCEDURE — 3078F PR MOST RECENT DIASTOLIC BLOOD PRESSURE < 80 MM HG: ICD-10-PCS | Mod: CPTII,S$GLB,, | Performed by: FAMILY MEDICINE

## 2023-02-23 PROCEDURE — 3008F PR BODY MASS INDEX (BMI) DOCUMENTED: ICD-10-PCS | Mod: CPTII,S$GLB,, | Performed by: FAMILY MEDICINE

## 2023-02-23 NOTE — PROGRESS NOTES
CHIEF COMPLAINT:  This is a 57-year-old female here for preventative health exam.     SUBJECTIVE:  Patient is doing well without complaints except for persistent pain in knees right greater than left.  She is status post left knee surgery in May 2022.  She has essential hypertension for which she takes Dyazide daily.  Her blood pressure today is 124/68. She's being followed by breast screening because of fibrocystic disease.  She had nodule in right breast which has improved since she discontinued caffeine.  She continues to take Xolair injections for chronic urticaria.  She also takes Singulair, doxepin, and famotidine.  She is obese with a BMI of 36.11. She takes Mybetriq 25 mg daily for overactive bladder.      Eye exam 2022.  Mammogram  February 2023.   Pap smear October 2021 due again in October 2026 per GYN.  Colonoscopy January 2018, due again in January 2028.  Tdap October 2016. Flu vaccine  September 2022. COVID-19 vaccine September, October 2021, April, December 2022.      ROS:  GENERAL: Patient denies fever, chills, night sweats. Patient denies weight gain. Patient denies anorexia, fatigue, weakness or swollen glands.  Positive for weight loss.  SKIN: Patient denies rash or hair loss.  HEENT: Patient denies sore throat, ear pain, hearing loss, nasal congestion, or runny nose. Patient denies visual disturbance, eye irritation or discharge.  LUNGS: Patient denies cough, wheeze or hemoptysis.  CARDIOVASCULAR: Patient denies shortness of breath, palpitations, syncope or lower extremity edema.  GI: Patient denies abdominal pain, nausea, vomiting, diarrhea, constipation, blood in stool or melena.  GENITOURINARY: Patient denies pelvic pain, vaginal discharge, itch or odor. Patient denies irregular vaginal bleeding. Patient denies dysuria, frequency, hematuria, nocturia, urgency or incontinence.  BREASTS: Patient denies breast pain, mass or nipple discharge.  MUSCULOSKELETAL: Patient denies joint swelling,  redness or warmth. Positive for ankle and foot pain.  NEUROLOGIC: Patient denies headache, vertigo, paresthesias, weakness in limb, dysarthria, dysphagia or abnormality of gait.  PSYCHIATRIC: Patient denies anxiety, depression, or memory loss.     OBJECTIVE:   GENERAL: Well-developed well-nourished, obese, black female alert and oriented x3, in no acute distress. Memory, judgment and cognition without deficit.  Weight loss of 10 pounds in the last year.  SKIN: Clear without rash. Normal color and tone.  HEENT: Eyes: Clear conjunctivae. Pupils equal reactive to light and accommodation. Ears: Clear TMs. Clear canals. Nose: Without congestion. Pharynx: Without injection or exudates.  NECK: Supple, normal range of motion. No masses, lymphadenopathy or enlarged thyroid. No JVD. Carotids 2+ and equal. No bruits.  LUNGS: Clear to auscultation. Normal respiratory effort.  CARDIOVASCULAR: Regular rhythm, normal S1, S2 without murmur, gallop or rub.  BACK: No CVA or spinal tenderness.  BREASTS: No masses, tenderness or nipple discharge.  ABDOMEN: Normal appearance. Active bowel sounds. Soft, nontender without mass or organomegaly. No rebound or guarding.  EXTREMITIES: Without cyanosis, clubbing or edema. Distal pulses 2+ and equal. Normal range of motion in all extremities. No joint effusion, erythema or warmth. Ankle/foot splints in place.  NEUROLOGIC: Cranial nerves II through XII without deficit. Motor strength equal bilaterally. Sensation normal to touch. Deep tendon reflexes 2+ and equal. Gait without abnormality. No tremor. Negative cerebellar signs.  PELVIC: Deferred to OBGYN.    ASSESSMENT:  1. Preventative health care    2. Essential hypertension    3. Overactive bladder    4. Urticaria      PLAN:  1. Weight reduction. Exercise regularly.  2. Age-appropriate counseling.  3. Fasting lab.  4. Refill medications as needed.    5. Follow-up annually.    This note is generated with speech recognition software and is  subject to transcription error and sound alike phrases that may be missed by proofreading.

## 2023-03-01 ENCOUNTER — OFFICE VISIT (OUTPATIENT)
Dept: FAMILY MEDICINE | Facility: CLINIC | Age: 58
End: 2023-03-01
Payer: COMMERCIAL

## 2023-03-01 ENCOUNTER — PATIENT MESSAGE (OUTPATIENT)
Dept: FAMILY MEDICINE | Facility: CLINIC | Age: 58
End: 2023-03-01
Payer: COMMERCIAL

## 2023-03-01 VITALS
DIASTOLIC BLOOD PRESSURE: 80 MMHG | WEIGHT: 197.44 LBS | TEMPERATURE: 97 F | HEART RATE: 92 BPM | OXYGEN SATURATION: 98 % | SYSTOLIC BLOOD PRESSURE: 120 MMHG | HEIGHT: 62 IN | BODY MASS INDEX: 36.33 KG/M2

## 2023-03-01 DIAGNOSIS — L50.9 URTICARIA: Primary | ICD-10-CM

## 2023-03-01 PROCEDURE — 3074F SYST BP LT 130 MM HG: CPT | Mod: CPTII,S$GLB,, | Performed by: REGISTERED NURSE

## 2023-03-01 PROCEDURE — 3044F PR MOST RECENT HEMOGLOBIN A1C LEVEL <7.0%: ICD-10-PCS | Mod: CPTII,S$GLB,, | Performed by: REGISTERED NURSE

## 2023-03-01 PROCEDURE — 96372 THER/PROPH/DIAG INJ SC/IM: CPT | Mod: S$GLB,,, | Performed by: REGISTERED NURSE

## 2023-03-01 PROCEDURE — 99214 PR OFFICE/OUTPT VISIT, EST, LEVL IV, 30-39 MIN: ICD-10-PCS | Mod: 25,S$GLB,, | Performed by: REGISTERED NURSE

## 2023-03-01 PROCEDURE — 3008F PR BODY MASS INDEX (BMI) DOCUMENTED: ICD-10-PCS | Mod: CPTII,S$GLB,, | Performed by: REGISTERED NURSE

## 2023-03-01 PROCEDURE — 3079F DIAST BP 80-89 MM HG: CPT | Mod: CPTII,S$GLB,, | Performed by: REGISTERED NURSE

## 2023-03-01 PROCEDURE — 1159F MED LIST DOCD IN RCRD: CPT | Mod: CPTII,S$GLB,, | Performed by: REGISTERED NURSE

## 2023-03-01 PROCEDURE — 1159F PR MEDICATION LIST DOCUMENTED IN MEDICAL RECORD: ICD-10-PCS | Mod: CPTII,S$GLB,, | Performed by: REGISTERED NURSE

## 2023-03-01 PROCEDURE — 3044F HG A1C LEVEL LT 7.0%: CPT | Mod: CPTII,S$GLB,, | Performed by: REGISTERED NURSE

## 2023-03-01 PROCEDURE — 99214 OFFICE O/P EST MOD 30 MIN: CPT | Mod: 25,S$GLB,, | Performed by: REGISTERED NURSE

## 2023-03-01 PROCEDURE — 3079F PR MOST RECENT DIASTOLIC BLOOD PRESSURE 80-89 MM HG: ICD-10-PCS | Mod: CPTII,S$GLB,, | Performed by: REGISTERED NURSE

## 2023-03-01 PROCEDURE — 99999 PR PBB SHADOW E&M-EST. PATIENT-LVL V: ICD-10-PCS | Mod: PBBFAC,,, | Performed by: REGISTERED NURSE

## 2023-03-01 PROCEDURE — 96372 PR INJECTION,THERAP/PROPH/DIAG2ST, IM OR SUBCUT: ICD-10-PCS | Mod: S$GLB,,, | Performed by: REGISTERED NURSE

## 2023-03-01 PROCEDURE — 3074F PR MOST RECENT SYSTOLIC BLOOD PRESSURE < 130 MM HG: ICD-10-PCS | Mod: CPTII,S$GLB,, | Performed by: REGISTERED NURSE

## 2023-03-01 PROCEDURE — 3008F BODY MASS INDEX DOCD: CPT | Mod: CPTII,S$GLB,, | Performed by: REGISTERED NURSE

## 2023-03-01 PROCEDURE — 99999 PR PBB SHADOW E&M-EST. PATIENT-LVL V: CPT | Mod: PBBFAC,,, | Performed by: REGISTERED NURSE

## 2023-03-01 RX ORDER — METHYLPREDNISOLONE 4 MG/1
TABLET ORAL
Qty: 1 EACH | Refills: 0 | Status: SHIPPED | OUTPATIENT
Start: 2023-03-01 | End: 2023-07-17

## 2023-03-01 RX ORDER — TRIAMCINOLONE ACETONIDE 40 MG/ML
40 INJECTION, SUSPENSION INTRA-ARTICULAR; INTRAMUSCULAR
Status: COMPLETED | OUTPATIENT
Start: 2023-03-01 | End: 2023-03-01

## 2023-03-01 RX ADMIN — TRIAMCINOLONE ACETONIDE 40 MG: 40 INJECTION, SUSPENSION INTRA-ARTICULAR; INTRAMUSCULAR at 04:03

## 2023-03-01 NOTE — PROGRESS NOTES
Subjective:      Stacey Wade is a 57 y.o. female, here today with C/C of:  Urticaria      HPI    Stacey is here with c/o hives and skin itching.  Current episode started on Sunday 2/26/23, was late on her Xolair injection.  Shot done on Monday, gradually improving.  Tx with hydroxyzine and Benedryl but has not given her any sig relief of s/s.      Review of Systems    Per HPI      Review of patient's allergies indicates:   Allergen Reactions    Benzalkonium chloride     Black pepper      Other reaction(s): Hives    Chocolate flavor      Other reaction(s): Hives    Citrus and derivatives Hives     LEMON PRODUCTS    Grapefruit Hives     Other reaction(s): Hives    Ibuprofen Swelling    Latex      Other reaction(s): Hives    Lemon balm (casper officinalis) Hives     Anything with lemon in it    Naproxen Swelling    Neomycin-bacitracin-polymyxin      Other reaction(s): Rash    Oats (richard) Hives     Oat foods (oatmeal, etc...)    Vegetable acetoglycerides Hives     Vegetable gums    Wheat containing prod     Wheat flour Hives       Patient Active Problem List   Diagnosis    Essential hypertension    Allergic rhinitis    Anxiety    Urticaria    Obesity (BMI 30.0-34.9)    Complex tear of medial meniscus of left knee    Migraines    Overactive bladder    Decreased functional mobility and endurance         Current Outpatient Medications:     aspirin (ECOTRIN) 81 MG EC tablet, Take 1 tablet by mouth every morning., Disp: , Rfl:     betamethasone dipropionate 0.05 % cream, APPLY TOPICALLY 2 TIMES DAILY., Disp: 45 g, Rfl: 0    clorazepate (TRANXENE) 3.75 MG Tab, TAKE 1 TABLET BY MOUTH EVERY DAY AS NEEDED, Disp: 30 tablet, Rfl: 0    diclofenac sodium (VOLTAREN) 1 % Gel, Apply 2 g topically 4 (four) times daily., Disp: 100 g, Rfl: 2    doxepin (SINEQUAN) 10 MG capsule, TAKE TWO CAPSULES BY MOUTH THREE TIMES DAILY, Disp: 540 capsule, Rfl: 2    EPINEPHrine (EPIPEN) 0.3 mg/0.3 mL AtIn, Inject 0.3 mg into the muscle daily  "as needed., Disp: , Rfl:     famotidine (PEPCID) 40 MG tablet, Take 20 mg by mouth., Disp: , Rfl:     fexofenadine (ALLEGRA) 180 MG tablet, TAKE ONE TABLET BY MOUTH EVERY DAY, Disp: 30 tablet, Rfl: 0    hydrOXYzine HCL (ATARAX) 25 MG tablet, TAKE ONE TABLET BY MOUTH FOUR TIMES DAILY AS NEEDED FOR ITCHING, Disp: 120 tablet, Rfl: 1    mirabegron (MYRBETRIQ) 25 mg Tb24 ER tablet, TAKE ONE TABLET BY MOUTH EVERY DAY, Disp: 90 tablet, Rfl: 2    mometasone (NASONEX) 50 mcg/actuation nasal spray, INHALE 2 SPRAYS BY NASAL ROUTE ONCE DAILY, Disp: 17 g, Rfl: 1    montelukast (SINGULAIR) 10 mg tablet, Take 1 tablet (10 mg total) by mouth once daily., Disp: 30 tablet, Rfl: 0    omalizumab (XOLAIR) 150 mg/mL injection, Inject 300 mg into the skin., Disp: , Rfl:     potassium chloride (MICRO-K) 10 MEQ CpSR, Take 1 capsule (10 mEq total) by mouth once daily., Disp: 90 capsule, Rfl: 3    rizatriptan (MAXALT) 10 MG tablet, TAKE ONE TABLET BY MOUTH AS NEEDED FOR migraine MAY REPEAT in TWO hours. no more THAN TWO TABLETS in 24 hours Strength: 10 mg, Disp: 27 tablet, Rfl: 1    triamterene-hydrochlorothiazide 37.5-25 mg (DYAZIDE) 37.5-25 mg per capsule, Take 1 capsule by mouth once daily., Disp: 90 capsule, Rfl: 3      Past medical, surgical, family and social histories have been reviewed today.      Objective:     Vitals:    03/01/23 1539   BP: 120/80   Pulse: 92   Temp: 97 °F (36.1 °C)   SpO2: 98%   Weight: 89.5 kg (197 lb 6.8 oz)   Height: 5' 2" (1.575 m)   PainSc: 0-No pain       Physical Exam  Vitals reviewed.   Constitutional:       General: She is not in acute distress.  HENT:      Head: Normocephalic and atraumatic.   Skin:     Comments: No current hives but does show me a pic on her phone at onset of issue.  Hives located to neck, arms and upper chest.  Now left with redness and small fine bumps to chest and arms.   Neurological:      Mental Status: She is alert and oriented to person, place, and time. "       Diagnosis/Assessment:      1. Urticaria  triamcinolone acetonide injection 40 mg          Plan:     Steroid IM today.  Medrol dosepack if needed -- was sent in to pharmacy earlier today by PCP, start in 2 to 3 days if injection wears off and still symptomatic.  AM --- Allegra 180 mg, Pepcid 20 mg  PM --- Pepcid 20 mg, hydroxyzine  Supplement with topical hydrocortisone cream and/or Sarna.  Skin care discussed.    Follow-up:     Contact office back in 2 to 3 days if worse or no better.  RTC as directed or on prn basis.        PAYTON Judge  [facility]       30 minutes of total time spent on the encounter, which includes face to face time and non-face to face time preparing to see the patient.  This included obtaining and/or reviewing separately obtained history, and documenting clinical information in the electronic or other health record.   Also includes independent interpretation of results (not separately reported) and communicating results to the patient/family/caregiver, with care coordination (not separately reported).

## 2023-03-01 NOTE — PATIENT INSTRUCTIONS
MORNING:  Allegra (fexofenadine) 180 mg tab  Pepcid (famotidine) 20 mg tab    EVENING/BEDTIME:  Pepcid 20 mg tab  Vistaril (hydroxyzine) 25 to 50 mg tab ---- SEDATING      Steroid injection given in office.  Steroid cream ---- can apply alone or mixed in with anti-itch cream (Sarna).    Start the Medrol dosepack either Friday or Sat if you need it.  Steroid shot may give you all the relief you need.

## 2023-03-03 ENCOUNTER — LAB VISIT (OUTPATIENT)
Dept: LAB | Facility: HOSPITAL | Age: 58
End: 2023-03-03
Attending: FAMILY MEDICINE
Payer: COMMERCIAL

## 2023-03-03 DIAGNOSIS — Z00.00 PREVENTATIVE HEALTH CARE: ICD-10-CM

## 2023-03-03 LAB
ALBUMIN SERPL BCP-MCNC: 4.1 G/DL (ref 3.5–5.2)
ALP SERPL-CCNC: 180 U/L (ref 55–135)
ALT SERPL W/O P-5'-P-CCNC: 16 U/L (ref 10–44)
ANION GAP SERPL CALC-SCNC: 14 MMOL/L (ref 8–16)
AST SERPL-CCNC: 17 U/L (ref 10–40)
BASOPHILS # BLD AUTO: 0.04 K/UL (ref 0–0.2)
BASOPHILS NFR BLD: 0.4 % (ref 0–1.9)
BILIRUB SERPL-MCNC: 0.4 MG/DL (ref 0.1–1)
BUN SERPL-MCNC: 18 MG/DL (ref 6–20)
CALCIUM SERPL-MCNC: 10.8 MG/DL (ref 8.7–10.5)
CHLORIDE SERPL-SCNC: 103 MMOL/L (ref 95–110)
CHOLEST SERPL-MCNC: 226 MG/DL (ref 120–199)
CHOLEST/HDLC SERPL: 3.1 {RATIO} (ref 2–5)
CO2 SERPL-SCNC: 22 MMOL/L (ref 23–29)
CREAT SERPL-MCNC: 1 MG/DL (ref 0.5–1.4)
DIFFERENTIAL METHOD: ABNORMAL
EOSINOPHIL # BLD AUTO: 0 K/UL (ref 0–0.5)
EOSINOPHIL NFR BLD: 0.2 % (ref 0–8)
ERYTHROCYTE [DISTWIDTH] IN BLOOD BY AUTOMATED COUNT: 16.2 % (ref 11.5–14.5)
EST. GFR  (NO RACE VARIABLE): >60 ML/MIN/1.73 M^2
ESTIMATED AVG GLUCOSE: 120 MG/DL (ref 68–131)
GLUCOSE SERPL-MCNC: 84 MG/DL (ref 70–110)
HBA1C MFR BLD: 5.8 % (ref 4–5.6)
HCT VFR BLD AUTO: 45.1 % (ref 37–48.5)
HDLC SERPL-MCNC: 74 MG/DL (ref 40–75)
HDLC SERPL: 32.7 % (ref 20–50)
HGB BLD-MCNC: 13.8 G/DL (ref 12–16)
IMM GRANULOCYTES # BLD AUTO: 0.04 K/UL (ref 0–0.04)
IMM GRANULOCYTES NFR BLD AUTO: 0.4 % (ref 0–0.5)
LDLC SERPL CALC-MCNC: 141 MG/DL (ref 63–159)
LYMPHOCYTES # BLD AUTO: 2.3 K/UL (ref 1–4.8)
LYMPHOCYTES NFR BLD: 20.5 % (ref 18–48)
MCH RBC QN AUTO: 22.7 PG (ref 27–31)
MCHC RBC AUTO-ENTMCNC: 30.6 G/DL (ref 32–36)
MCV RBC AUTO: 74 FL (ref 82–98)
MONOCYTES # BLD AUTO: 0.6 K/UL (ref 0.3–1)
MONOCYTES NFR BLD: 5.3 % (ref 4–15)
NEUTROPHILS # BLD AUTO: 8.1 K/UL (ref 1.8–7.7)
NEUTROPHILS NFR BLD: 73.2 % (ref 38–73)
NONHDLC SERPL-MCNC: 152 MG/DL
NRBC BLD-RTO: 0 /100 WBC
PLATELET # BLD AUTO: 228 K/UL (ref 150–450)
PMV BLD AUTO: 11.3 FL (ref 9.2–12.9)
POTASSIUM SERPL-SCNC: 3.5 MMOL/L (ref 3.5–5.1)
PROT SERPL-MCNC: 7.8 G/DL (ref 6–8.4)
RBC # BLD AUTO: 6.08 M/UL (ref 4–5.4)
SODIUM SERPL-SCNC: 139 MMOL/L (ref 136–145)
TRIGL SERPL-MCNC: 55 MG/DL (ref 30–150)
WBC # BLD AUTO: 11.02 K/UL (ref 3.9–12.7)

## 2023-03-03 PROCEDURE — 80061 LIPID PANEL: CPT | Performed by: FAMILY MEDICINE

## 2023-03-03 PROCEDURE — 83036 HEMOGLOBIN GLYCOSYLATED A1C: CPT | Performed by: FAMILY MEDICINE

## 2023-03-03 PROCEDURE — 85025 COMPLETE CBC W/AUTO DIFF WBC: CPT | Performed by: FAMILY MEDICINE

## 2023-03-03 PROCEDURE — 36415 COLL VENOUS BLD VENIPUNCTURE: CPT | Mod: PO | Performed by: FAMILY MEDICINE

## 2023-03-03 PROCEDURE — 84443 ASSAY THYROID STIM HORMONE: CPT | Performed by: FAMILY MEDICINE

## 2023-03-03 PROCEDURE — 80053 COMPREHEN METABOLIC PANEL: CPT | Performed by: FAMILY MEDICINE

## 2023-03-04 LAB — TSH SERPL DL<=0.005 MIU/L-ACNC: 0.52 UIU/ML (ref 0.4–4)

## 2023-03-05 PROBLEM — R73.03 PREDIABETES: Status: ACTIVE | Noted: 2023-03-05

## 2023-03-08 ENCOUNTER — TELEPHONE (OUTPATIENT)
Dept: ORTHOPEDICS | Facility: CLINIC | Age: 58
End: 2023-03-08
Payer: COMMERCIAL

## 2023-03-24 ENCOUNTER — OFFICE VISIT (OUTPATIENT)
Dept: FAMILY MEDICINE | Facility: CLINIC | Age: 58
End: 2023-03-24
Payer: COMMERCIAL

## 2023-03-24 VITALS
DIASTOLIC BLOOD PRESSURE: 70 MMHG | OXYGEN SATURATION: 98 % | SYSTOLIC BLOOD PRESSURE: 104 MMHG | BODY MASS INDEX: 35.46 KG/M2 | WEIGHT: 192.69 LBS | HEART RATE: 85 BPM | HEIGHT: 62 IN | TEMPERATURE: 98 F

## 2023-03-24 DIAGNOSIS — B37.2 INTERTRIGINOUS CANDIDIASIS: Primary | ICD-10-CM

## 2023-03-24 PROCEDURE — 3044F HG A1C LEVEL LT 7.0%: CPT | Mod: CPTII,S$GLB,, | Performed by: REGISTERED NURSE

## 2023-03-24 PROCEDURE — 3008F PR BODY MASS INDEX (BMI) DOCUMENTED: ICD-10-PCS | Mod: CPTII,S$GLB,, | Performed by: REGISTERED NURSE

## 2023-03-24 PROCEDURE — 3078F PR MOST RECENT DIASTOLIC BLOOD PRESSURE < 80 MM HG: ICD-10-PCS | Mod: CPTII,S$GLB,, | Performed by: REGISTERED NURSE

## 2023-03-24 PROCEDURE — 99213 PR OFFICE/OUTPT VISIT, EST, LEVL III, 20-29 MIN: ICD-10-PCS | Mod: S$GLB,,, | Performed by: REGISTERED NURSE

## 2023-03-24 PROCEDURE — 3078F DIAST BP <80 MM HG: CPT | Mod: CPTII,S$GLB,, | Performed by: REGISTERED NURSE

## 2023-03-24 PROCEDURE — 3074F PR MOST RECENT SYSTOLIC BLOOD PRESSURE < 130 MM HG: ICD-10-PCS | Mod: CPTII,S$GLB,, | Performed by: REGISTERED NURSE

## 2023-03-24 PROCEDURE — 3008F BODY MASS INDEX DOCD: CPT | Mod: CPTII,S$GLB,, | Performed by: REGISTERED NURSE

## 2023-03-24 PROCEDURE — 99999 PR PBB SHADOW E&M-EST. PATIENT-LVL V: ICD-10-PCS | Mod: PBBFAC,,, | Performed by: REGISTERED NURSE

## 2023-03-24 PROCEDURE — 3074F SYST BP LT 130 MM HG: CPT | Mod: CPTII,S$GLB,, | Performed by: REGISTERED NURSE

## 2023-03-24 PROCEDURE — 99213 OFFICE O/P EST LOW 20 MIN: CPT | Mod: S$GLB,,, | Performed by: REGISTERED NURSE

## 2023-03-24 PROCEDURE — 3044F PR MOST RECENT HEMOGLOBIN A1C LEVEL <7.0%: ICD-10-PCS | Mod: CPTII,S$GLB,, | Performed by: REGISTERED NURSE

## 2023-03-24 PROCEDURE — 1159F PR MEDICATION LIST DOCUMENTED IN MEDICAL RECORD: ICD-10-PCS | Mod: CPTII,S$GLB,, | Performed by: REGISTERED NURSE

## 2023-03-24 PROCEDURE — 1159F MED LIST DOCD IN RCRD: CPT | Mod: CPTII,S$GLB,, | Performed by: REGISTERED NURSE

## 2023-03-24 PROCEDURE — 99999 PR PBB SHADOW E&M-EST. PATIENT-LVL V: CPT | Mod: PBBFAC,,, | Performed by: REGISTERED NURSE

## 2023-03-24 RX ORDER — CLOTRIMAZOLE AND BETAMETHASONE DIPROPIONATE 10; .64 MG/G; MG/G
CREAM TOPICAL 2 TIMES DAILY
Qty: 45 G | Refills: 2 | Status: SHIPPED | OUTPATIENT
Start: 2023-03-24 | End: 2023-09-26

## 2023-03-24 NOTE — PROGRESS NOTES
Subjective:      Stacey Wade is a 57 y.o. female, here today with C/C of:  Skin problem      HPI:    Mrs. Wade is here with reports of skin problem.  She is not sure of when it started but noticed it 2 days ago.  Skin red and irritated under folds of stomach on both sides.  Tx with peroxide, Calamine and anti-fungal meds.      Review of Systems    Per HPI      Review of patient's allergies indicates:   Allergen Reactions    Benzalkonium chloride     Black pepper      Other reaction(s): Hives    Chocolate flavor      Other reaction(s): Hives    Citrus and derivatives Hives     LEMON PRODUCTS    Grapefruit Hives     Other reaction(s): Hives    Ibuprofen Swelling    Latex      Other reaction(s): Hives    Lemon balm (casper officinalis) Hives     Anything with lemon in it    Naproxen Swelling    Neomycin-bacitracin-polymyxin      Other reaction(s): Rash    Oats (richard) Hives     Oat foods (oatmeal, etc...)    Vegetable acetoglycerides Hives     Vegetable gums    Wheat containing prod     Wheat flour Hives       Patient Active Problem List   Diagnosis    Essential hypertension    Allergic rhinitis    Anxiety    Urticaria    Obesity (BMI 30.0-34.9)    Complex tear of medial meniscus of left knee    Migraines    Overactive bladder    Decreased functional mobility and endurance    Prediabetes         Current Outpatient Medications:     aspirin (ECOTRIN) 81 MG EC tablet, Take 1 tablet by mouth every morning., Disp: , Rfl:     betamethasone dipropionate 0.05 % cream, APPLY TOPICALLY 2 TIMES DAILY., Disp: 45 g, Rfl: 0    clorazepate (TRANXENE) 3.75 MG Tab, TAKE 1 TABLET BY MOUTH EVERY DAY AS NEEDED, Disp: 30 tablet, Rfl: 0    diclofenac sodium (VOLTAREN) 1 % Gel, Apply 2 g topically 4 (four) times daily., Disp: 100 g, Rfl: 2    doxepin (SINEQUAN) 10 MG capsule, TAKE TWO CAPSULES BY MOUTH THREE TIMES DAILY, Disp: 540 capsule, Rfl: 2    EPINEPHrine (EPIPEN) 0.3 mg/0.3 mL AtIn, Inject 0.3 mg into the muscle daily as  "needed., Disp: , Rfl:     famotidine (PEPCID) 40 MG tablet, Take 20 mg by mouth., Disp: , Rfl:     fexofenadine (ALLEGRA) 180 MG tablet, TAKE ONE TABLET BY MOUTH EVERY DAY, Disp: 30 tablet, Rfl: 0    hydrOXYzine HCL (ATARAX) 25 MG tablet, TAKE ONE TABLET BY MOUTH FOUR TIMES DAILY AS NEEDED FOR ITCHING, Disp: 120 tablet, Rfl: 1    methylPREDNISolone (MEDROL DOSEPACK) 4 mg tablet, Use as directed. (Patient not taking: Reported on 3/1/2023), Disp: 1 each, Rfl: 0    mirabegron (MYRBETRIQ) 25 mg Tb24 ER tablet, TAKE ONE TABLET BY MOUTH EVERY DAY, Disp: 90 tablet, Rfl: 2    mometasone (NASONEX) 50 mcg/actuation nasal spray, INHALE 2 SPRAYS BY NASAL ROUTE ONCE DAILY, Disp: 17 g, Rfl: 1    montelukast (SINGULAIR) 10 mg tablet, Take 1 tablet (10 mg total) by mouth once daily., Disp: 30 tablet, Rfl: 0    omalizumab (XOLAIR) 150 mg/mL injection, Inject 300 mg into the skin., Disp: , Rfl:     potassium chloride (MICRO-K) 10 MEQ CpSR, Take 1 capsule (10 mEq total) by mouth once daily., Disp: 90 capsule, Rfl: 3    rizatriptan (MAXALT) 10 MG tablet, TAKE ONE TABLET BY MOUTH AS NEEDED FOR migraine MAY REPEAT in TWO hours. no more THAN TWO TABLETS in 24 hours Strength: 10 mg, Disp: 27 tablet, Rfl: 1    triamterene-hydrochlorothiazide 37.5-25 mg (DYAZIDE) 37.5-25 mg per capsule, Take 1 capsule by mouth once daily., Disp: 90 capsule, Rfl: 3      Past medical, surgical, family and social histories have been reviewed today.      Objective:     Vitals:    03/24/23 1044   BP: 104/70   Pulse: 85   Temp: 97.7 °F (36.5 °C)   SpO2: 98%   Weight: 87.4 kg (192 lb 10.9 oz)   Height: 5' 2" (1.575 m)   PainSc: 0-No pain       Physical Exam  Vitals reviewed.   Constitutional:       General: She is not in acute distress.  Skin:     Findings: Rash (fungal rash under pannus both sides) present.   Neurological:      Mental Status: She is alert and oriented to person, place, and time.   Psychiatric:         Mood and Affect: Mood normal.         " Behavior: Behavior normal.         Thought Content: Thought content normal.         Judgment: Judgment normal.       Diagnosis/Assessment:     1. Intertriginous candidiasis  clotrimazole-betamethasone 1-0.05% (LOTRISONE) cream  Skin care discussed, txmt and prevention.  She has been provided information on this on her AVS.  Monitor.          Follow-up:     Contact office back if skin issue worsens or fails to improve.  RTC as directed and/or prn.        PAYTON Judge  [facility]       20 minutes of total time spent on the encounter, which includes face to face time and non-face to face time preparing to see the patient.  This includes obtaining and/or reviewing separately obtained history, performing a medically appropriate examination and/or evaluation, and counseling and educating the patient/family/caregiver.  Includes documenting clinical information in the electronic or other health record, independently interpreting results (not separately reported) and communicating results to the patient/family/caregiver, with care coordination (not separately reported).  Medications, tests and/or procedures ordered as necessary along with referring and communicating with other health professionals (when not separately reported).

## 2023-04-03 ENCOUNTER — PATIENT MESSAGE (OUTPATIENT)
Dept: FAMILY MEDICINE | Facility: CLINIC | Age: 58
End: 2023-04-03
Payer: COMMERCIAL

## 2023-04-07 ENCOUNTER — PATIENT MESSAGE (OUTPATIENT)
Dept: ORTHOPEDICS | Facility: CLINIC | Age: 58
End: 2023-04-07
Payer: COMMERCIAL

## 2023-04-24 ENCOUNTER — PATIENT MESSAGE (OUTPATIENT)
Dept: ORTHOPEDICS | Facility: CLINIC | Age: 58
End: 2023-04-24
Payer: COMMERCIAL

## 2023-05-11 DIAGNOSIS — R51.9 RECURRENT HEADACHE: ICD-10-CM

## 2023-05-11 RX ORDER — RIZATRIPTAN BENZOATE 10 MG/1
TABLET ORAL
Qty: 27 TABLET | Refills: 3 | Status: SHIPPED | OUTPATIENT
Start: 2023-05-11 | End: 2024-03-08 | Stop reason: SDUPTHER

## 2023-05-11 NOTE — TELEPHONE ENCOUNTER
No care due was identified.  [facility] Embedded Care Due Messages. Reference number: [id number].   5/11/2023 8:04:44 AM CDT

## 2023-05-11 NOTE — TELEPHONE ENCOUNTER
Refill Decision Note   Stacey Wade  is requesting a refill authorization.  Brief Assessment and Rationale for Refill:        Medication Therapy Plan:             Comments:     Note composed:12:49 PM 05/11/2023             Appointments     Last Visit   2/23/2023 Savannah Lindsey MD   Next Visit   Visit date not found Savannah Lindsey MD

## 2023-05-17 ENCOUNTER — TELEPHONE (OUTPATIENT)
Dept: SPORTS MEDICINE | Facility: CLINIC | Age: 58
End: 2023-05-17
Payer: COMMERCIAL

## 2023-05-17 NOTE — TELEPHONE ENCOUNTER
Called patient to determine if she would like to proceed with left knee viscosupplementation. Unable to leave voicemail as mailbox was full.

## 2023-05-18 DIAGNOSIS — L50.9 URTICARIA: ICD-10-CM

## 2023-05-18 RX ORDER — HYDROXYZINE HYDROCHLORIDE 25 MG/1
TABLET, FILM COATED ORAL
Qty: 120 TABLET | Refills: 5 | Status: SHIPPED | OUTPATIENT
Start: 2023-05-18 | End: 2023-11-13

## 2023-05-22 ENCOUNTER — OFFICE VISIT (OUTPATIENT)
Dept: SPORTS MEDICINE | Facility: CLINIC | Age: 58
End: 2023-05-22
Payer: COMMERCIAL

## 2023-05-22 VITALS — BODY MASS INDEX: 35.46 KG/M2 | WEIGHT: 192.69 LBS | HEIGHT: 62 IN

## 2023-05-22 DIAGNOSIS — M17.12 PRIMARY OSTEOARTHRITIS OF LEFT KNEE: Primary | ICD-10-CM

## 2023-05-22 DIAGNOSIS — E66.01 CLASS 2 SEVERE OBESITY WITH SERIOUS COMORBIDITY AND BODY MASS INDEX (BMI) OF 35.0 TO 35.9 IN ADULT, UNSPECIFIED OBESITY TYPE: ICD-10-CM

## 2023-05-22 PROCEDURE — 20610 DRAIN/INJ JOINT/BURSA W/O US: CPT | Mod: LT,S$GLB,, | Performed by: ORTHOPAEDIC SURGERY

## 2023-05-22 PROCEDURE — 99999 PR PBB SHADOW E&M-EST. PATIENT-LVL IV: CPT | Mod: PBBFAC,,, | Performed by: ORTHOPAEDIC SURGERY

## 2023-05-22 PROCEDURE — 99999 PR PBB SHADOW E&M-EST. PATIENT-LVL IV: ICD-10-PCS | Mod: PBBFAC,,, | Performed by: ORTHOPAEDIC SURGERY

## 2023-05-22 PROCEDURE — 1159F MED LIST DOCD IN RCRD: CPT | Mod: CPTII,S$GLB,, | Performed by: ORTHOPAEDIC SURGERY

## 2023-05-22 PROCEDURE — 99213 PR OFFICE/OUTPT VISIT, EST, LEVL III, 20-29 MIN: ICD-10-PCS | Mod: 25,S$GLB,, | Performed by: ORTHOPAEDIC SURGERY

## 2023-05-22 PROCEDURE — 3044F HG A1C LEVEL LT 7.0%: CPT | Mod: CPTII,S$GLB,, | Performed by: ORTHOPAEDIC SURGERY

## 2023-05-22 PROCEDURE — 1159F PR MEDICATION LIST DOCUMENTED IN MEDICAL RECORD: ICD-10-PCS | Mod: CPTII,S$GLB,, | Performed by: ORTHOPAEDIC SURGERY

## 2023-05-22 PROCEDURE — 3008F PR BODY MASS INDEX (BMI) DOCUMENTED: ICD-10-PCS | Mod: CPTII,S$GLB,, | Performed by: ORTHOPAEDIC SURGERY

## 2023-05-22 PROCEDURE — 3044F PR MOST RECENT HEMOGLOBIN A1C LEVEL <7.0%: ICD-10-PCS | Mod: CPTII,S$GLB,, | Performed by: ORTHOPAEDIC SURGERY

## 2023-05-22 PROCEDURE — 20610 LARGE JOINT ASPIRATION/INJECTION: L KNEE: ICD-10-PCS | Mod: LT,S$GLB,, | Performed by: ORTHOPAEDIC SURGERY

## 2023-05-22 PROCEDURE — 3008F BODY MASS INDEX DOCD: CPT | Mod: CPTII,S$GLB,, | Performed by: ORTHOPAEDIC SURGERY

## 2023-05-22 PROCEDURE — 99213 OFFICE O/P EST LOW 20 MIN: CPT | Mod: 25,S$GLB,, | Performed by: ORTHOPAEDIC SURGERY

## 2023-05-22 NOTE — PATIENT INSTRUCTIONS
Assessment:  Stacey Wade is a 57 y.o. 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    Primary osteoarthritis of left knee Yes    Class 2 severe obesity with serious comorbidity and body mass index (BMI) of 35.0 to 35.9 in adult, unspecified obesity type        Plan:  Left knee Euflexxa 1/3 performed today  - extra E&M - patient wanted to discuss visco although previously discussed, she wasn't sure and wanted to discuss again  - after a long discussion, she wants to proceed with visco which we did today  - also advised on standard OA recommendations.  Although there is not a cure for arthritis, there are effective ways to improve symptoms.     Exercise & Activity:  I recommend low impact activities such as elliptical and bicycle   Walking is great for arthritis: https://www.[employer]/3-reasons-walking-with-knee-arthritis/  If walking long distances, I recommend good quality well-cushioned shoes. Varsity sports can help you find the right ones: https://www.[employer]/  Aquatic and pool therapy is often helpful because it lessens the impact on the joint, strengthens the leg and thigh muscles, and helps to control swelling.   Knee motion is important to the health of the knee.     Knee Braces:  A compression knee sleeve can help limit swelling and provide proprioceptive feedback.     Prescriptions & Medications:  I do recommend formal physical therapy or at minimum a home exercise program.   Over the counter analgesic (pain-relieving) medications can help. Examples are Tylenol, Ibuprofen, and Aleve. Check with your primary care physician to make sure you don't have contra-indications to taking those medicines.  Some over the counter supplement solutions such as  glucosamine and chondroitin may help with symptoms, although the evidence is mixed.    Healthy Lifestyle:  Excess body weight can have a negative impact on joint health and on pain. I recommend healthy lifestyle choices including nutrition and exercise that help reach and maintain an ideal body weight. Tips for Exercise: https://www.Pixeon/13-exercise-tips-for-a-healthier-you/  Some diets cause increased inflammation. I recommend a balanced wholesome diet including some foods such as olives that are shown to decrease inflammation. More diet information available here: https://www.[employer]/8-best-foods-for-knee-arthritis/     Follow-up: visco or sooner if there are any problems between now and then.    Leave Review:   Google: Leave Google Review  Healthgrades: Leave Healthgrades Review    After Hours Number: [phone]

## 2023-05-22 NOTE — PROCEDURES
Large Joint Aspiration/Injection: L knee    Date/Time: 5/22/2023 2:00 PM  Performed by: Wes Faulkner MD  Authorized by: Wes Faulkner MD     Consent Done?:  Yes (Verbal)  Indications:  Joint swelling and pain  Site marked: the procedure site was marked    Timeout: prior to procedure the correct patient, procedure, and site was verified    Prep: patient was prepped and draped in usual sterile fashion      Local anesthesia used?: Yes    Local anesthetic:  Topical anesthetic    Details:  Needle Size:  22 G  Ultrasonic Guidance for needle placement?: No    Approach:  Superior  Location:  Knee  Site:  L knee  Medications:  20 mg sodium hyaluronate (EUFLEXXA) 10 mg/mL(mw 2.4 -3.6 million)  Patient tolerance:  Patient tolerated the procedure well with no immediate complications     Left knee Euflexxa 1/3

## 2023-05-22 NOTE — PROGRESS NOTES
Patient ID: Stacey Wade  YOB: 1965  MRN: [id number]    Chief Complaint: Pain of the Left Knee    Referred By: Established patient    History of Present Illness: Stacey Wade is a  57 y.o. 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 She is 12mo s/p Left Knee scope, medial meniscus root repair, centralization (5/13/2022). She rates her pain as a 1/10. She is not currently in PT. She is having pain with prolonged standing and walking. She is here today to get her knee looked at by Dr. Faulkner as it has started swelling up on her. She was previously seen in February where we placed an order for viscosupplementation for her Left knee. She had not started viscosupplementation for her Left knee, but it was approved.     Pain   (2/6/23)  Stacey is here today for her 9mo s/p L Knee scope, meniscus compartmentalization (5/13/2022), She rates her pain as a 1-2/10 today. She is not currently in PT. Her knee is doing great, just hurts a little with more strenuous activity. Overall, she has no complaints.    Past Medical History:   Past Medical History:   Diagnosis Date    Allergic rhinitis     Anxiety     Hypertension     Osteoarthritis of both knees     Prediabetes     Urticaria      Past Surgical History:   Procedure Laterality Date    CHONDROPLASTY OF KNEE Left 05/13/2022    Procedure: CHONDROPLASTY, KNEE;  Surgeon: Wes Faulkner MD;  Location: [facility] OR;  Service: Orthopedics;  Laterality: Left;    COLONOSCOPY N/A 01/18/2018    Procedure: COLONOSCOPY;  Surgeon: Yong Smith MD;  Location: [facility] ENDO;  Service: Endoscopy;  Laterality: N/A;    HERNIA REPAIR Left      Family History   Problem Relation Age of Onset    Lung cancer Paternal Grandfather     Ovarian cancer Maternal Grandmother     Hyperlipidemia Mother     Hypertension Mother     Breast cancer Mother 60    Arthritis Mother     Lung cancer Father      Breast cancer Maternal Aunt 53    Heart failure Other         Great aunt    Prostate cancer Brother     Brain cancer Sister     Diabetes Neg Hx     Pseudochol deficiency Neg Hx     Malignant hyperthermia Neg Hx      Social History     Socioeconomic History    Marital status: Single   Tobacco Use    Smoking status: Never    Smokeless tobacco: Never   Substance and Sexual Activity    Alcohol use: No    Drug use: No    Sexual activity: Not Currently     Partners: Male     Birth control/protection: None   Social History Narrative    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     Medication List with Changes/Refills   Current Medications    ASPIRIN (ECOTRIN) 81 MG EC TABLET    Take 1 tablet by mouth every morning.    BETAMETHASONE DIPROPIONATE 0.05 % CREAM    APPLY TOPICALLY 2 TIMES DAILY.    CLORAZEPATE (TRANXENE) 3.75 MG TAB    TAKE 1 TABLET BY MOUTH EVERY DAY AS NEEDED    CLOTRIMAZOLE-BETAMETHASONE 1-0.05% (LOTRISONE) CREAM    Apply topically 2 (two) times daily.    DICLOFENAC SODIUM (VOLTAREN) 1 % GEL    Apply 2 g topically 4 (four) times daily.    DOXEPIN (SINEQUAN) 10 MG CAPSULE    TAKE TWO CAPSULES BY MOUTH THREE TIMES DAILY    EPINEPHRINE (EPIPEN) 0.3 MG/0.3 ML ATIN    Inject 0.3 mg into the muscle daily as needed.    FAMOTIDINE (PEPCID) 40 MG TABLET    Take 20 mg by mouth.    FEXOFENADINE (ALLEGRA) 180 MG TABLET    TAKE ONE TABLET BY MOUTH EVERY DAY    HYDROXYZINE HCL (ATARAX) 25 MG TABLET    TAKE ONE TABLET BY MOUTH FOUR TIMES DAILY AS NEEDED FOR ITCHING    METHYLPREDNISOLONE (MEDROL DOSEPACK) 4 MG TABLET    Use as directed.    MIRABEGRON (MYRBETRIQ) 25 MG TB24 ER TABLET    TAKE ONE TABLET BY MOUTH EVERY DAY    MOMETASONE (NASONEX) 50 MCG/ACTUATION NASAL SPRAY    INHALE 2 SPRAYS INTO THE NOSTRIL ONCE A DAY    MONTELUKAST (SINGULAIR) 10 MG TABLET    Take 1 tablet (10 mg total) by mouth once daily.    OMALIZUMAB (XOLAIR) 150 MG/ML  INJECTION    Inject 300 mg into the skin.    POTASSIUM CHLORIDE (MICRO-K) 10 MEQ CPSR    Take 1 capsule (10 mEq total) by mouth once daily.    RIZATRIPTAN (MAXALT) 10 MG TABLET    TAKE 1 TABLET BY MOUTH AS NEEDED FOR MIGRAINE. MAY REPEAT IN 2 HOURS. MAX 2 TABS PER 24 HOURS    TRIAMTERENE-HYDROCHLOROTHIAZIDE 37.5-25 MG (DYAZIDE) 37.5-25 MG PER CAPSULE    Take 1 capsule by mouth once daily.     Review of patient's allergies indicates:   Allergen Reactions    Benzalkonium chloride     Black pepper      Other reaction(s): Hives    Chocolate flavor      Other reaction(s): Hives    Citrus and derivatives Hives     LEMON PRODUCTS    Grapefruit Hives     Other reaction(s): Hives    Ibuprofen Swelling    Latex      Other reaction(s): Hives    Lemon balm (casper officinalis) Hives     Anything with lemon in it    Naproxen Swelling    Neomycin-bacitracin-polymyxin      Other reaction(s): Rash    Oats (richard) Hives     Oat foods (oatmeal, etc...)    Vegetable acetoglycerides Hives     Vegetable gums    Wheat containing prod     Wheat flour Hives     ROS    Physical Exam:   Body mass index is 35.24 kg/m².  There were no vitals filed for this visit.   GENERAL: Well appearing, appropriate for stated age, no acute distress.  CARDIOVASCULAR: Pulses regular by peripheral palpation.  PULMONARY: Respirations are even and non-labored.  NEURO: Awake, alert, and oriented x 3.  PSYCH: Mood & affect are appropriate.  HEENT: Head is normocephalic and atraumatic.  Ortho/SPM Exam  Left Knee:    Inspection: mild effusion    Palpation tenderness: Medial joint line    Range of motion: 0 deg extension - 100 deg flexion, pain at end range flexion    Strength:  4+/5 Extension    4+/5 Flexion    Varus (-)  Valgus (-)  Lachman (-)  Anterior Drawer (-)  Posterior Drawer (-)  Dianne (-)  Intact EHL, FHL, gastrocsoleus, and tibialis anterior. Sensation intact to light touch in superficial peroneal, deep peroneal, tibial, sural, and saphenous nerve  distributions. Foot warm and well perfused with capillary refill of less than 2 seconds and palpable pedal pulses.      Imaging:   XR Results:  Results for orders placed during the hospital encounter of 05/13/22    X-Ray Knee 1 or 2 View Left    Narrative  EXAMINATION:  XR KNEE 1 OR 2 VIEW LEFT    CLINICAL HISTORY:  intra op;    TECHNIQUE:  Single fluoroscopic view of the left knee is submitted during ongoing surgical procedure.    COMPARISON:  None    FINDINGS:  Single view of the left knee is submitted during ongoing surgical procedure.  No acute abnormality seen on this single image.    Impression  Fluoroscopic view of the left knee for ongoing procedure.      Electronically signed by: Stephen Gonzalez  Date:    05/13/2022  Time:    12:35      MRI Results:  Results for orders placed during the hospital encounter of 03/30/22    MRI Knee Without Contrast Left    Narrative  EXAMINATION:  MRI KNEE WITHOUT CONTRAST LEFT    CLINICAL HISTORY:  Meniscus tear suspected;possible ACL tear as well;Pain in left knee    TECHNIQUE:  Multiplanar, multisequence images were performed about the knee.    COMPARISON:  None    FINDINGS:  Medial compartment: Complex tear of the posterior horn extending into the body of the medial meniscus with partial extrusion into the medial gutter.  The medial collateral ligament is intact of bowed by the extruded meniscus.  Cartilage heterogeneity and thinning within the medial compartment with partial-thickness cartilage loss along the medial femoral condyle and medial tibial plateau.    Lateral compartment: Increased signal within the lateral meniscus without a discrete tear.  The lateral collateral ligamentous complex is intact.  Cartilage heterogeneity within the lateral compartment.    Intercondylar notch: The anterior and posterior cruciate ligaments are intact.  There is a 9 mm loose body within the posterior aspect of the joint.    Patellofemoral compartment: The extensor mechanism is  intact.  No patellar tilt or subluxation.  Cartilage heterogeneity within the patellofemoral compartment.  Small joint effusion with leaking, complex Baker's cyst.    Impression  Arthritic changes of the knee with medial and lateral meniscal tears, chondromalacia, and joint effusion with leaking Baker's cyst.  Intra-articular loose body along the posterior aspect of the joint.      Electronically signed by: Rubin Hale  Date:    03/30/2022  Time:    12:35      CT Results:  No results found for this or any previous visit.        Relevant imaging results reviewed and interpreted by me, discussed with the patient and / or family today.     Other Tests:         Patient Instructions   Assessment:  Stacey Wade is a 57 y.o. 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    Primary osteoarthritis of left knee Yes    Class 2 severe obesity with serious comorbidity and body mass index (BMI) of 35.0 to 35.9 in adult, unspecified obesity type        Plan:  Left knee Euflexxa 1/3 performed today  - extra E&M - patient wanted to discuss visco although previously discussed, she wasn't sure and wanted to discuss again  - after a long discussion, she wants to proceed with visco which we did today  - also advised on standard OA recommendations.  Although there is not a cure for arthritis, there are effective ways to improve symptoms.     Exercise & Activity:  I recommend low impact activities such as elliptical and bicycle   Walking is great for arthritis: https://www.[employer].com/3-reasons-walking-with-knee-arthritis/  If walking long distances, I recommend good quality well-cushioned shoes. Varsity sports can help you find the right ones: https://www.varsityrunning.com/  Aquatic and pool therapy is often helpful because it lessens the  impact on the joint, strengthens the leg and thigh muscles, and helps to control swelling.   Knee motion is important to the health of the knee.     Knee Braces:  A compression knee sleeve can help limit swelling and provide proprioceptive feedback.     Prescriptions & Medications:  I do recommend formal physical therapy or at minimum a home exercise program.   Over the counter analgesic (pain-relieving) medications can help. Examples are Tylenol, Ibuprofen, and Aleve. Check with your primary care physician to make sure you don't have contra-indications to taking those medicines.  Some over the counter supplement solutions such as glucosamine and chondroitin may help with symptoms, although the evidence is mixed.    Healthy Lifestyle:  Excess body weight can have a negative impact on joint health and on pain. I recommend healthy lifestyle choices including nutrition and exercise that help reach and maintain an ideal body weight. Tips for Exercise: https://www.[employer]/13-exercise-tips-for-a-healthier-you/  Some diets cause increased inflammation. I recommend a balanced wholesome diet including some foods such as olives that are shown to decrease inflammation. More diet information available here: https://www.[employer]/8-best-foods-for-knee-arthritis/     Follow-up: visco or sooner if there are any problems between now and then.    Leave Review:   Google: Leave Google Review  Healthgrades: Leave Healthgrades Review    After Hours Number: [phone]      Provider Note/Medical Decision Making:       I discussed worrisome and red flag signs and symptoms with the patient. The patient expressed understanding and agreed to alert me immediately or to go to the emergency room if they experience any of these.   Treatment plan was developed with input from the patient/family, and they expressed understanding and agreement with the plan. All questions were answered today.          Wes Faulkner,  MD  Orthopaedic Surgery & Sports Medicine       Disclaimer: This note was prepared using a voice recognition system and is likely to have sound alike errors within the text.     I, Alissa Prater, acted as a scribe for Wes Faulkner MD for the duration of this office visit.

## 2023-05-25 RX ORDER — BETAMETHASONE DIPROPIONATE 0.5 MG/G
CREAM TOPICAL
Qty: 45 G | Refills: 2 | Status: SHIPPED | OUTPATIENT
Start: 2023-05-25 | End: 2023-08-22

## 2023-05-25 NOTE — TELEPHONE ENCOUNTER
No care due was identified.  [facility] Embedded Care Due Messages. Reference number: [id number].   5/25/2023 2:08:18 PM CDT

## 2023-05-25 NOTE — TELEPHONE ENCOUNTER
Refill Decision Note   Stacey Wade  is requesting a refill authorization.  Brief Assessment and Rationale for Refill:  Approve     Medication Therapy Plan:         Comments:     Note composed:2:09 PM 05/25/2023

## 2023-05-29 ENCOUNTER — PROCEDURE VISIT (OUTPATIENT)
Dept: ORTHOPEDICS | Facility: CLINIC | Age: 58
End: 2023-05-29
Payer: COMMERCIAL

## 2023-05-29 VITALS — WEIGHT: 192 LBS | BODY MASS INDEX: 35.33 KG/M2 | HEIGHT: 62 IN

## 2023-05-29 DIAGNOSIS — M17.12 PRIMARY OSTEOARTHRITIS OF LEFT KNEE: Primary | ICD-10-CM

## 2023-05-29 PROCEDURE — 20610 DRAIN/INJ JOINT/BURSA W/O US: CPT | Mod: LT,S$GLB,, | Performed by: PHYSICIAN ASSISTANT

## 2023-05-29 PROCEDURE — 99499 NO LOS: ICD-10-PCS | Mod: S$GLB,,, | Performed by: PHYSICIAN ASSISTANT

## 2023-05-29 PROCEDURE — 20610 LARGE JOINT ASPIRATION/INJECTION: L KNEE: ICD-10-PCS | Mod: LT,S$GLB,, | Performed by: PHYSICIAN ASSISTANT

## 2023-05-29 PROCEDURE — 99499 UNLISTED E&M SERVICE: CPT | Mod: S$GLB,,, | Performed by: PHYSICIAN ASSISTANT

## 2023-05-29 NOTE — PROCEDURES
Large Joint Aspiration/Injection: L knee    Date/Time: 5/29/2023 12:30 PM  Performed by: Megha Kwon PA-C  Authorized by: Megha Kwon PA-C     Consent Done?:  Yes (Verbal)  Indications:  Joint swelling and pain  Site marked: the procedure site was marked    Timeout: prior to procedure the correct patient, procedure, and site was verified    Prep: patient was prepped and draped in usual sterile fashion      Local anesthesia used?: Yes    Local anesthetic:  Topical anesthetic    Details:  Needle Size:  22 G  Ultrasonic Guidance for needle placement?: No    Approach:  Superior  Location:  Knee  Site:  L knee  Medications:  20 mg sodium hyaluronate (EUFLEXXA) 10 mg/mL(mw 2.4 -3.6 million)  Patient tolerance:  Patient tolerated the procedure well with no immediate complications     2/3

## 2023-05-31 ENCOUNTER — OFFICE VISIT (OUTPATIENT)
Dept: UROLOGY | Facility: CLINIC | Age: 58
End: 2023-05-31
Payer: COMMERCIAL

## 2023-05-31 VITALS
WEIGHT: 191.81 LBS | RESPIRATION RATE: 18 BRPM | HEART RATE: 71 BPM | HEIGHT: 62 IN | SYSTOLIC BLOOD PRESSURE: 113 MMHG | DIASTOLIC BLOOD PRESSURE: 79 MMHG | BODY MASS INDEX: 35.3 KG/M2

## 2023-05-31 DIAGNOSIS — N32.81 OAB (OVERACTIVE BLADDER): Primary | ICD-10-CM

## 2023-05-31 PROCEDURE — 99213 PR OFFICE/OUTPT VISIT, EST, LEVL III, 20-29 MIN: ICD-10-PCS | Mod: S$GLB,,, | Performed by: UROLOGY

## 2023-05-31 PROCEDURE — 1159F MED LIST DOCD IN RCRD: CPT | Mod: CPTII,S$GLB,, | Performed by: UROLOGY

## 2023-05-31 PROCEDURE — 3044F PR MOST RECENT HEMOGLOBIN A1C LEVEL <7.0%: ICD-10-PCS | Mod: CPTII,S$GLB,, | Performed by: UROLOGY

## 2023-05-31 PROCEDURE — 1160F PR REVIEW ALL MEDS BY PRESCRIBER/CLIN PHARMACIST DOCUMENTED: ICD-10-PCS | Mod: CPTII,S$GLB,, | Performed by: UROLOGY

## 2023-05-31 PROCEDURE — 3074F PR MOST RECENT SYSTOLIC BLOOD PRESSURE < 130 MM HG: ICD-10-PCS | Mod: CPTII,S$GLB,, | Performed by: UROLOGY

## 2023-05-31 PROCEDURE — 3074F SYST BP LT 130 MM HG: CPT | Mod: CPTII,S$GLB,, | Performed by: UROLOGY

## 2023-05-31 PROCEDURE — 1160F RVW MEDS BY RX/DR IN RCRD: CPT | Mod: CPTII,S$GLB,, | Performed by: UROLOGY

## 2023-05-31 PROCEDURE — 99999 PR PBB SHADOW E&M-EST. PATIENT-LVL IV: CPT | Mod: PBBFAC,,, | Performed by: UROLOGY

## 2023-05-31 PROCEDURE — 3078F DIAST BP <80 MM HG: CPT | Mod: CPTII,S$GLB,, | Performed by: UROLOGY

## 2023-05-31 PROCEDURE — 3044F HG A1C LEVEL LT 7.0%: CPT | Mod: CPTII,S$GLB,, | Performed by: UROLOGY

## 2023-05-31 PROCEDURE — 99213 OFFICE O/P EST LOW 20 MIN: CPT | Mod: S$GLB,,, | Performed by: UROLOGY

## 2023-05-31 PROCEDURE — 99999 PR PBB SHADOW E&M-EST. PATIENT-LVL IV: ICD-10-PCS | Mod: PBBFAC,,, | Performed by: UROLOGY

## 2023-05-31 PROCEDURE — 3008F BODY MASS INDEX DOCD: CPT | Mod: CPTII,S$GLB,, | Performed by: UROLOGY

## 2023-05-31 PROCEDURE — 3008F PR BODY MASS INDEX (BMI) DOCUMENTED: ICD-10-PCS | Mod: CPTII,S$GLB,, | Performed by: UROLOGY

## 2023-05-31 PROCEDURE — 3078F PR MOST RECENT DIASTOLIC BLOOD PRESSURE < 80 MM HG: ICD-10-PCS | Mod: CPTII,S$GLB,, | Performed by: UROLOGY

## 2023-05-31 PROCEDURE — 1159F PR MEDICATION LIST DOCUMENTED IN MEDICAL RECORD: ICD-10-PCS | Mod: CPTII,S$GLB,, | Performed by: UROLOGY

## 2023-06-05 ENCOUNTER — OFFICE VISIT (OUTPATIENT)
Dept: SPORTS MEDICINE | Facility: CLINIC | Age: 58
End: 2023-06-05
Payer: COMMERCIAL

## 2023-06-05 VITALS — BODY MASS INDEX: 35.15 KG/M2 | WEIGHT: 191 LBS | HEIGHT: 62 IN

## 2023-06-05 DIAGNOSIS — M17.12 PRIMARY OSTEOARTHRITIS OF LEFT KNEE: Primary | ICD-10-CM

## 2023-06-05 DIAGNOSIS — E66.01 CLASS 2 SEVERE OBESITY WITH SERIOUS COMORBIDITY AND BODY MASS INDEX (BMI) OF 35.0 TO 35.9 IN ADULT, UNSPECIFIED OBESITY TYPE: ICD-10-CM

## 2023-06-05 PROCEDURE — 99214 OFFICE O/P EST MOD 30 MIN: CPT | Mod: 25,S$GLB,, | Performed by: ORTHOPAEDIC SURGERY

## 2023-06-05 PROCEDURE — 97760 ORTHOTIC MGMT&TRAING 1ST ENC: CPT | Mod: GP,S$GLB,, | Performed by: ORTHOPAEDIC SURGERY

## 2023-06-05 PROCEDURE — 99999 PR PBB SHADOW E&M-EST. PATIENT-LVL IV: ICD-10-PCS | Mod: PBBFAC,,, | Performed by: ORTHOPAEDIC SURGERY

## 2023-06-05 PROCEDURE — 3008F BODY MASS INDEX DOCD: CPT | Mod: CPTII,S$GLB,, | Performed by: ORTHOPAEDIC SURGERY

## 2023-06-05 PROCEDURE — 3044F PR MOST RECENT HEMOGLOBIN A1C LEVEL <7.0%: ICD-10-PCS | Mod: CPTII,S$GLB,, | Performed by: ORTHOPAEDIC SURGERY

## 2023-06-05 PROCEDURE — 20610 DRAIN/INJ JOINT/BURSA W/O US: CPT | Mod: LT,S$GLB,, | Performed by: ORTHOPAEDIC SURGERY

## 2023-06-05 PROCEDURE — 99214 PR OFFICE/OUTPT VISIT, EST, LEVL IV, 30-39 MIN: ICD-10-PCS | Mod: 25,S$GLB,, | Performed by: ORTHOPAEDIC SURGERY

## 2023-06-05 PROCEDURE — 3044F HG A1C LEVEL LT 7.0%: CPT | Mod: CPTII,S$GLB,, | Performed by: ORTHOPAEDIC SURGERY

## 2023-06-05 PROCEDURE — 99999 PR PBB SHADOW E&M-EST. PATIENT-LVL IV: CPT | Mod: PBBFAC,,, | Performed by: ORTHOPAEDIC SURGERY

## 2023-06-05 PROCEDURE — 97760 PR ORTHOTIC MGMT&TRAINJ INITIAL ENC EA 15 MINS: ICD-10-PCS | Mod: GP,S$GLB,, | Performed by: ORTHOPAEDIC SURGERY

## 2023-06-05 PROCEDURE — 3008F PR BODY MASS INDEX (BMI) DOCUMENTED: ICD-10-PCS | Mod: CPTII,S$GLB,, | Performed by: ORTHOPAEDIC SURGERY

## 2023-06-05 PROCEDURE — 20610 LARGE JOINT ASPIRATION/INJECTION: L KNEE: ICD-10-PCS | Mod: LT,S$GLB,, | Performed by: ORTHOPAEDIC SURGERY

## 2023-06-05 NOTE — PROCEDURES
Large Joint Aspiration/Injection: L knee    Date/Time: 6/5/2023 8:00 AM  Performed by: Wes Faulkner MD  Authorized by: Wes Faulkner MD     Consent Done?:  Yes (Verbal)  Indications:  Joint swelling and pain  Site marked: the procedure site was marked    Timeout: prior to procedure the correct patient, procedure, and site was verified    Prep: patient was prepped and draped in usual sterile fashion      Local anesthesia used?: Yes    Local anesthetic:  Topical anesthetic    Details:  Needle Size:  22 G  Ultrasonic Guidance for needle placement?: No    Approach:  Superior  Location:  Knee  Site:  L knee  Medications:  20 mg sodium hyaluronate (EUFLEXXA) 10 mg/mL(mw 2.4 -3.6 million)  Patient tolerance:  Patient tolerated the procedure well with no immediate complications     Left knee Euflexxa 3/3

## 2023-06-05 NOTE — PATIENT INSTRUCTIONS
Assessment:  Stacey Wade is a 57 y.o. 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    Primary osteoarthritis of left knee Yes    Class 2 severe obesity with serious comorbidity and body mass index (BMI) of 35.0 to 35.9 in adult, unspecified obesity type          Plan:  Left knee Euflexxa 3/3 performed today  Discussed proceeding with with last Euflexxa injection today   Fitted for hinged knee brace today for Left knee  10 minutes were spent sizing, fitting, and educating regarding durable medical equipment by Dr. Wes Faulkner and his assistant under his direction today.  CPT 31830.    Follow-up: 4 months or sooner if there are any problems between now and then.    Leave Review:   Google: Leave Google Review  Healthgrades: Leave Healthgrades Review    After Hours Number: [phone]

## 2023-06-05 NOTE — PROGRESS NOTES
Patient ID: Stacey Wade  YOB: 1965  MRN: [id number]    Chief Complaint: Pain of the Left Knee    Referred By: established patient    History of Present Illness: Stacey Wade is a  57 y.o. 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 She reports that her pain continues to persist. She reports increases aching after prolonged standing. She reports use of KT tape and a compression knee brace as needed. She would like to discuss if proceeding with the injection for her left knee would be beneficial.     HPI 5/22/23  Stacey is here today for a f/u L knee. She is 12mo s/p Left Knee scope, medial meniscus root repair, centralization (5/13/2022). She rates her pain as a 1/10. She is not currently in PT. She is having pain with prolonged standing and walking. She is here today to get her knee looked at by Dr. Faulkner as it has started swelling up on her. She was previously seen in February where we placed an order for viscosupplementation for her Left knee. She had not started viscosupplementation for her Left knee, but it was approved.     HPI    Past Medical History:   Past Medical History:   Diagnosis Date    Allergic rhinitis     Anxiety     Hypertension     Osteoarthritis of both knees     Prediabetes     Urticaria      Past Surgical History:   Procedure Laterality Date    CHONDROPLASTY OF KNEE Left 05/13/2022    Procedure: CHONDROPLASTY, KNEE;  Surgeon: Wes Faulkner MD;  Location: [facility] OR;  Service: Orthopedics;  Laterality: Left;    COLONOSCOPY N/A 01/18/2018    Procedure: COLONOSCOPY;  Surgeon: Yong Smith MD;  Location: [facility];  Service: Endoscopy;  Laterality: N/A;    HERNIA REPAIR Left      Family History   Problem Relation Age of Onset    Lung cancer Paternal Grandfather     Ovarian cancer Maternal Grandmother     Hyperlipidemia Mother     Hypertension Mother     Breast cancer  Mother 60    Arthritis Mother     Lung cancer Father     Breast cancer Maternal Aunt 53    Heart failure Other         Great aunt    Prostate cancer Brother     Brain cancer Sister     Diabetes Neg Hx     Pseudochol deficiency Neg Hx     Malignant hyperthermia Neg Hx      Social History     Socioeconomic History    Marital status: Single   Tobacco Use    Smoking status: Never    Smokeless tobacco: Never   Substance and Sexual Activity    Alcohol use: No    Drug use: No    Sexual activity: Not Currently     Partners: Male     Birth control/protection: None   Social History Narrative    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     Medication List with Changes/Refills   Current Medications    ASPIRIN (ECOTRIN) 81 MG EC TABLET    Take 1 tablet by mouth every morning.    BETAMETHASONE DIPROPIONATE 0.05 % CREAM    APPLY TOPICALLY 2 TIMES DAILY.    CLORAZEPATE (TRANXENE) 3.75 MG TAB    TAKE 1 TABLET BY MOUTH EVERY DAY AS NEEDED    CLOTRIMAZOLE-BETAMETHASONE 1-0.05% (LOTRISONE) CREAM    Apply topically 2 (two) times daily.    DICLOFENAC SODIUM (VOLTAREN) 1 % GEL    Apply 2 g topically 4 (four) times daily.    DOXEPIN (SINEQUAN) 10 MG CAPSULE    TAKE TWO CAPSULES BY MOUTH THREE TIMES DAILY    EPINEPHRINE (EPIPEN) 0.3 MG/0.3 ML ATIN    Inject 0.3 mg into the muscle daily as needed.    FAMOTIDINE (PEPCID) 40 MG TABLET    Take 20 mg by mouth.    FEXOFENADINE (ALLEGRA) 180 MG TABLET    TAKE ONE TABLET BY MOUTH EVERY DAY    HYDROXYZINE HCL (ATARAX) 25 MG TABLET    TAKE ONE TABLET BY MOUTH FOUR TIMES DAILY AS NEEDED FOR ITCHING    METHYLPREDNISOLONE (MEDROL DOSEPACK) 4 MG TABLET    Use as directed.    MIRABEGRON (MYRBETRIQ) 25 MG TB24 ER TABLET    TAKE ONE TABLET BY MOUTH EVERY DAY    MOMETASONE (NASONEX) 50 MCG/ACTUATION NASAL SPRAY    INHALE 2 SPRAYS INTO THE NOSTRIL ONCE A DAY    MONTELUKAST (SINGULAIR) 10 MG TABLET    Take 1 tablet (10 mg total) by  mouth once daily.    OMALIZUMAB (XOLAIR) 150 MG/ML INJECTION    Inject 300 mg into the skin.    POTASSIUM CHLORIDE (MICRO-K) 10 MEQ CPSR    Take 1 capsule (10 mEq total) by mouth once daily.    RIZATRIPTAN (MAXALT) 10 MG TABLET    TAKE 1 TABLET BY MOUTH AS NEEDED FOR MIGRAINE. MAY REPEAT IN 2 HOURS. MAX 2 TABS PER 24 HOURS    TRIAMTERENE-HYDROCHLOROTHIAZIDE 37.5-25 MG (DYAZIDE) 37.5-25 MG PER CAPSULE    Take 1 capsule by mouth once daily.     Review of patient's allergies indicates:   Allergen Reactions    Benzalkonium chloride     Black pepper      Other reaction(s): Hives    Chocolate flavor      Other reaction(s): Hives    Citrus and derivatives Hives     LEMON PRODUCTS    Grapefruit Hives     Other reaction(s): Hives    Ibuprofen Swelling    Latex      Other reaction(s): Hives    Lemon balm (casper officinalis) Hives     Anything with lemon in it    Naproxen Swelling    Neomycin-bacitracin-polymyxin      Other reaction(s): Rash    Oats (richard) Hives     Oat foods (oatmeal, etc...)    Vegetable acetoglycerides Hives     Vegetable gums    Wheat containing prod     Wheat flour Hives     ROS    Physical Exam:   Body mass index is 34.93 kg/m².  There were no vitals filed for this visit.   GENERAL: Well appearing, appropriate for stated age, no acute distress.  CARDIOVASCULAR: Pulses regular by peripheral palpation.  PULMONARY: Respirations are even and non-labored.  NEURO: Awake, alert, and oriented x 3.  PSYCH: Mood & affect are appropriate.  HEENT: Head is normocephalic and atraumatic.  Ortho/SPM Exam  Left knee exam  Tenderness along anteromedial knee  Range of motion -5 deg ext - 100 deg flex  Intact EHL, FHL, gastrocsoleus, and tibialis anterior. Sensation intact to light touch in superficial peroneal, deep peroneal, tibial, sural, and saphenous nerve distributions. Foot warm and well perfused with capillary refill of less than 2 seconds and palpable pedal pulses.      Imaging:     XR Results:  Results for  orders placed during the hospital encounter of 05/13/22    X-Ray Knee 1 or 2 View Left    Narrative  EXAMINATION:  XR KNEE 1 OR 2 VIEW LEFT    CLINICAL HISTORY:  intra op;    TECHNIQUE:  Single fluoroscopic view of the left knee is submitted during ongoing surgical procedure.    COMPARISON:  None    FINDINGS:  Single view of the left knee is submitted during ongoing surgical procedure.  No acute abnormality seen on this single image.    Impression  Fluoroscopic view of the left knee for ongoing procedure.      Electronically signed by: Stephen Gonzalez  Date:    05/13/2022  Time:    12:35      MRI Results:  Results for orders placed during the hospital encounter of 03/30/22    MRI Knee Without Contrast Left    Narrative  EXAMINATION:  MRI KNEE WITHOUT CONTRAST LEFT    CLINICAL HISTORY:  Meniscus tear suspected;possible ACL tear as well;Pain in left knee    TECHNIQUE:  Multiplanar, multisequence images were performed about the knee.    COMPARISON:  None    FINDINGS:  Medial compartment: Complex tear of the posterior horn extending into the body of the medial meniscus with partial extrusion into the medial gutter.  The medial collateral ligament is intact of bowed by the extruded meniscus.  Cartilage heterogeneity and thinning within the medial compartment with partial-thickness cartilage loss along the medial femoral condyle and medial tibial plateau.    Lateral compartment: Increased signal within the lateral meniscus without a discrete tear.  The lateral collateral ligamentous complex is intact.  Cartilage heterogeneity within the lateral compartment.    Intercondylar notch: The anterior and posterior cruciate ligaments are intact.  There is a 9 mm loose body within the posterior aspect of the joint.    Patellofemoral compartment: The extensor mechanism is intact.  No patellar tilt or subluxation.  Cartilage heterogeneity within the patellofemoral compartment.  Small joint effusion with leaking, complex Baker's  cyst.    Impression  Arthritic changes of the knee with medial and lateral meniscal tears, chondromalacia, and joint effusion with leaking Baker's cyst.  Intra-articular loose body along the posterior aspect of the joint.      Electronically signed by: Rubin Hale  Date:    03/30/2022  Time:    12:35      CT Results:  No results found for this or any previous visit.        Relevant imaging results reviewed and interpreted by me, discussed with the patient and / or family today.     Other Tests:         Patient Instructions   Assessment:  Stacey Wade is a 57 y.o. 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    Primary osteoarthritis of left knee Yes    Class 2 severe obesity with serious comorbidity and body mass index (BMI) of 35.0 to 35.9 in adult, unspecified obesity type          Plan:  Left knee Euflexxa 3/3 performed today  Discussed proceeding with with last Euflexxa injection today   Fitted for hinged knee brace today for Left knee  10 minutes were spent sizing, fitting, and educating regarding durable medical equipment by Dr. Wes Faulkner and his assistant under his direction today.  CPT 14220.    Follow-up: 4 months or sooner if there are any problems between now and then.    Leave Review:   Google: Leave Google Review  Healthgrades: Leave Healthgrades Review    After Hours Number: [phone]      Provider Note/Medical Decision Making:       I discussed worrisome and red flag signs and symptoms with the patient. The patient expressed understanding and agreed to alert me immediately or to go to the emergency room if they experience any of these.   Treatment plan was developed with input from the patient/family, and they expressed understanding and agreement with  the plan. All questions were answered today.          Wes Faulkner MD  Orthopaedic Surgery & Sports Medicine       Disclaimer: This note was prepared using a voice recognition system and is likely to have sound alike errors within the text.     I, Alissa Prater, acted as a scribe for Wes Faulkner MD for the duration of this office visit.

## 2023-06-05 NOTE — PROGRESS NOTES
Chief Complaint:  OAB    HPI:   05/31/2023 - patient returns today for follow-up, continues taking the mirabegron and notes that it is working quite well for her, also notes that she is decreased how much coffee she is drinking now down to one 12 oz cup per day, no side effects from medication, also wearing just one pad per day, no gross hematuria or dysuria, no UTIs    01/27/2023 - patient returns today for follow-up, has been taking the Myrbetriq as prescribed and notes that while it does help she continues to have issues with urgency and urge incontinence, she has reduced her coffee intake, now really only having one cup in the morning, still wearing pads, 3/day, no SEs that she has noticed, no GH or dysuria    08/16/2023 - 57 yo female that presents OAB.  Patient notes worsening issues with frequency and urgency the last few years.  Patient notes that she will have urge incontinence 2-3 times per week and wears one pad per day for this.  She has previously tried Detrol as well as Myrbetriq.  She feels that the Myrbetriq is working for her currently.  She will also intermittently take azo.  She will also drink 3-4 thick cups of coffee per day, but notes that she has reduced this recently.  She denies any prior urologic procedures or gross hematuria.  She does have family history of prostate cancer in her brother and her dad.    PMH:  Past Medical History:   Diagnosis Date    Allergic rhinitis     Anxiety     Hypertension     Osteoarthritis of both knees     Prediabetes     Urticaria        PSH:  Past Surgical History:   Procedure Laterality Date    CHONDROPLASTY OF KNEE Left 05/13/2022    Procedure: CHONDROPLASTY, KNEE;  Surgeon: Wes Faulkner MD;  Location: [facility] OR;  Service: Orthopedics;  Laterality: Left;    COLONOSCOPY N/A 01/18/2018    Procedure: COLONOSCOPY;  Surgeon: Yong Smith MD;  Location: [facility];  Service: Endoscopy;  Laterality: N/A;    HERNIA REPAIR Left        Family History:  Family  History   Problem Relation Age of Onset    Lung cancer Paternal Grandfather     Ovarian cancer Maternal Grandmother     Hyperlipidemia Mother     Hypertension Mother     Breast cancer Mother 60    Arthritis Mother     Lung cancer Father     Breast cancer Maternal Aunt 53    Heart failure Other         Great aunt    Prostate cancer Brother     Brain cancer Sister     Diabetes Neg Hx     Pseudochol deficiency Neg Hx     Malignant hyperthermia Neg Hx        Social History:  Social History     Tobacco Use    Smoking status: Never    Smokeless tobacco: Never   Substance Use Topics    Alcohol use: No    Drug use: No        Review of Systems:  General: No fever, chills  Skin: No rashes  Chest:  Denies cough and sputum production  Heart: Denies chest pain  Resp: Denies dyspnea  Abdomen: Denies diarrhea, abdominal pain, hematemesis, or blood in stool.  Musculoskeletal: No joint stiffness or swelling. Denies back pain.  : see HPI  Neuro: no dizziness or weakness    Allergies:  Benzalkonium chloride, Black pepper, Chocolate flavor, Citrus and derivatives, Grapefruit, Ibuprofen, Latex, Lemon balm (casper officinalis), Naproxen, Neomycin-bacitracin-polymyxin, Oats (richard), Vegetable acetoglycerides, Wheat containing prod, and Wheat flour    Medications:    Current Outpatient Medications:     aspirin (ECOTRIN) 81 MG EC tablet, Take 1 tablet by mouth every morning., Disp: , Rfl:     betamethasone dipropionate 0.05 % cream, APPLY TOPICALLY 2 TIMES DAILY., Disp: 45 g, Rfl: 2    clorazepate (TRANXENE) 3.75 MG Tab, TAKE 1 TABLET BY MOUTH EVERY DAY AS NEEDED, Disp: 30 tablet, Rfl: 0    clotrimazole-betamethasone 1-0.05% (LOTRISONE) cream, Apply topically 2 (two) times daily., Disp: 45 g, Rfl: 2    diclofenac sodium (VOLTAREN) 1 % Gel, Apply 2 g topically 4 (four) times daily., Disp: 100 g, Rfl: 2    doxepin (SINEQUAN) 10 MG capsule, TAKE TWO CAPSULES BY MOUTH THREE TIMES DAILY, Disp: 540 capsule, Rfl: 2    EPINEPHrine (EPIPEN) 0.3  mg/0.3 mL AtIn, Inject 0.3 mg into the muscle daily as needed., Disp: , Rfl:     famotidine (PEPCID) 40 MG tablet, Take 20 mg by mouth., Disp: , Rfl:     fexofenadine (ALLEGRA) 180 MG tablet, TAKE ONE TABLET BY MOUTH EVERY DAY, Disp: 30 tablet, Rfl: 0    hydrOXYzine HCL (ATARAX) 25 MG tablet, TAKE ONE TABLET BY MOUTH FOUR TIMES DAILY AS NEEDED FOR ITCHING, Disp: 120 tablet, Rfl: 5    methylPREDNISolone (MEDROL DOSEPACK) 4 mg tablet, Use as directed., Disp: 1 each, Rfl: 0    mirabegron (MYRBETRIQ) 25 mg Tb24 ER tablet, TAKE ONE TABLET BY MOUTH EVERY DAY, Disp: 90 tablet, Rfl: 2    mometasone (NASONEX) 50 mcg/actuation nasal spray, INHALE 2 SPRAYS INTO THE NOSTRIL ONCE A DAY, Disp: 51 g, Rfl: 3    montelukast (SINGULAIR) 10 mg tablet, Take 1 tablet (10 mg total) by mouth once daily., Disp: 30 tablet, Rfl: 0    omalizumab (XOLAIR) 150 mg/mL injection, Inject 300 mg into the skin., Disp: , Rfl:     potassium chloride (MICRO-K) 10 MEQ CpSR, Take 1 capsule (10 mEq total) by mouth once daily., Disp: 90 capsule, Rfl: 3    rizatriptan (MAXALT) 10 MG tablet, TAKE 1 TABLET BY MOUTH AS NEEDED FOR MIGRAINE. MAY REPEAT IN 2 HOURS. MAX 2 TABS PER 24 HOURS, Disp: 27 tablet, Rfl: 3    triamterene-hydrochlorothiazide 37.5-25 mg (DYAZIDE) 37.5-25 mg per capsule, Take 1 capsule by mouth once daily., Disp: 90 capsule, Rfl: 3    Physical Exam:  Vitals:    05/31/23 0824   BP: 113/79   Pulse: 71   Resp: 18     Body mass index is 35.08 kg/m².  General: awake, alert, cooperative  Head: NC/AT  Ears: external ears normal  Eyes: sclera normal  Lungs: normal inspiration, NAD  Heart: well-perfused  Skin: The skin is warm and dry  Ext: No c/c/e.  Neuro: grossly intact, AOx3    RADIOLOGY:  No recent relevant imaging available for review.    LABS:  I personally reviewed the following lab values:  Lab Results   Component Value Date    WBC 11.02 03/03/2023    HGB 13.8 03/03/2023    HCT 45.1 03/03/2023     03/03/2023     03/03/2023    K  3.5 03/03/2023     03/03/2023    CREATININE 1.0 03/03/2023    BUN 18 03/03/2023    CO2 22 (L) 03/03/2023    TSH 0.519 03/03/2023    INR 1.0 05/02/2022    HGBA1C 5.8 (H) 03/03/2023    CHOL 226 (H) 03/03/2023    TRIG 55 03/03/2023    HDL 74 03/03/2023    ALT 16 03/03/2023    AST 17 03/03/2023     Assessment/Plan:   Stacey Wade is a 57 y.o. female with:    OAB - continue Myrbetriq, f/u 6 months      Mamadou Armstrong MD  Urology

## 2023-06-20 ENCOUNTER — OFFICE VISIT (OUTPATIENT)
Dept: OBSTETRICS AND GYNECOLOGY | Facility: CLINIC | Age: 58
End: 2023-06-20
Payer: COMMERCIAL

## 2023-06-20 ENCOUNTER — LAB VISIT (OUTPATIENT)
Dept: LAB | Facility: HOSPITAL | Age: 58
End: 2023-06-20
Attending: NURSE PRACTITIONER
Payer: COMMERCIAL

## 2023-06-20 VITALS
HEIGHT: 62 IN | SYSTOLIC BLOOD PRESSURE: 116 MMHG | WEIGHT: 189.13 LBS | DIASTOLIC BLOOD PRESSURE: 68 MMHG | BODY MASS INDEX: 34.8 KG/M2

## 2023-06-20 DIAGNOSIS — Z11.3 SCREENING EXAMINATION FOR STD (SEXUALLY TRANSMITTED DISEASE): ICD-10-CM

## 2023-06-20 DIAGNOSIS — Z01.419 ROUTINE GYNECOLOGICAL EXAMINATION: Primary | ICD-10-CM

## 2023-06-20 PROCEDURE — 99396 PREV VISIT EST AGE 40-64: CPT | Mod: S$GLB,,, | Performed by: NURSE PRACTITIONER

## 2023-06-20 PROCEDURE — 86592 SYPHILIS TEST NON-TREP QUAL: CPT | Performed by: NURSE PRACTITIONER

## 2023-06-20 PROCEDURE — 3044F PR MOST RECENT HEMOGLOBIN A1C LEVEL <7.0%: ICD-10-PCS | Mod: CPTII,S$GLB,, | Performed by: NURSE PRACTITIONER

## 2023-06-20 PROCEDURE — 99999 PR PBB SHADOW E&M-EST. PATIENT-LVL IV: CPT | Mod: PBBFAC,,, | Performed by: NURSE PRACTITIONER

## 2023-06-20 PROCEDURE — 3008F PR BODY MASS INDEX (BMI) DOCUMENTED: ICD-10-PCS | Mod: CPTII,S$GLB,, | Performed by: NURSE PRACTITIONER

## 2023-06-20 PROCEDURE — 3044F HG A1C LEVEL LT 7.0%: CPT | Mod: CPTII,S$GLB,, | Performed by: NURSE PRACTITIONER

## 2023-06-20 PROCEDURE — 80074 ACUTE HEPATITIS PANEL: CPT | Performed by: NURSE PRACTITIONER

## 2023-06-20 PROCEDURE — 1160F RVW MEDS BY RX/DR IN RCRD: CPT | Mod: CPTII,S$GLB,, | Performed by: NURSE PRACTITIONER

## 2023-06-20 PROCEDURE — 1159F PR MEDICATION LIST DOCUMENTED IN MEDICAL RECORD: ICD-10-PCS | Mod: CPTII,S$GLB,, | Performed by: NURSE PRACTITIONER

## 2023-06-20 PROCEDURE — 3074F SYST BP LT 130 MM HG: CPT | Mod: CPTII,S$GLB,, | Performed by: NURSE PRACTITIONER

## 2023-06-20 PROCEDURE — 99396 PR PREVENTIVE VISIT,EST,40-64: ICD-10-PCS | Mod: S$GLB,,, | Performed by: NURSE PRACTITIONER

## 2023-06-20 PROCEDURE — 3008F BODY MASS INDEX DOCD: CPT | Mod: CPTII,S$GLB,, | Performed by: NURSE PRACTITIONER

## 2023-06-20 PROCEDURE — 36415 COLL VENOUS BLD VENIPUNCTURE: CPT | Performed by: NURSE PRACTITIONER

## 2023-06-20 PROCEDURE — 87591 N.GONORRHOEAE DNA AMP PROB: CPT | Performed by: NURSE PRACTITIONER

## 2023-06-20 PROCEDURE — 87389 HIV-1 AG W/HIV-1&-2 AB AG IA: CPT | Performed by: NURSE PRACTITIONER

## 2023-06-20 PROCEDURE — 3074F PR MOST RECENT SYSTOLIC BLOOD PRESSURE < 130 MM HG: ICD-10-PCS | Mod: CPTII,S$GLB,, | Performed by: NURSE PRACTITIONER

## 2023-06-20 PROCEDURE — 1160F PR REVIEW ALL MEDS BY PRESCRIBER/CLIN PHARMACIST DOCUMENTED: ICD-10-PCS | Mod: CPTII,S$GLB,, | Performed by: NURSE PRACTITIONER

## 2023-06-20 PROCEDURE — 3078F DIAST BP <80 MM HG: CPT | Mod: CPTII,S$GLB,, | Performed by: NURSE PRACTITIONER

## 2023-06-20 PROCEDURE — 1159F MED LIST DOCD IN RCRD: CPT | Mod: CPTII,S$GLB,, | Performed by: NURSE PRACTITIONER

## 2023-06-20 PROCEDURE — 3078F PR MOST RECENT DIASTOLIC BLOOD PRESSURE < 80 MM HG: ICD-10-PCS | Mod: CPTII,S$GLB,, | Performed by: NURSE PRACTITIONER

## 2023-06-20 PROCEDURE — 99999 PR PBB SHADOW E&M-EST. PATIENT-LVL IV: ICD-10-PCS | Mod: PBBFAC,,, | Performed by: NURSE PRACTITIONER

## 2023-06-20 PROCEDURE — 87491 CHLMYD TRACH DNA AMP PROBE: CPT | Performed by: NURSE PRACTITIONER

## 2023-06-20 NOTE — PROGRESS NOTES
"  Subjective:       Patient ID: Stacey Wade is a 57 y.o. female.    Chief Complaint:  Well Woman      History of Present Illness  HPI  Presents today for [employer]   Desires std screening   Health Maintenance   Topic Date Due    Mammogram  2024    Lipid Panel  2024    TETANUS VACCINE  10/10/2026    Hepatitis C Screening  Completed     GYN & OB History  No LMP recorded. Patient is perimenopausal.   Date of Last Pap: 10/18/2021    OB History    Para Term  AB Living   0 0 0         SAB IAB Ectopic Multiple Live Births                   Review of Systems  Review of Systems        Objective:   /68   Ht 5' 2" (1.575 m)   Wt 85.8 kg (189 lb 2.5 oz)   BMI 34.60 kg/m²    Physical Exam:   Constitutional: She is oriented to person, place, and time. She appears well-developed and well-nourished.        Pulmonary/Chest: Right breast exhibits no inverted nipple, no mass, no nipple discharge, no skin change, no tenderness and no swelling. Left breast exhibits no inverted nipple, no mass, no nipple discharge, no skin change, no tenderness and no swelling.        Abdominal: Soft.     Genitourinary:    Inguinal canal and vagina normal.      Pelvic exam was performed with patient supine.   The external female genitalia was normal.   Genitalia hair distrobution normal .   Labial bartholins normal.No erythema,  no vaginal discharge, bleeding, rectocele, cystocele or unspecified prolapse of vaginal walls in the vagina.    No foreign body in the vagina.      No signs of injury in the vagina.                 Neurological: She is alert and oriented to person, place, and time.    Skin: Skin is warm and dry.    Psychiatric: She has a normal mood and affect. Her behavior is normal. Judgment and thought content normal.      Assessment:        1. Routine gynecological examination    2. Screening examination for STD (sexually transmitted disease)               Plan:           Stacey was seen today for well " woman.    Diagnoses and all orders for this visit:    Routine gynecological examination    Screening examination for STD (sexually transmitted disease)  -     C. trachomatis/N. gonorrhoeae by AMP DNA [facility]; Vagina  -     HIV 1/2 Ag/Ab (4th Gen); Future  -     RPR; Future  -     Hepatitis Panel, Acute; Future      Return to clinic in one year for [employer]

## 2023-06-21 LAB
C TRACH DNA SPEC QL NAA+PROBE: NOT DETECTED
HAV IGM SERPL QL IA: NORMAL
HBV CORE IGM SERPL QL IA: NORMAL
HBV SURFACE AG SERPL QL IA: NORMAL
HCV AB SERPL QL IA: NORMAL
HIV 1+2 AB+HIV1 P24 AG SERPL QL IA: NORMAL
N GONORRHOEA DNA SPEC QL NAA+PROBE: NOT DETECTED
RPR SER QL: NORMAL

## 2023-07-17 ENCOUNTER — OFFICE VISIT (OUTPATIENT)
Dept: FAMILY MEDICINE | Facility: CLINIC | Age: 58
End: 2023-07-17
Payer: COMMERCIAL

## 2023-07-17 VITALS
HEART RATE: 102 BPM | SYSTOLIC BLOOD PRESSURE: 102 MMHG | BODY MASS INDEX: 34.87 KG/M2 | HEIGHT: 62 IN | WEIGHT: 189.5 LBS | OXYGEN SATURATION: 98 % | DIASTOLIC BLOOD PRESSURE: 68 MMHG | TEMPERATURE: 99 F

## 2023-07-17 DIAGNOSIS — L29.9 ITCHING: ICD-10-CM

## 2023-07-17 DIAGNOSIS — R21 SKIN RASH: Primary | ICD-10-CM

## 2023-07-17 PROCEDURE — 3074F PR MOST RECENT SYSTOLIC BLOOD PRESSURE < 130 MM HG: ICD-10-PCS | Mod: CPTII,S$GLB,, | Performed by: REGISTERED NURSE

## 2023-07-17 PROCEDURE — 1159F MED LIST DOCD IN RCRD: CPT | Mod: CPTII,S$GLB,, | Performed by: REGISTERED NURSE

## 2023-07-17 PROCEDURE — 3044F PR MOST RECENT HEMOGLOBIN A1C LEVEL <7.0%: ICD-10-PCS | Mod: CPTII,S$GLB,, | Performed by: REGISTERED NURSE

## 2023-07-17 PROCEDURE — 3044F HG A1C LEVEL LT 7.0%: CPT | Mod: CPTII,S$GLB,, | Performed by: REGISTERED NURSE

## 2023-07-17 PROCEDURE — 96372 PR INJECTION,THERAP/PROPH/DIAG2ST, IM OR SUBCUT: ICD-10-PCS | Mod: S$GLB,,, | Performed by: REGISTERED NURSE

## 2023-07-17 PROCEDURE — 3078F PR MOST RECENT DIASTOLIC BLOOD PRESSURE < 80 MM HG: ICD-10-PCS | Mod: CPTII,S$GLB,, | Performed by: REGISTERED NURSE

## 2023-07-17 PROCEDURE — 99999 PR PBB SHADOW E&M-EST. PATIENT-LVL V: CPT | Mod: PBBFAC,,, | Performed by: REGISTERED NURSE

## 2023-07-17 PROCEDURE — 99214 OFFICE O/P EST MOD 30 MIN: CPT | Mod: 25,S$GLB,, | Performed by: REGISTERED NURSE

## 2023-07-17 PROCEDURE — 96372 THER/PROPH/DIAG INJ SC/IM: CPT | Mod: S$GLB,,, | Performed by: REGISTERED NURSE

## 2023-07-17 PROCEDURE — 99214 PR OFFICE/OUTPT VISIT, EST, LEVL IV, 30-39 MIN: ICD-10-PCS | Mod: 25,S$GLB,, | Performed by: REGISTERED NURSE

## 2023-07-17 PROCEDURE — 3008F BODY MASS INDEX DOCD: CPT | Mod: CPTII,S$GLB,, | Performed by: REGISTERED NURSE

## 2023-07-17 PROCEDURE — 3008F PR BODY MASS INDEX (BMI) DOCUMENTED: ICD-10-PCS | Mod: CPTII,S$GLB,, | Performed by: REGISTERED NURSE

## 2023-07-17 PROCEDURE — 1159F PR MEDICATION LIST DOCUMENTED IN MEDICAL RECORD: ICD-10-PCS | Mod: CPTII,S$GLB,, | Performed by: REGISTERED NURSE

## 2023-07-17 PROCEDURE — 99999 PR PBB SHADOW E&M-EST. PATIENT-LVL V: ICD-10-PCS | Mod: PBBFAC,,, | Performed by: REGISTERED NURSE

## 2023-07-17 PROCEDURE — 3078F DIAST BP <80 MM HG: CPT | Mod: CPTII,S$GLB,, | Performed by: REGISTERED NURSE

## 2023-07-17 PROCEDURE — 3074F SYST BP LT 130 MM HG: CPT | Mod: CPTII,S$GLB,, | Performed by: REGISTERED NURSE

## 2023-07-17 RX ORDER — METHYLPREDNISOLONE ACETATE 80 MG/ML
80 INJECTION, SUSPENSION INTRA-ARTICULAR; INTRALESIONAL; INTRAMUSCULAR; SOFT TISSUE ONCE
Status: COMPLETED | OUTPATIENT
Start: 2023-07-17 | End: 2023-07-17

## 2023-07-17 RX ORDER — BUTALBITAL, ACETAMINOPHEN AND CAFFEINE 50; 325; 40 MG/1; MG/1; MG/1
1 TABLET ORAL EVERY 6 HOURS PRN
COMMUNITY
Start: 2023-06-20 | End: 2024-03-07

## 2023-07-17 RX ORDER — FAMOTIDINE 40 MG/1
20 TABLET, FILM COATED ORAL 2 TIMES DAILY
Qty: 30 TABLET | Refills: 0 | Status: SHIPPED | OUTPATIENT
Start: 2023-07-17 | End: 2024-07-16

## 2023-07-17 RX ORDER — FLUOCINONIDE 0.5 MG/G
OINTMENT TOPICAL 2 TIMES DAILY
Qty: 30 G | Refills: 1 | Status: SHIPPED | OUTPATIENT
Start: 2023-07-17

## 2023-07-17 RX ORDER — MINERAL OIL
180 ENEMA (ML) RECTAL DAILY
Qty: 30 TABLET | Refills: 0 | Status: SHIPPED | OUTPATIENT
Start: 2023-07-17

## 2023-07-17 RX ADMIN — METHYLPREDNISOLONE ACETATE 80 MG: 80 INJECTION, SUSPENSION INTRA-ARTICULAR; INTRALESIONAL; INTRAMUSCULAR; SOFT TISSUE at 03:07

## 2023-07-17 NOTE — PROGRESS NOTES
SUBJECTIVE:     Stacey Wade is a 57 y.o. female is here today for:  HIVES    HPI:    Rash  Patient presents for evaluation of a rash involving the arms, reports rash as hives.  Rash started 1 week ago.  Lesions are small, slightly raised, pinpoint, and rough in texture.  Rash has changed over time, more irritation and redness 2/t scratching from the increased itch.  Patient denies: arthralgia, fever, and myalgia.  Patient has not had contacts with similar rash.  Patient has not had new exposures (soaps, lotions, laundry detergents, foods, medications, plants, insects or animals).  Tx rash with Aquaphor, steroid cream and Benedryl --- has not helped.      REVIEW OF SYSTEMS:    Review of Systems   Constitutional:  Negative for chills and fever.   Skin:  Positive for itching and rash.   Neurological: Negative.    All other systems reviewed and are negative.      CURRENT ALLERGIES:    Review of patient's allergies indicates:   Allergen Reactions    Benzalkonium chloride     Black pepper      Other reaction(s): Hives    Chocolate flavor      Other reaction(s): Hives    Citrus and derivatives Hives     LEMON PRODUCTS    Grapefruit Hives     Other reaction(s): Hives    Ibuprofen Swelling    Latex      Other reaction(s): Hives    Lemon balm (casper officinalis) Hives     Anything with lemon in it    Naproxen Swelling    Neomycin-bacitracin-polymyxin      Other reaction(s): Rash    Oats (richard) Hives     Oat foods (oatmeal, etc...)    Vegetable acetoglycerides Hives     Vegetable gums    Wheat containing prod     Wheat flour Hives       CURRENT PROBLEM LIST:    Patient Active Problem List   Diagnosis    Essential hypertension    Allergic rhinitis    Anxiety    Urticaria    Obesity (BMI 30.0-34.9)    Complex tear of medial meniscus of left knee    Migraines    Overactive bladder    Decreased functional mobility and endurance    Prediabetes       CURRENT MEDICATION LIST:      Current Outpatient Medications:      aspirin (ECOTRIN) 81 MG EC tablet, Take 1 tablet by mouth every morning., Disp: , Rfl:     betamethasone dipropionate 0.05 % cream, APPLY TOPICALLY 2 TIMES DAILY., Disp: 45 g, Rfl: 2    clorazepate (TRANXENE) 3.75 MG Tab, TAKE 1 TABLET BY MOUTH EVERY DAY AS NEEDED, Disp: 30 tablet, Rfl: 0    clotrimazole-betamethasone 1-0.05% (LOTRISONE) cream, Apply topically 2 (two) times daily., Disp: 45 g, Rfl: 2    diclofenac sodium (VOLTAREN) 1 % Gel, Apply 2 g topically 4 (four) times daily., Disp: 100 g, Rfl: 2    doxepin (SINEQUAN) 10 MG capsule, TAKE TWO CAPSULES BY MOUTH THREE TIMES DAILY, Disp: 540 capsule, Rfl: 2    EPINEPHrine (EPIPEN) 0.3 mg/0.3 mL AtIn, Inject 0.3 mg into the muscle daily as needed., Disp: , Rfl:     hydrOXYzine HCL (ATARAX) 25 MG tablet, TAKE ONE TABLET BY MOUTH FOUR TIMES DAILY AS NEEDED FOR ITCHING, Disp: 120 tablet, Rfl: 5    mirabegron (MYRBETRIQ) 25 mg Tb24 ER tablet, TAKE ONE TABLET BY MOUTH EVERY DAY, Disp: 90 tablet, Rfl: 2    mometasone (NASONEX) 50 mcg/actuation nasal spray, INHALE 2 SPRAYS INTO THE NOSTRIL ONCE A DAY, Disp: 51 g, Rfl: 3    montelukast (SINGULAIR) 10 mg tablet, Take 1 tablet (10 mg total) by mouth once daily., Disp: 30 tablet, Rfl: 0    omalizumab (XOLAIR) 150 mg/mL injection, Inject 300 mg into the skin., Disp: , Rfl:     potassium chloride (MICRO-K) 10 MEQ CpSR, Take 1 capsule (10 mEq total) by mouth once daily., Disp: 90 capsule, Rfl: 3    rizatriptan (MAXALT) 10 MG tablet, TAKE 1 TABLET BY MOUTH AS NEEDED FOR MIGRAINE. MAY REPEAT IN 2 HOURS. MAX 2 TABS PER 24 HOURS, Disp: 27 tablet, Rfl: 3    triamterene-hydrochlorothiazide 37.5-25 mg (DYAZIDE) 37.5-25 mg per capsule, Take 1 capsule by mouth once daily., Disp: 90 capsule, Rfl: 3    butalbital-acetaminophen-caffeine -40 mg (FIORICET, ESGIC) -40 mg per tablet, Take 1 tablet by mouth every 6 (six) hours as needed., Disp: , Rfl:         HISTORY:    Past medical, surgical, family and social histories have been  "reviewed today.      OBJECTIVE:     Vitals:    07/17/23 1506   BP: 102/68   Pulse: 102   Temp: 98.6 °F (37 °C)   SpO2: 98%   Weight: 86 kg (189 lb 7.8 oz)   Height: 5' 2" (1.575 m)   PainSc: 0-No pain       Physical Exam  Vitals reviewed.   Constitutional:       General: She is not in acute distress.  Skin:     Comments: Skin rash (not hives) noted to both elbows and upper arms.  Small raised bumps, fleshy colored.  Scattered skin excoriations noted due to increased scratching from the skin pruritus.   Neurological:      Mental Status: She is alert and oriented to person, place, and time.       ASSESSMENT:     1. Skin rash  methylPREDNISolone acetate injection 80 mg     Exact trigger unclear at this time fexofenadine (ALLEGRA) 180 MG tablet    famotidine (PEPCID) 40 MG tablet    fluocinonide (LIDEX) 0.05 % ointment      2. Itching  methylPREDNISolone acetate injection 80 mg     See # 1 fexofenadine (ALLEGRA) 180 MG tablet    famotidine (PEPCID) 40 MG tablet    fluocinonide (LIDEX) 0.05 % ointment          PLAN:     Steroid IM today.  AM ---- Allegra, Pepcid, ointment  PM ---- Pepcid, Atarax (has at home), ointment  If taking Atarax HS, then hold doxepin.  Sarna lotion if needed.  Contact office back if issue worsens or persists, may need to see Derm.  RTC as directed and/or prn.        PAYTON Judge  [facility]       30 minutes of total time spent on the encounter, which includes face to face time and non-face to face time preparing to see the patient.  This includes obtaining and/or reviewing separately obtained history, performing a medically appropriate examination and/or evaluation, and counseling and educating the patient/family/caregiver.  Includes documenting clinical information in the electronic or other health record, independently interpreting results (not separately reported) and communicating results to the patient/family/caregiver, with care coordination (not " separately reported).  Medications, tests and/or procedures ordered as necessary along with referring and communicating with other health professionals (when not separately reported).

## 2023-07-17 NOTE — PATIENT INSTRUCTIONS
MORNING:  Allegra (fexofenadine) 180 mg tab  Pepcid (famotidine) 20 mg tab    EVENING/BEDTIME:  Pepcid 20 mg tab  Vistaril (hydroxyzine) 25 to 50 mg tab ---- SEDATING      Steroid injection given in office.

## 2023-07-20 DIAGNOSIS — R21 SKIN RASH: Primary | ICD-10-CM

## 2023-07-20 DIAGNOSIS — L50.9 URTICARIA: ICD-10-CM

## 2023-07-20 RX ORDER — PREDNISONE 20 MG/1
TABLET ORAL
Qty: 22 TABLET | Refills: 0 | Status: SHIPPED | OUTPATIENT
Start: 2023-07-20 | End: 2023-09-11

## 2023-07-21 ENCOUNTER — OFFICE VISIT (OUTPATIENT)
Dept: FAMILY MEDICINE | Facility: CLINIC | Age: 58
End: 2023-07-21
Payer: COMMERCIAL

## 2023-07-21 ENCOUNTER — LAB VISIT (OUTPATIENT)
Dept: LAB | Facility: HOSPITAL | Age: 58
End: 2023-07-21
Attending: FAMILY MEDICINE
Payer: COMMERCIAL

## 2023-07-21 VITALS
WEIGHT: 188.81 LBS | BODY MASS INDEX: 34.74 KG/M2 | HEART RATE: 78 BPM | HEIGHT: 62 IN | OXYGEN SATURATION: 99 % | TEMPERATURE: 97 F | DIASTOLIC BLOOD PRESSURE: 78 MMHG | SYSTOLIC BLOOD PRESSURE: 106 MMHG

## 2023-07-21 DIAGNOSIS — R73.03 PREDIABETES: ICD-10-CM

## 2023-07-21 DIAGNOSIS — R73.03 PREDIABETES: Primary | ICD-10-CM

## 2023-07-21 DIAGNOSIS — I10 ESSENTIAL HYPERTENSION: Chronic | ICD-10-CM

## 2023-07-21 DIAGNOSIS — E66.9 OBESITY (BMI 30.0-34.9): ICD-10-CM

## 2023-07-21 PROBLEM — Z74.09 DECREASED FUNCTIONAL MOBILITY AND ENDURANCE: Status: RESOLVED | Noted: 2022-05-20 | Resolved: 2023-07-21

## 2023-07-21 LAB
ESTIMATED AVG GLUCOSE: 114 MG/DL (ref 68–131)
HBA1C MFR BLD: 5.6 % (ref 4–5.6)

## 2023-07-21 PROCEDURE — 3074F PR MOST RECENT SYSTOLIC BLOOD PRESSURE < 130 MM HG: ICD-10-PCS | Mod: CPTII,S$GLB,, | Performed by: FAMILY MEDICINE

## 2023-07-21 PROCEDURE — 1160F RVW MEDS BY RX/DR IN RCRD: CPT | Mod: CPTII,S$GLB,, | Performed by: FAMILY MEDICINE

## 2023-07-21 PROCEDURE — 3078F DIAST BP <80 MM HG: CPT | Mod: CPTII,S$GLB,, | Performed by: FAMILY MEDICINE

## 2023-07-21 PROCEDURE — 3044F PR MOST RECENT HEMOGLOBIN A1C LEVEL <7.0%: ICD-10-PCS | Mod: CPTII,S$GLB,, | Performed by: FAMILY MEDICINE

## 2023-07-21 PROCEDURE — 1159F PR MEDICATION LIST DOCUMENTED IN MEDICAL RECORD: ICD-10-PCS | Mod: CPTII,S$GLB,, | Performed by: FAMILY MEDICINE

## 2023-07-21 PROCEDURE — 99214 OFFICE O/P EST MOD 30 MIN: CPT | Mod: S$GLB,,, | Performed by: FAMILY MEDICINE

## 2023-07-21 PROCEDURE — 3008F BODY MASS INDEX DOCD: CPT | Mod: CPTII,S$GLB,, | Performed by: FAMILY MEDICINE

## 2023-07-21 PROCEDURE — 83036 HEMOGLOBIN GLYCOSYLATED A1C: CPT | Performed by: FAMILY MEDICINE

## 2023-07-21 PROCEDURE — 3008F PR BODY MASS INDEX (BMI) DOCUMENTED: ICD-10-PCS | Mod: CPTII,S$GLB,, | Performed by: FAMILY MEDICINE

## 2023-07-21 PROCEDURE — 3074F SYST BP LT 130 MM HG: CPT | Mod: CPTII,S$GLB,, | Performed by: FAMILY MEDICINE

## 2023-07-21 PROCEDURE — 99214 PR OFFICE/OUTPT VISIT, EST, LEVL IV, 30-39 MIN: ICD-10-PCS | Mod: S$GLB,,, | Performed by: FAMILY MEDICINE

## 2023-07-21 PROCEDURE — 1159F MED LIST DOCD IN RCRD: CPT | Mod: CPTII,S$GLB,, | Performed by: FAMILY MEDICINE

## 2023-07-21 PROCEDURE — 99999 PR PBB SHADOW E&M-EST. PATIENT-LVL V: ICD-10-PCS | Mod: PBBFAC,,, | Performed by: FAMILY MEDICINE

## 2023-07-21 PROCEDURE — 99999 PR PBB SHADOW E&M-EST. PATIENT-LVL V: CPT | Mod: PBBFAC,,, | Performed by: FAMILY MEDICINE

## 2023-07-21 PROCEDURE — 1160F PR REVIEW ALL MEDS BY PRESCRIBER/CLIN PHARMACIST DOCUMENTED: ICD-10-PCS | Mod: CPTII,S$GLB,, | Performed by: FAMILY MEDICINE

## 2023-07-21 PROCEDURE — 36415 COLL VENOUS BLD VENIPUNCTURE: CPT | Mod: PO | Performed by: FAMILY MEDICINE

## 2023-07-21 PROCEDURE — 3078F PR MOST RECENT DIASTOLIC BLOOD PRESSURE < 80 MM HG: ICD-10-PCS | Mod: CPTII,S$GLB,, | Performed by: FAMILY MEDICINE

## 2023-07-21 PROCEDURE — 3044F HG A1C LEVEL LT 7.0%: CPT | Mod: CPTII,S$GLB,, | Performed by: FAMILY MEDICINE

## 2023-07-21 NOTE — PROGRESS NOTES
CHIEF COMPLAINT:  This is a 57-year-old female here for follow up chronic medical conditions.     SUBJECTIVE:  Patient is doing well without complaints. Patient was diagnosed with prediabetes 6 months ago with A1c 5.8%. She reports eating more healthy and has lost at least 9 lb in the last 6 months.  The patient reports recent fall onto her knees but reports improved pain in knees since left knee surgery in May 2022.  She has essential hypertension for which she takes Dyazide daily.  Her blood pressure today is 106/78. She's being followed by breast screening because of fibrocystic disease.  She had nodule in right breast which has improved since she discontinued caffeine.  She continues to take Xolair injections for chronic urticaria.  She also takes Singulair, doxepin, and famotidine.  She is obese with a BMI of 34.54. She takes Mybetriq 25 mg daily for overactive bladder.      ROS:  GENERAL: Patient denies fever, chills, night sweats. Patient denies weight gain. Patient denies anorexia, fatigue, weakness or swollen glands.  Positive for weight loss.  SKIN: Patient denies rash or hair loss.  HEENT: Patient denies sore throat, ear pain, hearing loss, nasal congestion, or runny nose. Patient denies visual disturbance, eye irritation or discharge.  LUNGS: Patient denies cough, wheeze or hemoptysis.  CARDIOVASCULAR: Patient denies shortness of breath, palpitations, syncope or lower extremity edema.  GI: Patient denies abdominal pain, nausea, vomiting, diarrhea, constipation, blood in stool or melena.  GENITOURINARY: Patient denies pelvic pain, vaginal discharge, itch or odor. Patient denies irregular vaginal bleeding. Patient denies dysuria, frequency, hematuria, nocturia, urgency or incontinence.  BREASTS: Patient denies breast pain, mass or nipple discharge.  MUSCULOSKELETAL: Patient denies joint swelling, redness or warmth. Positive for ankle and foot pain.  NEUROLOGIC: Patient denies headache, vertigo,  paresthesias, weakness in limb, dysarthria, dysphagia or abnormality of gait.  PSYCHIATRIC: Patient denies anxiety, depression, or memory loss.     OBJECTIVE:   GENERAL: Well-developed well-nourished, obese, black female alert and oriented x3, in no acute distress. Memory, judgment and cognition without deficit.  Weight loss of 9 pounds in the last 6 months.  SKIN: Clear without rash. Normal color and tone.  HEENT: Eyes: Clear conjunctivae.  No scleral icterus  NECK: Supple, normal range of motion. No masses, lymphadenopathy or enlarged thyroid. No JVD. Carotids 2+ and equal. No bruits.  LUNGS: Clear to auscultation. Normal respiratory effort.  CARDIOVASCULAR: Regular rhythm, normal S1, S2 without murmur, gallop or rub.  EXTREMITIES: Without cyanosis, clubbing or edema. Distal pulses 2+ and equal. Normal range of motion in all extremities. No joint effusion, erythema or warmth.  Knee braces in place.  NEUROLOGIC:  Gait without abnormality.  No tremor.    ASSESSMENT:  1. Prediabetes    2. Essential hypertension    3. Obesity (BMI 30.0-34.9)      PLAN:  1. Weight reduction. Exercise regularly.  2. Follow low saturated fat, limited carbohydrate, no sugar diet.  3. Check A1c.    4. Follow-up in 6 months.      30 minutes of total time spent on the encounter, which includes face to face time and non-face to face time preparing to see the patient (eg, review of tests), Obtaining and/or reviewing separately obtained history, Documenting clinical information in the electronic or other health record, Independently interpreting results (not separately reported) and communicating results to the patient/family/caregiver, or Care coordination (not separately reported).     This note is generated with speech recognition software and is subject to transcription error and sound alike phrases that may be missed by proofreading.

## 2023-08-22 RX ORDER — BETAMETHASONE DIPROPIONATE 0.5 MG/G
CREAM TOPICAL
Qty: 45 G | Refills: 2 | Status: SHIPPED | OUTPATIENT
Start: 2023-08-22

## 2023-08-22 NOTE — TELEPHONE ENCOUNTER
Refill Decision Note   Stacey Wade  is requesting a refill authorization.  Brief Assessment and Rationale for Refill:  Approve     Medication Therapy Plan:       Medication Reconciliation Completed: No    Comments:     No Care Gaps recommended.     Note composed:12:21 PM 08/22/2023

## 2023-08-22 NOTE — TELEPHONE ENCOUNTER
No care due was identified.  [facility] Embedded Care Due Messages. Reference number: [id number].   8/22/2023 8:02:05 AM CDT

## 2023-09-10 NOTE — PROGRESS NOTES
SUBJECTIVE:     Stacey Wade is a 57 y.o. female is here today for:  Sore throat/fever/aches    HPI:    Mrs. Wade is here with c/o not feeling well, acute onset yesterday.  Reports sore throat, NC, aches, fever and chills.  Tx with Mucinex and Coricidin.  Feeling about the same today.  She is not drinking much water, trying to get some rest.  Positive for ill exposure.      REVIEW OF SYSTEMS:    Review of Systems   Constitutional:  Positive for chills, diaphoresis, fever and malaise/fatigue (sleeping more).   HENT:  Positive for congestion and sore throat. Negative for ear pain and sinus pain.         RN reported   Eyes: Negative.    Respiratory:  Positive for cough (dry). Negative for hemoptysis, sputum production, shortness of breath and wheezing.    Cardiovascular: Negative.    Musculoskeletal:  Positive for myalgias.   Skin:  Negative for itching and rash.   Neurological:  Positive for headaches. Negative for tingling, tremors and weakness.   All other systems reviewed and are negative.      CURRENT ALLERGIES:    Review of patient's allergies indicates:   Allergen Reactions    Benzalkonium chloride     Black pepper      Other reaction(s): Hives    Chocolate flavor      Other reaction(s): Hives    Citrus and derivatives Hives     LEMON PRODUCTS    Grapefruit Hives     Other reaction(s): Hives    Ibuprofen Swelling    Latex      Other reaction(s): Hives    Lemon balm (casper officinalis) Hives     Anything with lemon in it    Naproxen Swelling    Neomycin-bacitracin-polymyxin      Other reaction(s): Rash    Oats (richard) Hives     Oat foods (oatmeal, etc...)    Vegetable acetoglycerides Hives     Vegetable gums    Wheat containing prod     Wheat flour Hives       CURRENT PROBLEM LIST:    Patient Active Problem List   Diagnosis    Essential hypertension    Allergic rhinitis    Anxiety    Urticaria    Obesity (BMI 30.0-34.9)    Complex tear of medial meniscus of left knee    Migraines    Overactive bladder     Prediabetes       CURRENT MEDICATION LIST:      Current Outpatient Medications:     aspirin (ECOTRIN) 81 MG EC tablet, Take 1 tablet by mouth every morning., Disp: , Rfl:     betamethasone dipropionate 0.05 % cream, APPLY TOPICALLY 2 TIMES DAILY., Disp: 45 g, Rfl: 2    butalbital-acetaminophen-caffeine -40 mg (FIORICET, ESGIC) -40 mg per tablet, Take 1 tablet by mouth every 6 (six) hours as needed., Disp: , Rfl:     clorazepate (TRANXENE) 3.75 MG Tab, TAKE 1 TABLET BY MOUTH EVERY DAY AS NEEDED, Disp: 30 tablet, Rfl: 0    clotrimazole-betamethasone 1-0.05% (LOTRISONE) cream, Apply topically 2 (two) times daily., Disp: 45 g, Rfl: 2    diclofenac sodium (VOLTAREN) 1 % Gel, Apply 2 g topically 4 (four) times daily., Disp: 100 g, Rfl: 2    doxepin (SINEQUAN) 10 MG capsule, TAKE TWO CAPSULES BY MOUTH THREE TIMES DAILY, Disp: 540 capsule, Rfl: 2    EPINEPHrine (EPIPEN) 0.3 mg/0.3 mL AtIn, Inject 0.3 mg into the muscle daily as needed., Disp: , Rfl:     famotidine (PEPCID) 40 MG tablet, Take 0.5 tablets (20 mg total) by mouth 2 (two) times daily., Disp: 30 tablet, Rfl: 0    fexofenadine (ALLEGRA) 180 MG tablet, Take 1 tablet (180 mg total) by mouth once daily. In AM, Disp: 30 tablet, Rfl: 0    fluocinonide (LIDEX) 0.05 % ointment, Apply topically 2 (two) times daily. Do not use longer than 2 weeks at a time, Disp: 30 g, Rfl: 1    hydrOXYzine HCL (ATARAX) 25 MG tablet, TAKE ONE TABLET BY MOUTH FOUR TIMES DAILY AS NEEDED FOR ITCHING, Disp: 120 tablet, Rfl: 5    mirabegron (MYRBETRIQ) 25 mg Tb24 ER tablet, TAKE ONE TABLET BY MOUTH EVERY DAY, Disp: 90 tablet, Rfl: 2    montelukast (SINGULAIR) 10 mg tablet, Take 1 tablet (10 mg total) by mouth once daily., Disp: 30 tablet, Rfl: 0    omalizumab (XOLAIR) 150 mg/mL injection, Inject 300 mg into the skin., Disp: , Rfl:     potassium chloride (MICRO-K) 10 MEQ CpSR, Take 1 capsule (10 mEq total) by mouth once daily., Disp: 90 capsule, Rfl: 3    predniSONE (DELTASONE) 20  MG tablet, Take 3 tab by mouth x 5 days, 2 tab PO x 2 days, 1 tab PO x 2 days, then 1/2 tab on last day, Disp: 22 tablet, Rfl: 0    rizatriptan (MAXALT) 10 MG tablet, TAKE 1 TABLET BY MOUTH AS NEEDED FOR MIGRAINE. MAY REPEAT IN 2 HOURS. MAX 2 TABS PER 24 HOURS, Disp: 27 tablet, Rfl: 3    triamterene-hydrochlorothiazide 37.5-25 mg (DYAZIDE) 37.5-25 mg per capsule, Take 1 capsule by mouth once daily., Disp: 90 capsule, Rfl: 3    mometasone (NASONEX) 50 mcg/actuation nasal spray, INHALE 2 SPRAYS INTO THE NOSTRIL ONCE A DAY, Disp: 51 g, Rfl: 3      HISTORY:    Past medical, surgical, family and social histories have been reviewed today.      OBJECTIVE:     Vitals:    09/11/23 1031   BP: 120/74   Pulse: 98   Resp: 20   Temp: 98 °F (36.7 °C)   TempSrc: Tympanic   SpO2: 98%   Weight: 85.6 kg (188 lb 13.2 oz)   PainSc: 0-No pain       Physical Exam  Vitals reviewed.   Constitutional:       General: She is not in acute distress.     Appearance: She is well-developed. She is not diaphoretic.   HENT:      Head: Normocephalic and atraumatic.      Right Ear: Ear canal and external ear normal. A middle ear effusion is present. There is no impacted cerumen. Tympanic membrane is bulging. Tympanic membrane is not injected or erythematous.      Left Ear: Ear canal and external ear normal. A middle ear effusion is present. There is no impacted cerumen. Tympanic membrane is bulging. Tympanic membrane is not injected or erythematous.      Nose: Nose normal. No nasal deformity, mucosal edema, congestion or rhinorrhea.      Mouth/Throat:      Mouth: Mucous membranes are moist. Mucous membranes are not dry and not cyanotic. No oral lesions.      Dentition: Normal dentition.      Pharynx: Oropharynx is clear. Uvula midline. No oropharyngeal exudate, posterior oropharyngeal erythema or uvula swelling.      Tonsils: No tonsillar abscesses.   Eyes:      General: Lids are normal. Lids are everted, no foreign bodies appreciated. No scleral  icterus.        Right eye: No discharge.         Left eye: No discharge.      Conjunctiva/sclera: Conjunctivae normal.      Right eye: Right conjunctiva is not injected. No exudate.     Left eye: Left conjunctiva is not injected. No exudate.     Pupils: Pupils are equal, round, and reactive to light.   Neck:      Thyroid: No thyroid mass or thyromegaly.      Vascular: No carotid bruit or JVD.      Trachea: No tracheal deviation.   Cardiovascular:      Rate and Rhythm: Normal rate and regular rhythm.      Pulses: Normal pulses.      Heart sounds: Normal heart sounds. No murmur heard.     No friction rub. No gallop.   Pulmonary:      Effort: Pulmonary effort is normal. No respiratory distress.      Breath sounds: Normal breath sounds. No stridor. No wheezing.   Chest:      Chest wall: No tenderness.   Musculoskeletal:         General: No swelling. Normal range of motion.      Cervical back: Normal range of motion and neck supple. No edema or erythema. Normal range of motion.   Lymphadenopathy:      Cervical: No cervical adenopathy.   Skin:     General: Skin is warm and dry.      Capillary Refill: Capillary refill takes less than 2 seconds.      Coloration: Skin is not pale.      Findings: No bruising, erythema or rash.   Neurological:      Mental Status: She is alert and oriented to person, place, and time.      Sensory: No sensory deficit.      Motor: No weakness, tremor, atrophy or abnormal muscle tone.      Coordination: Coordination normal.      Gait: Gait normal.      Deep Tendon Reflexes: Reflexes are normal and symmetric.   Psychiatric:         Mood and Affect: Mood normal.         Speech: Speech normal.         Behavior: Behavior normal.         Thought Content: Thought content normal.         Cognition and Memory: Memory is not impaired.         Judgment: Judgment normal.       Results for orders placed or performed in visit on 09/11/23   POCT COVID-19 Rapid Screening   Result Value Ref Range    POC Rapid  COVID Positive (A) Negative     Acceptable Yes    POCT Influenza A/B Molecular   Result Value Ref Range    POC Molecular Influenza A Ag Negative Negative, Not Reported    POC Molecular Influenza B Ag Negative Negative, Not Reported     Acceptable Yes        ASSESSMENT:     1. COVID-19 virus infection  -     predniSONE (DELTASONE) 20 MG tablet; Take 3 tab by mouth x 5 days, 2 tab PO x 2 days, 1 tab PO x 2 days, then 1/2 tab on last day  Dispense: 22 tablet; Refill: 0  -     albuterol (PROVENTIL/VENTOLIN HFA) 90 mcg/actuation inhaler; Inhale 2 puffs into the lungs every 4 to 6 hours as needed for Wheezing.  Dispense: 18 g; Refill: 0    COVID RISK SCORE ----> 2     2. Fever and chills  -     POCT COVID-19 Rapid Screening  -     POCT Influenza A/B Molecular    3. Generalized body aches  -     POCT COVID-19 Rapid Screening  -     POCT Influenza A/B Molecular  -     predniSONE (DELTASONE) 20 MG tablet; Take 3 tab by mouth x 5 days, 2 tab PO x 2 days, 1 tab PO x 2 days, then 1/2 tab on last day  Dispense: 22 tablet; Refill: 0    4. Cough, unspecified type  -     POCT COVID-19 Rapid Screening  -     POCT Influenza A/B Molecular  -     predniSONE (DELTASONE) 20 MG tablet; Take 3 tab by mouth x 5 days, 2 tab PO x 2 days, 1 tab PO x 2 days, then 1/2 tab on last day  Dispense: 22 tablet; Refill: 0  -     albuterol (PROVENTIL/VENTOLIN HFA) 90 mcg/actuation inhaler; Inhale 2 puffs into the lungs every 4 to 6 hours as needed for Wheezing.  Dispense: 18 g; Refill: 0  -     benzonatate (TESSALON) 100 MG capsule; Take 1-2 capsules (100-200 mg total) by mouth 3 (three) times daily as needed for Cough.  Dispense: 30 capsule; Refill: 0  -     promethazine-dextromethorphan (PROMETHAZINE-DM) 6.25-15 mg/5 mL Syrp; Take 5 mLs by mouth every 4 to 6 hours as needed (CAN BE SEDATING).  Dispense: 150 mL; Refill: 0    5. Sore throat  -     POCT COVID-19 Rapid Screening  -     POCT Influenza A/B Molecular  -      predniSONE (DELTASONE) 20 MG tablet; Take 3 tab by mouth x 5 days, 2 tab PO x 2 days, 1 tab PO x 2 days, then 1/2 tab on last day  Dispense: 22 tablet; Refill: 0      PLAN:     Work note provided to return on Monday 9/18/23.  To get pulse ox for monitoring.  Advised to go to ER for sats <= 94 consistently, cp, severe sob, high fever/chills, or leg pain/swelling.  Isolation discussed.  Symptomatic care, rest, hydration.  RTC as directed and/or prn.        PAYTON Judge  [facility]       40 minutes of total time spent on the encounter, which includes face to face time and non-face to face time preparing to see the patient.  This includes obtaining and/or reviewing separately obtained history, performing a medically appropriate examination and/or evaluation, and counseling and educating the patient/family/caregiver.  Includes documenting clinical information in the electronic or other health record, independently interpreting results (not separately reported) and communicating results to the patient/family/caregiver, with care coordination (not separately reported).  Medications, tests and/or procedures ordered as necessary along with referring and communicating with other health professionals (when not separately reported).

## 2023-09-11 ENCOUNTER — OFFICE VISIT (OUTPATIENT)
Dept: FAMILY MEDICINE | Facility: CLINIC | Age: 58
End: 2023-09-11
Payer: COMMERCIAL

## 2023-09-11 VITALS
DIASTOLIC BLOOD PRESSURE: 74 MMHG | WEIGHT: 188.81 LBS | RESPIRATION RATE: 20 BRPM | SYSTOLIC BLOOD PRESSURE: 120 MMHG | OXYGEN SATURATION: 98 % | HEART RATE: 98 BPM | BODY MASS INDEX: 34.54 KG/M2 | TEMPERATURE: 98 F

## 2023-09-11 DIAGNOSIS — R52 GENERALIZED BODY ACHES: ICD-10-CM

## 2023-09-11 DIAGNOSIS — R05.9 COUGH, UNSPECIFIED TYPE: ICD-10-CM

## 2023-09-11 DIAGNOSIS — J02.9 SORE THROAT: ICD-10-CM

## 2023-09-11 DIAGNOSIS — U07.1 COVID-19 VIRUS INFECTION: Primary | ICD-10-CM

## 2023-09-11 DIAGNOSIS — R50.9 FEVER AND CHILLS: ICD-10-CM

## 2023-09-11 PROBLEM — S83.232A COMPLEX TEAR OF MEDIAL MENISCUS OF LEFT KNEE: Status: RESOLVED | Noted: 2022-05-05 | Resolved: 2023-09-11

## 2023-09-11 LAB
CTP QC/QA: YES
CTP QC/QA: YES
POC MOLECULAR INFLUENZA A AGN: NEGATIVE
POC MOLECULAR INFLUENZA B AGN: NEGATIVE
SARS-COV-2 RDRP RESP QL NAA+PROBE: POSITIVE

## 2023-09-11 PROCEDURE — 3008F BODY MASS INDEX DOCD: CPT | Mod: CPTII,S$GLB,, | Performed by: REGISTERED NURSE

## 2023-09-11 PROCEDURE — 3074F SYST BP LT 130 MM HG: CPT | Mod: CPTII,S$GLB,, | Performed by: REGISTERED NURSE

## 2023-09-11 PROCEDURE — 99999 PR PBB SHADOW E&M-EST. PATIENT-LVL V: CPT | Mod: PBBFAC,,, | Performed by: REGISTERED NURSE

## 2023-09-11 PROCEDURE — 3078F PR MOST RECENT DIASTOLIC BLOOD PRESSURE < 80 MM HG: ICD-10-PCS | Mod: CPTII,S$GLB,, | Performed by: REGISTERED NURSE

## 2023-09-11 PROCEDURE — 3044F HG A1C LEVEL LT 7.0%: CPT | Mod: CPTII,S$GLB,, | Performed by: REGISTERED NURSE

## 2023-09-11 PROCEDURE — 3078F DIAST BP <80 MM HG: CPT | Mod: CPTII,S$GLB,, | Performed by: REGISTERED NURSE

## 2023-09-11 PROCEDURE — 87635 SARS-COV-2 COVID-19 AMP PRB: CPT | Mod: QW,S$GLB,, | Performed by: REGISTERED NURSE

## 2023-09-11 PROCEDURE — 3074F PR MOST RECENT SYSTOLIC BLOOD PRESSURE < 130 MM HG: ICD-10-PCS | Mod: CPTII,S$GLB,, | Performed by: REGISTERED NURSE

## 2023-09-11 PROCEDURE — 99215 OFFICE O/P EST HI 40 MIN: CPT | Mod: 25,S$GLB,, | Performed by: REGISTERED NURSE

## 2023-09-11 PROCEDURE — 87502 INFLUENZA DNA AMP PROBE: CPT | Mod: QW,S$GLB,, | Performed by: REGISTERED NURSE

## 2023-09-11 PROCEDURE — 99999 PR PBB SHADOW E&M-EST. PATIENT-LVL V: ICD-10-PCS | Mod: PBBFAC,,, | Performed by: REGISTERED NURSE

## 2023-09-11 PROCEDURE — 1159F MED LIST DOCD IN RCRD: CPT | Mod: CPTII,S$GLB,, | Performed by: REGISTERED NURSE

## 2023-09-11 PROCEDURE — 3044F PR MOST RECENT HEMOGLOBIN A1C LEVEL <7.0%: ICD-10-PCS | Mod: CPTII,S$GLB,, | Performed by: REGISTERED NURSE

## 2023-09-11 PROCEDURE — 87502 POCT INFLUENZA A/B MOLECULAR: ICD-10-PCS | Mod: QW,S$GLB,, | Performed by: REGISTERED NURSE

## 2023-09-11 PROCEDURE — 99215 PR OFFICE/OUTPT VISIT, EST, LEVL V, 40-54 MIN: ICD-10-PCS | Mod: 25,S$GLB,, | Performed by: REGISTERED NURSE

## 2023-09-11 PROCEDURE — 87635: ICD-10-PCS | Mod: QW,S$GLB,, | Performed by: REGISTERED NURSE

## 2023-09-11 PROCEDURE — 3008F PR BODY MASS INDEX (BMI) DOCUMENTED: ICD-10-PCS | Mod: CPTII,S$GLB,, | Performed by: REGISTERED NURSE

## 2023-09-11 PROCEDURE — 1159F PR MEDICATION LIST DOCUMENTED IN MEDICAL RECORD: ICD-10-PCS | Mod: CPTII,S$GLB,, | Performed by: REGISTERED NURSE

## 2023-09-11 RX ORDER — ALBUTEROL SULFATE 90 UG/1
2 AEROSOL, METERED RESPIRATORY (INHALATION)
Qty: 18 G | Refills: 0 | Status: SHIPPED | OUTPATIENT
Start: 2023-09-11 | End: 2024-09-10

## 2023-09-11 RX ORDER — PROMETHAZINE HYDROCHLORIDE AND DEXTROMETHORPHAN HYDROBROMIDE 6.25; 15 MG/5ML; MG/5ML
5 SYRUP ORAL
Qty: 150 ML | Refills: 0 | Status: SHIPPED | OUTPATIENT
Start: 2023-09-11 | End: 2024-03-04

## 2023-09-11 RX ORDER — PREDNISONE 20 MG/1
TABLET ORAL
Qty: 22 TABLET | Refills: 0 | Status: SHIPPED | OUTPATIENT
Start: 2023-09-11 | End: 2024-03-04

## 2023-09-11 RX ORDER — BENZONATATE 100 MG/1
100-200 CAPSULE ORAL 3 TIMES DAILY PRN
Qty: 30 CAPSULE | Refills: 0 | Status: SHIPPED | OUTPATIENT
Start: 2023-09-11

## 2023-09-11 NOTE — LETTER
September 11, 2023    Stacey Wade  [address]  [facility] [address]             [facility]  [address] [facility] [address]  Phone: [phone]  Fax: [phone] To Whom it May Concern    Stacye Wade was seen in our office on 09/11/2023. She may return to work on 09/18/2023 with no restrictions.     If you have any questions or concerns, please don't hesitate to call.    Sincerely,        Judson Jameson, NP

## 2023-09-12 ENCOUNTER — PATIENT MESSAGE (OUTPATIENT)
Dept: SURGERY | Facility: CLINIC | Age: 58
End: 2023-09-12
Payer: COMMERCIAL

## 2023-09-26 DIAGNOSIS — B37.2 INTERTRIGINOUS CANDIDIASIS: ICD-10-CM

## 2023-09-26 RX ORDER — CLOTRIMAZOLE AND BETAMETHASONE DIPROPIONATE 10; .64 MG/G; MG/G
CREAM TOPICAL 2 TIMES DAILY
Qty: 45 G | Refills: 2 | Status: SHIPPED | OUTPATIENT
Start: 2023-09-26

## 2023-09-27 NOTE — PROGRESS NOTES
Patient ID: Stacey Wade is a 57 y.o. female.    Chief Complaint: elevated risk for breast cancer f/u    HPI: Patient presents for 6 month f/u breast exam.     Pt has been calculated to be high risk for breast cancer- Her breast cancer risk assessment score of 24.4% was calculated at the time of her annual mammogram 2/15/2-22. June 2018 had linda MRI and was wnl.     Pt has chosen to not have MRI's of the breast due to the expensive copay associated with the test .     Mammogram was performed  2/21/2023- dense breasts and risk score was 24.43%.     Has a history of a breast mass noted during her routine gyn exam back in March 2017. Negative right breast imaging with mammo and ultrasound at that time. We have been checking it clinically at 3- 6 mon intervals - had an episode of it increasing increase in size slightly last year. Pt had been using more caffeine than usual. Recommended seeing surgeon and pt wanted to decrease her caffeine to see if it would decrease the size of the area. It did go back to her baseline measurement. Pt denies any change. No new areas of concern.     MRI on 6/12/18 was wnl    Pt not exercising - caring for her mother- had left knee surgery and has recovering     Denies any breast changes or concerns- no change in FH    Pt is completely off her hormones      Review of Systems   Constitutional: Negative.  Negative for appetite change and unexpected weight change.   HENT: Negative.     Eyes: Negative.  Negative for visual disturbance.   Respiratory: Negative.  Negative for cough and shortness of breath.    Cardiovascular: Negative.  Negative for chest pain.   Gastrointestinal: Negative.  Negative for abdominal pain and diarrhea.        No reflux   Endocrine: Negative.    Genitourinary: Negative.  Negative for frequency.   Musculoskeletal: Negative.  Negative for back pain.   Allergic/Immunologic: Negative.    Neurological: Negative.  Negative for headaches.   Hematological: Negative.   Negative for adenopathy.   Psychiatric/Behavioral: Negative.  The patient is not nervous/anxious.      Breast: pt denies any nipple discharge or palpable mass that she has noted. Has had bilateral nipple tenderness intermittently. She has continued to decrease her cafeine intake.     Current Outpatient Medications   Medication Sig Dispense Refill    albuterol (PROVENTIL/VENTOLIN HFA) 90 mcg/actuation inhaler Inhale 2 puffs into the lungs every 4 to 6 hours as needed for Wheezing. 18 g 0    aspirin (ECOTRIN) 81 MG EC tablet Take 1 tablet by mouth every morning.      benzonatate (TESSALON) 100 MG capsule Take 1-2 capsules (100-200 mg total) by mouth 3 (three) times daily as needed for Cough. 30 capsule 0    betamethasone dipropionate 0.05 % cream APPLY TOPICALLY 2 TIMES DAILY. 45 g 2    butalbital-acetaminophen-caffeine -40 mg (FIORICET, ESGIC) -40 mg per tablet Take 1 tablet by mouth every 6 (six) hours as needed.      clorazepate (TRANXENE) 3.75 MG Tab TAKE 1 TABLET BY MOUTH EVERY DAY AS NEEDED 30 tablet 0    clotrimazole-betamethasone 1-0.05% (LOTRISONE) cream APPLY TOPICALLY TWICE DAILY AS DIRECTED 45 g 2    diclofenac sodium (VOLTAREN) 1 % Gel Apply 2 g topically 4 (four) times daily. 100 g 2    doxepin (SINEQUAN) 10 MG capsule TAKE TWO CAPSULES BY MOUTH THREE TIMES DAILY 540 capsule 2    EPINEPHrine (EPIPEN) 0.3 mg/0.3 mL AtIn Inject 0.3 mg into the muscle daily as needed.      famotidine (PEPCID) 40 MG tablet Take 0.5 tablets (20 mg total) by mouth 2 (two) times daily. 30 tablet 0    fexofenadine (ALLEGRA) 180 MG tablet Take 1 tablet (180 mg total) by mouth once daily. In AM 30 tablet 0    fluocinonide (LIDEX) 0.05 % ointment Apply topically 2 (two) times daily. Do not use longer than 2 weeks at a time 30 g 1    hydrOXYzine HCL (ATARAX) 25 MG tablet TAKE ONE TABLET BY MOUTH FOUR TIMES DAILY AS NEEDED FOR ITCHING 120 tablet 5    mirabegron (MYRBETRIQ) 25 mg Tb24 ER tablet Take 1 tablet (25 mg total)  by mouth once daily. 90 tablet 3    mometasone (NASONEX) 50 mcg/actuation nasal spray INHALE 2 SPRAYS INTO THE NOSTRIL ONCE A DAY 51 g 3    montelukast (SINGULAIR) 10 mg tablet Take 1 tablet (10 mg total) by mouth once daily. 30 tablet 0    omalizumab (XOLAIR) 150 mg/mL injection Inject 300 mg into the skin.      potassium chloride (MICRO-K) 10 MEQ CpSR Take 1 capsule (10 mEq total) by mouth once daily. 90 capsule 3    predniSONE (DELTASONE) 20 MG tablet Take 3 tab by mouth x 5 days, 2 tab PO x 2 days, 1 tab PO x 2 days, then 1/2 tab on last day 22 tablet 0    promethazine-dextromethorphan (PROMETHAZINE-DM) 6.25-15 mg/5 mL Syrp Take 5 mLs by mouth every 4 to 6 hours as needed (CAN BE SEDATING). 150 mL 0    rizatriptan (MAXALT) 10 MG tablet TAKE 1 TABLET BY MOUTH AS NEEDED FOR MIGRAINE. MAY REPEAT IN 2 HOURS. MAX 2 TABS PER 24 HOURS 27 tablet 3    tacrolimus (PROTOPIC) 0.03 % ointment Apply topically 2 (two) times daily.      triamterene-hydrochlorothiazide 37.5-25 mg (DYAZIDE) 37.5-25 mg per capsule Take 1 capsule by mouth once daily. 90 capsule 3     No current facility-administered medications for this visit.       Review of patient's allergies indicates:   Allergen Reactions    Benzalkonium chloride     Black pepper      Other reaction(s): Hives    Chocolate flavor      Other reaction(s): Hives    Citrus and derivatives Hives     LEMON PRODUCTS    Furosemide     Grapefruit      Other reaction(s): Hives    Latex      Other reaction(s): Hives    Lemon balm (casper officinalis) Hives    Neomycin-bacitracin-polymyxin      Other reaction(s): Rash    Oats (richard) Hives     Oat foods (oatmeal, etc...)    Wheat containing prod        Past Medical History:   Diagnosis Date    Allergic rhinitis     Anxiety     Complex tear of medial meniscus of left knee 5/5/2022    Hypertension     Osteoarthritis of both knees     Prediabetes     Urticaria        Past Surgical History:   Procedure Laterality Date    CHONDROPLASTY OF KNEE  Left 05/13/2022    Procedure: CHONDROPLASTY, KNEE;  Surgeon: eWs Faulkner MD;  Location: [facility] OR;  Service: Orthopedics;  Laterality: Left;    COLONOSCOPY N/A 01/18/2018    Procedure: COLONOSCOPY;  Surgeon: Yong Smith MD;  Location: [facility] ENDO;  Service: Endoscopy;  Laterality: N/A;    HERNIA REPAIR Left        Family History   Problem Relation Age of Onset    Lung cancer Paternal Grandfather     Ovarian cancer Maternal Grandmother     Hyperlipidemia Mother     Hypertension Mother     Breast cancer Mother 60    Arthritis Mother     Lung cancer Father     Breast cancer Maternal Aunt 53    Heart failure Other         Great aunt    Prostate cancer Brother     Brain cancer Sister     Diabetes Neg Hx     Pseudochol deficiency Neg Hx     Malignant hyperthermia Neg Hx        Social History     Socioeconomic History    Marital status: Single   Tobacco Use    Smoking status: Never    Smokeless tobacco: Never   Substance and Sexual Activity    Alcohol use: No    Drug use: No    Sexual activity: Not Currently     Partners: Male     Birth control/protection: None   Social History Narrative    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       Vitals:    10/10/23 1606   Resp: 14       Physical Exam  Vitals reviewed.   Constitutional:       Appearance: Normal appearance. She is well-developed.   HENT:      Head: Normocephalic and atraumatic.      Right Ear: External ear normal.      Left Ear: External ear normal.      Mouth/Throat:      Pharynx: No oropharyngeal exudate.   Eyes:      General: No scleral icterus.        Right eye: No discharge.         Left eye: No discharge.      Conjunctiva/sclera: Conjunctivae normal.      Pupils: Pupils are equal, round, and reactive to light.   Neck:      Thyroid: No thyromegaly.   Cardiovascular:      Rate and Rhythm: Normal rate and regular rhythm.      Pulses: Normal pulses.      Heart sounds: Normal heart  sounds.   Pulmonary:      Effort: Pulmonary effort is normal.      Breath sounds: Normal breath sounds.   Chest:   Breasts:     Right: No inverted nipple, mass, nipple discharge, skin change or tenderness.      Left: No inverted nipple, mass, nipple discharge, skin change or tenderness.   Abdominal:      General: Bowel sounds are normal.      Palpations: Abdomen is soft.   Musculoskeletal:         General: Normal range of motion.      Right shoulder: No crepitus. Normal strength.      Cervical back: Normal range of motion and neck supple.   Lymphadenopathy:      Head:      Right side of head: No submental, submandibular, tonsillar, preauricular, posterior auricular or occipital adenopathy.      Left side of head: No submental, submandibular, tonsillar, preauricular, posterior auricular or occipital adenopathy.      Cervical: No cervical adenopathy.      Right cervical: No superficial or posterior cervical adenopathy.     Left cervical: No superficial or posterior cervical adenopathy.      Upper Body:      Right upper body: No supraclavicular or axillary adenopathy.      Left upper body: No supraclavicular or axillary adenopathy.   Skin:     General: Skin is warm and dry.      Coloration: Skin is not pale.      Findings: No erythema or rash (fine barely visible papules over chin. No other areas noted).   Neurological:      General: No focal deficit present.      Mental Status: She is alert and oriented to person, place, and time.      Deep Tendon Reflexes: Reflexes are normal and symmetric.   Psychiatric:         Mood and Affect: Mood normal.         Behavior: Behavior normal.         Thought Content: Thought content normal.         Judgment: Judgment normal.       IMAGIN2023- mammogram - risk score is 24.43% - wnl    Menarche at 18 y/o      no history of radiation to the neck or chest wall.       FH: Maternal aunt breast cancer at 51 y/o, Maternal GM ovarian cancer, Father lung and prostate cancer,  Brother - prostate cancer    Assessment & Plan:  1. Area of prominent glandular tissue noted at the 3 oclock location of the right breast. Fibrous texture and blends more medially and laterally- stable measurements today  2. Mammogram 2/21/2023-wnl- risk score is 24.43%  3. linda screening mammogram Feb 2024 and exam  4. BSE recommended monthly- call for any changes.    5. Encouraged to get back into exercise and wt loss- pt agrees  6. Discussed genetic testing - pt declines at this time  7. Pt declines tamoxifen use for chemoprevention due to potential side effects   8. RTC Feb 2024 linda mammo and exam

## 2023-10-04 DIAGNOSIS — N32.81 OAB (OVERACTIVE BLADDER): Primary | ICD-10-CM

## 2023-10-04 NOTE — TELEPHONE ENCOUNTER
Message sent to provider. Waiting for approval       ----- Message from Ted Pandya sent at 10/4/2023  2:52 PM CDT -----  Contact: Stacey  Patient is calling to get a refill on mirabegron (MYRBETRIQ) 25 mg Tb24 ER tablet. Preferred pharmacy is listed below.Please give patient a call with any questions at [phone]       [id number]  [address] - [address] [address]  [address] [address]  Phone: [phone] Fax: [phone]

## 2023-10-05 RX ORDER — MIRABEGRON 25 MG/1
25 TABLET, FILM COATED, EXTENDED RELEASE ORAL DAILY
Qty: 90 TABLET | Refills: 3 | Status: SHIPPED | OUTPATIENT
Start: 2023-10-05

## 2023-10-10 ENCOUNTER — OFFICE VISIT (OUTPATIENT)
Dept: SURGERY | Facility: CLINIC | Age: 58
End: 2023-10-10
Payer: COMMERCIAL

## 2023-10-10 VITALS — HEIGHT: 62 IN | BODY MASS INDEX: 35.83 KG/M2 | RESPIRATION RATE: 14 BRPM | WEIGHT: 194.69 LBS

## 2023-10-10 DIAGNOSIS — Z80.3 FAMILY HISTORY OF BREAST CANCER: ICD-10-CM

## 2023-10-10 DIAGNOSIS — Z91.89 AT HIGH RISK FOR BREAST CANCER: Primary | ICD-10-CM

## 2023-10-10 PROCEDURE — 1159F MED LIST DOCD IN RCRD: CPT | Mod: CPTII,S$GLB,, | Performed by: NURSE PRACTITIONER

## 2023-10-10 PROCEDURE — 3044F PR MOST RECENT HEMOGLOBIN A1C LEVEL <7.0%: ICD-10-PCS | Mod: CPTII,S$GLB,, | Performed by: NURSE PRACTITIONER

## 2023-10-10 PROCEDURE — 3008F PR BODY MASS INDEX (BMI) DOCUMENTED: ICD-10-PCS | Mod: CPTII,S$GLB,, | Performed by: NURSE PRACTITIONER

## 2023-10-10 PROCEDURE — 99214 PR OFFICE/OUTPT VISIT, EST, LEVL IV, 30-39 MIN: ICD-10-PCS | Mod: S$GLB,,, | Performed by: NURSE PRACTITIONER

## 2023-10-10 PROCEDURE — 99999 PR PBB SHADOW E&M-EST. PATIENT-LVL IV: ICD-10-PCS | Mod: PBBFAC,,, | Performed by: NURSE PRACTITIONER

## 2023-10-10 PROCEDURE — 1160F PR REVIEW ALL MEDS BY PRESCRIBER/CLIN PHARMACIST DOCUMENTED: ICD-10-PCS | Mod: CPTII,S$GLB,, | Performed by: NURSE PRACTITIONER

## 2023-10-10 PROCEDURE — 99214 OFFICE O/P EST MOD 30 MIN: CPT | Mod: S$GLB,,, | Performed by: NURSE PRACTITIONER

## 2023-10-10 PROCEDURE — 1160F RVW MEDS BY RX/DR IN RCRD: CPT | Mod: CPTII,S$GLB,, | Performed by: NURSE PRACTITIONER

## 2023-10-10 PROCEDURE — 3008F BODY MASS INDEX DOCD: CPT | Mod: CPTII,S$GLB,, | Performed by: NURSE PRACTITIONER

## 2023-10-10 PROCEDURE — 99999 PR PBB SHADOW E&M-EST. PATIENT-LVL IV: CPT | Mod: PBBFAC,,, | Performed by: NURSE PRACTITIONER

## 2023-10-10 PROCEDURE — 1159F PR MEDICATION LIST DOCUMENTED IN MEDICAL RECORD: ICD-10-PCS | Mod: CPTII,S$GLB,, | Performed by: NURSE PRACTITIONER

## 2023-10-10 PROCEDURE — 3044F HG A1C LEVEL LT 7.0%: CPT | Mod: CPTII,S$GLB,, | Performed by: NURSE PRACTITIONER

## 2023-10-10 RX ORDER — TACROLIMUS 0.3 MG/G
OINTMENT TOPICAL 2 TIMES DAILY
COMMUNITY
Start: 2023-09-08

## 2023-10-11 RX ORDER — TRIAMTERENE AND HYDROCHLOROTHIAZIDE 37.5; 25 MG/1; MG/1
1 CAPSULE ORAL
Qty: 90 CAPSULE | Refills: 1 | Status: SHIPPED | OUTPATIENT
Start: 2023-10-11

## 2023-10-11 NOTE — TELEPHONE ENCOUNTER
Refill Decision Note   Stacey Wade  is requesting a refill authorization.  Brief Assessment and Rationale for Refill:  Approve     Medication Therapy Plan:         Comments:     Note composed:3:13 PM 10/11/2023

## 2023-10-11 NOTE — TELEPHONE ENCOUNTER
No care due was identified.  [facility] Embedded Care Due Messages. Reference number: [id number].   10/11/2023 3:02:52 PM CDT

## 2023-10-12 RX ORDER — POTASSIUM CHLORIDE 750 MG/1
10 CAPSULE, EXTENDED RELEASE ORAL
Qty: 90 CAPSULE | Refills: 1 | Status: SHIPPED | OUTPATIENT
Start: 2023-10-12

## 2023-10-12 NOTE — TELEPHONE ENCOUNTER
No care due was identified.  [facility] Embedded Care Due Messages. Reference number: [id number].   10/12/2023 8:00:53 AM CDT

## 2023-10-12 NOTE — TELEPHONE ENCOUNTER
Refill Decision Note   Stacey Wade  is requesting a refill authorization.  Brief Assessment and Rationale for Refill:  Approve     Medication Therapy Plan:       Medication Reconciliation Completed: No   Comments:     No Care Gaps recommended.     Note composed:11:35 AM 10/12/2023

## 2023-10-20 DIAGNOSIS — M25.562 LEFT KNEE PAIN, UNSPECIFIED CHRONICITY: Primary | ICD-10-CM

## 2023-11-01 ENCOUNTER — OFFICE VISIT (OUTPATIENT)
Dept: SPORTS MEDICINE | Facility: CLINIC | Age: 58
End: 2023-11-01
Payer: COMMERCIAL

## 2023-11-01 ENCOUNTER — HOSPITAL ENCOUNTER (OUTPATIENT)
Dept: RADIOLOGY | Facility: HOSPITAL | Age: 58
Discharge: HOME | End: 2023-11-01
Attending: ORTHOPAEDIC SURGERY
Payer: COMMERCIAL

## 2023-11-01 VITALS — WEIGHT: 194 LBS | HEIGHT: 62 IN | BODY MASS INDEX: 35.7 KG/M2

## 2023-11-01 DIAGNOSIS — E66.9 OBESITY (BMI 30-39.9): ICD-10-CM

## 2023-11-01 DIAGNOSIS — M25.562 LEFT KNEE PAIN, UNSPECIFIED CHRONICITY: ICD-10-CM

## 2023-11-01 DIAGNOSIS — M21.162 GENU VARUM OF LEFT LOWER EXTREMITY: Primary | ICD-10-CM

## 2023-11-01 DIAGNOSIS — M17.32 POST-TRAUMATIC OSTEOARTHRITIS OF LEFT KNEE: ICD-10-CM

## 2023-11-01 PROCEDURE — 99999 PR PBB SHADOW E&M-EST. PATIENT-LVL V: CPT | Mod: PBBFAC,,, | Performed by: ORTHOPAEDIC SURGERY

## 2023-11-01 PROCEDURE — 1159F PR MEDICATION LIST DOCUMENTED IN MEDICAL RECORD: ICD-10-PCS | Mod: CPTII,S$GLB,, | Performed by: ORTHOPAEDIC SURGERY

## 2023-11-01 PROCEDURE — 99214 PR OFFICE/OUTPT VISIT, EST, LEVL IV, 30-39 MIN: ICD-10-PCS | Mod: S$GLB,,, | Performed by: ORTHOPAEDIC SURGERY

## 2023-11-01 PROCEDURE — 73562 X-RAY EXAM OF KNEE 3: CPT | Mod: 26,RT,, | Performed by: RADIOLOGY

## 2023-11-01 PROCEDURE — 97760 ORTHOTIC MGMT&TRAING 1ST ENC: CPT | Mod: S$GLB,,, | Performed by: ORTHOPAEDIC SURGERY

## 2023-11-01 PROCEDURE — 73562 XR KNEE ORTHO LEFT WITH FLEXION: ICD-10-PCS | Mod: 26,RT,, | Performed by: RADIOLOGY

## 2023-11-01 PROCEDURE — 3008F BODY MASS INDEX DOCD: CPT | Mod: CPTII,S$GLB,, | Performed by: ORTHOPAEDIC SURGERY

## 2023-11-01 PROCEDURE — 73564 X-RAY EXAM KNEE 4 OR MORE: CPT | Mod: 26,LT,, | Performed by: RADIOLOGY

## 2023-11-01 PROCEDURE — 3008F PR BODY MASS INDEX (BMI) DOCUMENTED: ICD-10-PCS | Mod: CPTII,S$GLB,, | Performed by: ORTHOPAEDIC SURGERY

## 2023-11-01 PROCEDURE — 73564 XR KNEE ORTHO LEFT WITH FLEXION: ICD-10-PCS | Mod: 26,LT,, | Performed by: RADIOLOGY

## 2023-11-01 PROCEDURE — 3044F HG A1C LEVEL LT 7.0%: CPT | Mod: CPTII,S$GLB,, | Performed by: ORTHOPAEDIC SURGERY

## 2023-11-01 PROCEDURE — 3044F PR MOST RECENT HEMOGLOBIN A1C LEVEL <7.0%: ICD-10-PCS | Mod: CPTII,S$GLB,, | Performed by: ORTHOPAEDIC SURGERY

## 2023-11-01 PROCEDURE — 99214 OFFICE O/P EST MOD 30 MIN: CPT | Mod: S$GLB,,, | Performed by: ORTHOPAEDIC SURGERY

## 2023-11-01 PROCEDURE — 1159F MED LIST DOCD IN RCRD: CPT | Mod: CPTII,S$GLB,, | Performed by: ORTHOPAEDIC SURGERY

## 2023-11-01 PROCEDURE — 97760 PR ORTHOTIC MGMT&TRAINJ INITIAL ENC EA 15 MINS: ICD-10-PCS | Mod: S$GLB,,, | Performed by: ORTHOPAEDIC SURGERY

## 2023-11-01 PROCEDURE — 73564 X-RAY EXAM KNEE 4 OR MORE: CPT | Mod: TC,LT

## 2023-11-01 PROCEDURE — 99999 PR PBB SHADOW E&M-EST. PATIENT-LVL V: ICD-10-PCS | Mod: PBBFAC,,, | Performed by: ORTHOPAEDIC SURGERY

## 2023-11-01 NOTE — PATIENT INSTRUCTIONS
Assessment:  Stacey Wade is a  58 y.o. 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    Genu varum of left lower extremity Yes    Post-traumatic osteoarthritis of left knee     Obesity (BMI 30-39.9)       Plan:  I discussed the possibility of an injection today which the patient declined at this time.   I suggested starting physical therapy to help her pain instead which the patient is in agreement with.  I also suggested ordering a medial  brace for the patient to wear during long periods where she has to be on her feet.   Medial  brace required for valgus instability due to knee varus from medial compartment osteoarthritis.  Under the direction of Dr. Wes Faulkner 15 minutes were spent sizing, fitting, and educating for durable medical equipment application today.  CPT 98445.      Although there is not a cure for arthritis, there are effective ways to improve symptoms.     Exercise & Activity:  I recommend low impact activities such as elliptical and bicycle   Walking is great for arthritis: https://www.Customcells.com/3-reasons-walking-with-knee-arthritis/  If walking long distances, I recommend good quality well-cushioned shoes. Varsity sports can help you find the right ones: https://www.varsityrunning.com/  Aquatic and pool therapy is often helpful because it lessens the impact on the joint, strengthens the leg and thigh muscles, and helps to control swelling.   Knee motion is important to the health of the knee.     Knee Braces:  A compression knee sleeve can help limit swelling and provide proprioceptive feedback.     Prescriptions & Medications:  I do recommend formal physical therapy or at minimum a home exercise program.   Over the counter analgesic (pain-relieving) medications can help. Examples are Tylenol, Ibuprofen, and Aleve. Check with your primary care physician to  make sure you don't have contra-indications to taking those medicines.  Some over the counter supplement solutions such as glucosamine and chondroitin may help with symptoms, although the evidence is mixed.    Healthy Lifestyle:  Excess body weight can have a negative impact on joint health and on pain. I recommend healthy lifestyle choices including nutrition and exercise that help reach and maintain an ideal body weight. Tips for Exercise: https://www.CREATIV.Nuforce/13-exercise-tips-for-a-healthier-you/  Some diets cause increased inflammation. I recommend a balanced wholesome diet including some foods such as olives that are shown to decrease inflammation. More diet information available here: https://www.CREATIV.Nuforce/8-best-foods-for-knee-arthritis/      Follow-up: 6 weeks or sooner if there are any problems between now and then.    Leave Review:   Google: Leave Google Review  Healthgrades: Leave Healthgrades Review    After Hours Number: [phone]

## 2023-11-01 NOTE — PROGRESS NOTES
Patient ID: Stacey Wade  YOB: 1965  MRN: [id number]    Chief Complaint: Pain of the Left Knee    Referred By: Established patient    History of Present Illness: Stacey Wade is a  58 y.o. 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 She is 18mo s/p Left Knee scope, medial meniscus root repair, centralization (5/13/2022). She also finished a round of euflexxa injections on 6/5/23. She reports her pain had initially been reduced after the injections. She reports she had a fall in July/august on to her left knee that completely took her pain away for a brief period of time. She reports in mid September her symptoms returned and over the last 4 days her painhas worsened to an 8/10 and sometimes using a cane to help her walk. She is interested in further treatment options today.     To review her history from 5/22/23  Stacey is here today for a f/u L knee. She is 12mo s/p Left Knee scope, medial meniscus root repair, centralization (5/13/2022). She rates her pain as a 1/10. She is not currently in PT. She is having pain with prolonged standing and walking. She is here today to get her knee looked at by Dr. Faulkner as it has started swelling up on her. She was previously seen in February where we placed an order for viscosupplementation for her Left knee. She had not started viscosupplementation for her Left knee, but it was approved.     Pain    8/10       Past Medical History:   Past Medical History:   Diagnosis Date    Allergic rhinitis     Anxiety     Complex tear of medial meniscus of left knee 5/5/2022    Hypertension     Osteoarthritis of both knees     Prediabetes     Urticaria      Past Surgical History:   Procedure Laterality Date    CHONDROPLASTY OF KNEE Left 05/13/2022    Procedure: CHONDROPLASTY, KNEE;  Surgeon: Wes Faulkner MD;  Location: [facility];  Service: Orthopedics;   Laterality: Left;    COLONOSCOPY N/A 01/18/2018    Procedure: COLONOSCOPY;  Surgeon: Yong Smith MD;  Location: [facility];  Service: Endoscopy;  Laterality: N/A;    HERNIA REPAIR Left      Family History   Problem Relation Age of Onset    Lung cancer Paternal Grandfather     Ovarian cancer Maternal Grandmother     Hyperlipidemia Mother     Hypertension Mother     Breast cancer Mother 60    Arthritis Mother     Lung cancer Father     Breast cancer Maternal Aunt 53    Heart failure Other         Great aunt    Prostate cancer Brother     Brain cancer Sister     Diabetes Neg Hx     Pseudochol deficiency Neg Hx     Malignant hyperthermia Neg Hx      Social History     Socioeconomic History    Marital status: Single   Tobacco Use    Smoking status: Never    Smokeless tobacco: Never   Substance and Sexual Activity    Alcohol use: No    Drug use: No    Sexual activity: Not Currently     Partners: Male     Birth control/protection: None   Social History Narrative    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     Medication List with Changes/Refills   Current Medications    ALBUTEROL (PROVENTIL/VENTOLIN HFA) 90 MCG/ACTUATION INHALER    Inhale 2 puffs into the lungs every 4 to 6 hours as needed for Wheezing.    ASPIRIN (ECOTRIN) 81 MG EC TABLET    Take 1 tablet by mouth every morning.    BENZONATATE (TESSALON) 100 MG CAPSULE    Take 1-2 capsules (100-200 mg total) by mouth 3 (three) times daily as needed for Cough.    BETAMETHASONE DIPROPIONATE 0.05 % CREAM    APPLY TOPICALLY 2 TIMES DAILY.    BUTALBITAL-ACETAMINOPHEN-CAFFEINE -40 MG (FIORICET, ESGIC) -40 MG PER TABLET    Take 1 tablet by mouth every 6 (six) hours as needed.    CLORAZEPATE (TRANXENE) 3.75 MG TAB    TAKE 1 TABLET BY MOUTH EVERY DAY AS NEEDED    CLOTRIMAZOLE-BETAMETHASONE 1-0.05% (LOTRISONE) CREAM    APPLY TOPICALLY TWICE DAILY AS DIRECTED    DICLOFENAC SODIUM (VOLTAREN) 1  % GEL    Apply 2 g topically 4 (four) times daily.    DOXEPIN (SINEQUAN) 10 MG CAPSULE    TAKE TWO CAPSULES BY MOUTH THREE TIMES DAILY    EPINEPHRINE (EPIPEN) 0.3 MG/0.3 ML ATIN    Inject 0.3 mg into the muscle daily as needed.    FAMOTIDINE (PEPCID) 40 MG TABLET    Take 0.5 tablets (20 mg total) by mouth 2 (two) times daily.    FEXOFENADINE (ALLEGRA) 180 MG TABLET    Take 1 tablet (180 mg total) by mouth once daily. In AM    FLUOCINONIDE (LIDEX) 0.05 % OINTMENT    Apply topically 2 (two) times daily. Do not use longer than 2 weeks at a time    MIRABEGRON (MYRBETRIQ) 25 MG TB24 ER TABLET    Take 1 tablet (25 mg total) by mouth once daily.    MOMETASONE (NASONEX) 50 MCG/ACTUATION NASAL SPRAY    INHALE 2 SPRAYS INTO THE NOSTRIL ONCE A DAY    MONTELUKAST (SINGULAIR) 10 MG TABLET    Take 1 tablet (10 mg total) by mouth once daily.    OMALIZUMAB (XOLAIR) 150 MG/ML INJECTION    Inject 300 mg into the skin.    POTASSIUM CHLORIDE (MICRO-K) 10 MEQ CPSR    TAKE ONE CAPSULE BY MOUTH EVERY DAY    PREDNISONE (DELTASONE) 20 MG TABLET    Take 3 tab by mouth x 5 days, 2 tab PO x 2 days, 1 tab PO x 2 days, then 1/2 tab on last day    PROMETHAZINE-DEXTROMETHORPHAN (PROMETHAZINE-DM) 6.25-15 MG/5 ML SYRP    Take 5 mLs by mouth every 4 to 6 hours as needed (CAN BE SEDATING).    RIZATRIPTAN (MAXALT) 10 MG TABLET    TAKE 1 TABLET BY MOUTH AS NEEDED FOR MIGRAINE. MAY REPEAT IN 2 HOURS. MAX 2 TABS PER 24 HOURS    TACROLIMUS (PROTOPIC) 0.03 % OINTMENT    Apply topically 2 (two) times daily.    TRIAMTERENE-HYDROCHLOROTHIAZIDE 37.5-25 MG (DYAZIDE) 37.5-25 MG PER CAPSULE    TAKE 1 CAPSULE BY MOUTH ONCE A DAY   Changed and/or Refilled Medications    Modified Medication Previous Medication    HYDROXYZINE HCL (ATARAX) 25 MG TABLET hydrOXYzine HCL (ATARAX) 25 MG tablet       TAKE ONE TABLET BY MOUTH FOUR TIMES DAILY AS NEEDED FOR ITCHING    TAKE ONE TABLET BY MOUTH FOUR TIMES DAILY AS NEEDED FOR ITCHING     Review of patient's allergies indicates:    Allergen Reactions    Benzalkonium chloride     Black pepper      Other reaction(s): Hives    Chocolate flavor      Other reaction(s): Hives    Citrus and derivatives Hives     LEMON PRODUCTS    Grapefruit Hives     Other reaction(s): Hives    Ibuprofen Swelling    Latex      Other reaction(s): Hives    Lemon balm (casper officinalis) Hives     Anything with lemon in it    Naproxen Swelling    Neomycin-bacitracin-polymyxin      Other reaction(s): Rash    Oats (richard) Hives     Oat foods (oatmeal, etc...)    Vegetable acetoglycerides Hives     Vegetable gums    Wheat containing prod     Wheat flour Hives     ROS    Physical Exam:   Body mass index is 35.48 kg/m².  There were no vitals filed for this visit.   GENERAL: Well appearing, appropriate for stated age, no acute distress.  CARDIOVASCULAR: Pulses regular by peripheral palpation.  PULMONARY: Respirations are even and non-labored.  NEURO: Awake, alert, and oriented x 3.  PSYCH: Mood & affect are appropriate.  HEENT: Head is normocephalic and atraumatic.  Ortho/SPM Exam  Left Knee:    Inspection: mild effusion    Palpation tenderness: Medial joint line    Range of motion: 0 deg extension - 100 deg flexion, pain at end range flexion    Strength:  4+/5 Extension    4+/5 Flexion    Varus 1+ with stability  Valgus (-)  Lachman (-)  Anterior Drawer (-)  Posterior Drawer (-)  Dianne (-)  Intact EHL, FHL, gastrocsoleus, and tibialis anterior. Sensation intact to light touch in superficial peroneal, deep peroneal, tibial, sural, and saphenous nerve distributions. Foot warm and well perfused with capillary refill of less than 2 seconds and palpable pedal pulses.      Imaging:   XR Results:  Results for orders placed during the hospital encounter of 05/13/22    X-Ray Knee 1 or 2 View Left    Narrative  EXAMINATION:  XR KNEE 1 OR 2 VIEW LEFT    CLINICAL HISTORY:  intra op;    TECHNIQUE:  Single fluoroscopic view of the left knee is submitted during ongoing surgical  procedure.    COMPARISON:  None    FINDINGS:  Single view of the left knee is submitted during ongoing surgical procedure.  No acute abnormality seen on this single image.    Impression  Fluoroscopic view of the left knee for ongoing procedure.      Electronically signed by: Stephen Gonzalez  Date:    05/13/2022  Time:    12:35      MRI Results:  Results for orders placed during the hospital encounter of 03/30/22    MRI Knee Without Contrast Left    Narrative  EXAMINATION:  MRI KNEE WITHOUT CONTRAST LEFT    CLINICAL HISTORY:  Meniscus tear suspected;possible ACL tear as well;Pain in left knee    TECHNIQUE:  Multiplanar, multisequence images were performed about the knee.    COMPARISON:  None    FINDINGS:  Medial compartment: Complex tear of the posterior horn extending into the body of the medial meniscus with partial extrusion into the medial gutter.  The medial collateral ligament is intact of bowed by the extruded meniscus.  Cartilage heterogeneity and thinning within the medial compartment with partial-thickness cartilage loss along the medial femoral condyle and medial tibial plateau.    Lateral compartment: Increased signal within the lateral meniscus without a discrete tear.  The lateral collateral ligamentous complex is intact.  Cartilage heterogeneity within the lateral compartment.    Intercondylar notch: The anterior and posterior cruciate ligaments are intact.  There is a 9 mm loose body within the posterior aspect of the joint.    Patellofemoral compartment: The extensor mechanism is intact.  No patellar tilt or subluxation.  Cartilage heterogeneity within the patellofemoral compartment.  Small joint effusion with leaking, complex Baker's cyst.    Impression  Arthritic changes of the knee with medial and lateral meniscal tears, chondromalacia, and joint effusion with leaking Baker's cyst.  Intra-articular loose body along the posterior aspect of the joint.      Electronically signed by: Rubin  Geoffrey  Date:    03/30/2022  Time:    12:35      CT Results:  No results found for this or any previous visit.        Relevant imaging results reviewed and interpreted by me, discussed with the patient and / or family today.     Other Tests:         Patient Instructions   Assessment:  Stacey Wade is a  58 y.o. 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    Genu varum of left lower extremity Yes    Post-traumatic osteoarthritis of left knee     Obesity (BMI 30-39.9)       Plan:  I discussed the possibility of an injection today which the patient declined at this time.   I suggested starting physical therapy to help her pain instead which the patient is in agreement with.  I also suggested ordering a medial  brace for the patient to wear during long periods where she has to be on her feet.   Medial  brace required for valgus instability due to knee varus from medial compartment osteoarthritis.  Under the direction of Dr. Wes Faulkner 15 minutes were spent sizing, fitting, and educating for durable medical equipment application today.  CPT 81769.      Although there is not a cure for arthritis, there are effective ways to improve symptoms.     Exercise & Activity:  I recommend low impact activities such as elliptical and bicycle   Walking is great for arthritis: https://www.CitizenNet.com/3-reasons-walking-with-knee-arthritis/  If walking long distances, I recommend good quality well-cushioned shoes. Varsity sports can help you find the right ones: https://www.varsityrunning.com/  Aquatic and pool therapy is often helpful because it lessens the impact on the joint, strengthens the leg and thigh muscles, and helps to control swelling.   Knee motion is important to the health of the knee.     Knee Braces:  A compression knee sleeve can help limit swelling and provide proprioceptive  feedback.     Prescriptions & Medications:  I do recommend formal physical therapy or at minimum a home exercise program.   Over the counter analgesic (pain-relieving) medications can help. Examples are Tylenol, Ibuprofen, and Aleve. Check with your primary care physician to make sure you don't have contra-indications to taking those medicines.  Some over the counter supplement solutions such as glucosamine and chondroitin may help with symptoms, although the evidence is mixed.    Healthy Lifestyle:  Excess body weight can have a negative impact on joint health and on pain. I recommend healthy lifestyle choices including nutrition and exercise that help reach and maintain an ideal body weight. Tips for Exercise: https://www.[employer] [address].Limos.com/13-exercise-tips-for-a-healthier-you/  Some diets cause increased inflammation. I recommend a balanced wholesome diet including some foods such as olives that are shown to decrease inflammation. More diet information available here: https://www.[address].Limos.com/8-best-foods-for-knee-arthritis/      Follow-up: 6 weeks or sooner if there are any problems between now and then.    Leave Review:   Google: Leave Google Review  Healthgrades: Leave Healthgrades Review    After Hours Number: [phone]       Provider Note/Medical Decision Making:       I discussed worrisome and red flag signs and symptoms with the patient. The patient expressed understanding and agreed to alert me immediately or to go to the emergency room if they experience any of these.   Treatment plan was developed with input from the patient/family, and they expressed understanding and agreement with the plan. All questions were answered today.          Wes Faulkner MD  Orthopaedic Surgery & Sports Medicine       Disclaimer: This note was prepared using a voice recognition system and is likely to have sound alike errors within the text.     I, Jomar Julien, acted as a scribe for Wes Faulkner  MD REYMUNDO for the duration of this office visit.

## 2023-11-13 DIAGNOSIS — L50.9 URTICARIA: ICD-10-CM

## 2023-11-13 RX ORDER — HYDROXYZINE HYDROCHLORIDE 25 MG/1
TABLET, FILM COATED ORAL
Qty: 120 TABLET | Refills: 2 | Status: SHIPPED | OUTPATIENT
Start: 2023-11-13 | End: 2024-01-29

## 2023-12-08 DIAGNOSIS — F41.9 ANXIETY: ICD-10-CM

## 2023-12-08 NOTE — TELEPHONE ENCOUNTER
Care Due:                  Date            Visit Type   Department     Provider  --------------------------------------------------------------------------------                                MYCHART                              FOLLOWUP/OF  [facility]  Last Visit: 07-      FICE VISIT   MEDICINE       Savannah Lindsey  Next Visit: None Scheduled  None         None Found                                                            Last  Test          Frequency    Reason                     Performed    Due Date  --------------------------------------------------------------------------------    CMP.........  12 months..  potassium,                 03- 02-                             triamterene-hydrochloroth                             iazide...................    [employer] Embedded Care Due Messages. Reference number: [id number].   12/08/2023 8:05:28 AM CST

## 2023-12-09 RX ORDER — DOXEPIN HYDROCHLORIDE 10 MG/1
CAPSULE ORAL
Qty: 540 CAPSULE | Refills: 2 | Status: SHIPPED | OUTPATIENT
Start: 2023-12-09

## 2023-12-09 NOTE — TELEPHONE ENCOUNTER
Provider Staff:  Action required for this patient    Requires labs      Please see care gap opportunities below in Care Due Message.    Thanks!  [facility]     Appointments      Date Provider   Last Visit   7/21/2023 Savannah Lindsey MD   Next Visit   Visit date not found Savannah Lindsey MD     Refill Decision Note   Stacey Wade  is requesting a refill authorization.  Brief Assessment and Rationale for Refill:  Approve     Medication Therapy Plan:         Comments:     Note composed:5:51 PM 12/09/2023

## 2023-12-13 ENCOUNTER — PATIENT MESSAGE (OUTPATIENT)
Dept: SPORTS MEDICINE | Facility: CLINIC | Age: 58
End: 2023-12-13
Payer: COMMERCIAL

## 2023-12-25 NOTE — LETTER
April 14, 2022      [facility]  [address]  [place] [address]  Phone: [phone]  Fax: [phone]       Patient: Stacey Wade   YOB: 1965  Date of Visit: 04/14/2022    To Whom It May Concern:    Dafne Wade  was at [facility] on 04/14/2022.   The patient will be unable to work until further evaluated at next office visit on 04/28/2022.   If you have any questions or concerns, or if I can be of further assistance, please do not hesitate to contact me.    Sincerely,    Wes Faulkner MD       Pt alert/awake with mom at cartside. Discharge, medications, and follow up reviewed. Mom verbalized understanding. Pt jhony  PO. Pt breathing easy at this time..No questions at this time. Ok to dc per ER MD.

## 2024-01-09 ENCOUNTER — OFFICE VISIT (OUTPATIENT)
Dept: UROLOGY | Facility: CLINIC | Age: 59
End: 2024-01-09
Payer: COMMERCIAL

## 2024-01-09 VITALS
WEIGHT: 194 LBS | DIASTOLIC BLOOD PRESSURE: 74 MMHG | HEIGHT: 62 IN | HEART RATE: 80 BPM | BODY MASS INDEX: 35.7 KG/M2 | SYSTOLIC BLOOD PRESSURE: 120 MMHG | RESPIRATION RATE: 18 BRPM

## 2024-01-09 DIAGNOSIS — N32.81 OAB (OVERACTIVE BLADDER): Primary | ICD-10-CM

## 2024-01-09 PROCEDURE — 1159F MED LIST DOCD IN RCRD: CPT | Mod: CPTII,S$GLB,, | Performed by: UROLOGY

## 2024-01-09 PROCEDURE — 3078F DIAST BP <80 MM HG: CPT | Mod: CPTII,S$GLB,, | Performed by: UROLOGY

## 2024-01-09 PROCEDURE — 3008F BODY MASS INDEX DOCD: CPT | Mod: CPTII,S$GLB,, | Performed by: UROLOGY

## 2024-01-09 PROCEDURE — 99999 PR PBB SHADOW E&M-EST. PATIENT-LVL III: CPT | Mod: PBBFAC,,, | Performed by: UROLOGY

## 2024-01-09 PROCEDURE — 1160F RVW MEDS BY RX/DR IN RCRD: CPT | Mod: CPTII,S$GLB,, | Performed by: UROLOGY

## 2024-01-09 PROCEDURE — 99213 OFFICE O/P EST LOW 20 MIN: CPT | Mod: S$GLB,,, | Performed by: UROLOGY

## 2024-01-09 PROCEDURE — 3074F SYST BP LT 130 MM HG: CPT | Mod: CPTII,S$GLB,, | Performed by: UROLOGY

## 2024-01-09 NOTE — PROGRESS NOTES
Chief Complaint:  OAB    HPI:   01/09/2024 - patient returns today for follow-up, no new issues in the interim, still taking the mirabegron in notes that it continues to work well for her, no side effects, no gross hematuria or dysuria, no UTIs, does note that her brother was in a car accident before Christmas, had a stroke while he was driving and has been in the ICU with multiple fractures since that time    05/31/2023 - patient returns today for follow-up, continues taking the mirabegron and notes that it is working quite well for her, also notes that she is decreased how much coffee she is drinking now down to one 12 oz cup per day, no side effects from medication, also wearing just one pad per day, no gross hematuria or dysuria, no UTIs    01/27/2023 - patient returns today for follow-up, has been taking the Myrbetriq as prescribed and notes that while it does help she continues to have issues with urgency and urge incontinence, she has reduced her coffee intake, now really only having one cup in the morning, still wearing pads, 3/day, no SEs that she has noticed, no GH or dysuria    08/16/2023 - 57 yo female that presents OAB.  Patient notes worsening issues with frequency and urgency the last few years.  Patient notes that she will have urge incontinence 2-3 times per week and wears one pad per day for this.  She has previously tried Detrol as well as Myrbetriq.  She feels that the Myrbetriq is working for her currently.  She will also intermittently take azo.  She will also drink 3-4 thick cups of coffee per day, but notes that she has reduced this recently.  She denies any prior urologic procedures or gross hematuria.  She does have family history of prostate cancer in her brother and her dad.    PMH:  Past Medical History:   Diagnosis Date    Allergic rhinitis     Anxiety     Complex tear of medial meniscus of left knee 5/5/2022    Hypertension     Osteoarthritis of both knees     Prediabetes      Urticaria        PSH:  Past Surgical History:   Procedure Laterality Date    CHONDROPLASTY OF KNEE Left 05/13/2022    Procedure: CHONDROPLASTY, KNEE;  Surgeon: Wes Faulkner MD;  Location: [facility];  Service: Orthopedics;  Laterality: Left;    COLONOSCOPY N/A 01/18/2018    Procedure: COLONOSCOPY;  Surgeon: Yong Smith MD;  Location: [facility];  Service: Endoscopy;  Laterality: N/A;    HERNIA REPAIR Left        Family History:  Family History   Problem Relation Age of Onset    Lung cancer Paternal Grandfather     Ovarian cancer Maternal Grandmother     Hyperlipidemia Mother     Hypertension Mother     Breast cancer Mother 60    Arthritis Mother     Lung cancer Father     Breast cancer Maternal Aunt 53    Heart failure Other         Great aunt    Prostate cancer Brother     Brain cancer Sister     Diabetes Neg Hx     Pseudochol deficiency Neg Hx     Malignant hyperthermia Neg Hx        Social History:  Social History     Tobacco Use    Smoking status: Never    Smokeless tobacco: Never   Substance Use Topics    Alcohol use: No    Drug use: No        Review of Systems:  General: No fever, chills  Skin: No rashes  Chest:  Denies cough and sputum production  Heart: Denies chest pain  Resp: Denies dyspnea  Abdomen: Denies diarrhea, abdominal pain, hematemesis, or blood in stool.  Musculoskeletal: No joint stiffness or swelling. Denies back pain.  : see HPI  Neuro: no dizziness or weakness    Allergies:  Benzalkonium chloride, Black pepper, Chocolate flavor, Citrus and derivatives, Grapefruit, Ibuprofen, Latex, Lemon balm (casper officinalis), Naproxen, Neomycin-bacitracin-polymyxin, Oats (richard), Vegetable acetoglycerides, Wheat containing prod, and Wheat flour    Medications:    Current Outpatient Medications:     albuterol (PROVENTIL/VENTOLIN HFA) 90 mcg/actuation inhaler, Inhale 2 puffs into the lungs every 4 to 6 hours as needed for Wheezing., Disp: 18 g, Rfl: 0    aspirin (ECOTRIN) 81 MG EC tablet, Take 1  tablet by mouth every morning., Disp: , Rfl:     benzonatate (TESSALON) 100 MG capsule, Take 1-2 capsules (100-200 mg total) by mouth 3 (three) times daily as needed for Cough., Disp: 30 capsule, Rfl: 0    betamethasone dipropionate 0.05 % cream, APPLY TOPICALLY 2 TIMES DAILY., Disp: 45 g, Rfl: 2    butalbital-acetaminophen-caffeine -40 mg (FIORICET, ESGIC) -40 mg per tablet, Take 1 tablet by mouth every 6 (six) hours as needed., Disp: , Rfl:     clorazepate (TRANXENE) 3.75 MG Tab, TAKE 1 TABLET BY MOUTH EVERY DAY AS NEEDED, Disp: 30 tablet, Rfl: 0    clotrimazole-betamethasone 1-0.05% (LOTRISONE) cream, APPLY TOPICALLY TWICE DAILY AS DIRECTED, Disp: 45 g, Rfl: 2    diclofenac sodium (VOLTAREN) 1 % Gel, Apply 2 g topically 4 (four) times daily., Disp: 100 g, Rfl: 2    doxepin (SINEQUAN) 10 MG capsule, TAKE TWO CAPSULES BY MOUTH THREE TIMES DAILY, Disp: 540 capsule, Rfl: 2    EPINEPHrine (EPIPEN) 0.3 mg/0.3 mL AtIn, Inject 0.3 mg into the muscle daily as needed., Disp: , Rfl:     famotidine (PEPCID) 40 MG tablet, Take 0.5 tablets (20 mg total) by mouth 2 (two) times daily., Disp: 30 tablet, Rfl: 0    fexofenadine (ALLEGRA) 180 MG tablet, Take 1 tablet (180 mg total) by mouth once daily. In AM, Disp: 30 tablet, Rfl: 0    fluocinonide (LIDEX) 0.05 % ointment, Apply topically 2 (two) times daily. Do not use longer than 2 weeks at a time, Disp: 30 g, Rfl: 1    hydrOXYzine HCL (ATARAX) 25 MG tablet, TAKE ONE TABLET BY MOUTH FOUR TIMES DAILY AS NEEDED FOR ITCHING, Disp: 120 tablet, Rfl: 2    mirabegron (MYRBETRIQ) 25 mg Tb24 ER tablet, Take 1 tablet (25 mg total) by mouth once daily., Disp: 90 tablet, Rfl: 3    mometasone (NASONEX) 50 mcg/actuation nasal spray, INHALE 2 SPRAYS INTO THE NOSTRIL ONCE A DAY, Disp: 51 g, Rfl: 3    montelukast (SINGULAIR) 10 mg tablet, Take 1 tablet (10 mg total) by mouth once daily., Disp: 30 tablet, Rfl: 0    omalizumab (XOLAIR) 150 mg/mL injection, Inject 300 mg into the skin.,  Disp: , Rfl:     potassium chloride (MICRO-K) 10 MEQ CpSR, TAKE ONE CAPSULE BY MOUTH EVERY DAY, Disp: 90 capsule, Rfl: 1    predniSONE (DELTASONE) 20 MG tablet, Take 3 tab by mouth x 5 days, 2 tab PO x 2 days, 1 tab PO x 2 days, then 1/2 tab on last day, Disp: 22 tablet, Rfl: 0    promethazine-dextromethorphan (PROMETHAZINE-DM) 6.25-15 mg/5 mL Syrp, Take 5 mLs by mouth every 4 to 6 hours as needed (CAN BE SEDATING)., Disp: 150 mL, Rfl: 0    rizatriptan (MAXALT) 10 MG tablet, TAKE 1 TABLET BY MOUTH AS NEEDED FOR MIGRAINE. MAY REPEAT IN 2 HOURS. MAX 2 TABS PER 24 HOURS, Disp: 27 tablet, Rfl: 3    tacrolimus (PROTOPIC) 0.03 % ointment, Apply topically 2 (two) times daily., Disp: , Rfl:     triamterene-hydrochlorothiazide 37.5-25 mg (DYAZIDE) 37.5-25 mg per capsule, TAKE 1 CAPSULE BY MOUTH ONCE A DAY, Disp: 90 capsule, Rfl: 1    Physical Exam:  Vitals:    01/09/24 1530   BP: 120/74   Pulse: 80   Resp: 18     Body mass index is 35.48 kg/m².  General: awake, alert, cooperative  Head: NC/AT  Ears: external ears normal  Eyes: sclera normal  Lungs: normal inspiration, NAD  Heart: well-perfused  Skin: The skin is warm and dry  Ext: No c/c/e.  Neuro: grossly intact, AOx3    RADIOLOGY:  No recent relevant imaging available for review.    LABS:  I personally reviewed the following lab values:  Lab Results   Component Value Date    WBC 11.02 03/03/2023    HGB 13.8 03/03/2023    HCT 45.1 03/03/2023     03/03/2023     03/03/2023    K 3.5 03/03/2023     03/03/2023    CREATININE 1.0 03/03/2023    BUN 18 03/03/2023    CO2 22 (L) 03/03/2023    TSH 0.519 03/03/2023    INR 1.0 05/02/2022    HGBA1C 5.6 07/21/2023    CHOL 226 (H) 03/03/2023    TRIG 55 03/03/2023    HDL 74 03/03/2023    ALT 16 03/03/2023    AST 17 03/03/2023     Assessment/Plan:   Stacey Wade is a 58 y.o. female with:    OAB - continue Myrbetriq, f/u 1yr      Mamadou Armstrong MD  Urology

## 2024-01-29 DIAGNOSIS — L50.9 URTICARIA: ICD-10-CM

## 2024-01-29 RX ORDER — HYDROXYZINE HYDROCHLORIDE 25 MG/1
TABLET, FILM COATED ORAL
Qty: 120 TABLET | Refills: 0 | Status: SHIPPED | OUTPATIENT
Start: 2024-01-29 | End: 2024-03-08

## 2024-02-19 DIAGNOSIS — L50.9 URTICARIA: ICD-10-CM

## 2024-02-19 RX ORDER — HYDROXYZINE HYDROCHLORIDE 25 MG/1
TABLET, FILM COATED ORAL
Qty: 120 TABLET | Refills: 0 | OUTPATIENT
Start: 2024-02-19

## 2024-03-04 ENCOUNTER — OFFICE VISIT (OUTPATIENT)
Dept: FAMILY MEDICINE | Facility: CLINIC | Age: 59
End: 2024-03-04
Payer: COMMERCIAL

## 2024-03-04 VITALS
TEMPERATURE: 99 F | OXYGEN SATURATION: 97 % | SYSTOLIC BLOOD PRESSURE: 114 MMHG | HEIGHT: 62 IN | DIASTOLIC BLOOD PRESSURE: 68 MMHG | HEART RATE: 97 BPM | BODY MASS INDEX: 36.55 KG/M2 | WEIGHT: 198.63 LBS | RESPIRATION RATE: 18 BRPM

## 2024-03-04 DIAGNOSIS — I10 ESSENTIAL HYPERTENSION: Chronic | ICD-10-CM

## 2024-03-04 DIAGNOSIS — E66.01 CLASS 2 SEVERE OBESITY WITH SERIOUS COMORBIDITY AND BODY MASS INDEX (BMI) OF 35.0 TO 35.9 IN ADULT, UNSPECIFIED OBESITY TYPE: ICD-10-CM

## 2024-03-04 DIAGNOSIS — Z00.00 PREVENTATIVE HEALTH CARE: Primary | ICD-10-CM

## 2024-03-04 DIAGNOSIS — N32.81 OVERACTIVE BLADDER: ICD-10-CM

## 2024-03-04 DIAGNOSIS — R73.03 PREDIABETES: ICD-10-CM

## 2024-03-04 PROBLEM — E66.812 CLASS 2 SEVERE OBESITY WITH SERIOUS COMORBIDITY AND BODY MASS INDEX (BMI) OF 35.0 TO 35.9 IN ADULT, UNSPECIFIED OBESITY TYPE: Status: ACTIVE | Noted: 2024-03-04

## 2024-03-04 PROCEDURE — 3078F DIAST BP <80 MM HG: CPT | Mod: CPTII,S$GLB,, | Performed by: REGISTERED NURSE

## 2024-03-04 PROCEDURE — 99999 PR PBB SHADOW E&M-EST. PATIENT-LVL IV: CPT | Mod: PBBFAC,,, | Performed by: REGISTERED NURSE

## 2024-03-04 PROCEDURE — 3074F SYST BP LT 130 MM HG: CPT | Mod: CPTII,S$GLB,, | Performed by: REGISTERED NURSE

## 2024-03-04 PROCEDURE — 99396 PREV VISIT EST AGE 40-64: CPT | Mod: S$GLB,,, | Performed by: REGISTERED NURSE

## 2024-03-04 PROCEDURE — 3008F BODY MASS INDEX DOCD: CPT | Mod: CPTII,S$GLB,, | Performed by: REGISTERED NURSE

## 2024-03-04 NOTE — PROGRESS NOTES
SUBJECTIVE:     Stacey Wade is a 58 y.o. female, here today for:   PREVENTATIVE HEALTH EXAM    HPI:    Stacey Wade has not fasted to have bloodwork done today.  I have reviewed the patient's medical history in detail and updated the computerized patient record.  History obtained from the patient.    HEALTHCARE MAINTENANCE:    COMPLETED:  Health Maintenance Topics with due status: Not Due       Topic Last Completion Date    TETANUS VACCINE 10/10/2016    Colorectal Cancer Screening 01/18/2018    Cervical Cancer Screening 10/07/2021    Hemoglobin A1c (Prediabetes) 07/21/2023       DUE:  Health Maintenance Due   Topic Date Due    Pneumococcal Vaccines (Age 0-64) (1 of 2 - PCV) Never done    Influenza Vaccine (1) 09/01/2023    COVID-19 Vaccine (5 - 2023-24 season) 09/01/2023    Mammogram  02/21/2024    Lipid Panel  03/03/2024         REVIEW OF SYSTEMS:    Review of Systems   Constitutional:  Positive for unexpected weight change (mild gain). Negative for activity change, appetite change, chills, diaphoresis, fatigue and fever.        Wt Readings from Last 3 Encounters:  03/04/24 1343 : 90.1 kg (198 lb 10.2 oz)  01/09/24 1530 : 88 kg (194 lb 0.1 oz)  11/01/23 0756 : 88 kg (194 lb)   HENT: Negative.     Eyes:  Negative for discharge and visual disturbance.   Respiratory:  Negative for cough, shortness of breath and wheezing.    Cardiovascular:  Negative for chest pain, palpitations and leg swelling.   Gastrointestinal: Negative.    Genitourinary:  Positive for frequency (OAB, on med per Urology). Negative for bladder incontinence, dysuria, flank pain and pelvic pain.   Musculoskeletal: Negative.    Integumentary:  Negative for rash and mole/lesion.   Neurological:  Positive for headaches (migraines). Negative for vertigo, seizures, syncope and numbness.   Hematological: Negative.    Psychiatric/Behavioral:  Negative for depression and sleep disturbance. The patient is not nervous/anxious.    Breast:  negative.        ALLERGIES:  Review of patient's allergies indicates:   Allergen Reactions    Benzalkonium chloride     Black pepper      Other reaction(s): Hives    Chocolate flavor      Other reaction(s): Hives    Citrus and derivatives Hives     LEMON PRODUCTS    Grapefruit Hives     Other reaction(s): Hives    Ibuprofen Swelling    Latex      Other reaction(s): Hives    Lemon balm (casper officinalis) Hives     Anything with lemon in it    Naproxen Swelling    Neomycin-bacitracin-polymyxin      Other reaction(s): Rash    Oats (richard) Hives     Oat foods (oatmeal, etc...)    Vegetable acetoglycerides Hives     Vegetable gums    Wheat containing prod     Wheat flour Hives       CURRENT PROBLEM LIST:  Patient Active Problem List   Diagnosis    Essential hypertension    Allergic rhinitis    Anxiety    Urticaria    Obesity (BMI 30.0-34.9)    Migraines    Overactive bladder    Prediabetes    Class 2 severe obesity with serious comorbidity and body mass index (BMI) of 35.0 to 35.9 in adult, unspecified obesity type       CURRENT MEDICATIONS:    Current Outpatient Medications:     albuterol (PROVENTIL/VENTOLIN HFA) 90 mcg/actuation inhaler, Inhale 2 puffs into the lungs every 4 to 6 hours as needed for Wheezing., Disp: 18 g, Rfl: 0    aspirin (ECOTRIN) 81 MG EC tablet, Take 1 tablet by mouth every morning., Disp: , Rfl:     benzonatate (TESSALON) 100 MG capsule, Take 1-2 capsules (100-200 mg total) by mouth 3 (three) times daily as needed for Cough., Disp: 30 capsule, Rfl: 0    betamethasone dipropionate 0.05 % cream, APPLY TOPICALLY 2 TIMES DAILY., Disp: 45 g, Rfl: 2    butalbital-acetaminophen-caffeine -40 mg (FIORICET, ESGIC) -40 mg per tablet, Take 1 tablet by mouth every 6 (six) hours as needed., Disp: , Rfl:     clorazepate (TRANXENE) 3.75 MG Tab, TAKE 1 TABLET BY MOUTH EVERY DAY AS NEEDED, Disp: 30 tablet, Rfl: 0    clotrimazole-betamethasone 1-0.05% (LOTRISONE) cream, APPLY TOPICALLY TWICE DAILY AS  DIRECTED, Disp: 45 g, Rfl: 2    diclofenac sodium (VOLTAREN) 1 % Gel, Apply 2 g topically 4 (four) times daily., Disp: 100 g, Rfl: 2    doxepin (SINEQUAN) 10 MG capsule, TAKE TWO CAPSULES BY MOUTH THREE TIMES DAILY, Disp: 540 capsule, Rfl: 2    EPINEPHrine (EPIPEN) 0.3 mg/0.3 mL AtIn, Inject 0.3 mg into the muscle daily as needed., Disp: , Rfl:     famotidine (PEPCID) 40 MG tablet, Take 0.5 tablets (20 mg total) by mouth 2 (two) times daily., Disp: 30 tablet, Rfl: 0    fexofenadine (ALLEGRA) 180 MG tablet, Take 1 tablet (180 mg total) by mouth once daily. In AM, Disp: 30 tablet, Rfl: 0    fluocinonide (LIDEX) 0.05 % ointment, Apply topically 2 (two) times daily. Do not use longer than 2 weeks at a time, Disp: 30 g, Rfl: 1    hydrOXYzine HCL (ATARAX) 25 MG tablet, TAKE ONE TABLET BY MOUTH FOUR TIMES DAILY AS NEEDED FOR ITCHING, Disp: 120 tablet, Rfl: 0    mirabegron (MYRBETRIQ) 25 mg Tb24 ER tablet, Take 1 tablet (25 mg total) by mouth once daily., Disp: 90 tablet, Rfl: 3    mometasone (NASONEX) 50 mcg/actuation nasal spray, INHALE 2 SPRAYS INTO THE NOSTRIL ONCE A DAY, Disp: 51 g, Rfl: 3    montelukast (SINGULAIR) 10 mg tablet, Take 1 tablet (10 mg total) by mouth once daily., Disp: 30 tablet, Rfl: 0    omalizumab (XOLAIR) 150 mg/mL injection, Inject 300 mg into the skin., Disp: , Rfl:     potassium chloride (MICRO-K) 10 MEQ CpSR, TAKE ONE CAPSULE BY MOUTH EVERY DAY, Disp: 90 capsule, Rfl: 1    rizatriptan (MAXALT) 10 MG tablet, TAKE 1 TABLET BY MOUTH AS NEEDED FOR MIGRAINE. MAY REPEAT IN 2 HOURS. MAX 2 TABS PER 24 HOURS, Disp: 27 tablet, Rfl: 3    tacrolimus (PROTOPIC) 0.03 % ointment, Apply topically 2 (two) times daily., Disp: , Rfl:     triamterene-hydrochlorothiazide 37.5-25 mg (DYAZIDE) 37.5-25 mg per capsule, TAKE 1 CAPSULE BY MOUTH ONCE A DAY, Disp: 90 capsule, Rfl: 1      HISTORY:    PAST MEDICAL HISTORY:  Past Medical History:   Diagnosis Date    Allergic rhinitis     Anxiety     Complex tear of medial  "meniscus of left knee 5/5/2022    Hypertension     Osteoarthritis of both knees     Prediabetes     Urticaria        PAST SURGICAL HISTORY:  Past Surgical History:   Procedure Laterality Date    CHONDROPLASTY OF KNEE Left 05/13/2022    Procedure: CHONDROPLASTY, KNEE;  Surgeon: Wes Faulkner MD;  Location: [facility] OR;  Service: Orthopedics;  Laterality: Left;    COLONOSCOPY N/A 01/18/2018    Procedure: COLONOSCOPY;  Surgeon: Yong Smtih MD;  Location: [facility] ENDO;  Service: Endoscopy;  Laterality: N/A;    HERNIA REPAIR Left        FAMILY HISTORY:  Family History   Problem Relation Age of Onset    Lung cancer Paternal Grandfather     Ovarian cancer Maternal Grandmother     Hyperlipidemia Mother     Hypertension Mother     Breast cancer Mother 60    Arthritis Mother     Lung cancer Father     Breast cancer Maternal Aunt 53    Heart failure Other         Great aunt    Prostate cancer Brother     Brain cancer Sister     Diabetes Neg Hx     Pseudochol deficiency Neg Hx     Malignant hyperthermia Neg Hx        SOCIAL HISTORY:  Social History     Socioeconomic History    Marital status: Single   Occupational History    Occupation: Pharmacy Tech     Comment: [employer]   Tobacco Use    Smoking status: Never    Smokeless tobacco: Never   Substance and Sexual Activity    Alcohol use: No    Drug use: No    Sexual activity: Not Currently     Partners: Male     Birth control/protection: None   Social History Narrative    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         OBJECTIVE:     VITAL SIGNS:  Vitals:    03/04/24 1343   BP: 114/68   Pulse: 97   Resp: 18   Temp: 99 °F (37.2 °C)   TempSrc: Oral   SpO2: 97%   Weight: 90.1 kg (198 lb 10.2 oz)   Height: 5' 2" (1.575 m)       CURRENT BMI:   Body mass index is 36.33 kg/m².      PHYSICAL EXAM:  Physical Exam  Constitutional:       General: She is not in acute distress.     Appearance: She is " well-developed. She is not diaphoretic.   HENT:      Head: Normocephalic and atraumatic.      Right Ear: Tympanic membrane, ear canal and external ear normal. There is no impacted cerumen.      Left Ear: Tympanic membrane, ear canal and external ear normal. There is no impacted cerumen.      Nose: Nose normal. No nasal deformity, mucosal edema, congestion or rhinorrhea.      Mouth/Throat:      Mouth: Mucous membranes are moist. Mucous membranes are not dry and not cyanotic. No oral lesions.      Dentition: Normal dentition.      Pharynx: Oropharynx is clear. Uvula midline. No oropharyngeal exudate, posterior oropharyngeal erythema or uvula swelling.      Tonsils: No tonsillar abscesses.   Eyes:      General: Lids are normal. Lids are everted, no foreign bodies appreciated. No scleral icterus.        Right eye: No discharge.         Left eye: No discharge.      Conjunctiva/sclera: Conjunctivae normal.      Right eye: Right conjunctiva is not injected. No exudate.     Left eye: Left conjunctiva is not injected. No exudate.     Pupils: Pupils are equal, round, and reactive to light.   Neck:      Thyroid: No thyroid mass or thyromegaly.      Vascular: No carotid bruit or JVD.      Trachea: No tracheal deviation.   Cardiovascular:      Rate and Rhythm: Normal rate and regular rhythm.      Pulses: Normal pulses.      Heart sounds: Normal heart sounds. No murmur heard.     No friction rub. No gallop.   Pulmonary:      Effort: Pulmonary effort is normal. No respiratory distress.      Breath sounds: Normal breath sounds. No stridor. No wheezing.   Chest:      Chest wall: No tenderness.   Abdominal:      General: Bowel sounds are normal. There is no distension.      Palpations: Abdomen is soft. There is no mass.      Tenderness: There is no abdominal tenderness. There is no guarding or rebound.   Musculoskeletal:         General: No swelling, tenderness or deformity. Normal range of motion.      Cervical back: Normal range of  motion and neck supple. No edema or erythema. Normal range of motion.   Lymphadenopathy:      Cervical: No cervical adenopathy.   Skin:     General: Skin is warm and dry.      Capillary Refill: Capillary refill takes less than 2 seconds.      Coloration: Skin is not pale.      Findings: No bruising, erythema or rash.   Neurological:      Mental Status: She is alert and oriented to person, place, and time.      Sensory: No sensory deficit.      Motor: No weakness, tremor, atrophy or abnormal muscle tone.      Coordination: Coordination normal.      Gait: Gait normal.      Deep Tendon Reflexes: Reflexes are normal and symmetric.   Psychiatric:         Mood and Affect: Mood normal.         Speech: Speech normal.         Behavior: Behavior normal.         Thought Content: Thought content normal.         Cognition and Memory: Memory is not impaired.         Judgment: Judgment normal.         ~~~~~~~~~~~~~~~~~~~~~~~~~~~~~~~~~~~~~~~~~~~~~~~    LABS REVIEWED:    CBC:  Lab Results   Component Value Date    WBC 11.02 03/03/2023    RBC 6.08 (H) 03/03/2023    HGB 13.8 03/03/2023    HCT 45.1 03/03/2023    MCV 74 (L) 03/03/2023    MCH 22.7 (L) 03/03/2023    MCHC 30.6 (L) 03/03/2023    RDW 16.2 (H) 03/03/2023     03/03/2023    MPV 11.3 03/03/2023    GRAN 8.1 (H) 03/03/2023    GRAN 73.2 (H) 03/03/2023    LYMPH 2.3 03/03/2023    LYMPH 20.5 03/03/2023    MONO 0.6 03/03/2023    MONO 5.3 03/03/2023    EOS 0.0 03/03/2023    BASO 0.04 03/03/2023    EOSINOPHIL 0.2 03/03/2023    BASOPHIL 0.4 03/03/2023       CMP:  Sodium   Date Value Ref Range Status   03/03/2023 139 136 - 145 mmol/L Final     Potassium   Date Value Ref Range Status   03/03/2023 3.5 3.5 - 5.1 mmol/L Final     Chloride   Date Value Ref Range Status   03/03/2023 103 95 - 110 mmol/L Final     CO2   Date Value Ref Range Status   03/03/2023 22 (L) 23 - 29 mmol/L Final     Glucose   Date Value Ref Range Status   03/03/2023 84 70 - 110 mg/dL Final     BUN   Date Value Ref  Range Status   03/03/2023 18 6 - 20 mg/dL Final     Creatinine   Date Value Ref Range Status   03/03/2023 1.0 0.5 - 1.4 mg/dL Final     Calcium   Date Value Ref Range Status   03/03/2023 10.8 (H) 8.7 - 10.5 mg/dL Final     Total Protein   Date Value Ref Range Status   03/03/2023 7.8 6.0 - 8.4 g/dL Final     Albumin   Date Value Ref Range Status   03/03/2023 4.1 3.5 - 5.2 g/dL Final     Total Bilirubin   Date Value Ref Range Status   03/03/2023 0.4 0.1 - 1.0 mg/dL Final     Comment:     For infants and newborns, interpretation of results should be based  on gestational age, weight and in agreement with clinical  observations.    Premature Infant recommended reference ranges:  Up to 24 hours.............<8.0 mg/dL  Up to 48 hours............<12.0 mg/dL  3-5 days..................<15.0 mg/dL  6-29 days.................<15.0 mg/dL       Alkaline Phosphatase   Date Value Ref Range Status   03/03/2023 180 (H) 55 - 135 U/L Final     AST   Date Value Ref Range Status   03/03/2023 17 10 - 40 U/L Final     ALT   Date Value Ref Range Status   03/03/2023 16 10 - 44 U/L Final     Anion Gap   Date Value Ref Range Status   03/03/2023 14 8 - 16 mmol/L Final     eGFR   Date Value Ref Range Status   03/03/2023 >60.0 >60 mL/min/1.73 m^2 Final         LIPID PANEL:  Lab Results   Component Value Date    CHOL 226 (H) 03/03/2023     Lab Results   Component Value Date    HDL 74 03/03/2023     Lab Results   Component Value Date    LDLCALC 141.0 03/03/2023     Lab Results   Component Value Date    TRIG 55 03/03/2023         THYROID:  Lab Results   Component Value Date    TSH 0.519 03/03/2023         DIABETES:  Lab Results   Component Value Date    HGBA1C 5.6 07/21/2023         ASSESSMENT:     1. Preventative health care  -     CBC Auto Differential; Future; Expected date: 03/04/2024  -     Comprehensive Metabolic Panel; Future; Expected date: 03/04/2024  -     TSH; Future; Expected date: 03/04/2024  -     Lipid Panel; Future; Expected date:  03/04/2024  -     Hemoglobin A1C; Future; Expected date: 03/04/2024    2. Essential hypertension ---- chronic issue, stable on med  -     CBC Auto Differential; Future; Expected date: 03/04/2024  -     Comprehensive Metabolic Panel; Future; Expected date: 03/04/2024  -     TSH; Future; Expected date: 03/04/2024  -     Lipid Panel; Future; Expected date: 03/04/2024  -     Hemoglobin A1C; Future; Expected date: 03/04/2024    3. Prediabetes ---- no med currently, to follow proper diet and lifestyle choices, reduce weight  -     Hemoglobin A1C; Future; Expected date: 03/04/2024    4. Overactive bladder ---- stable, on med per Urology    5. Class 2 severe obesity with serious comorbidity and body mass index (BMI) of 35.0 to 35.9 in adult, unspecified obesity type  -     CBC Auto Differential; Future; Expected date: 03/04/2024  -     Comprehensive Metabolic Panel; Future; Expected date: 03/04/2024  -     TSH; Future; Expected date: 03/04/2024  -     Lipid Panel; Future; Expected date: 03/04/2024  -     Hemoglobin A1C; Future; Expected date: 03/04/2024        PLAN:     Healthy dietary and lifestyle changes discussed.  Mammo scheduled for 3/12/24.    FOLLOW-UP:     Pending lab.  RTC as directed and/or prn.        PAYTON Judge  [facility]       Patient Care Team:  Savannah Lindsey MD as PCP - General (Family Medicine)  Yue Merchant NP as Nurse Practitioner (Hematology and Oncology)      Future Appointments   Date Time Provider Department Center   3/12/2024 10:00 AM [facility] [id number] [facility] [place]   3/12/2024 11:00 AM Yue Merchant NP [facility] BREAST [facility] [place]   1/9/2025  3:00 PM Mamadou Armstrong MD ON UROLOGY [facility]

## 2024-03-05 ENCOUNTER — TELEPHONE (OUTPATIENT)
Dept: SURGERY | Facility: CLINIC | Age: 59
End: 2024-03-05
Payer: COMMERCIAL

## 2024-03-05 NOTE — TELEPHONE ENCOUNTER
Returned pt's call bk.. was able to reschedule appt per her request. Pt verbalized understanding to her new appt date and times.

## 2024-03-05 NOTE — TELEPHONE ENCOUNTER
----- Message from Mariam Kay sent at 3/5/2024  1:54 PM CST -----  Patient would like to reschedule the appointment on 03/12/2024. Call back number is  [phone]. [email or web address]

## 2024-03-07 ENCOUNTER — LAB VISIT (OUTPATIENT)
Dept: LAB | Facility: HOSPITAL | Age: 59
End: 2024-03-07
Attending: REGISTERED NURSE
Payer: COMMERCIAL

## 2024-03-07 DIAGNOSIS — L50.9 URTICARIA: ICD-10-CM

## 2024-03-07 DIAGNOSIS — R73.03 PREDIABETES: ICD-10-CM

## 2024-03-07 DIAGNOSIS — E66.01 CLASS 2 SEVERE OBESITY WITH SERIOUS COMORBIDITY AND BODY MASS INDEX (BMI) OF 35.0 TO 35.9 IN ADULT, UNSPECIFIED OBESITY TYPE: ICD-10-CM

## 2024-03-07 DIAGNOSIS — I10 ESSENTIAL HYPERTENSION: Chronic | ICD-10-CM

## 2024-03-07 DIAGNOSIS — Z00.00 PREVENTATIVE HEALTH CARE: ICD-10-CM

## 2024-03-07 LAB
ALBUMIN SERPL BCP-MCNC: 3.9 G/DL (ref 3.5–5.2)
ALP SERPL-CCNC: 143 U/L (ref 55–135)
ALT SERPL W/O P-5'-P-CCNC: 16 U/L (ref 10–44)
ANION GAP SERPL CALC-SCNC: 12 MMOL/L (ref 8–16)
AST SERPL-CCNC: 15 U/L (ref 10–40)
BASOPHILS # BLD AUTO: 0.04 K/UL (ref 0–0.2)
BASOPHILS NFR BLD: 0.4 % (ref 0–1.9)
BILIRUB SERPL-MCNC: 0.4 MG/DL (ref 0.1–1)
BUN SERPL-MCNC: 16 MG/DL (ref 6–20)
CALCIUM SERPL-MCNC: 10.2 MG/DL (ref 8.7–10.5)
CHLORIDE SERPL-SCNC: 106 MMOL/L (ref 95–110)
CHOLEST SERPL-MCNC: 198 MG/DL (ref 120–199)
CHOLEST/HDLC SERPL: 2.9 {RATIO} (ref 2–5)
CO2 SERPL-SCNC: 23 MMOL/L (ref 23–29)
CREAT SERPL-MCNC: 0.9 MG/DL (ref 0.5–1.4)
DIFFERENTIAL METHOD BLD: ABNORMAL
EOSINOPHIL # BLD AUTO: 0.1 K/UL (ref 0–0.5)
EOSINOPHIL NFR BLD: 0.7 % (ref 0–8)
ERYTHROCYTE [DISTWIDTH] IN BLOOD BY AUTOMATED COUNT: 15.4 % (ref 11.5–14.5)
EST. GFR  (NO RACE VARIABLE): >60 ML/MIN/1.73 M^2
ESTIMATED AVG GLUCOSE: 120 MG/DL (ref 68–131)
GLUCOSE SERPL-MCNC: 78 MG/DL (ref 70–110)
HBA1C MFR BLD: 5.8 % (ref 4–5.6)
HCT VFR BLD AUTO: 41.7 % (ref 37–48.5)
HDLC SERPL-MCNC: 68 MG/DL (ref 40–75)
HDLC SERPL: 34.3 % (ref 20–50)
HGB BLD-MCNC: 13 G/DL (ref 12–16)
IMM GRANULOCYTES # BLD AUTO: 0.04 K/UL (ref 0–0.04)
IMM GRANULOCYTES NFR BLD AUTO: 0.4 % (ref 0–0.5)
LDLC SERPL CALC-MCNC: 119.6 MG/DL (ref 63–159)
LYMPHOCYTES # BLD AUTO: 2.7 K/UL (ref 1–4.8)
LYMPHOCYTES NFR BLD: 25.2 % (ref 18–48)
MCH RBC QN AUTO: 23.1 PG (ref 27–31)
MCHC RBC AUTO-ENTMCNC: 31.2 G/DL (ref 32–36)
MCV RBC AUTO: 74 FL (ref 82–98)
MONOCYTES # BLD AUTO: 0.7 K/UL (ref 0.3–1)
MONOCYTES NFR BLD: 6.2 % (ref 4–15)
NEUTROPHILS # BLD AUTO: 7.2 K/UL (ref 1.8–7.7)
NEUTROPHILS NFR BLD: 67.1 % (ref 38–73)
NONHDLC SERPL-MCNC: 130 MG/DL
NRBC BLD-RTO: 0 /100 WBC
PLATELET # BLD AUTO: 194 K/UL (ref 150–450)
PMV BLD AUTO: 12.3 FL (ref 9.2–12.9)
POTASSIUM SERPL-SCNC: 3.6 MMOL/L (ref 3.5–5.1)
PROT SERPL-MCNC: 7.1 G/DL (ref 6–8.4)
RBC # BLD AUTO: 5.63 M/UL (ref 4–5.4)
SODIUM SERPL-SCNC: 141 MMOL/L (ref 136–145)
T4 FREE SERPL-MCNC: 0.89 NG/DL (ref 0.71–1.51)
TRIGL SERPL-MCNC: 52 MG/DL (ref 30–150)
TSH SERPL DL<=0.005 MIU/L-ACNC: 0.39 UIU/ML (ref 0.4–4)
WBC # BLD AUTO: 10.68 K/UL (ref 3.9–12.7)

## 2024-03-07 PROCEDURE — 85025 COMPLETE CBC W/AUTO DIFF WBC: CPT | Performed by: REGISTERED NURSE

## 2024-03-07 PROCEDURE — 83036 HEMOGLOBIN GLYCOSYLATED A1C: CPT | Performed by: REGISTERED NURSE

## 2024-03-07 PROCEDURE — 80061 LIPID PANEL: CPT | Performed by: REGISTERED NURSE

## 2024-03-07 PROCEDURE — 84443 ASSAY THYROID STIM HORMONE: CPT | Performed by: REGISTERED NURSE

## 2024-03-07 PROCEDURE — 84439 ASSAY OF FREE THYROXINE: CPT | Performed by: REGISTERED NURSE

## 2024-03-07 PROCEDURE — 80053 COMPREHEN METABOLIC PANEL: CPT | Performed by: REGISTERED NURSE

## 2024-03-07 PROCEDURE — 36415 COLL VENOUS BLD VENIPUNCTURE: CPT | Mod: PO | Performed by: REGISTERED NURSE

## 2024-03-07 RX ORDER — BUTALBITAL, ACETAMINOPHEN AND CAFFEINE 50; 325; 40 MG/1; MG/1; MG/1
TABLET ORAL
Qty: 30 TABLET | Refills: 0 | Status: SHIPPED | OUTPATIENT
Start: 2024-03-07

## 2024-03-07 NOTE — TELEPHONE ENCOUNTER
Care Due:                  Date            Visit Type   Department     Provider  --------------------------------------------------------------------------------                                MYCHART                              FOLLOWUP/OF  [facility]  Last Visit: 07-      FICE VISIT   MEDICINE       Savannah Lindsey  Next Visit: None Scheduled  None         None Found                                                            Last  Test          Frequency    Reason                     Performed    Due Date  --------------------------------------------------------------------------------    CMP.........  12 months..  potassium,                 03- 02-                             triamterene-hydrochloroth                             iazide...................    [employer] Embedded Care Due Messages. Reference number: [id number].   3/07/2024 3:03:11 PM CST

## 2024-03-08 DIAGNOSIS — R51.9 RECURRENT HEADACHE: ICD-10-CM

## 2024-03-08 RX ORDER — CLORAZEPATE DIPOTASSIUM 3.75 MG/1
TABLET ORAL
Qty: 30 TABLET | Refills: 0 | OUTPATIENT
Start: 2024-03-08

## 2024-03-08 RX ORDER — RIZATRIPTAN BENZOATE 10 MG/1
TABLET ORAL
Qty: 27 TABLET | Refills: 3 | Status: SHIPPED | OUTPATIENT
Start: 2024-03-08

## 2024-03-08 RX ORDER — HYDROXYZINE HYDROCHLORIDE 25 MG/1
TABLET, FILM COATED ORAL
Qty: 120 TABLET | Refills: 2 | Status: SHIPPED | OUTPATIENT
Start: 2024-03-08

## 2024-03-08 NOTE — TELEPHONE ENCOUNTER
No care due was identified.  [facility] Embedded Care Due Messages. Reference number: [id number].   3/08/2024 11:41:36 AM CST

## 2024-03-08 NOTE — TELEPHONE ENCOUNTER
"Message from Elenita Wade sent at 7/25/2018  7:02 AM CDT -----  Pt is requesting a call from nurse to discuss an a nurse visit for injection.          Please call pt back at [phone]   possible PONV, sore throat, and possibility of rare anesthetic and surgical emergencies.  .       Plan Factors-Exercise tolerance (METS): >4 METS.    Chart reviewed.  Imaging results reviewed. Existing labs reviewed. Patient summary reviewed.    Patient is not a current smoker.  Patient instructed to abstain from smoking on day of procedure. Patient did not smoke on day of surgery.    There is medical exclusion for perioperative obstructive sleep apnea risk education.    Intended use of anticholinesterase.    Induction- intravenous.    Postoperative Plan- Plan for postoperative opioid use. Planned trial extubation    Informed Consent- Anesthetic plan and risks discussed with patient.  I personally reviewed this patient with the CRNA. Discussed and agreed on the Anesthesia Plan with the CRNA..

## 2024-03-08 NOTE — TELEPHONE ENCOUNTER
LOV -  03/04/2024        Arely from the pharmacy is calling in regards to getting a tablet instead  of capsule  for potassium chloride (MICRO-K) 10 MEQ CpSR.pt can't have color.please call back at [phone]

## 2024-03-08 NOTE — TELEPHONE ENCOUNTER
----- Message from Sheela Wade sent at 3/8/2024 11:30 AM CST -----  Contact: Arely/Kingsley Mcgee is calling in regards to getting a tablet instead capsule potassium chloride (MICRO-K) 10 MEQ CpSR.pt can't have color.please call back at [phone]        Thanks  MELISSA

## 2024-03-14 DIAGNOSIS — J30.9 ALLERGIC RHINITIS, UNSPECIFIED SEASONALITY, UNSPECIFIED TRIGGER: ICD-10-CM

## 2024-03-14 RX ORDER — MONTELUKAST SODIUM 10 MG/1
10 TABLET ORAL DAILY
Qty: 90 TABLET | Refills: 3 | Status: SHIPPED | OUTPATIENT
Start: 2024-03-14

## 2024-04-12 ENCOUNTER — OFFICE VISIT (OUTPATIENT)
Dept: FAMILY MEDICINE | Facility: CLINIC | Age: 59
End: 2024-04-12
Payer: COMMERCIAL

## 2024-04-12 VITALS
HEART RATE: 91 BPM | DIASTOLIC BLOOD PRESSURE: 80 MMHG | SYSTOLIC BLOOD PRESSURE: 120 MMHG | TEMPERATURE: 98 F | BODY MASS INDEX: 36.53 KG/M2 | RESPIRATION RATE: 18 BRPM | OXYGEN SATURATION: 98 % | WEIGHT: 199.75 LBS

## 2024-04-12 DIAGNOSIS — I10 ESSENTIAL HYPERTENSION: Chronic | ICD-10-CM

## 2024-04-12 DIAGNOSIS — S09.90XA INJURY OF HEAD, INITIAL ENCOUNTER: Primary | ICD-10-CM

## 2024-04-12 DIAGNOSIS — M25.661 STIFFNESS OF RIGHT KNEE: ICD-10-CM

## 2024-04-12 DIAGNOSIS — S00.212A: ICD-10-CM

## 2024-04-12 PROCEDURE — 3074F SYST BP LT 130 MM HG: CPT | Mod: CPTII,S$GLB,, | Performed by: FAMILY MEDICINE

## 2024-04-12 PROCEDURE — 99999 PR PBB SHADOW E&M-EST. PATIENT-LVL V: CPT | Mod: PBBFAC,,, | Performed by: FAMILY MEDICINE

## 2024-04-12 PROCEDURE — 3079F DIAST BP 80-89 MM HG: CPT | Mod: CPTII,S$GLB,, | Performed by: FAMILY MEDICINE

## 2024-04-12 PROCEDURE — 1160F RVW MEDS BY RX/DR IN RCRD: CPT | Mod: CPTII,S$GLB,, | Performed by: FAMILY MEDICINE

## 2024-04-12 PROCEDURE — 1159F MED LIST DOCD IN RCRD: CPT | Mod: CPTII,S$GLB,, | Performed by: FAMILY MEDICINE

## 2024-04-12 PROCEDURE — 3008F BODY MASS INDEX DOCD: CPT | Mod: CPTII,S$GLB,, | Performed by: FAMILY MEDICINE

## 2024-04-12 PROCEDURE — 99214 OFFICE O/P EST MOD 30 MIN: CPT | Mod: S$GLB,,, | Performed by: FAMILY MEDICINE

## 2024-04-12 PROCEDURE — 3044F HG A1C LEVEL LT 7.0%: CPT | Mod: CPTII,S$GLB,, | Performed by: FAMILY MEDICINE

## 2024-04-12 RX ORDER — POTASSIUM CHLORIDE 750 MG/1
10 TABLET, EXTENDED RELEASE ORAL DAILY
Qty: 90 TABLET | Refills: 0 | Status: SHIPPED | OUTPATIENT
Start: 2024-04-12

## 2024-04-12 NOTE — PROGRESS NOTES
CHIEF COMPLAINT:  This is a 58-year-old female here for follow-up head injury.    SUBJECTIVE:  Patient fell 2 days ago while at [facility] for her brother surgery.  She has not sure what caused her to fall.  She may have been moving too fast.  She fell face down on the floor hitting left side of her head on the wall.  She sustained injury to left supraorbital rim but no loss of consciousness.  Patient reports headaches for 48 hours which have subsided.  She denies dizziness, paresthesias, weakness in limb, dysarthria, dysphagia or abnormality of gait.  Patient has been taking Tylenol arthritis Strength as needed.  She also reports stiffness in right knee since fall.  She had surgery on left knee in 2022 and scheduled follow-up with orthopedic surgeon for follow-up since fall.    Patient has prediabetes with last A1c 5.8% 1 month ago. She has essential hypertension for which she takes Dyazide daily.  Her blood pressure today is 120/80. She's being followed by breast screening because of fibrocystic disease.  She had nodule in right breast which has improved since she discontinued caffeine.  She continues to take Xolair injections for chronic urticaria.  She also takes Singulair, doxepin, and famotidine.  She is obese with a BMI of 36.53. She takes Mybetriq 25 mg daily for overactive bladder.      ROS:  GENERAL: Patient denies fever, chills, night sweats. Patient denies weight gain. Patient denies anorexia, fatigue, weakness or swollen glands.  Positive for weight loss.  SKIN: Patient denies rash or hair loss.  HEENT: Patient denies sore throat, ear pain, hearing loss, nasal congestion, or runny nose. Patient denies visual disturbance, eye irritation or discharge.  LUNGS: Patient denies cough, wheeze or hemoptysis.  CARDIOVASCULAR: Patient denies shortness of breath, palpitations, syncope or lower extremity edema.  GI: Patient denies abdominal pain, nausea, vomiting, diarrhea, constipation, blood in stool or  melena.  GENITOURINARY: Patient denies pelvic pain, vaginal discharge, itch or odor. Patient denies irregular vaginal bleeding. Patient denies dysuria, frequency, hematuria, nocturia, urgency or incontinence.  BREASTS: Patient denies breast pain, mass or nipple discharge.  MUSCULOSKELETAL: Patient denies joint swelling, redness or warmth. Positive for ankle and foot pain.  NEUROLOGIC: Patient denies headache, vertigo, paresthesias, weakness in limb, dysarthria, dysphagia or abnormality of gait.  PSYCHIATRIC: Patient denies anxiety, depression, or memory loss.     OBJECTIVE:   GENERAL: Well-developed well-nourished, obese, black female alert and oriented x3, in no acute distress. Memory, judgment and cognition without deficit.  Weight loss of 9 pounds in the last 6 months.  HEAD:  Abrasion with surrounding swelling left supraorbital rim below eyebrow.  No signs infection.  SKIN: Clear without rash. Normal color and tone.  HEENT: Eyes: Clear conjunctivae.  No scleral icterus.  Pupils equal and reactive light and accommodation.  Extraocular movements intact.  No nystagmus.  Fundi not visualized.  Ears:  No hemotympanum.  Nose:  Without congestion.  Pharynx:  Without injection or exudates.  NECK: Supple, normal range of motion. No masses, lymphadenopathy or enlarged thyroid. No JVD.  LUNGS: Clear to auscultation. Normal respiratory effort.  CARDIOVASCULAR: Regular rhythm, normal S1, S2 without murmur, gallop or rub.  EXTREMITIES: Without cyanosis, clubbing or edema.  Normal range of motion in all extremities. No joint effusion, erythema or warmth.  NEUROLOGIC:  Cranial nerves 2-12 without deficit.  Motor strength equal bilaterally.  Sensation normal to touch.  Gait without abnormality.  No tremor.    ASSESSMENT:  1. Injury of head, initial encounter    2. Abrasion of left orbit, initial encounter    3. Stiffness of right knee    4. Essential hypertension      PLAN:  1.  Reassurance.  2.  Continue Tylenol Arthritis  Strength as needed.    3.  Refill potassium chloride as tablets rather than capsules.  4.  Return to clinic for follow-up chronic medical conditions.    30 minutes of total time spent on the encounter, which includes face to face time and non-face to face time preparing to see the patient (eg, review of tests), Obtaining and/or reviewing separately obtained history, Documenting clinical information in the electronic or other health record, Independently interpreting results (not separately reported) and communicating results to the patient/family/caregiver, or Care coordination (not separately reported).     This note is generated with speech recognition software and is subject to transcription error and sound alike phrases that may be missed by proofreading.

## 2024-04-18 ENCOUNTER — OFFICE VISIT (OUTPATIENT)
Dept: SPORTS MEDICINE | Facility: CLINIC | Age: 59
End: 2024-04-18
Payer: COMMERCIAL

## 2024-04-18 ENCOUNTER — HOSPITAL ENCOUNTER (OUTPATIENT)
Dept: RADIOLOGY | Facility: HOSPITAL | Age: 59
Discharge: HOME | End: 2024-04-18
Attending: ORTHOPAEDIC SURGERY
Payer: COMMERCIAL

## 2024-04-18 VITALS — WEIGHT: 199.75 LBS | BODY MASS INDEX: 36.76 KG/M2 | HEIGHT: 62 IN

## 2024-04-18 DIAGNOSIS — M17.32 POST-TRAUMATIC OSTEOARTHRITIS OF LEFT KNEE: ICD-10-CM

## 2024-04-18 DIAGNOSIS — M17.0 BILATERAL PRIMARY OSTEOARTHRITIS OF KNEE: Primary | ICD-10-CM

## 2024-04-18 DIAGNOSIS — E66.01 CLASS 2 SEVERE OBESITY WITH SERIOUS COMORBIDITY AND BODY MASS INDEX (BMI) OF 36.0 TO 36.9 IN ADULT, UNSPECIFIED OBESITY TYPE: ICD-10-CM

## 2024-04-18 DIAGNOSIS — M25.561 RIGHT KNEE PAIN, UNSPECIFIED CHRONICITY: ICD-10-CM

## 2024-04-18 DIAGNOSIS — Z98.890 S/P MEDIAL MENISCUS REPAIR OF LEFT KNEE: ICD-10-CM

## 2024-04-18 DIAGNOSIS — M21.162 GENU VARUM OF LEFT LOWER EXTREMITY: ICD-10-CM

## 2024-04-18 DIAGNOSIS — M25.561 RIGHT KNEE PAIN, UNSPECIFIED CHRONICITY: Primary | ICD-10-CM

## 2024-04-18 DIAGNOSIS — M25.561 ACUTE PAIN OF RIGHT KNEE: ICD-10-CM

## 2024-04-18 PROCEDURE — 73564 X-RAY EXAM KNEE 4 OR MORE: CPT | Mod: 26,RT,, | Performed by: RADIOLOGY

## 2024-04-18 PROCEDURE — 3044F HG A1C LEVEL LT 7.0%: CPT | Mod: CPTII,S$GLB,, | Performed by: ORTHOPAEDIC SURGERY

## 2024-04-18 PROCEDURE — 73562 X-RAY EXAM OF KNEE 3: CPT | Mod: 26,LT,, | Performed by: RADIOLOGY

## 2024-04-18 PROCEDURE — 99214 OFFICE O/P EST MOD 30 MIN: CPT | Mod: S$GLB,,, | Performed by: ORTHOPAEDIC SURGERY

## 2024-04-18 PROCEDURE — 99999 PR PBB SHADOW E&M-EST. PATIENT-LVL III: CPT | Mod: PBBFAC,,, | Performed by: ORTHOPAEDIC SURGERY

## 2024-04-18 PROCEDURE — 73562 X-RAY EXAM OF KNEE 3: CPT | Mod: TC,59,PN,LT

## 2024-04-18 PROCEDURE — 3008F BODY MASS INDEX DOCD: CPT | Mod: CPTII,S$GLB,, | Performed by: ORTHOPAEDIC SURGERY

## 2024-04-18 NOTE — PATIENT INSTRUCTIONS
Assessment:  Stacey Wade is a  58 y.o. 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    Bilateral primary osteoarthritis of knee Yes    Acute pain of right knee     Genu varum of left lower extremity     Post-traumatic osteoarthritis of left knee     S/P medial meniscus repair of left knee     Class 2 severe obesity with serious comorbidity and body mass index (BMI) of 36.0 to 36.9 in adult, unspecified obesity type       Plan:  Order PT at [facility]  - 2-3x per week for 6-8 weeks for both knees  Discussed possible injection to the right knee but will defer to after PT   Check on medial  brace that was ordered on the fall  Medial  brace required for valgus instability due to knee varus from medial compartment osteoarthritis.      Although there is not a cure for arthritis, there are effective ways to improve symptoms.     Exercise & Activity:  I recommend low impact activities such as elliptical and bicycle   Walking is great for arthritis: https://www.Imalogix.com/3-reasons-walking-with-knee-arthritis/  If walking long distances, I recommend good quality well-cushioned shoes. Varsity sports can help you find the right ones: https://www.varsityrunning.com/  Aquatic and pool therapy is often helpful because it lessens the impact on the joint, strengthens the leg and thigh muscles, and helps to control swelling.   Knee motion is important to the health of the knee.     Knee Braces:  A compression knee sleeve can help limit swelling and provide proprioceptive feedback.     Prescriptions & Medications:  I do recommend formal physical therapy or at minimum a home exercise program.   Over the counter  analgesic (pain-relieving) medications can help. Examples are Tylenol, Ibuprofen, and Aleve. Check with your primary care physician to make sure you don't have contra-indications to taking those medicines.  Some over the counter supplement solutions such as glucosamine and chondroitin may help with symptoms, although the evidence is mixed.    Healthy Lifestyle:  Excess body weight can have a negative impact on joint health and on pain. I recommend healthy lifestyle choices including nutrition and exercise that help reach and maintain an ideal body weight. Tips for Exercise: https://www.Xunlei/13-exercise-tips-for-a-healthier-you/  Some diets cause increased inflammation. I recommend a balanced wholesome diet including some foods such as olives that are shown to decrease inflammation. More diet information available here: https://www.Xunlei/8-best-foods-for-knee-arthritis/      Follow-up: 8 weeks or sooner if there are any problems between now and then.    Leave Review:   Google: Leave Google Review  Healthgrades: Leave Healthgrades Review    After Hours Number: [phone]

## 2024-04-18 NOTE — PROGRESS NOTES
Patient ID: Stacey Wade  YOB: 1965  MRN: [id number]    Chief Complaint: Pain of the Right Knee and Pain of the Left Knee      Referred By: Dr. Petit    History of Present Illness: Stacey Wade is a  58 y.o. 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 She reports she fell recently on 4/10/24 while at [facility] for her brother. She reports she hit her head but now has bilateral knee pain right worst then left. She has been using a cane while ambulating after the fall and KT taping her knee. She reports she has done PT in the past but may to get it set up again after the fall. She reports mostly medial knee pain in both knees. She denies any catching or locking  She reports swelling in the right knee. Of note she is about 2 years s/p Left Knee scope, medial meniscus root repair, centralization (5/13/2022). She also has had gel injections on 6/5/23. She reports also a brace being ordered at the last visit but never followed up on it.     Pain      Past Medical History:   Past Medical History:   Diagnosis Date    Allergic rhinitis     Anxiety     Complex tear of medial meniscus of left knee 5/5/2022    Hypertension     Osteoarthritis of both knees     Prediabetes     Urticaria      Past Surgical History:   Procedure Laterality Date    CHONDROPLASTY OF KNEE Left 05/13/2022    Procedure: CHONDROPLASTY, KNEE;  Surgeon: Wes Faulkner MD;  Location: [facility] OR;  Service: Orthopedics;  Laterality: Left;    COLONOSCOPY N/A 01/18/2018    Procedure: COLONOSCOPY;  Surgeon: Yong Smith MD;  Location: [facility];  Service: Endoscopy;  Laterality: N/A;    HERNIA REPAIR Left      Family History   Problem Relation Name Age of Onset    Lung cancer Paternal Grandfather      Ovarian cancer Maternal Grandmother      Hyperlipidemia Mother Pepe Wade     Hypertension Mother Pepe  Mauro     Breast cancer Mother Pepe Wade 60    Arthritis Mother Pepe Wade     Lung cancer Father      Breast cancer Maternal Aunt  53    Heart failure Other          Great aunt    Prostate cancer Brother Angus     Brain cancer Sister      Diabetes Neg Hx      Pseudochol deficiency Neg Hx      Malignant hyperthermia Neg Hx       Social History     Socioeconomic History    Marital status: Single   Occupational History    Occupation: Pharmacy Tech     Comment: [employer]   Tobacco Use    Smoking status: Never    Smokeless tobacco: Never   Substance and Sexual Activity    Alcohol use: No    Drug use: No    Sexual activity: Not Currently     Partners: Male     Birth control/protection: None   Social History Narrative    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     Medication List with Changes/Refills   Current Medications    ALBUTEROL (PROVENTIL/VENTOLIN HFA) 90 MCG/ACTUATION INHALER    Inhale 2 puffs into the lungs every 4 to 6 hours as needed for Wheezing.    ASPIRIN (ECOTRIN) 81 MG EC TABLET    Take 1 tablet by mouth every morning.    BENZONATATE (TESSALON) 100 MG CAPSULE    Take 1-2 capsules (100-200 mg total) by mouth 3 (three) times daily as needed for Cough.    BETAMETHASONE DIPROPIONATE 0.05 % CREAM    APPLY TOPICALLY 2 TIMES DAILY.    BUTALBITAL-ACETAMINOPHEN-CAFFEINE -40 MG (FIORICET, ESGIC) -40 MG PER TABLET    TAKE 1 TABLET BY MOUTH EVERY 6 HOURS AS NEEDED FOR PAIN OR FOR HEADACHE    CLORAZEPATE (TRANXENE) 3.75 MG TAB    TAKE 1 TABLET BY MOUTH EVERY DAY AS NEEDED    CLOTRIMAZOLE-BETAMETHASONE 1-0.05% (LOTRISONE) CREAM    APPLY TOPICALLY TWICE DAILY AS DIRECTED    DICLOFENAC SODIUM (VOLTAREN) 1 % GEL    Apply 2 g topically 4 (four) times daily.    DOXEPIN (SINEQUAN) 10 MG CAPSULE    TAKE TWO CAPSULES BY MOUTH THREE TIMES DAILY    EPINEPHRINE (EPIPEN) 0.3 MG/0.3 ML ATIN    Inject 0.3 mg into the muscle  daily as needed.    FAMOTIDINE (PEPCID) 40 MG TABLET    Take 0.5 tablets (20 mg total) by mouth 2 (two) times daily.    FEXOFENADINE (ALLEGRA) 180 MG TABLET    Take 1 tablet (180 mg total) by mouth once daily. In AM    FLUOCINONIDE (LIDEX) 0.05 % OINTMENT    Apply topically 2 (two) times daily. Do not use longer than 2 weeks at a time    HYDROXYZINE HCL (ATARAX) 25 MG TABLET    TAKE ONE TABLET BY MOUTH FOUR TIMES DAILY AS NEEDED FOR ITCHING    MIRABEGRON (MYRBETRIQ) 25 MG TB24 ER TABLET    Take 1 tablet (25 mg total) by mouth once daily.    MOMETASONE (NASONEX) 50 MCG/ACTUATION NASAL SPRAY    INHALE 2 SPRAYS INTO THE NOSTRIL ONCE A DAY    MONTELUKAST (SINGULAIR) 10 MG TABLET    Take 1 tablet (10 mg total) by mouth once daily.    OMALIZUMAB (XOLAIR) 150 MG/ML INJECTION    Inject 300 mg into the skin.    POTASSIUM CHLORIDE (KLOR-CON) 10 MEQ TBSR    Take 1 tablet (10 mEq total) by mouth once daily.    RIZATRIPTAN (MAXALT) 10 MG TABLET    TAKE 1 TABLET BY MOUTH AS NEEDED FOR MIGRAINE. MAY REPEAT IN 2 HOURS. MAX 2 TABS PER 24 HOURS    TACROLIMUS (PROTOPIC) 0.03 % OINTMENT    Apply topically 2 (two) times daily.    TRIAMTERENE-HYDROCHLOROTHIAZIDE 37.5-25 MG (DYAZIDE) 37.5-25 MG PER CAPSULE    TAKE 1 CAPSULE BY MOUTH ONCE A DAY     Review of patient's allergies indicates:   Allergen Reactions    Benzalkonium chloride     Black pepper      Other reaction(s): Hives    Chocolate flavor      Other reaction(s): Hives    Citrus and derivatives Hives     LEMON PRODUCTS    Grapefruit Hives     Other reaction(s): Hives    Ibuprofen Swelling    Latex      Other reaction(s): Hives    Lemon balm (casper officinalis) Hives     Anything with lemon in it    Naproxen Swelling    Neomycin-bacitracin-polymyxin      Other reaction(s): Rash    Oats (richard) Hives     Oat foods (oatmeal, etc...)    Vegetable acetoglycerides Hives     Vegetable gums    Wheat containing prod     Wheat flour Hives     ROS    Physical Exam:   Body mass index is  36.53 kg/m².  There were no vitals filed for this visit.   GENERAL: Well appearing, appropriate for stated age, no acute distress.  CARDIOVASCULAR: Pulses regular by peripheral palpation.  PULMONARY: Respirations are even and non-labored.  NEURO: Awake, alert, and oriented x 3.  PSYCH: Mood & affect are appropriate.  HEENT: Head is normocephalic and atraumatic.            Right Knee Exam     Inspection   Scars: present    Tenderness   The patient is tender to palpation of the medial joint line.    Range of Motion   The patient has normal right knee ROM.  Extension:  0   Flexion:  120     Tests   Meniscus   Dianne:  Medial - negative Lateral - negative  Ligament Examination   Lachman: normal (-1 to 2mm)   MCL - Valgus: abnormal - grade I  LCL - Varus: normal    Other   Sensation: normal    Left Knee Exam     Inspection   Effusion: present    Tenderness   The patient tender to palpation of the medial joint line.    Range of Motion   The patient has normal left knee ROM.  Extension:  0   Flexion:  120     Tests   Meniscus   Dianne:  Medial - negative Lateral - negative  Stability   Lachman: normal (-1 to 2mm)   MCL - Valgus: abnormal - grade I  LCL - Varus: normal (0 to 2mm)    Other   Sensation: normal    Comments:  Intact EHL, FHL, gastrocsoleus, and tibialis anterior. Sensation intact to light touch in superficial peroneal, deep peroneal, tibial, sural, and saphenous nerve distributions. Foot warm and well perfused with capillary refill of less than 2 seconds and palpable pedal pulses.      Muscle Strength   Right Lower Extremity   Hip Abduction: 5/5   Quadriceps:  5/5   Hamstrin/5   Left Lower Extremity   Hip Abduction: 5/5   Quadriceps:  5/5   Hamstrin/5     Vascular Exam     Right Pulses  Dorsalis Pedis:      2+  Posterior Tibial:      2+        Left Pulses  Dorsalis Pedis:      2+  Posterior Tibial:      2+          Imaging:    X-ray Knee Ortho Right with Flexion  Narrative: EXAMINATION:  XR KNEE  ORTHO RIGHT WITH FLEXION    CLINICAL HISTORY:  Pain in right knee    TECHNIQUE:  AP standing views of both knees, AP flexion views of both knees, lateral view of the right knee and Merchant views of both knees    COMPARISON:  11/01/2023    FINDINGS:  The medial and lateral compartment joint spaces appear to be relatively well maintained bilaterally.  Minimal degenerative involving the right patellofemoral compartment.  No joint effusion.  No acute fracture or dislocation.  Moderate joint space narrowing and osteophyte formation seen involving the medial compartment of the left knee.  Impression: 1.  As above    Electronically signed by: Sergio Lake DO  Date:    04/18/2024  Time:    09:37      Relevant imaging results reviewed and interpreted by me, discussed with the patient and / or family today.     Other Tests:         Patient Instructions   Assessment:  Stacey Wade is a  58 y.o. 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    Bilateral primary osteoarthritis of knee Yes    Acute pain of right knee     Genu varum of left lower extremity     Post-traumatic osteoarthritis of left knee     S/P medial meniscus repair of left knee     Class 2 severe obesity with serious comorbidity and body mass index (BMI) of 36.0 to 36.9 in adult, unspecified obesity type       Plan:  Order PT at [place]  - 2-3x per week for 6-8 weeks for both knees  Discussed possible injection to the right knee but will defer to after PT   Check on medial  brace that was ordered on the fall  Medial  brace required for valgus instability due to knee varus from medial compartment osteoarthritis.      Although there is not a cure for  arthritis, there are effective ways to improve symptoms.     Exercise & Activity:  I recommend low impact activities such as elliptical and bicycle   Walking is great for arthritis: https://www.[email or web address] [email or web address].DreamHost/3-reasons-walking-with-knee-arthritis/  If walking long distances, I recommend good quality well-cushioned shoes. Varsity sports can help you find the right ones: https://[email or web address].DreamHost/  Aquatic and pool therapy is often helpful because it lessens the impact on the joint, strengthens the leg and thigh muscles, and helps to control swelling.   Knee motion is important to the health of the knee.     Knee Braces:  A compression knee sleeve can help limit swelling and provide proprioceptive feedback.     Prescriptions & Medications:  I do recommend formal physical therapy or at minimum a home exercise program.   Over the counter analgesic (pain-relieving) medications can help. Examples are Tylenol, Ibuprofen, and Aleve. Check with your primary care physician to make sure you don't have contra-indications to taking those medicines.  Some over the counter supplement solutions such as glucosamine and chondroitin may help with symptoms, although the evidence is mixed.    Healthy Lifestyle:  Excess body weight can have a negative impact on joint health and on pain. I recommend healthy lifestyle choices including nutrition and exercise that help reach and maintain an ideal body weight. Tips for Exercise: https://www.Neutral Space.DreamHost/13-exercise-tips-for-a-healthier-you/  Some diets cause increased inflammation. I recommend a balanced wholesome diet including some foods such as olives that are shown to decrease inflammation. More diet information available here: https://www.Neutral Space.DreamHost/8-best-foods-for-knee-arthritis/      Follow-up: 8 weeks or sooner if there are any problems between now and then.    Leave Review:   Google: Leave Google Review  Healthgrades: Leave Healthgrades Review    After  Hours Number: [phone]     Provider Note/Medical Decision Making: looked at xrays myself. Left side continues to progress radiographically despite repair and centralization, but right side is more symptomatic      I discussed worrisome and red flag signs and symptoms with the patient. The patient expressed understanding and agreed to alert me immediately or to go to the emergency room if they experience any of these.   Treatment plan was developed with input from the patient/family, and they expressed understanding and agreement with the plan. All questions were answered today.          Wes Faulkner MD  Orthopaedic Surgery & Sports Medicine       Disclaimer: This note was prepared using a voice recognition system and is likely to have sound alike errors within the text.     I, Tamra Garcia, acted as a scribe for Wes Faulkner MD for the duration of this office visit.

## 2024-04-22 ENCOUNTER — OFFICE VISIT (OUTPATIENT)
Dept: FAMILY MEDICINE | Facility: CLINIC | Age: 59
End: 2024-04-22
Payer: COMMERCIAL

## 2024-04-22 VITALS
TEMPERATURE: 99 F | HEART RATE: 92 BPM | DIASTOLIC BLOOD PRESSURE: 80 MMHG | BODY MASS INDEX: 35.75 KG/M2 | WEIGHT: 194.25 LBS | HEIGHT: 62 IN | OXYGEN SATURATION: 97 % | SYSTOLIC BLOOD PRESSURE: 132 MMHG

## 2024-04-22 DIAGNOSIS — L50.8 CHRONIC URTICARIA: Primary | ICD-10-CM

## 2024-04-22 PROCEDURE — 1160F RVW MEDS BY RX/DR IN RCRD: CPT | Mod: CPTII,S$GLB,, | Performed by: FAMILY MEDICINE

## 2024-04-22 PROCEDURE — 3079F DIAST BP 80-89 MM HG: CPT | Mod: CPTII,S$GLB,, | Performed by: FAMILY MEDICINE

## 2024-04-22 PROCEDURE — 3044F HG A1C LEVEL LT 7.0%: CPT | Mod: CPTII,S$GLB,, | Performed by: FAMILY MEDICINE

## 2024-04-22 PROCEDURE — 99213 OFFICE O/P EST LOW 20 MIN: CPT | Mod: 25,S$GLB,, | Performed by: FAMILY MEDICINE

## 2024-04-22 PROCEDURE — 99999 PR PBB SHADOW E&M-EST. PATIENT-LVL V: CPT | Mod: PBBFAC,,, | Performed by: FAMILY MEDICINE

## 2024-04-22 PROCEDURE — 1159F MED LIST DOCD IN RCRD: CPT | Mod: CPTII,S$GLB,, | Performed by: FAMILY MEDICINE

## 2024-04-22 PROCEDURE — 3008F BODY MASS INDEX DOCD: CPT | Mod: CPTII,S$GLB,, | Performed by: FAMILY MEDICINE

## 2024-04-22 PROCEDURE — 96372 THER/PROPH/DIAG INJ SC/IM: CPT | Mod: S$GLB,,, | Performed by: FAMILY MEDICINE

## 2024-04-22 PROCEDURE — 3075F SYST BP GE 130 - 139MM HG: CPT | Mod: CPTII,S$GLB,, | Performed by: FAMILY MEDICINE

## 2024-04-22 RX ORDER — METHYLPREDNISOLONE ACETATE 80 MG/ML
80 INJECTION, SUSPENSION INTRA-ARTICULAR; INTRALESIONAL; INTRAMUSCULAR; SOFT TISSUE
Status: COMPLETED | OUTPATIENT
Start: 2024-04-22 | End: 2024-04-22

## 2024-04-22 RX ADMIN — METHYLPREDNISOLONE ACETATE 80 MG: 80 INJECTION, SUSPENSION INTRA-ARTICULAR; INTRALESIONAL; INTRAMUSCULAR; SOFT TISSUE at 04:04

## 2024-04-22 NOTE — PROGRESS NOTES
CHIEF COMPLAINT:  This is a 58-year-old female complaining of flare-up of hives.     SUBJECTIVE:  Patient has a history of chronic urticaria for which she takes Xolair injections.  She missed her injection earlier this month.  She complains of acute onset of wheal and flare reaction for the last 5-7 days located on face, extremities and trunk.  She complains of incessant itching.  She has taken Benadryl with minimal relief.  Patient requests corticosteroid injection.  She denies shortness of breath, wheezing, swelling of lips, tongue or throat.     ROS:  GENERAL: Patient denies fever, chills, night sweats. Patient denies weight gain or loss. Patient denies anorexia, fatigue, weakness or swollen glands.  SKIN: Patient denies hair loss.  Positive for rash.  HEENT: Patient denies sore throat, ear pain, hearing loss, nasal congestion, or runny nose. Patient denies visual disturbance, eye irritation or discharge.  LUNGS: Patient denies cough, wheeze or hemoptysis.  CARDIOVASCULAR: Patient denies chest pain, shortness of breath, palpitations, syncope or lower extremity edema.  GI: Patient denies abdominal pain, nausea, vomiting, diarrhea, constipation, blood in stool or melena.     OBJECTIVE:    GENERAL: Well-developed well-nourished obese, black female alert and oriented x3 in no acute distress.  Memory, judgment and cognition without deficit.  SKIN: No urticaria seen at this time.  A few scattered erythematous papules noted.  HEENT: Eyes: Clear conjunctivae.  No scleral icterus.  Pharynx: Without injection or exudates.  No swelling of tongue or oral mucosa noted.  NECK: Supple, normal range of motion.  No lymphadenopathy.  LUNGS: Clear to auscultation.  Normal respiratory effort.  O2 saturation 97%.  CARDIOVASCULAR: Regular rhythm, normal S1, S2 without murmur, gallop or rub.  EXTREMITIES: Without cyanosis, clubbing or edema.  Distal pulses 2+ and equal.  No joint effusion, erythema or warmth.    ASSESSMENT:  1. Chronic  urticaria      PLAN:  1. Depo-Medrol 80 mg IM.    2. Consider oral steroids if no improvement.  3. Follow-up as needed.    20 minutes of total time spent on the encounter, which includes face to face time and non-face to face time preparing to see the patient (eg, review of tests), Obtaining and/or reviewing separately obtained history, Documenting clinical information in the electronic or other health record, Independently interpreting results (not separately reported) and communicating results to the patient/family/caregiver, or Care coordination (not separately reported).     This note is generated with speech recognition software and is subject to transcription error and sound alike phrases that may be missed by proofreading.

## 2024-04-23 NOTE — TELEPHONE ENCOUNTER
No care due was identified.  [facility] Embedded Care Due Messages. Reference number: [id number].   4/23/2024 5:10:40 PM CDT

## 2024-04-24 RX ORDER — METHYLPREDNISOLONE 4 MG/1
TABLET ORAL
Qty: 21 TABLET | Refills: 0 | OUTPATIENT
Start: 2024-04-24

## 2024-04-25 ENCOUNTER — CLINICAL SUPPORT (OUTPATIENT)
Facility: HOSPITAL | Age: 59
End: 2024-04-25
Payer: COMMERCIAL

## 2024-04-25 DIAGNOSIS — Z78.9 IMPAIRED MOBILITY AND ACTIVITIES OF DAILY LIVING: ICD-10-CM

## 2024-04-25 DIAGNOSIS — M25.561 ACUTE PAIN OF RIGHT KNEE: ICD-10-CM

## 2024-04-25 DIAGNOSIS — M17.0 BILATERAL PRIMARY OSTEOARTHRITIS OF KNEE: ICD-10-CM

## 2024-04-25 DIAGNOSIS — R29.898 DECREASED STRENGTH OF LOWER EXTREMITY: Primary | ICD-10-CM

## 2024-04-25 DIAGNOSIS — Z74.09 IMPAIRED MOBILITY AND ACTIVITIES OF DAILY LIVING: ICD-10-CM

## 2024-04-25 PROCEDURE — 97140 MANUAL THERAPY 1/> REGIONS: CPT | Mod: PN | Performed by: PHYSICAL THERAPIST

## 2024-04-25 PROCEDURE — 97162 PT EVAL MOD COMPLEX 30 MIN: CPT | Mod: PN | Performed by: PHYSICAL THERAPIST

## 2024-04-25 NOTE — PROGRESS NOTES
"[facility]   Physical Therapy Initial Evaluation      Name: Stacey Wade  Clinic Number: [id number]    Therapy Diagnosis:   Encounter Diagnoses   Name Primary?    Bilateral primary osteoarthritis of knee     Acute pain of right knee         Physician: Wes Faulkner MD    Physician Orders: {AMB PT KNEE ORDERS:10304} ***  Medical Diagnosis from Referral: ***  Evaluation Date: 4/25/2024  Authorization Period Expiration: ***  Plan of Care Expiration: ***  Progress Note Due: ***  Visit # / Visits authorized: ***/ ***     FOTO:  Goal: 72%  -Intake: 63%  -Status: incomplete  -Discharge: incomplete    Precautions: {IP WOUND PRECAUTIONS OHS:16534}     Time In: ***  Time Out: ***  Total Appointment Time (timed & untimed codes): *** minutes    Subjective     Date of onset: ***    History of current condition - Stacey reports: History of knee problems, Dr. Faulkner did surgery on the L side 2 years ago. She [facility] on [facility] and hit both of her knees.     Knee pain on L only started after fall. More swelling noted recently.    Falls: ***    Imaging: [] Xray [] MRI [] CT: Performed on:   FINDINGS:  The medial and lateral compartment joint spaces appear to be relatively well maintained bilaterally.  Minimal degenerative involving the right patellofemoral compartment.  No joint effusion.  No acute fracture or dislocation.  Moderate joint space narrowing and osteophyte formation seen involving the medial compartment of the left knee.       Pain:  Current 1/10 on L, 5/10 on R, worst 8/10 on R, 5/10 on L, best {0-10:20507::"0"}/10   Location: [x] Right   [x] Left:  Anterior Knee on R  Description: Stiffness  Aggravating Factors: Time on feet. Driving.  Easing Factors: activity avoidance, rest, tylenol, topicals    Prior Therapy: Yes  Social History:    Occupation: Pharmacy tech  Prior Level of Function: Limited due to L knee pain  Current Level of Function: Limited due to knee " pain    Patients goals: Get back to full function, be able to retire in 5 years     Medical History:   Past Medical History:   Diagnosis Date    Allergic rhinitis     Anxiety     Complex tear of medial meniscus of left knee 5/5/2022    Hypertension     Osteoarthritis of both knees     Prediabetes     Urticaria        Surgical History:   Stacey Wade  has a past surgical history that includes Hernia repair (Left); Colonoscopy (N/A, 01/18/2018); and Chondroplasty of knee (Left, 05/13/2022).    Medications:   Stacey has a current medication list which includes the following prescription(s): albuterol, aspirin, benzonatate, betamethasone dipropionate, butalbital-acetaminophen-caffeine -40 mg, clorazepate, clotrimazole-betamethasone 1-0.05%, diclofenac sodium, doxepin, epinephrine, famotidine, fexofenadine, fluocinonide, hydroxyzine hcl, myrbetriq, mometasone, montelukast, xolair, potassium chloride, rizatriptan, tacrolimus, and triamterene-hydrochlorothiazide 37.5-25 mg.    Allergies:   Review of patient's allergies indicates:   Allergen Reactions    Benzalkonium chloride     Black pepper      Other reaction(s): Hives    Chocolate flavor      Other reaction(s): Hives    Citrus and derivatives Hives     LEMON PRODUCTS    Grapefruit Hives     Other reaction(s): Hives    Ibuprofen Swelling    Latex      Other reaction(s): Hives    Lemon balm (casper officinalis) Hives     Anything with lemon in it    Naproxen Swelling    Neomycin-bacitracin-polymyxin      Other reaction(s): Rash    Oats (richard) Hives     Oat foods (oatmeal, etc...)    Vegetable acetoglycerides Hives     Vegetable gums    Wheat containing prod     Wheat flour Hives        Objective      Observation:   POSTURE:  ***  GAIT:   L knee varus thrust  R knee valgus  R LATERAL FOOT progression angle      FUNCTIONAL MOVEMENT PATTERNS  Movement Analysis Notes   Squat []Functional  [x]Dysfunctional:  [x]Painful  []Non-Painful    Increased hip hinge,  slow movement            Balance:    RIGHT   LEFT   Goal   Single Leg Unable 3s 30 seconds      , ,   Lower extremity myotomes  Neuro Testing Right   Left     L2 (hip flexion) 4/5 4+/5   L3 (knee extension) 5/5 5/5   L4 (ankle DF) 5/5 5/5   L5 (great toe extension) 5/5 5/5   S1 (plantarflexion) 5/5 5/5    ,     RANGE OF MOTION:     Hip AROM/PROM Right Left Pain/Dysfunction with Movement Goal   Hip Flexion (120º) 100 100     Hip Extension (30º)       Hip Abduction (45º)       Hip IR (45º) 30 30     Hip ER (45º) 45 45         Knee AROM/PROM Right Left Pain/Dysfunction with Movement Goal   Knee Flexion (135º) 81 100 (104) Pain    Knee Extension (0º) 1 hyper 3 hyper     Tibial External Rotation (40º}       Tibial Internal Rotation (30º)             STRENGTH:   L/E MMT Right  (spine) Left Pain/Dysfunction with Movement Goal   Hip Extension  3/5 3/5 R anterior knee pain 4+/5 B   Hip Abduction  3+/5 4+/5 B hip pain 4+/5 B   Knee Extension 4/5 4/5 R knee pain 5/5 B   Knee Flexion    5/5 B   Hip IR    4+/5 B   Hip ER    4+/5 B        MUSCLE LENGTH:   Muscle Tested  Right Left  Limitation Goal   Hip Flexors [] Normal  [] Limited [] Normal  [] Limited  Normal B   ITB / TFL [] Normal  [] Limited [] Normal  [] Limited  Normal B   Quadriceps [] Normal  [] Limited [] Normal  [] Limited  Normal B   Hamstrings  [] Normal  [] Limited [] Normal  [] Limited  Normal B   Piriformis [] Normal  [] Limited [] Normal  [] Limited  Normal B   Gastrocnemius  [] Normal  [] Limited [] Normal  [] Limited  Normal B         Joint mobility:  Fair hip mobility      SENSATION  [x] Intact to Light Touch   [] Impaired:      PALPATION: Structures: Increased tenderness to palpation of: {RIGHT/LEFT:90949} ***        Function:     Intake Outcome Measure for FOTO knee Survey    Therapist reviewed FOTO scores for Stacey Wade on 4/25/2024.   FOTO documents entered into [facility] - see Media section.    Intake Score: ***%         Treatment     Total Treatment  "time (time-based codes) separate from Evaluation: (***) minutes     Stacey received the treatments listed below:      Stacey received the following manual therapy techniques: Joint mobilizations and Soft tissue Mobilization were applied to the: *** for *** minutes, including:      Stacey received therapeutic exercises to develop strength, endurance, ROM, flexibility, posture, and core stabilization for *** minutes including:      Stacey participated in neuromuscular re-education activities to improve: Balance, Coordination, Kinesthetic, Sense, Proprioception, and Posture for *** minutes. The following activities were included:      Stacey participated in dynamic functional therapeutic activities to improve functional performance for ***  minutes, including:      Patient Education and Home Exercises     Education provided: (***) minutes  {PATIENT EDUCATION (Optional):93681}  Activity Modifications: ***    Written Home Exercises Provided: yes.  Exercises were reviewed and Stacey was able to demonstrate them prior to the end of the session.  Stacey demonstrated {Desc; good/fair/poor:82711} understanding of the education provided. See EMR under Patient Instructions for exercises provided during therapy sessions.    Assessment     Stacey is a 58 y.o. female referred to outpatient Physical Therapy with a medical diagnosis of ***. Clinical exam is consistent with ***.     Problem List:  ***    Pt prognosis is {REHAB PROGNOSIS OHS:47672::"Excellent"}  Pt will benefit from skilled outpatient Physical Therapy to address the deficits stated above and in the chart below, provide pt/family education, and to maximize pt's level of independence.     Plan of care discussed with patient: Yes  Pt's spiritual, cultural and educational needs considered and patient is agreeable to the plan of care and goals as stated below:     Anticipated Barriers for therapy: {barriers for care:06077}    Medical Necessity is demonstrated by the " "following ***  History  Co-morbidities and personal factors that may impact the plan of care [] LOW: no personal factors / co-morbidities  [] MODERATE: 1-2 personal factors / co-morbidities  [] HIGH: 3+ personal factors / co-morbidities    Moderate / High Support Documentation: See patient medical history and objective assessment     Examination  Body Structures and Functions, activity limitations and participation restrictions that may impact the plan of care [] LOW: addressing 1-2 elements  [] MODERATE: 3+ elements  [] HIGH: 4+ elements (please support below)    Moderate / High Support Documentation: See patient medical history and objective assessment     Clinical Presentation [] LOW: stable  [] MODERATE: Evolving  [] HIGH: Unstable     Decision Making/ Complexity Score: {Desc; low/moderate/high:741379}         Short Term Goals:  {numbers 1-12:84113::"6"} weeks Status  Date Met   PAIN: Pt will report worst pain of ***/10 in order to progress toward max functional ability and improve quality of life. [x] Progressing  [] Met  [] Not Met    FUNCTION: Patient will demonstrate improved function as indicated by a functional score improvement of at least 5 points on FOTO. [x] Progressing  [] Met  [] Not Met    MOBILITY: Patient will improve AROM to 50% of stated goals, listed in objective measures above, in order to progress towards independence with functional activities.  [x] Progressing  [] Met  [] Not Met    STRENGTH: Patient will improve strength to 50% of stated goals, listed in objective measures above, in order to progress towards independence with functional activities. [x] Progressing  [] Met  [] Not Met    POSTURE: Patient will correct postural deviations in sitting and standing, to decrease pain and promote long term stability.  [x] Progressing  [] Met  [] Not Met    GAIT: Patient will demonstrate improved gait mechanics including *** in order to improve functional mobility, improve quality of life, and " "decrease risk of further injury or fall.  [x] Progressing  [] Met  [] Not Met    HEP: Patient will demonstrate independence with HEP in order to progress toward functional independence. [x] Progressing  [] Met  [] Not Met    *** [x] Progressing  [] Met  [] Not Met      Long Term Goals:  {numbers 1-12:20432::"12"} weeks Status Date Met   PAIN: Pt will report worst pain of ***/10 in order to progress toward max functional ability and improve quality of life [x] Progressing  [] Met  [] Not Met    FUNCTION: Patient will demonstrate improved function as indicated by a FOTO functional score improvement as listed in header. [x] Progressing  [] Met  [] Not Met    MOBILITY: Patient will improve AROM to stated goals, listed in objective measures above, in order to return to maximal functional potential and improve quality of life. {Set ROM goals} [x] Progressing  [] Met  [] Not Met    STRENGTH: Patient will improve strength to stated goals, listed in objective measures above, in order to improve functional independence and quality of life. {Set strength goals} [x] Progressing  [] Met  [] Not Met    GAIT: Patient will demonstrate normalized gait mechanics with minimal compensation in order to return to PLOF. [x] Progressing  [] Met  [] Not Met    Patient will return to normal ADL's, IADL's, community involvement, recreational activities, and work-related activities with less than or equal to ***/10 pain and maximal function.  [x] Progressing  [] Met  [] Not Met    *** [x] Progressing  [] Met  [] Not Met      Plan   Plan of care Certification: 4/25/2024 to ***.    Outpatient Physical Therapy {Numbers; 1-5:25045::"2"} times weekly for {numbers 1-12:45182::"12"} weeks to include any combination of the following interventions: virtual visits, dry needling, modalities, electrical stimulation (IFC, Pre-Mod, Attended with Functional Dry Needling), {[place] PLAN:02276::"Cervical/Lumbar Traction","Gait Training","Manual " "Therapy","Neuromuscular Re-ed","Patient Education","Self Care","Therapeutic Exercise","Therapeutic Activites"}     Abdi Benton, PT, DPT      I CERTIFY THE NEED FOR THESE SERVICES FURNISHED UNDER THIS PLAN OF TREATMENT AND WHILE UNDER MY CARE   Physician's comments:     Physician's Signature: ___________________________________________________     "

## 2024-04-26 NOTE — PLAN OF CARE
[facility]   Physical Therapy Initial Evaluation      Name: Stacey Wade  Clinic Number: [id number]    Therapy Diagnosis:   Encounter Diagnoses   Name Primary?    Bilateral primary osteoarthritis of knee     Acute pain of right knee     Decreased strength of lower extremity Yes    Impaired mobility and activities of daily living         Physician: Wes Faulkner MD    Physician Orders: PT Eval and Treat   Medical Diagnosis from Referral: Bilateral primary osteoarthritis of knee [M17.0], Acute pain of right knee [M25.561]   Evaluation Date: 4/25/2024  Authorization Period Expiration: 12/31/24  Plan of Care Expiration: 7/25/24  Progress Note Due: 5/25/24  Visit # / Visits authorized: 1/ 1     FOTO:  Goal: 72%  -Intake: 63%  -Status: incomplete  -Discharge: incomplete    Precautions: Standard     Time In: 0930  Time Out: 1032  Total Appointment Time (timed & untimed codes): 62 minutes    Subjective     Date of onset: Ongoing issue on L, 2 weeks ago for R    History of current condition - Stacey reports: History of knee problems, Dr. Faulkner did surgery on the L side 2 years ago. She [facility] the [facility] on [facility] and hit both of her knees.     Knee pain on L only started after fall. More swelling noted recently.    Falls: Yes    Imaging: [] Xray [] MRI [] CT: Performed on:   FINDINGS:  The medial and lateral compartment joint spaces appear to be relatively well maintained bilaterally.  Minimal degenerative involving the right patellofemoral compartment.  No joint effusion.  No acute fracture or dislocation.  Moderate joint space narrowing and osteophyte formation seen involving the medial compartment of the left knee.       Pain:  Current 1/10 on L, 5/10 on R, worst 8/10 on R, 5/10 on L, best not verbalized/10   Location: [x] Right   [x] Left:  Anterior Knee on R  Description: Stiffness  Aggravating Factors: Time on feet. Driving.  Easing Factors: activity avoidance, rest,  tylenol, topicals    Prior Therapy: Yes  Social History:    Occupation: Pharmacy tech  Prior Level of Function: Limited due to L knee pain  Current Level of Function: Limited due to knee pain    Patients goals: Get back to full function, be able to retire in 5 years     Medical History:   Past Medical History:   Diagnosis Date    Allergic rhinitis     Anxiety     Complex tear of medial meniscus of left knee 5/5/2022    Hypertension     Osteoarthritis of both knees     Prediabetes     Urticaria        Surgical History:   Stacey Wade  has a past surgical history that includes Hernia repair (Left); Colonoscopy (N/A, 01/18/2018); and Chondroplasty of knee (Left, 05/13/2022).    Medications:   Stacey has a current medication list which includes the following prescription(s): albuterol, aspirin, benzonatate, betamethasone dipropionate, butalbital-acetaminophen-caffeine -40 mg, clorazepate, clotrimazole-betamethasone 1-0.05%, diclofenac sodium, doxepin, epinephrine, famotidine, fexofenadine, fluocinonide, hydroxyzine hcl, myrbetriq, mometasone, montelukast, xolair, potassium chloride, rizatriptan, tacrolimus, and triamterene-hydrochlorothiazide 37.5-25 mg.    Allergies:   Review of patient's allergies indicates:   Allergen Reactions    Benzalkonium chloride     Black pepper      Other reaction(s): Hives    Chocolate flavor      Other reaction(s): Hives    Citrus and derivatives Hives     LEMON PRODUCTS    Grapefruit Hives     Other reaction(s): Hives    Ibuprofen Swelling    Latex      Other reaction(s): Hives    Lemon balm (casper officinalis) Hives     Anything with lemon in it    Naproxen Swelling    Neomycin-bacitracin-polymyxin      Other reaction(s): Rash    Oats (richard) Hives     Oat foods (oatmeal, etc...)    Vegetable acetoglycerides Hives     Vegetable gums    Wheat containing prod     Wheat flour Hives        Objective      Observation:   POSTURE:    GAIT:   L knee varus thrust  R knee valgus  R  LATERAL FOOT progression angle      FUNCTIONAL MOVEMENT PATTERNS  Movement Analysis Notes   Squat []Functional  [x]Dysfunctional:  [x]Painful  []Non-Painful    Increased hip hinge, slow movement            Balance:    RIGHT   LEFT   Goal   Single Leg Unable 3s 30 seconds      , ,   Lower extremity myotomes  Neuro Testing Right   Left     L2 (hip flexion) 4/5 4+/5   L3 (knee extension) 5/5 5/5   L4 (ankle DF) 5/5 5/5   L5 (great toe extension) 5/5 5/5   S1 (plantarflexion) 5/5 5/5    ,     RANGE OF MOTION:     Hip AROM/PROM Right Left Pain/Dysfunction with Movement Goal   Hip Flexion (120º) 100 100     Hip Extension (30º)       Hip Abduction (45º)       Hip IR (45º) 30 30     Hip ER (45º) 45 45         Knee AROM/PROM Right Left Pain/Dysfunction with Movement Goal   Knee Flexion (135º) 81 100 (104) Pain    Knee Extension (0º) 1 hyper 3 hyper     Tibial External Rotation (40º}       Tibial Internal Rotation (30º)             STRENGTH:   L/E MMT Right  (spine) Left Pain/Dysfunction with Movement Goal   Hip Extension  3/5 3/5 R anterior knee pain 4+/5 B   Hip Abduction  3+/5 4+/5 B hip pain 4+/5 B   Knee Extension 4/5 4/5 R knee pain 5/5 B   Knee Flexion    5/5 B   Hip IR    4+/5 B   Hip ER    4+/5 B        MUSCLE LENGTH:   Muscle Tested  Right Left  Limitation Goal   Hip Flexors [] Normal  [] Limited [] Normal  [] Limited  Normal B   ITB / TFL [] Normal  [] Limited [] Normal  [] Limited  Normal B   Quadriceps [] Normal  [x] Limited [] Normal  [x] Limited  Normal B   Hamstrings  [] Normal  [] Limited [] Normal  [] Limited  Normal B   Piriformis [] Normal  [] Limited [] Normal  [] Limited  Normal B   Gastrocnemius  [] Normal  [] Limited [] Normal  [] Limited  Normal B         Joint mobility:  Fair hip mobility      SENSATION  [x] Intact to Light Touch   [] Impaired:      PALPATION: Structures: Increased tenderness to palpation of:          Function:     Intake Outcome Measure for FOTO knee Survey    Therapist reviewed  FOTO scores for Stacey Wade on 4/25/2024.   FOTO documents entered into [employer] - see Media section.    Intake Score: 63%         Treatment     Total Treatment time (time-based codes) separate from Evaluation: (15) minutes     Stacey received the treatments listed below:      Intervention Performed  Today Code  (see below chart) Date/Notes  4/24   Manual Therapy   MT     Bike x TE 10 MIN   LAQ  x    HEP   SL hip abduction  x    HEP   Mini squat  x    HEP                                                        10 minutes of Manual therapy (MT) to improve pain and ROM. (86845)  5 minutes of Therapeutic Exercise (TE) to develop strength, endurance, ROM, and flexibility. (99360)  00 minutes of Neuromuscular Re-Education (NR)  to improve: Balance, Coordination, Kinesthetic, Sense, Proprioception, and Posture. (03400)  00 minutes of Therapeutic Activities (TA) to improve functional performance. (16196)  Unattended Electrical Stimulation (ES) for muscle performance and/or pain modulation. (11820)  Vasopneumatic Device Therapy () for management of swelling/edema. (42459)  BFR: Blood flow restriction applied during exercise  NP: Not Performed      Patient Education and Home Exercises     Education provided: (5) minutes  Repetitive joint motion to improve symptoms  Physical therapy diagnosis, prognosis, and plan of care.     Activity Modifications:     Written Home Exercises Provided: yes.  Exercises were reviewed and Stacey was able to demonstrate them prior to the end of the session.  Stacey demonstrated good  understanding of the education provided. See EMR under Patient Instructions for exercises provided during therapy sessions.    Assessment     Stacey is a 58 y.o. female referred to outpatient Physical Therapy with a medical diagnosis of Bilateral primary osteoarthritis of knee [M17.0], Acute pain of right knee [M25.561] . Clinical exam is consistent with referring diagnosis.     Problem List:  Impaired knee  range of motion  Impaired strength  Impaired functional mobility    Pt prognosis is Good  Pt will benefit from skilled outpatient Physical Therapy to address the deficits stated above and in the chart below, provide pt/family education, and to maximize pt's level of independence.     Plan of care discussed with patient: Yes  Pt's spiritual, cultural and educational needs considered and patient is agreeable to the plan of care and goals as stated below:     Anticipated Barriers for therapy: chronicity of condition    Medical Necessity is demonstrated by the following   History  Co-morbidities and personal factors that may impact the plan of care [] LOW: no personal factors / co-morbidities  [x] MODERATE: 1-2 personal factors / co-morbidities  [] HIGH: 3+ personal factors / co-morbidities    Moderate / High Support Documentation: See patient medical history and objective assessment     Examination  Body Structures and Functions, activity limitations and participation restrictions that may impact the plan of care [] LOW: addressing 1-2 elements  [x] MODERATE: 3+ elements  [] HIGH: 4+ elements (please support below)    Moderate / High Support Documentation: See patient medical history and objective assessment     Clinical Presentation [] LOW: stable  [x] MODERATE: Evolving  [] HIGH: Unstable     Decision Making/ Complexity Score: moderate         Short Term Goals:  6 weeks Status  Date Met   PAIN: Pt will report worst pain of 4/10 in order to progress toward max functional ability and improve quality of life. [x] Progressing  [] Met  [] Not Met    FUNCTION: Patient will demonstrate improved function as indicated by a functional score improvement of at least 5 points on FOTO. [x] Progressing  [] Met  [] Not Met    MOBILITY: Patient will improve AROM to 50% of stated goals, listed in objective measures above, in order to progress towards independence with functional activities.  [x] Progressing  [] Met  [] Not Met     STRENGTH: Patient will improve strength to 50% of stated goals, listed in objective measures above, in order to progress towards independence with functional activities. [x] Progressing  [] Met  [] Not Met    POSTURE: Patient will correct postural deviations in sitting and standing, to decrease pain and promote long term stability.  [x] Progressing  [] Met  [] Not Met    GAIT: Patient will demonstrate improved gait mechanics including nonantalgic gait in order to improve functional mobility, improve quality of life, and decrease risk of further injury or fall.  [x] Progressing  [] Met  [] Not Met    HEP: Patient will demonstrate independence with HEP in order to progress toward functional independence. [x] Progressing  [] Met  [] Not Met      Long Term Goals:  12 weeks Status Date Met   PAIN: Pt will report worst pain of 1/10 in order to progress toward max functional ability and improve quality of life [x] Progressing  [] Met  [] Not Met    FUNCTION: Patient will demonstrate improved function as indicated by a FOTO functional score improvement as listed in header. [x] Progressing  [] Met  [] Not Met    MOBILITY: Patient will improve AROM to stated goals, listed in objective measures above, in order to return to maximal functional potential and improve quality of life.  [x] Progressing  [] Met  [] Not Met    STRENGTH: Patient will improve strength to stated goals, listed in objective measures above, in order to improve functional independence and quality of life.  [x] Progressing  [] Met  [] Not Met    GAIT: Patient will demonstrate normalized gait mechanics with minimal compensation in order to return to PLOF. [x] Progressing  [] Met  [] Not Met    Patient will return to normal ADL's, IADL's, community involvement, recreational activities, and work-related activities with less than or equal to 1/10 pain and maximal function.  [x] Progressing  [] Met  [] Not Met      Plan   Plan of care Certification: 4/25/2024 to  7/25/24.    Outpatient Physical Therapy 2 times weekly for 12 weeks to include any combination of the following interventions: virtual visits, dry needling, modalities, electrical stimulation (IFC, Pre-Mod, Attended with Functional Dry Needling), Cervical/Lumbar Traction, Gait Training, Manual Therapy, Neuromuscular Re-ed, Patient Education, Self Care, Therapeutic Exercise, and Therapeutic Activites     Abdi Benton, PT, DPT      I CERTIFY THE NEED FOR THESE SERVICES FURNISHED UNDER THIS PLAN OF TREATMENT AND WHILE UNDER MY CARE   Physician's comments:     Physician's Signature: ___________________________________________________

## 2024-04-29 ENCOUNTER — LAB VISIT (OUTPATIENT)
Dept: LAB | Facility: HOSPITAL | Age: 59
End: 2024-04-29
Attending: FAMILY MEDICINE
Payer: COMMERCIAL

## 2024-04-29 ENCOUNTER — OFFICE VISIT (OUTPATIENT)
Dept: FAMILY MEDICINE | Facility: CLINIC | Age: 59
End: 2024-04-29
Payer: COMMERCIAL

## 2024-04-29 VITALS
BODY MASS INDEX: 36.26 KG/M2 | DIASTOLIC BLOOD PRESSURE: 86 MMHG | SYSTOLIC BLOOD PRESSURE: 118 MMHG | WEIGHT: 197.06 LBS | OXYGEN SATURATION: 98 % | RESPIRATION RATE: 18 BRPM | HEIGHT: 62 IN | HEART RATE: 105 BPM | TEMPERATURE: 99 F

## 2024-04-29 DIAGNOSIS — L50.9 URTICARIA: ICD-10-CM

## 2024-04-29 DIAGNOSIS — R79.89 LOW TSH LEVEL: ICD-10-CM

## 2024-04-29 DIAGNOSIS — R73.03 PREDIABETES: Chronic | ICD-10-CM

## 2024-04-29 DIAGNOSIS — N32.81 OVERACTIVE BLADDER: ICD-10-CM

## 2024-04-29 DIAGNOSIS — Z00.00 PREVENTATIVE HEALTH CARE: Primary | ICD-10-CM

## 2024-04-29 DIAGNOSIS — E66.01 SEVERE OBESITY (BMI 35.0-35.9 WITH COMORBIDITY): ICD-10-CM

## 2024-04-29 DIAGNOSIS — I10 ESSENTIAL HYPERTENSION: Chronic | ICD-10-CM

## 2024-04-29 PROBLEM — E66.811 OBESITY (BMI 30.0-34.9): Status: RESOLVED | Noted: 2022-04-22 | Resolved: 2024-04-29

## 2024-04-29 PROBLEM — E66.812 CLASS 2 SEVERE OBESITY WITH SERIOUS COMORBIDITY AND BODY MASS INDEX (BMI) OF 35.0 TO 35.9 IN ADULT, UNSPECIFIED OBESITY TYPE: Status: RESOLVED | Noted: 2024-03-04 | Resolved: 2024-04-29

## 2024-04-29 PROBLEM — E66.9 OBESITY (BMI 30.0-34.9): Status: RESOLVED | Noted: 2022-04-22 | Resolved: 2024-04-29

## 2024-04-29 LAB — TSH SERPL DL<=0.005 MIU/L-ACNC: 1.11 UIU/ML (ref 0.4–4)

## 2024-04-29 PROCEDURE — 84443 ASSAY THYROID STIM HORMONE: CPT | Performed by: FAMILY MEDICINE

## 2024-04-29 PROCEDURE — 99999 PR PBB SHADOW E&M-EST. PATIENT-LVL III: CPT | Mod: PBBFAC,,, | Performed by: FAMILY MEDICINE

## 2024-04-29 PROCEDURE — 3074F SYST BP LT 130 MM HG: CPT | Mod: CPTII,S$GLB,, | Performed by: FAMILY MEDICINE

## 2024-04-29 PROCEDURE — 3044F HG A1C LEVEL LT 7.0%: CPT | Mod: CPTII,S$GLB,, | Performed by: FAMILY MEDICINE

## 2024-04-29 PROCEDURE — 3008F BODY MASS INDEX DOCD: CPT | Mod: CPTII,S$GLB,, | Performed by: FAMILY MEDICINE

## 2024-04-29 PROCEDURE — 3079F DIAST BP 80-89 MM HG: CPT | Mod: CPTII,S$GLB,, | Performed by: FAMILY MEDICINE

## 2024-04-29 PROCEDURE — 36415 COLL VENOUS BLD VENIPUNCTURE: CPT | Mod: PO | Performed by: FAMILY MEDICINE

## 2024-04-29 PROCEDURE — 99396 PREV VISIT EST AGE 40-64: CPT | Mod: S$GLB,,, | Performed by: FAMILY MEDICINE

## 2024-04-29 RX ORDER — TRIAMTERENE AND HYDROCHLOROTHIAZIDE 37.5; 25 MG/1; MG/1
1 CAPSULE ORAL DAILY
Qty: 90 CAPSULE | Refills: 3 | Status: SHIPPED | OUTPATIENT
Start: 2024-04-29

## 2024-04-29 NOTE — PROGRESS NOTES
CHIEF COMPLAINT:  This is a 58-year-old female here for preventative health exam.     SUBJECTIVE:  Patient is doing well without complaints except for chronic pain in knees right greater than left.  She is status post left knee surgery in May 2022.  She has essential hypertension for which she takes Dyazide daily.  Her blood pressure today is  118/86. She's being followed by breast screening because of fibrocystic disease.  She had nodule in right breast which has improved since she discontinued caffeine.  She continues to take Xolair injections for chronic urticaria.  She also takes Singulair, doxepin, and famotidine.  She is obese with a BMI of 36.05. She takes Mybetriq 25 mg daily for overactive bladder.     Eye exam June 2023.  Mammogram  February 2023.   Pap smear October 2021 due again in October 2026 per GYN.  Colonoscopy January 2018, due again in January 2028.  Tdap October 2016. Flu vaccine  September 2022. COVID-19 vaccine September, October 2021, April, December 2022.      ROS:  GENERAL: Patient denies fever, chills, night sweats. Patient denies weight gain. Patient denies anorexia, fatigue, weakness or swollen glands.  Positive for weight loss.  SKIN: Patient denies rash or hair loss.  HEENT: Patient denies sore throat, ear pain, hearing loss, nasal congestion, or runny nose. Patient denies visual disturbance, eye irritation or discharge.  LUNGS: Patient denies cough, wheeze or hemoptysis.  CARDIOVASCULAR: Patient denies shortness of breath, palpitations, syncope or lower extremity edema.  GI: Patient denies abdominal pain, nausea, vomiting, diarrhea, constipation, blood in stool or melena.  GENITOURINARY: Patient denies pelvic pain, vaginal discharge, itch or odor. Patient denies irregular vaginal bleeding. Patient denies dysuria, frequency, hematuria, nocturia, urgency or incontinence.  BREASTS: Patient denies breast pain, mass or nipple discharge.  MUSCULOSKELETAL: Patient denies joint swelling,  redness or warmth. Positive for ankle and foot pain.  NEUROLOGIC: Patient denies headache, vertigo, paresthesias, weakness in limb, dysarthria, dysphagia or abnormality of gait.  PSYCHIATRIC: Patient denies anxiety, depression, or memory loss.     OBJECTIVE:   GENERAL: Well-developed well-nourished, obese, black female alert and oriented x3, in no acute distress. Memory, judgment and cognition without deficit.  Weight loss of 10 pounds in the last year.  SKIN: Clear without rash. Normal color and tone.  HEENT: Eyes: Clear conjunctivae. Pupils equal reactive to light and accommodation. Ears: Clear TMs. Clear canals. Nose: Without congestion. Pharynx: Without injection or exudates.  NECK: Supple, normal range of motion. No masses, lymphadenopathy or enlarged thyroid. No JVD. Carotids 2+ and equal. No bruits.  LUNGS: Clear to auscultation. Normal respiratory effort.  CARDIOVASCULAR: Regular rhythm, normal S1, S2 without murmur, gallop or rub.  BACK: No CVA or spinal tenderness.  BREASTS: No masses, tenderness or nipple discharge.  ABDOMEN: Normal appearance. Active bowel sounds. Soft, nontender without mass or organomegaly. No rebound or guarding.  EXTREMITIES: Without cyanosis, clubbing or edema. Distal pulses 2+ and equal. Normal range of motion in all extremities. No joint effusion, erythema or warmth. Ankle/foot splints in place.  NEUROLOGIC: Cranial nerves II through XII without deficit. Motor strength equal bilaterally. Sensation normal to touch. Deep tendon reflexes 2+ and equal. Gait without abnormality. No tremor. Negative cerebellar signs.  PELVIC: Deferred to OBGYN.    Recent lab reviewed and acceptable except for suppressed TSH.    ASSESSMENT:  1. Preventative health care    2. Essential hypertension    3. Prediabetes    4. Urticaria    5. Severe obesity (BMI 35.0-35.9 with comorbidity)    6. Overactive bladder    7. Low TSH level      PLAN:  1. Weight reduction. Exercise regularly.  2. Age-appropriate  counseling.  3. Repeat TSH.  4. Mammogram.  5. Refill Dyazide.  6. Follow-up annually.    This note is generated with speech recognition software and is subject to transcription error and sound alike phrases that may be missed by proofreading.

## 2024-04-30 ENCOUNTER — HOSPITAL ENCOUNTER (OUTPATIENT)
Dept: RADIOLOGY | Facility: HOSPITAL | Age: 59
Discharge: HOME | End: 2024-04-30
Attending: NURSE PRACTITIONER
Payer: COMMERCIAL

## 2024-04-30 ENCOUNTER — OFFICE VISIT (OUTPATIENT)
Dept: SURGERY | Facility: CLINIC | Age: 59
End: 2024-04-30
Payer: COMMERCIAL

## 2024-04-30 ENCOUNTER — CLINICAL SUPPORT (OUTPATIENT)
Facility: HOSPITAL | Age: 59
End: 2024-04-30
Payer: COMMERCIAL

## 2024-04-30 ENCOUNTER — TELEPHONE (OUTPATIENT)
Dept: SURGERY | Facility: CLINIC | Age: 59
End: 2024-04-30
Payer: COMMERCIAL

## 2024-04-30 VITALS — BODY MASS INDEX: 36.02 KG/M2 | HEIGHT: 62 IN | WEIGHT: 195.75 LBS | RESPIRATION RATE: 16 BRPM

## 2024-04-30 DIAGNOSIS — Z80.3 FAMILY HISTORY OF BREAST CANCER: ICD-10-CM

## 2024-04-30 DIAGNOSIS — Z91.89 AT HIGH RISK FOR BREAST CANCER: Primary | ICD-10-CM

## 2024-04-30 DIAGNOSIS — R29.898 DECREASED STRENGTH OF LOWER EXTREMITY: ICD-10-CM

## 2024-04-30 DIAGNOSIS — Z71.89 ENCOUNTER FOR MEDICATION COUNSELING: ICD-10-CM

## 2024-04-30 DIAGNOSIS — Z74.09 IMPAIRED MOBILITY AND ACTIVITIES OF DAILY LIVING: Primary | ICD-10-CM

## 2024-04-30 DIAGNOSIS — Z91.89 AT HIGH RISK FOR BREAST CANCER: ICD-10-CM

## 2024-04-30 DIAGNOSIS — Z71.9 HEALTH EDUCATION/COUNSELING: ICD-10-CM

## 2024-04-30 DIAGNOSIS — Z78.9 IMPAIRED MOBILITY AND ACTIVITIES OF DAILY LIVING: Primary | ICD-10-CM

## 2024-04-30 DIAGNOSIS — Z12.31 ENCOUNTER FOR SCREENING MAMMOGRAM FOR BREAST CANCER: ICD-10-CM

## 2024-04-30 PROCEDURE — 77063 BREAST TOMOSYNTHESIS BI: CPT | Mod: 26,,, | Performed by: RADIOLOGY

## 2024-04-30 PROCEDURE — 1159F MED LIST DOCD IN RCRD: CPT | Mod: CPTII,S$GLB,, | Performed by: NURSE PRACTITIONER

## 2024-04-30 PROCEDURE — 77067 SCR MAMMO BI INCL CAD: CPT | Mod: TC

## 2024-04-30 PROCEDURE — 97112 NEUROMUSCULAR REEDUCATION: CPT | Mod: PN | Performed by: PHYSICAL THERAPIST

## 2024-04-30 PROCEDURE — 3044F HG A1C LEVEL LT 7.0%: CPT | Mod: CPTII,S$GLB,, | Performed by: NURSE PRACTITIONER

## 2024-04-30 PROCEDURE — 1160F RVW MEDS BY RX/DR IN RCRD: CPT | Mod: CPTII,S$GLB,, | Performed by: NURSE PRACTITIONER

## 2024-04-30 PROCEDURE — 3008F BODY MASS INDEX DOCD: CPT | Mod: CPTII,S$GLB,, | Performed by: NURSE PRACTITIONER

## 2024-04-30 PROCEDURE — 99203 OFFICE O/P NEW LOW 30 MIN: CPT | Mod: S$GLB,,, | Performed by: NURSE PRACTITIONER

## 2024-04-30 PROCEDURE — 97110 THERAPEUTIC EXERCISES: CPT | Mod: PN | Performed by: PHYSICAL THERAPIST

## 2024-04-30 PROCEDURE — 99999 PR PBB SHADOW E&M-EST. PATIENT-LVL IV: CPT | Mod: PBBFAC,,, | Performed by: NURSE PRACTITIONER

## 2024-04-30 PROCEDURE — 77067 SCR MAMMO BI INCL CAD: CPT | Mod: 26,,, | Performed by: RADIOLOGY

## 2024-04-30 NOTE — PROGRESS NOTES
[facility] OUTPATIENT THERAPY AND WELLNESS   Physical Therapy Treatment Note      Name: Stacey Wade  Clinic Number: [id number]    Therapy Diagnosis:   Encounter Diagnoses   Name Primary?    Impaired mobility and activities of daily living Yes    Decreased strength of lower extremity      Physician: Wes Faulkner MD     Physician Orders: PT Eval and Treat   Medical Diagnosis from Referral: Bilateral primary osteoarthritis of knee [M17.0], Acute pain of right knee [M25.561]   Evaluation Date: 4/25/2024  Authorization Period Expiration: 12/31/24  Plan of Care Expiration: 7/25/24  Progress Note Due: 5/25/24  Visit # / Visits authorized: 1/ 20     Time In: 0903  Time Out: 1000  Total Billable Time: 45 minutes Billing reflects 1:1 direct supervision    MD follow-up:    Precautions: Standard    FOTO:  Goal: 72%  -Intake: 63%  -Status: incomplete  -Discharge: incomplete    Subjective     Pt reports: Knee is feeling better.  She was compliant with home exercise program.  Response to previous treatment: Improving symptoms  Functional change: Gradually improving function    Pain: Minimal/10  Location: bilateral knees     Objective      Objective Measures updated at progress report unless specified otherwise.    Problem List:  Impaired knee range of motion  Impaired strength  Impaired functional mobility  Treatment     Stacey received the treatments listed below:      Intervention Performed  Today Code  (see below chart) Date/Notes  4/30   Manual Therapy   MT     Bike x TE 10 MIN   Bridging x NR 2 X 10 X 5S   LAQ x TE 5# 3 x 15   SLS x NR 3 X 30S   Standing TB hip abduction x NR     Shuttle squat x TE 1.5 bands 3 x 15     30 minutes of Therapeutic Exercise (TE) to develop strength, endurance, ROM, and flexibility. (74792)  00 minutes of Manual therapy (MT) to improve pain and ROM. (42167)  15 minutes of Neuromuscular Re-Education (NMR)  to improve: Balance, Coordination, Kinesthetic, Sense, Proprioception, and Posture.  (11519)  00 minutes of Therapeutic Activities (TA) to improve functional performance. (60668)  Unattended Electrical Stimulation (ES) for muscle performance and/or pain modulation. (64335)  Vasopneumatic Device Therapy () for management of swelling/edema. (76840)  BFR: Blood flow restriction applied during exercise  NP: Not Performed        Patient Education and Home Exercises         Education provided:   Physical therapy diagnosis, prognosis, and plan of care.     Written Home Exercises Provided: Patient instructed to cont prior HEP.  Exercises were reviewed and Stacey was able to demonstrate them prior to the end of the session.  Stacey demonstrated good  understanding of the education provided.     See EMR under Patient Instructions for exercises provided prior visit.    Assessment     4/30- Stacey Wade tolerated PT session well with minimal  complaints of pain or discomfort. Patient continues to have impaired R knee flexion range of motion and tolerance to functional load. Objective findings are improving with range of motion, functional activity performance, and symptoms.  Updated home exercises were not issued during today's visit. Stacey demonstrated good understanding of new exercises and will continue to progress at home until next follow-up.       Stacey Is progressing well towards her goals.   Pt prognosis is Good.     Pt will continue to benefit from skilled outpatient physical therapy to address the deficits listed in the problem list box on initial evaluation, provide pt/family education and to maximize pt's level of independence in the home and community environment.     Pt's spiritual, cultural and educational needs considered and pt agreeable to plan of care and goals.    Anticipated barriers to physical therapy: chronicity of condition     Goals:     Short Term Goals:  6 weeks Status  Date Met   PAIN: Pt will report worst pain of 4/10 in order to progress toward max functional ability  and improve quality of life. [x] Progressing  [] Met  [] Not Met     FUNCTION: Patient will demonstrate improved function as indicated by a functional score improvement of at least 5 points on FOTO. [x] Progressing  [] Met  [] Not Met     MOBILITY: Patient will improve AROM to 50% of stated goals, listed in objective measures above, in order to progress towards independence with functional activities.  [x] Progressing  [] Met  [] Not Met     STRENGTH: Patient will improve strength to 50% of stated goals, listed in objective measures above, in order to progress towards independence with functional activities. [x] Progressing  [] Met  [] Not Met     POSTURE: Patient will correct postural deviations in sitting and standing, to decrease pain and promote long term stability.  [x] Progressing  [] Met  [] Not Met     GAIT: Patient will demonstrate improved gait mechanics including nonantalgic gait in order to improve functional mobility, improve quality of life, and decrease risk of further injury or fall.  [x] Progressing  [] Met  [] Not Met     HEP: Patient will demonstrate independence with HEP in order to progress toward functional independence. [x] Progressing  [] Met  [] Not Met        Long Term Goals:  12 weeks Status Date Met   PAIN: Pt will report worst pain of 1/10 in order to progress toward max functional ability and improve quality of life [x] Progressing  [] Met  [] Not Met     FUNCTION: Patient will demonstrate improved function as indicated by a FOTO functional score improvement as listed in header. [x] Progressing  [] Met  [] Not Met     MOBILITY: Patient will improve AROM to stated goals, listed in objective measures above, in order to return to maximal functional potential and improve quality of life.  [x] Progressing  [] Met  [] Not Met     STRENGTH: Patient will improve strength to stated goals, listed in objective measures above, in order to improve functional independence and quality of life.  [x]  Progressing  [] Met  [] Not Met     GAIT: Patient will demonstrate normalized gait mechanics with minimal compensation in order to return to PLOF. [x] Progressing  [] Met  [] Not Met     Patient will return to normal ADL's, IADL's, community involvement, recreational activities, and work-related activities with less than or equal to 1/10 pain and maximal function.  [x] Progressing  [] Met  [] Not Met          Plan       Continue to progress per protocol and per patient tolerance      Abdi Benton, PT

## 2024-04-30 NOTE — TELEPHONE ENCOUNTER
Tried contacting pt in regards to todays appt. Pt was sched for 4:30, with a scheduled 1:30 mammogram on today. Phone call placed to offer pt sooner time ( 2:30 PM ) after her scheduled mammogram.. no answer. Could not leave msg due to voicemail full. Tried alternate contacts. Will try again later.

## 2024-04-30 NOTE — PROGRESS NOTES
Patient ID: Stacey Wade is a 58 y.o. female.    Chief Complaint: elevated risk for breast cancer f/u    HPI: Patient presents for 6 month f/u breast exam.     Pt has been calculated to be high risk for breast cancer- Her breast cancer risk assessment score of 24.4% was calculated at the time of her annual mammogram 2/15/2-22. June 2018 had linda MRI and was wnl.     Pt has chosen to not have MRI's of the breast due to the expensive copay associated with the test .     Mammogram was performed  2/21/2023- dense breasts and risk score was 24.43%.     Mammo today- results pending and score pending    Has a history of a breast mass noted during her routine gyn exam back in March 2017. Negative right breast imaging with mammo and ultrasound at that time. We have been checking it clinically at 3- 6 mon intervals - had an episode of it increasing increase in size slightly last year. Pt had been using more caffeine than usual. Recommended seeing surgeon and pt wanted to decrease her caffeine to see if it would decrease the size of the area. It did go back to her baseline measurement. Pt denies any change. No new areas of concern.     MRI on 6/12/18 was wnl    Pt not exercising - caring for her mother- has knee and hip pain     Denies any breast changes or concerns- no change in FH    Pt is completely off her hormones      Review of Systems   Constitutional: Negative.  Negative for appetite change and unexpected weight change.   HENT: Negative.     Eyes: Negative.  Negative for visual disturbance.   Respiratory: Negative.  Negative for cough and shortness of breath.    Cardiovascular: Negative.  Negative for chest pain.   Gastrointestinal: Negative.  Negative for abdominal pain and diarrhea.   Endocrine: Negative.    Genitourinary: Negative.  Negative for frequency.   Musculoskeletal: Negative.  Negative for back pain.   Allergic/Immunologic: Negative.    Neurological: Negative.  Negative for headaches.   Hematological:  Negative.  Negative for adenopathy.   Psychiatric/Behavioral: Negative.  The patient is not nervous/anxious.      Breast: pt denies any nipple discharge or palpable mass that she has noted. Has had bilateral nipple tenderness intermittently. She has continued to decrease her cafeine intake.     Current Outpatient Medications   Medication Sig Dispense Refill    albuterol (PROVENTIL/VENTOLIN HFA) 90 mcg/actuation inhaler Inhale 2 puffs into the lungs every 4 to 6 hours as needed for Wheezing. 18 g 0    aspirin (ECOTRIN) 81 MG EC tablet Take 1 tablet by mouth every morning.      betamethasone dipropionate 0.05 % cream APPLY TOPICALLY 2 TIMES DAILY. 45 g 2    butalbital-acetaminophen-caffeine -40 mg (FIORICET, ESGIC) -40 mg per tablet TAKE 1 TABLET BY MOUTH EVERY 6 HOURS AS NEEDED FOR PAIN OR FOR HEADACHE 30 tablet 0    clorazepate (TRANXENE) 3.75 MG Tab TAKE 1 TABLET BY MOUTH EVERY DAY AS NEEDED 30 tablet 0    clotrimazole-betamethasone 1-0.05% (LOTRISONE) cream APPLY TOPICALLY TWICE DAILY AS DIRECTED 45 g 2    diclofenac sodium (VOLTAREN) 1 % Gel Apply 2 g topically 4 (four) times daily. 100 g 2    doxepin (SINEQUAN) 10 MG capsule TAKE TWO CAPSULES BY MOUTH THREE TIMES DAILY 540 capsule 2    EPINEPHrine (EPIPEN) 0.3 mg/0.3 mL AtIn Inject 0.3 mg into the muscle daily as needed.      famotidine (PEPCID) 40 MG tablet Take 0.5 tablets (20 mg total) by mouth 2 (two) times daily. 30 tablet 0    fexofenadine (ALLEGRA) 180 MG tablet Take 1 tablet (180 mg total) by mouth once daily. In AM 30 tablet 0    fluocinonide (LIDEX) 0.05 % ointment Apply topically 2 (two) times daily. Do not use longer than 2 weeks at a time 30 g 1    hydrOXYzine HCL (ATARAX) 25 MG tablet TAKE ONE TABLET BY MOUTH FOUR TIMES DAILY AS NEEDED FOR ITCHING 120 tablet 2    mirabegron (MYRBETRIQ) 25 mg Tb24 ER tablet Take 1 tablet (25 mg total) by mouth once daily. 90 tablet 3    mometasone (NASONEX) 50 mcg/actuation nasal spray INHALE 2 SPRAYS  INTO THE NOSTRIL ONCE A DAY 51 g 3    montelukast (SINGULAIR) 10 mg tablet Take 1 tablet (10 mg total) by mouth once daily. 90 tablet 3    omalizumab (XOLAIR) 150 mg/mL injection Inject 300 mg into the skin.      potassium chloride (KLOR-CON) 10 MEQ TbSR Take 1 tablet (10 mEq total) by mouth once daily. 90 tablet 0    rizatriptan (MAXALT) 10 MG tablet TAKE 1 TABLET BY MOUTH AS NEEDED FOR MIGRAINE. MAY REPEAT IN 2 HOURS. MAX 2 TABS PER 24 HOURS 27 tablet 3    tacrolimus (PROTOPIC) 0.03 % ointment Apply topically 2 (two) times daily.      triamterene-hydrochlorothiazide 37.5-25 mg (DYAZIDE) 37.5-25 mg per capsule Take 1 capsule by mouth once daily. 90 capsule 3     No current facility-administered medications for this visit.       Review of patient's allergies indicates:   Allergen Reactions    Benzalkonium chloride     Black pepper      Other reaction(s): Hives    Chocolate flavor      Other reaction(s): Hives    Citrus and derivatives Hives     LEMON PRODUCTS    Furosemide     Grapefruit      Other reaction(s): Hives    Latex      Other reaction(s): Hives    Lemon balm (casper officinalis) Hives    Neomycin-bacitracin-polymyxin      Other reaction(s): Rash    Oats (richard) Hives     Oat foods (oatmeal, etc...)    Wheat containing prod        Past Medical History:   Diagnosis Date    Allergic rhinitis     Anxiety     Complex tear of medial meniscus of left knee 5/5/2022    Hypertension     Osteoarthritis of both knees     Prediabetes     Urticaria        Past Surgical History:   Procedure Laterality Date    CHONDROPLASTY OF KNEE Left 05/13/2022    Procedure: CHONDROPLASTY, KNEE;  Surgeon: Wes Faulkner MD;  Location: [facility] OR;  Service: Orthopedics;  Laterality: Left;    COLONOSCOPY N/A 01/18/2018    Procedure: COLONOSCOPY;  Surgeon: Yong Smith MD;  Location: [facility] ENDO;  Service: Endoscopy;  Laterality: N/A;    HERNIA REPAIR Left        Family History   Problem Relation Name Age of Onset    Lung cancer  Paternal Grandfather      Ovarian cancer Maternal Grandmother      Hyperlipidemia Mother Pepe Wade     Hypertension Mother Pepe Wade     Breast cancer Mother Pepe Wade 60    Arthritis Mother Pepe Wade     Lung cancer Father      Breast cancer Maternal Aunt  53    Heart failure Other          Great aunt    Prostate cancer Brother Angus     Brain cancer Sister      Diabetes Neg Hx      Pseudochol deficiency Neg Hx      Malignant hyperthermia Neg Hx         Social History     Socioeconomic History    Marital status: Single   Occupational History    Occupation: Pharmacy Tech     Comment: [employer]   Tobacco Use    Smoking status: Never    Smokeless tobacco: Never   Substance and Sexual Activity    Alcohol use: No    Drug use: No    Sexual activity: Not Currently     Partners: Male     Birth control/protection: None   Social History Narrative    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       Vitals:    04/30/24 1354   Resp: 16       Chaperone present for exam  Cheryl Harris MA    Physical Exam  Vitals reviewed.   Constitutional:       Appearance: Normal appearance. She is well-developed.   HENT:      Head: Normocephalic and atraumatic.      Right Ear: External ear normal.      Left Ear: External ear normal.      Mouth/Throat:      Pharynx: No oropharyngeal exudate.   Eyes:      General: No scleral icterus.        Right eye: No discharge.         Left eye: No discharge.      Conjunctiva/sclera: Conjunctivae normal.      Pupils: Pupils are equal, round, and reactive to light.   Neck:      Thyroid: No thyromegaly.   Chest:   Breasts:     Right: No inverted nipple, mass, nipple discharge, skin change or tenderness.      Left: No inverted nipple, mass, nipple discharge, skin change or tenderness.   Musculoskeletal:         General: Normal range of motion.      Cervical back: Normal range of motion and neck supple.    Lymphadenopathy:      Head:      Right side of head: No submental, submandibular, tonsillar, preauricular, posterior auricular or occipital adenopathy.      Left side of head: No submental, submandibular, tonsillar, preauricular, posterior auricular or occipital adenopathy.      Cervical: No cervical adenopathy.      Right cervical: No superficial or posterior cervical adenopathy.     Left cervical: No superficial or posterior cervical adenopathy.      Upper Body:      Right upper body: No supraclavicular or axillary adenopathy.      Left upper body: No supraclavicular or axillary adenopathy.   Skin:     General: Skin is warm and dry.      Coloration: Skin is not pale.      Findings: No erythema or rash (fine barely visible papules over chin. No other areas noted).   Neurological:      General: No focal deficit present.      Mental Status: She is alert and oriented to person, place, and time.   Psychiatric:         Mood and Affect: Mood normal.         Behavior: Behavior normal.         Thought Content: Thought content normal.         Judgment: Judgment normal.       IMAGIN2023- mammogram - risk score is 24.43% - [facility]    Mammo today- results pending-risk score not calculated yet    Menarche at 18 y/o      no history of radiation to the neck or chest wall.       FH: Maternal aunt breast cancer at 49 y/o, Maternal GM ovarian cancer, Father lung and prostate cancer, Brother - prostate cancer    Assessment & Plan:  1. Area of prominent glandular tissue noted at the 3 oclock location of the right breast. Fibrous texture and blends more medially and laterally- stable measurements today  2. Mammogram today- results pending- will message pt through portal regarding f/u recommendations- if > 20% will see pt in October for exam and if < 20% will see pt yearly for mammo and exam  3. BSE recommended monthly- call for any changes.    4. Encouraged to get back into exercise and wt loss- pt agrees  5. Discussed  genetic testing - pt declines at this time  6. Pt declines tamoxifen use for chemoprevention due to potential side effects

## 2024-05-02 ENCOUNTER — CLINICAL SUPPORT (OUTPATIENT)
Facility: HOSPITAL | Age: 59
End: 2024-05-02
Payer: COMMERCIAL

## 2024-05-02 DIAGNOSIS — Z74.09 IMPAIRED MOBILITY AND ACTIVITIES OF DAILY LIVING: Primary | ICD-10-CM

## 2024-05-02 DIAGNOSIS — R29.898 DECREASED STRENGTH OF LOWER EXTREMITY: ICD-10-CM

## 2024-05-02 DIAGNOSIS — Z78.9 IMPAIRED MOBILITY AND ACTIVITIES OF DAILY LIVING: Primary | ICD-10-CM

## 2024-05-02 PROCEDURE — 97140 MANUAL THERAPY 1/> REGIONS: CPT | Mod: PN | Performed by: PHYSICAL THERAPIST

## 2024-05-02 PROCEDURE — 97110 THERAPEUTIC EXERCISES: CPT | Mod: PN | Performed by: PHYSICAL THERAPIST

## 2024-05-02 PROCEDURE — 97112 NEUROMUSCULAR REEDUCATION: CPT | Mod: PN | Performed by: PHYSICAL THERAPIST

## 2024-05-02 NOTE — PROGRESS NOTES
[facility]   Physical Therapy Treatment Note      Name: Stacey Wade  Clinic Number: [id number]    Therapy Diagnosis:   No diagnosis found.    Physician: Wes Faulkner MD     Physician Orders: PT Eval and Treat   Medical Diagnosis from Referral: Bilateral primary osteoarthritis of knee [M17.0], Acute pain of right knee [M25.561]   Evaluation Date: 4/25/2024  Authorization Period Expiration: 12/31/24  Plan of Care Expiration: 7/25/24  Progress Note Due: 5/25/24  Visit # / Visits authorized: 2/ 20     Time In: 0806  Time Out: 0855  Total Billable Time: 43 minutes Billing reflects 1:1 direct supervision    MD follow-up:    Precautions: Standard    FOTO:  Goal: 72%  -Intake: 63%  -Status: incomplete  -Discharge: incomplete    Subjective     Pt reports: 5/2- She was a little sore later in the day and the day after her last therapy session.  She was compliant with home exercise program.  Response to previous treatment: Improving symptoms  Functional change: Gradually improving function    Pain: Minimal/10  Location: bilateral knees     Objective      Objective Measures updated at progress report unless specified otherwise.    Problem List:  Impaired knee range of motion  Impaired strength  Impaired functional mobility    Treatment     Stacey received the treatments listed below:      Intervention Performed  Today Code  (see below chart) Date/Notes  5/2   Manual Therapy  x MT  Flexion mbos   Bike x TE 10 MIN   Shuttle squat x TE 1 bands 5 min   Bridging x NR 3 X 10 X 5S   LAQ x TE 5# 3 x 15   SLS x NR 3 X 30S   Standing TB hip abduction x NR YTB 2 x 15       20 minutes of Therapeutic Exercise (TE) to develop strength, endurance, ROM, and flexibility. (59373)  08 minutes of Manual therapy (MT) to improve pain and ROM. (07846)  15 minutes of Neuromuscular Re-Education (NMR)  to improve: Balance, Coordination, Kinesthetic, Sense, Proprioception, and Posture. (21298)  00 minutes of  Therapeutic Activities (TA) to improve functional performance. (93009)  Unattended Electrical Stimulation (ES) for muscle performance and/or pain modulation. (82639)  Vasopneumatic Device Therapy () for management of swelling/edema. (23255)  BFR: Blood flow restriction applied during exercise  NP: Not Performed    Patient Education and Home Exercises         Education provided:   Physical therapy diagnosis, prognosis, and plan of care.     Written Home Exercises Provided: Patient instructed to cont prior HEP.  Exercises were reviewed and Stacey was able to demonstrate them prior to the end of the session.  Stacey demonstrated good  understanding of the education provided.     See EMR under Patient Instructions for exercises provided prior visit.    Assessment     5/2- Stacey Wade tolerated PT session well with moderate  complaints of pain or discomfort that were improved throughout her session. Patient continues to have knee joint irritability. Objective findings are improving with range of motion and functional activity performance.  Updated home exercises were not issued during today's visit. Stacey demonstrated good understanding of new exercises and will continue to progress at home until next follow-up.         Stacey Is progressing well towards her goals.   Pt prognosis is Good.     Pt will continue to benefit from skilled outpatient physical therapy to address the deficits listed in the problem list box on initial evaluation, provide pt/family education and to maximize pt's level of independence in the home and community environment.     Pt's spiritual, cultural and educational needs considered and pt agreeable to plan of care and goals.    Anticipated barriers to physical therapy: chronicity of condition     Goals:     Short Term Goals:  6 weeks Status  Date Met   PAIN: Pt will report worst pain of 4/10 in order to progress toward max functional ability and improve quality of life. [x]  Progressing  [] Met  [] Not Met     FUNCTION: Patient will demonstrate improved function as indicated by a functional score improvement of at least 5 points on FOTO. [x] Progressing  [] Met  [] Not Met     MOBILITY: Patient will improve AROM to 50% of stated goals, listed in objective measures above, in order to progress towards independence with functional activities.  [x] Progressing  [] Met  [] Not Met     STRENGTH: Patient will improve strength to 50% of stated goals, listed in objective measures above, in order to progress towards independence with functional activities. [x] Progressing  [] Met  [] Not Met     POSTURE: Patient will correct postural deviations in sitting and standing, to decrease pain and promote long term stability.  [x] Progressing  [] Met  [] Not Met     GAIT: Patient will demonstrate improved gait mechanics including nonantalgic gait in order to improve functional mobility, improve quality of life, and decrease risk of further injury or fall.  [x] Progressing  [] Met  [] Not Met     HEP: Patient will demonstrate independence with HEP in order to progress toward functional independence. [x] Progressing  [] Met  [] Not Met        Long Term Goals:  12 weeks Status Date Met   PAIN: Pt will report worst pain of 1/10 in order to progress toward max functional ability and improve quality of life [x] Progressing  [] Met  [] Not Met     FUNCTION: Patient will demonstrate improved function as indicated by a FOTO functional score improvement as listed in header. [x] Progressing  [] Met  [] Not Met     MOBILITY: Patient will improve AROM to stated goals, listed in objective measures above, in order to return to maximal functional potential and improve quality of life.  [x] Progressing  [] Met  [] Not Met     STRENGTH: Patient will improve strength to stated goals, listed in objective measures above, in order to improve functional independence and quality of life.  [x] Progressing  [] Met  [] Not Met      GAIT: Patient will demonstrate normalized gait mechanics with minimal compensation in order to return to PLOF. [x] Progressing  [] Met  [] Not Met     Patient will return to normal ADL's, IADL's, community involvement, recreational activities, and work-related activities with less than or equal to 1/10 pain and maximal function.  [x] Progressing  [] Met  [] Not Met          Plan       Continue to progress per protocol and per patient tolerance      Abdi Benton, PT

## 2024-05-07 ENCOUNTER — CLINICAL SUPPORT (OUTPATIENT)
Facility: HOSPITAL | Age: 59
End: 2024-05-07
Payer: COMMERCIAL

## 2024-05-07 DIAGNOSIS — Z74.09 IMPAIRED MOBILITY AND ACTIVITIES OF DAILY LIVING: Primary | ICD-10-CM

## 2024-05-07 DIAGNOSIS — Z78.9 IMPAIRED MOBILITY AND ACTIVITIES OF DAILY LIVING: Primary | ICD-10-CM

## 2024-05-07 DIAGNOSIS — R29.898 DECREASED STRENGTH OF LOWER EXTREMITY: ICD-10-CM

## 2024-05-07 PROCEDURE — 97110 THERAPEUTIC EXERCISES: CPT | Mod: PN | Performed by: PHYSICAL THERAPIST

## 2024-05-07 PROCEDURE — 97112 NEUROMUSCULAR REEDUCATION: CPT | Mod: PN | Performed by: PHYSICAL THERAPIST

## 2024-05-07 NOTE — PROGRESS NOTES
"[facility]   Physical Therapy Treatment Note      Name: Stacey Wade  Clinic Number: [id number]    Therapy Diagnosis:   Encounter Diagnoses   Name Primary?    Impaired mobility and activities of daily living Yes    Decreased strength of lower extremity        Physician: Wes Faulkner MD     Physician Orders: PT Eval and Treat   Medical Diagnosis from Referral: Bilateral primary osteoarthritis of knee [M17.0], Acute pain of right knee [M25.561]   Evaluation Date: 4/25/2024  Authorization Period Expiration: 12/31/24  Plan of Care Expiration: 7/25/24  Progress Note Due: 5/25/24  Visit # / Visits authorized: 3/ 20     Time In: 0805  Time Out: 0902  Total Billable Time: 50 minutes Billing reflects 1:1 direct supervision    MD follow-up:    Precautions: Standard    FOTO:  Goal: 72%  -Intake: 63%  -Status: incomplete  -Discharge: incomplete    Subjective     Pt reports: 5/7- Knees are feeling pretty good, she feels some discomfort in her knees when riding the bike.  She was compliant with home exercise program.  Response to previous treatment: Improving symptoms  Functional change: Gradually improving function    Pain: Minimal/10  Location: bilateral knees     Objective      Objective Measures updated at progress report unless specified otherwise.    Problem List:  Impaired knee range of motion  Impaired strength  Impaired functional mobility    Treatment     Stacey received the treatments listed below:      Intervention Performed  Today Code  (see below chart) Date/Notes  5/7   Manual Therapy x MT Knee flexion mobs x 5 min   Bike x TE 10 MIN   Step ups  x NR 4" 3 x 10 ea   Squat to box x NR 24" 3 x 10   Bridging x NR 3 X 10 X 5S   LAQ x TE 5# 3 x 15   SLS x NR 3 X 30S   Standing TB hip abduction x NR YTB 2 x 15   Heel raises X TE 3 x 10   Knee extensions X TE Dbl 30# 3 x 10     20 minutes of Therapeutic Exercise (TE) to develop strength, endurance, ROM, and flexibility. (52232)  5 " minutes of Manual therapy (MT) to improve pain and ROM. (55740)  25 minutes of Neuromuscular Re-Education (NMR)  to improve: Balance, Coordination, Kinesthetic, Sense, Proprioception, and Posture. (00923)  00 minutes of Therapeutic Activities (TA) to improve functional performance. (27531)  Unattended Electrical Stimulation (ES) for muscle performance and/or pain modulation. (66600)  Vasopneumatic Device Therapy () for management of swelling/edema. (32159)  BFR: Blood flow restriction applied during exercise  NP: Not Performed    Patient Education and Home Exercises         Education provided:   Physical therapy diagnosis, prognosis, and plan of care.     Written Home Exercises Provided: Patient instructed to cont prior HEP.  Exercises were reviewed and Stacey was able to demonstrate them prior to the end of the session.  Stacey demonstrated good  understanding of the education provided.     See EMR under Patient Instructions for exercises provided prior visit.    Assessment     5/7- Stacey Wade tolerated PT session well with minimal  complaints of pain or discomfort. Patient continues to have mild knee discomfort but is improving in tolerance to load with exercises. Objective findings are improving with functional activity performance and symptoms.  Updated home exercises were not issued during today's visit. Stacey demonstrated good understanding of new exercises and will continue to progress at home until next follow-up.         Stacey Is progressing well towards her goals.   Pt prognosis is Good.     Pt will continue to benefit from skilled outpatient physical therapy to address the deficits listed in the problem list box on initial evaluation, provide pt/family education and to maximize pt's level of independence in the home and community environment.     Pt's spiritual, cultural and educational needs considered and pt agreeable to plan of care and goals.    Anticipated barriers to physical therapy:  chronicity of condition     Goals:     Short Term Goals:  6 weeks Status  Date Met   PAIN: Pt will report worst pain of 4/10 in order to progress toward max functional ability and improve quality of life. [x] Progressing  [] Met  [] Not Met     FUNCTION: Patient will demonstrate improved function as indicated by a functional score improvement of at least 5 points on FOTO. [x] Progressing  [] Met  [] Not Met     MOBILITY: Patient will improve AROM to 50% of stated goals, listed in objective measures above, in order to progress towards independence with functional activities.  [x] Progressing  [] Met  [] Not Met     STRENGTH: Patient will improve strength to 50% of stated goals, listed in objective measures above, in order to progress towards independence with functional activities. [x] Progressing  [] Met  [] Not Met     POSTURE: Patient will correct postural deviations in sitting and standing, to decrease pain and promote long term stability.  [x] Progressing  [] Met  [] Not Met     GAIT: Patient will demonstrate improved gait mechanics including nonantalgic gait in order to improve functional mobility, improve quality of life, and decrease risk of further injury or fall.  [x] Progressing  [] Met  [] Not Met     HEP: Patient will demonstrate independence with HEP in order to progress toward functional independence. [x] Progressing  [] Met  [] Not Met        Long Term Goals:  12 weeks Status Date Met   PAIN: Pt will report worst pain of 1/10 in order to progress toward max functional ability and improve quality of life [x] Progressing  [] Met  [] Not Met     FUNCTION: Patient will demonstrate improved function as indicated by a FOTO functional score improvement as listed in header. [x] Progressing  [] Met  [] Not Met     MOBILITY: Patient will improve AROM to stated goals, listed in objective measures above, in order to return to maximal functional potential and improve quality of life.  [x] Progressing  [] Met  []  Not Met     STRENGTH: Patient will improve strength to stated goals, listed in objective measures above, in order to improve functional independence and quality of life.  [x] Progressing  [] Met  [] Not Met     GAIT: Patient will demonstrate normalized gait mechanics with minimal compensation in order to return to PLOF. [x] Progressing  [] Met  [] Not Met     Patient will return to normal ADL's, IADL's, community involvement, recreational activities, and work-related activities with less than or equal to 1/10 pain and maximal function.  [x] Progressing  [] Met  [] Not Met          Plan       Continue to progress per protocol and per patient tolerance      Abdi Benton, PT         09-Jun-2017 09:42

## 2024-05-09 ENCOUNTER — CLINICAL SUPPORT (OUTPATIENT)
Facility: HOSPITAL | Age: 59
End: 2024-05-09
Payer: COMMERCIAL

## 2024-05-09 DIAGNOSIS — R29.898 DECREASED STRENGTH OF LOWER EXTREMITY: ICD-10-CM

## 2024-05-09 DIAGNOSIS — Z78.9 IMPAIRED MOBILITY AND ACTIVITIES OF DAILY LIVING: Primary | ICD-10-CM

## 2024-05-09 DIAGNOSIS — Z74.09 IMPAIRED MOBILITY AND ACTIVITIES OF DAILY LIVING: Primary | ICD-10-CM

## 2024-05-09 PROCEDURE — 97112 NEUROMUSCULAR REEDUCATION: CPT | Mod: PN | Performed by: PHYSICAL THERAPIST

## 2024-05-09 PROCEDURE — 97110 THERAPEUTIC EXERCISES: CPT | Mod: PN | Performed by: PHYSICAL THERAPIST

## 2024-05-09 NOTE — PROGRESS NOTES
"[facility]   Physical Therapy Treatment Note      Name: Stacey Wade  Clinic Number: [id number]    Therapy Diagnosis:   Encounter Diagnoses   Name Primary?    Impaired mobility and activities of daily living Yes    Decreased strength of lower extremity        Physician: Wes Faulkner MD     Physician Orders: PT Eval and Treat   Medical Diagnosis from Referral: Bilateral primary osteoarthritis of knee [M17.0], Acute pain of right knee [M25.561]   Evaluation Date: 4/25/2024  Authorization Period Expiration: 12/31/24  Plan of Care Expiration: 7/25/24  Progress Note Due: 5/25/24  Visit # / Visits authorized: 4/ 20     Time In: 0810  Time Out: 0915  Total Billable Time: 56 minutes Billing reflects 1:1 direct supervision    MD follow-up:    Precautions: Standard    FOTO:  Goal: 72%  -Intake: 63%  -Status: incomplete  -Discharge: incomplete    Subjective     Pt reports: 5/9- She felt good after last time.  She was compliant with home exercise program.  Response to previous treatment: Improving symptoms  Functional change: Gradually improving function    Pain: Minimal/10  Location: bilateral knees     Objective      Objective Measures updated at progress report unless specified otherwise.    Problem List:  Impaired knee range of motion  Impaired strength  Impaired functional mobility    Treatment     Stacey received the treatments listed below:      Intervention Performed  Today Code  (see below chart) Date/Notes  5/10   Manual Therapy x MT Knee flexion mobs x 5 min   Bike x TE 10 MIN   Bridging x NR 3 X 10 X 5S   Standing TB hip abduction x NR YTB 2 x 15   Step ups  x NR 6" 3 x 10 ea   Squat to box x NR 24" 3 x 10   SLS x NR 3 X 30S   Heel raises x TE 3 x 10   Knee extensions x TE Dbl 30# 3 x 10   Shuttle squat   TE 2.0 bands 3 x 15 min     23 minutes of Therapeutic Exercise (TE) to develop strength, endurance, ROM, and flexibility. (62113)  5 minutes of Manual therapy (MT) to improve " pain and ROM. (07277)  28 minutes of Neuromuscular Re-Education (NMR)  to improve: Balance, Coordination, Kinesthetic, Sense, Proprioception, and Posture. (57093)  00 minutes of Therapeutic Activities (TA) to improve functional performance. (21976)  Unattended Electrical Stimulation (ES) for muscle performance and/or pain modulation. (64975)  Vasopneumatic Device Therapy () for management of swelling/edema. (18395)  BFR: Blood flow restriction applied during exercise  NP: Not Performed    Patient Education and Home Exercises         Education provided:   Physical therapy diagnosis, prognosis, and plan of care.     Written Home Exercises Provided: Patient instructed to cont prior HEP.  Exercises were reviewed and Stacey was able to demonstrate them prior to the end of the session.  Stacey demonstrated good  understanding of the education provided.     See EMR under Patient Instructions for exercises provided prior visit.    Assessment     5/9- Stacey Wade tolerated PT session well with minimal  complaints of pain or discomfort. Breaks were needed during session today due to fatigue, but she did well. Patient continues to have impaired strength and LE related function. Objective findings are improving with range of motion, functional strength, and symptoms.  Updated home exercises were not issued during today's visit. Stacey demonstrated good understanding of new exercises and will continue to progress at home until next follow-up.           Stacey Is progressing well towards her goals.   Pt prognosis is Good.     Pt will continue to benefit from skilled outpatient physical therapy to address the deficits listed in the problem list box on initial evaluation, provide pt/family education and to maximize pt's level of independence in the home and community environment.     Pt's spiritual, cultural and educational needs considered and pt agreeable to plan of care and goals.    Anticipated barriers to physical  therapy: chronicity of condition     Goals:     Short Term Goals:  6 weeks Status  Date Met   PAIN: Pt will report worst pain of 4/10 in order to progress toward max functional ability and improve quality of life. [x] Progressing  [] Met  [] Not Met     FUNCTION: Patient will demonstrate improved function as indicated by a functional score improvement of at least 5 points on FOTO. [x] Progressing  [] Met  [] Not Met     MOBILITY: Patient will improve AROM to 50% of stated goals, listed in objective measures above, in order to progress towards independence with functional activities.  [x] Progressing  [] Met  [] Not Met     STRENGTH: Patient will improve strength to 50% of stated goals, listed in objective measures above, in order to progress towards independence with functional activities. [x] Progressing  [] Met  [] Not Met     POSTURE: Patient will correct postural deviations in sitting and standing, to decrease pain and promote long term stability.  [x] Progressing  [] Met  [] Not Met     GAIT: Patient will demonstrate improved gait mechanics including nonantalgic gait in order to improve functional mobility, improve quality of life, and decrease risk of further injury or fall.  [x] Progressing  [] Met  [] Not Met     HEP: Patient will demonstrate independence with HEP in order to progress toward functional independence. [x] Progressing  [] Met  [] Not Met        Long Term Goals:  12 weeks Status Date Met   PAIN: Pt will report worst pain of 1/10 in order to progress toward max functional ability and improve quality of life [x] Progressing  [] Met  [] Not Met     FUNCTION: Patient will demonstrate improved function as indicated by a FOTO functional score improvement as listed in header. [x] Progressing  [] Met  [] Not Met     MOBILITY: Patient will improve AROM to stated goals, listed in objective measures above, in order to return to maximal functional potential and improve quality of life.  [x] Progressing  []  Met  [] Not Met     STRENGTH: Patient will improve strength to stated goals, listed in objective measures above, in order to improve functional independence and quality of life.  [x] Progressing  [] Met  [] Not Met     GAIT: Patient will demonstrate normalized gait mechanics with minimal compensation in order to return to PLOF. [x] Progressing  [] Met  [] Not Met     Patient will return to normal ADL's, IADL's, community involvement, recreational activities, and work-related activities with less than or equal to 1/10 pain and maximal function.  [x] Progressing  [] Met  [] Not Met          Plan       Continue to progress per protocol and per patient tolerance      Abdi Benton, PT

## 2024-05-14 DIAGNOSIS — M25.561 PAIN IN BOTH KNEES, UNSPECIFIED CHRONICITY: Primary | ICD-10-CM

## 2024-05-14 DIAGNOSIS — M25.562 PAIN IN BOTH KNEES, UNSPECIFIED CHRONICITY: Primary | ICD-10-CM

## 2024-05-17 ENCOUNTER — PATIENT MESSAGE (OUTPATIENT)
Dept: SPORTS MEDICINE | Facility: CLINIC | Age: 59
End: 2024-05-17
Payer: COMMERCIAL

## 2024-05-21 ENCOUNTER — CLINICAL SUPPORT (OUTPATIENT)
Facility: HOSPITAL | Age: 59
End: 2024-05-21
Payer: COMMERCIAL

## 2024-05-21 DIAGNOSIS — Z74.09 IMPAIRED MOBILITY AND ACTIVITIES OF DAILY LIVING: Primary | ICD-10-CM

## 2024-05-21 DIAGNOSIS — R29.898 DECREASED STRENGTH OF LOWER EXTREMITY: ICD-10-CM

## 2024-05-21 DIAGNOSIS — Z78.9 IMPAIRED MOBILITY AND ACTIVITIES OF DAILY LIVING: Primary | ICD-10-CM

## 2024-05-21 PROCEDURE — 97112 NEUROMUSCULAR REEDUCATION: CPT | Mod: PN | Performed by: PHYSICAL THERAPIST

## 2024-05-21 PROCEDURE — 97110 THERAPEUTIC EXERCISES: CPT | Mod: PN | Performed by: PHYSICAL THERAPIST

## 2024-05-21 NOTE — PROGRESS NOTES
"[facility]   Physical Therapy Treatment Note      Name: Stacey Wade  Clinic Number: [id number]    Therapy Diagnosis:   Encounter Diagnoses   Name Primary?    Impaired mobility and activities of daily living Yes    Decreased strength of lower extremity        Physician: Wes Faulkner MD     Physician Orders: PT Eval and Treat   Medical Diagnosis from Referral: Bilateral primary osteoarthritis of knee [M17.0], Acute pain of right knee [M25.561]   Evaluation Date: 2024  Authorization Period Expiration: 24  Plan of Care Expiration: 24  Progress Note Due: 24  Visit # / Visits authorized:      Time In: 804  Time Out: 906  Total Billable Time: 56 minutes Billing reflects 1:1 direct supervision    MD follow-up:    Precautions: Standard    FOTO:  Goal: 72%  -Intake: 63%  -Status: incomplete  -Discharge: incomplete    Subjective     Pt reports: - The knees have been feeling good recently. She was not able to make it to therapy last week because her car .  She was compliant with home exercise program.  Response to previous treatment: Improving symptoms  Functional change: Gradually improving function    Pain: Minimal/10  Location: bilateral knees     Objective      Objective Measures updated at progress report unless specified otherwise.    Problem List:  Impaired knee range of motion  Impaired strength  Impaired functional mobility    Treatment     Stacey received the treatments listed below:      Code  (see below chart) Intervention Date/Notes     MT Manual Therapy Knee flexion mobs x 5 min   TE Bike 10 MIN   TE Dynamic stretch Knee hugs  Hamstring sweeps  Gastroc stretch   NR Bridging With 15# DB 3 X 10 X 5S   NR SLS 3 X 30S   NR Squat to box 10# KB 24" 3 x 10   NR Standing TB hip abduction YTB 2 x 15   NR Step ups  6" 3 x 10 ea   TE Heel raises 3 x 15   TE Knee extensions Dbl 35# 3 x 10   TE Hamstring curl Dbl 30# 3 x 10     25 minutes of " Therapeutic Exercise (TE) to develop strength, endurance, ROM, and flexibility. (07862)  05 minutes of Manual therapy (MT) to improve pain and ROM. (32371)  26 minutes of Neuromuscular Re-Education (NMR)  to improve: Balance, Coordination, Kinesthetic, Sense, Proprioception, and Posture. (46587)  00 minutes of Therapeutic Activities (TA) to improve functional performance. (09436)  Unattended Electrical Stimulation (ES) for muscle performance and/or pain modulation. (65093)  Vasopneumatic Device Therapy () for management of swelling/edema. (41600)  BFR: Blood flow restriction applied during exercise  NP: Not Performed    Patient Education and Home Exercises         Education provided:   Physical therapy diagnosis, prognosis, and plan of care.     Written Home Exercises Provided: Patient instructed to cont prior HEP.  Exercises were reviewed and Stacey was able to demonstrate them prior to the end of the session.  Stacey demonstrated good  understanding of the education provided.     See EMR under Patient Instructions for exercises provided prior visit.    Assessment     5/21- Patient continues to have mild irritability in her knees. Some discomfort when doing dynamic stretches due to impairment in gate stability. Good tolerance to progression of exercises today.      Stacey Is progressing well towards her goals.   Pt prognosis is Good.     Pt will continue to benefit from skilled outpatient physical therapy to address the deficits listed in the problem list box on initial evaluation, provide pt/family education and to maximize pt's level of independence in the home and community environment.     Pt's spiritual, cultural and educational needs considered and pt agreeable to plan of care and goals.    Anticipated barriers to physical therapy: chronicity of condition     Goals:     Short Term Goals:  6 weeks Status  Date Met   PAIN: Pt will report worst pain of 4/10 in order to progress toward max functional ability  and improve quality of life. [x] Progressing  [] Met  [] Not Met     FUNCTION: Patient will demonstrate improved function as indicated by a functional score improvement of at least 5 points on FOTO. [x] Progressing  [] Met  [] Not Met     MOBILITY: Patient will improve AROM to 50% of stated goals, listed in objective measures above, in order to progress towards independence with functional activities.  [x] Progressing  [] Met  [] Not Met     STRENGTH: Patient will improve strength to 50% of stated goals, listed in objective measures above, in order to progress towards independence with functional activities. [x] Progressing  [] Met  [] Not Met     POSTURE: Patient will correct postural deviations in sitting and standing, to decrease pain and promote long term stability.  [x] Progressing  [] Met  [] Not Met     GAIT: Patient will demonstrate improved gait mechanics including nonantalgic gait in order to improve functional mobility, improve quality of life, and decrease risk of further injury or fall.  [x] Progressing  [] Met  [] Not Met     HEP: Patient will demonstrate independence with HEP in order to progress toward functional independence. [x] Progressing  [] Met  [] Not Met        Long Term Goals:  12 weeks Status Date Met   PAIN: Pt will report worst pain of 1/10 in order to progress toward max functional ability and improve quality of life [x] Progressing  [] Met  [] Not Met     FUNCTION: Patient will demonstrate improved function as indicated by a FOTO functional score improvement as listed in header. [x] Progressing  [] Met  [] Not Met     MOBILITY: Patient will improve AROM to stated goals, listed in objective measures above, in order to return to maximal functional potential and improve quality of life.  [x] Progressing  [] Met  [] Not Met     STRENGTH: Patient will improve strength to stated goals, listed in objective measures above, in order to improve functional independence and quality of life.  [x]  Progressing  [] Met  [] Not Met     GAIT: Patient will demonstrate normalized gait mechanics with minimal compensation in order to return to PLOF. [x] Progressing  [] Met  [] Not Met     Patient will return to normal ADL's, IADL's, community involvement, recreational activities, and work-related activities with less than or equal to 1/10 pain and maximal function.  [x] Progressing  [] Met  [] Not Met          Plan       Continue to progress per protocol and per patient tolerance      Abdi Benton, PT

## 2024-05-22 ENCOUNTER — PATIENT MESSAGE (OUTPATIENT)
Dept: FAMILY MEDICINE | Facility: CLINIC | Age: 59
End: 2024-05-22
Payer: COMMERCIAL

## 2024-05-24 ENCOUNTER — CLINICAL SUPPORT (OUTPATIENT)
Facility: HOSPITAL | Age: 59
End: 2024-05-24
Payer: COMMERCIAL

## 2024-05-24 DIAGNOSIS — R29.898 DECREASED STRENGTH OF LOWER EXTREMITY: ICD-10-CM

## 2024-05-24 DIAGNOSIS — Z74.09 IMPAIRED MOBILITY AND ACTIVITIES OF DAILY LIVING: Primary | ICD-10-CM

## 2024-05-24 DIAGNOSIS — Z78.9 IMPAIRED MOBILITY AND ACTIVITIES OF DAILY LIVING: Primary | ICD-10-CM

## 2024-05-24 PROCEDURE — 97110 THERAPEUTIC EXERCISES: CPT | Mod: PN | Performed by: PHYSICAL THERAPIST

## 2024-05-24 NOTE — PROGRESS NOTES
[facility]   Physical Therapy Treatment Note      Name: Stacey Wade  Clinic Number: [id number]    Therapy Diagnosis:   Encounter Diagnoses   Name Primary?    Impaired mobility and activities of daily living Yes    Decreased strength of lower extremity        Physician: Wes Faulkner MD     Physician Orders: PT Eval and Treat   Medical Diagnosis from Referral: Bilateral primary osteoarthritis of knee [M17.0], Acute pain of right knee [M25.561]   Evaluation Date: 4/25/2024  Authorization Period Expiration: 12/31/24  Plan of Care Expiration: 7/25/24  Progress Note Due: 6/24/24  Visit # / Visits authorized: 6/ 20     Time In: 0815  Time Out: 0830  Total Billable Time: 15 minutes Billing reflects 1:1 direct supervision    MD follow-up:    Precautions: Standard    FOTO:  Goal: 72%  -Intake: 63%  -Status: 62%  -Discharge: incomplete    Subjective     Pt reports: 5/24- She has a migraine today and is feeling nauseous. The knee is feeling pretty good, she still feels a little bit limited with daily activities but she is making progress.  She was compliant with home exercise program.  Response to previous treatment: Improving symptoms  Functional change: Gradually improving function    Pain: Minimal/10  Location: bilateral knees     Objective      Objective Measures updated at progress report unless specified otherwise.    Problem List:  Impaired knee range of motion  Impaired strength  Impaired functional mobility    Observation:   POSTURE:    GAIT:   L knee varus thrust  R knee valgus  R LATERAL FOOT progression angle        FUNCTIONAL MOVEMENT PATTERNS  Movement Analysis Notes   Squat [x]Functional  []Dysfunctional:  []Painful  [x]Non-Painful                   Balance:     RIGHT    LEFT    Goal   Single Leg 3s 8s 30 seconds       , ,        RANGE OF MOTION:      Hip AROM/PROM Right Left Pain/Dysfunction with Movement Goal   Hip Flexion (120º) 100 100       Hip Extension (30º)            Hip Abduction (45º)           Hip IR (45º) 30 30       Hip ER (45º) 45 45           Knee AROM/PROM Right Left Pain/Dysfunction with Movement Goal   Knee Flexion (135º) 120 120 (104) Pain  130   Knee Extension (0º) 1 hyper 3 hyper       Tibial External Rotation (40º}           Tibial Internal Rotation (30º)                  STRENGTH:   L/E MMT Right  (spine) Left Pain/Dysfunction with Movement Goal   Hip Extension  3/5 3/5 R anterior knee pain 4+/5 B   Hip Abduction  4/5 4+/5 B hip pain 4+/5 B   Knee Extension 4/5 4/5 R knee pain 5/5 B   Knee Flexion  4/5 4/5    5/5 B   Hip IR       4+/5 B   Hip ER       4+/5 B         MUSCLE LENGTH:   Muscle Tested  Right Left  Limitation Goal   Hip Flexors [] Normal  [] Limited [] Normal  [] Limited   Normal B   ITB / TFL [] Normal  [] Limited [] Normal  [] Limited   Normal B   Quadriceps [] Normal  [x] Limited [] Normal  [x] Limited   Normal B   Hamstrings  [] Normal  [] Limited [] Normal  [] Limited   Normal B   Piriformis [] Normal  [] Limited [] Normal  [] Limited   Normal B   Gastrocnemius  [] Normal  [] Limited [] Normal  [] Limited   Normal B            Joint mobility:  Fair hip mobility        SENSATION  [x] Intact to Light Touch                       [] Impaired:        PALPATION: Structures: Increased tenderness to palpation of:            Treatment     Stacey received the treatments listed below:      Stacey received therapeutic exercises to develop strength, ROM, and flexibility for 15 minutes including:    Reassessment of objective measures x 15 minutes    Patient Education and Home Exercises         Education provided:   Physical therapy diagnosis, prognosis, and plan of care.     Written Home Exercises Provided: Patient instructed to cont prior HEP.  Exercises were reviewed and Stacey was able to demonstrate them prior to the end of the session.  Stacey demonstrated good  understanding of the education provided.     See EMR under Patient Instructions for exercises  provided prior visit.    Assessment     5/24- Patient demonstrates improved strength and tolerance to load. She continues to have impaired tolerance to functional activities as well as proprioception and motor control. She would benefit from continued work on building functional strength and motor control.      Stacey Is progressing well towards her goals.   Pt prognosis is Good.     Pt will continue to benefit from skilled outpatient physical therapy to address the deficits listed in the problem list box on initial evaluation, provide pt/family education and to maximize pt's level of independence in the home and community environment.     Pt's spiritual, cultural and educational needs considered and pt agreeable to plan of care and goals.    Anticipated barriers to physical therapy: chronicity of condition     Goals:     Short Term Goals:  6 weeks Status  Date Met   PAIN: Pt will report worst pain of 4/10 in order to progress toward max functional ability and improve quality of life. [x] Progressing  [x] Met  [] Not Met     FUNCTION: Patient will demonstrate improved function as indicated by a functional score improvement of at least 5 points on FOTO. [x] Progressing  [] Met  [] Not Met     MOBILITY: Patient will improve AROM to 50% of stated goals, listed in objective measures above, in order to progress towards independence with functional activities.  [x] Progressing  [x] Met  [] Not Met     STRENGTH: Patient will improve strength to 50% of stated goals, listed in objective measures above, in order to progress towards independence with functional activities. [x] Progressing  [x] Met  [] Not Met     POSTURE: Patient will correct postural deviations in sitting and standing, to decrease pain and promote long term stability.  [x] Progressing  [x] Met  [] Not Met     GAIT: Patient will demonstrate improved gait mechanics including nonantalgic gait in order to improve functional mobility, improve quality of life, and  decrease risk of further injury or fall.  [x] Progressing  [x] Met  [] Not Met     HEP: Patient will demonstrate independence with HEP in order to progress toward functional independence. [x] Progressing  [x] Met  [] Not Met        Long Term Goals:  12 weeks Status Date Met   PAIN: Pt will report worst pain of 1/10 in order to progress toward max functional ability and improve quality of life [x] Progressing  [] Met  [] Not Met     FUNCTION: Patient will demonstrate improved function as indicated by a FOTO functional score improvement as listed in header. [x] Progressing  [] Met  [] Not Met     MOBILITY: Patient will improve AROM to stated goals, listed in objective measures above, in order to return to maximal functional potential and improve quality of life.  [x] Progressing  [] Met  [] Not Met     STRENGTH: Patient will improve strength to stated goals, listed in objective measures above, in order to improve functional independence and quality of life.  [x] Progressing  [] Met  [] Not Met     GAIT: Patient will demonstrate normalized gait mechanics with minimal compensation in order to return to PLOF. [x] Progressing  [] Met  [] Not Met     Patient will return to normal ADL's, IADL's, community involvement, recreational activities, and work-related activities with less than or equal to 1/10 pain and maximal function.  [x] Progressing  [] Met  [] Not Met          Plan       Continue to progress per protocol and per patient tolerance      Abdi Benton, PT

## 2024-05-28 ENCOUNTER — CLINICAL SUPPORT (OUTPATIENT)
Facility: HOSPITAL | Age: 59
End: 2024-05-28
Payer: COMMERCIAL

## 2024-05-28 DIAGNOSIS — Z78.9 IMPAIRED MOBILITY AND ACTIVITIES OF DAILY LIVING: Primary | ICD-10-CM

## 2024-05-28 DIAGNOSIS — R29.898 DECREASED STRENGTH OF LOWER EXTREMITY: ICD-10-CM

## 2024-05-28 DIAGNOSIS — Z74.09 IMPAIRED MOBILITY AND ACTIVITIES OF DAILY LIVING: Primary | ICD-10-CM

## 2024-05-28 PROCEDURE — 97110 THERAPEUTIC EXERCISES: CPT | Mod: PN | Performed by: PHYSICAL THERAPIST

## 2024-05-28 PROCEDURE — 97112 NEUROMUSCULAR REEDUCATION: CPT | Mod: PN | Performed by: PHYSICAL THERAPIST

## 2024-05-28 NOTE — PROGRESS NOTES
"[facility]   Physical Therapy Treatment Note      Name: Stacey Wade  Clinic Number: [id number]    Therapy Diagnosis:   Encounter Diagnoses   Name Primary?    Impaired mobility and activities of daily living Yes    Decreased strength of lower extremity        Physician: Wes Faulkner MD     Physician Orders: PT Eval and Treat   Medical Diagnosis from Referral: Bilateral primary osteoarthritis of knee [M17.0], Acute pain of right knee [M25.561]   Evaluation Date: 4/25/2024  Authorization Period Expiration: 12/31/24  Plan of Care Expiration: 7/25/24  Progress Note Due: 6/24/24  Visit # / Visits authorized: 7/ 20     Time In: 0805  Time Out: 0910  Total Billable Time: 49 minutes Billing reflects 1:1 direct supervision    MD follow-up:    Precautions: Standard    FOTO:  Goal: 72%  -Intake: 63%  -Status: 62%  -Discharge: incomplete    Subjective     Pt reports: 5/28- She still notices a lack of single leg stability, but she does feel that the symptoms have improved since initiating therapy  She was compliant with home exercise program.  Response to previous treatment: Improving symptoms  Functional change: Gradually improving function    Pain: Minimal/10  Location: bilateral knees     Objective      Objective Measures updated at progress report unless specified otherwise.    Problem List:  Impaired knee range of motion  Impaired strength  Impaired functional mobility  Pes planus         Treatment     Stacey received the treatments listed below:      Code  (see below chart) Intervention Date/Notes  5/28   MT Manual Therapy Knee flexion mobs x 5 min   TE Bike 08 MIN   TE Dynamic stretch Knee hugs  Hamstring sweeps  Gastroc stretch   NR Bridging With 15# DB 3 X 10 X 5S   NR SLS 3 X 30S   NR Squat to box 10# KB 24" 3 x 10   NR Standing TB hip abduction YTB 2 x 15   NR Step ups  6" 3 x 10 ea   TE Heel raises dbl with ball squeeze 3 x 15   TE Knee extensions Dbl 35# 3 x 10   TE Hamstring " curl Dbl 30# 3 x 10     20 minutes of Therapeutic Exercise (TE) to develop strength, endurance, ROM, and flexibility. (41692)  5 minutes of Manual therapy (MT) to improve pain and ROM. (15481)  24 minutes of Neuromuscular Re-Education (NMR)  to improve: Balance, Coordination, Kinesthetic, Sense, Proprioception, and Posture. (10384)  00 minutes of Therapeutic Activities (TA) to improve functional performance. (65939)  Unattended Electrical Stimulation (ES) for muscle performance and/or pain modulation. (44215)  Vasopneumatic Device Therapy () for management of swelling/edema. (85395)  BFR: Blood flow restriction applied during exercise  NP: Not Performed    Patient Education and Home Exercises         Education provided:   Physical therapy diagnosis, prognosis, and plan of care.     Written Home Exercises Provided: Patient instructed to cont prior HEP.  Exercises were reviewed and Stacey was able to demonstrate them prior to the end of the session.  Stacey demonstrated good  understanding of the education provided.     See EMR under Patient Instructions for exercises provided prior visit.    Assessment     5/28- She continues to have impaired knee ROM as well as single leg stability and strength. She does have pes planus which likely contributes to lack of single leg stability and some loading to the knee.      Stacey Is progressing well towards her goals.   Pt prognosis is Good.     Pt will continue to benefit from skilled outpatient physical therapy to address the deficits listed in the problem list box on initial evaluation, provide pt/family education and to maximize pt's level of independence in the home and community environment.     Pt's spiritual, cultural and educational needs considered and pt agreeable to plan of care and goals.    Anticipated barriers to physical therapy: chronicity of condition     Goals:     Short Term Goals:  6 weeks Status  Date Met   PAIN: Pt will report worst pain of 4/10 in order  to progress toward max functional ability and improve quality of life. [x] Progressing  [x] Met  [] Not Met     FUNCTION: Patient will demonstrate improved function as indicated by a functional score improvement of at least 5 points on FOTO. [x] Progressing  [] Met  [] Not Met     MOBILITY: Patient will improve AROM to 50% of stated goals, listed in objective measures above, in order to progress towards independence with functional activities.  [x] Progressing  [x] Met  [] Not Met     STRENGTH: Patient will improve strength to 50% of stated goals, listed in objective measures above, in order to progress towards independence with functional activities. [x] Progressing  [x] Met  [] Not Met     POSTURE: Patient will correct postural deviations in sitting and standing, to decrease pain and promote long term stability.  [x] Progressing  [x] Met  [] Not Met     GAIT: Patient will demonstrate improved gait mechanics including nonantalgic gait in order to improve functional mobility, improve quality of life, and decrease risk of further injury or fall.  [x] Progressing  [x] Met  [] Not Met     HEP: Patient will demonstrate independence with HEP in order to progress toward functional independence. [x] Progressing  [x] Met  [] Not Met        Long Term Goals:  12 weeks Status Date Met   PAIN: Pt will report worst pain of 1/10 in order to progress toward max functional ability and improve quality of life [x] Progressing  [] Met  [] Not Met     FUNCTION: Patient will demonstrate improved function as indicated by a FOTO functional score improvement as listed in header. [x] Progressing  [] Met  [] Not Met     MOBILITY: Patient will improve AROM to stated goals, listed in objective measures above, in order to return to maximal functional potential and improve quality of life.  [x] Progressing  [] Met  [] Not Met     STRENGTH: Patient will improve strength to stated goals, listed in objective measures above, in order to improve  functional independence and quality of life.  [x] Progressing  [] Met  [] Not Met     GAIT: Patient will demonstrate normalized gait mechanics with minimal compensation in order to return to PLOF. [x] Progressing  [] Met  [] Not Met     Patient will return to normal ADL's, IADL's, community involvement, recreational activities, and work-related activities with less than or equal to 1/10 pain and maximal function.  [x] Progressing  [] Met  [] Not Met          Plan       Continue to progress per protocol and per patient tolerance      Abdi Benton, PT

## 2024-05-30 ENCOUNTER — CLINICAL SUPPORT (OUTPATIENT)
Facility: HOSPITAL | Age: 59
End: 2024-05-30
Payer: COMMERCIAL

## 2024-05-30 DIAGNOSIS — Z78.9 IMPAIRED MOBILITY AND ACTIVITIES OF DAILY LIVING: Primary | ICD-10-CM

## 2024-05-30 DIAGNOSIS — Z74.09 IMPAIRED MOBILITY AND ACTIVITIES OF DAILY LIVING: Primary | ICD-10-CM

## 2024-05-30 DIAGNOSIS — R29.898 DECREASED STRENGTH OF LOWER EXTREMITY: ICD-10-CM

## 2024-05-30 PROCEDURE — 97110 THERAPEUTIC EXERCISES: CPT | Mod: PN | Performed by: PHYSICAL THERAPIST

## 2024-05-30 PROCEDURE — 97112 NEUROMUSCULAR REEDUCATION: CPT | Mod: PN | Performed by: PHYSICAL THERAPIST

## 2024-05-30 NOTE — PROGRESS NOTES
"[facility]   Physical Therapy Treatment Note      Name: Stacey Wade  Clinic Number: [id number]    Therapy Diagnosis:   No diagnosis found.      Physician: Wes Faulkner MD     Physician Orders: PT Eval and Treat   Medical Diagnosis from Referral: Bilateral primary osteoarthritis of knee [M17.0], Acute pain of right knee [M25.561]   Evaluation Date: 4/25/2024  Authorization Period Expiration: 12/31/24  Plan of Care Expiration: 7/25/24  Progress Note Due: 6/24/24  Visit # / Visits authorized: 8/ 20     Time In: 0810  Time Out: 0915  Total Billable Time: 48 minutes Billing reflects 1:1 direct supervision    MD follow-up:    Precautions: Standard    FOTO:  Goal: 72%  -Intake: 63%  -Status: 62%  -Discharge: incomplete    Subjective     Pt reports: 5/30- She is feeling good today. She worked the knees a good bit from being on her feet at work yesterday.  She was compliant with home exercise program.  Response to previous treatment: Improving symptoms  Functional change: Gradually improving function    Pain: Minimal/10  Location: bilateral knees     Objective      Objective Measures updated at progress report unless specified otherwise.    Problem List:  Impaired knee range of motion  Impaired strength  Impaired functional mobility  Pes planus         Treatment     Stacey received the treatments listed below:      Code  (see below chart) Intervention Date/Notes  5/30   TE Bike 08 MIN   TE Dynamic stretch Knee hugs  Hamstring sweeps  Gastroc stretch   NR Bridging With 15# DB 3 X 10 X 5S   NR Balance SLS 3 X 30S  Tandem on foam 3 x 30s   NR Squat to box 15# KB 24" 3 x 10   NR Standing TB hip abduction RTB 2 x 15   NR Step ups  8" 3 x 10 ea   TE Heel raises dbl with ball squeeze 3 x 15   TE Knee extensions Dbl 35# 3 x 10   TE Hamstring curl Dbl 30# 3 x 10     20 minutes of Therapeutic Exercise (TE) to develop strength, endurance, ROM, and flexibility. (36326)  00 minutes of Manual " therapy (MT) to improve pain and ROM. (45600)  28 minutes of Neuromuscular Re-Education (NMR)  to improve: Balance, Coordination, Kinesthetic, Sense, Proprioception, and Posture. (56759)  00 minutes of Therapeutic Activities (TA) to improve functional performance. (59427)  Unattended Electrical Stimulation (ES) for muscle performance and/or pain modulation. (74834)  Vasopneumatic Device Therapy () for management of swelling/edema. (36837)  BFR: Blood flow restriction applied during exercise  NP: Not Performed    Patient Education and Home Exercises         Education provided:   Physical therapy diagnosis, prognosis, and plan of care.     Written Home Exercises Provided: Patient instructed to cont prior HEP.  Exercises were reviewed and Stacey was able to demonstrate them prior to the end of the session.  Stacey demonstrated good  understanding of the education provided.     See EMR under Patient Instructions for exercises provided prior visit.    Assessment     5/30- The patient had no increase in symptoms with exercises today. She tolerated progression of exercises very well she would benefit from continued to work on single leg stability, motor control, and functional strength.    Staecy Is progressing well towards her goals.   Pt prognosis is Good.     Pt will continue to benefit from skilled outpatient physical therapy to address the deficits listed in the problem list box on initial evaluation, provide pt/family education and to maximize pt's level of independence in the home and community environment.     Pt's spiritual, cultural and educational needs considered and pt agreeable to plan of care and goals.    Anticipated barriers to physical therapy: chronicity of condition     Goals:     Short Term Goals:  6 weeks Status  Date Met   PAIN: Pt will report worst pain of 4/10 in order to progress toward max functional ability and improve quality of life. [x] Progressing  [x] Met  [] Not Met     FUNCTION: Patient  will demonstrate improved function as indicated by a functional score improvement of at least 5 points on FOTO. [x] Progressing  [] Met  [] Not Met     MOBILITY: Patient will improve AROM to 50% of stated goals, listed in objective measures above, in order to progress towards independence with functional activities.  [x] Progressing  [x] Met  [] Not Met     STRENGTH: Patient will improve strength to 50% of stated goals, listed in objective measures above, in order to progress towards independence with functional activities. [x] Progressing  [x] Met  [] Not Met     POSTURE: Patient will correct postural deviations in sitting and standing, to decrease pain and promote long term stability.  [x] Progressing  [x] Met  [] Not Met     GAIT: Patient will demonstrate improved gait mechanics including nonantalgic gait in order to improve functional mobility, improve quality of life, and decrease risk of further injury or fall.  [x] Progressing  [x] Met  [] Not Met     HEP: Patient will demonstrate independence with HEP in order to progress toward functional independence. [x] Progressing  [x] Met  [] Not Met        Long Term Goals:  12 weeks Status Date Met   PAIN: Pt will report worst pain of 1/10 in order to progress toward max functional ability and improve quality of life [x] Progressing  [] Met  [] Not Met     FUNCTION: Patient will demonstrate improved function as indicated by a FOTO functional score improvement as listed in header. [x] Progressing  [] Met  [] Not Met     MOBILITY: Patient will improve AROM to stated goals, listed in objective measures above, in order to return to maximal functional potential and improve quality of life.  [x] Progressing  [] Met  [] Not Met     STRENGTH: Patient will improve strength to stated goals, listed in objective measures above, in order to improve functional independence and quality of life.  [x] Progressing  [] Met  [] Not Met     GAIT: Patient will demonstrate normalized gait  mechanics with minimal compensation in order to return to PLOF. [x] Progressing  [] Met  [] Not Met     Patient will return to normal ADL's, IADL's, community involvement, recreational activities, and work-related activities with less than or equal to 1/10 pain and maximal function.  [x] Progressing  [] Met  [] Not Met          Plan       Continue to progress per protocol and per patient tolerance      Abdi Benton, PT

## 2024-06-04 ENCOUNTER — CLINICAL SUPPORT (OUTPATIENT)
Facility: HOSPITAL | Age: 59
End: 2024-06-04
Payer: COMMERCIAL

## 2024-06-04 DIAGNOSIS — Z78.9 IMPAIRED MOBILITY AND ACTIVITIES OF DAILY LIVING: Primary | ICD-10-CM

## 2024-06-04 DIAGNOSIS — R29.898 DECREASED STRENGTH OF LOWER EXTREMITY: ICD-10-CM

## 2024-06-04 DIAGNOSIS — Z74.09 IMPAIRED MOBILITY AND ACTIVITIES OF DAILY LIVING: Primary | ICD-10-CM

## 2024-06-04 PROCEDURE — 97110 THERAPEUTIC EXERCISES: CPT | Mod: PN | Performed by: PHYSICAL THERAPIST

## 2024-06-04 PROCEDURE — 97112 NEUROMUSCULAR REEDUCATION: CPT | Mod: PN | Performed by: PHYSICAL THERAPIST

## 2024-06-04 NOTE — PROGRESS NOTES
"[facility]   Physical Therapy Treatment Note      Name: Stacey Wade  Clinic Number: [id number]    Therapy Diagnosis:   No diagnosis found.      Physician: Wes Faulkner MD     Physician Orders: PT Eval and Treat   Medical Diagnosis from Referral: Bilateral primary osteoarthritis of knee [M17.0], Acute pain of right knee [M25.561]   Evaluation Date: 4/25/2024  Authorization Period Expiration: 12/31/24  Plan of Care Expiration: 7/25/24  Progress Note Due: 6/24/24  Visit # / Visits authorized: 9/ 20     Time In: 0808  Time Out: 0915  Total Billable Time: 60 minutes Billing reflects 1:1 direct supervision    MD follow-up:    Precautions: Standard    FOTO:  Goal: 72%  -Intake: 63%  -Status: 62%  -Discharge: incomplete    Subjective     Pt reports: She is feeling a little bit more soreness today. She started back at the new pharmacy location on Monday  She was compliant with home exercise program.  Response to previous treatment: Improving symptoms  Functional change: Gradually improving function    Pain: Minimal/10  Location: bilateral knees     Objective      Objective Measures updated at progress report unless specified otherwise.    Problem List:  Impaired knee range of motion  Impaired strength  Impaired functional mobility  Pes planus         Treatment     Stacey received the treatments listed below:      Code  (see below chart) Intervention Date/Notes  6/4   MT Manual Therapy Knee flexion mobs x 5 min   TE Bike 08 MIN   TE Dynamic stretch Knee hugs  Hamstring sweeps  Gastroc stretch   NR Bridging With 20# DB 3 X 10 X 5S   NR Balance SLS 3 X 30S  Tandem on foam 4 x 30s   TE Heel raises dbl with ball squeeze between heels 3 x 15   NR Squat to box 15# KB 24" 3 x 10   NR Standing TB hip abduction RTB 2 x 15   NR Step ups  8" 3 x 10 ea   TE Knee extensions Dbl 35# 3 x 10   TE Hamstring curl Dbl 30# 3 x 10     30 minutes of Therapeutic Exercise (TE) to develop strength, endurance, " ROM, and flexibility. (81762)  5 minutes of Manual therapy (MT) to improve pain and ROM. (37771)  25 minutes of Neuromuscular Re-Education (NMR)  to improve: Balance, Coordination, Kinesthetic, Sense, Proprioception, and Posture. (60431)  00 minutes of Therapeutic Activities (TA) to improve functional performance. (78568)  Unattended Electrical Stimulation (ES) for muscle performance and/or pain modulation. (00922)  Vasopneumatic Device Therapy () for management of swelling/edema. (94197)  BFR: Blood flow restriction applied during exercise  NP: Not Performed    Patient Education and Home Exercises         Education provided:   Physical therapy diagnosis, prognosis, and plan of care.     Written Home Exercises Provided: Patient instructed to cont prior HEP.  Exercises were reviewed and Stacey was able to demonstrate them prior to the end of the session.  Stacey demonstrated good  understanding of the education provided.     See EMR under Patient Instructions for exercises provided prior visit.    Assessment     Patient stated that she felt a little bit off with some exercises today, so we avoided making any big progressions. She is tolerating functional strength and motor control exercises well. Continue to progress strengthening to build back tolerance to functional activities    Stacey Is progressing well towards her goals.   Pt prognosis is Good.     Pt will continue to benefit from skilled outpatient physical therapy to address the deficits listed in the problem list box on initial evaluation, provide pt/family education and to maximize pt's level of independence in the home and community environment.     Pt's spiritual, cultural and educational needs considered and pt agreeable to plan of care and goals.    Anticipated barriers to physical therapy: chronicity of condition     Goals:     Short Term Goals:  6 weeks Status  Date Met   PAIN: Pt will report worst pain of 4/10 in order to progress toward max  functional ability and improve quality of life. [x] Progressing  [x] Met  [] Not Met     FUNCTION: Patient will demonstrate improved function as indicated by a functional score improvement of at least 5 points on FOTO. [x] Progressing  [] Met  [] Not Met     MOBILITY: Patient will improve AROM to 50% of stated goals, listed in objective measures above, in order to progress towards independence with functional activities.  [x] Progressing  [x] Met  [] Not Met     STRENGTH: Patient will improve strength to 50% of stated goals, listed in objective measures above, in order to progress towards independence with functional activities. [x] Progressing  [x] Met  [] Not Met     POSTURE: Patient will correct postural deviations in sitting and standing, to decrease pain and promote long term stability.  [x] Progressing  [x] Met  [] Not Met     GAIT: Patient will demonstrate improved gait mechanics including nonantalgic gait in order to improve functional mobility, improve quality of life, and decrease risk of further injury or fall.  [x] Progressing  [x] Met  [] Not Met     HEP: Patient will demonstrate independence with HEP in order to progress toward functional independence. [x] Progressing  [x] Met  [] Not Met        Long Term Goals:  12 weeks Status Date Met   PAIN: Pt will report worst pain of 1/10 in order to progress toward max functional ability and improve quality of life [x] Progressing  [] Met  [] Not Met     FUNCTION: Patient will demonstrate improved function as indicated by a FOTO functional score improvement as listed in header. [x] Progressing  [] Met  [] Not Met     MOBILITY: Patient will improve AROM to stated goals, listed in objective measures above, in order to return to maximal functional potential and improve quality of life.  [x] Progressing  [] Met  [] Not Met     STRENGTH: Patient will improve strength to stated goals, listed in objective measures above, in order to improve functional independence and  quality of life.  [x] Progressing  [] Met  [] Not Met     GAIT: Patient will demonstrate normalized gait mechanics with minimal compensation in order to return to PLOF. [x] Progressing  [] Met  [] Not Met     Patient will return to normal ADL's, IADL's, community involvement, recreational activities, and work-related activities with less than or equal to 1/10 pain and maximal function.  [x] Progressing  [] Met  [] Not Met          Plan       Continue to progress per protocol and per patient tolerance      Abdi Benton, PT

## 2024-06-11 ENCOUNTER — CLINICAL SUPPORT (OUTPATIENT)
Facility: HOSPITAL | Age: 59
End: 2024-06-11
Payer: COMMERCIAL

## 2024-06-11 DIAGNOSIS — Z78.9 IMPAIRED MOBILITY AND ACTIVITIES OF DAILY LIVING: Primary | ICD-10-CM

## 2024-06-11 DIAGNOSIS — R29.898 DECREASED STRENGTH OF LOWER EXTREMITY: ICD-10-CM

## 2024-06-11 DIAGNOSIS — Z74.09 IMPAIRED MOBILITY AND ACTIVITIES OF DAILY LIVING: Primary | ICD-10-CM

## 2024-06-11 PROCEDURE — 97110 THERAPEUTIC EXERCISES: CPT | Mod: PN | Performed by: PHYSICAL THERAPIST

## 2024-06-11 PROCEDURE — 97140 MANUAL THERAPY 1/> REGIONS: CPT | Mod: PN | Performed by: PHYSICAL THERAPIST

## 2024-06-11 PROCEDURE — 97112 NEUROMUSCULAR REEDUCATION: CPT | Mod: PN | Performed by: PHYSICAL THERAPIST

## 2024-06-12 NOTE — PROGRESS NOTES
"  [facility]   Physical Therapy Treatment Note      Name: Stacey Wade  Clinic Number: [id number]    Therapy Diagnosis:   Encounter Diagnoses   Name Primary?    Impaired mobility and activities of daily living Yes    Decreased strength of lower extremity        Physician: eWs Faulknre MD     Physician Orders: PT Eval and Treat   Medical Diagnosis from Referral: Bilateral primary osteoarthritis of knee [M17.0], Acute pain of right knee [M25.561]   Evaluation Date: 4/25/2024  Authorization Period Expiration: 12/31/24  Plan of Care Expiration: 7/25/24  Progress Note Due: 6/24/24  Visit # / Visits authorized: 10/ 20     Time In: 0805  Time Out: 0915  Total Billable Time: 53 minutes Billing reflects 1:1 direct supervision    MD follow-up:    Precautions: Standard    FOTO:  Goal: 72%  -Intake: 63%  -Status: 62%  -Discharge: incomplete    Subjective     Pt reports: 6/11 -  She had to miss her appointment last week because of an incident with her mom. Her knees are feeling fine, just a little bit stiff this morning.  She was compliant with home exercise program.  Response to previous treatment: Improving symptoms  Functional change: Gradually improving function    Pain: Minimal/10  Location: bilateral knees     Objective      Objective Measures updated at progress report unless specified otherwise.    Problem List:  Impaired knee range of motion  Impaired strength  Impaired functional mobility  Pes planus         Treatment     Stacey received the treatments listed below:      Code  (see below chart) Intervention Date/Notes  6/11   MT Manual Therapy Knee flexion mobs x 08 min   TE Bike 08 MIN   NR Bridging With 20# DB 3 X 10 X 5S   NR Balance SLS 3 X 30S  Tandem on foam 4 x 30s   TE Heel raises dbl with ball squeeze between heels 3 x 15   NR Squat to box 20# KB 24" 3 x 10   NR Step ups  8" 3 x 10 ea   TE Hamstring curl Dbl 30# 3 x 10   NR Lateral band walk RTB 2 laps 10 yds     15 " minutes of Therapeutic Exercise (TE) to develop strength, endurance, ROM, and flexibility. (56914)  08 minutes of Manual therapy (MT) to improve pain and ROM. (78721)  30 minutes of Neuromuscular Re-Education (NMR)  to improve: Balance, Coordination, Kinesthetic, Sense, Proprioception, and Posture. (70703)  00 minutes of Therapeutic Activities (TA) to improve functional performance. (06286)  Unattended Electrical Stimulation (ES) for muscle performance and/or pain modulation. (41013)  Vasopneumatic Device Therapy () for management of swelling/edema. (77792)  BFR: Blood flow restriction applied during exercise  NP: Not Performed    Patient Education and Home Exercises         Education provided:   Physical therapy diagnosis, prognosis, and plan of care.     Written Home Exercises Provided: Patient instructed to cont prior HEP.  Exercises were reviewed and Stacey was able to demonstrate them prior to the end of the session.  Stacey demonstrated good  understanding of the education provided.     See EMR under Patient Instructions for exercises provided prior visit.    Assessment     6/11- Very good tolerance to progression of exercises today. She did have some complaints of knee stiffness at the beginning of the session that were improved with manual therapy and stretching.    Stacey Is progressing well towards her goals.   Pt prognosis is Good.     Pt will continue to benefit from skilled outpatient physical therapy to address the deficits listed in the problem list box on initial evaluation, provide pt/family education and to maximize pt's level of independence in the home and community environment.     Pt's spiritual, cultural and educational needs considered and pt agreeable to plan of care and goals.    Anticipated barriers to physical therapy: chronicity of condition     Goals:     Short Term Goals:  6 weeks Status  Date Met   PAIN: Pt will report worst pain of 4/10 in order to progress toward max functional  ability and improve quality of life. [x] Progressing  [x] Met  [] Not Met     FUNCTION: Patient will demonstrate improved function as indicated by a functional score improvement of at least 5 points on FOTO. [x] Progressing  [] Met  [] Not Met     MOBILITY: Patient will improve AROM to 50% of stated goals, listed in objective measures above, in order to progress towards independence with functional activities.  [x] Progressing  [x] Met  [] Not Met     STRENGTH: Patient will improve strength to 50% of stated goals, listed in objective measures above, in order to progress towards independence with functional activities. [x] Progressing  [x] Met  [] Not Met     POSTURE: Patient will correct postural deviations in sitting and standing, to decrease pain and promote long term stability.  [x] Progressing  [x] Met  [] Not Met     GAIT: Patient will demonstrate improved gait mechanics including nonantalgic gait in order to improve functional mobility, improve quality of life, and decrease risk of further injury or fall.  [x] Progressing  [x] Met  [] Not Met     HEP: Patient will demonstrate independence with HEP in order to progress toward functional independence. [x] Progressing  [x] Met  [] Not Met        Long Term Goals:  12 weeks Status Date Met   PAIN: Pt will report worst pain of 1/10 in order to progress toward max functional ability and improve quality of life [x] Progressing  [] Met  [] Not Met     FUNCTION: Patient will demonstrate improved function as indicated by a FOTO functional score improvement as listed in header. [x] Progressing  [] Met  [] Not Met     MOBILITY: Patient will improve AROM to stated goals, listed in objective measures above, in order to return to maximal functional potential and improve quality of life.  [x] Progressing  [] Met  [] Not Met     STRENGTH: Patient will improve strength to stated goals, listed in objective measures above, in order to improve functional independence and quality of  life.  [x] Progressing  [] Met  [] Not Met     GAIT: Patient will demonstrate normalized gait mechanics with minimal compensation in order to return to PLOF. [x] Progressing  [] Met  [] Not Met     Patient will return to normal ADL's, IADL's, community involvement, recreational activities, and work-related activities with less than or equal to 1/10 pain and maximal function.  [x] Progressing  [] Met  [] Not Met          Plan       Continue to progress per protocol and per patient tolerance      Abdi Benton, PT

## 2024-06-13 ENCOUNTER — CLINICAL SUPPORT (OUTPATIENT)
Facility: HOSPITAL | Age: 59
End: 2024-06-13
Payer: COMMERCIAL

## 2024-06-13 ENCOUNTER — OFFICE VISIT (OUTPATIENT)
Dept: SPORTS MEDICINE | Facility: CLINIC | Age: 59
End: 2024-06-13
Payer: COMMERCIAL

## 2024-06-13 VITALS — WEIGHT: 195.75 LBS | BODY MASS INDEX: 36.02 KG/M2 | HEIGHT: 62 IN

## 2024-06-13 DIAGNOSIS — R29.898 DECREASED STRENGTH OF LOWER EXTREMITY: ICD-10-CM

## 2024-06-13 DIAGNOSIS — E66.01 CLASS 2 SEVERE OBESITY WITH SERIOUS COMORBIDITY AND BODY MASS INDEX (BMI) OF 35.0 TO 35.9 IN ADULT, UNSPECIFIED OBESITY TYPE: Primary | ICD-10-CM

## 2024-06-13 DIAGNOSIS — Z74.09 IMPAIRED MOBILITY AND ACTIVITIES OF DAILY LIVING: Primary | ICD-10-CM

## 2024-06-13 DIAGNOSIS — Z78.9 IMPAIRED MOBILITY AND ACTIVITIES OF DAILY LIVING: Primary | ICD-10-CM

## 2024-06-13 DIAGNOSIS — M17.0 BILATERAL PRIMARY OSTEOARTHRITIS OF KNEE: ICD-10-CM

## 2024-06-13 PROCEDURE — 1159F MED LIST DOCD IN RCRD: CPT | Mod: CPTII,S$GLB,, | Performed by: ORTHOPAEDIC SURGERY

## 2024-06-13 PROCEDURE — 99214 OFFICE O/P EST MOD 30 MIN: CPT | Mod: S$GLB,,, | Performed by: ORTHOPAEDIC SURGERY

## 2024-06-13 PROCEDURE — 3044F HG A1C LEVEL LT 7.0%: CPT | Mod: CPTII,S$GLB,, | Performed by: ORTHOPAEDIC SURGERY

## 2024-06-13 PROCEDURE — 3008F BODY MASS INDEX DOCD: CPT | Mod: CPTII,S$GLB,, | Performed by: ORTHOPAEDIC SURGERY

## 2024-06-13 PROCEDURE — 99999 PR PBB SHADOW E&M-EST. PATIENT-LVL IV: CPT | Mod: PBBFAC,,, | Performed by: ORTHOPAEDIC SURGERY

## 2024-06-13 PROCEDURE — 97112 NEUROMUSCULAR REEDUCATION: CPT | Mod: PN | Performed by: PHYSICAL THERAPIST

## 2024-06-13 PROCEDURE — 97140 MANUAL THERAPY 1/> REGIONS: CPT | Mod: PN | Performed by: PHYSICAL THERAPIST

## 2024-06-13 PROCEDURE — 97110 THERAPEUTIC EXERCISES: CPT | Mod: PN | Performed by: PHYSICAL THERAPIST

## 2024-06-13 NOTE — PROGRESS NOTES
Patient ID: Stacey Wade  YOB: 1965  MRN: [id number]    Chief Complaint: Pain of the Right Knee      Referred By: EP    History of Present Illness: Stacey Wade is a  58 y.o. 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 She has been working with [employer] at  [employer] and reports significant improvement since her last visit. She reports she still has some minor discomfort with stairs but otherwise is very happy with her recovery.     To review her hx from 4/18/24  Bridgette presents today with bilateral knee pain. She reports she fell recently on 4/10/24 while at [employer] for her brother. She reports she hit her head but now has bilateral knee pain right worst then left. She has been using a cane while ambulating after the fall and KT taping her knee. She reports she has done PT in the past but may to get it set up again after the fall. She reports mostly medial knee pain in both knees. She denies any catching or locking  She reports swelling in the right knee. Of note she is about 2 years s/p Left Knee scope, medial meniscus root repair, centralization (5/13/2022). She also has had gel injections on 6/5/23. She reports also a brace being ordered at the last visit but never followed up on it.     HPI    Past Medical History:   Past Medical History:   Diagnosis Date    Allergic rhinitis     Anxiety     Complex tear of medial meniscus of left knee 5/5/2022    Hypertension     Osteoarthritis of both knees     Prediabetes     Urticaria      Past Surgical History:   Procedure Laterality Date    CHONDROPLASTY OF KNEE Left 05/13/2022    Procedure: CHONDROPLASTY, KNEE;  Surgeon: Wes Faulkner MD;  Location: [facility];  Service: Orthopedics;  Laterality: Left;    COLONOSCOPY N/A 01/18/2018    Procedure: COLONOSCOPY;  Surgeon: Yong Smith MD;  Location: [facility]  ENDO;  Service: Endoscopy;  Laterality: N/A;    HERNIA REPAIR Left      Family History   Problem Relation Name Age of Onset    Lung cancer Paternal Grandfather      Ovarian cancer Maternal Grandmother      Hyperlipidemia Mother Pepe Wade     Hypertension Mother Pepe Wade     Breast cancer Mother Pepe Wade 60    Arthritis Mother Pepe Wade     Lung cancer Father      Breast cancer Maternal Aunt  53    Heart failure Other          Great aunt    Prostate cancer Brother Angus     Brain cancer Sister      Diabetes Neg Hx      Pseudochol deficiency Neg Hx      Malignant hyperthermia Neg Hx       Social History     Socioeconomic History    Marital status: Single   Occupational History    Occupation: Pharmacy Tech     Comment: [employer]   Tobacco Use    Smoking status: Never    Smokeless tobacco: Never   Substance and Sexual Activity    Alcohol use: No    Drug use: No    Sexual activity: Not Currently     Partners: Male     Birth control/protection: None   Social History Narrative    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     Medication List with Changes/Refills   Current Medications    ALBUTEROL (PROVENTIL/VENTOLIN HFA) 90 MCG/ACTUATION INHALER    Inhale 2 puffs into the lungs every 4 to 6 hours as needed for Wheezing.    ASPIRIN (ECOTRIN) 81 MG EC TABLET    Take 1 tablet by mouth every morning.    BETAMETHASONE DIPROPIONATE 0.05 % CREAM    APPLY TOPICALLY 2 TIMES DAILY.    BUTALBITAL-ACETAMINOPHEN-CAFFEINE -40 MG (FIORICET, ESGIC) -40 MG PER TABLET    TAKE 1 TABLET BY MOUTH EVERY 6 HOURS AS NEEDED FOR PAIN OR FOR HEADACHE    CLORAZEPATE (TRANXENE) 3.75 MG TAB    TAKE 1 TABLET BY MOUTH EVERY DAY AS NEEDED    CLOTRIMAZOLE-BETAMETHASONE 1-0.05% (LOTRISONE) CREAM    APPLY TOPICALLY TWICE DAILY AS DIRECTED    DICLOFENAC SODIUM (VOLTAREN) 1 % GEL    Apply 2 g topically 4 (four) times daily.    DOXEPIN  (SINEQUAN) 10 MG CAPSULE    TAKE TWO CAPSULES BY MOUTH THREE TIMES DAILY    EPINEPHRINE (EPIPEN) 0.3 MG/0.3 ML ATIN    Inject 0.3 mg into the muscle daily as needed.    FAMOTIDINE (PEPCID) 40 MG TABLET    Take 0.5 tablets (20 mg total) by mouth 2 (two) times daily.    FEXOFENADINE (ALLEGRA) 180 MG TABLET    Take 1 tablet (180 mg total) by mouth once daily. In AM    FLUOCINONIDE (LIDEX) 0.05 % OINTMENT    Apply topically 2 (two) times daily. Do not use longer than 2 weeks at a time    HYDROXYZINE HCL (ATARAX) 25 MG TABLET    TAKE ONE TABLET BY MOUTH FOUR TIMES DAILY AS NEEDED FOR ITCHING    MIRABEGRON (MYRBETRIQ) 25 MG TB24 ER TABLET    Take 1 tablet (25 mg total) by mouth once daily.    MOMETASONE (NASONEX) 50 MCG/ACTUATION NASAL SPRAY    INHALE 2 SPRAYS INTO THE NOSTRIL ONCE A DAY    MONTELUKAST (SINGULAIR) 10 MG TABLET    Take 1 tablet (10 mg total) by mouth once daily.    OMALIZUMAB (XOLAIR) 150 MG/ML INJECTION    Inject 300 mg into the skin.    POTASSIUM CHLORIDE (KLOR-CON) 10 MEQ TBSR    Take 1 tablet (10 mEq total) by mouth once daily.    RIZATRIPTAN (MAXALT) 10 MG TABLET    TAKE 1 TABLET BY MOUTH AS NEEDED FOR MIGRAINE. MAY REPEAT IN 2 HOURS. MAX 2 TABS PER 24 HOURS    TACROLIMUS (PROTOPIC) 0.03 % OINTMENT    Apply topically 2 (two) times daily.    TRIAMTERENE-HYDROCHLOROTHIAZIDE 37.5-25 MG (DYAZIDE) 37.5-25 MG PER CAPSULE    Take 1 capsule by mouth once daily.     Review of patient's allergies indicates:   Allergen Reactions    Benzalkonium chloride     Black pepper      Other reaction(s): Hives    Chocolate flavor      Other reaction(s): Hives    Citrus and derivatives Hives     LEMON PRODUCTS    Grapefruit Hives     Other reaction(s): Hives    Ibuprofen Swelling    Latex      Other reaction(s): Hives    Lemon balm (casper officinalis) Hives     Anything with lemon in it    Naproxen Swelling    Neomycin-bacitracin-polymyxin      Other reaction(s): Rash    Oats (richard) Hives     Oat foods (oatmeal, etc...)     Vegetable acetoglycerides Hives     Vegetable gums    Wheat containing prod     Wheat flour Hives     ROS    Physical Exam:   Body mass index is 35.81 kg/m².  There were no vitals filed for this visit.   GENERAL: Well appearing, appropriate for stated age, no acute distress.  CARDIOVASCULAR: Pulses regular by peripheral palpation.  PULMONARY: Respirations are even and non-labored.  NEURO: Awake, alert, and oriented x 3.  PSYCH: Mood & affect are appropriate.  HEENT: Head is normocephalic and atraumatic.  Ortho/SPM Exam  No effusion on exam  TTP patellar tendon    ROM 0-120    Intact EHL, FHL, gastrocsoleus, and tibialis anterior. Sensation intact to light touch in superficial peroneal, deep peroneal, tibial, sural, and saphenous nerve distributions. Foot warm and well perfused with capillary refill of less than 2 seconds and palpable pedal pulses.      Imaging:    Mammo Digital Screening Bilat w/ Jimy  Narrative: Result:  Mammo Digital Screening Bilat w/ Jimy    History:  Patient is 58 y.o. and is seen for a screening mammogram.    Films Compared:  Compared to: 02/21/2023 Mammo Digital Screening Bilat w/ Jimy and   02/15/2022 Mammo Digital Screening Bilat w/ Jimy     Findings:  This procedure was performed using tomosynthesis.   Computer-aided detection was utilized in the interpretation of this   examination.    The breasts are heterogeneously dense, which may obscure small masses.   There is no evidence of suspicious masses, microcalcifications or   architectural distortion.  Impression:    No mammographic evidence of malignancy.    BI-RADS Category 1: Negative    Recommendation:  Routine screening mammogram in 1 year is recommended.    Your estimated lifetime risk of breast cancer (to age 85) based on   Tyrer-Cuzick risk assessment model is 24.43%.  According to the American   Cancer Society, patients with a lifetime breast cancer risk of 20% or   higher might benefit from supplemental screening tests, such as  screening   breast MRI.      Relevant imaging results reviewed and interpreted by me, discussed with the patient and / or family today.     Other Tests:         Patient Instructions   Assessment:  Stacey Wade is a  58 y.o. 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    Class 2 severe obesity with serious comorbidity and body mass index (BMI) of 35.0 to 35.9 in adult, unspecified obesity type Yes    Bilateral primary osteoarthritis of knee       Plan:  Continue to keep up with physical therapy   Happy with progress thus far, encouraged patient to keep up good work    Follow-up: as needed or sooner if there are any problems between now and then.    Leave Review:   Google: Leave Google Review  Healthgrades: Leave Healthgrades Review    After Hours Number: [phone]       Provider Note/Medical Decision Making:       I discussed worrisome and red flag signs and symptoms with the patient. The patient expressed understanding and agreed to alert me immediately or to go to the emergency room if they experience any of these.   Treatment plan was developed with input from the patient/family, and they expressed understanding and agreement with the plan. All questions were answered today.          Wes Faulkner MD  Orthopaedic Surgery & Sports Medicine       Disclaimer: This note was prepared using a voice recognition system and is likely to have sound alike errors within the text.     I, Jomar Julien, acted as a scribe for Wes Faulkner MD for the duration of this office visit.

## 2024-06-13 NOTE — PATIENT INSTRUCTIONS
Assessment:  Stacey Wade is a  58 y.o. 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    Class 2 severe obesity with serious comorbidity and body mass index (BMI) of 35.0 to 35.9 in adult, unspecified obesity type Yes    Bilateral primary osteoarthritis of knee       Plan:  Continue to keep up with physical therapy   Happy with progress thus far, encouraged patient to keep up good work    Follow-up: as needed or sooner if there are any problems between now and then.    Leave Review:   Google: Leave Google Review  Healthgrades: Leave Healthgrades Review    After Hours Number: [phone]

## 2024-06-13 NOTE — PROGRESS NOTES
"  [facility]   Physical Therapy Treatment Note      Name: Stacey Wade  Clinic Number: [id number]    Therapy Diagnosis:   No diagnosis found.      Physician: Wes Faulkner MD     Physician Orders: PT Eval and Treat   Medical Diagnosis from Referral: Bilateral primary osteoarthritis of knee [M17.0], Acute pain of right knee [M25.561]   Evaluation Date: 4/25/2024  Authorization Period Expiration: 12/31/24  Plan of Care Expiration: 7/25/24  Progress Note Due: 6/24/24  Visit # / Visits authorized: 11/ 20     Time In: 0815  Time Out: 0915  Total Billable Time: 58 minutes Billing reflects 1:1 direct supervision    MD follow-up:    Precautions: Standard    FOTO:  Goal: 72%  -Intake: 63%  -Status: 62%  -Discharge: incomplete    Subjective     Pt reports: 6/13- She is feeling better today.  She was compliant with home exercise program.  Response to previous treatment: Improving symptoms  Functional change: Gradually improving function    Pain: Minimal/10  Location: bilateral knees     Objective      Objective Measures updated at progress report unless specified otherwise.    Problem List:  Impaired knee range of motion  Impaired strength  Impaired functional mobility  Pes planus         Treatment     Stacey received the treatments listed below:      Code  (see below chart) Intervention Date/Notes  6/13   MT Manual Therapy Knee flexion mobs x 58min   TE Bike 08 MIN   TE Prone quad stretch 3 x 30s ea   NR Bridging With 20# DB 3 X 10 X 5S   NR Balance SLS 3 X 30S  Tandem on foam 4 x 30s   TE Heel raises dbl with ball squeeze between heels 3 x 15   NR Squat to box 20# KB 24" 3 x 10   NR Step ups  8" 3 x 10 ea   TE Hamstring curl Dbl 30# 3 x 10   NR Lateral band walk RTB 2 laps 10 yds     30 minutes of Therapeutic Exercise (TE) to develop strength, endurance, ROM, and flexibility. (90602)  08 minutes of Manual therapy (MT) to improve pain and ROM. (56189)  20 minutes of Neuromuscular " Re-Education (NMR)  to improve: Balance, Coordination, Kinesthetic, Sense, Proprioception, and Posture. (30573)  00 minutes of Therapeutic Activities (TA) to improve functional performance. (99415)  Unattended Electrical Stimulation (ES) for muscle performance and/or pain modulation. (61844)  Vasopneumatic Device Therapy () for management of swelling/edema. (80742)  BFR: Blood flow restriction applied during exercise  NP: Not Performed    Patient Education and Home Exercises         Education provided:   Physical therapy diagnosis, prognosis, and plan of care.     Written Home Exercises Provided: Patient instructed to cont prior HEP.  Exercises were reviewed and Stacey was able to demonstrate them prior to the end of the session.  Stacey demonstrated good  understanding of the education provided.     See EMR under Patient Instructions for exercises provided prior visit.    Assessment     6/13- Patient continues to have trouble with single leg balance. Good tolerance to progressive strengthening exercises.    Stacey Is progressing well towards her goals.   Pt prognosis is Good.     Pt will continue to benefit from skilled outpatient physical therapy to address the deficits listed in the problem list box on initial evaluation, provide pt/family education and to maximize pt's level of independence in the home and community environment.     Pt's spiritual, cultural and educational needs considered and pt agreeable to plan of care and goals.    Anticipated barriers to physical therapy: chronicity of condition     Goals:     Short Term Goals:  6 weeks Status  Date Met   PAIN: Pt will report worst pain of 4/10 in order to progress toward max functional ability and improve quality of life. [x] Progressing  [x] Met  [] Not Met     FUNCTION: Patient will demonstrate improved function as indicated by a functional score improvement of at least 5 points on FOTO. [x] Progressing  [] Met  [] Not Met     MOBILITY: Patient will  improve AROM to 50% of stated goals, listed in objective measures above, in order to progress towards independence with functional activities.  [x] Progressing  [x] Met  [] Not Met     STRENGTH: Patient will improve strength to 50% of stated goals, listed in objective measures above, in order to progress towards independence with functional activities. [x] Progressing  [x] Met  [] Not Met     POSTURE: Patient will correct postural deviations in sitting and standing, to decrease pain and promote long term stability.  [x] Progressing  [x] Met  [] Not Met     GAIT: Patient will demonstrate improved gait mechanics including nonantalgic gait in order to improve functional mobility, improve quality of life, and decrease risk of further injury or fall.  [x] Progressing  [x] Met  [] Not Met     HEP: Patient will demonstrate independence with HEP in order to progress toward functional independence. [x] Progressing  [x] Met  [] Not Met        Long Term Goals:  12 weeks Status Date Met   PAIN: Pt will report worst pain of 1/10 in order to progress toward max functional ability and improve quality of life [x] Progressing  [] Met  [] Not Met     FUNCTION: Patient will demonstrate improved function as indicated by a FOTO functional score improvement as listed in header. [x] Progressing  [] Met  [] Not Met     MOBILITY: Patient will improve AROM to stated goals, listed in objective measures above, in order to return to maximal functional potential and improve quality of life.  [x] Progressing  [] Met  [] Not Met     STRENGTH: Patient will improve strength to stated goals, listed in objective measures above, in order to improve functional independence and quality of life.  [x] Progressing  [] Met  [] Not Met     GAIT: Patient will demonstrate normalized gait mechanics with minimal compensation in order to return to PLOF. [x] Progressing  [] Met  [] Not Met     Patient will return to normal ADL's, IADL's, community involvement,  recreational activities, and work-related activities with less than or equal to 1/10 pain and maximal function.  [x] Progressing  [] Met  [] Not Met          Plan       Continue to progress per protocol and per patient tolerance      Abdi Benton, PT

## 2024-06-18 ENCOUNTER — CLINICAL SUPPORT (OUTPATIENT)
Facility: HOSPITAL | Age: 59
End: 2024-06-18
Payer: COMMERCIAL

## 2024-06-18 DIAGNOSIS — R29.898 DECREASED STRENGTH OF LOWER EXTREMITY: ICD-10-CM

## 2024-06-18 DIAGNOSIS — Z78.9 IMPAIRED MOBILITY AND ACTIVITIES OF DAILY LIVING: Primary | ICD-10-CM

## 2024-06-18 DIAGNOSIS — Z74.09 IMPAIRED MOBILITY AND ACTIVITIES OF DAILY LIVING: Primary | ICD-10-CM

## 2024-06-18 PROCEDURE — 97110 THERAPEUTIC EXERCISES: CPT | Mod: PN | Performed by: PHYSICAL THERAPIST

## 2024-06-18 PROCEDURE — 97140 MANUAL THERAPY 1/> REGIONS: CPT | Mod: PN | Performed by: PHYSICAL THERAPIST

## 2024-06-18 PROCEDURE — 97112 NEUROMUSCULAR REEDUCATION: CPT | Mod: PN | Performed by: PHYSICAL THERAPIST

## 2024-06-18 NOTE — PROGRESS NOTES
"  [facility]   Physical Therapy Treatment Note      Name: Stacey Wade  Clinic Number: [id number]    Therapy Diagnosis:   Encounter Diagnoses   Name Primary?    Impaired mobility and activities of daily living Yes    Decreased strength of lower extremity          Physician: Wes Faulkner MD     Physician Orders: PT Eval and Treat   Medical Diagnosis from Referral: Bilateral primary osteoarthritis of knee [M17.0], Acute pain of right knee [M25.561]   Evaluation Date: 4/25/2024  Authorization Period Expiration: 12/31/24  Plan of Care Expiration: 7/25/24  Progress Note Due: 6/24/24  Visit # / Visits authorized: 12/ 20     Time In: 0930  Time Out: 1030  Total Billable Time: 53 minutes Billing reflects 1:1 direct supervision    MD follow-up:    Precautions: Standard    FOTO:  Goal: 72%  -Intake: 63%  -Status: 62%  -Discharge: incomplete    Subjective     Pt reports: 6/18- She was on her feet a lot yesterday and her knees were really sore by the end of the night.  She was compliant with home exercise program.  Response to previous treatment: Improving symptoms  Functional change: Gradually improving function    Pain: Minimal/10  Location: bilateral knees     Objective      Objective Measures updated at progress report unless specified otherwise.    Problem List:  Impaired knee range of motion  Impaired strength  Impaired functional mobility  Pes planus         Treatment     Stacey received the treatments listed below:      Code  (see below chart) Intervention Date/Notes  6/18   MT Manual Therapy Knee flexion mobs x 08 min   TE Bike 08 MIN   TE Prone quad stretch 3 x 30s ea   NR Balance SLS 3 X 30S  Tandem on foam 4 x 30s   NR Hamstring bridge feet on box 3 x 10   TE Heel raises dbl with ball squeeze between heels 3 x 15   NR Squat to box 20# KB 24" 3 x 10   NR Step ups  8" 3 x 10 ea   NR Lateral band walk RTB 2 laps 10 yds     20 minutes of Therapeutic Exercise (TE) to develop " strength, endurance, ROM, and flexibility. (09284)  08 minutes of Manual therapy (MT) to improve pain and ROM. (52178)  25 minutes of Neuromuscular Re-Education (NMR)  to improve: Balance, Coordination, Kinesthetic, Sense, Proprioception, and Posture. (86309)  00 minutes of Therapeutic Activities (TA) to improve functional performance. (68672)  Unattended Electrical Stimulation (ES) for muscle performance and/or pain modulation. (92579)  Vasopneumatic Device Therapy () for management of swelling/edema. (77001)  BFR: Blood flow restriction applied during exercise  NP: Not Performed    Patient Education and Home Exercises         Education provided:   Physical therapy diagnosis, prognosis, and plan of care.     Written Home Exercises Provided: Patient instructed to cont prior HEP.  Exercises were reviewed and Stacey was able to demonstrate them prior to the end of the session.  Stacey demonstrated good  understanding of the education provided.     See EMR under Patient Instructions for exercises provided prior visit.    Assessment     6/18- the patient continues to have impaired functional strength and tolerance to ADL. She would benefit from continue to work on building strength and functional stability the lower extremity.    Stacey Is progressing well towards her goals.   Pt prognosis is Good.     Pt will continue to benefit from skilled outpatient physical therapy to address the deficits listed in the problem list box on initial evaluation, provide pt/family education and to maximize pt's level of independence in the home and community environment.     Pt's spiritual, cultural and educational needs considered and pt agreeable to plan of care and goals.    Anticipated barriers to physical therapy: chronicity of condition     Goals:     Short Term Goals:  6 weeks Status  Date Met   PAIN: Pt will report worst pain of 4/10 in order to progress toward max functional ability and improve quality of life. [x]  Progressing  [x] Met  [] Not Met     FUNCTION: Patient will demonstrate improved function as indicated by a functional score improvement of at least 5 points on FOTO. [x] Progressing  [] Met  [] Not Met     MOBILITY: Patient will improve AROM to 50% of stated goals, listed in objective measures above, in order to progress towards independence with functional activities.  [x] Progressing  [x] Met  [] Not Met     STRENGTH: Patient will improve strength to 50% of stated goals, listed in objective measures above, in order to progress towards independence with functional activities. [x] Progressing  [x] Met  [] Not Met     POSTURE: Patient will correct postural deviations in sitting and standing, to decrease pain and promote long term stability.  [x] Progressing  [x] Met  [] Not Met     GAIT: Patient will demonstrate improved gait mechanics including nonantalgic gait in order to improve functional mobility, improve quality of life, and decrease risk of further injury or fall.  [x] Progressing  [x] Met  [] Not Met     HEP: Patient will demonstrate independence with HEP in order to progress toward functional independence. [x] Progressing  [x] Met  [] Not Met        Long Term Goals:  12 weeks Status Date Met   PAIN: Pt will report worst pain of 1/10 in order to progress toward max functional ability and improve quality of life [x] Progressing  [] Met  [] Not Met     FUNCTION: Patient will demonstrate improved function as indicated by a FOTO functional score improvement as listed in header. [x] Progressing  [] Met  [] Not Met     MOBILITY: Patient will improve AROM to stated goals, listed in objective measures above, in order to return to maximal functional potential and improve quality of life.  [x] Progressing  [] Met  [] Not Met     STRENGTH: Patient will improve strength to stated goals, listed in objective measures above, in order to improve functional independence and quality of life.  [x] Progressing  [] Met  [] Not  Met     GAIT: Patient will demonstrate normalized gait mechanics with minimal compensation in order to return to PLOF. [x] Progressing  [] Met  [] Not Met     Patient will return to normal ADL's, IADL's, community involvement, recreational activities, and work-related activities with less than or equal to 1/10 pain and maximal function.  [x] Progressing  [] Met  [] Not Met          Plan       Continue to progress per protocol and per patient tolerance      Abdi Benton, PT

## 2024-06-20 ENCOUNTER — CLINICAL SUPPORT (OUTPATIENT)
Facility: HOSPITAL | Age: 59
End: 2024-06-20
Payer: COMMERCIAL

## 2024-06-20 DIAGNOSIS — Z74.09 IMPAIRED MOBILITY AND ACTIVITIES OF DAILY LIVING: Primary | ICD-10-CM

## 2024-06-20 DIAGNOSIS — R29.898 DECREASED STRENGTH OF LOWER EXTREMITY: ICD-10-CM

## 2024-06-20 DIAGNOSIS — Z78.9 IMPAIRED MOBILITY AND ACTIVITIES OF DAILY LIVING: Primary | ICD-10-CM

## 2024-06-20 PROCEDURE — 97112 NEUROMUSCULAR REEDUCATION: CPT | Mod: PN | Performed by: PHYSICAL THERAPIST

## 2024-06-20 PROCEDURE — 97110 THERAPEUTIC EXERCISES: CPT | Mod: PN | Performed by: PHYSICAL THERAPIST

## 2024-06-20 NOTE — PROGRESS NOTES
"  [facility]   Physical Therapy Treatment Note      Name: Stacey Wade  Clinic Number: [id number]    Therapy Diagnosis:   Encounter Diagnoses   Name Primary?    Impaired mobility and activities of daily living Yes    Decreased strength of lower extremity          Physician: Wes Faulkner MD     Physician Orders: PT Eval and Treat   Medical Diagnosis from Referral: Bilateral primary osteoarthritis of knee [M17.0], Acute pain of right knee [M25.561]   Evaluation Date: 4/25/2024  Authorization Period Expiration: 12/31/24  Plan of Care Expiration: 7/25/24  Progress Note Due: 6/24/24  Visit # / Visits authorized: 13/ 20     Time In: 0730  Time Out: 0834  Total Billable Time: 55 minutes Billing reflects 1:1 direct supervision    MD follow-up:    Precautions: Standard    FOTO:  Goal: 72%  -Intake: 63%  -Status: 62%  -Discharge: incomplete    Subjective     Pt reports: 6/20- Feeling good, still gets some stiffness and discomfort in her knees but feeling better overall.  She was compliant with home exercise program.  Response to previous treatment: Improving symptoms  Functional change: Gradually improving function    Pain: Minimal/10  Location: bilateral knees     Objective      Objective Measures updated at progress report unless specified otherwise.    Problem List:  Impaired knee range of motion  Impaired strength  Impaired functional mobility  Pes planus         Treatment     Stacey received the treatments listed below:      Code  (see below chart) Intervention Date/Notes  6/20   MT Manual Therapy Knee flexion mobs x 5 min   TE Treadmill 08 MIN 1.3 MPH   TE Prone quad stretch 3 x 30s ea   NR Hamstring bridge feet on box 3 x 10   NR Balance SLS 3 X 30S  Manuel walking fwd   TE Heel raises dbl with ball squeeze between heels 3 x 15   NR Squat to box 20# KB 24" 3 x 10   NR Step ups  8" 3 x 10 ea   NR Lateral band walk RTB 2 laps 10 yds     15 minutes of Therapeutic Exercise (TE) to " develop strength, endurance, ROM, and flexibility. (32371)  05 minutes of Manual therapy (MT) to improve pain and ROM. (42771)  35 minutes of Neuromuscular Re-Education (NMR)  to improve: Balance, Coordination, Kinesthetic, Sense, Proprioception, and Posture. (63690)  00 minutes of Therapeutic Activities (TA) to improve functional performance. (24257)  Unattended Electrical Stimulation (ES) for muscle performance and/or pain modulation. (29524)  Vasopneumatic Device Therapy () for management of swelling/edema. (59718)  BFR: Blood flow restriction applied during exercise  NP: Not Performed    Patient Education and Home Exercises         Education provided:   Physical therapy diagnosis, prognosis, and plan of care.     Written Home Exercises Provided: Patient instructed to cont prior HEP.  Exercises were reviewed and Stacey was able to demonstrate them prior to the end of the session.  Stacey demonstrated good  understanding of the education provided.     See EMR under Patient Instructions for exercises provided prior visit.    Assessment     6/20- Patient continues to have some knee joint signs but is progressing well with functional strength. She has continued deficits in single leg stability and motor contorl.    Stacey Is progressing well towards her goals.   Pt prognosis is Good.     Pt will continue to benefit from skilled outpatient physical therapy to address the deficits listed in the problem list box on initial evaluation, provide pt/family education and to maximize pt's level of independence in the home and community environment.     Pt's spiritual, cultural and educational needs considered and pt agreeable to plan of care and goals.    Anticipated barriers to physical therapy: chronicity of condition     Goals:     Short Term Goals:  6 weeks Status  Date Met   PAIN: Pt will report worst pain of 4/10 in order to progress toward max functional ability and improve quality of life. [x] Progressing  [x]  Met  [] Not Met     FUNCTION: Patient will demonstrate improved function as indicated by a functional score improvement of at least 5 points on FOTO. [x] Progressing  [] Met  [] Not Met     MOBILITY: Patient will improve AROM to 50% of stated goals, listed in objective measures above, in order to progress towards independence with functional activities.  [x] Progressing  [x] Met  [] Not Met     STRENGTH: Patient will improve strength to 50% of stated goals, listed in objective measures above, in order to progress towards independence with functional activities. [x] Progressing  [x] Met  [] Not Met     POSTURE: Patient will correct postural deviations in sitting and standing, to decrease pain and promote long term stability.  [x] Progressing  [x] Met  [] Not Met     GAIT: Patient will demonstrate improved gait mechanics including nonantalgic gait in order to improve functional mobility, improve quality of life, and decrease risk of further injury or fall.  [x] Progressing  [x] Met  [] Not Met     HEP: Patient will demonstrate independence with HEP in order to progress toward functional independence. [x] Progressing  [x] Met  [] Not Met        Long Term Goals:  12 weeks Status Date Met   PAIN: Pt will report worst pain of 1/10 in order to progress toward max functional ability and improve quality of life [x] Progressing  [] Met  [] Not Met     FUNCTION: Patient will demonstrate improved function as indicated by a FOTO functional score improvement as listed in header. [x] Progressing  [] Met  [] Not Met     MOBILITY: Patient will improve AROM to stated goals, listed in objective measures above, in order to return to maximal functional potential and improve quality of life.  [x] Progressing  [] Met  [] Not Met     STRENGTH: Patient will improve strength to stated goals, listed in objective measures above, in order to improve functional independence and quality of life.  [x] Progressing  [] Met  [] Not Met     GAIT:  Patient will demonstrate normalized gait mechanics with minimal compensation in order to return to PLOF. [x] Progressing  [] Met  [] Not Met     Patient will return to normal ADL's, IADL's, community involvement, recreational activities, and work-related activities with less than or equal to 1/10 pain and maximal function.  [x] Progressing  [] Met  [] Not Met          Plan       Continue to progress per protocol and per patient tolerance      Abdi Benton, PT

## 2024-06-25 ENCOUNTER — CLINICAL SUPPORT (OUTPATIENT)
Facility: HOSPITAL | Age: 59
End: 2024-06-25
Payer: COMMERCIAL

## 2024-06-25 DIAGNOSIS — Z74.09 IMPAIRED MOBILITY AND ACTIVITIES OF DAILY LIVING: Primary | ICD-10-CM

## 2024-06-25 DIAGNOSIS — R29.898 DECREASED STRENGTH OF LOWER EXTREMITY: ICD-10-CM

## 2024-06-25 DIAGNOSIS — Z78.9 IMPAIRED MOBILITY AND ACTIVITIES OF DAILY LIVING: Primary | ICD-10-CM

## 2024-06-25 PROCEDURE — 97110 THERAPEUTIC EXERCISES: CPT | Mod: PN | Performed by: PHYSICAL THERAPIST

## 2024-06-25 PROCEDURE — 97112 NEUROMUSCULAR REEDUCATION: CPT | Mod: PN | Performed by: PHYSICAL THERAPIST

## 2024-06-25 NOTE — PROGRESS NOTES
[facility]   Physical Therapy Treatment Note      Name: Stacey Wade  Clinic Number: [id number]    Therapy Diagnosis:   Encounter Diagnoses   Name Primary?    Impaired mobility and activities of daily living Yes    Decreased strength of lower extremity          Physician: Wes Faulkner MD     Physician Orders: PT Eval and Treat   Medical Diagnosis from Referral: Bilateral primary osteoarthritis of knee [M17.0], Acute pain of right knee [M25.561]   Evaluation Date: 4/25/2024  Authorization Period Expiration: 12/31/24  Plan of Care Expiration: 7/25/24  Progress Note Due: 7/25/24  Visit # / Visits authorized: 14/ 20     Time In: 0820  Time Out: 0920  Total Billable Time: 59 minutes Billing reflects 1:1 direct supervision    MD follow-up:    Precautions: Standard    FOTO:  Goal: 72%  -Intake: 63%  -Status: 62%, 67%  -Discharge: incomplete    Subjective     Pt reports: 6/25- She notices that the knee is feeling better. She is able to do more around the house.  She was compliant with home exercise program.  Response to previous treatment: Improving symptoms  Functional change: Gradually improving function    Pain: Minimal/10  Location: bilateral knees     Objective      Objective Measures updated at progress report unless specified otherwise.    Problem List:  Impaired knee range of motion  Impaired strength  Impaired functional mobility  Pes planus     Observation:   POSTURE:    GAIT:   L knee varus thrust  R knee valgus  R LATERAL FOOT progression angle        FUNCTIONAL MOVEMENT PATTERNS  Movement Analysis Notes   Squat [x]Functional  []Dysfunctional:  []Painful  [x]Non-Painful                    Balance:     RIGHT    LEFT    Goal   Single Leg 3s 16s 30 seconds       , ,         RANGE OF MOTION:      Hip AROM/PROM Right Left Pain/Dysfunction with Movement Goal   Hip Flexion (120º) 100 100       Hip Extension (30º)           Hip Abduction (45º)           Hip IR (45º) 30 30      "  Hip ER (45º) 45 45           Knee AROM/PROM Right Left Pain/Dysfunction with Movement Goal   Knee Flexion (135º) 120 120  Pain  130   Knee Extension (0º) 1 hyper 3 hyper       Tibial External Rotation (40º}           Tibial Internal Rotation (30º)                  STRENGTH:   L/E MMT Right  (spine) Left Pain/Dysfunction with Movement Goal   Hip Extension  3/5 3/5 R anterior knee pain 4+/5 B   Hip Abduction  4/5  37.2# 4+/5  39.4# B hip pain 4+/5 B   Knee Extension 4/5  47.5 4/5  59.4 R knee pain 5/5 B   Knee Flexion  4/5  32.8# 4/5   33# Seated  5/5 B   Hip IR       4+/5 B   Hip ER       4+/5 B         MUSCLE LENGTH:   Muscle Tested  Right Left  Limitation Goal   Hip Flexors [] Normal  [] Limited [] Normal  [] Limited   Normal B   ITB / TFL [] Normal  [] Limited [] Normal  [] Limited   Normal B   Quadriceps [] Normal  [x] Limited [] Normal  [x] Limited   Normal B   Hamstrings  [] Normal  [] Limited [] Normal  [] Limited   Normal B   Piriformis [] Normal  [] Limited [] Normal  [] Limited   Normal B   Gastrocnemius  [] Normal  [] Limited [] Normal  [] Limited   Normal B            Joint mobility:  Fair hip mobility        SENSATION  [x] Intact to Light Touch                       [] Impaired:        PALPATION: Structures: Increased tenderness to palpation of:      Treatment     Stacey received the treatments listed below:      Code  (see below chart) Intervention Date/Notes  6/24   MT Manual Therapy Knee flexion mobs x 5 min   TE Reassessment 20 min   TE Treadmill 08 MIN 1.3 MPH   TE Prone quad stretch NP   NR Balance NP      NR Hamstring bridge feet on box 3 x 10   TE Heel raises dbl with ball squeeze between heels NP   NR Squat to box 20# KB 24" 3 x 10   NR Step ups  8" 3 x 10 ea   NR Lateral band walk RTB 2 laps 10 yds   TE Knee extension machine SL 2 x 12     30 minutes of Therapeutic Exercise (TE) to develop strength, endurance, ROM, and flexibility. (66402)  05 minutes of Manual therapy (MT) to improve pain " and ROM. (62346)  24 minutes of Neuromuscular Re-Education (NMR)  to improve: Balance, Coordination, Kinesthetic, Sense, Proprioception, and Posture. (77907)  00 minutes of Therapeutic Activities (TA) to improve functional performance. (92566)  Unattended Electrical Stimulation (ES) for muscle performance and/or pain modulation. (16393)  Vasopneumatic Device Therapy () for management of swelling/edema. (26434)  BFR: Blood flow restriction applied during exercise  NP: Not Performed    Patient Education and Home Exercises         Education provided:   Physical therapy diagnosis, prognosis, and plan of care.     Written Home Exercises Provided: Patient instructed to cont prior HEP.  Exercises were reviewed and Stacey was able to demonstrate them prior to the end of the session.  Stacey demonstrated good  understanding of the education provided.     See EMR under Patient Instructions for exercises provided prior visit.    Assessment     6/25- Patient shows progress in overall strength and function. She is noted to have persistent deficits in R quad strength which correlates with strength and single leg stability. She would benefit from continued work on overall strength and LE function    Stacey Is progressing well towards her goals.   Pt prognosis is Good.     Pt will continue to benefit from skilled outpatient physical therapy to address the deficits listed in the problem list box on initial evaluation, provide pt/family education and to maximize pt's level of independence in the home and community environment.     Pt's spiritual, cultural and educational needs considered and pt agreeable to plan of care and goals.    Anticipated barriers to physical therapy: chronicity of condition     Goals:     Short Term Goals:  6 weeks Status  Date Met   PAIN: Pt will report worst pain of 4/10 in order to progress toward max functional ability and improve quality of life. [x] Progressing  [x] Met  [] Not Met     FUNCTION:  Patient will demonstrate improved function as indicated by a functional score improvement of at least 5 points on FOTO. [x] Progressing  [] Met  [] Not Met     MOBILITY: Patient will improve AROM to 50% of stated goals, listed in objective measures above, in order to progress towards independence with functional activities.  [x] Progressing  [x] Met  [] Not Met     STRENGTH: Patient will improve strength to 50% of stated goals, listed in objective measures above, in order to progress towards independence with functional activities. [x] Progressing  [x] Met  [] Not Met     POSTURE: Patient will correct postural deviations in sitting and standing, to decrease pain and promote long term stability.  [x] Progressing  [x] Met  [] Not Met     GAIT: Patient will demonstrate improved gait mechanics including nonantalgic gait in order to improve functional mobility, improve quality of life, and decrease risk of further injury or fall.  [x] Progressing  [x] Met  [] Not Met     HEP: Patient will demonstrate independence with HEP in order to progress toward functional independence. [x] Progressing  [x] Met  [] Not Met        Long Term Goals:  12 weeks Status Date Met   PAIN: Pt will report worst pain of 1/10 in order to progress toward max functional ability and improve quality of life [x] Progressing  [] Met  [] Not Met     FUNCTION: Patient will demonstrate improved function as indicated by a FOTO functional score improvement as listed in header. [x] Progressing  [] Met  [] Not Met     MOBILITY: Patient will improve AROM to stated goals, listed in objective measures above, in order to return to maximal functional potential and improve quality of life.  [x] Progressing  [] Met  [] Not Met     STRENGTH: Patient will improve strength to stated goals, listed in objective measures above, in order to improve functional independence and quality of life.  [x] Progressing  [] Met  [] Not Met     GAIT: Patient will demonstrate  normalized gait mechanics with minimal compensation in order to return to PLOF. [x] Progressing  [] Met  [] Not Met     Patient will return to normal ADL's, IADL's, community involvement, recreational activities, and work-related activities with less than or equal to 1/10 pain and maximal function.  [x] Progressing  [] Met  [] Not Met          Plan       Continue to progress per protocol and per patient tolerance      Abdi Benton, PT

## 2024-06-27 ENCOUNTER — PATIENT MESSAGE (OUTPATIENT)
Facility: HOSPITAL | Age: 59
End: 2024-06-27
Payer: COMMERCIAL

## 2024-07-01 RX ORDER — POTASSIUM CHLORIDE 750 MG/1
10 TABLET, EXTENDED RELEASE ORAL DAILY
Qty: 90 TABLET | Refills: 2 | Status: SHIPPED | OUTPATIENT
Start: 2024-07-01

## 2024-07-01 NOTE — TELEPHONE ENCOUNTER
No care due was identified.  [facility] Embedded Care Due Messages. Reference number: [id number].   7/01/2024 8:03:28 AM CDT

## 2024-07-01 NOTE — TELEPHONE ENCOUNTER
Refill Routing Note   Medication(s) are not appropriate for processing by [facility] for the following reason(s):        New or recently adjusted medication    [facility] action(s):  Defer               Appointments  past 12m or future 3m with PCP    Date Provider   Last Visit   4/29/2024 Savannah Lindsey MD   Next Visit   Visit date not found Savannah Lindsey MD   ED visits in past 90 days: 0        Note composed:9:55 AM 07/01/2024

## 2024-07-02 ENCOUNTER — CLINICAL SUPPORT (OUTPATIENT)
Facility: HOSPITAL | Age: 59
End: 2024-07-02
Payer: COMMERCIAL

## 2024-07-02 DIAGNOSIS — R29.898 DECREASED STRENGTH OF LOWER EXTREMITY: ICD-10-CM

## 2024-07-02 DIAGNOSIS — Z74.09 IMPAIRED MOBILITY AND ACTIVITIES OF DAILY LIVING: Primary | ICD-10-CM

## 2024-07-02 DIAGNOSIS — Z78.9 IMPAIRED MOBILITY AND ACTIVITIES OF DAILY LIVING: Primary | ICD-10-CM

## 2024-07-02 PROCEDURE — 97110 THERAPEUTIC EXERCISES: CPT | Mod: PN | Performed by: PHYSICAL THERAPIST

## 2024-07-02 PROCEDURE — 97112 NEUROMUSCULAR REEDUCATION: CPT | Mod: PN | Performed by: PHYSICAL THERAPIST

## 2024-07-02 NOTE — PROGRESS NOTES
"  [facility]   Physical Therapy Treatment Note      Name: Stacey Wade  Clinic Number: [id number]    Therapy Diagnosis:   No diagnosis found.        Physician: Wes Faulkner MD     Physician Orders: PT Eval and Treat   Medical Diagnosis from Referral: Bilateral primary osteoarthritis of knee [M17.0], Acute pain of right knee [M25.561]   Evaluation Date: 4/25/2024  Authorization Period Expiration: 12/31/24  Plan of Care Expiration: 7/25/24  Progress Note Due: 7/25/24  Visit # / Visits authorized: 15/ 20     Time In: 0820  Time Out: 0930  Total Billable Time: 63 minutes Billing reflects 1:1 direct supervision    MD follow-up:    Precautions: Standard    FOTO:  Goal: 72%  -Intake: 63%  -Status: 62%, 67%  -Discharge: incomplete    Subjective     Pt reports: 7/2- she had to miss last week because she had hives. She is feeling pretty good today. She would like to get a list of home exercises that she can work on at home.  Response to previous treatment: Improving symptoms  Functional change: Gradually improving function    Pain: Minimal/10  Location: bilateral knees     Objective      Objective Measures updated at progress report unless specified otherwise.    Problem List:  Impaired knee range of motion  Impaired strength  Impaired functional mobility  Pes planus      Treatment     Stacey received the treatments listed below:      Code  (see below chart) Intervention Date/Notes  7/2   MT Manual Therapy Knee flexion mobs x 5 min   TE Treadmill 08 MIN 1.3 MPH   TE Prone quad stretch NP   NR Balance Manuel walking fwd, lateral      NR Hamstring bridge feet on ball 3 x 10   TE Heel raises dbl with ball squeeze between heels 3 x 15   NR Squat to box 20# KB 24" 3 x 10   NR Step ups forward 8" 2 x 10 ea   NR Step up lateral 8" 2 x 10   NR Lateral band walk GTB 2 laps 10 yds   TE Knee extension machine SL #20 3 x 10     23 minutes of Therapeutic Exercise (TE) to develop strength, endurance, " ROM, and flexibility. (63870)  05 minutes of Manual therapy (MT) to improve pain and ROM. (64228)  35 minutes of Neuromuscular Re-Education (NMR)  to improve: Balance, Coordination, Kinesthetic, Sense, Proprioception, and Posture. (15626)  00 minutes of Therapeutic Activities (TA) to improve functional performance. (63873)  Unattended Electrical Stimulation (ES) for muscle performance and/or pain modulation. (13264)  Vasopneumatic Device Therapy () for management of swelling/edema. (08568)  BFR: Blood flow restriction applied during exercise  NP: Not Performed    Patient Education and Home Exercises         Education provided:   Physical therapy diagnosis, prognosis, and plan of care.     Written Home Exercises Provided: Patient instructed to cont prior HEP.  Exercises were reviewed and Stacey was able to demonstrate them prior to the end of the session.  Stacey demonstrated good  understanding of the education provided.     See EMR under Patient Instructions for exercises provided prior visit.    Assessment     7/2- Patient continues to have impaired thigh, strength and hip strength. She was noted to have some hip stiffness at the beginning of treatment that was improved with manual therapy. Good tolerance to addition of multiplane step exercises today.    Stacey Is progressing well towards her goals.   Pt prognosis is Good.     Pt will continue to benefit from skilled outpatient physical therapy to address the deficits listed in the problem list box on initial evaluation, provide pt/family education and to maximize pt's level of independence in the home and community environment.     Pt's spiritual, cultural and educational needs considered and pt agreeable to plan of care and goals.    Anticipated barriers to physical therapy: chronicity of condition     Goals:     Short Term Goals:  6 weeks Status  Date Met   PAIN: Pt will report worst pain of 4/10 in order to progress toward max functional ability and improve  quality of life. [x] Progressing  [x] Met  [] Not Met     FUNCTION: Patient will demonstrate improved function as indicated by a functional score improvement of at least 5 points on FOTO. [x] Progressing  [] Met  [] Not Met     MOBILITY: Patient will improve AROM to 50% of stated goals, listed in objective measures above, in order to progress towards independence with functional activities.  [x] Progressing  [x] Met  [] Not Met     STRENGTH: Patient will improve strength to 50% of stated goals, listed in objective measures above, in order to progress towards independence with functional activities. [x] Progressing  [x] Met  [] Not Met     POSTURE: Patient will correct postural deviations in sitting and standing, to decrease pain and promote long term stability.  [x] Progressing  [x] Met  [] Not Met     GAIT: Patient will demonstrate improved gait mechanics including nonantalgic gait in order to improve functional mobility, improve quality of life, and decrease risk of further injury or fall.  [x] Progressing  [x] Met  [] Not Met     HEP: Patient will demonstrate independence with HEP in order to progress toward functional independence. [x] Progressing  [x] Met  [] Not Met        Long Term Goals:  12 weeks Status Date Met   PAIN: Pt will report worst pain of 1/10 in order to progress toward max functional ability and improve quality of life [x] Progressing  [] Met  [] Not Met     FUNCTION: Patient will demonstrate improved function as indicated by a FOTO functional score improvement as listed in header. [x] Progressing  [] Met  [] Not Met     MOBILITY: Patient will improve AROM to stated goals, listed in objective measures above, in order to return to maximal functional potential and improve quality of life.  [x] Progressing  [] Met  [] Not Met     STRENGTH: Patient will improve strength to stated goals, listed in objective measures above, in order to improve functional independence and quality of life.  [x]  Progressing  [] Met  [] Not Met     GAIT: Patient will demonstrate normalized gait mechanics with minimal compensation in order to return to PLOF. [x] Progressing  [] Met  [] Not Met     Patient will return to normal ADL's, IADL's, community involvement, recreational activities, and work-related activities with less than or equal to 1/10 pain and maximal function.  [x] Progressing  [] Met  [] Not Met          Plan       Continue to progress per protocol and per patient tolerance      Abdi Benton, PT

## 2024-07-05 ENCOUNTER — CLINICAL SUPPORT (OUTPATIENT)
Facility: HOSPITAL | Age: 59
End: 2024-07-05
Payer: COMMERCIAL

## 2024-07-05 DIAGNOSIS — Z74.09 IMPAIRED MOBILITY AND ACTIVITIES OF DAILY LIVING: Primary | ICD-10-CM

## 2024-07-05 DIAGNOSIS — Z78.9 IMPAIRED MOBILITY AND ACTIVITIES OF DAILY LIVING: Primary | ICD-10-CM

## 2024-07-05 DIAGNOSIS — R29.898 DECREASED STRENGTH OF LOWER EXTREMITY: ICD-10-CM

## 2024-07-05 PROCEDURE — 97112 NEUROMUSCULAR REEDUCATION: CPT | Mod: PN | Performed by: PHYSICAL THERAPIST

## 2024-07-05 PROCEDURE — 97110 THERAPEUTIC EXERCISES: CPT | Mod: PN | Performed by: PHYSICAL THERAPIST

## 2024-07-05 PROCEDURE — 97140 MANUAL THERAPY 1/> REGIONS: CPT | Mod: PN | Performed by: PHYSICAL THERAPIST

## 2024-07-05 NOTE — PROGRESS NOTES
"[facility]   Physical Therapy Treatment Note      Name: Stacey Wade  Clinic Number: [id number]    Therapy Diagnosis:   Encounter Diagnoses   Name Primary?    Impaired mobility and activities of daily living Yes    Decreased strength of lower extremity            Physician: Wes Faulkner MD     Physician Orders: PT Eval and Treat   Medical Diagnosis from Referral: Bilateral primary osteoarthritis of knee [M17.0], Acute pain of right knee [M25.561]   Evaluation Date: 4/25/2024  Authorization Period Expiration: 12/31/24  Plan of Care Expiration: 7/25/24  Progress Note Due: 7/25/24  Visit # / Visits authorized: 16/ 20     Time In: 0822  Time Out: 0925  Total Billable Time: 58 minutes Billing reflects 1:1 direct supervision    MD follow-up:    Precautions: Standard    FOTO:  Goal: 72%  -Intake: 63%  -Status: 62%, 67%  -Discharge: incomplete    Subjective     Pt reports: 7/5- she is feeling really good today.  Response to previous treatment: Improving symptoms  Functional change: Gradually improving function    Pain: Minimal/10  Location: bilateral knees     Objective      Objective Measures updated at progress report unless specified otherwise.    Problem List:  Impaired knee range of motion  Impaired strength  Impaired functional mobility  Pes planus      Treatment     Stacey received the treatments listed below:      Code  (see below chart) Intervention Date/Notes  7/5   MT Manual Therapy Knee flexion mobs x 5 min  Hip gapping R   TE Treadmill 10 MIN 1.6 MPH   TE Prone quad stretch 4 x 30s ea   NR Hamstring bridge feet on ball 3 x 10   NR Balance Manuel walking fwd, lateral      TE Heel raises dbl with ball squeeze between heels 3 x 15   NR Step ups forward 8" 2 x 10 ea   NR Step up lateral 8" 2 x 10   NR Squat to box 20# KB 24" 3 x 10   NR Lateral band walk GTB 2 laps 10 yds   TE Knee extension machine SL #20 3 x 10     20 minutes of Therapeutic Exercise (TE) to develop " strength, endurance, ROM, and flexibility. (08572)  08 minutes of Manual therapy (MT) to improve pain and ROM. (13272)  30 minutes of Neuromuscular Re-Education (NMR)  to improve: Balance, Coordination, Kinesthetic, Sense, Proprioception, and Posture. (64478)  00 minutes of Therapeutic Activities (TA) to improve functional performance. (74400)  Unattended Electrical Stimulation (ES) for muscle performance and/or pain modulation. (03006)  Vasopneumatic Device Therapy () for management of swelling/edema. (41501)  BFR: Blood flow restriction applied during exercise  NP: Not Performed    Patient Education and Home Exercises         Education provided:   Physical therapy diagnosis, prognosis, and plan of care.     Written Home Exercises Provided: Patient instructed to cont prior HEP.  Exercises were reviewed and Stacey was able to demonstrate them prior to the end of the session.  Stacey demonstrated good  understanding of the education provided.     See EMR under Patient Instructions for exercises provided prior visit.    Assessment     7/5- Pt continues to make very good progress with functional loading. Continued deficits in single leg stability. Continue to progress.    Stacey Is progressing well towards her goals.   Pt prognosis is Good.     Pt will continue to benefit from skilled outpatient physical therapy to address the deficits listed in the problem list box on initial evaluation, provide pt/family education and to maximize pt's level of independence in the home and community environment.     Pt's spiritual, cultural and educational needs considered and pt agreeable to plan of care and goals.    Anticipated barriers to physical therapy: chronicity of condition     Goals:     Short Term Goals:  6 weeks Status  Date Met   PAIN: Pt will report worst pain of 4/10 in order to progress toward max functional ability and improve quality of life. [x] Progressing  [x] Met  [] Not Met     FUNCTION: Patient will  demonstrate improved function as indicated by a functional score improvement of at least 5 points on FOTO. [x] Progressing  [] Met  [] Not Met     MOBILITY: Patient will improve AROM to 50% of stated goals, listed in objective measures above, in order to progress towards independence with functional activities.  [x] Progressing  [x] Met  [] Not Met     STRENGTH: Patient will improve strength to 50% of stated goals, listed in objective measures above, in order to progress towards independence with functional activities. [x] Progressing  [x] Met  [] Not Met     POSTURE: Patient will correct postural deviations in sitting and standing, to decrease pain and promote long term stability.  [x] Progressing  [x] Met  [] Not Met     GAIT: Patient will demonstrate improved gait mechanics including nonantalgic gait in order to improve functional mobility, improve quality of life, and decrease risk of further injury or fall.  [x] Progressing  [x] Met  [] Not Met     HEP: Patient will demonstrate independence with HEP in order to progress toward functional independence. [x] Progressing  [x] Met  [] Not Met        Long Term Goals:  12 weeks Status Date Met   PAIN: Pt will report worst pain of 1/10 in order to progress toward max functional ability and improve quality of life [x] Progressing  [] Met  [] Not Met     FUNCTION: Patient will demonstrate improved function as indicated by a FOTO functional score improvement as listed in header. [x] Progressing  [] Met  [] Not Met     MOBILITY: Patient will improve AROM to stated goals, listed in objective measures above, in order to return to maximal functional potential and improve quality of life.  [x] Progressing  [] Met  [] Not Met     STRENGTH: Patient will improve strength to stated goals, listed in objective measures above, in order to improve functional independence and quality of life.  [x] Progressing  [] Met  [] Not Met     GAIT: Patient will demonstrate normalized gait  mechanics with minimal compensation in order to return to PLOF. [x] Progressing  [] Met  [] Not Met     Patient will return to normal ADL's, IADL's, community involvement, recreational activities, and work-related activities with less than or equal to 1/10 pain and maximal function.  [x] Progressing  [] Met  [] Not Met          Plan       Continue to progress per protocol and per patient tolerance      Abdi Benton, PT

## 2024-07-09 ENCOUNTER — CLINICAL SUPPORT (OUTPATIENT)
Facility: HOSPITAL | Age: 59
End: 2024-07-09
Payer: COMMERCIAL

## 2024-07-09 DIAGNOSIS — R29.898 DECREASED STRENGTH OF LOWER EXTREMITY: ICD-10-CM

## 2024-07-09 DIAGNOSIS — Z74.09 IMPAIRED MOBILITY AND ACTIVITIES OF DAILY LIVING: Primary | ICD-10-CM

## 2024-07-09 DIAGNOSIS — Z78.9 IMPAIRED MOBILITY AND ACTIVITIES OF DAILY LIVING: Primary | ICD-10-CM

## 2024-07-09 PROCEDURE — 97110 THERAPEUTIC EXERCISES: CPT | Mod: PN | Performed by: PHYSICAL THERAPIST

## 2024-07-09 PROCEDURE — 97112 NEUROMUSCULAR REEDUCATION: CPT | Mod: PN | Performed by: PHYSICAL THERAPIST

## 2024-07-09 NOTE — PROGRESS NOTES
"[facility]   Physical Therapy Treatment Note      Name: Stacey Wade  Clinic Number: [id number]    Therapy Diagnosis:   Encounter Diagnoses   Name Primary?    Impaired mobility and activities of daily living Yes    Decreased strength of lower extremity            Physician: Wes Faulkner MD     Physician Orders: PT Eval and Treat   Medical Diagnosis from Referral: Bilateral primary osteoarthritis of knee [M17.0], Acute pain of right knee [M25.561]   Evaluation Date: 4/25/2024  Authorization Period Expiration: 12/31/24  Plan of Care Expiration: 7/25/24  Progress Note Due: 7/25/24  Visit # / Visits authorized: 17/ 20     Time In: 8:10 am  Time Out: 0915  Total Billable Time: 59 minutes Billing reflects 1:1 direct supervision    MD follow-up:    Precautions: Standard    FOTO:  Goal: 72%  -Intake: 63%  -Status: 62%, 67%  -Discharge: incomplete    Subjective     Pt reports: 7/9-knee got sore at work the other day. Maybe the weather .  Response to previous treatment: Improving symptoms  Functional change: Gradually improving function    Pain: Minimal/10  Location: bilateral knees     Objective      Objective Measures updated at progress report unless specified otherwise.    Problem List:  Impaired knee range of motion  Impaired strength  Impaired functional mobility  Pes planus      Treatment     Stacey received the treatments listed below:      Code  (see below chart) Intervention Date/Notes  7/9   MT Manual Therapy Knee flexion mobs x 5 min   TE Treadmill 10 MIN 1.6 MPH   TE Prone quad stretch 4 x 30s ea   NR Hamstring bridge feet on ball 3 x 10   NR Balance Manuel walking fwd, lateral      TE Heel raises dbl with ball squeeze between heels 3 x 15   NR Step ups forward 8" 2 x 10 ea   NR Step up lateral 8" 2 x 10   NR Squat to box 20# KB 20" 3 x 10   NR Lateral band walk GTB 2 laps 10 yds   TE Knee extension machine SL #20 3 x 10     24 minutes of Therapeutic Exercise (TE) to " develop strength, endurance, ROM, and flexibility. (03959)  05 minutes of Manual therapy (MT) to improve pain and ROM. (02671)  30 minutes of Neuromuscular Re-Education (NMR)  to improve: Balance, Coordination, Kinesthetic, Sense, Proprioception, and Posture. (17991)  00 minutes of Therapeutic Activities (TA) to improve functional performance. (84917)  Unattended Electrical Stimulation (ES) for muscle performance and/or pain modulation. (10000)  Vasopneumatic Device Therapy () for management of swelling/edema. (69883)  BFR: Blood flow restriction applied during exercise  NP: Not Performed    Patient Education and Home Exercises         Education provided:   Physical therapy diagnosis, prognosis, and plan of care.     Written Home Exercises Provided: Patient instructed to cont prior HEP.  Exercises were reviewed and Stacey was able to demonstrate them prior to the end of the session.  Stacey demonstrated good  understanding of the education provided.     See EMR under Patient Instructions for exercises provided prior visit.    Assessment     7/9- Pt noted to have less exacerbation of symptoms from day to day, but continues to have decreased capacity that would benefit from continued skilled care. Plan to work through the end of her plan of care and then discharge to home program.    Stacey Is progressing well towards her goals.   Pt prognosis is Good.     Pt will continue to benefit from skilled outpatient physical therapy to address the deficits listed in the problem list box on initial evaluation, provide pt/family education and to maximize pt's level of independence in the home and community environment.     Pt's spiritual, cultural and educational needs considered and pt agreeable to plan of care and goals.    Anticipated barriers to physical therapy: chronicity of condition     Goals:     Short Term Goals:  6 weeks Status  Date Met   PAIN: Pt will report worst pain of 4/10 in order to progress toward max  functional ability and improve quality of life. [x] Progressing  [x] Met  [] Not Met     FUNCTION: Patient will demonstrate improved function as indicated by a functional score improvement of at least 5 points on FOTO. [x] Progressing  [] Met  [] Not Met     MOBILITY: Patient will improve AROM to 50% of stated goals, listed in objective measures above, in order to progress towards independence with functional activities.  [x] Progressing  [x] Met  [] Not Met     STRENGTH: Patient will improve strength to 50% of stated goals, listed in objective measures above, in order to progress towards independence with functional activities. [x] Progressing  [x] Met  [] Not Met     POSTURE: Patient will correct postural deviations in sitting and standing, to decrease pain and promote long term stability.  [x] Progressing  [x] Met  [] Not Met     GAIT: Patient will demonstrate improved gait mechanics including nonantalgic gait in order to improve functional mobility, improve quality of life, and decrease risk of further injury or fall.  [x] Progressing  [x] Met  [] Not Met     HEP: Patient will demonstrate independence with HEP in order to progress toward functional independence. [x] Progressing  [x] Met  [] Not Met        Long Term Goals:  12 weeks Status Date Met   PAIN: Pt will report worst pain of 1/10 in order to progress toward max functional ability and improve quality of life [x] Progressing  [] Met  [] Not Met     FUNCTION: Patient will demonstrate improved function as indicated by a FOTO functional score improvement as listed in header. [x] Progressing  [] Met  [] Not Met     MOBILITY: Patient will improve AROM to stated goals, listed in objective measures above, in order to return to maximal functional potential and improve quality of life.  [x] Progressing  [] Met  [] Not Met     STRENGTH: Patient will improve strength to stated goals, listed in objective measures above, in order to improve functional independence and  quality of life.  [x] Progressing  [] Met  [] Not Met     GAIT: Patient will demonstrate normalized gait mechanics with minimal compensation in order to return to PLOF. [x] Progressing  [] Met  [] Not Met     Patient will return to normal ADL's, IADL's, community involvement, recreational activities, and work-related activities with less than or equal to 1/10 pain and maximal function.  [x] Progressing  [] Met  [] Not Met          Plan       Continue to progress per protocol and per patient tolerance      Abdi Benton, PT

## 2024-07-11 ENCOUNTER — CLINICAL SUPPORT (OUTPATIENT)
Facility: HOSPITAL | Age: 59
End: 2024-07-11
Payer: COMMERCIAL

## 2024-07-11 DIAGNOSIS — Z78.9 IMPAIRED MOBILITY AND ACTIVITIES OF DAILY LIVING: Primary | ICD-10-CM

## 2024-07-11 DIAGNOSIS — R29.898 DECREASED STRENGTH OF LOWER EXTREMITY: ICD-10-CM

## 2024-07-11 DIAGNOSIS — Z74.09 IMPAIRED MOBILITY AND ACTIVITIES OF DAILY LIVING: Primary | ICD-10-CM

## 2024-07-11 PROCEDURE — 97110 THERAPEUTIC EXERCISES: CPT | Mod: PN | Performed by: PHYSICAL THERAPIST

## 2024-07-11 PROCEDURE — 97112 NEUROMUSCULAR REEDUCATION: CPT | Mod: PN | Performed by: PHYSICAL THERAPIST

## 2024-07-11 NOTE — PROGRESS NOTES
"[facility]   Physical Therapy Treatment Note      Name: Stacey Wade  Clinic Number: [id number]    Therapy Diagnosis:   Encounter Diagnoses   Name Primary?    Impaired mobility and activities of daily living Yes    Decreased strength of lower extremity        Physician: Wes Faulkner MD     Physician Orders: PT Eval and Treat   Medical Diagnosis from Referral: Bilateral primary osteoarthritis of knee [M17.0], Acute pain of right knee [M25.561]   Evaluation Date: 4/25/2024  Authorization Period Expiration: 12/31/24  Plan of Care Expiration: 7/25/24  Progress Note Due: 7/25/24  Visit # / Visits authorized: 18/ 20     Time In: 8:05 am  Time Out: 09:15 am  Total Billable Time: 63 minutes Billing reflects 1:1 direct supervision    MD follow-up:    Precautions: Standard    FOTO:  Goal: 72%  -Intake: 63%  -Status: 62%, 67%  -Discharge: incomplete    Subjective     Pt reports: 7/11- she is feeling good today. No new complaints.  Response to previous treatment: Improving symptoms  Functional change: Gradually improving function    Pain: Minimal/10  Location: bilateral knees     Objective      Objective Measures updated at progress report unless specified otherwise.    Problem List:  Impaired knee range of motion  Impaired strength  Impaired functional mobility  Pes planus      Treatment     Stacey received the treatments listed below:      Code  (see below chart) Intervention Date/Notes 7/11   MT Manual Therapy Knee flexion mobs x 5 min   TE Treadmill 10 MIN 1.8 MPH   TE Prone quad stretch 4 x 30s ea   NR Hamstring bridge feet on ball 3 x 10   NR Balance Manuel walking fwd, lateral   TE Heel raises dbl with ball squeeze between heels 3 x 15   NR Step ups forward 8" 2 x 10 ea   NR Step up lateral 8" 2 x 10   NR Squat to box 20# KB 20" 3 x 10   NR Lateral band walk GTB 2 laps 10 yds   TE Knee extension machine DL #25 3 x 10   TE Leg curl DL #25 3 x 10      05 minutes of Manual therapy " (MT) to improve pain and ROM. (92533)  28 minutes of Therapeutic Exercise (TE) to develop strength, endurance, ROM, and flexibility. (26421)  30 minutes of Neuromuscular Re-Education (NR)  to improve: Balance, Coordination, Kinesthetic, Sense, Proprioception, and Posture. (79067)  00 minutes of Therapeutic Activities (TA) to improve functional performance. (82114)  Unattended Electrical Stimulation (ES) for muscle performance and/or pain modulation. (90705)  Vasopneumatic Device Therapy () for management of swelling/edema. (12371)  BFR: Blood flow restriction applied during exercise  NP: Not Performed  Patient Education and Home Exercises         Education provided:   Physical therapy diagnosis, prognosis, and plan of care.     Written Home Exercises Provided: Patient instructed to cont prior HEP.  Exercises were reviewed and Stacey was able to demonstrate them prior to the end of the session.  Stacey demonstrated good  understanding of the education provided.     See EMR under Patient Instructions for exercises provided prior visit.    Assessment     7/11- the patient has progressed very well to this point. We will plan to transition to a home exercise program next week.    Stacey Is progressing well towards her goals.   Pt prognosis is Good.     Pt will continue to benefit from skilled outpatient physical therapy to address the deficits listed in the problem list box on initial evaluation, provide pt/family education and to maximize pt's level of independence in the home and community environment.     Pt's spiritual, cultural and educational needs considered and pt agreeable to plan of care and goals.    Anticipated barriers to physical therapy: chronicity of condition     Goals:     Short Term Goals:  6 weeks Status  Date Met   PAIN: Pt will report worst pain of 4/10 in order to progress toward max functional ability and improve quality of life. [x] Progressing  [x] Met  [] Not Met     FUNCTION: Patient will  demonstrate improved function as indicated by a functional score improvement of at least 5 points on FOTO. [x] Progressing  [] Met  [] Not Met     MOBILITY: Patient will improve AROM to 50% of stated goals, listed in objective measures above, in order to progress towards independence with functional activities.  [x] Progressing  [x] Met  [] Not Met     STRENGTH: Patient will improve strength to 50% of stated goals, listed in objective measures above, in order to progress towards independence with functional activities. [x] Progressing  [x] Met  [] Not Met     POSTURE: Patient will correct postural deviations in sitting and standing, to decrease pain and promote long term stability.  [x] Progressing  [x] Met  [] Not Met     GAIT: Patient will demonstrate improved gait mechanics including nonantalgic gait in order to improve functional mobility, improve quality of life, and decrease risk of further injury or fall.  [x] Progressing  [x] Met  [] Not Met     HEP: Patient will demonstrate independence with HEP in order to progress toward functional independence. [x] Progressing  [x] Met  [] Not Met        Long Term Goals:  12 weeks Status Date Met   PAIN: Pt will report worst pain of 1/10 in order to progress toward max functional ability and improve quality of life [x] Progressing  [] Met  [] Not Met     FUNCTION: Patient will demonstrate improved function as indicated by a FOTO functional score improvement as listed in header. [x] Progressing  [] Met  [] Not Met     MOBILITY: Patient will improve AROM to stated goals, listed in objective measures above, in order to return to maximal functional potential and improve quality of life.  [x] Progressing  [] Met  [] Not Met     STRENGTH: Patient will improve strength to stated goals, listed in objective measures above, in order to improve functional independence and quality of life.  [x] Progressing  [] Met  [] Not Met     GAIT: Patient will demonstrate normalized gait  mechanics with minimal compensation in order to return to PLOF. [x] Progressing  [] Met  [] Not Met     Patient will return to normal ADL's, IADL's, community involvement, recreational activities, and work-related activities with less than or equal to 1/10 pain and maximal function.  [x] Progressing  [] Met  [] Not Met          Plan       Continue to progress per protocol and per patient tolerance      Abdi Benton, PT

## 2024-07-15 ENCOUNTER — PATIENT MESSAGE (OUTPATIENT)
Dept: FAMILY MEDICINE | Facility: CLINIC | Age: 59
End: 2024-07-15
Payer: COMMERCIAL

## 2024-07-15 ENCOUNTER — TELEPHONE (OUTPATIENT)
Dept: FAMILY MEDICINE | Facility: CLINIC | Age: 59
End: 2024-07-15
Payer: COMMERCIAL

## 2024-07-16 ENCOUNTER — OFFICE VISIT (OUTPATIENT)
Dept: FAMILY MEDICINE | Facility: CLINIC | Age: 59
End: 2024-07-16
Payer: COMMERCIAL

## 2024-07-16 ENCOUNTER — TELEPHONE (OUTPATIENT)
Dept: FAMILY MEDICINE | Facility: CLINIC | Age: 59
End: 2024-07-16

## 2024-07-16 VITALS
OXYGEN SATURATION: 97 % | TEMPERATURE: 97 F | SYSTOLIC BLOOD PRESSURE: 106 MMHG | HEART RATE: 87 BPM | RESPIRATION RATE: 18 BRPM | WEIGHT: 192.38 LBS | DIASTOLIC BLOOD PRESSURE: 72 MMHG | HEIGHT: 62 IN | BODY MASS INDEX: 35.4 KG/M2

## 2024-07-16 DIAGNOSIS — L50.9 URTICARIA: Primary | ICD-10-CM

## 2024-07-16 DIAGNOSIS — R09.82 POSTNASAL DRIP: ICD-10-CM

## 2024-07-16 PROCEDURE — 3044F HG A1C LEVEL LT 7.0%: CPT | Mod: CPTII,S$GLB,, | Performed by: INTERNAL MEDICINE

## 2024-07-16 PROCEDURE — 1159F MED LIST DOCD IN RCRD: CPT | Mod: CPTII,S$GLB,, | Performed by: INTERNAL MEDICINE

## 2024-07-16 PROCEDURE — 3074F SYST BP LT 130 MM HG: CPT | Mod: CPTII,S$GLB,, | Performed by: INTERNAL MEDICINE

## 2024-07-16 PROCEDURE — 3078F DIAST BP <80 MM HG: CPT | Mod: CPTII,S$GLB,, | Performed by: INTERNAL MEDICINE

## 2024-07-16 PROCEDURE — 99213 OFFICE O/P EST LOW 20 MIN: CPT | Mod: S$GLB,,, | Performed by: INTERNAL MEDICINE

## 2024-07-16 PROCEDURE — 3008F BODY MASS INDEX DOCD: CPT | Mod: CPTII,S$GLB,, | Performed by: INTERNAL MEDICINE

## 2024-07-16 PROCEDURE — 99999 PR PBB SHADOW E&M-EST. PATIENT-LVL V: CPT | Mod: PBBFAC,,, | Performed by: INTERNAL MEDICINE

## 2024-07-16 RX ORDER — METHYLPREDNISOLONE 4 MG/1
TABLET ORAL
Qty: 21 EACH | Refills: 0 | Status: SHIPPED | OUTPATIENT
Start: 2024-07-16

## 2024-07-16 NOTE — PROGRESS NOTES
Subjective     Patient ID: Stacey Wade is a 58 y.o. female.    Chief Complaint: Urticaria (Chronic condition  after injections )      HPI  Patient presents with chronic urticaria. She reports she will be due for an injection in 1 week but she is having an increase in hives. She also reports postnasal drip.  Review of Systems   Constitutional:  Negative for chills and fatigue.   HENT:  Positive for postnasal drip.    Respiratory:  Negative for chest tightness and shortness of breath.    Cardiovascular:  Negative for chest pain and palpitations.   Gastrointestinal:  Negative for abdominal pain, constipation, diarrhea, nausea and vomiting.   Genitourinary:  Negative for frequency and urgency.   Integumentary:  Positive for rash (hives).   Neurological:  Negative for dizziness, numbness and headaches.          Objective       Current Outpatient Medications:     albuterol (PROVENTIL/VENTOLIN HFA) 90 mcg/actuation inhaler, Inhale 2 puffs into the lungs every 4 to 6 hours as needed for Wheezing., Disp: 18 g, Rfl: 0    aspirin (ECOTRIN) 81 MG EC tablet, Take 1 tablet by mouth every morning., Disp: , Rfl:     betamethasone dipropionate 0.05 % cream, APPLY TOPICALLY 2 TIMES DAILY., Disp: 45 g, Rfl: 2    butalbital-acetaminophen-caffeine -40 mg (FIORICET, ESGIC) -40 mg per tablet, TAKE 1 TABLET BY MOUTH EVERY 6 HOURS AS NEEDED FOR PAIN OR FOR HEADACHE, Disp: 30 tablet, Rfl: 0    clorazepate (TRANXENE) 3.75 MG Tab, TAKE 1 TABLET BY MOUTH EVERY DAY AS NEEDED, Disp: 30 tablet, Rfl: 0    clotrimazole-betamethasone 1-0.05% (LOTRISONE) cream, APPLY TOPICALLY TWICE DAILY AS DIRECTED, Disp: 45 g, Rfl: 2    diclofenac sodium (VOLTAREN) 1 % Gel, Apply 2 g topically 4 (four) times daily., Disp: 100 g, Rfl: 2    doxepin (SINEQUAN) 10 MG capsule, TAKE TWO CAPSULES BY MOUTH THREE TIMES DAILY, Disp: 540 capsule, Rfl: 2    EPINEPHrine (EPIPEN) 0.3 mg/0.3 mL AtIn, Inject 0.3 mg into the muscle daily as needed., Disp: , Rfl:      famotidine (PEPCID) 40 MG tablet, Take 0.5 tablets (20 mg total) by mouth 2 (two) times daily., Disp: 30 tablet, Rfl: 0    fexofenadine (ALLEGRA) 180 MG tablet, Take 1 tablet (180 mg total) by mouth once daily. In AM, Disp: 30 tablet, Rfl: 0    fluocinonide (LIDEX) 0.05 % ointment, Apply topically 2 (two) times daily. Do not use longer than 2 weeks at a time, Disp: 30 g, Rfl: 1    hydrOXYzine HCL (ATARAX) 25 MG tablet, TAKE ONE TABLET BY MOUTH FOUR TIMES DAILY AS NEEDED FOR ITCHING, Disp: 120 tablet, Rfl: 2    methylPREDNISolone (MEDROL DOSEPACK) 4 mg tablet, use as directed, Disp: 21 each, Rfl: 0    mirabegron (MYRBETRIQ) 25 mg Tb24 ER tablet, Take 1 tablet (25 mg total) by mouth once daily., Disp: 90 tablet, Rfl: 3    mometasone (NASONEX) 50 mcg/actuation nasal spray, INHALE 2 SPRAYS INTO THE NOSTRIL ONCE A DAY, Disp: 51 g, Rfl: 3    montelukast (SINGULAIR) 10 mg tablet, Take 1 tablet (10 mg total) by mouth once daily., Disp: 90 tablet, Rfl: 3    omalizumab (XOLAIR) 150 mg/mL injection, Inject 300 mg into the skin., Disp: , Rfl:     potassium chloride SA (K-DUR,KLOR-CON M) 10 MEQ tablet, Take 1 tablet (10 mEq total) by mouth once daily., Disp: 90 tablet, Rfl: 2    rizatriptan (MAXALT) 10 MG tablet, TAKE 1 TABLET BY MOUTH AS NEEDED FOR MIGRAINE. MAY REPEAT IN 2 HOURS. MAX 2 TABS PER 24 HOURS, Disp: 27 tablet, Rfl: 3    tacrolimus (PROTOPIC) 0.03 % ointment, Apply topically 2 (two) times daily., Disp: , Rfl:     triamterene-hydrochlorothiazide 37.5-25 mg (DYAZIDE) 37.5-25 mg per capsule, Take 1 capsule by mouth once daily., Disp: 90 capsule, Rfl: 3     Physical Exam  Constitutional:       General: She is not in acute distress.     Appearance: Normal appearance. She is obese.   HENT:      Head: Normocephalic and atraumatic.   Cardiovascular:      Rate and Rhythm: Normal rate and regular rhythm.      Heart sounds: Normal heart sounds. No murmur heard.     No friction rub. No gallop.   Pulmonary:      Effort:  Pulmonary effort is normal.      Breath sounds: Normal breath sounds. No wheezing, rhonchi or rales.   Abdominal:      General: Bowel sounds are normal. There is no distension.      Palpations: Abdomen is soft.      Tenderness: There is no abdominal tenderness. There is no rebound.   Skin:     General: Skin is warm and dry.      Coloration: Skin is not jaundiced.   Neurological:      General: No focal deficit present.      Mental Status: She is alert and oriented to person, place, and time. Mental status is at baseline.   Psychiatric:         Mood and Affect: Mood normal.         Behavior: Behavior normal.            Assessment and Plan     1. Urticaria  Comments:  Will prescribe a medrol dose pack. Advised patient to follow up with Allergist.  Orders:  -     methylPREDNISolone (MEDROL DOSEPACK) 4 mg tablet; use as directed  Dispense: 21 each; Refill: 0    2. Postnasal drip  Comments:  Medrol dose pack will help with symptoms               No follow-ups on file.

## 2024-07-18 ENCOUNTER — PATIENT MESSAGE (OUTPATIENT)
Facility: HOSPITAL | Age: 59
End: 2024-07-18
Payer: COMMERCIAL

## 2024-07-23 ENCOUNTER — CLINICAL SUPPORT (OUTPATIENT)
Facility: HOSPITAL | Age: 59
End: 2024-07-23
Payer: COMMERCIAL

## 2024-07-23 DIAGNOSIS — Z74.09 IMPAIRED MOBILITY AND ACTIVITIES OF DAILY LIVING: Primary | ICD-10-CM

## 2024-07-23 DIAGNOSIS — Z78.9 IMPAIRED MOBILITY AND ACTIVITIES OF DAILY LIVING: Primary | ICD-10-CM

## 2024-07-23 DIAGNOSIS — R29.898 DECREASED STRENGTH OF LOWER EXTREMITY: ICD-10-CM

## 2024-07-23 PROCEDURE — 97140 MANUAL THERAPY 1/> REGIONS: CPT | Mod: PN | Performed by: PHYSICAL THERAPIST

## 2024-07-23 PROCEDURE — 97112 NEUROMUSCULAR REEDUCATION: CPT | Mod: PN | Performed by: PHYSICAL THERAPIST

## 2024-07-23 PROCEDURE — 97110 THERAPEUTIC EXERCISES: CPT | Mod: PN | Performed by: PHYSICAL THERAPIST

## 2024-07-23 NOTE — PROGRESS NOTES
"[facility]   Physical Therapy Treatment Note      Name: Stacey Wade  Clinic Number: [id number]    Therapy Diagnosis:   Encounter Diagnoses   Name Primary?    Impaired mobility and activities of daily living Yes    Decreased strength of lower extremity          Physician: Wes Faulkner MD     Physician Orders: PT Eval and Treat   Medical Diagnosis from Referral: Bilateral primary osteoarthritis of knee [M17.0], Acute pain of right knee [M25.561]   Evaluation Date: 4/25/2024  Authorization Period Expiration: 12/31/24  Plan of Care Expiration: 7/25/24  Progress Note Due: 7/25/24  Visit # / Visits authorized: 19/ 20     Time In: 8:08 am  Time Out: 09:10 am  Total Billable Time: 55 minutes Billing reflects 1:1 direct supervision    MD follow-up:    Precautions: Standard    FOTO:  Goal: 72%  -Intake: 63%  -Status: 62%, 67%  -Discharge: incomplete    Subjective     Pt reports: 7/23- She is feeling really good. She had a hives breakout last week so she was unable to make it in for therapy.  Response to previous treatment: Improving symptoms  Functional change: Gradually improving function    Pain: Minimal/10  Location: bilateral knees     Objective      Objective Measures updated at progress report unless specified otherwise.    Problem List:  Impaired knee range of motion  Impaired strength  Impaired functional mobility  Pes planus      Treatment     Stacey received the treatments listed below:      Code  (see below chart) Intervention Date/Notes 7/23   MT Manual Therapy Knee flexion mobs x 5 min   TE Treadmill 10 MIN 1.8 MPH   TE Prone quad stretch 4 x 30s ea   NR Hamstring bridge feet on ball 3 x 10   NR Balance Manuel walking fwd, lateral   TE Heel raises dbl with ball squeeze between heels 3 x 15   NR Step ups forward 8" 2 x 10 ea   NR Step up lateral 8" 2 x 10   NR Squat to box 20# KB 20" 3 x 10   NR Lateral band walk GTB 2 laps 10 yds   TE Knee extension machine DL #25 3 x 10 "   TE Leg curl DL NP     20 minutes of Therapeutic Exercise (TE) to develop strength, endurance, ROM, and flexibility. (19720)  10 minutes of Manual therapy (MT) to improve pain and ROM. (60331)  25 minutes of Neuromuscular Re-Education (NMR)  to improve: Balance, Coordination, Kinesthetic, Sense, Proprioception, and Posture. (39463)  00 minutes of Therapeutic Activities (TA) to improve functional performance. (76102)  Unattended Electrical Stimulation (ES) for muscle performance and/or pain modulation. (98198)  Vasopneumatic Device Therapy () for management of swelling/edema. (28881)  BFR: Blood flow restriction applied during exercise  NP: Not Performed    Patient Education and Home Exercises         Education provided:   Physical therapy diagnosis, prognosis, and plan of care.     Written Home Exercises Provided: Patient instructed to cont prior HEP.  Exercises were reviewed and Stacey was able to demonstrate them prior to the end of the session.  Stacey demonstrated good  understanding of the education provided.     See EMR under Patient Instructions for exercises provided prior visit.    Assessment     7/23- the patient is progressing very well with current exercise program. We will continue with progressive strengthening, and plan to reassess next visit. As long as she continues to progress we will plan to discharge with HEP after next week.    Stacey Is progressing well towards her goals.   Pt prognosis is Good.     Pt will continue to benefit from skilled outpatient physical therapy to address the deficits listed in the problem list box on initial evaluation, provide pt/family education and to maximize pt's level of independence in the home and community environment.     Pt's spiritual, cultural and educational needs considered and pt agreeable to plan of care and goals.    Anticipated barriers to physical therapy: chronicity of condition     Goals:     Short Term Goals:  6 weeks Status  Date Met   PAIN: Pt  will report worst pain of 4/10 in order to progress toward max functional ability and improve quality of life. [x] Progressing  [x] Met  [] Not Met     FUNCTION: Patient will demonstrate improved function as indicated by a functional score improvement of at least 5 points on FOTO. [x] Progressing  [] Met  [] Not Met     MOBILITY: Patient will improve AROM to 50% of stated goals, listed in objective measures above, in order to progress towards independence with functional activities.  [x] Progressing  [x] Met  [] Not Met     STRENGTH: Patient will improve strength to 50% of stated goals, listed in objective measures above, in order to progress towards independence with functional activities. [x] Progressing  [x] Met  [] Not Met     POSTURE: Patient will correct postural deviations in sitting and standing, to decrease pain and promote long term stability.  [x] Progressing  [x] Met  [] Not Met     GAIT: Patient will demonstrate improved gait mechanics including nonantalgic gait in order to improve functional mobility, improve quality of life, and decrease risk of further injury or fall.  [x] Progressing  [x] Met  [] Not Met     HEP: Patient will demonstrate independence with HEP in order to progress toward functional independence. [x] Progressing  [x] Met  [] Not Met        Long Term Goals:  12 weeks Status Date Met   PAIN: Pt will report worst pain of 1/10 in order to progress toward max functional ability and improve quality of life [x] Progressing  [] Met  [] Not Met     FUNCTION: Patient will demonstrate improved function as indicated by a FOTO functional score improvement as listed in header. [x] Progressing  [] Met  [] Not Met     MOBILITY: Patient will improve AROM to stated goals, listed in objective measures above, in order to return to maximal functional potential and improve quality of life.  [x] Progressing  [] Met  [] Not Met     STRENGTH: Patient will improve strength to stated goals, listed in objective  measures above, in order to improve functional independence and quality of life.  [x] Progressing  [] Met  [] Not Met     GAIT: Patient will demonstrate normalized gait mechanics with minimal compensation in order to return to PLOF. [x] Progressing  [] Met  [] Not Met     Patient will return to normal ADL's, IADL's, community involvement, recreational activities, and work-related activities with less than or equal to 1/10 pain and maximal function.  [x] Progressing  [] Met  [] Not Met          Plan       Continue to progress per protocol and per patient tolerance      Abdi Benton, PT

## 2024-07-25 ENCOUNTER — CLINICAL SUPPORT (OUTPATIENT)
Facility: HOSPITAL | Age: 59
End: 2024-07-25
Payer: COMMERCIAL

## 2024-07-25 DIAGNOSIS — Z74.09 IMPAIRED MOBILITY AND ACTIVITIES OF DAILY LIVING: Primary | ICD-10-CM

## 2024-07-25 DIAGNOSIS — Z78.9 IMPAIRED MOBILITY AND ACTIVITIES OF DAILY LIVING: Primary | ICD-10-CM

## 2024-07-25 DIAGNOSIS — R29.898 DECREASED STRENGTH OF LOWER EXTREMITY: ICD-10-CM

## 2024-07-25 PROCEDURE — 97140 MANUAL THERAPY 1/> REGIONS: CPT | Mod: PN | Performed by: PHYSICAL THERAPIST

## 2024-07-25 PROCEDURE — 97110 THERAPEUTIC EXERCISES: CPT | Mod: PN | Performed by: PHYSICAL THERAPIST

## 2024-07-25 PROCEDURE — 97112 NEUROMUSCULAR REEDUCATION: CPT | Mod: PN | Performed by: PHYSICAL THERAPIST

## 2024-07-25 NOTE — PROGRESS NOTES
"[facility]   Physical Therapy Treatment Note      Name: Stacey Wade  Clinic Number: [id number]    Therapy Diagnosis:   No diagnosis found.        Physician: Wes Faulkner MD     Physician Orders: PT Eval and Treat   Medical Diagnosis from Referral: Bilateral primary osteoarthritis of knee [M17.0], Acute pain of right knee [M25.561]   Evaluation Date: 4/25/2024  Authorization Period Expiration: 12/31/24  Plan of Care Expiration: 7/25/24  Progress Note Due: 7/25/24  Visit # / Visits authorized: 19/ 20     Time In: 8:12 am  Time Out: 09:20 am  Total Billable Time: 60 minutes Billing reflects 1:1 direct supervision    MD follow-up:    Precautions: Standard    FOTO:  Goal: 72%  -Intake: 63%  -Status: 62%, 67%  -Discharge: incomplete    Subjective     Pt reports: 7/25- She has been feeling really good recently.  Response to previous treatment: Improving symptoms  Functional change: Gradually improving function    Pain: Minimal/10  Location: bilateral knees     Objective      Objective Measures updated at progress report unless specified otherwise.    Problem List:  Impaired knee range of motion  Impaired strength  Impaired functional mobility  Pes planus      Treatment     Stacey received the treatments listed below:      Code  (see below chart) Intervention Date/Notes 7/25   MT Manual Therapy Knee flexion mobs x 5 min   TE Treadmill 10 MIN 1.8 MPH   TE Prone quad stretch 4 x 30s ea   NR Hamstring bridge feet on ball 3 x 10   NR Balance Manuel walking fwd, lateral   TE Heel raises dbl with ball squeeze between heels 3 x 15   NR Step ups forward 8" 2 x 10 ea   NR Step up lateral 8" 2 x 10   NR Squat to box 20# KB 20" 3 x 10   NR Lateral band walk GTB 2 laps 10 yds   TE Knee extension machine DL #25 3 x 10   TE Leg curl DL NP     20 minutes of Therapeutic Exercise (TE) to develop strength, endurance, ROM, and flexibility. (15309)  10 minutes of Manual therapy (MT) to improve pain and " ROM. (82837)  30 minutes of Neuromuscular Re-Education (NMR)  to improve: Balance, Coordination, Kinesthetic, Sense, Proprioception, and Posture. (60326)  00 minutes of Therapeutic Activities (TA) to improve functional performance. (95577)  Unattended Electrical Stimulation (ES) for muscle performance and/or pain modulation. (64330)  Vasopneumatic Device Therapy () for management of swelling/edema. (83052)  BFR: Blood flow restriction applied during exercise  NP: Not Performed    Patient Education and Home Exercises         Education provided:   Physical therapy diagnosis, prognosis, and plan of care.     Written Home Exercises Provided: Patient instructed to cont prior HEP.  Exercises were reviewed and Stacey was able to demonstrate them prior to the end of the session.  Stacey demonstrated good  understanding of the education provided.     See EMR under Patient Instructions for exercises provided prior visit.    Assessment     7/25- The patient was provided with an updated home exercise program today. We had a discussion regarding the importance of continued exercise. I recommended that she incorporate strengthening exercise, but reinforced the importance of just staying active.    Stacey Is progressing well towards her goals.   Pt prognosis is Good.     Pt will continue to benefit from skilled outpatient physical therapy to address the deficits listed in the problem list box on initial evaluation, provide pt/family education and to maximize pt's level of independence in the home and community environment.     Pt's spiritual, cultural and educational needs considered and pt agreeable to plan of care and goals.    Anticipated barriers to physical therapy: chronicity of condition     Goals:     Short Term Goals:  6 weeks Status  Date Met   PAIN: Pt will report worst pain of 4/10 in order to progress toward max functional ability and improve quality of life. [x] Progressing  [x] Met  [] Not Met     FUNCTION:  Patient will demonstrate improved function as indicated by a functional score improvement of at least 5 points on FOTO. [x] Progressing  [] Met  [] Not Met     MOBILITY: Patient will improve AROM to 50% of stated goals, listed in objective measures above, in order to progress towards independence with functional activities.  [x] Progressing  [x] Met  [] Not Met     STRENGTH: Patient will improve strength to 50% of stated goals, listed in objective measures above, in order to progress towards independence with functional activities. [x] Progressing  [x] Met  [] Not Met     POSTURE: Patient will correct postural deviations in sitting and standing, to decrease pain and promote long term stability.  [x] Progressing  [x] Met  [] Not Met     GAIT: Patient will demonstrate improved gait mechanics including nonantalgic gait in order to improve functional mobility, improve quality of life, and decrease risk of further injury or fall.  [x] Progressing  [x] Met  [] Not Met     HEP: Patient will demonstrate independence with HEP in order to progress toward functional independence. [x] Progressing  [x] Met  [] Not Met        Long Term Goals:  12 weeks Status Date Met   PAIN: Pt will report worst pain of 1/10 in order to progress toward max functional ability and improve quality of life [x] Progressing  [] Met  [] Not Met     FUNCTION: Patient will demonstrate improved function as indicated by a FOTO functional score improvement as listed in header. [x] Progressing  [] Met  [] Not Met     MOBILITY: Patient will improve AROM to stated goals, listed in objective measures above, in order to return to maximal functional potential and improve quality of life.  [x] Progressing  [] Met  [] Not Met     STRENGTH: Patient will improve strength to stated goals, listed in objective measures above, in order to improve functional independence and quality of life.  [x] Progressing  [] Met  [] Not Met     GAIT: Patient will demonstrate  normalized gait mechanics with minimal compensation in order to return to PLOF. [x] Progressing  [] Met  [] Not Met     Patient will return to normal ADL's, IADL's, community involvement, recreational activities, and work-related activities with less than or equal to 1/10 pain and maximal function.  [x] Progressing  [] Met  [] Not Met          Plan       Continue to progress per protocol and per patient tolerance      Abdi Benton, PT

## 2024-07-30 ENCOUNTER — CLINICAL SUPPORT (OUTPATIENT)
Facility: HOSPITAL | Age: 59
End: 2024-07-30
Payer: COMMERCIAL

## 2024-07-30 DIAGNOSIS — Z74.09 IMPAIRED MOBILITY AND ACTIVITIES OF DAILY LIVING: Primary | ICD-10-CM

## 2024-07-30 DIAGNOSIS — R29.898 DECREASED STRENGTH OF LOWER EXTREMITY: ICD-10-CM

## 2024-07-30 DIAGNOSIS — Z78.9 IMPAIRED MOBILITY AND ACTIVITIES OF DAILY LIVING: Primary | ICD-10-CM

## 2024-07-30 PROCEDURE — 97112 NEUROMUSCULAR REEDUCATION: CPT | Mod: PN | Performed by: PHYSICAL THERAPIST

## 2024-07-30 PROCEDURE — 97110 THERAPEUTIC EXERCISES: CPT | Mod: PN | Performed by: PHYSICAL THERAPIST

## 2024-07-30 NOTE — PROGRESS NOTES
"[facility]   Physical Therapy Treatment Note      Name: Stacey Wade  Clinic Number: [id number]    Therapy Diagnosis:   Encounter Diagnoses   Name Primary?    Impaired mobility and activities of daily living Yes    Decreased strength of lower extremity        Physician: Wes Faulkner MD     Physician Orders: PT Eval and Treat   Medical Diagnosis from Referral: Bilateral primary osteoarthritis of knee [M17.0], Acute pain of right knee [M25.561]   Evaluation Date: 4/25/2024  Authorization Period Expiration: 12/31/24  Plan of Care Expiration: 7/25/24  Progress Note Due: 7/25/24  Visit # / Visits authorized: 21/ 30     Time In: 8:15 am  Time Out: 09:24 am  Total Billable Time: 65 minutes Billing reflects 1:1 direct supervision    MD follow-up:    Precautions: Standard    FOTO:  Goal: 72%  -Intake: 63%  -Status: 62%, 67%  -Discharge: incomplete    Subjective     Pt reports: 7/30- Pt reports decreased knee pain and has overall been feeling good.   Response to previous treatment: Improving symptoms  Functional change: Gradually improving function    Pain: Minimal/10  Location: bilateral knees     Objective      Objective Measures updated at progress report unless specified otherwise.    Problem List:  Impaired knee range of motion  Impaired strength  Impaired functional mobility  Pes planus      Treatment     Stacey received the treatments listed below:    Code  (see below chart) Intervention Date/Notes   7/30   MT Manual Therapy Knee flexion mobs x 5 min   TE Treadmill 10 MIN 1.8 MPH   TE Prone quad stretch 4 x 30s ea   NR Hamstring bridge feet on ball 3 x 10   NR Balance Manuel walking fwd, lateral   TE Heel raises dbl with ball squeeze between heels 3 x 15   NR Step ups forward 8" 3 x 10 ea   NR Step up lateral 8" 2 x 10   NR Squat to box 20# KB 18" 3 x 10   NR Lateral band walk  GTB 2 laps 10 yds   TE Knee extension machine DL #25 3 x 10       30 minutes of Therapeutic Exercise " (TE) to develop strength, endurance, ROM, and flexibility. (80936)  5 minutes of Manual therapy (MT) to improve pain and ROM. (83470)  30 minutes of Neuromuscular Re-Education (NMR)  to improve: Balance, Coordination, Kinesthetic, Sense, Proprioception, and Posture. (06205)  00 minutes of Therapeutic Activities (TA) to improve functional performance. (56637)  Unattended Electrical Stimulation (ES) for muscle performance and/or pain modulation. (63269)  Vasopneumatic Device Therapy () for management of swelling/edema. (18714)  BFR: Blood flow restriction applied during exercise  NP: Not Performed    Patient Education and Home Exercises         Education provided:   Physical therapy diagnosis, prognosis, and plan of care.     Written Home Exercises Provided: Patient instructed to cont prior HEP.  Exercises were reviewed and Stacey was able to demonstrate them prior to the end of the session.  Stacey demonstrated good  understanding of the education provided.     See EMR under Patient Instructions for exercises provided prior visit.    Assessment     7/30- Patient's knee ROM is improving and was reinforced through manual therapy. Patient tolerated exercises well but would benefit from continuing to use and progress her HEP.     Stacey Is progressing well towards her goals.   Pt prognosis is Good.     Pt will continue to benefit from skilled outpatient physical therapy to address the deficits listed in the problem list box on initial evaluation, provide pt/family education and to maximize pt's level of independence in the home and community environment.     Pt's spiritual, cultural and educational needs considered and pt agreeable to plan of care and goals.    Anticipated barriers to physical therapy: chronicity of condition     Goals:     Short Term Goals:  6 weeks Status  Date Met   PAIN: Pt will report worst pain of 4/10 in order to progress toward max functional ability and improve quality of life. [x]  Progressing  [x] Met  [] Not Met     FUNCTION: Patient will demonstrate improved function as indicated by a functional score improvement of at least 5 points on FOTO. [x] Progressing  [] Met  [] Not Met     MOBILITY: Patient will improve AROM to 50% of stated goals, listed in objective measures above, in order to progress towards independence with functional activities.  [x] Progressing  [x] Met  [] Not Met     STRENGTH: Patient will improve strength to 50% of stated goals, listed in objective measures above, in order to progress towards independence with functional activities. [x] Progressing  [x] Met  [] Not Met     POSTURE: Patient will correct postural deviations in sitting and standing, to decrease pain and promote long term stability.  [x] Progressing  [x] Met  [] Not Met     GAIT: Patient will demonstrate improved gait mechanics including nonantalgic gait in order to improve functional mobility, improve quality of life, and decrease risk of further injury or fall.  [x] Progressing  [x] Met  [] Not Met     HEP: Patient will demonstrate independence with HEP in order to progress toward functional independence. [x] Progressing  [x] Met  [] Not Met        Long Term Goals:  12 weeks Status Date Met   PAIN: Pt will report worst pain of 1/10 in order to progress toward max functional ability and improve quality of life [x] Progressing  [] Met  [] Not Met     FUNCTION: Patient will demonstrate improved function as indicated by a FOTO functional score improvement as listed in header. [x] Progressing  [] Met  [] Not Met     MOBILITY: Patient will improve AROM to stated goals, listed in objective measures above, in order to return to maximal functional potential and improve quality of life.  [x] Progressing  [] Met  [] Not Met     STRENGTH: Patient will improve strength to stated goals, listed in objective measures above, in order to improve functional independence and quality of life.  [x] Progressing  [] Met  [] Not  Met     GAIT: Patient will demonstrate normalized gait mechanics with minimal compensation in order to return to PLOF. [x] Progressing  [] Met  [] Not Met     Patient will return to normal ADL's, IADL's, community involvement, recreational activities, and work-related activities with less than or equal to 1/10 pain and maximal function.  [x] Progressing  [] Met  [] Not Met          Plan       Continue to progress per protocol and per patient tolerance      Abdi Benton, PT

## 2024-08-02 ENCOUNTER — CLINICAL SUPPORT (OUTPATIENT)
Facility: HOSPITAL | Age: 59
End: 2024-08-02
Payer: COMMERCIAL

## 2024-08-02 DIAGNOSIS — R29.898 DECREASED STRENGTH OF LOWER EXTREMITY: ICD-10-CM

## 2024-08-02 DIAGNOSIS — Z74.09 IMPAIRED MOBILITY AND ACTIVITIES OF DAILY LIVING: Primary | ICD-10-CM

## 2024-08-02 DIAGNOSIS — Z78.9 IMPAIRED MOBILITY AND ACTIVITIES OF DAILY LIVING: Primary | ICD-10-CM

## 2024-08-02 PROCEDURE — 97112 NEUROMUSCULAR REEDUCATION: CPT | Mod: PN | Performed by: PHYSICAL THERAPIST

## 2024-08-02 PROCEDURE — 97140 MANUAL THERAPY 1/> REGIONS: CPT | Mod: PN | Performed by: PHYSICAL THERAPIST

## 2024-08-02 PROCEDURE — 97110 THERAPEUTIC EXERCISES: CPT | Mod: PN | Performed by: PHYSICAL THERAPIST

## 2024-08-27 DIAGNOSIS — N32.81 OAB (OVERACTIVE BLADDER): ICD-10-CM

## 2024-08-28 RX ORDER — MIRABEGRON 25 MG/1
25 TABLET, FILM COATED, EXTENDED RELEASE ORAL
Qty: 90 TABLET | Refills: 3 | Status: SHIPPED | OUTPATIENT
Start: 2024-08-28

## 2024-09-11 NOTE — PROGRESS NOTES
Patient ID: Stacey Wade is a 58 y.o. female.    Chief Complaint: elevated risk for breast cancer f/u    HPI: Patient presents for 6 month f/u breast exam.     Pt has been calculated to be high risk for breast cancer- Her breast cancer risk assessment score of 24.43%     June 2018 had linda MRI and was wnl.     Pt has chosen to not have MRI's of the breast due to the expensive copay associated with the test .     Mammogram was performed  4/30/2024- dense breasts and risk score was 24.43%.     Has a history of a breast mass noted during her routine gyn exam back in March 2017. Negative right breast imaging with mammo and ultrasound at that time. We have been checking it clinically at 3- 6 mon intervals - had an episode of it increasing increase in size slightly last year. Pt had been using more caffeine than usual. Recommended seeing surgeon and pt wanted to decrease her caffeine to see if it would decrease the size of the area. It did go back to her baseline measurement. Pt denies any change. No new areas of concern.     MRI on 6/12/18 was wnl    Pt not exercising - caring for her mother- has knee and hip pain       Interval history:  Pt denies breast pain, lump, changes or nipple discharge  Denies any changes in family history        Review of Systems   Constitutional: Negative.  Negative for appetite change and unexpected weight change.   HENT: Negative.     Eyes: Negative.  Negative for visual disturbance.   Respiratory: Negative.  Negative for cough and shortness of breath.    Cardiovascular: Negative.  Negative for chest pain.   Gastrointestinal: Negative.  Negative for abdominal pain and diarrhea.   Endocrine: Negative.    Genitourinary: Negative.  Negative for frequency.   Musculoskeletal: Negative.  Negative for back pain.   Allergic/Immunologic: Negative.    Neurological: Negative.  Negative for headaches.   Hematological: Negative.  Negative for adenopathy.   Psychiatric/Behavioral: Negative.  The  patient is not nervous/anxious.      Breast: pt denies any nipple discharge or palpable mass that she has noted. Has had bilateral nipple tenderness intermittently. She has continued to decrease her cafeine intake.     Current Outpatient Medications   Medication Sig Dispense Refill    aspirin (ECOTRIN) 81 MG EC tablet Take 1 tablet by mouth every morning.      betamethasone dipropionate 0.05 % cream APPLY TOPICALLY 2 TIMES DAILY. 45 g 2    butalbital-acetaminophen-caffeine -40 mg (FIORICET, ESGIC) -40 mg per tablet TAKE 1 TABLET BY MOUTH EVERY 6 HOURS AS NEEDED FOR PAIN OR FOR HEADACHE 30 tablet 0    clorazepate (TRANXENE) 3.75 MG Tab TAKE 1 TABLET BY MOUTH EVERY DAY AS NEEDED 30 tablet 0    clotrimazole-betamethasone 1-0.05% (LOTRISONE) cream APPLY TOPICALLY TWICE DAILY AS DIRECTED 45 g 2    diclofenac sodium (VOLTAREN) 1 % Gel Apply 2 g topically 4 (four) times daily. 100 g 2    doxepin (SINEQUAN) 10 MG capsule TAKE TWO CAPSULES BY MOUTH THREE TIMES DAILY 540 capsule 2    EPINEPHrine (EPIPEN) 0.3 mg/0.3 mL AtIn Inject 0.3 mg into the muscle daily as needed.      fexofenadine (ALLEGRA) 180 MG tablet Take 1 tablet (180 mg total) by mouth once daily. In AM 30 tablet 0    fluocinonide (LIDEX) 0.05 % ointment Apply topically 2 (two) times daily. Do not use longer than 2 weeks at a time 30 g 1    hydrOXYzine HCL (ATARAX) 25 MG tablet TAKE ONE TABLET BY MOUTH FOUR TIMES DAILY AS NEEDED FOR ITCHING 120 tablet 2    methylPREDNISolone (MEDROL DOSEPACK) 4 mg tablet use as directed 21 each 0    mirabegron (MYRBETRIQ) 25 mg Tb24 ER tablet TAKE ONE TABLET BY MOUTH ONCE DAILY 90 tablet 3    mometasone (NASONEX) 50 mcg/actuation nasal spray INHALE 2 SPRAYS INTO THE NOSTRIL ONCE A DAY 51 g 3    montelukast (SINGULAIR) 10 mg tablet Take 1 tablet (10 mg total) by mouth once daily. 90 tablet 3    omalizumab (XOLAIR) 150 mg/mL injection Inject 300 mg into the skin.      potassium chloride SA (K-DUR,KLOR-CON M) 10 MEQ  tablet Take 1 tablet (10 mEq total) by mouth once daily. 90 tablet 2    rizatriptan (MAXALT) 10 MG tablet TAKE 1 TABLET BY MOUTH AS NEEDED FOR MIGRAINE. MAY REPEAT IN 2 HOURS. MAX 2 TABS PER 24 HOURS 27 tablet 3    tacrolimus (PROTOPIC) 0.03 % ointment Apply topically 2 (two) times daily.      triamterene-hydrochlorothiazide 37.5-25 mg (DYAZIDE) 37.5-25 mg per capsule Take 1 capsule by mouth once daily. 90 capsule 3    albuterol (PROVENTIL/VENTOLIN HFA) 90 mcg/actuation inhaler Inhale 2 puffs into the lungs every 4 to 6 hours as needed for Wheezing. 18 g 0    famotidine (PEPCID) 40 MG tablet Take 0.5 tablets (20 mg total) by mouth 2 (two) times daily. 30 tablet 0     No current facility-administered medications for this visit.       Review of patient's allergies indicates:   Allergen Reactions    Benzalkonium chloride     Black pepper      Other reaction(s): Hives    Chocolate flavor      Other reaction(s): Hives    Citrus and derivatives Hives     LEMON PRODUCTS    Furosemide     Grapefruit      Other reaction(s): Hives    Latex      Other reaction(s): Hives    Lemon balm (casper officinalis) Hives    Neomycin-bacitracin-polymyxin      Other reaction(s): Rash    Oats (richard) Hives     Oat foods (oatmeal, etc...)    Wheat containing prod        Past Medical History:   Diagnosis Date    Allergic rhinitis     Anxiety     Complex tear of medial meniscus of left knee 5/5/2022    Hypertension     Osteoarthritis of both knees     Prediabetes     Urticaria        Past Surgical History:   Procedure Laterality Date    CHONDROPLASTY OF KNEE Left 05/13/2022    Procedure: CHONDROPLASTY, KNEE;  Surgeon: Wes Faulkner MD;  Location: [facility] OR;  Service: Orthopedics;  Laterality: Left;    COLONOSCOPY N/A 01/18/2018    Procedure: COLONOSCOPY;  Surgeon: Yong Smith MD;  Location: [facility] ENDO;  Service: Endoscopy;  Laterality: N/A;    HERNIA REPAIR Left        Family History   Problem Relation Name Age of Onset    Lung cancer  Paternal Grandfather      Ovarian cancer Maternal Grandmother      Hyperlipidemia Mother Pepe Wade     Hypertension Mother Pepe Wade     Breast cancer Mother Pepe Wade 60    Arthritis Mother Pepe Wade     Lung cancer Father      Breast cancer Maternal Aunt  53    Heart failure Other          Great aunt    Prostate cancer Brother Angus     Brain cancer Sister      Diabetes Neg Hx      Pseudochol deficiency Neg Hx      Malignant hyperthermia Neg Hx         Social History     Socioeconomic History    Marital status: Single   Occupational History    Occupation: Pharmacy Tech     Comment: [employer]   Tobacco Use    Smoking status: Never    Smokeless tobacco: Never   Substance and Sexual Activity    Alcohol use: No    Drug use: No    Sexual activity: Not Currently     Partners: Male     Birth control/protection: None   Social History Narrative    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       Vitals:    10/02/24 0939   Resp: 16         Physical Exam  Vitals reviewed.   Constitutional:       Appearance: Normal appearance. She is well-developed.   HENT:      Head: Normocephalic and atraumatic.      Right Ear: External ear normal.      Left Ear: External ear normal.   Eyes:      General: No scleral icterus.        Right eye: No discharge.         Left eye: No discharge.      Conjunctiva/sclera: Conjunctivae normal.   Neck:      Thyroid: No thyromegaly.   Chest:   Breasts:     Right: No inverted nipple, mass, nipple discharge, skin change or tenderness.      Left: No inverted nipple, mass, nipple discharge, skin change or tenderness.   Musculoskeletal:         General: Normal range of motion.      Cervical back: Normal range of motion and neck supple.   Lymphadenopathy:      Head:      Right side of head: No submental, submandibular, tonsillar, preauricular, posterior auricular or occipital adenopathy.      Left side  of head: No submental, submandibular, tonsillar, preauricular, posterior auricular or occipital adenopathy.      Cervical: No cervical adenopathy.      Right cervical: No superficial or posterior cervical adenopathy.     Left cervical: No superficial or posterior cervical adenopathy.      Upper Body:      Right upper body: No supraclavicular or axillary adenopathy.      Left upper body: No supraclavicular or axillary adenopathy.   Skin:     General: Skin is warm and dry.      Coloration: Skin is not pale.      Findings: No erythema or rash (fine barely visible papules over chin. No other areas noted).   Neurological:      General: No focal deficit present.      Mental Status: She is alert and oriented to person, place, and time.   Psychiatric:         Mood and Affect: Mood normal.         Behavior: Behavior normal.         Thought Content: Thought content normal.         Judgment: Judgment normal.       IMAGIN24- mammogram - risk score is 24.43% - wnl    Menarche at 18 y/o      no history of radiation to the neck or chest wall.       FH: Maternal aunt breast cancer at 51 y/o, Maternal GM ovarian cancer, Father lung and prostate cancer, Brother - prostate cancer    Assessment & Plan:  1. Area of prominent glandular tissue noted at the 3 oclock location of the right breast. Fibrous texture and blends more medially and laterally- stable measurements today  2. 24- mammogram- wnl- risk score 24.43% -   3. BSE recommended monthly- call for any changes.    4. Discussed genetic testing - pt declines at this time  5. Pt declines tamoxifen use for chemoprevention due to potential side effects   6. RTC May 2025 for linda mammo and exam       TIME SPENT WITH PATIENT: Time spent: 20 minutes in face to face discussion concerning diagnosis, review of test results, management of disease, medication counseling, counseling of patient and coordination of care between various health care providers . Greater than half the  time spent was used for coordination of care and counseling of patient.

## 2024-10-02 ENCOUNTER — OFFICE VISIT (OUTPATIENT)
Dept: SURGERY | Facility: CLINIC | Age: 59
End: 2024-10-02
Payer: COMMERCIAL

## 2024-10-02 VITALS — WEIGHT: 198.19 LBS | RESPIRATION RATE: 16 BRPM | HEIGHT: 62 IN | BODY MASS INDEX: 36.47 KG/M2

## 2024-10-02 DIAGNOSIS — Z80.3 FAMILY HISTORY OF BREAST CANCER: ICD-10-CM

## 2024-10-02 DIAGNOSIS — Z12.31 ENCOUNTER FOR SCREENING MAMMOGRAM FOR MALIGNANT NEOPLASM OF BREAST: ICD-10-CM

## 2024-10-02 DIAGNOSIS — Z91.89 AT HIGH RISK FOR BREAST CANCER: Primary | ICD-10-CM

## 2024-10-02 PROCEDURE — 3044F HG A1C LEVEL LT 7.0%: CPT | Mod: CPTII,S$GLB,, | Performed by: NURSE PRACTITIONER

## 2024-10-02 PROCEDURE — 99213 OFFICE O/P EST LOW 20 MIN: CPT | Mod: S$GLB,,, | Performed by: NURSE PRACTITIONER

## 2024-10-02 PROCEDURE — 3008F BODY MASS INDEX DOCD: CPT | Mod: CPTII,S$GLB,, | Performed by: NURSE PRACTITIONER

## 2024-10-02 PROCEDURE — 1159F MED LIST DOCD IN RCRD: CPT | Mod: CPTII,S$GLB,, | Performed by: NURSE PRACTITIONER

## 2024-10-02 PROCEDURE — 99999 PR PBB SHADOW E&M-EST. PATIENT-LVL IV: CPT | Mod: PBBFAC,,, | Performed by: NURSE PRACTITIONER

## 2024-10-21 DIAGNOSIS — F41.9 ANXIETY: ICD-10-CM

## 2024-10-21 RX ORDER — DOXEPIN HYDROCHLORIDE 10 MG/1
20 CAPSULE ORAL 3 TIMES DAILY
Qty: 540 CAPSULE | Refills: 1 | Status: SHIPPED | OUTPATIENT
Start: 2024-10-21

## 2024-10-21 NOTE — TELEPHONE ENCOUNTER
Refill Decision Note   Stacey Wade  is requesting a refill authorization.  Brief Assessment and Rationale for Refill:  Approve     Medication Therapy Plan:         Comments:     Note composed:1:02 PM 10/21/2024

## 2024-10-21 NOTE — TELEPHONE ENCOUNTER
No care due was identified.  [facility] Embedded Care Due Messages. Reference number: [id number].   10/21/2024 1:51:52 AM CDT

## 2024-10-28 ENCOUNTER — IMMUNIZATION (OUTPATIENT)
Dept: FAMILY MEDICINE | Facility: CLINIC | Age: 59
End: 2024-10-28
Payer: COMMERCIAL

## 2024-10-28 DIAGNOSIS — Z23 NEED FOR VACCINATION: Primary | ICD-10-CM

## 2024-10-28 PROCEDURE — 90471 IMMUNIZATION ADMIN: CPT | Mod: S$GLB,,, | Performed by: FAMILY MEDICINE

## 2024-10-28 PROCEDURE — 90656 IIV3 VACC NO PRSV 0.5 ML IM: CPT | Mod: S$GLB,,, | Performed by: FAMILY MEDICINE

## 2024-12-01 ENCOUNTER — NURSE TRIAGE (OUTPATIENT)
Dept: ADMINISTRATIVE | Facility: CLINIC | Age: 59
End: 2024-12-01
Payer: COMMERCIAL

## 2024-12-01 NOTE — TELEPHONE ENCOUNTER
Patient has sinus congestion, runny nose, body aches and productive cough with green sputum. Patient has tried mucinex, tylenol and motrin. Temp 99. Dispo provided- be seen within 24 hours. Appointment made in pcp clinic for tomorrow and placed on wait list for earlier visit time. Also offered [facility] as option to be seen sooner but patient prefers in person visit. Instructed to call back with additional questions or worsening of symptoms. Patient verbalized understanding.     Reason for Disposition   Earache    Additional Information   Negative: SEVERE difficulty breathing (e.g., struggling for each breath, speaks in single words)   Negative: Sounds like a life-threatening emergency to the triager   Negative: Fever > 104 F (40 C)   Negative: [1] Fever > 101 F (38.3 C) AND [2] age > 60 years   Negative: [1] Fever > 100 F (37.8 C) AND [2] bedridden (e.g., CVA, chronic illness, recovering from surgery)   Negative: [1] Fever > 100 F (37.8 C) AND [2] diabetes mellitus or weak immune system (e.g., HIV positive, cancer chemo, splenectomy, organ transplant, chronic steroids)   Negative: Fever present > 3 days (72 hours)   Negative: [1] Fever returns after gone for over 24 hours AND [2] symptoms worse or not improved   Negative: [1] Sinus pain (not just congestion) AND [2] fever    Protocols used: Common Cold-A-AH

## 2025-01-09 ENCOUNTER — OFFICE VISIT (OUTPATIENT)
Dept: UROLOGY | Facility: CLINIC | Age: 60
End: 2025-01-09
Payer: COMMERCIAL

## 2025-01-09 VITALS
BODY MASS INDEX: 36.25 KG/M2 | RESPIRATION RATE: 16 BRPM | DIASTOLIC BLOOD PRESSURE: 85 MMHG | HEART RATE: 86 BPM | SYSTOLIC BLOOD PRESSURE: 134 MMHG | WEIGHT: 198.19 LBS

## 2025-01-09 DIAGNOSIS — N32.81 OAB (OVERACTIVE BLADDER): ICD-10-CM

## 2025-01-09 PROCEDURE — 1159F MED LIST DOCD IN RCRD: CPT | Mod: CPTII,S$GLB,, | Performed by: UROLOGY

## 2025-01-09 PROCEDURE — 99999 PR PBB SHADOW E&M-EST. PATIENT-LVL IV: CPT | Mod: PBBFAC,,, | Performed by: UROLOGY

## 2025-01-09 PROCEDURE — 3008F BODY MASS INDEX DOCD: CPT | Mod: CPTII,S$GLB,, | Performed by: UROLOGY

## 2025-01-09 PROCEDURE — 3079F DIAST BP 80-89 MM HG: CPT | Mod: CPTII,S$GLB,, | Performed by: UROLOGY

## 2025-01-09 PROCEDURE — 1160F RVW MEDS BY RX/DR IN RCRD: CPT | Mod: CPTII,S$GLB,, | Performed by: UROLOGY

## 2025-01-09 PROCEDURE — 3075F SYST BP GE 130 - 139MM HG: CPT | Mod: CPTII,S$GLB,, | Performed by: UROLOGY

## 2025-01-09 PROCEDURE — 99213 OFFICE O/P EST LOW 20 MIN: CPT | Mod: S$GLB,,, | Performed by: UROLOGY

## 2025-01-09 RX ORDER — MIRABEGRON 25 MG/1
25 TABLET, FILM COATED, EXTENDED RELEASE ORAL DAILY
Qty: 90 TABLET | Refills: 3 | Status: SHIPPED | OUTPATIENT
Start: 2025-01-09

## 2025-01-09 NOTE — PROGRESS NOTES
Chief Complaint:  OAB    HPI:   01/09/2025 - returns today for follow-up, no new issues in the interim, mirabegron working well for, wears one pad per day, no gross hematuria or dysuria, no UTIs, brother is doing better but notes that her cousin passed away from a car accident in the same area of town that her brother got into an accident in over the holidays    01/09/2024 - patient returns today for follow-up, no new issues in the interim, still taking the mirabegron in notes that it continues to work well for her, no side effects, no gross hematuria or dysuria, no UTIs, does note that her brother was in a car accident before Christmas, had a stroke while he was driving and has been in the ICU with multiple fractures since that time    05/31/2023 - patient returns today for follow-up, continues taking the mirabegron and notes that it is working quite well for her, also notes that she is decreased how much coffee she is drinking now down to one 12 oz cup per day, no side effects from medication, also wearing just one pad per day, no gross hematuria or dysuria, no UTIs    01/27/2023 - patient returns today for follow-up, has been taking the Myrbetriq as prescribed and notes that while it does help she continues to have issues with urgency and urge incontinence, she has reduced her coffee intake, now really only having one cup in the morning, still wearing pads, 3/day, no SEs that she has noticed, no GH or dysuria    08/16/2023 - 57 yo female that presents OAB.  Patient notes worsening issues with frequency and urgency the last few years.  Patient notes that she will have urge incontinence 2-3 times per week and wears one pad per day for this.  She has previously tried Detrol as well as Myrbetriq.  She feels that the Myrbetriq is working for her currently.  She will also intermittently take azo.  She will also drink 3-4 thick cups of coffee per day, but notes that she has reduced this recently.  She denies any prior  urologic procedures or gross hematuria.  She does have family history of prostate cancer in her brother and her dad.    PMH:  Past Medical History:   Diagnosis Date    Allergic rhinitis     Anxiety     Complex tear of medial meniscus of left knee 5/5/2022    Hypertension     Osteoarthritis of both knees     Prediabetes     Urticaria        PSH:  Past Surgical History:   Procedure Laterality Date    CHONDROPLASTY OF KNEE Left 05/13/2022    Procedure: CHONDROPLASTY, KNEE;  Surgeon: Wes Faulkner MD;  Location: [facility] OR;  Service: Orthopedics;  Laterality: Left;    COLONOSCOPY N/A 01/18/2018    Procedure: COLONOSCOPY;  Surgeon: Yong Smith MD;  Location: [facility] ENDO;  Service: Endoscopy;  Laterality: N/A;    HERNIA REPAIR Left        Family History:  Family History   Problem Relation Name Age of Onset    Lung cancer Paternal Grandfather      Ovarian cancer Maternal Grandmother      Hyperlipidemia Mother Pepe Wade     Hypertension Mother Pepe Wade     Breast cancer Mother Pepe Wade 60    Arthritis Mother Pepe Wade     Lung cancer Father      Breast cancer Maternal Aunt  53    Heart failure Other          Great aunt    Prostate cancer Brother Angus     Brain cancer Sister      Diabetes Neg Hx      Pseudochol deficiency Neg Hx      Malignant hyperthermia Neg Hx         Social History:  Social History     Tobacco Use    Smoking status: Never    Smokeless tobacco: Never   Substance Use Topics    Alcohol use: No    Drug use: No        Review of Systems:  General: No fever, chills  Skin: No rashes  Chest:  Denies cough and sputum production  Heart: Denies chest pain  Resp: Denies dyspnea  Abdomen: Denies diarrhea, abdominal pain, hematemesis, or blood in stool.  Musculoskeletal: No joint stiffness or swelling. Denies back pain.  : see HPI  Neuro: no dizziness or weakness    Allergies:  Benzalkonium chloride, Black pepper, Chocolate flavor, Citrus and derivatives, Grapefruit, Ibuprofen,  Latex, Lemon balm (casper officinalis), Naproxen, Neomycin-bacitracin-polymyxin, Oats (richard), Vegetable acetoglycerides, Wheat containing prod, and Wheat flour    Medications:    Current Outpatient Medications:     aspirin (ECOTRIN) 81 MG EC tablet, Take 1 tablet by mouth every morning., Disp: , Rfl:     betamethasone dipropionate 0.05 % cream, APPLY TOPICALLY 2 TIMES DAILY., Disp: 45 g, Rfl: 2    butalbital-acetaminophen-caffeine -40 mg (FIORICET, ESGIC) -40 mg per tablet, TAKE 1 TABLET BY MOUTH EVERY 6 HOURS AS NEEDED FOR PAIN OR FOR HEADACHE, Disp: 30 tablet, Rfl: 0    clorazepate (TRANXENE) 3.75 MG Tab, TAKE 1 TABLET BY MOUTH EVERY DAY AS NEEDED, Disp: 30 tablet, Rfl: 0    clotrimazole-betamethasone 1-0.05% (LOTRISONE) cream, APPLY TOPICALLY TWICE DAILY AS DIRECTED, Disp: 45 g, Rfl: 2    diclofenac sodium (VOLTAREN) 1 % Gel, Apply 2 g topically 4 (four) times daily., Disp: 100 g, Rfl: 2    doxepin (SINEQUAN) 10 MG capsule, Take 2 capsules (20 mg total) by mouth 3 (three) times daily., Disp: 540 capsule, Rfl: 1    EPINEPHrine (EPIPEN) 0.3 mg/0.3 mL AtIn, Inject 0.3 mg into the muscle daily as needed., Disp: , Rfl:     fexofenadine (ALLEGRA) 180 MG tablet, Take 1 tablet (180 mg total) by mouth once daily. In AM, Disp: 30 tablet, Rfl: 0    fluocinonide (LIDEX) 0.05 % ointment, Apply topically 2 (two) times daily. Do not use longer than 2 weeks at a time, Disp: 30 g, Rfl: 1    hydrOXYzine HCL (ATARAX) 25 MG tablet, TAKE ONE TABLET BY MOUTH FOUR TIMES DAILY AS NEEDED FOR ITCHING, Disp: 120 tablet, Rfl: 2    methylPREDNISolone (MEDROL DOSEPACK) 4 mg tablet, use as directed, Disp: 21 each, Rfl: 0    mirabegron (MYRBETRIQ) 25 mg Tb24 ER tablet, TAKE ONE TABLET BY MOUTH ONCE DAILY, Disp: 90 tablet, Rfl: 3    mometasone (NASONEX) 50 mcg/actuation nasal spray, INHALE 2 SPRAYS INTO THE NOSTRIL ONCE A DAY, Disp: 51 g, Rfl: 3    montelukast (SINGULAIR) 10 mg tablet, Take 1 tablet (10 mg total) by mouth once  daily., Disp: 90 tablet, Rfl: 3    omalizumab (XOLAIR) 150 mg/mL injection, Inject 300 mg into the skin., Disp: , Rfl:     potassium chloride SA (K-DUR,KLOR-CON M) 10 MEQ tablet, Take 1 tablet (10 mEq total) by mouth once daily., Disp: 90 tablet, Rfl: 2    rizatriptan (MAXALT) 10 MG tablet, TAKE 1 TABLET BY MOUTH AS NEEDED FOR MIGRAINE. MAY REPEAT IN 2 HOURS. MAX 2 TABS PER 24 HOURS, Disp: 27 tablet, Rfl: 3    tacrolimus (PROTOPIC) 0.03 % ointment, Apply topically 2 (two) times daily., Disp: , Rfl:     triamterene-hydrochlorothiazide 37.5-25 mg (DYAZIDE) 37.5-25 mg per capsule, Take 1 capsule by mouth once daily., Disp: 90 capsule, Rfl: 3    albuterol (PROVENTIL/VENTOLIN HFA) 90 mcg/actuation inhaler, Inhale 2 puffs into the lungs every 4 to 6 hours as needed for Wheezing., Disp: 18 g, Rfl: 0    famotidine (PEPCID) 40 MG tablet, Take 0.5 tablets (20 mg total) by mouth 2 (two) times daily., Disp: 30 tablet, Rfl: 0    Physical Exam:  Vitals:    01/09/25 1514   BP: 134/85   Pulse: 86   Resp: 16     Body mass index is 36.25 kg/m².  General: awake, alert, cooperative  Head: NC/AT  Ears: external ears normal  Eyes: sclera normal  Lungs: normal inspiration, NAD  Heart: well-perfused  Skin: The skin is warm and dry  Ext: No c/c/e.  Neuro: grossly intact, AOx3    RADIOLOGY:  No recent relevant imaging available for review.    LABS:  I personally reviewed the following lab values:  Lab Results   Component Value Date    WBC 10.68 03/07/2024    HGB 13.0 03/07/2024    HCT 41.7 03/07/2024     03/07/2024     03/07/2024    K 3.6 03/07/2024     03/07/2024    CREATININE 0.9 03/07/2024    BUN 16 03/07/2024    CO2 23 03/07/2024    TSH 1.110 04/29/2024    INR 1.0 05/02/2022    HGBA1C 5.8 (H) 03/07/2024    CHOL 198 03/07/2024    TRIG 52 03/07/2024    HDL 68 03/07/2024    ALT 16 03/07/2024    AST 15 03/07/2024     Assessment/Plan:   Stacey Wade is a 59 y.o. female with:    OAB - continue Myrbetriq, f/u 1yr    Mamadou  Jeffery Armstrong MD  Urology

## 2025-02-07 DIAGNOSIS — R51.9 RECURRENT HEADACHE: ICD-10-CM

## 2025-02-08 RX ORDER — RIZATRIPTAN BENZOATE 10 MG/1
TABLET ORAL
Qty: 81 TABLET | Refills: 0 | Status: SHIPPED | OUTPATIENT
Start: 2025-02-08

## 2025-02-17 ENCOUNTER — OFFICE VISIT (OUTPATIENT)
Dept: FAMILY MEDICINE | Facility: CLINIC | Age: 60
End: 2025-02-17
Payer: COMMERCIAL

## 2025-02-17 VITALS
TEMPERATURE: 99 F | BODY MASS INDEX: 35.94 KG/M2 | HEART RATE: 106 BPM | DIASTOLIC BLOOD PRESSURE: 80 MMHG | WEIGHT: 195.31 LBS | OXYGEN SATURATION: 97 % | HEIGHT: 62 IN | SYSTOLIC BLOOD PRESSURE: 132 MMHG

## 2025-02-17 DIAGNOSIS — I10 ESSENTIAL HYPERTENSION: Chronic | ICD-10-CM

## 2025-02-17 DIAGNOSIS — J01.90 ACUTE RHINOSINUSITIS: Primary | ICD-10-CM

## 2025-02-17 DIAGNOSIS — R73.03 PREDIABETES: Chronic | ICD-10-CM

## 2025-02-17 DIAGNOSIS — E66.01 SEVERE OBESITY (BMI 35.0-35.9 WITH COMORBIDITY): ICD-10-CM

## 2025-02-17 LAB
CTP QC/QA: YES
CTP QC/QA: YES
POC MOLECULAR INFLUENZA A AGN: NEGATIVE
POC MOLECULAR INFLUENZA B AGN: NEGATIVE
SARS-COV-2 RDRP RESP QL NAA+PROBE: NEGATIVE

## 2025-02-17 NOTE — PROGRESS NOTES
CHIEF COMPLAINT:  This is a 59-year-old female complaining of respiratory illness.      SUBJECTIVE:  The patient complains of a 2 day history of illness starting with sore throat, stuffy nose and coughing.  Cough is nonproductive.  She had onset of body aches today.  Patient denies fever, chills, ear pain, runny nose, chest congestion, shortness for breath or wheezing.  Positive ill contacts.  Patient has been taking NyQuil, Tylenol and ibuprofen.    The patient has essential hypertension for which she takes Dyazide daily.  Her blood pressure today is 132/80.  She's being followed by breast screening because of fibrocystic disease.  She had nodule in right breast which has improved since she discontinued caffeine.  She continues to take Xolair injections for chronic urticaria.  She also takes Singulair, doxepin, and famotidine.  She is obese with a BMI of 36.05. She takes Mybetriq 25 mg daily for overactive bladder.     ROS:  GENERAL: Patient denies fever, chills, night sweats. Patient denies weight gain or loss. Patient denies anorexia, fatigue, weakness or swollen glands.  SKIN: Patient denies rash or hair loss.  HEENT: Patient denies ear pain, hearing loss, or runny nose. Patient denies visual disturbance, eye irritation or discharge.  Positive for sore throat and nasal congestion.  LUNGS: Patient denies wheeze or hemoptysis.  Positive for cough.  CARDIOVASCULAR: Patient denies shortness of breath, palpitations, syncope or lower extremity edema.  GI: Patient denies abdominal pain, nausea, vomiting, diarrhea, constipation, blood in stool or melena.  GENITOURINARY: Patient denies pelvic pain, vaginal discharge, itch or odor. Patient denies irregular vaginal bleeding. Patient denies dysuria, frequency, hematuria, nocturia, urgency or incontinence.  BREASTS: Patient denies breast pain, mass or nipple discharge.  MUSCULOSKELETAL: Patient denies joint swelling, redness or warmth. Positive for ankle and foot  pain.  NEUROLOGIC: Patient denies headache, vertigo, paresthesias, weakness in limb, dysarthria, dysphagia or abnormality of gait.  PSYCHIATRIC: Patient denies anxiety, depression, or memory loss.     OBJECTIVE:   GENERAL: Well-developed well-nourished, obese, black female alert and oriented x3, in no acute distress. Memory, judgment and cognition without deficit.  SKIN: Clear without rash. Normal color and tone.  HEENT: Eyes: Clear conjunctivae. Pupils equal reactive to light and accommodation. Ears: Clear TMs. Clear canals. Nose:  Mild bilateral congestion. Pharynx:  2+ injection.  No exudates.    NECK: Supple, normal range of motion. No masses, lymphadenopathy or enlarged thyroid. No JVD.  Delete at  LUNGS: Clear to auscultation. Normal respiratory effort.  O2 saturation 97%.  CARDIOVASCULAR: Regular rhythm, normal S1, S2 without murmur, gallop or rub.  EXTREMITIES: Without cyanosis, clubbing or edema.  Normal range of motion in all extremities. No joint effusion, erythema or warmth. Ankle/foot splints in place.  NEUROLOGIC:  Gait without abnormality.  No tremor.      Rapid COVID-19 test:  Negative  Rapid flu test:  Negative    ASSESSMENT:  1. Acute rhinosinusitis    2. Essential hypertension    3. Prediabetes    4. Severe obesity (BMI 35.0-35.9 with comorbidity)      PLAN:  1. Weight reduction. Exercise regularly.  2. Age-appropriate counseling.  3. Fasting lab.  4. Mammogram.  5. Colonoscopy.    20 minutes of total time spent on the encounter, which includes face to face time and non-face to face time preparing to see the patient (eg, review of tests), Obtaining and/or reviewing separately obtained history, Documenting clinical information in the electronic or other health record, Independently interpreting results (not separately reported) and communicating results to the patient/family/caregiver, or Care coordination (not separately reported).     This note is generated with speech recognition software and is  subject to transcription error and sound alike phrases that may be missed by proofreading.

## 2025-02-19 ENCOUNTER — PATIENT MESSAGE (OUTPATIENT)
Dept: FAMILY MEDICINE | Facility: CLINIC | Age: 60
End: 2025-02-19
Payer: COMMERCIAL

## 2025-02-19 RX ORDER — PROMETHAZINE HYDROCHLORIDE AND DEXTROMETHORPHAN HYDROBROMIDE 6.25; 15 MG/5ML; MG/5ML
5 SYRUP ORAL
Qty: 180 ML | Refills: 0 | Status: SHIPPED | OUTPATIENT
Start: 2025-02-19 | End: 2025-03-01

## 2025-02-19 RX ORDER — METHYLPREDNISOLONE 4 MG/1
TABLET ORAL
Qty: 1 EACH | Refills: 0 | Status: SHIPPED | OUTPATIENT
Start: 2025-02-19

## 2025-02-21 ENCOUNTER — HOSPITAL ENCOUNTER (OUTPATIENT)
Dept: RADIOLOGY | Facility: HOSPITAL | Age: 60
Discharge: HOME | End: 2025-02-21
Attending: ORTHOPAEDIC SURGERY
Payer: COMMERCIAL

## 2025-02-21 DIAGNOSIS — M25.562 PAIN IN BOTH KNEES, UNSPECIFIED CHRONICITY: ICD-10-CM

## 2025-02-21 DIAGNOSIS — M25.561 PAIN IN BOTH KNEES, UNSPECIFIED CHRONICITY: ICD-10-CM

## 2025-02-21 PROCEDURE — 73564 X-RAY EXAM KNEE 4 OR MORE: CPT | Mod: TC,50

## 2025-02-21 PROCEDURE — 73564 X-RAY EXAM KNEE 4 OR MORE: CPT | Mod: 26,,, | Performed by: RADIOLOGY

## 2025-02-27 ENCOUNTER — HOSPITAL ENCOUNTER (OUTPATIENT)
Dept: RADIOLOGY | Facility: HOSPITAL | Age: 60
Discharge: HOME | End: 2025-02-27
Attending: ORTHOPAEDIC SURGERY
Payer: COMMERCIAL

## 2025-02-27 ENCOUNTER — OFFICE VISIT (OUTPATIENT)
Dept: SPORTS MEDICINE | Facility: CLINIC | Age: 60
End: 2025-02-27
Payer: COMMERCIAL

## 2025-02-27 ENCOUNTER — OFFICE VISIT (OUTPATIENT)
Dept: FAMILY MEDICINE | Facility: CLINIC | Age: 60
End: 2025-02-27
Payer: COMMERCIAL

## 2025-02-27 VITALS — HEIGHT: 62 IN | WEIGHT: 194 LBS | BODY MASS INDEX: 35.7 KG/M2

## 2025-02-27 VITALS
SYSTOLIC BLOOD PRESSURE: 112 MMHG | HEART RATE: 90 BPM | HEIGHT: 62 IN | WEIGHT: 190.69 LBS | BODY MASS INDEX: 35.09 KG/M2 | OXYGEN SATURATION: 96 % | TEMPERATURE: 97 F | DIASTOLIC BLOOD PRESSURE: 84 MMHG

## 2025-02-27 DIAGNOSIS — M25.561 CHRONIC PAIN OF RIGHT KNEE: Primary | ICD-10-CM

## 2025-02-27 DIAGNOSIS — Z00.00 PREVENTATIVE HEALTH CARE: Primary | ICD-10-CM

## 2025-02-27 DIAGNOSIS — M25.561 PAIN IN BOTH KNEES, UNSPECIFIED CHRONICITY: ICD-10-CM

## 2025-02-27 DIAGNOSIS — G89.29 CHRONIC PAIN OF RIGHT KNEE: Primary | ICD-10-CM

## 2025-02-27 DIAGNOSIS — M25.562 PAIN IN BOTH KNEES, UNSPECIFIED CHRONICITY: ICD-10-CM

## 2025-02-27 DIAGNOSIS — N32.81 OVERACTIVE BLADDER: ICD-10-CM

## 2025-02-27 DIAGNOSIS — I10 ESSENTIAL HYPERTENSION: Chronic | ICD-10-CM

## 2025-02-27 DIAGNOSIS — R73.03 PREDIABETES: Chronic | ICD-10-CM

## 2025-02-27 PROCEDURE — 3008F BODY MASS INDEX DOCD: CPT | Mod: CPTII,S$GLB,, | Performed by: ORTHOPAEDIC SURGERY

## 2025-02-27 PROCEDURE — 1159F MED LIST DOCD IN RCRD: CPT | Mod: CPTII,S$GLB,, | Performed by: FAMILY MEDICINE

## 2025-02-27 PROCEDURE — 99999 PR PBB SHADOW E&M-EST. PATIENT-LVL V: CPT | Mod: PBBFAC,,, | Performed by: ORTHOPAEDIC SURGERY

## 2025-02-27 PROCEDURE — 73564 X-RAY EXAM KNEE 4 OR MORE: CPT | Mod: TC,50,PN

## 2025-02-27 PROCEDURE — 3008F BODY MASS INDEX DOCD: CPT | Mod: CPTII,S$GLB,, | Performed by: FAMILY MEDICINE

## 2025-02-27 PROCEDURE — 3074F SYST BP LT 130 MM HG: CPT | Mod: CPTII,S$GLB,, | Performed by: FAMILY MEDICINE

## 2025-02-27 PROCEDURE — 99999 PR PBB SHADOW E&M-EST. PATIENT-LVL V: CPT | Mod: PBBFAC,,, | Performed by: FAMILY MEDICINE

## 2025-02-27 PROCEDURE — 3079F DIAST BP 80-89 MM HG: CPT | Mod: CPTII,S$GLB,, | Performed by: FAMILY MEDICINE

## 2025-02-27 PROCEDURE — 99214 OFFICE O/P EST MOD 30 MIN: CPT | Mod: S$GLB,,, | Performed by: ORTHOPAEDIC SURGERY

## 2025-02-27 PROCEDURE — 99396 PREV VISIT EST AGE 40-64: CPT | Mod: S$GLB,,, | Performed by: FAMILY MEDICINE

## 2025-02-27 NOTE — PROGRESS NOTES
Patient ID: Stacey Wade  YOB: 1965  MRN: [id number]    Chief Complaint: Pain of the Right Knee and Pain of the Left Knee    Referred By: Dr. Petit    History of Present Illness: Stacey Wade is a  59 y.o. 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    HPI:  Stacey presents for a follow-up visit regarding bilateral knee pain. Pain is primarily located in the front of the knee, which she describes as piercing. The left knee, which had surgery performed by Dr. Faulkner in May 2022, is more painful than the right knee. Pain severity fluctuates, with today being a low pain, one or two on the pain scale. However, she states that some other days it's like 8 or 9.    She has been managing pain through exercise, including going up and down stairs. No anti-inflammatory medications or viscosupplementation injections have been used, though these were previously mentioned as potential treatment options. Her last imaging was an x-ray taken at the hospital following a fall last year.    Stacey mentions that Dr. Faulkner wants to perform surgery on the right knee as well, but she is trying to keep this one functional. She expresses uncertainty, stating that it's difficult to make a decision when the knee feels good. Steroid injections have been administered in the past.    SURGICAL HISTORY:  - Left knee surgery: May 2022, performed by Dr. Faulkner      ROS:  Musculoskeletal: +joint pain, -joint swelling       Recall from previous HPI on 6/13/24: Patient presents today 8wks s/p PT for a fall that occurred on 4/10/24 which injured her right knee. She has been working with [employer] at  [email or web address] and reports significant improvement since her last visit. She reports she still has some minor discomfort with stairs but otherwise is very happy  with her recovery.      To review her hx from 4/18/24: Bridgette presents today with bilateral knee pain. She reports she fell recently on 4/10/24 while at [facility] for her brother. She reports she hit her head but now has bilateral knee pain right worst then left. She has been using a cane while ambulating after the fall and KT taping her knee. She reports she has done PT in the past but may to get it set up again after the fall. She reports mostly medial knee pain in both knees. She denies any catching or locking  She reports swelling in the right knee. Of note she is about 2 years s/p Left Knee scope, medial meniscus root repair, centralization (5/13/2022). She also has had gel injections on 6/5/23. She reports also a brace being ordered at the last visit but never followed up on it.        Past Medical History:   Past Medical History:   Diagnosis Date    Allergic rhinitis     Anxiety     Complex tear of medial meniscus of left knee 5/5/2022    Hypertension     Osteoarthritis of both knees     Prediabetes     Urticaria      Past Surgical History:   Procedure Laterality Date    CHONDROPLASTY OF KNEE Left 05/13/2022    Procedure: CHONDROPLASTY, KNEE;  Surgeon: Wes Faulkner MD;  Location: [facility] OR;  Service: Orthopedics;  Laterality: Left;    COLONOSCOPY N/A 01/18/2018    Procedure: COLONOSCOPY;  Surgeon: Yong Smith MD;  Location: [facility];  Service: Endoscopy;  Laterality: N/A;    HERNIA REPAIR Left      Family History   Problem Relation Name Age of Onset    Lung cancer Paternal Grandfather      Ovarian cancer Maternal Grandmother      Hyperlipidemia Mother Pepe Wade     Hypertension Mother Pepe Wade     Breast cancer Mother Pepe Wade 60    Arthritis Mother Pepe Wade     Lung cancer Father      Breast cancer Maternal Aunt  53    Heart failure Other          Great aunt    Prostate cancer Brother Angus     Brain cancer Sister      Diabetes Neg Hx      Pseudochol deficiency Neg Hx       Malignant hyperthermia Neg Hx       Social History[1]  Medication List with Changes/Refills   Current Medications    ASPIRIN (ECOTRIN) 81 MG EC TABLET    Take 1 tablet by mouth every morning.    BETAMETHASONE DIPROPIONATE 0.05 % CREAM    APPLY TOPICALLY 2 TIMES DAILY.    BUTALBITAL-ACETAMINOPHEN-CAFFEINE -40 MG (FIORICET, ESGIC) -40 MG PER TABLET    TAKE 1 TABLET BY MOUTH EVERY 6 HOURS AS NEEDED FOR PAIN OR FOR HEADACHE    CLORAZEPATE (TRANXENE) 3.75 MG TAB    TAKE 1 TABLET BY MOUTH EVERY DAY AS NEEDED    CLOTRIMAZOLE-BETAMETHASONE 1-0.05% (LOTRISONE) CREAM    APPLY TOPICALLY TWICE DAILY AS DIRECTED    DICLOFENAC SODIUM (VOLTAREN) 1 % GEL    Apply 2 g topically 4 (four) times daily.    DOXEPIN (SINEQUAN) 10 MG CAPSULE    Take 2 capsules (20 mg total) by mouth 3 (three) times daily.    EPINEPHRINE (EPIPEN) 0.3 MG/0.3 ML ATIN    Inject 0.3 mg into the muscle daily as needed.    FAMOTIDINE (PEPCID) 40 MG TABLET    Take 0.5 tablets (20 mg total) by mouth 2 (two) times daily.    FEXOFENADINE (ALLEGRA) 180 MG TABLET    Take 1 tablet (180 mg total) by mouth once daily. In AM    FLUOCINONIDE (LIDEX) 0.05 % OINTMENT    Apply topically 2 (two) times daily. Do not use longer than 2 weeks at a time    HYDROXYZINE HCL (ATARAX) 25 MG TABLET    TAKE ONE TABLET BY MOUTH FOUR TIMES DAILY AS NEEDED FOR ITCHING    METHYLPREDNISOLONE (MEDROL DOSEPACK) 4 MG TABLET    Use as directed.    MIRABEGRON (MYRBETRIQ) 25 MG TB24 ER TABLET    Take 1 tablet (25 mg total) by mouth once daily.    MOMETASONE (NASONEX) 50 MCG/ACTUATION NASAL SPRAY    INHALE 2 SPRAYS INTO THE NOSTRIL ONCE A DAY    MONTELUKAST (SINGULAIR) 10 MG TABLET    Take 1 tablet (10 mg total) by mouth once daily.    OMALIZUMAB (XOLAIR) 150 MG/ML INJECTION    Inject 300 mg into the skin.    POTASSIUM CHLORIDE SA (K-DUR,KLOR-CON M) 10 MEQ TABLET    Take 1 tablet (10 mEq total) by mouth once daily.    PROMETHAZINE-DEXTROMETHORPHAN (PROMETHAZINE-DM) 6.25-15 MG/5 ML SYRP     Take 5 mLs by mouth every 4 to 6 hours as needed (cough).    RIZATRIPTAN (MAXALT) 10 MG TABLET    TAKE 1 TABLET BY MOUTH AS NEEDED FOR MIGRAINE. MAY REPEAT IN 2 HRS. MAX 2 TABS PER 24 HOURS    TACROLIMUS (PROTOPIC) 0.03 % OINTMENT    Apply topically 2 (two) times daily.    TRIAMTERENE-HYDROCHLOROTHIAZIDE 37.5-25 MG (DYAZIDE) 37.5-25 MG PER CAPSULE    Take 1 capsule by mouth once daily.     Review of patient's allergies indicates:   Allergen Reactions    Benzalkonium chloride     Black pepper      Other reaction(s): Hives    Chocolate flavor      Other reaction(s): Hives    Citrus and derivatives Hives     LEMON PRODUCTS    Grapefruit Hives     Other reaction(s): Hives    Ibuprofen Swelling    Latex      Other reaction(s): Hives    Lemon balm (casper officinalis) Hives     Anything with lemon in it    Naproxen Swelling    Neomycin-bacitracin-polymyxin      Other reaction(s): Rash    Oats (richard) Hives     Oat foods (oatmeal, etc...)    Vegetable acetoglycerides Hives     Vegetable gums    Wheat containing prod     Wheat flour Hives     ROS    Physical Exam:   Body mass index is 35.48 kg/m².  There were no vitals filed for this visit.   GENERAL: Well appearing, appropriate for stated age, no acute distress.  CARDIOVASCULAR: Pulses regular by peripheral palpation.  PULMONARY: Respirations are even and non-labored.  NEURO: Awake, alert, and oriented x 3.  PSYCH: Mood & affect are appropriate.  HEENT: Head is normocephalic and atraumatic.            Right Knee Exam     Inspection   Effusion: absent    Tenderness   The patient is tender to palpation of the medial joint line.    Range of Motion   The patient has normal right knee ROM.  Extension:  0   Flexion:  120     Tests   Meniscus   Dianne:  Medial - positive Lateral - negative  Ligament Examination   Lachman: normal (-1 to 2mm)   MCL - Valgus: normal (0 to 2mm)  LCL - Varus: normal    Other   Sensation: normal    Comments:  Intact EHL, FHL, gastrocsoleus, and  tibialis anterior. Sensation intact to light touch in superficial peroneal, deep peroneal, tibial, sural, and saphenous nerve distributions. Foot warm and well perfused with capillary refill of less than 2 seconds and palpable pedal pulses.      Left Knee Exam     Inspection   Scars: present  Effusion: absent    Range of Motion   The patient has normal left knee ROM.  Extension:  0   Flexion:  120     Tests   Meniscus   Dianne:  Medial - negative Lateral - negative  Stability   Lachman: normal (-1 to 2mm)   MCL - Valgus: normal (0 to 2mm)  LCL - Varus: normal (0 to 2mm)    Other   Sensation: normal    Muscle Strength   Right Lower Extremity   Hip Abduction: 5/5   Quadriceps:  5/5   Hamstrin/5   Left Lower Extremity   Hip Abduction: 5/5   Quadriceps:  5/5   Hamstrin/5     Vascular Exam     Right Pulses  Dorsalis Pedis:      2+  Posterior Tibial:      2+        Left Pulses  Dorsalis Pedis:      2+  Posterior Tibial:      2+        Physical Exam    IMAGING:  - X-rays of the knees: Last year when the patient fell             Imaging:    X-ray Knee Ortho Bilateral with Flexion  Narrative: EXAM: XR KNEE ORTHO BILAT WITH FLEXION    CLINICAL HISTORY: Right knee pain, left knee pain    FINDINGS:  1 view.  Severe medial joint space narrowing of the left knee.  narrowing of the lateral compartment of the right knee.    Comparison knee series 2025.  Impression:  Severe medial joint space narrowing left knee.  Moderate lateral joint space narrowing of the right knee.    Finalized on: 2025 3:59 PM By:  Anders Kincaid MD  [facility]# [id number]      2025 16:01:36.917     [facility]        Relevant imaging results reviewed and interpreted by me, discussed with the patient and / or family today.     Other Tests:         Assessment & Plan    PLAN SUMMARY:   MRI of right knee ordered to evaluate for meniscus tear   Follow-up after MRI to discuss results and treatment plan    RIGHT KNEE PAIN:   Ordered MRI of right knee to  evaluate for meniscus tear.    FOLLOW-UP:   Follow up after MRI to discuss results and treatment plan.             Patient Instructions   Assessment:  Stacey Wade is a  59 y.o. 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    Pain in both knees, unspecified chronicity     Chronic pain of right knee Yes        Plan:  MRI of Right knee to evaluate meniscus     Follow-up: AFTER IMAGING. Please reach out to us sooner if there are any problems between now and then.    About Dr. Kaushik Faulkner's Research & Publications    Give us Feedback:   Google: Leave Google Review  Healthgrades: Leave Healthgrades Review    After Hours Number: [phone]        Provider Note/Medical Decision Making:  She is really symptomatic in the medial joint line of the right knee and is concerned that she has a root tear on that side.  She would like to get advanced imaging to work this up which I do think is reasonable.  She has failed considerable conservative management at this point.      I discussed worrisome and red flag signs and symptoms with the patient. The patient expressed understanding and agreed to alert me immediately or to go to the emergency room if they experience any of these.   Treatment plan was developed with input from the patient/family, and they expressed understanding and agreement with the plan. All questions were answered today.          Wes Faulkner MD  Orthopaedic Surgery & Sports Medicine       Disclaimer: This note was prepared using a voice recognition system and is likely to have sound alike errors within the text.     This note was generated with the assistance of ambient listening technology. Verbal consent  was obtained by the patient and accompanying visitor(s) for the recording of patient appointment to facilitate this note. I attest to having reviewed and edited the generated note for accuracy, though some syntax or spelling errors may persist. Please contact the author of this note for any clarification.    I, Tamra Garcia, acted as a scribe for Wes Faulkner MD for the duration of this office visit.           [1]   Social History  Socioeconomic History    Marital status: Single   Occupational History    Occupation: Pharmacy Tech     Comment: [employer]   Tobacco Use    Smoking status: Never    Smokeless tobacco: Never   Substance and Sexual Activity    Alcohol use: No    Drug use: No    Sexual activity: Not Currently     Partners: Male     Birth control/protection: None   Social History Narrative    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

## 2025-02-27 NOTE — PROGRESS NOTES
CHIEF COMPLAINT:  This is a 59-year-old female here for preventative health exam.     SUBJECTIVE:  Patient is doing well without complaints except for chronic pain in knees right greater than left.  She is status post left knee surgery in May 2022 and is followed by Orthopedics.  She has essential hypertension for which she takes Dyazide daily.  Her blood pressure today is  112/84. She has prediabetes with last A1c 5.8% one year ago. She's being followed by breast screening because of fibrocystic disease.  She had nodule in right breast which has improved since she discontinued caffeine.  She continues to take Xolair injections for chronic urticaria.  She also takes Singulair, doxepin, and famotidine.  She is obese with a BMI of 34.88. She takes Mybetriq 25 mg daily for overactive bladder.     Eye exam June 2024.  Mammogram April 2024.   Pap smear October 2021 due again in October 2026 per GYN.  Colonoscopy January 2018, due again in January 2028.  Tdap October 2016. Flu vaccine October 2024. COVID-19 vaccine September, October 2021, April, December 2022.      ROS:  GENERAL: Patient denies fever, chills, night sweats. Patient denies weight gain. Patient denies anorexia, fatigue, weakness or swollen glands.  Positive for weight loss.  SKIN: Patient denies rash or hair loss.  HEENT: Patient denies sore throat, ear pain, hearing loss, nasal congestion, or runny nose. Patient denies visual disturbance, eye irritation or discharge.  LUNGS: Patient denies cough, wheeze or hemoptysis.  CARDIOVASCULAR: Patient denies shortness of breath, palpitations, syncope or lower extremity edema.  GI: Patient denies abdominal pain, nausea, vomiting, diarrhea, constipation, blood in stool or melena.  GENITOURINARY: Patient denies pelvic pain, vaginal discharge, itch or odor. Patient denies irregular vaginal bleeding. Patient denies dysuria, frequency, hematuria, nocturia, urgency or incontinence.  BREASTS: Patient denies breast pain,  mass or nipple discharge.  MUSCULOSKELETAL: Patient denies joint swelling, redness or warmth. Positive for ankle and foot pain.  NEUROLOGIC: Patient denies headache, vertigo, paresthesias, weakness in limb, dysarthria, dysphagia or abnormality of gait.  PSYCHIATRIC: Patient denies anxiety, depression, or memory loss.     OBJECTIVE:   GENERAL: Well-developed well-nourished, obese, black female alert and oriented x3, in no acute distress. Memory, judgment and cognition without deficit.  Weight loss of 10 pounds in the last year.  SKIN: Clear without rash. Normal color and tone.  HEENT: Eyes: Clear conjunctivae. Pupils equal reactive to light and accommodation. Ears: Clear TMs. Clear canals. Nose: Without congestion. Pharynx: Without injection or exudates.  NECK: Supple, normal range of motion. No masses, lymphadenopathy or enlarged thyroid. No JVD. Carotids 2+ and equal. No bruits.  LUNGS: Clear to auscultation. Normal respiratory effort.  CARDIOVASCULAR: Regular rhythm, normal S1, S2 without murmur, gallop or rub.  BACK: No CVA or spinal tenderness.  BREASTS: No masses, tenderness or nipple discharge.  ABDOMEN: Normal appearance. Active bowel sounds. Soft, nontender without mass or organomegaly. No rebound or guarding.  EXTREMITIES: Without cyanosis, clubbing or edema. Distal pulses 2+ and equal. Normal range of motion in all extremities. No joint effusion, erythema or warmth. Ankle/foot splints in place.  NEUROLOGIC: Cranial nerves II through XII without deficit. Motor strength equal bilaterally. Sensation normal to touch. Deep tendon reflexes 2+ and equal. Gait without abnormality. No tremor. Negative cerebellar signs.  PELVIC: Deferred to OBGYN.    ASSESSMENT:  1. Preventative health care    2. Essential hypertension    3. Prediabetes    4. Overactive bladder      PLAN:  1. Weight reduction. Exercise regularly.  2. Age-appropriate counseling.  3. Fasting lab.  4. Mammogram.  5. Refill medications as needed.    6.  Follow-up annually.    This note is generated with speech recognition software and is subject to transcription error and sound alike phrases that may be missed by proofreading.

## 2025-02-27 NOTE — PATIENT INSTRUCTIONS
Assessment:  Stacey Wade is a  59 y.o. 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    Pain in both knees, unspecified chronicity     Chronic pain of right knee Yes      Plan:  MRI of Right knee to evaluate meniscus     Follow-up: AFTER IMAGING. Please reach out to us sooner if there are any problems between now and then.    About Dr. Kaushik Faulkner's Research & Publications    Give us Feedback:   Google: Leave Google Review  Healthgrades: Leave Healthgrades Review    After Hours Number: [phone]

## 2025-03-02 ENCOUNTER — RESULTS FOLLOW-UP (OUTPATIENT)
Dept: FAMILY MEDICINE | Facility: CLINIC | Age: 60
End: 2025-03-02

## 2025-03-07 DIAGNOSIS — J30.9 ALLERGIC RHINITIS, UNSPECIFIED SEASONALITY, UNSPECIFIED TRIGGER: ICD-10-CM

## 2025-03-07 NOTE — TELEPHONE ENCOUNTER
Refill Routing Note   Medication(s) are not appropriate for processing by [facility] for the following reason(s):        Due for refill >6 months ago    [facility] action(s):  Defer        Medication Therapy Plan: Last ordered 4/25/23      Appointments  past 12m or future 3m with PCP    Date Provider   Last Visit   2/27/2025 Savannah Lindsey MD   Next Visit   Visit date not found Savannah Lindsey MD   ED visits in past 90 days: 0        Note composed:12:46 PM 03/07/2025

## 2025-03-07 NOTE — TELEPHONE ENCOUNTER
No care due was identified.  [facility] Embedded Care Due Messages. Reference number: [id number].   3/07/2025 10:27:07 AM CST

## 2025-03-09 RX ORDER — MOMETASONE FUROATE MONOHYDRATE 50 UG/1
SPRAY, METERED NASAL
Qty: 51 G | Refills: 3 | Status: SHIPPED | OUTPATIENT
Start: 2025-03-09

## 2025-03-10 ENCOUNTER — PATIENT MESSAGE (OUTPATIENT)
Dept: SPORTS MEDICINE | Facility: CLINIC | Age: 60
End: 2025-03-10
Payer: COMMERCIAL

## 2025-03-17 ENCOUNTER — PATIENT MESSAGE (OUTPATIENT)
Dept: FAMILY MEDICINE | Facility: CLINIC | Age: 60
End: 2025-03-17
Payer: COMMERCIAL

## 2025-03-17 ENCOUNTER — TELEPHONE (OUTPATIENT)
Dept: FAMILY MEDICINE | Facility: CLINIC | Age: 60
End: 2025-03-17
Payer: COMMERCIAL

## 2025-03-17 NOTE — TELEPHONE ENCOUNTER
Pt states she started having problems last night. Pt states she has burning in her stomach, gas, nausea, and frequent bowel movements. Pt states she tried OTC Famotidine 20 mg but it did not really help.

## 2025-03-17 NOTE — TELEPHONE ENCOUNTER
----- Message from Dana sent at 3/17/2025  1:33 PM CDT -----  Contact: pt  Type:  Needs Medical AdviceWho Called:  ptSymptoms (please be specific):  pt is having acid reflux and need dr to call in something for itHow long has patient had these symptoms:   last nightPharmacy name and phone #:  [phone] Would the patient rather a call back or a response via [facility]? [facility] Call Back Number:  Additional Information:   [id number] - [place] - [address], [facility] [id number] [facility], [address] [address]Smteu: [phone] Fax: [phone]

## 2025-03-17 NOTE — TELEPHONE ENCOUNTER
Need more information.  How long as this has been going on?  What are her symptoms?  What has she tried taking?

## 2025-03-19 DIAGNOSIS — L50.9 URTICARIA: ICD-10-CM

## 2025-03-20 RX ORDER — HYDROXYZINE HYDROCHLORIDE 25 MG/1
TABLET, FILM COATED ORAL
Qty: 120 TABLET | Refills: 3 | Status: SHIPPED | OUTPATIENT
Start: 2025-03-20

## 2025-03-25 DIAGNOSIS — F41.9 ANXIETY: Primary | ICD-10-CM

## 2025-03-25 RX ORDER — CLORAZEPATE DIPOTASSIUM 3.75 MG/1
3.75 TABLET ORAL DAILY PRN
Qty: 30 TABLET | Refills: 0 | Status: SHIPPED | OUTPATIENT
Start: 2025-03-25

## 2025-03-25 NOTE — TELEPHONE ENCOUNTER
No care due was identified.  [facility] Embedded Care Due Messages. Reference number: [id number].   3/25/2025 11:23:43 AM CDT

## 2025-03-29 DIAGNOSIS — F41.9 ANXIETY: ICD-10-CM

## 2025-03-29 NOTE — TELEPHONE ENCOUNTER
No care due was identified.  [facility] Embedded Care Due Messages. Reference number: [id number].   3/29/2025 8:04:06 AM CDT

## 2025-03-30 RX ORDER — DOXEPIN HYDROCHLORIDE 10 MG/1
20 CAPSULE ORAL 3 TIMES DAILY
Qty: 540 CAPSULE | Refills: 3 | Status: SHIPPED | OUTPATIENT
Start: 2025-03-30

## 2025-03-30 NOTE — TELEPHONE ENCOUNTER
Refill Decision Note   Stacey Wade  is requesting a refill authorization.  Brief Assessment and Rationale for Refill:  Approve     Medication Therapy Plan:       Medication Reconciliation Completed: No   Comments:     No Care Gaps recommended.     Note composed:2:00 PM 03/30/2025

## 2025-04-03 RX ORDER — POTASSIUM CHLORIDE 750 MG/1
10 TABLET, EXTENDED RELEASE ORAL DAILY
Qty: 90 TABLET | Refills: 3 | Status: SHIPPED | OUTPATIENT
Start: 2025-04-03

## 2025-04-03 NOTE — TELEPHONE ENCOUNTER
Refill Decision Note   Stacey Wade  is requesting a refill authorization.  Brief Assessment and Rationale for Refill:  Approve     Medication Therapy Plan:         Pharmacist review requested: Yes   Comments:     Note composed:11:08 AM 04/03/2025

## 2025-04-03 NOTE — TELEPHONE ENCOUNTER
Refill Routing Note   Medication(s) are not appropriate for processing by [facility] for the following reason(s):        Drug-drug interaction    [facility] action(s):  Defer        Medication Therapy Plan: Drug drug:  potassium chloride ER  and potassium chloride SA    Pharmacist review requested: Yes     Appointments  past 12m or future 3m with PCP    Date Provider   Last Visit   2/27/2025 Savannah Lindsey MD   Next Visit   Visit date not found Savannah Lindsey MD   ED visits in past 90 days: 0        Note composed:11:03 AM 04/03/2025

## 2025-04-03 NOTE — TELEPHONE ENCOUNTER
No care due was identified.  [facility] Embedded Care Due Messages. Reference number: [id number].   4/03/2025 8:03:08 AM CDT

## 2025-04-15 RX ORDER — BETAMETHASONE DIPROPIONATE 0.5 MG/G
CREAM TOPICAL 2 TIMES DAILY
Qty: 45 G | Refills: 0 | Status: SHIPPED | OUTPATIENT
Start: 2025-04-15

## 2025-04-15 NOTE — TELEPHONE ENCOUNTER
No care due was identified.  [facility] Embedded Care Due Messages. Reference number: [id number].   4/15/2025 5:38:08 PM CDT

## 2025-04-16 NOTE — TELEPHONE ENCOUNTER
Refill Decision Note   Stacey Wade  is requesting a refill authorization.  Brief Assessment and Rationale for Refill:  Approve     Medication Therapy Plan:         Comments:     Note composed:7:10 PM 04/15/2025             Appointments     Last Visit   2/27/2025 Savannah Lindsey MD   Next Visit   Visit date not found Savannah Lindsey MD

## 2025-04-30 ENCOUNTER — TELEPHONE (OUTPATIENT)
Dept: SURGERY | Facility: CLINIC | Age: 60
End: 2025-04-30
Payer: COMMERCIAL

## 2025-04-30 NOTE — TELEPHONE ENCOUNTER
Call placed to pt to inform of 5/20 appt cancelation. Pt verbalized understanding, and Pt agreed to new appt details.

## 2025-05-07 RX ORDER — TRIAMTERENE AND HYDROCHLOROTHIAZIDE 37.5; 25 MG/1; MG/1
1 CAPSULE ORAL
Qty: 90 CAPSULE | Refills: 3 | Status: SHIPPED | OUTPATIENT
Start: 2025-05-07

## 2025-05-07 NOTE — TELEPHONE ENCOUNTER
No care due was identified.  [facility] Embedded Care Due Messages. Reference number: [id number].   5/07/2025 8:03:21 AM CDT

## 2025-05-07 NOTE — TELEPHONE ENCOUNTER
Refill Decision Note   Stacey Wade  is requesting a refill authorization.  Brief Assessment and Rationale for Refill:  Approve     Medication Therapy Plan:         Comments:     Note composed:10:14 AM 05/07/2025

## 2025-05-20 ENCOUNTER — HOSPITAL ENCOUNTER (OUTPATIENT)
Dept: RADIOLOGY | Facility: HOSPITAL | Age: 60
Discharge: HOME | End: 2025-05-20
Attending: NURSE PRACTITIONER
Payer: COMMERCIAL

## 2025-05-20 VITALS — BODY MASS INDEX: 35.09 KG/M2 | HEIGHT: 62 IN | WEIGHT: 190.69 LBS

## 2025-05-20 DIAGNOSIS — Z80.3 FAMILY HISTORY OF BREAST CANCER: ICD-10-CM

## 2025-05-20 DIAGNOSIS — Z91.89 AT HIGH RISK FOR BREAST CANCER: ICD-10-CM

## 2025-05-20 DIAGNOSIS — Z12.31 ENCOUNTER FOR SCREENING MAMMOGRAM FOR MALIGNANT NEOPLASM OF BREAST: ICD-10-CM

## 2025-05-20 PROCEDURE — 77067 SCR MAMMO BI INCL CAD: CPT | Mod: 26,,, | Performed by: STUDENT IN AN ORGANIZED HEALTH CARE EDUCATION/TRAINING PROGRAM

## 2025-05-20 PROCEDURE — 77063 BREAST TOMOSYNTHESIS BI: CPT | Mod: 26,,, | Performed by: STUDENT IN AN ORGANIZED HEALTH CARE EDUCATION/TRAINING PROGRAM

## 2025-05-20 PROCEDURE — 77063 BREAST TOMOSYNTHESIS BI: CPT | Mod: TC

## 2025-05-21 ENCOUNTER — RESULTS FOLLOW-UP (OUTPATIENT)
Dept: SURGERY | Facility: CLINIC | Age: 60
End: 2025-05-21

## 2025-05-27 RX ORDER — BUTALBITAL, ACETAMINOPHEN AND CAFFEINE 50; 325; 40 MG/1; MG/1; MG/1
TABLET ORAL
Qty: 30 TABLET | Refills: 3 | Status: SHIPPED | OUTPATIENT
Start: 2025-05-27 | End: 2025-05-27 | Stop reason: SDUPTHER

## 2025-05-27 RX ORDER — BUTALBITAL, ACETAMINOPHEN AND CAFFEINE 50; 325; 40 MG/1; MG/1; MG/1
TABLET ORAL
Qty: 30 TABLET | Refills: 3 | Status: SHIPPED | OUTPATIENT
Start: 2025-05-27

## 2025-05-28 ENCOUNTER — OFFICE VISIT (OUTPATIENT)
Dept: SURGERY | Facility: CLINIC | Age: 60
End: 2025-05-28
Payer: COMMERCIAL

## 2025-05-28 DIAGNOSIS — Z80.3 FAMILY HISTORY OF MALIGNANT NEOPLASM OF BREAST: Primary | ICD-10-CM

## 2025-05-28 DIAGNOSIS — R92.333 HETEROGENEOUSLY DENSE TISSUE OF BOTH BREASTS ON MAMMOGRAPHY: ICD-10-CM

## 2025-05-28 PROCEDURE — 1160F RVW MEDS BY RX/DR IN RCRD: CPT | Mod: CPTII,S$GLB,, | Performed by: SURGERY

## 2025-05-28 PROCEDURE — 99999 PR PBB SHADOW E&M-EST. PATIENT-LVL II: CPT | Mod: PBBFAC,,, | Performed by: SURGERY

## 2025-05-28 PROCEDURE — 1159F MED LIST DOCD IN RCRD: CPT | Mod: CPTII,S$GLB,, | Performed by: SURGERY

## 2025-05-28 PROCEDURE — 99213 OFFICE O/P EST LOW 20 MIN: CPT | Mod: S$GLB,,, | Performed by: SURGERY

## 2025-05-28 PROCEDURE — 3044F HG A1C LEVEL LT 7.0%: CPT | Mod: CPTII,S$GLB,, | Performed by: SURGERY

## 2025-05-28 NOTE — PROGRESS NOTES
Date of Service: 5/28/2025    Chief Complaint:   Stacey Wade is a 59 y.o. female presenting today for follow-up and high-risk clinic     This is a 59-year-old female who presents for follow-up secondary to heterogeneously dense breasts and a family history of breast cancer.  She has been alternating mammogram and ultrasound.  She has not been doing high-risk MRI secondary to high deductible.  She currently denies breast pain, she denies a palpable breast mass.  She denies nipple discharge.  She has had no previous breast biopsies or surgery.  The patient does not routinely exercising secondary to chronic knee pain.  She is trying to eat relatively healthy.  She rarely drinks alcohol, she does not smoke.  She did have bilateral breast mammogram May 2025.  She presents today for physical exam.      IMAGING:    Mammo Digital Screening Bilat w/ Jimy  Narrative: Facility:  [facility] [address] [place] [address]  [phone]    Name: Stacey Wade    MRN: [id number]    Result:  Mammo Digital Screening Bilat w/ Jimy    History:  Patient is 59 y.o. and is seen for a screening mammogram.    Films Compared:  Compared to: 04/30/2024 Mammo Digital Screening Bilat w/ Jimy, 02/21/2023   Mammo Digital Screening Bilat w/ Jimy, and 02/15/2022 Mammo Digital   Screening Bilat w/ Jimy     Findings:  This procedure was performed using tomosynthesis.   Computer-aided detection was utilized in the interpretation of this   examination.    The breasts are heterogeneously dense, which may obscure small masses.   There is no evidence of suspicious masses, microcalcifications or   architectural distortion.  Impression:    No mammographic evidence of malignancy.    BI-RADS Category 1: Negative    Recommendation:  Routine screening mammogram in 1 year is recommended.    Your estimated lifetime risk of breast cancer (to age 85) based on   Tyrer-Cuzick risk assessment model is 20.67%.   According to the American   Cancer Society, patients with a lifetime breast cancer risk of 20% or   higher might benefit from supplemental screening tests, such as screening   breast MRI.    Electronically signed by,  Gabriel Steve MD     06/12/2018 [facility]  MRI Breast Bilateral W WO Contrast     History:  Patient is 52 y.o. and is seen for at high risk for breast cancer.        Films Compared:  Prior images (if available) were compared.     Technique:  A routine breast MRI was performed with a dedicated breast coil. Pre-contrast STIR were acquired. Then, pre and post contrast T1 weighted fat saturated images were acquired and subtracted with MIP reconstruction. 15 ml of intravenous gadolinium contrast was administered. The study was reviewed with Consumr software.     Findings:  The breasts have heterogeneous fibroglandular tissue. The background parenchymal enhancement is minimal and symmetric.     No significant masses, enhancements, or other abnormal findings are seen.     Impression:  No significant masses, enhancements, or other abnormal findings are seen.     BI-RADS Category:   Overall: 1 - Negative     Recommendation:  Screening Breast MRI in 1 year is recommended for both breasts.      Past Medical History:   Diagnosis Date    Allergic rhinitis     Anxiety     Complex tear of medial meniscus of left knee 5/5/2022    Hypertension     Osteoarthritis of both knees     Prediabetes     Urticaria       Past Surgical History:   Procedure Laterality Date    CHONDROPLASTY OF KNEE Left 05/13/2022    Procedure: CHONDROPLASTY, KNEE;  Surgeon: Wes Faulkner MD;  Location: [facility] OR;  Service: Orthopedics;  Laterality: Left;    COLONOSCOPY N/A 01/18/2018    Procedure: COLONOSCOPY;  Surgeon: Yong Smith MD;  Location: [facility] ENDO;  Service: Endoscopy;  Laterality: N/A;    HERNIA REPAIR Left       Current Medications[1]   Review of patient's allergies indicates:   Allergen Reactions    Benzalkonium chloride      Black pepper      Other reaction(s): Hives    Chocolate flavor      Other reaction(s): Hives    Citrus and derivatives Hives     LEMON PRODUCTS    Grapefruit Hives     Other reaction(s): Hives    Ibuprofen Swelling    Latex      Other reaction(s): Hives    Lemon balm (casper officinalis) Hives     Anything with lemon in it    Naproxen Swelling    Neomycin-bacitracin-polymyxin      Other reaction(s): Rash    Oats (richard) Hives     Oat foods (oatmeal, etc...)    Vegetable acetoglycerides Hives     Vegetable gums    Wheat containing prod     Wheat flour Hives      Social History     Tobacco Use    Smoking status: Never    Smokeless tobacco: Never   Substance Use Topics    Alcohol use: No      Family History   Problem Relation Name Age of Onset    Lung cancer Paternal Grandfather      Ovarian cancer Maternal Grandmother      Hyperlipidemia Mother Pepe Wade     Hypertension Mother Pepe Wade     Breast cancer Mother Pepe Wade 60    Arthritis Mother Pepe Wade     Lung cancer Father      Breast cancer Maternal Aunt  53    Heart failure Other          Great aunt    Prostate cancer Brother Angus     Brain cancer Sister      Diabetes Neg Hx      Pseudochol deficiency Neg Hx      Malignant hyperthermia Neg Hx          Review of Systems   All other systems reviewed and are negative.       Physical Exam   Constitutional: She is oriented to person, place, and time.   HENT:   Head: Normocephalic.   Nose: Nose normal.   Mouth/Throat: Mucous membranes are dry. Oropharynx is clear.   Cardiovascular:  Normal rate, regular rhythm, normal heart sounds and normal pulses.            No murmur heard.  Pulmonary/Chest: Effort normal and breath sounds normal. She exhibits no mass, no tenderness and no swelling. Right breast exhibits no inverted nipple, no mass, no nipple discharge, no skin change and no tenderness. Left breast exhibits no inverted nipple, no mass, no nipple discharge, no skin change and no  tenderness. No breast swelling or bleeding. Breasts are symmetrical.   Abdominal: Soft. Normal appearance and bowel sounds are normal.   Genitourinary: No breast swelling or bleeding.   Musculoskeletal: Normal range of motion. No swelling or tenderness.   Neurological: She is alert and oriented to person, place, and time. She displays no weakness.   Skin: Skin is warm and dry. No bruising and no rash noted.     Psychiatric: Her behavior is normal. Mood, judgment and thought content normal.          NOTE:::We viewed her films together at today's visit.  We discussed the multiple views obtained and the important findings.  Even benign changes were mentioned and her questions were answered.  She knows that she may receive a formal letter or report from the Radiologist.  She is to contact us if she has questions.    ASSESSMENT and PLAN OF CARE     1. Family history of malignant neoplasm of breast  Assessment & Plan:  #1.The patient does have a family history of breast cancer. Her estimated lifetime risk of breast cancer (to age 87) based on Tyrer-Cuzick risk assessment model is 20.67%. High Risk - According to the BARON risk assessment tool, the patient has a lifetime risk for development of breast cancer of 20.67%.  I do recommend high risk screening secondary to her 20.6% lifetime risk for development of breast cancer.  This will consist of alternating MMG and/or Ultrasound with MRI every 6 months, along with physical exam of the breasts.  The patient does have a high deductible, and at this time wishes to alternate mammogram with ultrasound every 6 months.    #2. We recommended no tobacco use to include e-cigs and vapes.   #3. We recommend limiting alcohol consumption in moderation (3 or less glasses of wine a week.)   #4. She is aware regular exercise in increments of 30-45 minutes with heart rate elevation 3-4 times a week and maintaining healthy weight when used in combination with the prior discussed strategies  can decrease the risk of breast cancer.   #5. I recommend regular BSE for surveillance.  #6. I recommend regular exercise as well as eating a clean, healthy diet such as the Mediterranean diet to maintain a healthy BMI.  #7.  Bilateral breast mammogram May 2025 at [facility] so no suspicious findings.  Bilateral whole breast ultrasound at [facility] November 2025.  We will follow up patient in high-risk clinic with results of her physical exam.  #8. The patient has been instructed to call the office for problems, questions or concerns.       2. Heterogeneously dense tissue of both breasts on mammography        Follow up:  6 months and prn                [1]   Current Outpatient Medications:     aspirin (ECOTRIN) 81 MG EC tablet, Take 1 tablet by mouth every morning., Disp: , Rfl:     betamethasone dipropionate 0.05 % cream, APPLY TOPICALLY 2 TIMES DAILY., Disp: 45 g, Rfl: 0    butalbital-acetaminophen-caffeine -40 mg (FIORICET, ESGIC) -40 mg per tablet, TAKE 1 TABLET BY MOUTH EVERY 6 HOURS AS NEEDED FOR PAIN OR FOR HEADACHE, Disp: 30 tablet, Rfl: 3    clorazepate (TRANXENE) 3.75 MG Tab, Take 1 tablet (3.75 mg total) by mouth daily as needed., Disp: 30 tablet, Rfl: 0    clotrimazole-betamethasone 1-0.05% (LOTRISONE) cream, APPLY TOPICALLY TWICE DAILY AS DIRECTED, Disp: 45 g, Rfl: 2    diclofenac sodium (VOLTAREN) 1 % Gel, Apply 2 g topically 4 (four) times daily., Disp: 100 g, Rfl: 2    doxepin (SINEQUAN) 10 MG capsule, TAKE TWO CAPSULES BY MOUTH THREE TIMES DAILY, Disp: 540 capsule, Rfl: 3    EPINEPHrine (EPIPEN) 0.3 mg/0.3 mL AtIn, Inject 0.3 mg into the muscle daily as needed., Disp: , Rfl:     famotidine (PEPCID) 40 MG tablet, Take 0.5 tablets (20 mg total) by mouth 2 (two) times daily., Disp: 30 tablet, Rfl: 0    fexofenadine (ALLEGRA) 180 MG tablet, Take 1 tablet (180 mg total) by mouth once daily. In AM, Disp: 30 tablet, Rfl: 0    fluocinonide (LIDEX) 0.05 % ointment, Apply topically 2 (two) times daily.  Do not use longer than 2 weeks at a time, Disp: 30 g, Rfl: 1    hydrOXYzine HCL (ATARAX) 25 MG tablet, TAKE ONE TABLET BY MOUTH FOUR TIMES DAILY AS NEEDED FOR ITCHING, Disp: 120 tablet, Rfl: 3    methylPREDNISolone (MEDROL DOSEPACK) 4 mg tablet, Use as directed., Disp: 1 each, Rfl: 0    mirabegron (MYRBETRIQ) 25 mg Tb24 ER tablet, Take 1 tablet (25 mg total) by mouth once daily., Disp: 90 tablet, Rfl: 3    mometasone (NASONEX) 50 mcg/actuation nasal spray, INHALE 2 SPRAYS INTO THE NOSTRIL ONCE A DAY, Disp: 51 g, Rfl: 3    montelukast (SINGULAIR) 10 mg tablet, Take 1 tablet (10 mg total) by mouth once daily., Disp: 90 tablet, Rfl: 3    omalizumab (XOLAIR) 150 mg/mL injection, Inject 300 mg into the skin., Disp: , Rfl:     potassium chloride SA (K-DUR,KLOR-CON M) 10 MEQ tablet, Take 1 tablet (10 mEq total) by mouth once daily., Disp: 90 tablet, Rfl: 2    potassium chloride SA (K-DUR,KLOR-CON M) 10 MEQ tablet, Take 1 tablet (10 mEq total) by mouth once daily., Disp: 90 tablet, Rfl: 3    rizatriptan (MAXALT) 10 MG tablet, TAKE 1 TABLET BY MOUTH AS NEEDED FOR MIGRAINE. MAY REPEAT IN 2 HRS. MAX 2 TABS PER 24 HOURS, Disp: 81 tablet, Rfl: 0    tacrolimus (PROTOPIC) 0.03 % ointment, Apply topically 2 (two) times daily., Disp: , Rfl:     triamterene-hydrochlorothiazide 37.5-25 mg (DYAZIDE) 37.5-25 mg per capsule, TAKE 1 CAPSULE BY MOUTH ONCE A DAY, Disp: 90 capsule, Rfl: 3

## 2025-05-28 NOTE — ASSESSMENT & PLAN NOTE
#1.The patient does have a family history of breast cancer. Her estimated lifetime risk of breast cancer (to age 87) based on Tyrer-Cuzick risk assessment model is 20.67%. High Risk - According to the BARON risk assessment tool, the patient has a lifetime risk for development of breast cancer of 20.67%.  I do recommend high risk screening secondary to her 20.6% lifetime risk for development of breast cancer.  This will consist of alternating MMG and/or Ultrasound with MRI every 6 months, along with physical exam of the breasts.  The patient does have a high deductible, and at this time wishes to alternate mammogram with ultrasound every 6 months.    #2. We recommended no tobacco use to include e-cigs and vapes.   #3. We recommend limiting alcohol consumption in moderation (3 or less glasses of wine a week.)   #4. She is aware regular exercise in increments of 30-45 minutes with heart rate elevation 3-4 times a week and maintaining healthy weight when used in combination with the prior discussed strategies can decrease the risk of breast cancer.   #5. I recommend regular BSE for surveillance.  #6. I recommend regular exercise as well as eating a clean, healthy diet such as the Mediterranean diet to maintain a healthy BMI.  #7.  Bilateral breast mammogram May 2025 at [facility] so no suspicious findings.  Bilateral whole breast ultrasound at [facility] November 2025.  We will follow up patient in high-risk clinic with results of her physical exam.  #8. The patient has been instructed to call the office for problems, questions or concerns.

## 2025-05-29 RX ORDER — BUTALBITAL, ACETAMINOPHEN AND CAFFEINE 50; 325; 40 MG/1; MG/1; MG/1
TABLET ORAL
Qty: 30 TABLET | Refills: 3 | OUTPATIENT
Start: 2025-05-29

## 2025-05-29 NOTE — TELEPHONE ENCOUNTER
No care due was identified.  [facility] Embedded Care Due Messages. Reference number: [id number].   5/29/2025 2:11:53 PM CDT

## 2025-06-04 DIAGNOSIS — B37.2 INTERTRIGINOUS CANDIDIASIS: ICD-10-CM

## 2025-06-04 RX ORDER — CLOTRIMAZOLE AND BETAMETHASONE DIPROPIONATE 10; .64 MG/G; MG/G
CREAM TOPICAL 2 TIMES DAILY
Qty: 45 G | Refills: 2 | Status: SHIPPED | OUTPATIENT
Start: 2025-06-04

## 2025-06-04 RX ORDER — CLOTRIMAZOLE AND BETAMETHASONE DIPROPIONATE 10; .64 MG/G; MG/G
CREAM TOPICAL 2 TIMES DAILY
Qty: 45 G | Refills: 2 | OUTPATIENT
Start: 2025-06-04

## 2025-06-28 DIAGNOSIS — L50.9 URTICARIA: ICD-10-CM

## 2025-06-30 RX ORDER — HYDROXYZINE HYDROCHLORIDE 25 MG/1
TABLET, FILM COATED ORAL
Qty: 120 TABLET | Refills: 2 | Status: SHIPPED | OUTPATIENT
Start: 2025-06-30

## 2025-06-30 NOTE — TELEPHONE ENCOUNTER
Refill Routing Note   Medication(s) are not appropriate for processing by [facility] for the following reason(s):        Outside of protocol    [facility] action(s):  Route               Appointments  past 12m or future 3m with PCP    Date Provider   Last Visit   2/27/2025 Savannah Lindsey MD   Next Visit   Visit date not found Savannah Lindsey MD   ED visits in past 90 days: 0        Note composed:9:41 AM 06/30/2025

## 2025-07-28 DIAGNOSIS — F41.9 ANXIETY: ICD-10-CM

## 2025-07-28 NOTE — TELEPHONE ENCOUNTER
No care due was identified.  [facility] Embedded Care Due Messages. Reference number: [id number].   7/28/2025 4:54:06 PM CDT

## 2025-07-29 RX ORDER — CLORAZEPATE DIPOTASSIUM 3.75 MG/1
3.75 TABLET ORAL
Qty: 30 TABLET | Refills: 0 | Status: SHIPPED | OUTPATIENT
Start: 2025-07-29

## (undated) DEVICE — UNDERGLOVES BIOGEL PI SZ 6 LF

## (undated) DEVICE — PAD ABD 8X10 STERILE

## (undated) DEVICE — PASSER SUTURELASSO MICROSURG

## (undated) DEVICE — COVER LIGHT HANDLE 80/CA

## (undated) DEVICE — Device

## (undated) DEVICE — DRAPE INCISE IOBAN 2 23X17IN

## (undated) DEVICE — SEE MEDLINE ITEM 146298

## (undated) DEVICE — NDL SAFETY 22G X 1.5 ECLIPSE

## (undated) DEVICE — BANDAGE ESMARK ELASTIC ST 6X9

## (undated) DEVICE — SEE MEDLINE ITEM 146292

## (undated) DEVICE — GAUZE SPONGE 4X4 12PLY

## (undated) DEVICE — GLOVE BIOGEL 7.5

## (undated) DEVICE — SEE MEDLINE ITEM 157216

## (undated) DEVICE — SUPPORT ULNA NERVE PROTECTOR

## (undated) DEVICE — ELECTRODE REM PLYHSV RETURN 9

## (undated) DEVICE — TIP SUCTION YANKAUER

## (undated) DEVICE — ANCHOR SUT FIBERTAK 1.8 KNTLS: Type: IMPLANTABLE DEVICE | Site: KNEE | Status: NON-FUNCTIONAL

## (undated) DEVICE — BLADE SURG #15 CARBON STEEL

## (undated) DEVICE — SUT ETHILON 3-0 PS2 18 BLK

## (undated) DEVICE — SEE MEDLINE ITEM 157027

## (undated) DEVICE — SHAVER SABRETOOTH CRV 4MMX13CM

## (undated) DEVICE — UNDERGLOVES BIOGEL PI SIZE 8.5

## (undated) DEVICE — GOWN SMARTGOWN LVL4 X-LONG XL

## (undated) DEVICE — ADHESIVE MASTISOL VIAL 48/BX

## (undated) DEVICE — TOURNIQUET SB QC DP 34X4IN

## (undated) DEVICE — COVER CAMERA OPERATING ROOM

## (undated) DEVICE — DRAPE MINI C-ARM

## (undated) DEVICE — TUBING SUCTION STRAIGHT .25X20

## (undated) DEVICE — APPLICATOR CHLORAPREP ORN 26ML

## (undated) DEVICE — MAT SURGICAL ECOSUCTIONER

## (undated) DEVICE — SUT MONOCRYL 3-0 SH U/D

## (undated) DEVICE — SUT PROLENE 3-0 PS-1 BL 18

## (undated) DEVICE — TUBING PUMP ARTHROSCOPY STRL

## (undated) DEVICE — GLOVE PROTEXIS LTX  8.5

## (undated) DEVICE — DRAPE STERI U-SHAPED 47X51IN

## (undated) DEVICE — SUT FIBERWIRE 0 1.5IN CLOSE LP

## (undated) DEVICE — SYR ONLY LUER LOCK 20CC

## (undated) DEVICE — POSITIONER HEAD DONUT 9IN FOAM

## (undated) DEVICE — DISPOSABLES KIT, TRANSTIBIAL ACL WITH SAW BLADE

## (undated) DEVICE — KIT FIBERTAK CURVED SPEAR 1.8M

## (undated) DEVICE — BLADE SHAVER TORPEDO 4MMX13CM

## (undated) DEVICE — NDL SPINAL 18GX3.5 SPINOCAN

## (undated) DEVICE — DRAPE PLASTIC U 60X72

## (undated) DEVICE — MANIFOLD 4 PORT

## (undated) DEVICE — PROBE MULTI PORT RF 90 DEGREE

## (undated) DEVICE — PAD CAST SPECIALIST STRL 6

## (undated) DEVICE — UNDERGLOVES BIOGEL PI SIZE 8

## (undated) DEVICE — GLOVE SURGEONS ULTRA TOUCH 5.5

## (undated) DEVICE — SHAVER SABRETOOTH 4MMX12.5CM

## (undated) DEVICE — TOWEL OR DISP STRL BLUE 4/PK

## (undated) DEVICE — SEE MEDLINE ITEM 157131

## (undated) DEVICE — BANDAGE ACE DOUBLE STER 6IN

## (undated) DEVICE — RETRIEVER SUTURE HEWSON DISP